# Patient Record
Sex: FEMALE | Race: WHITE | Employment: FULL TIME | URBAN - METROPOLITAN AREA
[De-identification: names, ages, dates, MRNs, and addresses within clinical notes are randomized per-mention and may not be internally consistent; named-entity substitution may affect disease eponyms.]

---

## 2017-01-08 ENCOUNTER — APPOINTMENT (OUTPATIENT)
Dept: CT IMAGING | Age: 37
End: 2017-01-08
Attending: NURSE PRACTITIONER
Payer: MEDICAID

## 2017-01-08 ENCOUNTER — HOSPITAL ENCOUNTER (EMERGENCY)
Age: 37
Discharge: HOME | End: 2017-01-08
Attending: EMERGENCY MEDICINE
Payer: MEDICAID

## 2017-01-08 VITALS
HEART RATE: 86 BPM | SYSTOLIC BLOOD PRESSURE: 105 MMHG | WEIGHT: 182.76 LBS | RESPIRATION RATE: 16 BRPM | OXYGEN SATURATION: 99 % | HEIGHT: 64 IN | TEMPERATURE: 97.7 F | BODY MASS INDEX: 31.2 KG/M2 | DIASTOLIC BLOOD PRESSURE: 76 MMHG

## 2017-01-08 DIAGNOSIS — K57.30 DIVERTICULOSIS OF LARGE INTESTINE WITHOUT HEMORRHAGE: Primary | ICD-10-CM

## 2017-01-08 DIAGNOSIS — K59.00 CONSTIPATION, UNSPECIFIED CONSTIPATION TYPE: ICD-10-CM

## 2017-01-08 LAB
ALBUMIN SERPL BCP-MCNC: 3.5 G/DL (ref 3.5–5)
ALBUMIN/GLOB SERPL: 0.8 {RATIO} (ref 1.1–2.2)
ALP SERPL-CCNC: 110 U/L (ref 45–117)
ALT SERPL-CCNC: 23 U/L (ref 12–78)
ANION GAP BLD CALC-SCNC: 9 MMOL/L (ref 5–15)
APPEARANCE UR: CLEAR
AST SERPL W P-5'-P-CCNC: 12 U/L (ref 15–37)
BACTERIA URNS QL MICRO: NEGATIVE /HPF
BASOPHILS # BLD AUTO: 0.1 K/UL (ref 0–0.1)
BASOPHILS # BLD: 1 % (ref 0–1)
BILIRUB SERPL-MCNC: 0.5 MG/DL (ref 0.2–1)
BILIRUB UR QL: NEGATIVE
BUN SERPL-MCNC: 24 MG/DL (ref 6–20)
BUN/CREAT SERPL: 26 (ref 12–20)
CALCIUM SERPL-MCNC: 9.3 MG/DL (ref 8.5–10.1)
CHLORIDE SERPL-SCNC: 105 MMOL/L (ref 97–108)
CO2 SERPL-SCNC: 25 MMOL/L (ref 21–32)
COLOR UR: ABNORMAL
CREAT SERPL-MCNC: 0.93 MG/DL (ref 0.55–1.02)
EOSINOPHIL # BLD: 0.1 K/UL (ref 0–0.4)
EOSINOPHIL NFR BLD: 2 % (ref 0–7)
EPITH CASTS URNS QL MICRO: ABNORMAL /LPF
ERYTHROCYTE [DISTWIDTH] IN BLOOD BY AUTOMATED COUNT: 14.2 % (ref 11.5–14.5)
GLOBULIN SER CALC-MCNC: 4.3 G/DL (ref 2–4)
GLUCOSE SERPL-MCNC: 72 MG/DL (ref 65–100)
GLUCOSE UR STRIP.AUTO-MCNC: NEGATIVE MG/DL
HCT VFR BLD AUTO: 45 % (ref 35–47)
HGB BLD-MCNC: 14.9 G/DL (ref 11.5–16)
HGB UR QL STRIP: ABNORMAL
KETONES UR QL STRIP.AUTO: NEGATIVE MG/DL
LEUKOCYTE ESTERASE UR QL STRIP.AUTO: NEGATIVE
LIPASE SERPL-CCNC: 159 U/L (ref 73–393)
LYMPHOCYTES # BLD AUTO: 38 % (ref 12–49)
LYMPHOCYTES # BLD: 2.9 K/UL (ref 0.8–3.5)
MCH RBC QN AUTO: 30.2 PG (ref 26–34)
MCHC RBC AUTO-ENTMCNC: 33.1 G/DL (ref 30–36.5)
MCV RBC AUTO: 91.1 FL (ref 80–99)
MONOCYTES # BLD: 0.4 K/UL (ref 0–1)
MONOCYTES NFR BLD AUTO: 5 % (ref 5–13)
NEUTS SEG # BLD: 4.2 K/UL (ref 1.8–8)
NEUTS SEG NFR BLD AUTO: 54 % (ref 32–75)
NITRITE UR QL STRIP.AUTO: NEGATIVE
PH UR STRIP: 5.5 [PH] (ref 5–8)
PLATELET # BLD AUTO: 383 K/UL (ref 150–400)
POTASSIUM SERPL-SCNC: 4.6 MMOL/L (ref 3.5–5.1)
PROT SERPL-MCNC: 7.8 G/DL (ref 6.4–8.2)
PROT UR STRIP-MCNC: NEGATIVE MG/DL
RBC # BLD AUTO: 4.94 M/UL (ref 3.8–5.2)
RBC #/AREA URNS HPF: ABNORMAL /HPF (ref 0–5)
SODIUM SERPL-SCNC: 139 MMOL/L (ref 136–145)
SP GR UR REFRACTOMETRY: 1.02 (ref 1–1.03)
UA: UC IF INDICATED,UAUC: ABNORMAL
UROBILINOGEN UR QL STRIP.AUTO: 0.2 EU/DL (ref 0.2–1)
WBC # BLD AUTO: 7.7 K/UL (ref 3.6–11)
WBC URNS QL MICRO: ABNORMAL /HPF (ref 0–4)

## 2017-01-08 PROCEDURE — 80053 COMPREHEN METABOLIC PANEL: CPT | Performed by: EMERGENCY MEDICINE

## 2017-01-08 PROCEDURE — 96361 HYDRATE IV INFUSION ADD-ON: CPT

## 2017-01-08 PROCEDURE — 81001 URINALYSIS AUTO W/SCOPE: CPT | Performed by: NURSE PRACTITIONER

## 2017-01-08 PROCEDURE — 85025 COMPLETE CBC W/AUTO DIFF WBC: CPT | Performed by: EMERGENCY MEDICINE

## 2017-01-08 PROCEDURE — 83690 ASSAY OF LIPASE: CPT | Performed by: EMERGENCY MEDICINE

## 2017-01-08 PROCEDURE — 74011250636 HC RX REV CODE- 250/636: Performed by: NURSE PRACTITIONER

## 2017-01-08 PROCEDURE — 36415 COLL VENOUS BLD VENIPUNCTURE: CPT | Performed by: EMERGENCY MEDICINE

## 2017-01-08 PROCEDURE — 74176 CT ABD & PELVIS W/O CONTRAST: CPT

## 2017-01-08 PROCEDURE — 96374 THER/PROPH/DIAG INJ IV PUSH: CPT

## 2017-01-08 PROCEDURE — 99283 EMERGENCY DEPT VISIT LOW MDM: CPT

## 2017-01-08 RX ORDER — SODIUM CHLORIDE 9 MG/ML
100 INJECTION, SOLUTION INTRAVENOUS CONTINUOUS
Status: DISCONTINUED | OUTPATIENT
Start: 2017-01-08 | End: 2017-01-08 | Stop reason: HOSPADM

## 2017-01-08 RX ORDER — MORPHINE SULFATE 4 MG/ML
4 INJECTION, SOLUTION INTRAMUSCULAR; INTRAVENOUS
Status: COMPLETED | OUTPATIENT
Start: 2017-01-08 | End: 2017-01-08

## 2017-01-08 RX ADMIN — SODIUM CHLORIDE 100 ML/HR: 900 INJECTION, SOLUTION INTRAVENOUS at 17:53

## 2017-01-08 RX ADMIN — MORPHINE SULFATE 4 MG: 4 INJECTION, SOLUTION INTRAMUSCULAR; INTRAVENOUS at 17:53

## 2017-01-08 NOTE — ED PROVIDER NOTES
HPI Comments: Rita Beard, 31 y.o. female, presents ambulatory to ED [facility] ED with cc of acute onset sharp epigastric abdominal pain that radiates to her mid back that onset yesterday. The patient notes a history of gall bladder complications, and she notes that current symptoms are similar. The patient notes that she took Ibuprofen with no relief. The patient also reports that she has a surgical history significant for a hysterectomy. The patient denies any exacerbating/relieving factors to her symptoms. The patient specifically denies diarrhea, urinary complaints, or other acute complaints at this time. PCP: Terry Dyson MD    Social history significant for: + Tobacco (1 PPD), - EtOH, - Illicit Drug Use    There are no other complaints, changes, or physical findings at this time. Written by ANGELICA Sanchez, as dictated by Stephan Hi P-BC. The history is provided by the patient. No  was used. Past Medical History:   Diagnosis Date    ADD (attention deficit disorder) 19-17yo     Treated with Adderall since . 2013 dosing 20mg AM and 10mg PM.  Trial/change to Vyvanse Nov-Dec 2015--not as effective.  Headache(784.0)      migraines    HX OTHER MEDICAL       -BUFA baby    HX OTHER MEDICAL      HPV positive    Idiopathic vulvodynia 2014    IUD (intrauterine device) in place 2015     Mirena IUD placed on 4/17/15    Migraines 2013    miscarriages     Neurological disorder     Pelvic pain in female 9/15/2014     2015 gyn laparoscopy/hysteroscopy with endometriosis worse right.     Psychiatric disorder      depression    Psychiatric problem      depression    Secondary dysmenorrhea 2014     With menorraghia    Seizures ([facility] [address]      never on meds--had appr 4-5 in a short amt of time and none since       Past Surgical History:   Procedure Laterality Date    Endometrial cryoablation      Hx gyn  4/17/15 laparoscopy and hysteroscopy and IUD placement    Hx hysterectomy  04/04/2016     Complete Hysterectomy         Family History:   Problem Relation Age of Onset    Cancer Mother     Alcohol abuse Father      and drug    Psychiatric Disorder Father     Cancer Father     Diabetes Maternal Grandmother     Hypertension Maternal Grandmother     Thyroid Disease Maternal Grandmother     Diabetes Maternal Grandfather     Hypertension Maternal Grandfather     Cancer Maternal Grandfather     Heart Disease Maternal Grandfather     Cancer Paternal Grandmother     Diabetes Paternal Grandmother        Social History     Social History    Marital status: SINGLE     Spouse name: N/A    Number of children: N/A    Years of education: N/A     Occupational History    Not on file. Social History Main Topics    Smoking status: Current Every Day Smoker     Packs/day: 1.00     Years: 18.00     Types: Cigarettes    Smokeless tobacco: Never Used      Comment: Information given with AVS.    Alcohol use No    Drug use: No    Sexual activity: Yes     Partners: Male     Birth control/ protection: Surgical     Other Topics Concern    Not on file     Social History Narrative    ** Merged History Encounter **          ALLERGIES: Aspirin and Penicillins    Review of Systems   Constitutional: Negative. Negative for chills, diaphoresis and fever. HENT: Negative. Negative for congestion, rhinorrhea and trouble swallowing. Eyes: Negative. Respiratory: Negative. Negative for shortness of breath. Cardiovascular: Negative. Gastrointestinal: Positive for abdominal pain. Negative for diarrhea, nausea and vomiting. Endocrine: Negative. Genitourinary: Negative. Negative for difficulty urinating and dysuria. Musculoskeletal: Negative. Negative for arthralgias, myalgias, neck pain and neck stiffness. Skin: Negative. Negative for rash. Allergic/Immunologic: Negative. Neurological: Negative.   Negative for dizziness, syncope, weakness and headaches. Hematological: Negative. Psychiatric/Behavioral: Negative. All other systems reviewed and are negative. Vitals:    01/08/17 1529 01/08/17 1840   BP: 108/66 105/76   Pulse: 100 86   Resp: 12 16   Temp: 97.7 °F (36.5 °C)    SpO2: 100% 99%   Weight: 82.9 kg (182 lb 12.2 oz)    Height: 5' 4\" (1.626 m)             Physical Exam   Constitutional: She is oriented to person, place, and time. Vital signs are normal. She appears well-developed and well-nourished. Non-toxic appearance. She does not have a sickly appearance. She does not appear ill. HENT:   Head: Normocephalic and atraumatic. Eyes: Conjunctivae, EOM and lids are normal. Pupils are equal, round, and reactive to light. Neck: Trachea normal, normal range of motion and full passive range of motion without pain. Neck supple. Cardiovascular: Normal rate, regular rhythm, normal heart sounds and normal pulses. Pulmonary/Chest: Effort normal and breath sounds normal.   Abdominal: Soft. Normal appearance. There is tenderness in the right upper quadrant and epigastric area. There is no CVA tenderness. Active bowel sounds in all four quadrants   Musculoskeletal: Normal range of motion. Neurological: She is alert and oriented to person, place, and time. She has normal strength. GCS eye subscore is 4. GCS verbal subscore is 5. GCS motor subscore is 6. Skin: Skin is warm, dry and intact. Psychiatric: She has a normal mood and affect. Her speech is normal and behavior is normal. Judgment and thought content normal. Cognition and memory are normal.   Nursing note and vitals reviewed.        MDM  Number of Diagnoses or Management Options  Constipation, unspecified constipation type: minor  Diverticulosis of large intestine without hemorrhage: minor  Diagnosis management comments:   DDx: Cholecystitis, kidney stone, diverticulitis       Amount and/or Complexity of Data Reviewed  Clinical lab tests: ordered and reviewed  Tests in the radiology section of CPT®: ordered and reviewed  Review and summarize past medical records: yes    Risk of Complications, Morbidity, and/or Mortality  Presenting problems: low  Diagnostic procedures: moderate  Management options: low    Patient Progress  Patient progress: stable    ED Course       Procedures    Progress Note:  7:15 PM  Upon discharge, the patient's lab and imaging results were discussed with the patient. The patient conveys her understanding of the results and agreement with the plan of care. Written by Aaliyah Newberry ED Scribe, as dictated by Lino Hi [facility]-BC. LABORATORY TESTS:  Recent Results (from the past 12 hour(s))   CBC WITH AUTOMATED DIFF    Collection Time: 01/08/17  3:50 PM   Result Value Ref Range    WBC 7.7 3.6 - 11.0 K/uL    RBC 4.94 3.80 - 5.20 M/uL    HGB 14.9 11.5 - 16.0 g/dL    HCT 45.0 35.0 - 47.0 %    MCV 91.1 80.0 - 99.0 FL    MCH 30.2 26.0 - 34.0 PG    MCHC 33.1 30.0 - 36.5 g/dL    RDW 14.2 11.5 - 14.5 %    PLATELET 638 044 - 646 K/uL    NEUTROPHILS 54 32 - 75 %    LYMPHOCYTES 38 12 - 49 %    MONOCYTES 5 5 - 13 %    EOSINOPHILS 2 0 - 7 %    BASOPHILS 1 0 - 1 %    ABS. NEUTROPHILS 4.2 1.8 - 8.0 K/UL    ABS. LYMPHOCYTES 2.9 0.8 - 3.5 K/UL    ABS. MONOCYTES 0.4 0.0 - 1.0 K/UL    ABS. EOSINOPHILS 0.1 0.0 - 0.4 K/UL    ABS. BASOPHILS 0.1 0.0 - 0.1 K/UL   METABOLIC PANEL, COMPREHENSIVE    Collection Time: 01/08/17  3:50 PM   Result Value Ref Range    Sodium 139 136 - 145 mmol/L    Potassium 4.6 3.5 - 5.1 mmol/L    Chloride 105 97 - 108 mmol/L    CO2 25 21 - 32 mmol/L    Anion gap 9 5 - 15 mmol/L    Glucose 72 65 - 100 mg/dL    BUN 24 (H) 6 - 20 MG/DL    Creatinine 0.93 0.55 - 1.02 MG/DL    BUN/Creatinine ratio 26 (H) 12 - 20      GFR est AA >60 >60 ml/min/1.73m2    GFR est non-AA >60 >60 ml/min/1.73m2    Calcium 9.3 8.5 - 10.1 MG/DL    Bilirubin, total 0.5 0.2 - 1.0 MG/DL    ALT 23 12 - 78 U/L    AST 12 (L) 15 - 37 U/L    Alk.  phosphatase 110 45 - 117 U/L    Protein, total 7.8 6.4 - 8.2 g/dL    Albumin 3.5 3.5 - 5.0 g/dL    Globulin 4.3 (H) 2.0 - 4.0 g/dL    A-G Ratio 0.8 (L) 1.1 - 2.2     LIPASE    Collection Time: 01/08/17  3:50 PM   Result Value Ref Range    Lipase 159 73 - 393 U/L   URINALYSIS W/ REFLEX CULTURE    Collection Time: 01/08/17  5:56 PM   Result Value Ref Range    Color YELLOW/STRAW      Appearance CLEAR CLEAR      Specific gravity 1.025 1.003 - 1.030      pH (UA) 5.5 5.0 - 8.0      Protein NEGATIVE  NEG mg/dL    Glucose NEGATIVE  NEG mg/dL    Ketone NEGATIVE  NEG mg/dL    Bilirubin NEGATIVE  NEG      Blood TRACE (A) NEG      Urobilinogen 0.2 0.2 - 1.0 EU/dL    Nitrites NEGATIVE  NEG      Leukocyte Esterase NEGATIVE  NEG      WBC 0-4 0 - 4 /hpf    RBC 5-10 0 - 5 /hpf    Epithelial cells FEW FEW /lpf    Bacteria NEGATIVE  NEG /hpf    UA:UC IF INDICATED CULTURE NOT INDICATED BY UA RESULT CNI         IMAGING RESULTS:      EXAM: CT ABD PELV WO CONT     INDICATION: Epigastric pain     COMPARISON: 11/10/2016.     CONTRAST: None.      TECHNIQUE:   Unenhanced multislice helical CT was performed from the diaphragm to the  symphysis pubis without oral or intravenous contrast administration. Contiguous  5 mm axial images were reconstructed and lung and soft tissue windows were  generated. Coronal and sagittal reformations were generated. CT dose reduction  was achieved through use of a standardized protocol tailored for this  examination and automatic exposure control for dose modulation.     FINDINGS:  The visualized portions of the lung bases are clear.     The absence of intravenous contrast material reduces the sensitivity for  evaluation of the solid parenchymal organs of the abdomen. The liver, spleen,  bilateral adrenal glands, pancreas, and gallbladder are unremarkable. The  bilateral kidneys are unremarkable without evidence of nephrolithiasis or  hydronephrosis.     The abdominal aorta is normal in size.  There is no lymphadenopathy.     The stomach is decompressed. The small bowel is unremarkable. The appendix is  normal. A mild amount of stool is seen throughout the colon. Several diverticula  are seen in the distal left colon. No evidence of diverticulitis. No  obstruction, free fluid, or free air.     The patient is status post hysterectomy. The bladder is unremarkable.     No evidence of a destructive osseous lesion.     IMPRESSION  IMPRESSION: No evidence of acute process         MEDICATIONS GIVEN:  Medications   0.9% sodium chloride infusion (0 mL/hr IntraVENous IV Completed 1/8/17 1943)   morphine injection 4 mg (4 mg IntraVENous Given 1/8/17 6313)       IMPRESSION:  1. Diverticulosis of large intestine without hemorrhage    2. Constipation, unspecified constipation type        PLAN:  1. Current Discharge Medication List      CONTINUE these medications which have NOT CHANGED    Details   amphetamine-dextroamphetamine XR (ADDERALL XR) 20 mg XR capsule Take 1 Cap by mouth daily. Refills: 0      amphetamine-dextroamphetamine XR (ADDERALL XR) 25 mg XR capsule Take 1 Cap by mouth daily. Refills: 0           2. Follow-up Information     Follow up With Details Comments [address] [facility] [facility], [address] [facility] and [place] [address]  [phone]          Return to ED if worse     DISCHARGE NOTE  7:24 PM  The patient has been re-evaluated and is ready for discharge. Reviewed available results with patient. Counseled patient on diagnosis and care plan. Patient has expressed understanding, and all questions have been answered. Patient agrees with plan and agrees to follow up as recommended, or return to the ED if their symptoms worsen. Discharge instructions have been provided and explained to the patient, along with reasons to return to the ED. This note is prepared by Lorin Carlton, acting as Scribe for [employer]. Pagé FNP-BC. Rob Degroot  Pagé FNP-BC: The scribe's documentation has been prepared under my direction and personally reviewed by me in its entirety. I confirm that the note above accurately reflects all work, treatment, procedures, and medical decision making performed by me.

## 2017-01-08 NOTE — ED NOTES
Pt presents to ED for abdominal pain mid abdomen, epigastric that radiates straight through to back x yesterday. Pt reports she had an episodes of this pain 10 years ago, told gallbladder at that time, pain subsided. Pt denies alcohol intake or greasy food. Pt alert and oriented x 4. Pt denies nausea, vomiting, diarrhea.

## 2017-01-09 ENCOUNTER — OFFICE VISIT (OUTPATIENT)
Dept: INTERNAL MEDICINE CLINIC | Age: 37
End: 2017-01-09

## 2017-01-09 VITALS
DIASTOLIC BLOOD PRESSURE: 75 MMHG | HEART RATE: 95 BPM | OXYGEN SATURATION: 99 % | WEIGHT: 182 LBS | TEMPERATURE: 97.9 F | RESPIRATION RATE: 17 BRPM | HEIGHT: 64 IN | BODY MASS INDEX: 31.07 KG/M2 | SYSTOLIC BLOOD PRESSURE: 110 MMHG

## 2017-01-09 DIAGNOSIS — R10.13 MIDEPIGASTRIC PAIN: ICD-10-CM

## 2017-01-09 DIAGNOSIS — F98.8 ADD (ATTENTION DEFICIT DISORDER): ICD-10-CM

## 2017-01-09 DIAGNOSIS — K29.00 ACUTE GASTRITIS WITHOUT HEMORRHAGE, UNSPECIFIED GASTRITIS TYPE: Primary | ICD-10-CM

## 2017-01-09 DIAGNOSIS — R10.11 RUQ ABDOMINAL PAIN: ICD-10-CM

## 2017-01-09 RX ORDER — ESOMEPRAZOLE MAGNESIUM 40 MG/1
40 CAPSULE, DELAYED RELEASE ORAL DAILY
Qty: 30 CAP | Refills: 1 | Status: SHIPPED | OUTPATIENT
Start: 2017-01-09 | End: 2017-01-23

## 2017-01-09 RX ORDER — DEXTROAMPHETAMINE SACCHARATE, AMPHETAMINE ASPARTATE MONOHYDRATE, DEXTROAMPHETAMINE SULFATE AND AMPHETAMINE SULFATE 5; 5; 5; 5 MG/1; MG/1; MG/1; MG/1
20 CAPSULE, EXTENDED RELEASE ORAL
Qty: 30 CAP | Refills: 0 | Status: SHIPPED | OUTPATIENT
Start: 2017-02-13 | End: 2017-01-09 | Stop reason: SDUPTHER

## 2017-01-09 RX ORDER — DEXTROAMPHETAMINE SACCHARATE, AMPHETAMINE ASPARTATE MONOHYDRATE, DEXTROAMPHETAMINE SULFATE AND AMPHETAMINE SULFATE 6.25; 6.25; 6.25; 6.25 MG/1; MG/1; MG/1; MG/1
25 CAPSULE, EXTENDED RELEASE ORAL
Qty: 30 CAP | Refills: 0 | Status: SHIPPED | OUTPATIENT
Start: 2017-01-10 | End: 2017-06-17

## 2017-01-09 RX ORDER — DEXTROAMPHETAMINE SACCHARATE, AMPHETAMINE ASPARTATE MONOHYDRATE, DEXTROAMPHETAMINE SULFATE AND AMPHETAMINE SULFATE 6.25; 6.25; 6.25; 6.25 MG/1; MG/1; MG/1; MG/1
25 CAPSULE, EXTENDED RELEASE ORAL
Qty: 30 CAP | Refills: 0 | Status: SHIPPED | OUTPATIENT
Start: 2017-01-14 | End: 2017-01-09 | Stop reason: SDUPTHER

## 2017-01-09 RX ORDER — DEXTROAMPHETAMINE SACCHARATE, AMPHETAMINE ASPARTATE MONOHYDRATE, DEXTROAMPHETAMINE SULFATE AND AMPHETAMINE SULFATE 6.25; 6.25; 6.25; 6.25 MG/1; MG/1; MG/1; MG/1
25 CAPSULE, EXTENDED RELEASE ORAL DAILY
Qty: 30 CAP | Refills: 0 | Status: SHIPPED | OUTPATIENT
Start: 2017-03-11 | End: 2017-06-17

## 2017-01-09 RX ORDER — DEXTROAMPHETAMINE SACCHARATE, AMPHETAMINE ASPARTATE MONOHYDRATE, DEXTROAMPHETAMINE SULFATE AND AMPHETAMINE SULFATE 6.25; 6.25; 6.25; 6.25 MG/1; MG/1; MG/1; MG/1
25 CAPSULE, EXTENDED RELEASE ORAL DAILY
Qty: 30 CAP | Refills: 0 | Status: SHIPPED | OUTPATIENT
Start: 2017-03-15 | End: 2017-01-09 | Stop reason: SDUPTHER

## 2017-01-09 RX ORDER — DEXTROAMPHETAMINE SACCHARATE, AMPHETAMINE ASPARTATE MONOHYDRATE, DEXTROAMPHETAMINE SULFATE AND AMPHETAMINE SULFATE 6.25; 6.25; 6.25; 6.25 MG/1; MG/1; MG/1; MG/1
25 CAPSULE, EXTENDED RELEASE ORAL
Qty: 30 CAP | Refills: 0 | Status: SHIPPED | OUTPATIENT
Start: 2017-02-13 | End: 2017-01-09 | Stop reason: SDUPTHER

## 2017-01-09 RX ORDER — DEXTROAMPHETAMINE SACCHARATE, AMPHETAMINE ASPARTATE MONOHYDRATE, DEXTROAMPHETAMINE SULFATE AND AMPHETAMINE SULFATE 6.25; 6.25; 6.25; 6.25 MG/1; MG/1; MG/1; MG/1
25 CAPSULE, EXTENDED RELEASE ORAL
Qty: 30 CAP | Refills: 0 | Status: SHIPPED | OUTPATIENT
Start: 2017-02-09 | End: 2017-06-17

## 2017-01-09 RX ORDER — DEXTROAMPHETAMINE SACCHARATE, AMPHETAMINE ASPARTATE MONOHYDRATE, DEXTROAMPHETAMINE SULFATE AND AMPHETAMINE SULFATE 5; 5; 5; 5 MG/1; MG/1; MG/1; MG/1
20 CAPSULE, EXTENDED RELEASE ORAL
Qty: 30 CAP | Refills: 0 | Status: SHIPPED | OUTPATIENT
Start: 2017-01-14 | End: 2017-01-09 | Stop reason: SDUPTHER

## 2017-01-09 RX ORDER — DEXTROAMPHETAMINE SACCHARATE, AMPHETAMINE ASPARTATE MONOHYDRATE, DEXTROAMPHETAMINE SULFATE AND AMPHETAMINE SULFATE 5; 5; 5; 5 MG/1; MG/1; MG/1; MG/1
20 CAPSULE, EXTENDED RELEASE ORAL DAILY
Qty: 30 CAP | Refills: 0 | Status: SHIPPED | OUTPATIENT
Start: 2017-03-15 | End: 2017-01-09 | Stop reason: SDUPTHER

## 2017-01-09 RX ORDER — DEXTROAMPHETAMINE SACCHARATE, AMPHETAMINE ASPARTATE MONOHYDRATE, DEXTROAMPHETAMINE SULFATE AND AMPHETAMINE SULFATE 5; 5; 5; 5 MG/1; MG/1; MG/1; MG/1
20 CAPSULE, EXTENDED RELEASE ORAL
Qty: 30 CAP | Refills: 0 | Status: SHIPPED | OUTPATIENT
Start: 2017-02-09 | End: 2017-06-17

## 2017-01-09 RX ORDER — DEXTROAMPHETAMINE SACCHARATE, AMPHETAMINE ASPARTATE MONOHYDRATE, DEXTROAMPHETAMINE SULFATE AND AMPHETAMINE SULFATE 5; 5; 5; 5 MG/1; MG/1; MG/1; MG/1
20 CAPSULE, EXTENDED RELEASE ORAL
Qty: 30 CAP | Refills: 0 | Status: SHIPPED | OUTPATIENT
Start: 2017-01-10 | End: 2017-06-17

## 2017-01-09 RX ORDER — DEXTROAMPHETAMINE SACCHARATE, AMPHETAMINE ASPARTATE MONOHYDRATE, DEXTROAMPHETAMINE SULFATE AND AMPHETAMINE SULFATE 5; 5; 5; 5 MG/1; MG/1; MG/1; MG/1
20 CAPSULE, EXTENDED RELEASE ORAL DAILY
Qty: 30 CAP | Refills: 0 | Status: SHIPPED | OUTPATIENT
Start: 2017-03-11 | End: 2017-06-17

## 2017-01-09 NOTE — ED NOTES
Bedside and Verbal shift change report given to Marlin RN (oncoming nurse) by Abraham Gomez RN (offgoing nurse). Report included the following information SBAR and ED Summary.

## 2017-01-09 NOTE — PATIENT INSTRUCTIONS
Please call [facility] to schedule your ultrasound at [phone]. Please schedule US if not improving on the Nexium or stomach acid blocking medicine. If the Nexium is not covered, please let us know, as pharmacy/insurance should cover an alternative. If not able to find alternative, can take omeprazole 40mg daily OTC in interim. If medicine not helping, and pain continues, may need to see GI to evaluate.

## 2017-01-09 NOTE — PROGRESS NOTES
HISTORY OF PRESENT ILLNESS  Herminia Frausto is a 31 y.o. female. HPI   Here for ADD medication follow-up, and for eval/follow-up of abdominal pain. She notes eval yesterday in ED--reviewed. --normal labs:  CMP, H18, lipase; UA with trace blood. --normal CT abd/pelvis without clear cause noted. Wt Readings from Last 3 Encounters:   01/09/17 182 lb (82.6 kg)   01/08/17 182 lb 12.2 oz (82.9 kg)   12/02/16 177 lb (80.3 kg)       Prescription Monitoring Program ([place]) database query with fills:  12/15/2016 DEXTROAMP-AMPHET ER 25 MG CAP 30.  12/11/2016 DEXTROAMP-AMPHET ER 20 MG CAP 30.  11/11/2016 DEXTROAMP-AMPHET ER 25 MG CAP 30.  11/11/2016 DEXTROAMP-AMPHET ER 20 MG CAP 30. Notes prior Adderall refill was approved, as above--the 12/15 fill. She needed authorization due to change in her Medicaid plan. She notes has had pain like current abd pain, but was some time ago. Notes some burning character to pain. Notes had when younger when she used to drink alcohol more--not drinking currently. Reviewed possible gastritis, as alternative to ED constipation dx. She is agreeable with plan as below. ROS      Blood pressure 110/75, pulse 95, temperature 97.9 °F (36.6 °C), temperature source Oral, resp. rate 17, height 5' 4\" (1.626 m), weight 182 lb (82.6 kg), last menstrual period 03/14/2016, SpO2 99 %. Physical Exam   Constitutional: She appears well-developed and well-nourished. No distress. HENT:   Head: Normocephalic and atraumatic. Eyes: Conjunctivae are normal. Right eye exhibits no discharge. Left eye exhibits no discharge. No scleral icterus. Neck: Neck supple. Cardiovascular: Normal rate, regular rhythm, normal heart sounds and intact distal pulses. Exam reveals no gallop and no friction rub. No murmur heard. Pulmonary/Chest: Effort normal and breath sounds normal. No respiratory distress. She has no wheezes. She has no rales. She exhibits no tenderness.    Abdominal: Soft. Bowel sounds are normal. She exhibits no distension and no mass. There is tenderness (mid-epigastric and RUQ--mild. ). There is no rebound and no guarding. Musculoskeletal: She exhibits no edema, tenderness or deformity. Neurological: She is alert. She exhibits normal muscle tone. Coordination normal.   Skin: Skin is warm. No rash noted. She is not diaphoretic. No erythema. No pallor. Psychiatric: She has a normal mood and affect. Her behavior is normal. Judgment and thought content normal.       ASSESSMENT and PLAN    ICD-10-CM ICD-9-CM    1. Acute gastritis without hemorrhage, unspecified gastritis type K29.00 535.00 esomeprazole (NEXIUM) 40 mg capsule      US ABD LTD   2. ADD (attention deficit disorder) F98.8 314.00 amphetamine-dextroamphetamine XR (ADDERALL XR) 20 mg XR capsule      amphetamine-dextroamphetamine XR (ADDERALL XR) 25 mg XR capsule      amphetamine-dextroamphetamine XR (ADDERALL XR) 25 mg XR capsule      amphetamine-dextroamphetamine XR (ADDERALL XR) 20 mg XR capsule      amphetamine-dextroamphetamine XR (ADDERALL XR) 20 mg XR capsule      amphetamine-dextroamphetamine XR (ADDERALL XR) 25 mg XR capsule   3. Midepigastric pain R10.13 789.06 esomeprazole (NEXIUM) 40 mg capsule      US ABD LTD   4. RUQ abdominal pain R10.11 789.01 US ABD LTD       1,3,4:  If PPI not helping, plan US, although no GB problems on CT. If US normal, plan see GI to evaluate. 2.  Refills reviewed. One script mis-printed (Jan 2017 fill)and re-printed at visit.       Follow-up Disposition:  Return in about 3 months (around 4/9/2017), or if symptoms worsen or fail to improve, for medication follow-up.  lab results and schedule of future lab studies reviewed with patient  reviewed diet, exercise and weight control  reviewed medications and side effects in detail  radiology results and schedule of future radiology studies reviewed with patient    For additional documentation of information and/or recommendations discussed this visit, please see notes in instructions. Addendum:  Pt requested 20mg script for Adderall be written earlier than initial order, as out. Not due to fill 20mg script until tomorrow. The 20mg and 25mg scripts written to be filled at same time, even though last scripts staggered with fill by 4 days. Re-printed for pt at visit for 3mo supply.

## 2017-01-09 NOTE — MR AVS SNAPSHOT
Visit Information Date & Time Provider Department Dept. Phone Encounter #  
 1/9/2017  8:30 AM David Pulido, [address] [place] [address] and Internal Medicine [phone] [id number] Follow-up Instructions Return in about 3 months (around 4/9/2017), or if symptoms worsen or fail to improve, for medication follow-up. Upcoming Health Maintenance Date Due Pneumococcal 19-64 Medium Risk (1 of 1 - PPSV23) 11/9/2001 DTaP/Tdap/Td series (1 - Tdap) 11/9/2003 PAP AKA CERVICAL CYTOLOGY 8/12/2017 Allergies as of 1/9/2017  Review Complete On: 1/9/2017 By: David Pulido MD  
  
 Severity Noted Reaction Type Reactions Aspirin High 05/30/2013    Other (comments) Hot sweats and passed out; tolerated ibuprofen Penicillins  08/27/2013    Other (comments) Childhood. Does not remember reaction. Is not aware if she has ever taken cephalosporins in the past.  
  
Current Immunizations  Reviewed on 11/21/2013 Name Date Influenza Vaccine 9/26/2013 Influenza Vaccine (Quad) PF 10/12/2016, 10/13/2015, 12/12/2014 Influenza Vaccine Split 9/27/2012 11:34 AM  
  
 Not reviewed this visit You Were Diagnosed With   
  
 Codes Comments Acute gastritis without hemorrhage, unspecified gastritis type    -  Primary ICD-10-CM: K29.00 ICD-9-CM: 535.00   
 ADD (attention deficit disorder)     ICD-10-CM: F98.8 ICD-9-CM: 314.00 Midepigastric pain     ICD-10-CM: R10.13 ICD-9-CM: 789.06   
 RUQ abdominal pain     ICD-10-CM: R10.11 ICD-9-CM: 789.01 Vitals BP Pulse Temp Resp Height(growth percentile) Weight(growth percentile) 110/75 (BP 1 Location: Right arm, BP Patient Position: Sitting) 95 97.9 °F (36.6 °C) (Oral) 17 5' 4\" (1.626 m) 182 lb (82.6 kg) LMP SpO2 BMI OB Status Smoking Status 03/14/2016 99% 31.24 kg/m2 Hysterectomy Current Every Day Smoker Vitals History BMI and BSA Data Body Mass Index Body Surface Area 31.24 kg/m 2 1.93 m 2 Preferred Pharmacy Pharmacy Name Phone [id number] [address] [phone] Your Updated Medication List  
  
   
This list is accurate as of: 1/9/17  9:16 AM.  Always use your most recent med list.  
  
  
  
  
 * amphetamine-dextroamphetamine XR 25 mg XR capsule Commonly known as:  ADDERALL XR Take 1 Cap by mouth every morning. Max Daily Amount: 25 mg. Take daily with 20mg XR dose for 45mg total daily dose. Fill on or after January 14, 2017. Start taking on:  1/14/2017 * amphetamine-dextroamphetamine XR 20 mg XR capsule Commonly known as:  ADDERALL XR Take 1 Cap by mouth every morning. Max Daily Amount: 20 mg. Take daily with 25mg XR dose for 45mg total daily dose. Fill on or after January 14, 2017. Start taking on:  1/14/2017 * amphetamine-dextroamphetamine XR 25 mg XR capsule Commonly known as:  ADDERALL XR Take 1 Cap by mouth every morning. Max Daily Amount: 45 mg. Take daily with 20mg XR dose for 45mg total daily dose. Fill on or after February 13, 2017. Start taking on:  2/13/2017 * amphetamine-dextroamphetamine XR 20 mg XR capsule Commonly known as:  ADDERALL XR Take 1 Cap by mouth every morning. Max Daily Amount: 45 mg. Take daily with 25mg XR dose for 45mg total daily dose. Fill on or after February 13, 2017. Start taking on:  2/13/2017 * amphetamine-dextroamphetamine XR 20 mg XR capsule Commonly known as:  ADDERALL XR Take 1 Cap by mouth daily. Max Daily Amount: 20 mg. Take daily with 25mg XR dose for 45mg total daily dose. Fill on or after March 15, 2017. Start taking on:  3/15/2017 * amphetamine-dextroamphetamine XR 25 mg XR capsule Commonly known as:  ADDERALL XR Take 1 Cap by mouth daily. Max Daily Amount: 25 mg. Take daily with 20mg XR dose for 45mg total daily dose. Fill on or after March 15, 2017. Start taking on:  3/15/2017  
  
 esomeprazole 40 mg capsule Commonly known as:  NexIUM Take 1 Cap by mouth daily for 14 days. Take daily for 14 days, then as needed. * Notice: This list has 6 medication(s) that are the same as other medications prescribed for you. Read the directions carefully, and ask your doctor or other care provider to review them with you. Prescriptions Printed Refills  
 amphetamine-dextroamphetamine XR (ADDERALL XR) 20 mg XR capsule 0 Starting on: 3/15/2017 Sig: Take 1 Cap by mouth daily. Max Daily Amount: 20 mg. Take daily with 25mg XR dose for 45mg total daily dose. Fill on or after March 15, 2017. Class: Print Route: Oral  
 amphetamine-dextroamphetamine XR (ADDERALL XR) 25 mg XR capsule 0 Starting on: 3/15/2017 Sig: Take 1 Cap by mouth daily. Max Daily Amount: 25 mg. Take daily with 20mg XR dose for 45mg total daily dose. Fill on or after March 15, 2017. Class: Print Route: Oral  
 amphetamine-dextroamphetamine XR (ADDERALL XR) 25 mg XR capsule 0 Starting on: 2/13/2017 Sig: Take 1 Cap by mouth every morning. Max Daily Amount: 45 mg. Take daily with 20mg XR dose for 45mg total daily dose. Fill on or after February 13, 2017. Class: Print Route: Oral  
 amphetamine-dextroamphetamine XR (ADDERALL XR) 20 mg XR capsule 0 Starting on: 2/13/2017 Sig: Take 1 Cap by mouth every morning. Max Daily Amount: 45 mg. Take daily with 25mg XR dose for 45mg total daily dose. Fill on or after February 13, 2017. Class: Print Route: Oral  
 amphetamine-dextroamphetamine XR (ADDERALL XR) 25 mg XR capsule 0 Starting on: 1/14/2017 Sig: Take 1 Cap by mouth every morning. Max Daily Amount: 25 mg. Take daily with 20mg XR dose for 45mg total daily dose. Fill on or after January 14, 2017. Class: Print Route: Oral  
 amphetamine-dextroamphetamine XR (ADDERALL XR) 20 mg XR capsule 0 Starting on: 1/14/2017 Sig: Take 1 Cap by mouth every morning. Max Daily Amount: 20 mg. Take daily with 25mg XR dose for 45mg total daily dose. Fill on or after January 14, 2017. Class: Print Route: Oral  
  
Prescriptions Sent to Pharmacy Refills  
 esomeprazole (NEXIUM) 40 mg capsule 1 Sig: Take 1 Cap by mouth daily for 14 days. Take daily for 14 days, then as needed. Class: Normal  
 Pharmacy: [facility]/pharmacy #[id number]- [address]  #: [phone] Route: Oral  
  
Follow-up Instructions Return in about 3 months (around 4/9/2017), or if symptoms worsen or fail to improve, for medication follow-up. To-Do List   
 01/14/2017 Imaging:  US ABD LTD Patient Instructions Please call Ysabel Dunn to schedule your ultrasound at [phone]. Please schedule US if not improving on the Nexium or stomach acid blocking medicine. If the Nexium is not covered, please let us know, as pharmacy/insurance should cover an alternative. If not able to find alternative, can take omeprazole 40mg daily OTC in interim. If medicine not helping, and pain continues, may need to see GI to evaluate. Introducing [facility]! Dear Geovani Reese: Thank you for requesting a [employer] account. Our records indicate that you already have an active [employer] account. You can access your account anytime at [email or web address] Did you know that you can access your hospital and ER discharge instructions at any time in [employer]? You can also review all of your test results from your hospital stay or ER visit. Additional Information If you have questions, please visit the Frequently Asked Questions section of the [employer] website at [email or web address] Remember, [employer] is NOT to be used for urgent needs. For medical emergencies, dial 911. Now available from your iPhone and Android! Please provide this summary of care documentation to your next provider. Your primary care clinician is listed as [address] [facility]. If you have any questions after today's visit, please call [phone].

## 2017-01-09 NOTE — PROGRESS NOTES
Rm 16    Chief Complaint   Patient presents with   [address] [place] [address] Follow Up     patient seen in Er yesterday for abdominal pain     Patient states the pain started Saturday morning located in the middle of her stomach  Through to her back 4/10  Health Maintenance Due   Topic Date Due    Pneumococcal 19-64 Medium Risk (1 of 1 - PPSV23) 11/09/2001    DTaP/Tdap/Td series (1 - Tdap) 11/09/2003     1. Have you been to the ER, urgent care clinic since your last visit? Hospitalized since your last visit? yes/ [facility] Er/1/8/2016/abdominal pain    2. Have you seen or consulted any other health care providers outside of the [address] [place] [address] since your last visit? Include any pap smears or colon screening.  No    Learning Assessment 3/4/2016   PRIMARY LEARNER Patient   HIGHEST LEVEL OF EDUCATION - PRIMARY LEARNER  -   BARRIERS PRIMARY LEARNER -   PRIMARY LANGUAGE ENGLISH    NEED -   LEARNER PREFERENCE PRIMARY LISTENING   ANSWERED BY patient   RELATIONSHIP SELF     PHQ 2 / 9, over the last two weeks 7/8/2016   Little interest or pleasure in doing things More than half the days   Feeling down, depressed or hopeless More than half the days   Total Score PHQ 2 4     Living medical will information given at previous visit

## 2017-01-09 NOTE — DISCHARGE INSTRUCTIONS
Constipation: Care Instructions  Your Care Instructions  Constipation means that you have a hard time passing stools (bowel movements). People pass stools from 3 times a day to once every 3 days. What is normal for you may be different. Constipation may occur with pain in the rectum and cramping. The pain may get worse when you try to pass stools. Sometimes there are small amounts of bright red blood on toilet paper or the surface of stools. This is because of enlarged veins near the rectum (hemorrhoids). A few changes in your diet and lifestyle may help you avoid ongoing constipation. Your doctor may also prescribe medicine to help loosen your stool. Some medicines can cause constipation. These include pain medicines and antidepressants. Tell your doctor about all the medicines you take. Your doctor may want to make a medicine change to ease your symptoms. Follow-up care is a key part of your treatment and safety. Be sure to make and go to all appointments, and call your doctor if you are having problems. It's also a good idea to know your test results and keep a list of the medicines you take. How can you care for yourself at home? · Drink plenty of fluids, enough so that your urine is light yellow or clear like water. If you have kidney, heart, or liver disease and have to limit fluids, talk with your doctor before you increase the amount of fluids you drink. · Include high-fiber foods in your diet each day. These include fruits, vegetables, beans, and whole grains. · Get at least 30 minutes of exercise on most days of the week. Walking is a good choice. You also may want to do other activities, such as running, swimming, cycling, or playing tennis or team sports. · Take a fiber supplement, such as Citrucel or Metamucil, every day. Read and follow all instructions on the label. · Schedule time each day for a bowel movement. A daily routine may help.  Take your time having your bowel movement. · Support your feet with a small step stool when you sit on the toilet. This helps flex your hips and places your pelvis in a squatting position. · Your doctor may recommend an over-the-counter laxative to relieve your constipation. Examples are Milk of Magnesia and MiraLax. Read and follow all instructions on the label. Do not use laxatives on a long-term basis. When should you call for help? Call your doctor now or seek immediate medical care if:  · You have new or worse belly pain. · You have new or worse nausea or vomiting. · You have blood in your stools. Watch closely for changes in your health, and be sure to contact your doctor if:  · Your constipation is getting worse. · You do not get better as expected. Where can you learn more? Go to http://jalil-mo.info/. Enter [id number] in the search box to learn more about \"Constipation: Care Instructions. \"  Current as of: May 27, 2016  Content Version: 11.1  © 9971-4266 [employer]. Care instructions adapted under license by [employer] (which disclaims liability or warranty for this information). If you have questions about a medical condition or this instruction, always ask your healthcare professional. [address] any warranty or liability for your use of this information. Diverticulosis: Care Instructions  Your Care Instructions  In diverticulosis, pouches called diverticula form in the wall of the large intestine (colon). The pouches do not cause any pain or other symptoms. Most people who have diverticulosis do not know they have it. But the pouches sometimes bleed, and if they become infected, they can cause pain and other symptoms. When this happens, it is called diverticulitis. Diverticula form when pressure pushes the wall of the colon outward at certain weak points. A diet that is too low in fiber can cause diverticula.   Follow-up care is a key part of your treatment and safety. Be sure to make and go to all appointments, and call your doctor if you are having problems. It's also a good idea to know your test results and keep a list of the medicines you take. How can you care for yourself at home? · Include fruits, leafy green vegetables, beans, and whole grains in your diet each day. These foods are high in fiber. · Take a fiber supplement, such as Citrucel or Metamucil, every day if needed. Read and follow all instructions on the label. · Drink plenty of fluids, enough so that your urine is light yellow or clear like water. If you have kidney, heart, or liver disease and have to limit fluids, talk with your doctor before you increase the amount of fluids you drink. · Get at least 30 minutes of exercise on most days of the week. Walking is a good choice. You also may want to do other activities, such as running, swimming, cycling, or playing tennis or team sports. · Cut out foods that cause gas, pain, or other symptoms. When should you call for help? Call your doctor now or seek immediate medical care if:  · You have belly pain. · You pass maroon or very bloody stools. · You have a fever. · You have nausea and vomiting. · You have unusual changes in your bowel movements or abdominal swelling. · You have burning pain when you urinate. · You have abnormal vaginal discharge. · You have shoulder pain. · You have cramping pain that does not get better when you have a bowel movement or pass gas. · You pass gas or stool from your urethra while urinating. Watch closely for changes in your health, and be sure to contact your doctor if you have any problems. Where can you learn more? Go to http://jalil-mo.info/. Enter Y725 in the search box to learn more about \"Diverticulosis: Care Instructions. \"  Current as of: August 9, 2016  Content Version: 11.1  © 2718-0207 [employer], [employer].  Care instructions adapted under license by [employer] (which disclaims liability or warranty for this information). If you have questions about a medical condition or this instruction, always ask your healthcare professional. [address] any warranty or liability for your use of this information. Thank you for allowing us to provide you with excellent care today. We hope we addressed all of your concerns and needs. We strive to provide excellent quality care in the Emergency Department. Please rate us as excellent, as anything less than excellent does not meet our expectations. If you feel that you have not received excellent quality care or timely care, please ask to speak to the nurse manager. Please choose us in the future for your continued health care needs. The exam and treatment you received in the Emergency Department were for an urgent problem and are not intended as complete care. It is important that you follow-up with a doctor, nurse practitioner, or  [id number] assistant to: (1) confirm your diagnosis, (2) re-evaluation of changes in your illness and treatment, and (3) for ongoing care. If your symptoms become worse or you do not improve as expected and you are unable to reach your usual health care provider, you should return to the Emergency Department. We are available 24 hours a day. Take this sheet with you when you go to your follow-up visit. If you have any problem arranging the follow-up visit, contact the Emergency Department immediately. Make an appointment with your Primary Care doctor for follow up of this visit. Return to the ER if you are unable to be seen in the time recommended on your discharge instructions. Jacinta Mendiola  Pagé FNP-BC

## 2017-01-09 NOTE — ED NOTES
Jose Maria Metcalf NP has reviewed discharge instructions with the patient. The patient verbalized understanding. Pt. A&Ox4, respirations even and unlabored. VS stable as noted in flowsheet. Ambulating independently from department with paperwork and prescriptions in hand.

## 2017-06-17 ENCOUNTER — HOSPITAL ENCOUNTER (EMERGENCY)
Age: 37
Discharge: HOME | End: 2017-06-17
Attending: EMERGENCY MEDICINE
Payer: SELF-PAY

## 2017-06-17 VITALS
DIASTOLIC BLOOD PRESSURE: 55 MMHG | HEART RATE: 109 BPM | HEIGHT: 64 IN | BODY MASS INDEX: 29.55 KG/M2 | SYSTOLIC BLOOD PRESSURE: 107 MMHG | RESPIRATION RATE: 14 BRPM | TEMPERATURE: 97.3 F | OXYGEN SATURATION: 95 % | WEIGHT: 173.06 LBS

## 2017-06-17 DIAGNOSIS — R11.2 NAUSEA AND VOMITING, INTRACTABILITY OF VOMITING NOT SPECIFIED, UNSPECIFIED VOMITING TYPE: Primary | ICD-10-CM

## 2017-06-17 DIAGNOSIS — R10.13 ABDOMINAL PAIN, EPIGASTRIC: ICD-10-CM

## 2017-06-17 LAB
ALBUMIN SERPL BCP-MCNC: 3.5 G/DL (ref 3.5–5)
ALBUMIN/GLOB SERPL: 0.9 {RATIO} (ref 1.1–2.2)
ALP SERPL-CCNC: 89 U/L (ref 45–117)
ALT SERPL-CCNC: 22 U/L (ref 12–78)
ANION GAP BLD CALC-SCNC: 8 MMOL/L (ref 5–15)
APPEARANCE UR: CLEAR
AST SERPL W P-5'-P-CCNC: 17 U/L (ref 15–37)
BASOPHILS # BLD AUTO: 0.1 K/UL (ref 0–0.1)
BASOPHILS # BLD: 1 % (ref 0–1)
BILIRUB SERPL-MCNC: 0.7 MG/DL (ref 0.2–1)
BILIRUB UR QL: NEGATIVE
BUN SERPL-MCNC: 18 MG/DL (ref 6–20)
BUN/CREAT SERPL: 28 (ref 12–20)
CALCIUM SERPL-MCNC: 9 MG/DL (ref 8.5–10.1)
CHLORIDE SERPL-SCNC: 105 MMOL/L (ref 97–108)
CO2 SERPL-SCNC: 23 MMOL/L (ref 21–32)
COLOR UR: NORMAL
CREAT SERPL-MCNC: 0.65 MG/DL (ref 0.55–1.02)
EOSINOPHIL # BLD: 0 K/UL (ref 0–0.4)
EOSINOPHIL NFR BLD: 1 % (ref 0–7)
ERYTHROCYTE [DISTWIDTH] IN BLOOD BY AUTOMATED COUNT: 13.9 % (ref 11.5–14.5)
GLOBULIN SER CALC-MCNC: 4.1 G/DL (ref 2–4)
GLUCOSE SERPL-MCNC: 98 MG/DL (ref 65–100)
GLUCOSE UR STRIP.AUTO-MCNC: NEGATIVE MG/DL
HCT VFR BLD AUTO: 39.7 % (ref 35–47)
HGB BLD-MCNC: 14 G/DL (ref 11.5–16)
HGB UR QL STRIP: NEGATIVE
KETONES UR QL STRIP.AUTO: NEGATIVE MG/DL
LEUKOCYTE ESTERASE UR QL STRIP.AUTO: NEGATIVE
LIPASE SERPL-CCNC: 143 U/L (ref 73–393)
LYMPHOCYTES # BLD AUTO: 29 % (ref 12–49)
LYMPHOCYTES # BLD: 2.1 K/UL (ref 0.8–3.5)
MCH RBC QN AUTO: 32.2 PG (ref 26–34)
MCHC RBC AUTO-ENTMCNC: 35.3 G/DL (ref 30–36.5)
MCV RBC AUTO: 91.3 FL (ref 80–99)
MONOCYTES # BLD: 0.5 K/UL (ref 0–1)
MONOCYTES NFR BLD AUTO: 7 % (ref 5–13)
NEUTS SEG # BLD: 4.7 K/UL (ref 1.8–8)
NEUTS SEG NFR BLD AUTO: 62 % (ref 32–75)
NITRITE UR QL STRIP.AUTO: NEGATIVE
PH UR STRIP: 7 [PH] (ref 5–8)
PLATELET # BLD AUTO: 343 K/UL (ref 150–400)
POTASSIUM SERPL-SCNC: 3.9 MMOL/L (ref 3.5–5.1)
PROT SERPL-MCNC: 7.6 G/DL (ref 6.4–8.2)
PROT UR STRIP-MCNC: NEGATIVE MG/DL
RBC # BLD AUTO: 4.35 M/UL (ref 3.8–5.2)
SODIUM SERPL-SCNC: 136 MMOL/L (ref 136–145)
SP GR UR REFRACTOMETRY: 1.02 (ref 1–1.03)
UROBILINOGEN UR QL STRIP.AUTO: 0.2 EU/DL (ref 0.2–1)
WBC # BLD AUTO: 7.4 K/UL (ref 3.6–11)

## 2017-06-17 PROCEDURE — 96376 TX/PRO/DX INJ SAME DRUG ADON: CPT

## 2017-06-17 PROCEDURE — 96375 TX/PRO/DX INJ NEW DRUG ADDON: CPT

## 2017-06-17 PROCEDURE — 74011000250 HC RX REV CODE- 250: Performed by: EMERGENCY MEDICINE

## 2017-06-17 PROCEDURE — 36415 COLL VENOUS BLD VENIPUNCTURE: CPT | Performed by: EMERGENCY MEDICINE

## 2017-06-17 PROCEDURE — 83690 ASSAY OF LIPASE: CPT | Performed by: EMERGENCY MEDICINE

## 2017-06-17 PROCEDURE — 80053 COMPREHEN METABOLIC PANEL: CPT | Performed by: EMERGENCY MEDICINE

## 2017-06-17 PROCEDURE — 96361 HYDRATE IV INFUSION ADD-ON: CPT

## 2017-06-17 PROCEDURE — 74011250636 HC RX REV CODE- 250/636: Performed by: EMERGENCY MEDICINE

## 2017-06-17 PROCEDURE — 85025 COMPLETE CBC W/AUTO DIFF WBC: CPT | Performed by: EMERGENCY MEDICINE

## 2017-06-17 PROCEDURE — 96374 THER/PROPH/DIAG INJ IV PUSH: CPT

## 2017-06-17 PROCEDURE — 74011250637 HC RX REV CODE- 250/637: Performed by: EMERGENCY MEDICINE

## 2017-06-17 PROCEDURE — 81003 URINALYSIS AUTO W/O SCOPE: CPT | Performed by: EMERGENCY MEDICINE

## 2017-06-17 PROCEDURE — 99284 EMERGENCY DEPT VISIT MOD MDM: CPT

## 2017-06-17 RX ORDER — HYDROCODONE BITARTRATE AND ACETAMINOPHEN 5; 325 MG/1; MG/1
1 TABLET ORAL
Qty: 15 TAB | Refills: 0 | Status: SHIPPED | OUTPATIENT
Start: 2017-06-17 | End: 2017-06-21

## 2017-06-17 RX ORDER — ONDANSETRON 2 MG/ML
4 INJECTION INTRAMUSCULAR; INTRAVENOUS
Status: COMPLETED | OUTPATIENT
Start: 2017-06-17 | End: 2017-06-17

## 2017-06-17 RX ORDER — MORPHINE SULFATE 4 MG/ML
4 INJECTION, SOLUTION INTRAMUSCULAR; INTRAVENOUS
Status: COMPLETED | OUTPATIENT
Start: 2017-06-17 | End: 2017-06-17

## 2017-06-17 RX ORDER — SUCRALFATE 1 G/1
1 TABLET ORAL 4 TIMES DAILY
Qty: 30 TAB | Refills: 0 | Status: SHIPPED | OUTPATIENT
Start: 2017-06-17 | End: 2017-06-21

## 2017-06-17 RX ORDER — ONDANSETRON 4 MG/1
4 TABLET, ORALLY DISINTEGRATING ORAL
Qty: 16 TAB | Refills: 0 | Status: SHIPPED | OUTPATIENT
Start: 2017-06-17 | End: 2017-06-21

## 2017-06-17 RX ORDER — PROCHLORPERAZINE EDISYLATE 5 MG/ML
10 INJECTION INTRAMUSCULAR; INTRAVENOUS
Status: COMPLETED | OUTPATIENT
Start: 2017-06-17 | End: 2017-06-17

## 2017-06-17 RX ADMIN — MORPHINE SULFATE 4 MG: 4 INJECTION, SOLUTION INTRAMUSCULAR; INTRAVENOUS at 19:02

## 2017-06-17 RX ADMIN — ONDANSETRON HYDROCHLORIDE 4 MG: 2 INJECTION, SOLUTION INTRAMUSCULAR; INTRAVENOUS at 16:13

## 2017-06-17 RX ADMIN — SODIUM CHLORIDE 1000 ML: 900 INJECTION, SOLUTION INTRAVENOUS at 16:14

## 2017-06-17 RX ADMIN — PROCHLORPERAZINE EDISYLATE 10 MG: 5 INJECTION INTRAMUSCULAR; INTRAVENOUS at 19:02

## 2017-06-17 RX ADMIN — LIDOCAINE HYDROCHLORIDE 40 ML: 20 SOLUTION ORAL; TOPICAL at 16:12

## 2017-06-17 RX ADMIN — MORPHINE SULFATE 4 MG: 4 INJECTION, SOLUTION INTRAMUSCULAR; INTRAVENOUS at 17:43

## 2017-06-17 NOTE — ED NOTES
Bedside shift change report given to [address] [place] [address]  (oncoming nurse) by Martha Herring RN (offgoing nurse). Report included the following information SBAR, ED Summary, MAR and Recent Results.

## 2017-06-17 NOTE — DISCHARGE INSTRUCTIONS
Abdominal Pain: Care Instructions  Your Care Instructions    Abdominal pain has many possible causes. Some aren't serious and get better on their own in a few days. Others need more testing and treatment. If your pain continues or gets worse, you need to be rechecked and may need more tests to find out what is wrong. You may need surgery to correct the problem. Don't ignore new symptoms, such as fever, nausea and vomiting, urination problems, pain that gets worse, and dizziness. These may be signs of a more serious problem. Your doctor may have recommended a follow-up visit in the next 8 to 12 hours. If you are not getting better, you may need more tests or treatment. The doctor has checked you carefully, but problems can develop later. If you notice any problems or new symptoms, get medical treatment right away. Follow-up care is a key part of your treatment and safety. Be sure to make and go to all appointments, and call your doctor if you are having problems. It's also a good idea to know your test results and keep a list of the medicines you take. How can you care for yourself at home? · Rest until you feel better. · To prevent dehydration, drink plenty of fluids, enough so that your urine is light yellow or clear like water. Choose water and other caffeine-free clear liquids until you feel better. If you have kidney, heart, or liver disease and have to limit fluids, talk with your doctor before you increase the amount of fluids you drink. · If your stomach is upset, eat mild foods, such as rice, dry toast or crackers, bananas, and applesauce. Try eating several small meals instead of two or three large ones. · Wait until 48 hours after all symptoms have gone away before you have spicy foods, alcohol, and drinks that contain caffeine. · Do not eat foods that are high in fat. · Avoid anti-inflammatory medicines such as aspirin, ibuprofen (Advil, Motrin), and naproxen (Aleve).  These can cause stomach upset. Talk to your doctor if you take daily aspirin for another health problem. When should you call for help? Call 911 anytime you think you may need emergency care. For example, call if:  · You passed out (lost consciousness). · You pass maroon or very bloody stools. · You vomit blood or what looks like coffee grounds. · You have new, severe belly pain. Call your doctor now or seek immediate medical care if:  · Your pain gets worse, especially if it becomes focused in one area of your belly. · You have a new or higher fever. · Your stools are black and look like tar, or they have streaks of blood. · You have unexpected vaginal bleeding. · You have symptoms of a urinary tract infection. These may include:  ¨ Pain when you urinate. ¨ Urinating more often than usual.  ¨ Blood in your urine. · You are dizzy or lightheaded, or you feel like you may faint. Watch closely for changes in your health, and be sure to contact your doctor if:  · You are not getting better after 1 day (24 hours). Where can you learn more? Go to http://jalilAbsiomo.info/. Enter G428 in the search box to learn more about \"Abdominal Pain: Care Instructions. \"  Current as of: May 27, 2016  Content Version: 11.2  © 7657-0500 [employer]. Care instructions adapted under license by [employer] (which disclaims liability or warranty for this information). If you have questions about a medical condition or this instruction, always ask your healthcare professional. [place] [address] any warranty or liability for your use of this information. Upper GI Endoscopy: Before Your Procedure  What is an upper GI endoscopy? An upper gastrointestinal (or GI) endoscopy is a test that allows your doctor to look at the inside of your esophagus, stomach, and the first part of your small intestine, called the duodenum. The esophagus is the tube that carries food to your stomach. The doctor uses a thin, lighted tube that bends. It is called an endoscope, or scope. The doctor puts the tip of the scope in your mouth and gently moves it down your throat. The scope is a flexible video camera. The doctor looks at a monitor (like a TV set or a computer screen) as he or she moves the scope. A doctor may do this test, which is also called a procedure, to look for ulcers, tumors, infection, or bleeding. It also can be used to look for signs of acid backing up into your esophagus. This is called gastroesophageal reflux disease, or GERD. The doctor can use the scope to take a sample of tissue for study (a biopsy). The doctor also can use the scope to take out growths or stop bleeding. Follow-up care is a key part of your treatment and safety. Be sure to make and go to all appointments, and call your doctor if you are having problems. It's also a good idea to know your test results and keep a list of the medicines you take. What happens before the procedure? Procedures can be stressful. This information will help you understand what you can expect. And it will help you safely prepare for your procedure. Preparing for the procedure  · Understand exactly what procedure is planned, along with the risks, benefits, and other options. · Tell your doctors ALL the medicines, vitamins, supplements, and herbal remedies you take. Some of these can increase the risk of bleeding or interact with anesthesia. · If you take blood thinners, such as warfarin (Coumadin), clopidogrel (Plavix), or aspirin, be sure to talk to your doctor. He or she will tell you if you should stop taking these medicines before your procedure. Make sure that you understand exactly what your doctor wants you to do. · Your doctor will tell you which medicines to take or stop before your procedure. You may need to stop taking certain medicines a week or more before the procedure. So talk to your doctor as soon as you can.   · If you have an advance directive, let your doctor know. It may include a living will and a durable power of  for health care. Bring a copy to the hospital. If you don't have one, you may want to prepare one. It lets your doctor and loved ones know your health care wishes. Doctors advise that everyone prepare these papers before any type of surgery or procedure. What happens on the day of the procedure? · Follow the instructions exactly about when to stop eating and drinking. If you don't, your procedure may be canceled. If your doctor told you to take your medicines on the day of the procedure, take them with only a sip of water. · Take a bath or shower before you come in for your procedure. Do not apply lotions, perfumes, deodorants, or nail polish. · Take off all jewelry and piercings. And take out contact lenses, if you wear them. At the hospital or surgery center  · Bring a picture ID. · The test may take 15 to 30 minutes. · The doctor may spray medicine on the back of your throat to numb it. You also will get medicine to prevent pain and to relax you. · You will lie on your left side. The doctor will put the scope in your mouth and toward the back of your throat. The doctor will tell you when to swallow. This helps the scope move down your throat. You will be able to breathe normally. The doctor will move the scope down your esophagus into your stomach. The doctor also may look at the duodenum. · If your doctor wants to take a sample of tissue for a biopsy, he or she may use small surgical tools, which are put into the scope, to cut off some tissue. You will not feel a biopsy, if one is taken. The doctor also can use the tools to stop bleeding or to do other treatments, if needed. · You will stay at the hospital or surgery center for 1 to 2 hours until the medicine you were given wears off. Going home  · Be sure you have someone to drive you home.  Anesthesia and pain medicine make it unsafe for you to drive.  · You will be given more specific instructions about recovering from your procedure. They will cover things like diet, wound care, follow-up care, driving, and getting back to your normal routine. When should you call your doctor? · You have questions or concerns. · You don't understand how to prepare for your procedure. · You become ill before the procedure (such as fever, flu, or a cold). · You need to reschedule or have changed your mind about having the procedure. Where can you learn more? Go to http://jalil-mo.info/. Enter P790 in the search box to learn more about \"Upper GI Endoscopy: Before Your Procedure. \"  Current as of: August 9, 2016  Content Version: 11.2  © 2204-5575 [employer]. Care instructions adapted under license by [employer] (which disclaims liability or warranty for this information). If you have questions about a medical condition or this instruction, always ask your healthcare professional. [place] [address] any warranty or liability for your use of this information.

## 2017-06-17 NOTE — ED PROVIDER NOTES
HPI Comments: Ana Maria Huerta is a 31 y.o. Female, with no pertinent PMHx, who presents ambulatory to the ED [facility] ED c/o 4 episodes intermittent epigastric abdominal pain in the \"past couple months\". She notes associated symptoms of N/V/D. Pt reports she is uncertain of the cause of the onset and states eating or drinking does not trigger the abdominal pain, but she notes eating or drinking will exacerbate her pain and induce vomiting. She stated she had 10 episodes of dry heaving today, secondary to her not being able to stomach her foods. Pt denies taking ASA or Ibuprofen for her pain. Pt denies seeing a regular GI specialist. Pt specifically denies vomiting blood. Social hx: + Tobacco use, - EtOH use, - Illicit drug use    PCP: Rhea Castelan MD  GI: Dr. Jhony Whelan MD    There are no other complaints, changes or physical findings at this time. The history is provided by the patient. No  was used. Past Medical History:   Diagnosis Date    ADD (attention deficit disorder) 19-19yo    Treated with Adderall since dx. 2013 dosing 20mg AM and 10mg PM.  Trial/change to Vyvanse Nov-Dec 2015--not as effective.  Headache     migraines    HX OTHER MEDICAL      -BUFA baby    HX OTHER MEDICAL     HPV positive    Idiopathic vulvodynia 2014    IUD (intrauterine device) in place 2015    Mirena IUD placed on 4/17/15    Migraines 2013    miscarriages     Neurological disorder     Pelvic pain in female 9/15/2014    2015 gyn laparoscopy/hysteroscopy with endometriosis worse right.     Psychiatric disorder     depression    Psychiatric problem     depression    Secondary dysmenorrhea 2014    With menorraghia    Seizures ([facility] [address]     never on meds--had appr 4-5 in a short amt of time and none since       Past Surgical History:   Procedure Laterality Date    ENDOMETRIAL CRYOABLATION      HX GYN  4/17/15    laparoscopy and hysteroscopy and IUD placement    HX HYSTERECTOMY  04/04/2016    Complete Hysterectomy         Family History:   Problem Relation Age of Onset    Cancer Mother     Alcohol abuse Father      and drug    Psychiatric Disorder Father     Cancer Father     Diabetes Maternal Grandmother     Hypertension Maternal Grandmother     Thyroid Disease Maternal Grandmother     Diabetes Maternal Grandfather     Hypertension Maternal Grandfather     Cancer Maternal Grandfather     Heart Disease Maternal Grandfather     Cancer Paternal Grandmother     Diabetes Paternal Grandmother        Social History     Social History    Marital status: SINGLE     Spouse name: N/A    Number of children: N/A    Years of education: N/A     Occupational History    Not on file. Social History Main Topics    Smoking status: Current Every Day Smoker     Packs/day: 1.00     Years: 18.00     Types: Cigarettes    Smokeless tobacco: Never Used      Comment: Information given with AVS.    Alcohol use No    Drug use: No    Sexual activity: Yes     Partners: Male     Birth control/ protection: Surgical     Other Topics Concern    Not on file     Social History Narrative    ** Merged History Encounter **              ALLERGIES: Aspirin and Penicillins    Review of Systems   Constitutional: Negative for fatigue and fever. HENT: Negative. Eyes: Negative. Respiratory: Negative for shortness of breath and wheezing. Cardiovascular: Negative for chest pain and leg swelling. Gastrointestinal: Positive for abdominal pain (+epigastric), diarrhea, nausea and vomiting. Negative for blood in stool and constipation. -hematemesis   Endocrine: Negative. Genitourinary: Negative for difficulty urinating and dysuria. Musculoskeletal: Negative. Skin: Negative for rash. Allergic/Immunologic: Negative. Neurological: Negative for weakness and numbness. Hematological: Negative. Psychiatric/Behavioral: Negative.       Patient Vitals for the past 12 hrs:   Temp Pulse Resp BP SpO2   06/17/17 1941 - - - - 95 %   06/17/17 1930 - - - 107/55 -   06/17/17 1900 - - - 119/62 95 %   06/17/17 1800 - - - 101/48 97 %   06/17/17 1743 - - - 126/72 99 %   06/17/17 1600 - - - 119/51 99 %   06/17/17 1524 97.3 °F (36.3 °C) (!) 109 14 134/71 100 %            Physical Exam   Constitutional: She is oriented to person, place, and time. She appears well-developed and well-nourished. Appears uncomfortable   HENT:   Head: Normocephalic and atraumatic. Mouth/Throat: Oropharynx is clear and moist.   Eyes: Conjunctivae and EOM are normal.   Neck: Neck supple. No JVD present. No tracheal deviation present. Cardiovascular: Normal rate, regular rhythm and intact distal pulses. Exam reveals no gallop and no friction rub. No murmur heard. Pulmonary/Chest: Effort normal and breath sounds normal. No stridor. No respiratory distress. She has no wheezes. Abdominal: Soft. Bowel sounds are normal. She exhibits no distension and no mass. There is no rebound and no guarding. General upper abdominal tenderness   Musculoskeletal: Normal range of motion. She exhibits no edema or tenderness. No deformity   Neurological: She is alert and oriented to person, place, and time. She has normal strength. No focal deficits   Skin: Skin is warm, dry and intact. No rash noted. Psychiatric: She has a normal mood and affect. Her behavior is normal. Judgment and thought content normal.   Nursing note and vitals reviewed. MDM  Number of Diagnoses or Management Options  Diagnosis management comments: DDx: gastritis, PUD, biliary colic, pancreatitis, GERD, dehydration, steven, electrolyte abnormality, gastroenteritis. Amount and/or Complexity of Data Reviewed  Clinical lab tests: ordered and reviewed  Review and summarize past medical records: yes    Patient Progress  Patient progress: stable    ED Course       Procedures    PROGRESS NOTE:  5:54 PM  Pt has been re-evaluated.  Pt notes she is feeling better. PROGRESS NOTE:  6:37 PM  Pt has been re-evaluated. Pt is tolerating her ice chips slowly. Pt reports her pain has improved, but she is getting nauseous again. PROGRESS NOTE:  7:32 PM  Pt has been re-evaluated. Pt reports she is feeling a little better and is just a little nauseous. LABORATORY TESTS:  Recent Results (from the past 12 hour(s))   CBC WITH AUTOMATED DIFF    Collection Time: 06/17/17  4:06 PM   Result Value Ref Range    WBC 7.4 3.6 - 11.0 K/uL    RBC 4.35 3.80 - 5.20 M/uL    HGB 14.0 11.5 - 16.0 g/dL    HCT 39.7 35.0 - 47.0 %    MCV 91.3 80.0 - 99.0 FL    MCH 32.2 26.0 - 34.0 PG    MCHC 35.3 30.0 - 36.5 g/dL    RDW 13.9 11.5 - 14.5 %    PLATELET 304 862 - 628 K/uL    NEUTROPHILS 62 32 - 75 %    LYMPHOCYTES 29 12 - 49 %    MONOCYTES 7 5 - 13 %    EOSINOPHILS 1 0 - 7 %    BASOPHILS 1 0 - 1 %    ABS. NEUTROPHILS 4.7 1.8 - 8.0 K/UL    ABS. LYMPHOCYTES 2.1 0.8 - 3.5 K/UL    ABS. MONOCYTES 0.5 0.0 - 1.0 K/UL    ABS. EOSINOPHILS 0.0 0.0 - 0.4 K/UL    ABS. BASOPHILS 0.1 0.0 - 0.1 K/UL   METABOLIC PANEL, COMPREHENSIVE    Collection Time: 06/17/17  4:06 PM   Result Value Ref Range    Sodium 136 136 - 145 mmol/L    Potassium 3.9 3.5 - 5.1 mmol/L    Chloride 105 97 - 108 mmol/L    CO2 23 21 - 32 mmol/L    Anion gap 8 5 - 15 mmol/L    Glucose 98 65 - 100 mg/dL    BUN 18 6 - 20 MG/DL    Creatinine 0.65 0.55 - 1.02 MG/DL    BUN/Creatinine ratio 28 (H) 12 - 20      GFR est AA >60 >60 ml/min/1.73m2    GFR est non-AA >60 >60 ml/min/1.73m2    Calcium 9.0 8.5 - 10.1 MG/DL    Bilirubin, total 0.7 0.2 - 1.0 MG/DL    ALT (SGPT) 22 12 - 78 U/L    AST (SGOT) 17 15 - 37 U/L    Alk.  phosphatase 89 45 - 117 U/L    Protein, total 7.6 6.4 - 8.2 g/dL    Albumin 3.5 3.5 - 5.0 g/dL    Globulin 4.1 (H) 2.0 - 4.0 g/dL    A-G Ratio 0.9 (L) 1.1 - 2.2     URINALYSIS W/ RFLX MICROSCOPIC    Collection Time: 06/17/17  4:06 PM   Result Value Ref Range    Color YELLOW/STRAW      Appearance CLEAR CLEAR Specific gravity 1.022 1.003 - 1.030      pH (UA) 7.0 5.0 - 8.0      Protein NEGATIVE  NEG mg/dL    Glucose NEGATIVE  NEG mg/dL    Ketone NEGATIVE  NEG mg/dL    Bilirubin NEGATIVE  NEG      Blood NEGATIVE  NEG      Urobilinogen 0.2 0.2 - 1.0 EU/dL    Nitrites NEGATIVE  NEG      Leukocyte Esterase NEGATIVE  NEG     LIPASE    Collection Time: 06/17/17  4:06 PM   Result Value Ref Range    Lipase 143 73 - 393 U/L       MEDICATIONS GIVEN:  Medications   sodium chloride 0.9 % bolus infusion 1,000 mL (0 mL IntraVENous IV Completed 6/17/17 1744)   ondansetron (ZOFRAN) injection 4 mg (4 mg IntraVENous Given 6/17/17 1613)   mylanta/viscous lidocaine (ZAHRAA)(GI COCKTAIL) (40 mL Oral Given 6/17/17 1612)   morphine injection 4 mg (4 mg IntraVENous Given 6/17/17 1743)   morphine injection 4 mg (4 mg IntraVENous Given 6/17/17 1902)   prochlorperazine (COMPAZINE) injection 10 mg (10 mg IntraVENous Given 6/17/17 1902)       IMPRESSION:  1. Nausea and vomiting, intractability of vomiting not specified, unspecified vomiting type    2. Abdominal pain, epigastric        PLAN:  1. Discharge home  Current Discharge Medication List      START taking these medications    Details   ondansetron (ZOFRAN ODT) 4 mg disintegrating tablet Take 1 Tab by mouth every eight (8) hours as needed for Nausea. Qty: 16 Tab, Refills: 0      sucralfate (CARAFATE) 1 gram tablet Take 1 Tab by mouth four (4) times daily. Qty: 30 Tab, Refills: 0      HYDROcodone-acetaminophen (NORCO) 5-325 mg per tablet Take 1 Tab by mouth every four (4) hours as needed for Pain. Max Daily Amount: 6 Tabs. Qty: 15 Tab, Refills: 0           2.    Follow-up Information     Follow up With Details Comments [address], [place] Schedule an appointment as soon as possible for a visit in 2 days  [address] [place] [address] and [address] [id number] [facility] Schedule an appointment as soon as possible for a visit in 2 days  [address] [facility] [address]      [facility] EMERGENCY DEPT  As needed, If symptoms worsen [address] [place] [address]  [phone]        Return to ED if worse     DISCHARGE NOTE  7:45 PM  The patient has been re-evaluated and is ready for discharge. Reviewed available results with patient. Counseled patient on diagnosis and care plan. Patient has expressed understanding, and all questions have been answered. Patient agrees with plan and agrees to follow up as recommended, or return to the ED if their symptoms worsen. Discharge instructions have been provided and explained to the patient, along with reasons to return to the ED. ATTESTATION:  This note is prepared by Lizbeth Marcano, acting as Scribe for Bren Francisco DO. Bren Francisco DO: The scribe's documentation has been prepared under my direction and personally reviewed by me in its entirety. I confirm that the note above accurately reflects all work, treatment, procedures, and medical decision making performed by me.

## 2017-06-17 NOTE — ED NOTES
Assumed care of pt from [address] [facility], [address] [place] [address]. Pt resting quietly and in no acute distress at this time. Pt states pain and nausea are better. Call bell within reach.

## 2017-06-17 NOTE — ED NOTES
Complaint of n/v/d since early this morning with greater than 20 episodes of vomiting and 7 or 8 episodes of diarrhea. No fever. This problem seems to be recurring every couple of weeks or so for the past 3 months.

## 2017-06-18 NOTE — ED NOTES
Dr. Yanet Alfaro reviewed discharge instructions with the patient. The patient verbalized understanding. All questions and concerns were addressed. The patient declined a wheelchair and is discharged ambulatory in the care of family members with instructions and prescriptions in hand. Pt is alert and oriented x 4. Respirations are clear and unlabored. Pt A/Ox4. Pt in no apparent distress. Pt eating crackers and juice and able to keep food/fluid down without vomiting.

## 2017-06-21 ENCOUNTER — APPOINTMENT (OUTPATIENT)
Dept: GENERAL RADIOLOGY | Age: 37
End: 2017-06-21
Payer: SELF-PAY

## 2017-06-21 ENCOUNTER — HOSPITAL ENCOUNTER (EMERGENCY)
Age: 37
Discharge: HOME | End: 2017-06-21
Attending: EMERGENCY MEDICINE
Payer: SELF-PAY

## 2017-06-21 VITALS
BODY MASS INDEX: 28.94 KG/M2 | SYSTOLIC BLOOD PRESSURE: 113 MMHG | WEIGHT: 169.53 LBS | HEIGHT: 64 IN | HEART RATE: 77 BPM | DIASTOLIC BLOOD PRESSURE: 63 MMHG | RESPIRATION RATE: 16 BRPM | TEMPERATURE: 98.2 F | OXYGEN SATURATION: 95 %

## 2017-06-21 DIAGNOSIS — R19.7 DIARRHEA, UNSPECIFIED TYPE: ICD-10-CM

## 2017-06-21 DIAGNOSIS — R10.12 ABDOMINAL PAIN, LUQ (LEFT UPPER QUADRANT): Primary | ICD-10-CM

## 2017-06-21 DIAGNOSIS — R11.2 INTRACTABLE VOMITING WITH NAUSEA, UNSPECIFIED VOMITING TYPE: ICD-10-CM

## 2017-06-21 DIAGNOSIS — F32.A DEPRESSION, UNSPECIFIED DEPRESSION TYPE: ICD-10-CM

## 2017-06-21 LAB
ALBUMIN SERPL BCP-MCNC: 3.4 G/DL (ref 3.5–5)
ALBUMIN/GLOB SERPL: 0.9 {RATIO} (ref 1.1–2.2)
ALP SERPL-CCNC: 87 U/L (ref 45–117)
ALT SERPL-CCNC: 20 U/L (ref 12–78)
ANION GAP BLD CALC-SCNC: 8 MMOL/L (ref 5–15)
APPEARANCE UR: CLEAR
AST SERPL W P-5'-P-CCNC: 14 U/L (ref 15–37)
BACTERIA URNS QL MICRO: NEGATIVE /HPF
BASOPHILS # BLD AUTO: 0 K/UL (ref 0–0.1)
BASOPHILS # BLD: 1 % (ref 0–1)
BILIRUB SERPL-MCNC: 0.7 MG/DL (ref 0.2–1)
BILIRUB UR QL: NEGATIVE
BUN SERPL-MCNC: 18 MG/DL (ref 6–20)
BUN/CREAT SERPL: 28 (ref 12–20)
CALCIUM SERPL-MCNC: 9 MG/DL (ref 8.5–10.1)
CHLORIDE SERPL-SCNC: 104 MMOL/L (ref 97–108)
CO2 SERPL-SCNC: 25 MMOL/L (ref 21–32)
COLOR UR: ABNORMAL
CREAT SERPL-MCNC: 0.65 MG/DL (ref 0.55–1.02)
EOSINOPHIL # BLD: 0 K/UL (ref 0–0.4)
EOSINOPHIL NFR BLD: 1 % (ref 0–7)
EPITH CASTS URNS QL MICRO: ABNORMAL /LPF
ERYTHROCYTE [DISTWIDTH] IN BLOOD BY AUTOMATED COUNT: 13.8 % (ref 11.5–14.5)
GLOBULIN SER CALC-MCNC: 3.9 G/DL (ref 2–4)
GLUCOSE SERPL-MCNC: 92 MG/DL (ref 65–100)
GLUCOSE UR STRIP.AUTO-MCNC: NEGATIVE MG/DL
HCT VFR BLD AUTO: 40.9 % (ref 35–47)
HGB BLD-MCNC: 14.2 G/DL (ref 11.5–16)
HGB UR QL STRIP: NEGATIVE
HYALINE CASTS URNS QL MICRO: ABNORMAL /LPF (ref 0–5)
KETONES UR QL STRIP.AUTO: NEGATIVE MG/DL
LEUKOCYTE ESTERASE UR QL STRIP.AUTO: NEGATIVE
LIPASE SERPL-CCNC: 126 U/L (ref 73–393)
LYMPHOCYTES # BLD AUTO: 28 % (ref 12–49)
LYMPHOCYTES # BLD: 2 K/UL (ref 0.8–3.5)
MCH RBC QN AUTO: 31 PG (ref 26–34)
MCHC RBC AUTO-ENTMCNC: 34.7 G/DL (ref 30–36.5)
MCV RBC AUTO: 89.3 FL (ref 80–99)
MONOCYTES # BLD: 0.4 K/UL (ref 0–1)
MONOCYTES NFR BLD AUTO: 5 % (ref 5–13)
NEUTS SEG # BLD: 4.5 K/UL (ref 1.8–8)
NEUTS SEG NFR BLD AUTO: 65 % (ref 32–75)
NITRITE UR QL STRIP.AUTO: NEGATIVE
PH UR STRIP: 7 [PH] (ref 5–8)
PLATELET # BLD AUTO: 324 K/UL (ref 150–400)
POTASSIUM SERPL-SCNC: 4.1 MMOL/L (ref 3.5–5.1)
PROT SERPL-MCNC: 7.3 G/DL (ref 6.4–8.2)
PROT UR STRIP-MCNC: NEGATIVE MG/DL
RBC # BLD AUTO: 4.58 M/UL (ref 3.8–5.2)
RBC #/AREA URNS HPF: ABNORMAL /HPF (ref 0–5)
SODIUM SERPL-SCNC: 137 MMOL/L (ref 136–145)
SP GR UR REFRACTOMETRY: 1.02 (ref 1–1.03)
UA: UC IF INDICATED,UAUC: ABNORMAL
UROBILINOGEN UR QL STRIP.AUTO: 0.2 EU/DL (ref 0.2–1)
WBC # BLD AUTO: 6.9 K/UL (ref 3.6–11)
WBC URNS QL MICRO: ABNORMAL /HPF (ref 0–4)

## 2017-06-21 PROCEDURE — 96374 THER/PROPH/DIAG INJ IV PUSH: CPT

## 2017-06-21 PROCEDURE — 85025 COMPLETE CBC W/AUTO DIFF WBC: CPT | Performed by: PHYSICIAN ASSISTANT

## 2017-06-21 PROCEDURE — 74011250636 HC RX REV CODE- 250/636: Performed by: PHYSICIAN ASSISTANT

## 2017-06-21 PROCEDURE — 81001 URINALYSIS AUTO W/SCOPE: CPT | Performed by: PHYSICIAN ASSISTANT

## 2017-06-21 PROCEDURE — 74000 XR ABD (KUB): CPT

## 2017-06-21 PROCEDURE — 80053 COMPREHEN METABOLIC PANEL: CPT | Performed by: PHYSICIAN ASSISTANT

## 2017-06-21 PROCEDURE — 74011250637 HC RX REV CODE- 250/637

## 2017-06-21 PROCEDURE — 96375 TX/PRO/DX INJ NEW DRUG ADDON: CPT

## 2017-06-21 PROCEDURE — 99284 EMERGENCY DEPT VISIT MOD MDM: CPT

## 2017-06-21 PROCEDURE — 96361 HYDRATE IV INFUSION ADD-ON: CPT

## 2017-06-21 PROCEDURE — 36415 COLL VENOUS BLD VENIPUNCTURE: CPT | Performed by: PHYSICIAN ASSISTANT

## 2017-06-21 PROCEDURE — 96376 TX/PRO/DX INJ SAME DRUG ADON: CPT

## 2017-06-21 PROCEDURE — 83690 ASSAY OF LIPASE: CPT | Performed by: PHYSICIAN ASSISTANT

## 2017-06-21 RX ORDER — PROMETHAZINE HYDROCHLORIDE 25 MG/1
25 TABLET ORAL
Qty: 12 TAB | Refills: 0 | Status: SHIPPED | OUTPATIENT
Start: 2017-06-21 | End: 2017-07-16

## 2017-06-21 RX ORDER — SODIUM CHLORIDE 0.9 % (FLUSH) 0.9 %
5-10 SYRINGE (ML) INJECTION AS NEEDED
Status: DISCONTINUED | OUTPATIENT
Start: 2017-06-21 | End: 2017-06-21 | Stop reason: HOSPADM

## 2017-06-21 RX ORDER — SODIUM CHLORIDE 0.9 % (FLUSH) 0.9 %
5-10 SYRINGE (ML) INJECTION EVERY 8 HOURS
Status: DISCONTINUED | OUTPATIENT
Start: 2017-06-21 | End: 2017-06-21 | Stop reason: HOSPADM

## 2017-06-21 RX ORDER — HYDROCODONE BITARTRATE AND ACETAMINOPHEN 5; 325 MG/1; MG/1
1 TABLET ORAL
Qty: 10 TAB | Refills: 0 | Status: SHIPPED | OUTPATIENT
Start: 2017-06-21 | End: 2017-07-11

## 2017-06-21 RX ORDER — FAMOTIDINE 40 MG/1
40 TABLET, FILM COATED ORAL DAILY
Qty: 14 TAB | Refills: 0 | Status: SHIPPED | OUTPATIENT
Start: 2017-06-21 | End: 2017-07-05

## 2017-06-21 RX ORDER — ONDANSETRON 4 MG/1
TABLET, ORALLY DISINTEGRATING ORAL
Status: COMPLETED
Start: 2017-06-21 | End: 2017-06-21

## 2017-06-21 RX ORDER — ONDANSETRON 2 MG/ML
4 INJECTION INTRAMUSCULAR; INTRAVENOUS
Status: COMPLETED | OUTPATIENT
Start: 2017-06-21 | End: 2017-06-21

## 2017-06-21 RX ORDER — ONDANSETRON 4 MG/1
4 TABLET, ORALLY DISINTEGRATING ORAL
Qty: 10 TAB | Refills: 0 | Status: SHIPPED | OUTPATIENT
Start: 2017-06-21 | End: 2017-07-16

## 2017-06-21 RX ORDER — HYDROMORPHONE HYDROCHLORIDE 1 MG/ML
1 INJECTION, SOLUTION INTRAMUSCULAR; INTRAVENOUS; SUBCUTANEOUS
Status: COMPLETED | OUTPATIENT
Start: 2017-06-21 | End: 2017-06-21

## 2017-06-21 RX ADMIN — SODIUM CHLORIDE 1000 ML: 900 INJECTION, SOLUTION INTRAVENOUS at 11:20

## 2017-06-21 RX ADMIN — HYDROMORPHONE HYDROCHLORIDE 1 MG: 1 INJECTION, SOLUTION INTRAMUSCULAR; INTRAVENOUS; SUBCUTANEOUS at 11:51

## 2017-06-21 RX ADMIN — ONDANSETRON HYDROCHLORIDE 4 MG: 2 INJECTION, SOLUTION INTRAMUSCULAR; INTRAVENOUS at 14:08

## 2017-06-21 RX ADMIN — ONDANSETRON HYDROCHLORIDE 4 MG: 2 INJECTION, SOLUTION INTRAMUSCULAR; INTRAVENOUS at 11:20

## 2017-06-21 RX ADMIN — ONDANSETRON 4 MG: 4 TABLET, ORALLY DISINTEGRATING ORAL at 11:10

## 2017-06-21 NOTE — LETTER
[address] 
[facility] EMERGENCY DEPT 
[address] [place] [address] [address] [id number] 
[phone] Work/School Note Date: 6/21/2017 To Whom It May concern: 
 
Rudy Barrett was seen and treated today in the emergency room. She may return to work in 1 to 2 days, as symptoms improve. Sincerely, Bryson Nielsen

## 2017-06-21 NOTE — Clinical Note
Rest, liquid diet x 24 hours, then gradually advance as tolerated. Follow up with the Gastroenterologist as scheduled.

## 2017-06-21 NOTE — ED PROVIDER NOTES
HPI Comments: Abel Nolasco is a 31 y.o. female with PMHx of IUD, and PSHx of endometrial cryoablation and complete hysterectomy, presenting ambulatory to ED c/o N/V/D with associated LUQ abdominal pain since 19. She rates current pain 6/10 and notes her symptoms are unchanged with taking Motrin and Zofran. Pt notes streaks of blood in her emesis since last night. She reports her diarrhea is watery and denies blood. Pt endorses she has been having these symptoms intermittently for the past two months, noting they onset ~ every two weeks, with no specific/known trigger. She reports she was evaluated on 17 at [facility] ED for the same symptoms and treated with a GI cocktail, hydrocodone, and compazine, and rx'd Zofran and hydrocodone, which temporarily resolved her symptoms. Pt notes her symptoms returned that evening and have been persistent since. She denies history of pancreatitis and liver disease. Pt notes she has an appointment with GI in two weeks but is unsure of whom the appointment is with. She denies chance of pregnancy secondary to history of hysterectomy. Pt notes she had a CT two months ago but is unsure of the impression. She specifically denies blood in her stool. PCP: Alexy Hayden MD  Social Hx: current every day smoker (1 ppd); - EtOH; - drug use. There are no other complaints, changes, or physical findings at this time. Written by ANGELICA Pelaez, as dictated by KP Prater. The history is provided by the patient. Past Medical History:   Diagnosis Date    ADD (attention deficit disorder) 17-17yo    Treated with Adderall since dx. 2013 dosing 20mg AM and 10mg PM.  Trial/change to Vyvanse Nov-Dec 2015--not as effective.     Headache     migraines    HX OTHER MEDICAL      -BUFA baby    HX OTHER MEDICAL     HPV positive    Idiopathic vulvodynia 2014    IUD (intrauterine device) in place 2015    Mirena IUD placed on 4/17/15    Migraines 6/12/2013    miscarriages     Neurological disorder     Pelvic pain in female 9/15/2014    April 2015 gyn laparoscopy/hysteroscopy with endometriosis worse right.  Psychiatric disorder     depression    Psychiatric problem     depression    Secondary dysmenorrhea 8/12/2014    With menorraghia    Seizures ([facility] [address] 2008    never on meds--had appr 4-5 in a short amt of time and none since       Past Surgical History:   Procedure Laterality Date    ENDOMETRIAL CRYOABLATION      HX GYN  4/17/15    laparoscopy and hysteroscopy and IUD placement    HX HYSTERECTOMY  04/04/2016    Complete Hysterectomy         Family History:   Problem Relation Age of Onset    Cancer Mother     Alcohol abuse Father      and drug    Psychiatric Disorder Father     Cancer Father     Diabetes Maternal Grandmother     Hypertension Maternal Grandmother     Thyroid Disease Maternal Grandmother     Diabetes Maternal Grandfather     Hypertension Maternal Grandfather     Cancer Maternal Grandfather     Heart Disease Maternal Grandfather     Cancer Paternal Grandmother     Diabetes Paternal Grandmother        Social History     Social History    Marital status: SINGLE     Spouse name: N/A    Number of children: N/A    Years of education: N/A     Occupational History    Not on file. Social History Main Topics    Smoking status: Current Every Day Smoker     Packs/day: 1.00     Years: 18.00     Types: Cigarettes    Smokeless tobacco: Never Used      Comment: Information given with AVS.    Alcohol use No    Drug use: No    Sexual activity: Yes     Partners: Male     Birth control/ protection: Surgical     Other Topics Concern    Not on file     Social History Narrative    ** Merged History Encounter **              ALLERGIES: Aspirin and Penicillins    Review of Systems   Constitutional: Negative for chills and fever. HENT: Negative for congestion, rhinorrhea and sore throat.     Respiratory: Negative for cough and shortness of breath. Cardiovascular: Negative for chest pain and palpitations. Gastrointestinal: Positive for abdominal pain (LUQ), diarrhea (watery), nausea and vomiting (with streaks of blood). Negative for blood in stool. Genitourinary: Negative for dysuria and hematuria. Musculoskeletal: Negative for neck pain and neck stiffness. Skin: Negative for rash and wound. Neurological: Negative for dizziness and headaches. Psychiatric/Behavioral: Negative for agitation and confusion. All other systems reviewed and are negative. Vitals:    06/21/17 1151 06/21/17 1229 06/21/17 1230 06/21/17 1410   BP: 112/64 104/59 113/67 113/63   Pulse: 95 74  77   Resp: 16 16  16   Temp:       SpO2: 100% 95% 94% 95%   Weight:       Height:                Physical Exam   Constitutional: She is oriented to person, place, and time. She appears well-developed and well-nourished. Notable distress; active emesis in the room;   HENT:   Head: Normocephalic and atraumatic. Nose: Nose normal.   Mouth/Throat: Oropharynx is clear and moist. No oropharyngeal exudate. Eyes: Conjunctivae and EOM are normal. Right eye exhibits no discharge. Left eye exhibits no discharge. No scleral icterus. Neck: Normal range of motion. Neck supple. No JVD present. No tracheal deviation present. No thyromegaly present. Cardiovascular: Normal rate, regular rhythm and normal heart sounds. Pulmonary/Chest: Effort normal and breath sounds normal. No respiratory distress. She has no wheezes. Abdominal: Soft. There is tenderness (LUQ). Musculoskeletal: Normal range of motion. She exhibits no edema. Lymphadenopathy:     She has no cervical adenopathy. Neurological: She is alert and oriented to person, place, and time. She exhibits normal muscle tone. Coordination normal.   Skin: Skin is warm and dry. She is not diaphoretic. Psychiatric: She has a normal mood and affect.  Her behavior is normal. Judgment normal.   Nursing note and vitals reviewed. MDM  Number of Diagnoses or Management Options  Diagnosis management comments: DDx: pancreatitis, gastroenteritis, PUD, gastritis, cholecystectomy. Amount and/or Complexity of Data Reviewed  Clinical lab tests: ordered and reviewed  Review and summarize past medical records: yes    Patient Progress  Patient progress: stable    Procedures    Progress Note:  12:36 PM  Pt re-evaluated. Her vomiting is controlled and she is resting comfortably in bed. Written by Hari Wilder ED Scribe, as dictated by Josué Santiago PAC. Progress Note:  12:46 PM  Discussed pt's case with Bertha Dukes DO, who recommends obtaining a KUB. Written by Hari Wilder ED Scribe, as dictated by Josué Santiago, PAC. LABORATORY TESTS:  Recent Results (from the past 12 hour(s))   CBC WITH AUTOMATED DIFF    Collection Time: 06/21/17 11:17 AM   Result Value Ref Range    WBC 6.9 3.6 - 11.0 K/uL    RBC 4.58 3.80 - 5.20 M/uL    HGB 14.2 11.5 - 16.0 g/dL    HCT 40.9 35.0 - 47.0 %    MCV 89.3 80.0 - 99.0 FL    MCH 31.0 26.0 - 34.0 PG    MCHC 34.7 30.0 - 36.5 g/dL    RDW 13.8 11.5 - 14.5 %    PLATELET 589 249 - 771 K/uL    NEUTROPHILS 65 32 - 75 %    LYMPHOCYTES 28 12 - 49 %    MONOCYTES 5 5 - 13 %    EOSINOPHILS 1 0 - 7 %    BASOPHILS 1 0 - 1 %    ABS. NEUTROPHILS 4.5 1.8 - 8.0 K/UL    ABS. LYMPHOCYTES 2.0 0.8 - 3.5 K/UL    ABS. MONOCYTES 0.4 0.0 - 1.0 K/UL    ABS. EOSINOPHILS 0.0 0.0 - 0.4 K/UL    ABS.  BASOPHILS 0.0 0.0 - 0.1 K/UL   METABOLIC PANEL, COMPREHENSIVE    Collection Time: 06/21/17 11:17 AM   Result Value Ref Range    Sodium 137 136 - 145 mmol/L    Potassium 4.1 3.5 - 5.1 mmol/L    Chloride 104 97 - 108 mmol/L    CO2 25 21 - 32 mmol/L    Anion gap 8 5 - 15 mmol/L    Glucose 92 65 - 100 mg/dL    BUN 18 6 - 20 MG/DL    Creatinine 0.65 0.55 - 1.02 MG/DL    BUN/Creatinine ratio 28 (H) 12 - 20      GFR est AA >60 >60 ml/min/1.73m2    GFR est non-AA >60 >60 ml/min/1.73m2 Calcium 9.0 8.5 - 10.1 MG/DL    Bilirubin, total 0.7 0.2 - 1.0 MG/DL    ALT (SGPT) 20 12 - 78 U/L    AST (SGOT) 14 (L) 15 - 37 U/L    Alk. phosphatase 87 45 - 117 U/L    Protein, total 7.3 6.4 - 8.2 g/dL    Albumin 3.4 (L) 3.5 - 5.0 g/dL    Globulin 3.9 2.0 - 4.0 g/dL    A-G Ratio 0.9 (L) 1.1 - 2.2     LIPASE    Collection Time: 06/21/17 11:17 AM   Result Value Ref Range    Lipase 126 73 - 393 U/L   URINALYSIS W/ REFLEX CULTURE    Collection Time: 06/21/17  1:44 PM   Result Value Ref Range    Color YELLOW/STRAW      Appearance CLEAR CLEAR      Specific gravity 1.019 1.003 - 1.030      pH (UA) 7.0 5.0 - 8.0      Protein NEGATIVE  NEG mg/dL    Glucose NEGATIVE  NEG mg/dL    Ketone NEGATIVE  NEG mg/dL    Bilirubin NEGATIVE  NEG      Blood NEGATIVE  NEG      Urobilinogen 0.2 0.2 - 1.0 EU/dL    Nitrites NEGATIVE  NEG      Leukocyte Esterase NEGATIVE  NEG      UA:UC IF INDICATED CULTURE NOT INDICATED BY UA RESULT CNI      WBC 0-4 0 - 4 /hpf    RBC 0-5 0 - 5 /hpf    Epithelial cells MODERATE (A) FEW /lpf    Bacteria NEGATIVE  NEG /hpf    Hyaline cast 2-5 0 - 5 /lpf       IMAGING RESULTS:  EXAM:  XR ABD (KUB)     INDICATION: Abdominal pain, nausea, and vomiting for 5 days. Emergency  department visit 4 days ago.     COMPARISON: Acute abdominal series on 6/10/2015. CT abdomen/pelvis on 1/8/2017. .     TECHNIQUE: 2 images of supine frontal abdomen (KUB).     FINDINGS: No dilated small bowel. Stool and gas are present in the colon. No  pathologic calcification. Osseous structures are age appropriate.     IMPRESSION  IMPRESSION:      Normal abdomen views.  No change.            MEDICATIONS GIVEN:  Medications   sodium chloride (NS) flush 5-10 mL (not administered)   sodium chloride (NS) flush 5-10 mL (not administered)   ondansetron (ZOFRAN) injection 4 mg (not administered)   sodium chloride 0.9 % bolus infusion 1,000 mL (1,000 mL IntraVENous New Bag 6/21/17 1120)   ondansetron (ZOFRAN) injection 4 mg (4 mg IntraVENous Given 6/21/17 1120)   ondansetron (ZOFRAN ODT) 4 mg tablet (4 mg  Given 6/21/17 1110)   HYDROmorphone (PF) (DILAUDID) injection 1 mg (1 mg IntraVENous Given 6/21/17 1151)       IMPRESSION:  1. Abdominal pain, LUQ (left upper quadrant)    2. Intractable vomiting with nausea, unspecified vomiting type    3. Diarrhea, unspecified type        PLAN:  1. Current Discharge Medication List      START taking these medications    Details   ondansetron (ZOFRAN ODT) 4 mg disintegrating tablet Take 1 Tab by mouth every eight (8) hours as needed for Nausea. Qty: 10 Tab, Refills: 0      promethazine (PHENERGAN) 25 mg tablet Take 1 Tab by mouth every six (6) hours as needed for Nausea. Qty: 12 Tab, Refills: 0      HYDROcodone-acetaminophen (NORCO) 5-325 mg per tablet Take 1 Tab by mouth every six (6) hours as needed for Pain for up to 10 doses. Max Daily Amount: 4 Tabs. Qty: 10 Tab, Refills: 0      famotidine (PEPCID) 40 mg tablet Take 1 Tab by mouth daily for 14 days. Qty: 14 Tab, Refills: 0           2. Follow-up Information     Follow up With Details Comments [address], [place]   [address] [facility]  Sakshi Found [id number]  [address] [place] [address] [facility]   [address] [facility] [address]      [facility] EMERGENCY DEPT  If symptoms worsen [address]        Return to ED if worse     DISCHARGE NOTE  2:01 PM  The patient has been re-evaluated and is ready for discharge. Reviewed available results with patient. Counseled pt on diagnosis and care plan. Pt has expressed understanding, and all questions have been answered. Pt agrees with plan and agrees to F/U as recommended, or return to the ED if their sxs worsen. Discharge instructions have been provided and explained to the pt, along with reasons to return to the ED.   Written by Aundrea Euceda ED Scribe, as dictated by Aki Garcia PAC.    This note is prepared by Edwige Blancas, acting as Scribe for [employer], PAC. BRICE Silveira, [facility]: The scribe's documentation has been prepared under my direction and personally reviewed by me in its entirety. I confirm that the note above accurately reflects all work, treatment, procedures, and medical decision making performed by me.

## 2017-06-21 NOTE — ED NOTES
Discharge instructions reviewed with the patient by the PA. Patient ambulatory out of the ER with family.

## 2017-06-21 NOTE — ED NOTES
Patient c/o abdominal pain, nausea and vomiting x 5 days. Patient was seen in this ER Saturday for the same and discharged home. Patient states she is not feeling better.

## 2017-06-21 NOTE — DISCHARGE INSTRUCTIONS
Abdominal Pain: Care Instructions  Your Care Instructions    Abdominal pain has many possible causes. Some aren't serious and get better on their own in a few days. Others need more testing and treatment. If your pain continues or gets worse, you need to be rechecked and may need more tests to find out what is wrong. You may need surgery to correct the problem. Don't ignore new symptoms, such as fever, nausea and vomiting, urination problems, pain that gets worse, and dizziness. These may be signs of a more serious problem. Your doctor may have recommended a follow-up visit in the next 8 to 12 hours. If you are not getting better, you may need more tests or treatment. The doctor has checked you carefully, but problems can develop later. If you notice any problems or new symptoms, get medical treatment right away. Follow-up care is a key part of your treatment and safety. Be sure to make and go to all appointments, and call your doctor if you are having problems. It's also a good idea to know your test results and keep a list of the medicines you take. How can you care for yourself at home? · Rest until you feel better. · To prevent dehydration, drink plenty of fluids, enough so that your urine is light yellow or clear like water. Choose water and other caffeine-free clear liquids until you feel better. If you have kidney, heart, or liver disease and have to limit fluids, talk with your doctor before you increase the amount of fluids you drink. · If your stomach is upset, eat mild foods, such as rice, dry toast or crackers, bananas, and applesauce. Try eating several small meals instead of two or three large ones. · Wait until 48 hours after all symptoms have gone away before you have spicy foods, alcohol, and drinks that contain caffeine. · Do not eat foods that are high in fat. · Avoid anti-inflammatory medicines such as aspirin, ibuprofen (Advil, Motrin), and naproxen (Aleve).  These can cause stomach upset. Talk to your doctor if you take daily aspirin for another health problem. When should you call for help? Call 911 anytime you think you may need emergency care. For example, call if:  · You passed out (lost consciousness). · You pass maroon or very bloody stools. · You vomit blood or what looks like coffee grounds. · You have new, severe belly pain. Call your doctor now or seek immediate medical care if:  · Your pain gets worse, especially if it becomes focused in one area of your belly. · You have a new or higher fever. · Your stools are black and look like tar, or they have streaks of blood. · You have unexpected vaginal bleeding. · You have symptoms of a urinary tract infection. These may include:  ¨ Pain when you urinate. ¨ Urinating more often than usual.  ¨ Blood in your urine. · You are dizzy or lightheaded, or you feel like you may faint. Watch closely for changes in your health, and be sure to contact your doctor if:  · You are not getting better after 1 day (24 hours). Where can you learn more? Go to http://jalilMassage Envymo.info/. Enter D931 in the search box to learn more about \"Abdominal Pain: Care Instructions. \"  Current as of: March 20, 2017  Content Version: 11.3  © 7984-0817 [employer]. Care instructions adapted under license by [employer] (which disclaims liability or warranty for this information). If you have questions about a medical condition or this instruction, always ask your healthcare professional. [place] [address] any warranty or liability for your use of this information. Nausea and Vomiting: Care Instructions  Your Care Instructions    When you are nauseated, you may feel weak and sweaty and notice a lot of saliva in your mouth. Nausea often leads to vomiting. Most of the time you do not need to worry about nausea and vomiting, but they can be signs of other illnesses.   Two common causes of nausea and vomiting are stomach flu and food poisoning. Nausea and vomiting from viral stomach flu will usually start to improve within 24 hours. Nausea and vomiting from food poisoning may last from 12 to 48 hours. The doctor has checked you carefully, but problems can develop later. If you notice any problems or new symptoms, get medical treatment right away. Follow-up care is a key part of your treatment and safety. Be sure to make and go to all appointments, and call your doctor if you are having problems. It's also a good idea to know your test results and keep a list of the medicines you take. How can you care for yourself at home? · To prevent dehydration, drink plenty of fluids, enough so that your urine is light yellow or clear like water. Choose water and other caffeine-free clear liquids until you feel better. If you have kidney, heart, or liver disease and have to limit fluids, talk with your doctor before you increase the amount of fluids you drink. · Rest in bed until you feel better. · When you are able to eat, try clear soups, mild foods, and liquids until all symptoms are gone for 12 to 48 hours. Other good choices include dry toast, crackers, cooked cereal, and gelatin dessert, such as Jell-O. When should you call for help? Call 911 anytime you think you may need emergency care. For example, call if:  · You passed out (lost consciousness). Call your doctor now or seek immediate medical care if:  · You have symptoms of dehydration, such as:  ¨ Dry eyes and a dry mouth. ¨ Passing only a little dark urine. ¨ Feeling thirstier than usual.  · You have new or worsening belly pain. · You have a new or higher fever. · You vomit blood or what looks like coffee grounds. Watch closely for changes in your health, and be sure to contact your doctor if:  · You have ongoing nausea and vomiting. · Your vomiting is getting worse. · Your vomiting lasts longer than 2 days.   · You are not getting better as expected. Where can you learn more? Go to http://jalil-mo.info/. Enter 25 426509 in the search box to learn more about \"Nausea and Vomiting: Care Instructions. \"  Current as of: March 20, 2017  Content Version: 11.3  © 0883-9947 [employer]. Care instructions adapted under license by [employer] (which disclaims liability or warranty for this information). If you have questions about a medical condition or this instruction, always ask your healthcare professional. [address] any warranty or liability for your use of this information. Peptic Ulcer Disease: Care Instructions  Your Care Instructions    Peptic ulcers are sores on the inside of the stomach or the small intestine. They are usually caused by an infection with bacteria or from use of nonsteroidal anti-inflammatory drugs (NSAIDs). NSAIDs include aspirin, ibuprofen (Advil), and naproxen (Aleve). Your doctor may have prescribed medicine to reduce stomach acid. You also may need to take antibiotics if your peptic ulcers are caused by an infection. You can help your stomach heal and keep ulcers from coming back by making some changes in your lifestyle. Quit smoking, limit caffeine and alcohol, and reduce stress. Follow-up care is a key part of your treatment and safety. Be sure to make and go to all appointments, and call your doctor if you are having problems. Its also a good idea to know your test results and keep a list of the medicines you take. How can you care for yourself at home? · Take your medicines exactly as prescribed. Call your doctor if you think you are having a problem with your medicine. · Do not take aspirin or other NSAIDs such as ibuprofen (Advil or Motrin) or naproxen (Aleve). Ask your doctor what you can take for pain. · Do not smoke. Smoking can make ulcers worse.  If you need help quitting, talk to your doctor about stop-smoking programs and medicines. These can increase your chances of quitting for good. · Drink in moderation or avoid drinking alcohol. · Do not drink beverages that have caffeine if they bother your stomach. These include coffee, tea, and soda. · Eat a balanced diet of small, frequent meals. Make an appointment with a dietitian if you need help planning your meals. · Reduce stress. Avoid people and places that make you feel anxious, if you can. Learn ways to reduce stress, such as biofeedback, guided imagery, and meditation. When should you call for help? Call 911 anytime you think you may need emergency care. For example, call if:  · You passed out (lost consciousness). · You vomit blood or what looks like coffee grounds. · You pass maroon or very bloody stools. Call your doctor now or seek immediate medical care if:  · You have severe pain in your belly, back, or shoulders. · You have new or worsening belly pain. · You are dizzy or lightheaded, or you feel like you may faint. · Your stools are black and tarlike or have streaks of blood. Watch closely for changes in your health, and be sure to contact your doctor if:  · You have new symptoms such as weight loss, nausea or vomiting. · You do not feel better as expected. Where can you learn more? Go to http://jalil-mo.info/. Enter I719 in the search box to learn more about \"Peptic Ulcer Disease: Care Instructions. \"  Current as of: August 9, 2016  Content Version: 11.3  © 1040-0799 [employer]. Care instructions adapted under license by [employer] (which disclaims liability or warranty for this information). If you have questions about a medical condition or this instruction, always ask your healthcare professional. [address] any warranty or liability for your use of this information.

## 2017-06-29 ENCOUNTER — TELEPHONE (OUTPATIENT)
Dept: SURGERY | Age: 37
End: 2017-06-29

## 2017-06-29 NOTE — TELEPHONE ENCOUNTER
Pt would like a letter regarding April 2016 surgery for a custody case. Please review and advise.     [phone]

## 2017-07-07 NOTE — TELEPHONE ENCOUNTER
Called pt and letter written for her. She will come by next week and  letter from [address] [place] [address]

## 2017-07-11 ENCOUNTER — HOSPITAL ENCOUNTER (EMERGENCY)
Age: 37
Discharge: HOME | End: 2017-07-11
Attending: EMERGENCY MEDICINE | Admitting: EMERGENCY MEDICINE
Payer: SELF-PAY

## 2017-07-11 ENCOUNTER — APPOINTMENT (OUTPATIENT)
Dept: GENERAL RADIOLOGY | Age: 37
End: 2017-07-11
Attending: PHYSICIAN ASSISTANT
Payer: SELF-PAY

## 2017-07-11 VITALS
HEART RATE: 115 BPM | RESPIRATION RATE: 18 BRPM | BODY MASS INDEX: 26.46 KG/M2 | SYSTOLIC BLOOD PRESSURE: 135 MMHG | TEMPERATURE: 98.1 F | DIASTOLIC BLOOD PRESSURE: 87 MMHG | OXYGEN SATURATION: 98 % | HEIGHT: 64 IN | WEIGHT: 155 LBS

## 2017-07-11 DIAGNOSIS — F17.200 NICOTINE DEPENDENCE, UNCOMPLICATED, UNSPECIFIED NICOTINE PRODUCT TYPE: ICD-10-CM

## 2017-07-11 DIAGNOSIS — S93.401A SPRAIN OF RIGHT ANKLE, UNSPECIFIED LIGAMENT, INITIAL ENCOUNTER: Primary | ICD-10-CM

## 2017-07-11 PROCEDURE — L4350 ANKLE CONTROL ORTHO PRE OTS: HCPCS

## 2017-07-11 PROCEDURE — 73630 X-RAY EXAM OF FOOT: CPT

## 2017-07-11 PROCEDURE — 74011250637 HC RX REV CODE- 250/637: Performed by: PHYSICIAN ASSISTANT

## 2017-07-11 PROCEDURE — 99283 EMERGENCY DEPT VISIT LOW MDM: CPT

## 2017-07-11 PROCEDURE — 73610 X-RAY EXAM OF ANKLE: CPT

## 2017-07-11 RX ORDER — IBUPROFEN 600 MG/1
600 TABLET ORAL
Qty: 20 TAB | Refills: 0 | Status: SHIPPED | OUTPATIENT
Start: 2017-07-11 | End: 2017-07-16

## 2017-07-11 RX ORDER — HYDROCODONE BITARTRATE AND ACETAMINOPHEN 5; 325 MG/1; MG/1
1 TABLET ORAL
Qty: 12 TAB | Refills: 0 | Status: SHIPPED | OUTPATIENT
Start: 2017-07-11 | End: 2017-07-16

## 2017-07-11 RX ORDER — OXYCODONE AND ACETAMINOPHEN 5; 325 MG/1; MG/1
1 TABLET ORAL
Status: COMPLETED | OUTPATIENT
Start: 2017-07-11 | End: 2017-07-11

## 2017-07-11 RX ADMIN — OXYCODONE HYDROCHLORIDE AND ACETAMINOPHEN 1 TABLET: 5; 325 TABLET ORAL at 22:12

## 2017-07-11 NOTE — LETTER
[address] 
[facility] EMERGENCY DEPT 
[address] [place] [address] [address] [id number] [phone] Work/School Note Date: 7/11/2017 To Whom It May concern: 
 
Jose Abdul was seen and treated today in the emergency room by the following provider(s): 
Attending Provider: Starr Lundberg MD 
Physician Assistant: JULIENNE Astorga.   
 
Jose Abdul may return to work in 1-2 days. Sincerely, Bryson Astorga

## 2017-07-12 NOTE — DISCHARGE INSTRUCTIONS
Ankle Sprain: Care Instructions  Your Care Instructions    An ankle sprain can happen when you twist your ankle. The ligaments that support the ankle can get stretched and torn. Often the ankle is swollen and painful. Ankle sprains may take from several weeks to several months to heal. Usually, the more pain and swelling you have, the more severe your ankle sprain is and the longer it will take to heal. You can heal faster and regain strength in your ankle with good home treatment. It is very important to give your ankle time to heal completely, so that you do not easily hurt your ankle again. Follow-up care is a key part of your treatment and safety. Be sure to make and go to all appointments, and call your doctor if you are having problems. It's also a good idea to know your test results and keep a list of the medicines you take. How can you care for yourself at home? · Prop up your foot on pillows as much as possible for the next 3 days. Try to keep your ankle above the level of your heart. This will help reduce the swelling. · Follow your doctor's directions for wearing a splint or elastic bandage. Wrapping the ankle may help reduce or prevent swelling. · Your doctor may give you a splint, a brace, an air stirrup, or another form of ankle support to protect your ankle until it is healed. Wear it as directed while your ankle is healing. Do not remove it unless your doctor tells you to. After your ankle has healed, ask your doctor whether you should wear the brace when you exercise. · Put ice or cold packs on your injured ankle for 10 to 20 minutes at a time. Try to do this every 1 to 2 hours for the next 3 days (when you are awake) or until the swelling goes down. Put a thin cloth between the ice and your skin. · You may need to use crutches until you can walk without pain. If you do use crutches, try to bear some weight on your injured ankle if you can do so without pain.  This helps the ankle heal.  · Take pain medicines exactly as directed. ¨ If the doctor gave you a prescription medicine for pain, take it as prescribed. ¨ If you are not taking a prescription pain medicine, ask your doctor if you can take an over-the-counter medicine. · If you have been given ankle exercises to do at home, do them exactly as instructed. These can promote healing and help prevent lasting weakness. When should you call for help? Call your doctor now or seek immediate medical care if:  · Your pain is getting worse. · Your swelling is getting worse. · Your splint feels too tight or you are unable to loosen it. Watch closely for changes in your health, and be sure to contact your doctor if:  · You are not getting better after 1 week. Where can you learn more? Go to http://jalil-mo.info/. Enter O607 in the search box to learn more about \"Ankle Sprain: Care Instructions. \"  Current as of: March 21, 2017  Content Version: 11.3  © 1083-1551 [employer]. Care instructions adapted under license by [employer] (which disclaims liability or warranty for this information). If you have questions about a medical condition or this instruction, always ask your healthcare professional. [place] [address] any warranty or liability for your use of this information.

## 2017-07-12 NOTE — ED PROVIDER NOTES
HPI Comments: Augustus Santillan, 31 y.o. female, presents ambulatory to [address] [facility] ED with cc of R foot and ankle pain x this evening. Patient states her injury occurred while walking at work. She states she felt her R ankle roll inward and reports exacerbation of pain with any weight bearing. She also c/o associated numbness of the top of the foot. Patient took Ibuprofen at home approximately 1.5-2 hours PTA. She denies any other injury, prior h/o ankle injuries, or other sxs. Patient denies CP, SOB, abdominal pain, N/V/D, and ha. PCP: Cody Leblanc MD    PMHx significant for: migraines, ADD, depression, HPV+, dysmenorrhea, menorrhagia, sz  PSHx significant for: endometrial ablation, hysterectomy  Social history significant for: + Tobacco, - EtOH, - Illicit Drug Use    There are no other complaints, changes, or physical findings at this time. Written by Pb Robles ED Scribe, as dictated by Mary Ellen Hendricks PA-C. The history is provided by the patient. No  was used. Past Medical History:   Diagnosis Date    ADD (attention deficit disorder) 17-17yo    Treated with Adderall since . 2013 dosing 20mg AM and 10mg PM.  Trial/change to Vyvanse Nov-Dec 2015--not as effective.  Headache     migraines    HX OTHER MEDICAL      -BUFA baby    HX OTHER MEDICAL     HPV positive    Idiopathic vulvodynia 2014    IUD (intrauterine device) in place 2015    Mirena IUD placed on 4/17/15    Migraines 2013    miscarriages     Neurological disorder     Pelvic pain in female 9/15/2014    2015 gyn laparoscopy/hysteroscopy with endometriosis worse right.     Psychiatric disorder     depression    Psychiatric problem     depression    Secondary dysmenorrhea 2014    With menorraghia    Seizures ([address]     never on meds--had appr 4-5 in a short amt of time and none since       Past Surgical History:   Procedure Laterality Date    ENDOMETRIAL CRYOABLATION      HX GYN  4/17/15    laparoscopy and hysteroscopy and IUD placement    HX HYSTERECTOMY  04/04/2016    Complete Hysterectomy         Family History:   Problem Relation Age of Onset    Cancer Mother     Alcohol abuse Father      and drug    Psychiatric Disorder Father     Cancer Father     Diabetes Maternal Grandmother     Hypertension Maternal Grandmother     Thyroid Disease Maternal Grandmother     Diabetes Maternal Grandfather     Hypertension Maternal Grandfather     Cancer Maternal Grandfather     Heart Disease Maternal Grandfather     Cancer Paternal Grandmother     Diabetes Paternal Grandmother        Social History     Social History    Marital status: SINGLE     Spouse name: N/A    Number of children: N/A    Years of education: N/A     Occupational History    Not on file. Social History Main Topics    Smoking status: Current Every Day Smoker     Packs/day: 1.00     Years: 18.00     Types: Cigarettes    Smokeless tobacco: Never Used      Comment: Information given with AVS.    Alcohol use No    Drug use: No    Sexual activity: Yes     Partners: Male     Birth control/ protection: Surgical     Other Topics Concern    Not on file     Social History Narrative    ** Merged History Encounter **              ALLERGIES: Aspirin and Penicillins    Review of Systems   Constitutional: Negative. Negative for chills and fever. HENT: Negative. Negative for rhinorrhea and sore throat. Eyes: Negative. Negative for visual disturbance. Respiratory: Negative. Negative for cough, chest tightness, shortness of breath and wheezing. Cardiovascular: Negative. Negative for chest pain and palpitations. Gastrointestinal: Negative. Negative for abdominal pain, constipation, diarrhea, nausea and vomiting. Genitourinary: Negative. Negative for dysuria and hematuria. Musculoskeletal: Positive for arthralgias. Negative for myalgias. Skin: Negative. Negative for rash. Allergic/Immunologic: Negative. Negative for environmental allergies and food allergies. Neurological: Positive for numbness. Negative for headaches. Psychiatric/Behavioral: Negative. Negative for suicidal ideas. Vitals:    07/11/17 2205 07/11/17 2310   BP: 124/82 135/87   Pulse: (!) 114 (!) 115   Resp: 18    Temp: 98.1 °F (36.7 °C)    SpO2: 100% 98%   Weight: 70.3 kg (155 lb)    Height: 5' 4\" (1.626 m)             Physical Exam   Constitutional: She is oriented to person, place, and time. She appears well-developed and well-nourished. No distress. Pt is a  F, awake and alert in NAD. HENT:   Head: Normocephalic and atraumatic. Right Ear: External ear normal.   Left Ear: External ear normal.   Nose: Nose normal.   Eyes: Conjunctivae are normal. Right eye exhibits no discharge. Left eye exhibits no discharge. Neck: Normal range of motion. Cardiovascular: Normal heart sounds and intact distal pulses. Tachycardia present. Pulses:       Dorsalis pedis pulses are 2+ on the right side, and 2+ on the left side. Pulmonary/Chest: Effort normal and breath sounds normal. No respiratory distress. She has no wheezes. She has no rales. She exhibits no tenderness. Abdominal: Soft. Bowel sounds are normal. There is no tenderness. There is no guarding. No CVA tenderness b/l. Musculoskeletal: Normal range of motion. She exhibits tenderness. She exhibits no edema or deformity. Right ankle: She exhibits normal range of motion. Tenderness. R ankle: No edema, erythema, or obvious bony deformity. + TTP to diffuse medial joint. ROM intact. R foot: No edema, erythema, or obvious bony deformity. + TTP over lateral foot. ROM intact. 2+ D.P. Pulses b/l. Neuro and sensation intact. Neurological: She is alert and oriented to person, place, and time. No cranial nerve deficit. No focal neuro deficits. Skin: Skin is warm and dry. No rash noted. She is not diaphoretic. No erythema.  No pallor. No erythema or edema of ankle. Psychiatric: She has a normal mood and affect. Her behavior is normal.   Nursing note and vitals reviewed. MDM  Number of Diagnoses or Management Options  Nicotine dependence, uncomplicated, unspecified nicotine product type:   Sprain of right ankle, unspecified ligament, initial encounter:   Diagnosis management comments:   Ddx: sprain, strain, fx       Amount and/or Complexity of Data Reviewed  Tests in the radiology section of CPT®: ordered and reviewed  Review and summarize past medical records: yes    Patient Progress  Patient progress: stable    ED Course       Procedures      IMAGING RESULTS:  XR ANKLE RT MIN 3 V   Final Result   EXAM:  XR ANKLE RT MIN 3 V, XR FOOT RT MIN 3 V     INDICATION:  Trauma. 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     COMPARISON: None.     FINDINGS: Three views of the right ankle an 3 additional views of the right foot  demonstrate no fracture or disruption of the ankle mortise. There is no other  acute osseous or articular abnormality. The soft tissues are within normal  limits.     IMPRESSION  IMPRESSION: No acute abnormality. XR FOOT RT MIN 3 V   Final Result   EXAM:  XR ANKLE RT MIN 3 V, XR FOOT RT MIN 3 V     INDICATION:  Trauma. 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     COMPARISON: None.     FINDINGS: Three views of the right ankle an 3 additional views of the right foot  demonstrate no fracture or disruption of the ankle mortise. There is no other  acute osseous or articular abnormality. The soft tissues are within normal  limits.     IMPRESSION  IMPRESSION: No acute abnormality. MEDICATIONS GIVEN:  Medications   oxyCODONE-acetaminophen (PERCOCET) 5-325 mg per tablet 1 Tab (1 Tab Oral Given 7/11/17 8982)       IMPRESSION:  1. Sprain of right ankle, unspecified ligament, initial encounter    2. Nicotine dependence, uncomplicated, unspecified nicotine product type        PLAN:  1.    Discharge Medication List as of 7/11/2017 11:03 PM      START taking these medications    Details   ibuprofen (MOTRIN) 600 mg tablet Take 1 Tab by mouth every six (6) hours as needed for Pain., Print, Disp-20 Tab, R-0         CONTINUE these medications which have CHANGED    Details   HYDROcodone-acetaminophen (NORCO) 5-325 mg per tablet Take 1 Tab by mouth every six (6) hours as needed for Pain. Max Daily Amount: 4 Tabs., Print, Disp-12 Tab, R-0         CONTINUE these medications which have NOT CHANGED    Details   ondansetron (ZOFRAN ODT) 4 mg disintegrating tablet Take 1 Tab by mouth every eight (8) hours as needed for Nausea., Normal, Disp-10 Tab, R-0      promethazine (PHENERGAN) 25 mg tablet Take 1 Tab by mouth every six (6) hours as needed for Nausea., Normal, Disp-12 Tab, R-0           2. Follow-up Information     Follow up With Details Comments Contact Info    Julio Ruiz MD Schedule an appointment as soon as possible for a visit in 4 days  [address]  [phone]      [facility] EMERGENCY DEPT  As needed or, If symptoms worsen [address]  [phone]        3. RICE    Return to ED if worse     Discharge Note:  11:00 PM  The pt is ready for discharge. The pt's signs, symptoms, diagnosis, and discharge instructions have been discussed and pt has conveyed their understanding. The pt is to follow up as recommended or return to ER should their symptoms worsen. Plan has been discussed and pt is in agreement. This note is prepared by Edel Black, acting as a Scribe for Julio César Isaacs PA-C. Julio César Isaacs PA-C: The scribe's documentation has been prepared under my direction and personally reviewed by me in its entirety. I confirm that the notes above accurately reflects all work, treatment, procedures, and medical decision making performed by me. This note will not be viewable in [address]

## 2017-07-16 ENCOUNTER — HOSPITAL ENCOUNTER (EMERGENCY)
Age: 37
Discharge: HOME | End: 2017-07-16
Attending: EMERGENCY MEDICINE
Payer: SELF-PAY

## 2017-07-16 ENCOUNTER — APPOINTMENT (OUTPATIENT)
Dept: GENERAL RADIOLOGY | Age: 37
End: 2017-07-16
Attending: EMERGENCY MEDICINE
Payer: SELF-PAY

## 2017-07-16 VITALS
RESPIRATION RATE: 18 BRPM | TEMPERATURE: 97.9 F | BODY MASS INDEX: 29.58 KG/M2 | SYSTOLIC BLOOD PRESSURE: 116 MMHG | HEART RATE: 91 BPM | WEIGHT: 173.28 LBS | OXYGEN SATURATION: 91 % | DIASTOLIC BLOOD PRESSURE: 69 MMHG | HEIGHT: 64 IN

## 2017-07-16 DIAGNOSIS — E86.0 DEHYDRATION: ICD-10-CM

## 2017-07-16 DIAGNOSIS — K29.90 GASTRITIS AND DUODENITIS: Primary | ICD-10-CM

## 2017-07-16 LAB
ALBUMIN SERPL BCP-MCNC: 3.2 G/DL (ref 3.5–5)
ALBUMIN/GLOB SERPL: 0.9 {RATIO} (ref 1.1–2.2)
ALP SERPL-CCNC: 87 U/L (ref 45–117)
ALT SERPL-CCNC: 25 U/L (ref 12–78)
ANION GAP BLD CALC-SCNC: 10 MMOL/L (ref 5–15)
APPEARANCE UR: CLEAR
AST SERPL W P-5'-P-CCNC: 14 U/L (ref 15–37)
BACTERIA URNS QL MICRO: NEGATIVE /HPF
BASOPHILS # BLD AUTO: 0 K/UL (ref 0–0.1)
BASOPHILS # BLD: 0 % (ref 0–1)
BILIRUB SERPL-MCNC: 0.3 MG/DL (ref 0.2–1)
BILIRUB UR QL: NEGATIVE
BUN SERPL-MCNC: 16 MG/DL (ref 6–20)
BUN/CREAT SERPL: 27 (ref 12–20)
CALCIUM SERPL-MCNC: 8.1 MG/DL (ref 8.5–10.1)
CHLORIDE SERPL-SCNC: 109 MMOL/L (ref 97–108)
CO2 SERPL-SCNC: 21 MMOL/L (ref 21–32)
COLOR UR: NORMAL
CREAT SERPL-MCNC: 0.59 MG/DL (ref 0.55–1.02)
EOSINOPHIL # BLD: 0.1 K/UL (ref 0–0.4)
EOSINOPHIL NFR BLD: 1 % (ref 0–7)
EPITH CASTS URNS QL MICRO: NORMAL /LPF
ERYTHROCYTE [DISTWIDTH] IN BLOOD BY AUTOMATED COUNT: 13.4 % (ref 11.5–14.5)
GLOBULIN SER CALC-MCNC: 3.6 G/DL (ref 2–4)
GLUCOSE SERPL-MCNC: 115 MG/DL (ref 65–100)
GLUCOSE UR STRIP.AUTO-MCNC: NEGATIVE MG/DL
HCT VFR BLD AUTO: 39.3 % (ref 35–47)
HGB BLD-MCNC: 13.7 G/DL (ref 11.5–16)
HGB UR QL STRIP: NEGATIVE
HYALINE CASTS URNS QL MICRO: NORMAL /LPF (ref 0–5)
KETONES UR QL STRIP.AUTO: NEGATIVE MG/DL
LACTATE SERPL-SCNC: 2 MMOL/L (ref 0.4–2)
LEUKOCYTE ESTERASE UR QL STRIP.AUTO: NEGATIVE
LIPASE SERPL-CCNC: 185 U/L (ref 73–393)
LYMPHOCYTES # BLD AUTO: 35 % (ref 12–49)
LYMPHOCYTES # BLD: 3.9 K/UL (ref 0.8–3.5)
MCH RBC QN AUTO: 31.9 PG (ref 26–34)
MCHC RBC AUTO-ENTMCNC: 34.9 G/DL (ref 30–36.5)
MCV RBC AUTO: 91.4 FL (ref 80–99)
MONOCYTES # BLD: 0.5 K/UL (ref 0–1)
MONOCYTES NFR BLD AUTO: 5 % (ref 5–13)
NEUTS SEG # BLD: 6.7 K/UL (ref 1.8–8)
NEUTS SEG NFR BLD AUTO: 59 % (ref 32–75)
NITRITE UR QL STRIP.AUTO: NEGATIVE
PH UR STRIP: 5.5 [PH] (ref 5–8)
PLATELET # BLD AUTO: 300 K/UL (ref 150–400)
POTASSIUM SERPL-SCNC: 3.7 MMOL/L (ref 3.5–5.1)
PROT SERPL-MCNC: 6.8 G/DL (ref 6.4–8.2)
PROT UR STRIP-MCNC: NEGATIVE MG/DL
RBC # BLD AUTO: 4.3 M/UL (ref 3.8–5.2)
RBC #/AREA URNS HPF: NORMAL /HPF (ref 0–5)
SODIUM SERPL-SCNC: 140 MMOL/L (ref 136–145)
SP GR UR REFRACTOMETRY: 1.02 (ref 1–1.03)
UROBILINOGEN UR QL STRIP.AUTO: 0.2 EU/DL (ref 0.2–1)
WBC # BLD AUTO: 11.2 K/UL (ref 3.6–11)
WBC URNS QL MICRO: NORMAL /HPF (ref 0–4)

## 2017-07-16 PROCEDURE — 74011250636 HC RX REV CODE- 250/636: Performed by: EMERGENCY MEDICINE

## 2017-07-16 PROCEDURE — 96361 HYDRATE IV INFUSION ADD-ON: CPT

## 2017-07-16 PROCEDURE — 74022 RADEX COMPL AQT ABD SERIES: CPT

## 2017-07-16 PROCEDURE — 83605 ASSAY OF LACTIC ACID: CPT | Performed by: EMERGENCY MEDICINE

## 2017-07-16 PROCEDURE — 36415 COLL VENOUS BLD VENIPUNCTURE: CPT | Performed by: EMERGENCY MEDICINE

## 2017-07-16 PROCEDURE — 99284 EMERGENCY DEPT VISIT MOD MDM: CPT

## 2017-07-16 PROCEDURE — 83690 ASSAY OF LIPASE: CPT | Performed by: EMERGENCY MEDICINE

## 2017-07-16 PROCEDURE — 96376 TX/PRO/DX INJ SAME DRUG ADON: CPT

## 2017-07-16 PROCEDURE — 85025 COMPLETE CBC W/AUTO DIFF WBC: CPT | Performed by: EMERGENCY MEDICINE

## 2017-07-16 PROCEDURE — 96375 TX/PRO/DX INJ NEW DRUG ADDON: CPT

## 2017-07-16 PROCEDURE — 96374 THER/PROPH/DIAG INJ IV PUSH: CPT

## 2017-07-16 PROCEDURE — 80053 COMPREHEN METABOLIC PANEL: CPT | Performed by: EMERGENCY MEDICINE

## 2017-07-16 PROCEDURE — 81001 URINALYSIS AUTO W/SCOPE: CPT | Performed by: EMERGENCY MEDICINE

## 2017-07-16 PROCEDURE — 74011250636 HC RX REV CODE- 250/636

## 2017-07-16 RX ORDER — ONDANSETRON 2 MG/ML
INJECTION INTRAMUSCULAR; INTRAVENOUS
Status: DISPENSED
Start: 2017-07-16 | End: 2017-07-16

## 2017-07-16 RX ORDER — MORPHINE SULFATE 4 MG/ML
INJECTION, SOLUTION INTRAMUSCULAR; INTRAVENOUS
Status: DISCONTINUED
Start: 2017-07-16 | End: 2017-07-16 | Stop reason: HOSPADM

## 2017-07-16 RX ORDER — MORPHINE SULFATE 4 MG/ML
4 INJECTION, SOLUTION INTRAMUSCULAR; INTRAVENOUS
Status: COMPLETED | OUTPATIENT
Start: 2017-07-16 | End: 2017-07-16

## 2017-07-16 RX ORDER — ONDANSETRON 2 MG/ML
4 INJECTION INTRAMUSCULAR; INTRAVENOUS
Status: DISCONTINUED | OUTPATIENT
Start: 2017-07-16 | End: 2017-07-16 | Stop reason: SDUPTHER

## 2017-07-16 RX ORDER — SODIUM CHLORIDE 0.9 % (FLUSH) 0.9 %
5-10 SYRINGE (ML) INJECTION AS NEEDED
Status: DISCONTINUED | OUTPATIENT
Start: 2017-07-16 | End: 2017-07-16 | Stop reason: HOSPADM

## 2017-07-16 RX ORDER — ONDANSETRON 2 MG/ML
4 INJECTION INTRAMUSCULAR; INTRAVENOUS
Status: COMPLETED | OUTPATIENT
Start: 2017-07-16 | End: 2017-07-16

## 2017-07-16 RX ORDER — MORPHINE SULFATE 2 MG/ML
4 INJECTION, SOLUTION INTRAMUSCULAR; INTRAVENOUS
Status: DISCONTINUED | OUTPATIENT
Start: 2017-07-16 | End: 2017-07-16

## 2017-07-16 RX ORDER — SUCRALFATE 1 G/1
1 TABLET ORAL 4 TIMES DAILY
Qty: 30 TAB | Refills: 0 | Status: SHIPPED | OUTPATIENT
Start: 2017-07-16 | End: 2017-08-08

## 2017-07-16 RX ORDER — HYDROCODONE BITARTRATE AND ACETAMINOPHEN 5; 325 MG/1; MG/1
1 TABLET ORAL
Qty: 20 TAB | Refills: 0 | Status: SHIPPED | OUTPATIENT
Start: 2017-07-16 | End: 2017-07-29

## 2017-07-16 RX ORDER — SODIUM CHLORIDE 0.9 % (FLUSH) 0.9 %
5-10 SYRINGE (ML) INJECTION EVERY 8 HOURS
Status: DISCONTINUED | OUTPATIENT
Start: 2017-07-16 | End: 2017-07-16 | Stop reason: HOSPADM

## 2017-07-16 RX ORDER — MORPHINE SULFATE 2 MG/ML
4 INJECTION, SOLUTION INTRAMUSCULAR; INTRAVENOUS
Status: COMPLETED | OUTPATIENT
Start: 2017-07-16 | End: 2017-07-16

## 2017-07-16 RX ORDER — ONDANSETRON 4 MG/1
4 TABLET, ORALLY DISINTEGRATING ORAL
Qty: 10 TAB | Refills: 0 | Status: SHIPPED | OUTPATIENT
Start: 2017-07-16 | End: 2017-12-13

## 2017-07-16 RX ORDER — MORPHINE SULFATE 4 MG/ML
INJECTION, SOLUTION INTRAMUSCULAR; INTRAVENOUS
Status: COMPLETED
Start: 2017-07-16 | End: 2017-07-16

## 2017-07-16 RX ADMIN — MORPHINE SULFATE 4 MG: 4 INJECTION, SOLUTION INTRAMUSCULAR; INTRAVENOUS at 02:21

## 2017-07-16 RX ADMIN — ONDANSETRON 4 MG: 2 INJECTION INTRAMUSCULAR; INTRAVENOUS at 02:40

## 2017-07-16 RX ADMIN — SODIUM CHLORIDE 1000 ML: 900 INJECTION, SOLUTION INTRAVENOUS at 01:40

## 2017-07-16 RX ADMIN — MORPHINE SULFATE 4 MG: 2 INJECTION, SOLUTION INTRAMUSCULAR; INTRAVENOUS at 02:17

## 2017-07-16 RX ADMIN — MORPHINE SULFATE 4 MG: 4 INJECTION, SOLUTION INTRAMUSCULAR; INTRAVENOUS at 04:34

## 2017-07-16 RX ADMIN — ONDANSETRON 4 MG: 2 INJECTION INTRAMUSCULAR; INTRAVENOUS at 01:19

## 2017-07-16 NOTE — ED PROVIDER NOTES
HPI Comments: Liza Murphy, 31 y.o. Female presents ambulatory to ED [facility] ED with cc of epigastric abdominal pain, worsening a few hours ago. She also has had multiple episodes of nausea and vomiting with blood seen in the vomit. She was seen here recently for the same sxs and diagnosed with a possible peptic ulcer. She specifically denies any fevers, chills, chest pain, shortness of breath, headache, rash, diarrhea, sweating or weight loss. PCP: Saida Parsons MD    Social history significant for: + Tobacco, - EtOH, - Illicit Drug Use  Surgical history significant for: hysterectomy    There are no other complaints, changes, or physical findings at this time. The history is provided by the patient. Past Medical History:   Diagnosis Date    ADD (attention deficit disorder) 17-17yo    Treated with Adderall since dx. 2013 dosing 20mg AM and 10mg PM.  Trial/change to Vyvanse Nov-Dec 2015--not as effective.  Headache     migraines    HX OTHER MEDICAL      -BUFA baby    HX OTHER MEDICAL     HPV positive    Idiopathic vulvodynia 2014    IUD (intrauterine device) in place 2015    Mirena IUD placed on 4/17/15    Migraines 2013    miscarriages     Neurological disorder     Pelvic pain in female 9/15/2014    2015 gyn laparoscopy/hysteroscopy with endometriosis worse right.     Psychiatric disorder     depression    Psychiatric problem     depression    Secondary dysmenorrhea 2014    With menorraghia    Seizures ([facility] [address]     never on meds--had appr 4-5 in a short amt of time and none since       Past Surgical History:   Procedure Laterality Date    ENDOMETRIAL CRYOABLATION      HX GYN  4/17/15    laparoscopy and hysteroscopy and IUD placement    HX HYSTERECTOMY  2016    Complete Hysterectomy         Family History:   Problem Relation Age of Onset    Cancer Mother     Alcohol abuse Father      and drug    Psychiatric Disorder Father  Cancer Father     Diabetes Maternal Grandmother     Hypertension Maternal Grandmother     Thyroid Disease Maternal Grandmother     Diabetes Maternal Grandfather     Hypertension Maternal Grandfather     Cancer Maternal Grandfather     Heart Disease Maternal Grandfather     Cancer Paternal Grandmother     Diabetes Paternal Grandmother        Social History     Social History    Marital status: SINGLE     Spouse name: N/A    Number of children: N/A    Years of education: N/A     Occupational History    Not on file. Social History Main Topics    Smoking status: Current Every Day Smoker     Packs/day: 1.00     Years: 18.00     Types: Cigarettes    Smokeless tobacco: Never Used      Comment: Information given with AVS.    Alcohol use No    Drug use: No    Sexual activity: Yes     Partners: Male     Birth control/ protection: Surgical     Other Topics Concern    Not on file     Social History Narrative    ** Merged History Encounter **              ALLERGIES: Aspirin and Penicillins    Review of Systems   Constitutional: Negative. Negative for activity change, appetite change, chills, fatigue, fever and unexpected weight change. HENT: Negative. Negative for congestion, hearing loss, rhinorrhea, sneezing and voice change. Eyes: Negative. Negative for pain and visual disturbance. Respiratory: Negative. Negative for apnea, cough, choking, chest tightness and shortness of breath. Cardiovascular: Negative. Negative for chest pain and palpitations. Gastrointestinal: Positive for abdominal pain, nausea and vomiting. Negative for abdominal distention, blood in stool and diarrhea. Genitourinary: Negative. Negative for difficulty urinating, flank pain, frequency and urgency. No discharge   Musculoskeletal: Negative. Negative for arthralgias, back pain, myalgias and neck stiffness. Skin: Negative. Negative for color change and rash. Neurological: Negative.   Negative for dizziness, seizures, syncope, speech difficulty, weakness, numbness and headaches. Hematological: Negative for adenopathy. Psychiatric/Behavioral: Negative. Negative for agitation, behavioral problems, dysphoric mood and suicidal ideas. The patient is not nervous/anxious. Vitals:    07/16/17 0315 07/16/17 0330 07/16/17 0345 07/16/17 0415   BP: 96/58 97/61 99/58 116/69   Pulse:       Resp:       Temp:       SpO2: 93% 93% 91%    Weight:       Height:                Physical Exam   Constitutional: She is oriented to person, place, and time. She appears well-developed and well-nourished. No distress. HENT:   Head: Normocephalic and atraumatic. Mouth/Throat: Oropharynx is clear and moist. No oropharyngeal exudate. Eyes: Conjunctivae and EOM are normal. Pupils are equal, round, and reactive to light. Right eye exhibits no discharge. Left eye exhibits no discharge. Neck: Normal range of motion. Neck supple. Cardiovascular: Normal rate, regular rhythm and intact distal pulses. Exam reveals no gallop and no friction rub. No murmur heard. Pulmonary/Chest: Effort normal and breath sounds normal. No respiratory distress. She has no wheezes. She has no rales. She exhibits no tenderness. Abdominal: Soft. Bowel sounds are normal. She exhibits no distension and no mass. There is tenderness (mild diffuse). There is no rebound and no guarding. Musculoskeletal: Normal range of motion. She exhibits no edema. Lymphadenopathy:     She has no cervical adenopathy. Neurological: She is alert and oriented to person, place, and time. No cranial nerve deficit. Coordination normal.   Skin: Skin is warm and dry. No rash noted. No erythema. Psychiatric: She has a normal mood and affect. Nursing note and vitals reviewed.        MDM  Number of Diagnoses or Management Options  Diagnosis management comments: DDx: gastritis, gastroenteritis, PUD       Amount and/or Complexity of Data Reviewed  Clinical lab tests: reviewed and ordered  Tests in the radiology section of CPT®: ordered and reviewed  Review and summarize past medical records: yes  Independent visualization of images, tracings, or specimens: yes      ED Course       Procedures    Chief Complaint   Patient presents with    Abdominal Pain     epigastric pain. seen by GI awaiting endoscopy \"but I can't take the pain\"    Vomiting       2:46 AM  The patients presenting problems have been discussed, and they are in agreement with the care plan formulated and outlined with them. I have encouraged them to ask questions as they arise throughout their visit. MEDICATIONS GIVEN:  Medications   sodium chloride (NS) flush 5-10 mL (not administered)   sodium chloride (NS) flush 5-10 mL (not administered)   morphine 4 mg/mL injection (  Canceled Entry 7/16/17 0225)   ondansetron (ZOFRAN) injection 4 mg (4 mg IntraVENous Given 7/16/17 0119)   sodium chloride 0.9 % bolus infusion 1,000 mL (0 mL IntraVENous IV Completed 7/16/17 0321)   morphine injection 4 mg (4 mg IntraVENous Given 7/16/17 0217)   morphine 4 mg/mL injection (4 mg IntraVENous Given 7/16/17 0221)   ondansetron (ZOFRAN) injection 4 mg (4 mg IntraVENous Given 7/16/17 0240)   morphine injection 4 mg (4 mg IntraVENous Given 7/16/17 0434)       LABS REVIEWED:  Recent Results (from the past 24 hour(s))   CBC WITH AUTOMATED DIFF    Collection Time: 07/16/17  1:36 AM   Result Value Ref Range    WBC 11.2 (H) 3.6 - 11.0 K/uL    RBC 4.30 3.80 - 5.20 M/uL    HGB 13.7 11.5 - 16.0 g/dL    HCT 39.3 35.0 - 47.0 %    MCV 91.4 80.0 - 99.0 FL    MCH 31.9 26.0 - 34.0 PG    MCHC 34.9 30.0 - 36.5 g/dL    RDW 13.4 11.5 - 14.5 %    PLATELET 217 126 - 596 K/uL    NEUTROPHILS 59 32 - 75 %    LYMPHOCYTES 35 12 - 49 %    MONOCYTES 5 5 - 13 %    EOSINOPHILS 1 0 - 7 %    BASOPHILS 0 0 - 1 %    ABS. NEUTROPHILS 6.7 1.8 - 8.0 K/UL    ABS. LYMPHOCYTES 3.9 (H) 0.8 - 3.5 K/UL    ABS. MONOCYTES 0.5 0.0 - 1.0 K/UL    ABS.  EOSINOPHILS 0.1 0.0 - 0.4 K/UL    ABS. BASOPHILS 0.0 0.0 - 0.1 K/UL   METABOLIC PANEL, COMPREHENSIVE    Collection Time: 07/16/17  1:36 AM   Result Value Ref Range    Sodium 140 136 - 145 mmol/L    Potassium 3.7 3.5 - 5.1 mmol/L    Chloride 109 (H) 97 - 108 mmol/L    CO2 21 21 - 32 mmol/L    Anion gap 10 5 - 15 mmol/L    Glucose 115 (H) 65 - 100 mg/dL    BUN 16 6 - 20 MG/DL    Creatinine 0.59 0.55 - 1.02 MG/DL    BUN/Creatinine ratio 27 (H) 12 - 20      GFR est AA >60 >60 ml/min/1.73m2    GFR est non-AA >60 >60 ml/min/1.73m2    Calcium 8.1 (L) 8.5 - 10.1 MG/DL    Bilirubin, total 0.3 0.2 - 1.0 MG/DL    ALT (SGPT) 25 12 - 78 U/L    AST (SGOT) 14 (L) 15 - 37 U/L    Alk.  phosphatase 87 45 - 117 U/L    Protein, total 6.8 6.4 - 8.2 g/dL    Albumin 3.2 (L) 3.5 - 5.0 g/dL    Globulin 3.6 2.0 - 4.0 g/dL    A-G Ratio 0.9 (L) 1.1 - 2.2     LIPASE    Collection Time: 07/16/17  1:36 AM   Result Value Ref Range    Lipase 185 73 - 393 U/L   LACTIC ACID    Collection Time: 07/16/17  1:36 AM   Result Value Ref Range    Lactic acid 2.0 0.4 - 2.0 MMOL/L   URINALYSIS W/MICROSCOPIC    Collection Time: 07/16/17  3:52 AM   Result Value Ref Range    Color YELLOW/STRAW      Appearance CLEAR CLEAR      Specific gravity 1.017 1.003 - 1.030      pH (UA) 5.5 5.0 - 8.0      Protein NEGATIVE  NEG mg/dL    Glucose NEGATIVE  NEG mg/dL    Ketone NEGATIVE  NEG mg/dL    Bilirubin NEGATIVE  NEG      Blood NEGATIVE  NEG      Urobilinogen 0.2 0.2 - 1.0 EU/dL    Nitrites NEGATIVE  NEG      Leukocyte Esterase NEGATIVE  NEG      WBC 0-4 0 - 4 /hpf    RBC 0-5 0 - 5 /hpf    Epithelial cells FEW FEW /lpf    Bacteria NEGATIVE  NEG /hpf    Hyaline cast 2-5 0 - 5 /lpf       VITAL SIGNS:  Patient Vitals for the past 12 hrs:   Temp Pulse Resp BP SpO2   07/16/17 0415 - - - 116/69 -   07/16/17 0345 - - - 99/58 91 %   07/16/17 0330 - - - 97/61 93 %   07/16/17 0315 - - - 96/58 93 %   07/16/17 0300 - - - 99/58 93 %   07/16/17 0245 - - - 102/65 95 %   07/16/17 0215 - - - 114/76 97 %   07/16/17 0200 - - - 111/65 92 %   07/16/17 0145 - - - 114/70 92 %   07/16/17 0130 - - - 117/73 91 %   07/16/17 0115 - - - 154/88 99 %   07/16/17 0110 97.9 °F (36.6 °C) 91 18 113/57 99 %   07/16/17 0108 - - - 113/57 -       RADIOLOGY RESULTS:  The following have been ordered and reviewed:  XR ABD ACUTE W 1 V CHEST   Final Result   INDICATION:  obstruction?     COMPARISON: Abdomen radiographs June 21, 2017, chest x-ray 10/31/2016.     FINDINGS: The upright chest radiograph demonstrates clear lungs and normal  cardiac and mediastinal contours. There is no pleural effusion or free air under  the diaphragm.     Supine and upright views of the abdomen demonstrate a nonobstructive bowel gas  pattern. There is no free intraperitoneal air. No soft tissue masses or  pathologic calcifications are identified. The bones are within normal limits.     IMPRESSION  IMPRESSION: No acute findings. PROGRESS NOTES:    Progress Note:  4:28 AM  She says she did get some relief from the GI cocktail. Written by Lanette White ED Scribcharanjit, as dictated by Jorden Wood. Vanita Germain MD.    DIAGNOSIS:    1. Gastritis and duodenitis    2. Dehydration        PLAN:  Follow-up Information     Follow up With Details Comments [address] [facility], [place]   [address] [facility]  [phone]          Discharge Medication List as of 7/16/2017  4:29 AM      START taking these medications    Details   sucralfate (CARAFATE) 1 gram tablet Take 1 Tab by mouth four (4) times daily. , Normal, Disp-30 Tab, R-0      HYDROcodone-acetaminophen (NORCO) 5-325 mg per tablet Take 1 Tab by mouth every four (4) hours as needed for Pain.  Max Daily Amount: 6 Tabs., Print, Disp-20 Tab, R-0      ondansetron (ZOFRAN ODT) 4 mg disintegrating tablet Take 1 Tab by mouth every eight (8) hours as needed for Nausea., Normal, Disp-10 Tab, R-0               ED COURSE: The patients hospital course has been uncomplicated. 4:28 AM  Rut Milligan's  results have been reviewed with her. She has been counseled regarding her diagnosis. She verbally conveys understanding and agreement of the signs, symptoms, diagnosis, treatment and prognosis and additionally agrees to follow up as recommended with Dr. Geri Turner MD in 24 - 48 hours. She also agrees with the care-plan and conveys that all of her questions have been answered. I have also put together some discharge instructions for her that include: 1) educational information regarding their diagnosis, 2) how to care for their diagnosis at home, as well a 3) list of reasons why they would want to return to the ED prior to their follow-up appointment, should their condition change. This note is prepared by Lanette White, acting as a Scribe for [employer] Vanita Germain, [facility] Vanita Germain MD: The scribe's documentation has been prepared under my direction and personally reviewed by me in its entirety. I confirm that the notes above accurately reflects all work, treatment, procedures, and medical decision making performed by me.

## 2017-07-16 NOTE — DISCHARGE INSTRUCTIONS
Dehydration: Care Instructions  Your Care Instructions  Dehydration happens when your body loses too much fluid. This might happen when you do not drink enough water or you lose large amounts of fluids from your body because of diarrhea, vomiting, or sweating. Severe dehydration can be life-threatening. Water and minerals called electrolytes help put your body fluids back in balance. Learn the early signs of fluid loss, and drink more fluids to prevent dehydration. Follow-up care is a key part of your treatment and safety. Be sure to make and go to all appointments, and call your doctor if you are having problems. It's also a good idea to know your test results and keep a list of the medicines you take. How can you care for yourself at home? · To prevent dehydration, drink plenty of fluids, enough so that your urine is light yellow or clear like water. Choose water and other caffeine-free clear liquids until you feel better. If you have kidney, heart, or liver disease and have to limit fluids, talk with your doctor before you increase the amount of fluids you drink. · If you do not feel like eating or drinking, try taking small sips of water, sports drinks, or other rehydration drinks. · Get plenty of rest.  To prevent dehydration  · Add more fluids to your diet and daily routine, unless your doctor has told you not to. · During hot weather, drink more fluids. Drink even more fluids if you exercise a lot. Stay away from drinks with alcohol or caffeine. · Watch for the symptoms of dehydration. These include:  ¨ A dry, sticky mouth. ¨ Dark yellow urine, and not much of it. ¨ Dry and sunken eyes. ¨ Feeling very tired. · Learn what problems can lead to dehydration. These include:  ¨ Diarrhea, fever, and vomiting. ¨ Any illness with a fever, such as pneumonia or the flu. ¨ Activities that cause heavy sweating, such as endurance races and heavy outdoor work in hot or humid weather.   ¨ Alcohol or drug abuse or withdrawal.  ¨ Certain medicines, such as cold and allergy pills (antihistamines), diet pills (diuretics), and laxatives. ¨ Certain diseases, such as diabetes, cancer, and heart or kidney disease. When should you call for help? Call 911 anytime you think you may need emergency care. For example, call if:  · You passed out (lost consciousness). Call your doctor now or seek immediate medical care if:  · You are confused and cannot think clearly. · You are dizzy or lightheaded, or you feel like you may faint. · You have signs of needing more fluids. You have sunken eyes and a dry mouth, and you pass only a little dark urine. · You cannot keep fluids down. Watch closely for changes in your health, and be sure to contact your doctor if:  · You are not making tears. · Your skin is very dry and sags slowly back into place after you pinch it. · Your mouth and eyes are very dry. Where can you learn more? Go to http://jalil-mo.info/. Enter Q595 in the search box to learn more about \"Dehydration: Care Instructions. \"  Current as of: March 20, 2017  Content Version: 11.3  © 5567-7880 [employer]. Care instructions adapted under license by [employer] (which disclaims liability or warranty for this information). If you have questions about a medical condition or this instruction, always ask your healthcare professional. [place] [address] any warranty or liability for your use of this information. Gastritis: Care Instructions  Your Care Instructions    Gastritis is a sore and upset stomach. It happens when something irritates the stomach lining. Many things can cause it. These include an infection such as the flu or something you ate or drank. Medicines or a sore on the lining of the stomach (ulcer) also can cause it. Your belly may bloat and ache. You may belch, vomit, and feel sick to your stomach.   You should be able to relieve the problem by taking medicine. And it may help to change your diet. If gastritis lasts, your doctor may prescribe medicine. Follow-up care is a key part of your treatment and safety. Be sure to make and go to all appointments, and call your doctor if you are having problems. It's also a good idea to know your test results and keep a list of the medicines you take. How can you care for yourself at home? · If your doctor prescribed antibiotics, take them as directed. Do not stop taking them just because you feel better. You need to take the full course of antibiotics. · Be safe with medicines. If your doctor prescribed medicine to decrease stomach acid, take it as directed. Call your doctor if you think you are having a problem with your medicine. · Do not take any other medicine, including over-the-counter pain relievers, without talking to your doctor first.  · If your doctor recommends over-the-counter medicine to reduce stomach acid, such as Pepcid AC, Prilosec, Tagamet HB, or Zantac 75, follow the directions on the label. · Drink plenty of fluids (enough so that your urine is light yellow or clear like water) to prevent dehydration. Choose water and other caffeine-free clear liquids. If you have kidney, heart, or liver disease and have to limit fluids, talk with your doctor before you increase the amount of fluids you drink. · Limit how much alcohol you drink. · Avoid coffee, tea, cola drinks, chocolate, and other foods with caffeine. They increase stomach acid. When should you call for help? Call 911 anytime you think you may need emergency care. For example, call if:  · You vomit blood or what looks like coffee grounds. · You pass maroon or very bloody stools. Call your doctor now or seek immediate medical care if:  · You start breathing fast and have not produced urine in the last 8 hours. · You cannot keep fluids down.   Watch closely for changes in your health, and be sure to contact your doctor if:  · You do not get better as expected. Where can you learn more? Go to http://jalil-mo.info/. Enter [id number] in the search box to learn more about \"Gastritis: Care Instructions. \"  Current as of: August 9, 2016  Content Version: 11.3  © 5920-6309 [employer]. Care instructions adapted under license by [employer] (which disclaims liability or warranty for this information). If you have questions about a medical condition or this instruction, always ask your healthcare professional. [place] [address] any warranty or liability for your use of this information.

## 2017-07-29 ENCOUNTER — HOSPITAL ENCOUNTER (EMERGENCY)
Age: 37
Discharge: HOME | End: 2017-07-30
Attending: EMERGENCY MEDICINE
Payer: SELF-PAY

## 2017-07-29 DIAGNOSIS — R10.13 ABDOMINAL PAIN, EPIGASTRIC: ICD-10-CM

## 2017-07-29 DIAGNOSIS — R11.2 NAUSEA AND VOMITING, INTRACTABILITY OF VOMITING NOT SPECIFIED, UNSPECIFIED VOMITING TYPE: Primary | ICD-10-CM

## 2017-07-29 LAB
ALBUMIN SERPL BCP-MCNC: 3.5 G/DL (ref 3.5–5)
ALBUMIN/GLOB SERPL: 0.8 {RATIO} (ref 1.1–2.2)
ALP SERPL-CCNC: 87 U/L (ref 45–117)
ALT SERPL-CCNC: 31 U/L (ref 12–78)
ANION GAP BLD CALC-SCNC: 8 MMOL/L (ref 5–15)
APPEARANCE UR: ABNORMAL
AST SERPL W P-5'-P-CCNC: 23 U/L (ref 15–37)
BACTERIA URNS QL MICRO: ABNORMAL /HPF
BASOPHILS # BLD AUTO: 0 K/UL (ref 0–0.1)
BASOPHILS # BLD: 0 % (ref 0–1)
BILIRUB SERPL-MCNC: 0.3 MG/DL (ref 0.2–1)
BILIRUB UR QL: NEGATIVE
BUN SERPL-MCNC: 13 MG/DL (ref 6–20)
BUN/CREAT SERPL: 18 (ref 12–20)
CALCIUM SERPL-MCNC: 9.1 MG/DL (ref 8.5–10.1)
CHLORIDE SERPL-SCNC: 105 MMOL/L (ref 97–108)
CO2 SERPL-SCNC: 26 MMOL/L (ref 21–32)
COLOR UR: ABNORMAL
CREAT SERPL-MCNC: 0.73 MG/DL (ref 0.55–1.02)
EOSINOPHIL # BLD: 0.1 K/UL (ref 0–0.4)
EOSINOPHIL NFR BLD: 1 % (ref 0–7)
EPITH CASTS URNS QL MICRO: ABNORMAL /LPF
ERYTHROCYTE [DISTWIDTH] IN BLOOD BY AUTOMATED COUNT: 13.3 % (ref 11.5–14.5)
GLOBULIN SER CALC-MCNC: 4.4 G/DL (ref 2–4)
GLUCOSE SERPL-MCNC: 94 MG/DL (ref 65–100)
GLUCOSE UR STRIP.AUTO-MCNC: NEGATIVE MG/DL
HCG UR QL: NEGATIVE
HCT VFR BLD AUTO: 42 % (ref 35–47)
HGB BLD-MCNC: 14.7 G/DL (ref 11.5–16)
HGB UR QL STRIP: NEGATIVE
KETONES UR QL STRIP.AUTO: NEGATIVE MG/DL
LACTATE SERPL-SCNC: 2.1 MMOL/L (ref 0.4–2)
LEUKOCYTE ESTERASE UR QL STRIP.AUTO: ABNORMAL
LIPASE SERPL-CCNC: 212 U/L (ref 73–393)
LYMPHOCYTES # BLD AUTO: 25 % (ref 12–49)
LYMPHOCYTES # BLD: 2.1 K/UL (ref 0.8–3.5)
MCH RBC QN AUTO: 32.2 PG (ref 26–34)
MCHC RBC AUTO-ENTMCNC: 35 G/DL (ref 30–36.5)
MCV RBC AUTO: 92.1 FL (ref 80–99)
MONOCYTES # BLD: 0.6 K/UL (ref 0–1)
MONOCYTES NFR BLD AUTO: 7 % (ref 5–13)
MUCOUS THREADS URNS QL MICRO: ABNORMAL /LPF
NEUTS SEG # BLD: 5.8 K/UL (ref 1.8–8)
NEUTS SEG NFR BLD AUTO: 67 % (ref 32–75)
NITRITE UR QL STRIP.AUTO: NEGATIVE
PH UR STRIP: 6 [PH] (ref 5–8)
PLATELET # BLD AUTO: 372 K/UL (ref 150–400)
POTASSIUM SERPL-SCNC: 3.6 MMOL/L (ref 3.5–5.1)
PROT SERPL-MCNC: 7.9 G/DL (ref 6.4–8.2)
PROT UR STRIP-MCNC: NEGATIVE MG/DL
RBC # BLD AUTO: 4.56 M/UL (ref 3.8–5.2)
RBC #/AREA URNS HPF: ABNORMAL /HPF (ref 0–5)
SODIUM SERPL-SCNC: 139 MMOL/L (ref 136–145)
SP GR UR REFRACTOMETRY: 1.02 (ref 1–1.03)
UA: UC IF INDICATED,UAUC: ABNORMAL
UROBILINOGEN UR QL STRIP.AUTO: 1 EU/DL (ref 0.2–1)
WBC # BLD AUTO: 8.6 K/UL (ref 3.6–11)
WBC URNS QL MICRO: ABNORMAL /HPF (ref 0–4)

## 2017-07-29 PROCEDURE — 36415 COLL VENOUS BLD VENIPUNCTURE: CPT | Performed by: EMERGENCY MEDICINE

## 2017-07-29 PROCEDURE — 74011250636 HC RX REV CODE- 250/636: Performed by: EMERGENCY MEDICINE

## 2017-07-29 PROCEDURE — 99284 EMERGENCY DEPT VISIT MOD MDM: CPT

## 2017-07-29 PROCEDURE — 81025 URINE PREGNANCY TEST: CPT | Performed by: EMERGENCY MEDICINE

## 2017-07-29 PROCEDURE — 96374 THER/PROPH/DIAG INJ IV PUSH: CPT

## 2017-07-29 PROCEDURE — 85025 COMPLETE CBC W/AUTO DIFF WBC: CPT | Performed by: EMERGENCY MEDICINE

## 2017-07-29 PROCEDURE — 96375 TX/PRO/DX INJ NEW DRUG ADDON: CPT

## 2017-07-29 PROCEDURE — 83605 ASSAY OF LACTIC ACID: CPT | Performed by: EMERGENCY MEDICINE

## 2017-07-29 PROCEDURE — 87086 URINE CULTURE/COLONY COUNT: CPT | Performed by: EMERGENCY MEDICINE

## 2017-07-29 PROCEDURE — 99285 EMERGENCY DEPT VISIT HI MDM: CPT

## 2017-07-29 PROCEDURE — C9113 INJ PANTOPRAZOLE SODIUM, VIA: HCPCS | Performed by: EMERGENCY MEDICINE

## 2017-07-29 PROCEDURE — 80053 COMPREHEN METABOLIC PANEL: CPT | Performed by: EMERGENCY MEDICINE

## 2017-07-29 PROCEDURE — 74011250637 HC RX REV CODE- 250/637: Performed by: EMERGENCY MEDICINE

## 2017-07-29 PROCEDURE — 83690 ASSAY OF LIPASE: CPT | Performed by: EMERGENCY MEDICINE

## 2017-07-29 PROCEDURE — 74011000250 HC RX REV CODE- 250: Performed by: EMERGENCY MEDICINE

## 2017-07-29 PROCEDURE — 96376 TX/PRO/DX INJ SAME DRUG ADON: CPT

## 2017-07-29 PROCEDURE — 81001 URINALYSIS AUTO W/SCOPE: CPT | Performed by: EMERGENCY MEDICINE

## 2017-07-29 PROCEDURE — 96361 HYDRATE IV INFUSION ADD-ON: CPT

## 2017-07-29 RX ORDER — ONDANSETRON 2 MG/ML
4 INJECTION INTRAMUSCULAR; INTRAVENOUS
Status: COMPLETED | OUTPATIENT
Start: 2017-07-29 | End: 2017-07-29

## 2017-07-29 RX ORDER — PANTOPRAZOLE SODIUM 40 MG/10ML
40 INJECTION, POWDER, LYOPHILIZED, FOR SOLUTION INTRAVENOUS
Status: COMPLETED | OUTPATIENT
Start: 2017-07-29 | End: 2017-07-29

## 2017-07-29 RX ORDER — MORPHINE SULFATE 2 MG/ML
4 INJECTION, SOLUTION INTRAMUSCULAR; INTRAVENOUS
Status: COMPLETED | OUTPATIENT
Start: 2017-07-29 | End: 2017-07-29

## 2017-07-29 RX ADMIN — MORPHINE SULFATE 4 MG: 2 INJECTION, SOLUTION INTRAMUSCULAR; INTRAVENOUS at 23:07

## 2017-07-29 RX ADMIN — ONDANSETRON HYDROCHLORIDE 4 MG: 2 INJECTION, SOLUTION INTRAMUSCULAR; INTRAVENOUS at 23:08

## 2017-07-29 RX ADMIN — SODIUM CHLORIDE 1000 ML: 900 INJECTION, SOLUTION INTRAVENOUS at 23:09

## 2017-07-29 RX ADMIN — PANTOPRAZOLE SODIUM 40 MG: 40 INJECTION, POWDER, FOR SOLUTION INTRAVENOUS at 23:07

## 2017-07-29 RX ADMIN — LIDOCAINE HYDROCHLORIDE 40 ML: 20 SOLUTION ORAL; TOPICAL at 23:07

## 2017-07-30 ENCOUNTER — APPOINTMENT (OUTPATIENT)
Dept: GENERAL RADIOLOGY | Age: 37
End: 2017-07-30
Attending: EMERGENCY MEDICINE
Payer: SELF-PAY

## 2017-07-30 VITALS
RESPIRATION RATE: 18 BRPM | HEIGHT: 65 IN | SYSTOLIC BLOOD PRESSURE: 99 MMHG | OXYGEN SATURATION: 98 % | DIASTOLIC BLOOD PRESSURE: 54 MMHG | WEIGHT: 174.82 LBS | TEMPERATURE: 98.5 F | HEART RATE: 81 BPM | BODY MASS INDEX: 29.13 KG/M2

## 2017-07-30 PROCEDURE — 74000 XR ABD (KUB): CPT

## 2017-07-30 PROCEDURE — 74011250636 HC RX REV CODE- 250/636: Performed by: EMERGENCY MEDICINE

## 2017-07-30 PROCEDURE — 74011000250 HC RX REV CODE- 250: Performed by: EMERGENCY MEDICINE

## 2017-07-30 RX ORDER — PROMETHAZINE HYDROCHLORIDE 25 MG/1
25 TABLET ORAL
Qty: 12 TAB | Refills: 0 | Status: SHIPPED | OUTPATIENT
Start: 2017-07-30 | End: 2017-08-08

## 2017-07-30 RX ORDER — ONDANSETRON 2 MG/ML
INJECTION INTRAMUSCULAR; INTRAVENOUS
Status: DISCONTINUED
Start: 2017-07-30 | End: 2017-07-30 | Stop reason: HOSPADM

## 2017-07-30 RX ORDER — PROMETHAZINE HYDROCHLORIDE 25 MG/1
25 SUPPOSITORY RECTAL
Qty: 12 SUPPOSITORY | Refills: 0 | Status: SHIPPED | OUTPATIENT
Start: 2017-07-30 | End: 2017-08-06

## 2017-07-30 RX ORDER — ONDANSETRON 2 MG/ML
4 INJECTION INTRAMUSCULAR; INTRAVENOUS
Status: COMPLETED | OUTPATIENT
Start: 2017-07-30 | End: 2017-07-30

## 2017-07-30 RX ORDER — DIAZEPAM 10 MG/2ML
2 INJECTION INTRAMUSCULAR
Status: COMPLETED | OUTPATIENT
Start: 2017-07-30 | End: 2017-07-30

## 2017-07-30 RX ORDER — PANTOPRAZOLE SODIUM 40 MG/1
40 TABLET, DELAYED RELEASE ORAL DAILY
Qty: 30 TAB | Refills: 0 | Status: SHIPPED | OUTPATIENT
Start: 2017-07-30 | End: 2017-08-08

## 2017-07-30 RX ORDER — DIAZEPAM 10 MG/2ML
INJECTION INTRAMUSCULAR
Status: DISCONTINUED
Start: 2017-07-30 | End: 2017-07-30 | Stop reason: HOSPADM

## 2017-07-30 RX ADMIN — DIAZEPAM 2 MG: 5 INJECTION, SOLUTION INTRAMUSCULAR; INTRAVENOUS at 01:08

## 2017-07-30 RX ADMIN — SODIUM CHLORIDE 1000 ML: 900 INJECTION, SOLUTION INTRAVENOUS at 02:25

## 2017-07-30 RX ADMIN — SODIUM CHLORIDE 10 MG: 9 INJECTION INTRAMUSCULAR; INTRAVENOUS; SUBCUTANEOUS at 02:23

## 2017-07-30 RX ADMIN — ONDANSETRON 4 MG: 2 INJECTION INTRAMUSCULAR; INTRAVENOUS at 01:08

## 2017-07-30 NOTE — ED PROVIDER NOTES
HPI Comments: Megan Clayton is a 31 y.o. female with PMhx significant for migraine, depression, and seizures who presents ambulatory to the ED with cc of constant, stabbing epigastric pain x 1 day as well as nausea, non-bloody/non-bilious hematemesis, and diarrhea beginning ~1600. She states her abdominal pain is localized to the epigastrium and does not radiate. Pt indicates she has been treated at [facility] ED several times over the past several months for possible peptic ulcer and prescribed Zofran and Carafate. She states she is unable to tolerate any PO, including the medications prescribed. Pt reports she has contacted a GI specialist for an endoscopy, but is awaiting insurance to approve the procedure. Pt denies currently taking any proton pump inhibitors. Pt reports tobacco use, but denies any EtOH and illicit drug use. PCP: Ric Watters MD    There are no other complaints, changes or physical findings at this time. The history is provided by the patient. Past Medical History:   Diagnosis Date    ADD (attention deficit disorder) 17-17yo    Treated with Adderall since dx. 2013 dosing 20mg AM and 10mg PM.  Trial/change to Vyvanse Nov-Dec 2015--not as effective.  Headache     migraines    HX OTHER MEDICAL      -BUFA baby    HX OTHER MEDICAL     HPV positive    Idiopathic vulvodynia 2014    IUD (intrauterine device) in place 2015    Mirena IUD placed on 4/17/15    Migraines 2013    miscarriages     Neurological disorder     Pelvic pain in female 9/15/2014    2015 gyn laparoscopy/hysteroscopy with endometriosis worse right.     Psychiatric disorder     depression    Psychiatric problem     depression    Secondary dysmenorrhea 2014    With menorraghia    Seizures ([facility] [address]     never on meds--had appr 4-5 in a short amt of time and none since       Past Surgical History:   Procedure Laterality Date    ENDOMETRIAL CRYOABLATION      HX GYN  4/17/15    laparoscopy and hysteroscopy and IUD placement    HX HYSTERECTOMY  04/04/2016    Complete Hysterectomy         Family History:   Problem Relation Age of Onset    Cancer Mother     Alcohol abuse Father      and drug    Psychiatric Disorder Father     Cancer Father     Diabetes Maternal Grandmother     Hypertension Maternal Grandmother     Thyroid Disease Maternal Grandmother     Diabetes Maternal Grandfather     Hypertension Maternal Grandfather     Cancer Maternal Grandfather     Heart Disease Maternal Grandfather     Cancer Paternal Grandmother     Diabetes Paternal Grandmother        Social History     Social History    Marital status: SINGLE     Spouse name: N/A    Number of children: N/A    Years of education: N/A     Occupational History    Not on file. Social History Main Topics    Smoking status: Current Every Day Smoker     Packs/day: 1.00     Years: 18.00     Types: Cigarettes    Smokeless tobacco: Never Used      Comment: Information given with AVS.    Alcohol use No    Drug use: No    Sexual activity: Yes     Partners: Male     Birth control/ protection: Surgical     Other Topics Concern    Not on file     Social History Narrative    ** Merged History Encounter **              ALLERGIES: Aspirin and Penicillins    Review of Systems   Constitutional: Negative for chills, diaphoresis, fever and unexpected weight change. HENT: Negative for rhinorrhea and sore throat. Eyes: Negative for pain. Respiratory: Negative for shortness of breath, wheezing and stridor. Cardiovascular: Negative for chest pain and leg swelling. Gastrointestinal: Positive for abdominal pain (epigastric), diarrhea, nausea and vomiting (hematemesis). Negative for blood in stool. Genitourinary: Negative for difficulty urinating, dysuria and flank pain. Musculoskeletal: Negative for back pain and neck stiffness. Skin: Negative for rash.    Neurological: Negative for seizures, syncope, weakness, light-headedness and headaches. Psychiatric/Behavioral: Negative for confusion. Patient Vitals for the past 12 hrs:   Temp Pulse Resp BP SpO2   07/30/17 0400 - 81 18 99/54 98 %   07/30/17 0330 98.5 °F (36.9 °C) 91 18 95/46 97 %   07/30/17 0245 - - - 119/63 96 %   07/30/17 0230 - - - 109/57 92 %   07/30/17 0030 - - - 114/68 96 %   07/30/17 0000 - - - (!) 115/98 97 %   07/29/17 2330 - - - 116/62 94 %   07/29/17 2300 - - - 124/71 94 %   07/29/17 2041 97.8 °F (36.6 °C) (!) 103 16 126/72 100 %            Physical Exam   Constitutional: She is oriented to person, place, and time. She appears well-developed. No distress. Elevated BMI   HENT:   Nose: Nose normal.   Mouth/Throat: Oropharynx is clear and moist. No oropharyngeal exudate. Eyes: Conjunctivae and EOM are normal. Pupils are equal, round, and reactive to light. Right eye exhibits no discharge. Left eye exhibits no discharge. No scleral icterus. Neck: Normal range of motion. Neck supple. No JVD present. Cardiovascular: Normal rate, regular rhythm, normal heart sounds and intact distal pulses. No murmur heard. Pulmonary/Chest: Effort normal and breath sounds normal. No stridor. No respiratory distress. She has no wheezes. She has no rales. Abdominal: Soft. Bowel sounds are normal. She exhibits no distension. There is tenderness (epigastric). There is no rebound and no guarding. Musculoskeletal: She exhibits no edema or tenderness. Neurological: She is alert and oriented to person, place, and time. Skin: Skin is warm and dry. No rash noted. She is not diaphoretic. Psychiatric: She has a normal mood and affect. Nursing note and vitals reviewed. MDM  Number of Diagnoses or Management Options  Abdominal pain, epigastric:   Nausea and vomiting, intractability of vomiting not specified, unspecified vomiting type:   Diagnosis management comments: Recurrent epigastric pain with nausea vomiting.  Likely PUD versus GERD/gastritis. Labs unremarkable. Symptoms improved with treatment in the ED and pt tolerating PO. Pt has established care with GI, but is pending insurance approval for EGD which is what she needs. Does not meet inpatient admission criteria at this time. Will start her on PPI treatment. Stable for discharge. Amount and/or Complexity of Data Reviewed  Clinical lab tests: ordered and reviewed  Tests in the radiology section of CPT®: ordered and reviewed  Review and summarize past medical records: yes  Independent visualization of images, tracings, or specimens: yes    Patient Progress  Patient progress: stable    ED Course       Procedures    PROGRESS NOTE:  12:23 AM  Pt states she was feeling much better, but after taking the GI cocktail she is having severe pain again. Written by Susannah Avendano, ED Scribe, as dictated by George Baez MD.    PROGRESS NOTE:  3:43 AM  Pt reports she is feeling better and would like to PO challenge. Written by Susannah Avendano, ED Scribe, as dictated by George Baez MD.    PROGRESS NOTE:  4:24 AM  Pt tolerated PO and is feeling better. Anticipate discharge. Written by Susannah Avendano ED Scribe, as dictated by George Baez MD.    LABORATORY TESTS:  Recent Results (from the past 12 hour(s))   CBC WITH AUTOMATED DIFF    Collection Time: 07/29/17  9:59 PM   Result Value Ref Range    WBC 8.6 3.6 - 11.0 K/uL    RBC 4.56 3.80 - 5.20 M/uL    HGB 14.7 11.5 - 16.0 g/dL    HCT 42.0 35.0 - 47.0 %    MCV 92.1 80.0 - 99.0 FL    MCH 32.2 26.0 - 34.0 PG    MCHC 35.0 30.0 - 36.5 g/dL    RDW 13.3 11.5 - 14.5 %    PLATELET 164 353 - 225 K/uL    NEUTROPHILS 67 32 - 75 %    LYMPHOCYTES 25 12 - 49 %    MONOCYTES 7 5 - 13 %    EOSINOPHILS 1 0 - 7 %    BASOPHILS 0 0 - 1 %    ABS. NEUTROPHILS 5.8 1.8 - 8.0 K/UL    ABS. LYMPHOCYTES 2.1 0.8 - 3.5 K/UL    ABS. MONOCYTES 0.6 0.0 - 1.0 K/UL    ABS. EOSINOPHILS 0.1 0.0 - 0.4 K/UL    ABS.  BASOPHILS 0.0 0.0 - 0.1 K/UL   METABOLIC PANEL, COMPREHENSIVE Collection Time: 07/29/17  9:59 PM   Result Value Ref Range    Sodium 139 136 - 145 mmol/L    Potassium 3.6 3.5 - 5.1 mmol/L    Chloride 105 97 - 108 mmol/L    CO2 26 21 - 32 mmol/L    Anion gap 8 5 - 15 mmol/L    Glucose 94 65 - 100 mg/dL    BUN 13 6 - 20 MG/DL    Creatinine 0.73 0.55 - 1.02 MG/DL    BUN/Creatinine ratio 18 12 - 20      GFR est AA >60 >60 ml/min/1.73m2    GFR est non-AA >60 >60 ml/min/1.73m2    Calcium 9.1 8.5 - 10.1 MG/DL    Bilirubin, total 0.3 0.2 - 1.0 MG/DL    ALT (SGPT) 31 12 - 78 U/L    AST (SGOT) 23 15 - 37 U/L    Alk. phosphatase 87 45 - 117 U/L    Protein, total 7.9 6.4 - 8.2 g/dL    Albumin 3.5 3.5 - 5.0 g/dL    Globulin 4.4 (H) 2.0 - 4.0 g/dL    A-G Ratio 0.8 (L) 1.1 - 2.2     LIPASE    Collection Time: 07/29/17  9:59 PM   Result Value Ref Range    Lipase 212 73 - 393 U/L   LACTIC ACID    Collection Time: 07/29/17 10:51 PM   Result Value Ref Range    Lactic acid 2.1 (HH) 0.4 - 2.0 MMOL/L   URINALYSIS W/ REFLEX CULTURE    Collection Time: 07/29/17 10:51 PM   Result Value Ref Range    Color YELLOW/STRAW      Appearance CLOUDY (A) CLEAR      Specific gravity 1.018 1.003 - 1.030      pH (UA) 6.0 5.0 - 8.0      Protein NEGATIVE  NEG mg/dL    Glucose NEGATIVE  NEG mg/dL    Ketone NEGATIVE  NEG mg/dL    Bilirubin NEGATIVE  NEG      Blood NEGATIVE  NEG      Urobilinogen 1.0 0.2 - 1.0 EU/dL    Nitrites NEGATIVE  NEG      Leukocyte Esterase TRACE (A) NEG      WBC 5-10 0 - 4 /hpf    RBC 0-5 0 - 5 /hpf    Epithelial cells MODERATE (A) FEW /lpf    Bacteria 1+ (A) NEG /hpf    UA:UC IF INDICATED URINE CULTURE ORDERED (A) CNI      Mucus 2+ (A) NEG /lpf   HCG URINE, QL    Collection Time: 07/29/17 10:51 PM   Result Value Ref Range    HCG urine, Ql. NEGATIVE  NEG         IMAGING RESULTS:  XR ABD (KUB)   Final Result   EXAM:  XR ABD (KUB)     INDICATION:  r/o free air     COMPARISON: None.     FINDINGS: A supine radiograph of the abdomen shows a normal bowel gas pattern.   No soft tissue masses or pathologic calcifications are identified. The bones and  soft tissues are within normal limits.     IMPRESSION: No free air or other acute finding. MEDICATIONS GIVEN:  Medications   ondansetron (ZOFRAN) 4 mg/2 mL injection (not administered)   diazePAM (VALIUM) 5 mg/mL injection (not administered)   sodium chloride 0.9 % bolus infusion 1,000 mL (0 mL IntraVENous IV Completed 7/30/17 0100)   ondansetron (ZOFRAN) injection 4 mg (4 mg IntraVENous Given 7/29/17 2308)   morphine injection 4 mg (4 mg IntraVENous Given 7/29/17 2307)   pantoprazole (PROTONIX) injection 40 mg (40 mg IntraVENous Given 7/29/17 2307)   mylanta/viscous lidocaine (ZAHRAA)(GI COCKTAIL) (40 mL Oral Given 7/29/17 2307)   ondansetron (ZOFRAN) injection 4 mg (4 mg IntraVENous Given 7/30/17 0108)   diazePAM (VALIUM) injection 2 mg (2 mg IntraVENous Given 7/30/17 0108)   sodium chloride 0.9 % bolus infusion 1,000 mL (0 mL IntraVENous IV Completed 7/30/17 0427)   prochlorperazine (COMPAZINE) with saline injection 10 mg (10 mg IntraVENous Given 7/30/17 0223)       IMPRESSION:  1. Nausea and vomiting, intractability of vomiting not specified, unspecified vomiting type    2. Abdominal pain, epigastric        PLAN:  1. Discharge home  Current Discharge Medication List      START taking these medications    Details   promethazine (PHENERGAN) 25 mg tablet Take 1 Tab by mouth every six (6) hours as needed. Qty: 12 Tab, Refills: 0      promethazine (PHENERGAN) 25 mg suppository Insert 1 Suppository into rectum every six (6) hours as needed for Nausea for up to 7 days. Qty: 12 Suppository, Refills: 0      pantoprazole (PROTONIX) 40 mg tablet Take 1 Tab by mouth daily. Qty: 30 Tab, Refills: 0           2.    Follow-up Information     Follow up With Details Comments Contact Info    Yelitza Diaz MD Call in 2 days  [address]      Yuri Bocanegra MD Call in 2 days Gastroenterology [address] [facility]  [address] [facility]  [phone]      [facility] EMERGENCY DEPT  As needed, If symptoms worsen [address] [place] [address]  [phone]        Return to ED if worse     DISCHARGE NOTE:  4:30 AM  The patient is ready for discharge. The patients signs, symptoms, diagnosis, and instructions for discharge have been discussed and the pt has conveyed their understanding. The patient is to follow up as recommended with PCP or return to the ER should their symptoms worsen. Plan has been discussed and patient has conveyed their agreement. This note is prepared by Day Vale, acting as Scribe for Kaleigh Fontanez MD.    Kaleigh Fontanez MD: The scribe's documentation has been prepared under my direction and personally reviewed by me in its entirety. I confirm that the note above accurately reflects all work, treatment, procedures, and medical decision making performed by me.

## 2017-07-30 NOTE — ED NOTES
Bedside and Verbal shift change report given to [facility] [address] (oncoming nurse) by Bruno Brown (offgoing nurse). Report included the following information SBAR, ED Summary, Intake/Output, MAR and Recent Results. Monitor x 2. Call bell in reach. Bed in lowest position, with side rails up x 2. Pt updated on plan of care. Resting at this time.

## 2017-07-30 NOTE — ED NOTES
.Discharge instructions reviewed with patient and given to pt per MD Ashkan Hutchinson. Pt able to return/verbalize discharge instructions. Copy of discharge instructions given. RX given to pt. Pt condition stable, no further complaints. Pt out of ER, accompanied by self. Ambulatory, steady gait. Wheelchair offered & pt declined. Patient out of ED prior to obtaining discharge vitals. Belongings out of ED with patient. Family to drive patient home.

## 2017-07-30 NOTE — DISCHARGE INSTRUCTIONS
Abdominal Pain: Care Instructions  Your Care Instructions    Abdominal pain has many possible causes. Some aren't serious and get better on their own in a few days. Others need more testing and treatment. If your pain continues or gets worse, you need to be rechecked and may need more tests to find out what is wrong. You may need surgery to correct the problem. Don't ignore new symptoms, such as fever, nausea and vomiting, urination problems, pain that gets worse, and dizziness. These may be signs of a more serious problem. Your doctor may have recommended a follow-up visit in the next 8 to 12 hours. If you are not getting better, you may need more tests or treatment. The doctor has checked you carefully, but problems can develop later. If you notice any problems or new symptoms, get medical treatment right away. Follow-up care is a key part of your treatment and safety. Be sure to make and go to all appointments, and call your doctor if you are having problems. It's also a good idea to know your test results and keep a list of the medicines you take. How can you care for yourself at home? · Rest until you feel better. · To prevent dehydration, drink plenty of fluids, enough so that your urine is light yellow or clear like water. Choose water and other caffeine-free clear liquids until you feel better. If you have kidney, heart, or liver disease and have to limit fluids, talk with your doctor before you increase the amount of fluids you drink. · If your stomach is upset, eat mild foods, such as rice, dry toast or crackers, bananas, and applesauce. Try eating several small meals instead of two or three large ones. · Wait until 48 hours after all symptoms have gone away before you have spicy foods, alcohol, and drinks that contain caffeine. · Do not eat foods that are high in fat. · Avoid anti-inflammatory medicines such as aspirin, ibuprofen (Advil, Motrin), and naproxen (Aleve).  These can cause stomach upset. Talk to your doctor if you take daily aspirin for another health problem. When should you call for help? Call 911 anytime you think you may need emergency care. For example, call if:  · You passed out (lost consciousness). · You pass maroon or very bloody stools. · You vomit blood or what looks like coffee grounds. · You have new, severe belly pain. Call your doctor now or seek immediate medical care if:  · Your pain gets worse, especially if it becomes focused in one area of your belly. · You have a new or higher fever. · Your stools are black and look like tar, or they have streaks of blood. · You have unexpected vaginal bleeding. · You have symptoms of a urinary tract infection. These may include:  ¨ Pain when you urinate. ¨ Urinating more often than usual.  ¨ Blood in your urine. · You are dizzy or lightheaded, or you feel like you may faint. Watch closely for changes in your health, and be sure to contact your doctor if:  · You are not getting better after 1 day (24 hours). Where can you learn more? Go to http://jalilAltrec.commo.info/. Enter T560 in the search box to learn more about \"Abdominal Pain: Care Instructions. \"  Current as of: March 20, 2017  Content Version: 11.3  © 4661-1974 "[employer]". Care instructions adapted under license by [employer] (which disclaims liability or warranty for this information). If you have questions about a medical condition or this instruction, always ask your healthcare professional. [place] [address] any warranty or liability for your use of this information. Nausea and Vomiting: Care Instructions  Your Care Instructions    When you are nauseated, you may feel weak and sweaty and notice a lot of saliva in your mouth. Nausea often leads to vomiting. Most of the time you do not need to worry about nausea and vomiting, but they can be signs of other illnesses.   Two common causes of nausea and vomiting are stomach flu and food poisoning. Nausea and vomiting from viral stomach flu will usually start to improve within 24 hours. Nausea and vomiting from food poisoning may last from 12 to 48 hours. The doctor has checked you carefully, but problems can develop later. If you notice any problems or new symptoms, get medical treatment right away. Follow-up care is a key part of your treatment and safety. Be sure to make and go to all appointments, and call your doctor if you are having problems. It's also a good idea to know your test results and keep a list of the medicines you take. How can you care for yourself at home? · To prevent dehydration, drink plenty of fluids, enough so that your urine is light yellow or clear like water. Choose water and other caffeine-free clear liquids until you feel better. If you have kidney, heart, or liver disease and have to limit fluids, talk with your doctor before you increase the amount of fluids you drink. · Rest in bed until you feel better. · When you are able to eat, try clear soups, mild foods, and liquids until all symptoms are gone for 12 to 48 hours. Other good choices include dry toast, crackers, cooked cereal, and gelatin dessert, such as Jell-O. When should you call for help? Call 911 anytime you think you may need emergency care. For example, call if:  · You passed out (lost consciousness). Call your doctor now or seek immediate medical care if:  · You have symptoms of dehydration, such as:  ¨ Dry eyes and a dry mouth. ¨ Passing only a little dark urine. ¨ Feeling thirstier than usual.  · You have new or worsening belly pain. · You have a new or higher fever. · You vomit blood or what looks like coffee grounds. Watch closely for changes in your health, and be sure to contact your doctor if:  · You have ongoing nausea and vomiting. · Your vomiting is getting worse. · Your vomiting lasts longer than 2 days.   · You are not getting better as expected. Where can you learn more? Go to http://jalil-mo.info/. Enter 25 [id number] in the search box to learn more about \"Nausea and Vomiting: Care Instructions. \"  Current as of: March 20, 2017  Content Version: 11.3  © 6410-2430 [employer]. Care instructions adapted under license by [employer] (which disclaims liability or warranty for this information). If you have questions about a medical condition or this instruction, always ask your healthcare professional. [place] [address] any warranty or liability for your use of this information.

## 2017-07-31 ENCOUNTER — HOSPITAL ENCOUNTER (EMERGENCY)
Age: 37
Discharge: STILL ADMITTED OR ADMINISTRATIVE | End: 2017-07-31
Attending: EMERGENCY MEDICINE
Payer: SELF-PAY

## 2017-07-31 LAB
BACTERIA SPEC CULT: NORMAL
CC UR VC: NORMAL
SERVICE CMNT-IMP: NORMAL

## 2017-07-31 PROCEDURE — 75810000275 HC EMERGENCY DEPT VISIT NO LEVEL OF CARE

## 2017-08-07 ENCOUNTER — HOSPITAL ENCOUNTER (EMERGENCY)
Age: 37
Discharge: HOME | End: 2017-08-08
Attending: EMERGENCY MEDICINE
Payer: SELF-PAY

## 2017-08-07 DIAGNOSIS — S32.010A COMPRESSION FRACTURE OF L1 LUMBAR VERTEBRA, CLOSED, INITIAL ENCOUNTER (HCC): Primary | ICD-10-CM

## 2017-08-07 PROCEDURE — 96376 TX/PRO/DX INJ SAME DRUG ADON: CPT

## 2017-08-07 PROCEDURE — 96361 HYDRATE IV INFUSION ADD-ON: CPT

## 2017-08-07 PROCEDURE — 96374 THER/PROPH/DIAG INJ IV PUSH: CPT

## 2017-08-07 PROCEDURE — 99285 EMERGENCY DEPT VISIT HI MDM: CPT

## 2017-08-07 NOTE — LETTER
[address] 
[facility] EMERGENCY DEPT 
[address] [facility] [id number] 
[phone] Work/School Note Date: 8/7/2017 To Whom It May concern: Yvette Curry was in the Emergency Room with patient Awa Mckinley was seen and treated today in the emergency room by the following provider(s): 
Attending Provider: Ronny García MD. Awa Mckinley Sincerely, 
 
 
 
 
Pablo Arizmendi RN

## 2017-08-07 NOTE — LETTER
[facility] EMERGENCY DEPT 
[address] [place] [address] [address] [id number] [phone] Work/School Note Date: 8/7/2017 To Whom It May concern: 
 
Carmel Moise was seen and treated today in the emergency room by the following provider(s): 
Attending Provider: Brooklyn Kwok MD. Carmel Moise should be excused from work on 8/8/2017. Sincerely, Brooklyn Kwok MD

## 2017-08-08 ENCOUNTER — APPOINTMENT (OUTPATIENT)
Dept: GENERAL RADIOLOGY | Age: 37
End: 2017-08-08
Attending: EMERGENCY MEDICINE
Payer: SELF-PAY

## 2017-08-08 VITALS
RESPIRATION RATE: 18 BRPM | BODY MASS INDEX: 29.71 KG/M2 | SYSTOLIC BLOOD PRESSURE: 107 MMHG | TEMPERATURE: 98 F | WEIGHT: 174 LBS | DIASTOLIC BLOOD PRESSURE: 72 MMHG | HEIGHT: 64 IN | OXYGEN SATURATION: 97 % | HEART RATE: 99 BPM

## 2017-08-08 LAB
ALBUMIN SERPL BCP-MCNC: 3.2 G/DL (ref 3.5–5)
ALBUMIN/GLOB SERPL: 0.8 {RATIO} (ref 1.1–2.2)
ALP SERPL-CCNC: 83 U/L (ref 45–117)
ALT SERPL-CCNC: 26 U/L (ref 12–78)
ANION GAP BLD CALC-SCNC: 7 MMOL/L (ref 5–15)
APPEARANCE UR: CLEAR
AST SERPL W P-5'-P-CCNC: 14 U/L (ref 15–37)
BACTERIA URNS QL MICRO: ABNORMAL /HPF
BASOPHILS # BLD AUTO: 0 K/UL (ref 0–0.1)
BASOPHILS # BLD: 0 % (ref 0–1)
BILIRUB SERPL-MCNC: 0.4 MG/DL (ref 0.2–1)
BILIRUB UR QL: NEGATIVE
BUN SERPL-MCNC: 21 MG/DL (ref 6–20)
BUN/CREAT SERPL: 21 (ref 12–20)
CALCIUM SERPL-MCNC: 8 MG/DL (ref 8.5–10.1)
CHLORIDE SERPL-SCNC: 109 MMOL/L (ref 97–108)
CO2 SERPL-SCNC: 25 MMOL/L (ref 21–32)
COLOR UR: ABNORMAL
CREAT SERPL-MCNC: 1 MG/DL (ref 0.55–1.02)
EOSINOPHIL # BLD: 0.1 K/UL (ref 0–0.4)
EOSINOPHIL NFR BLD: 1 % (ref 0–7)
EPITH CASTS URNS QL MICRO: ABNORMAL /LPF
ERYTHROCYTE [DISTWIDTH] IN BLOOD BY AUTOMATED COUNT: 12.9 % (ref 11.5–14.5)
GLOBULIN SER CALC-MCNC: 3.8 G/DL (ref 2–4)
GLUCOSE SERPL-MCNC: 110 MG/DL (ref 65–100)
GLUCOSE UR STRIP.AUTO-MCNC: NEGATIVE MG/DL
HCT VFR BLD AUTO: 40.7 % (ref 35–47)
HGB BLD-MCNC: 13.6 G/DL (ref 11.5–16)
HGB UR QL STRIP: ABNORMAL
HYALINE CASTS URNS QL MICRO: ABNORMAL /LPF (ref 0–5)
KETONES UR QL STRIP.AUTO: NEGATIVE MG/DL
LEUKOCYTE ESTERASE UR QL STRIP.AUTO: NEGATIVE
LYMPHOCYTES # BLD AUTO: 46 % (ref 12–49)
LYMPHOCYTES # BLD: 3.3 K/UL (ref 0.8–3.5)
MCH RBC QN AUTO: 30.3 PG (ref 26–34)
MCHC RBC AUTO-ENTMCNC: 33.4 G/DL (ref 30–36.5)
MCV RBC AUTO: 90.6 FL (ref 80–99)
MONOCYTES # BLD: 0.2 K/UL (ref 0–1)
MONOCYTES NFR BLD AUTO: 2 % (ref 5–13)
NEUTS SEG # BLD: 3.5 K/UL (ref 1.8–8)
NEUTS SEG NFR BLD AUTO: 51 % (ref 32–75)
NITRITE UR QL STRIP.AUTO: NEGATIVE
PH UR STRIP: 5.5 [PH] (ref 5–8)
PLATELET # BLD AUTO: 359 K/UL (ref 150–400)
POTASSIUM SERPL-SCNC: 4 MMOL/L (ref 3.5–5.1)
PROT SERPL-MCNC: 7 G/DL (ref 6.4–8.2)
PROT UR STRIP-MCNC: NEGATIVE MG/DL
RBC # BLD AUTO: 4.49 M/UL (ref 3.8–5.2)
RBC #/AREA URNS HPF: ABNORMAL /HPF (ref 0–5)
SODIUM SERPL-SCNC: 141 MMOL/L (ref 136–145)
SP GR UR REFRACTOMETRY: 1.02 (ref 1–1.03)
UA: UC IF INDICATED,UAUC: ABNORMAL
UROBILINOGEN UR QL STRIP.AUTO: 0.2 EU/DL (ref 0.2–1)
WBC # BLD AUTO: 7.1 K/UL (ref 3.6–11)
WBC URNS QL MICRO: ABNORMAL /HPF (ref 0–4)

## 2017-08-08 PROCEDURE — 85025 COMPLETE CBC W/AUTO DIFF WBC: CPT | Performed by: EMERGENCY MEDICINE

## 2017-08-08 PROCEDURE — 36415 COLL VENOUS BLD VENIPUNCTURE: CPT | Performed by: EMERGENCY MEDICINE

## 2017-08-08 PROCEDURE — 80053 COMPREHEN METABOLIC PANEL: CPT | Performed by: EMERGENCY MEDICINE

## 2017-08-08 PROCEDURE — 74011250636 HC RX REV CODE- 250/636: Performed by: EMERGENCY MEDICINE

## 2017-08-08 PROCEDURE — 81001 URINALYSIS AUTO W/SCOPE: CPT | Performed by: EMERGENCY MEDICINE

## 2017-08-08 PROCEDURE — 72110 X-RAY EXAM L-2 SPINE 4/>VWS: CPT

## 2017-08-08 PROCEDURE — 87086 URINE CULTURE/COLONY COUNT: CPT | Performed by: EMERGENCY MEDICINE

## 2017-08-08 RX ORDER — LORAZEPAM 1 MG/1
1 TABLET ORAL
COMMUNITY
End: 2017-12-13

## 2017-08-08 RX ORDER — HYDROMORPHONE HYDROCHLORIDE 1 MG/ML
0.5 INJECTION, SOLUTION INTRAMUSCULAR; INTRAVENOUS; SUBCUTANEOUS
Status: COMPLETED | OUTPATIENT
Start: 2017-08-08 | End: 2017-08-08

## 2017-08-08 RX ORDER — SODIUM CHLORIDE 0.9 % (FLUSH) 0.9 %
5-10 SYRINGE (ML) INJECTION AS NEEDED
Status: DISCONTINUED | OUTPATIENT
Start: 2017-08-08 | End: 2017-08-08 | Stop reason: HOSPADM

## 2017-08-08 RX ORDER — CYCLOBENZAPRINE HCL 10 MG
10 TABLET ORAL
Qty: 15 TAB | Refills: 0 | Status: SHIPPED | OUTPATIENT
Start: 2017-08-08 | End: 2017-12-13

## 2017-08-08 RX ORDER — OXYCODONE AND ACETAMINOPHEN 5; 325 MG/1; MG/1
1 TABLET ORAL
Qty: 10 TAB | Refills: 0 | Status: SHIPPED | OUTPATIENT
Start: 2017-08-08 | End: 2017-09-10

## 2017-08-08 RX ORDER — SODIUM CHLORIDE 0.9 % (FLUSH) 0.9 %
5-10 SYRINGE (ML) INJECTION EVERY 8 HOURS
Status: DISCONTINUED | OUTPATIENT
Start: 2017-08-08 | End: 2017-08-08 | Stop reason: HOSPADM

## 2017-08-08 RX ADMIN — HYDROMORPHONE HYDROCHLORIDE 0.5 MG: 1 INJECTION, SOLUTION INTRAMUSCULAR; INTRAVENOUS; SUBCUTANEOUS at 03:51

## 2017-08-08 RX ADMIN — HYDROMORPHONE HYDROCHLORIDE 0.5 MG: 1 INJECTION, SOLUTION INTRAMUSCULAR; INTRAVENOUS; SUBCUTANEOUS at 01:57

## 2017-08-08 RX ADMIN — Medication 10 ML: at 00:27

## 2017-08-08 RX ADMIN — SODIUM CHLORIDE 1000 ML: 900 INJECTION, SOLUTION INTRAVENOUS at 00:26

## 2017-08-08 RX ADMIN — HYDROMORPHONE HYDROCHLORIDE 0.5 MG: 1 INJECTION, SOLUTION INTRAMUSCULAR; INTRAVENOUS; SUBCUTANEOUS at 00:27
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
## 2017-08-08 NOTE — ED NOTES
Dr __Obier______________________ in to talk with patient and explain plan of care with  understanding and  written & verbal instructions.

## 2017-08-08 NOTE — DISCHARGE INSTRUCTIONS
Compression Fracture of the Spine: Care Instructions  Your Care Instructions    A compression fracture happens when the front part of a spinal bone breaks and collapses. A fall or other accident can cause it. A minor injury or moving the wrong way can cause a break if you have thin or brittle bones (osteoporosis). These types of breaks will heal in 8 to 10 weeks. You will need rest and pain medicines. Your doctor may recommend physical therapy. Some doctors recommend that certain people with compression fractures wear braces. Your doctor also may treat thin or brittle bones. You may need surgery if you have lasting pain or if the bone presses on the spinal cord or nerves. You heal best when you take good care of yourself. Eat a variety of healthy foods, and don't smoke. Follow-up care is a key part of your treatment and safety. Be sure to make and go to all appointments, and call your doctor if you are having problems. It's also a good idea to know your test results and keep a list of the medicines you take. How can you care for yourself at home? · Be safe with medicines. Read and follow all instructions on the label. ¨ If the doctor gave you a prescription medicine for pain, take it as prescribed. ¨ If you are not taking a prescription pain medicine, ask your doctor if you can take an over-the-counter medicine. · Talk to your doctor about how to make your bones stronger. You may need medicines or a change in what you eat. · Be active only as directed by your doctor. When should you call for help? Call 911 anytime you think you may need emergency care. For example, call if:  · You are unable to move an arm or a leg at all. Call your doctor now or seek immediate medical care if:  · You have new or worse symptoms in your arms, chest, legs, belly, or buttocks. Symptoms may include:  ¨ Numbness or tingling. ¨ Weakness. ¨ Pain. · You lose bladder or bowel control.   · You have belly pain, bloating, vomiting, or nausea. Watch closely for changes in your health, and be sure to contact your doctor if:  · You are not getting better as expected. Where can you learn more? Go to http://jalil-mo.info/. Enter P445 in the search box to learn more about \"Compression Fracture of the Spine: Care Instructions. \"  Current as of: March 21, 2017  Content Version: 11.3  © 1214-2267 [employer]. Care instructions adapted under license by [employer] (which disclaims liability or warranty for this information). If you have questions about a medical condition or this instruction, always ask your healthcare professional. [address] any warranty or liability for your use of this information.

## 2017-08-09 LAB
BACTERIA SPEC CULT: NORMAL
CC UR VC: NORMAL
SERVICE CMNT-IMP: NORMAL

## 2017-08-10 ENCOUNTER — HOSPITAL ENCOUNTER (EMERGENCY)
Age: 37
Discharge: HOME | End: 2017-08-10
Attending: EMERGENCY MEDICINE
Payer: SELF-PAY

## 2017-08-10 VITALS
HEART RATE: 122 BPM | OXYGEN SATURATION: 98 % | TEMPERATURE: 98.3 F | DIASTOLIC BLOOD PRESSURE: 83 MMHG | BODY MASS INDEX: 29.36 KG/M2 | SYSTOLIC BLOOD PRESSURE: 157 MMHG | RESPIRATION RATE: 16 BRPM | HEIGHT: 64 IN | WEIGHT: 171.96 LBS

## 2017-08-10 DIAGNOSIS — S32.000S COMPRESSION FRACTURE OF LUMBAR VERTEBRA, SEQUELA: Primary | ICD-10-CM

## 2017-08-10 PROCEDURE — 99282 EMERGENCY DEPT VISIT SF MDM: CPT

## 2017-08-10 RX ORDER — OXYCODONE AND ACETAMINOPHEN 7.5; 325 MG/1; MG/1
1 TABLET ORAL
Qty: 20 TAB | Refills: 0 | Status: SHIPPED | OUTPATIENT
Start: 2017-08-10 | End: 2017-09-10

## 2017-08-10 RX ORDER — HYDROMORPHONE HYDROCHLORIDE 1 MG/ML
1 INJECTION, SOLUTION INTRAMUSCULAR; INTRAVENOUS; SUBCUTANEOUS
Status: DISCONTINUED | OUTPATIENT
Start: 2017-08-10 | End: 2017-08-10

## 2017-08-10 NOTE — DISCHARGE INSTRUCTIONS
Compression Fracture of the Spine: Care Instructions  Your Care Instructions    A compression fracture happens when the front part of a spinal bone breaks and collapses. A fall or other accident can cause it. A minor injury or moving the wrong way can cause a break if you have thin or brittle bones (osteoporosis). These types of breaks will heal in 8 to 10 weeks. You will need rest and pain medicines. Your doctor may recommend physical therapy. Some doctors recommend that certain people with compression fractures wear braces. Your doctor also may treat thin or brittle bones. You may need surgery if you have lasting pain or if the bone presses on the spinal cord or nerves. You heal best when you take good care of yourself. Eat a variety of healthy foods, and don't smoke. Follow-up care is a key part of your treatment and safety. Be sure to make and go to all appointments, and call your doctor if you are having problems. It's also a good idea to know your test results and keep a list of the medicines you take. How can you care for yourself at home? · Be safe with medicines. Read and follow all instructions on the label. ¨ If the doctor gave you a prescription medicine for pain, take it as prescribed. ¨ If you are not taking a prescription pain medicine, ask your doctor if you can take an over-the-counter medicine. · Talk to your doctor about how to make your bones stronger. You may need medicines or a change in what you eat. · Be active only as directed by your doctor. When should you call for help? Call 911 anytime you think you may need emergency care. For example, call if:  · You are unable to move an arm or a leg at all. Call your doctor now or seek immediate medical care if:  · You have new or worse symptoms in your arms, chest, legs, belly, or buttocks. Symptoms may include:  ¨ Numbness or tingling. ¨ Weakness. ¨ Pain. · You lose bladder or bowel control.   · You have belly pain, bloating, vomiting, or nausea. Watch closely for changes in your health, and be sure to contact your doctor if:  · You are not getting better as expected. Where can you learn more? Go to http://jalil-mo.info/. Enter P445 in the search box to learn more about \"Compression Fracture of the Spine: Care Instructions. \"  Current as of: March 21, 2017  Content Version: 11.3  © 2754-1600 [employer]. Care instructions adapted under license by [employer] (which disclaims liability or warranty for this information). If you have questions about a medical condition or this instruction, always ask your healthcare professional. [place] [address] any warranty or liability for your use of this information. Kyphoplasty: Before Your Procedure  What is kyphoplasty? Kyphoplasty (say \"EN-wog-zrxu-kelvin\") is a procedure for your back. It is done to relieve pain from compression fractures of the spine. It can return your vertebrae to a more normal shape. The doctor makes a small cut in your back. Then he or she inserts a hollow needle or tube called a trocar. Fluoroscopy, a kind of X-ray, is used to guide the needle to the fractured vertebra. When the needle is in place, the doctor inserts a balloon. The balloon is inflated and then deflated. Using the hollow needle, the doctor puts a cement substance into the space created by the balloon. It takes about 1 to 2 hours to treat each vertebra. You may go home that day, or you may spend the night in the hospital.  Most people are able to go back to their normal activities within a day. Follow-up care is a key part of your treatment and safety. Be sure to make and go to all appointments, and call your doctor if you are having problems. It's also a good idea to know your test results and keep a list of the medicines you take. What happens before the procedure? Procedures can be stressful.  This information will help you understand what you can expect. And it will help you safely prepare for your procedure. Preparing for the procedure  · Understand exactly what procedure is planned, along with the risks, benefits, and other options. · Tell your doctors ALL the medicines, vitamins, supplements, and herbal remedies you take. Some of these can increase the risk of bleeding or interact with anesthesia. · If you take blood thinners, such as warfarin (Coumadin), clopidogrel (Plavix), or aspirin, be sure to talk to your doctor. He or she will tell you if you should stop taking these medicines before your procedure. Make sure that you understand exactly what your doctor wants you to do. · Your doctor will tell you which medicines to take or stop before your procedure. You may need to stop taking certain medicines a week or more before the procedure. So talk to your doctor as soon as you can. · If you have an advance directive, let your doctor know. It may include a living will and a durable power of  for health care. Bring a copy to the hospital. If you don't have one, you may want to prepare one. It lets your doctor and loved ones know your health care wishes. Doctors advise that everyone prepare these papers before any type of surgery or procedure. What happens on the day of the procedure? · Follow the instructions exactly about when to stop eating and drinking. If you don't, your procedure may be canceled. If your doctor told you to take your medicines on the day of the procedure, take them with only a sip of water. · Take a bath or shower before you come in for your procedure. Do not apply lotions, perfumes, deodorants, or nail polish. · Take off all jewelry and piercings. And take out contact lenses, if you wear them. At the hospital or surgery center  · Bring a picture ID. · You will be kept comfortable and safe by your anesthesia provider. You may get medicine that relaxes you or puts you in a light sleep.  The area being worked on will be numb. Going home  · Be sure you have someone to drive you home. Anesthesia and pain medicine make it unsafe for you to drive. · You will be given more specific instructions about recovering from your procedure. They will cover things like diet, wound care, follow-up care, driving, and getting back to your normal routine. When should you call your doctor? · You have questions or concerns. · You don't understand how to prepare for your procedure. · You become ill before the procedure (such as fever, flu, or a cold). · You need to reschedule or have changed your mind about having the procedure. Where can you learn more? Go to http://jalil-mo.info/. Enter Y916 in the search box to learn more about \"Kyphoplasty: Before Your Procedure. \"  Current as of: March 21, 2017  Content Version: 11.3  © 1138-8731 [employer]. Care instructions adapted under license by [employer] (which disclaims liability or warranty for this information). If you have questions about a medical condition or this instruction, always ask your healthcare professional. [address] any warranty or liability for your use of this information.

## 2017-08-10 NOTE — ED NOTES
JULIENNE Henderson has reviewed discharge instructions with the patient. The patient verbalized understanding. Pt discharged with written instructions. No further concerns at this time. Pt given prescriptions and ED excuse note. Pt ambulatory to exit.
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
## 2017-08-10 NOTE — LETTER
[address] 
[facility] EMERGENCY DEPT 
[address] [place] [address] [address] [phone] 
[phone] Work/School Note Date: 8/10/2017 To Whom It May concern: 
 
Kei Negrete was seen and treated today in the emergency room by the following provider(s): 
Attending Provider: Yessenia Herron MD 
Physician Assistant: JULIENNE Petty.   
 
Kei Negrete may return to work on [facility]. Sincerely, JULIENNE Petty

## 2017-08-14 ENCOUNTER — HOSPITAL ENCOUNTER (EMERGENCY)
Age: 37
Discharge: HOME | End: 2017-08-14
Attending: EMERGENCY MEDICINE
Payer: SELF-PAY

## 2017-08-14 VITALS
RESPIRATION RATE: 21 BRPM | TEMPERATURE: 97.9 F | HEART RATE: 131 BPM | OXYGEN SATURATION: 99 % | SYSTOLIC BLOOD PRESSURE: 129 MMHG | DIASTOLIC BLOOD PRESSURE: 87 MMHG | WEIGHT: 177.69 LBS | HEIGHT: 64 IN | BODY MASS INDEX: 30.34 KG/M2

## 2017-08-14 DIAGNOSIS — S32.000S COMPRESSION FRACTURE OF LUMBAR VERTEBRA, SEQUELA: Primary | ICD-10-CM

## 2017-08-14 PROCEDURE — 99283 EMERGENCY DEPT VISIT LOW MDM: CPT

## 2017-08-14 PROCEDURE — 74011250637 HC RX REV CODE- 250/637: Performed by: PHYSICIAN ASSISTANT

## 2017-08-14 RX ORDER — ONDANSETRON 4 MG/1
4 TABLET, ORALLY DISINTEGRATING ORAL
Status: COMPLETED | OUTPATIENT
Start: 2017-08-14 | End: 2017-08-14

## 2017-08-14 RX ORDER — OXYCODONE AND ACETAMINOPHEN 5; 325 MG/1; MG/1
2 TABLET ORAL
Status: COMPLETED | OUTPATIENT
Start: 2017-08-14 | End: 2017-08-14

## 2017-08-14 RX ADMIN — ONDANSETRON 4 MG: 4 TABLET, ORALLY DISINTEGRATING ORAL at 14:50

## 2017-08-14 RX ADMIN — OXYCODONE HYDROCHLORIDE AND ACETAMINOPHEN 2 TABLET: 5; 325 TABLET ORAL at 14:51
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
## 2017-08-14 NOTE — ED NOTES
Pt was seen, and deemed safe for discharge by ED provider. ED provider discussed discharge instructions with the patient and all questioned fully answered. VSS. Pt ambulatory leaving ED.

## 2017-08-14 NOTE — DISCHARGE INSTRUCTIONS
Compression Fracture of the Spine: Care Instructions  Your Care Instructions    A compression fracture happens when the front part of a spinal bone breaks and collapses. A fall or other accident can cause it. A minor injury or moving the wrong way can cause a break if you have thin or brittle bones (osteoporosis). These types of breaks will heal in 8 to 10 weeks. You will need rest and pain medicines. Your doctor may recommend physical therapy. Some doctors recommend that certain people with compression fractures wear braces. Your doctor also may treat thin or brittle bones. You may need surgery if you have lasting pain or if the bone presses on the spinal cord or nerves. You heal best when you take good care of yourself. Eat a variety of healthy foods, and don't smoke. Follow-up care is a key part of your treatment and safety. Be sure to make and go to all appointments, and call your doctor if you are having problems. It's also a good idea to know your test results and keep a list of the medicines you take. How can you care for yourself at home? · Be safe with medicines. Read and follow all instructions on the label. ¨ If the doctor gave you a prescription medicine for pain, take it as prescribed. ¨ If you are not taking a prescription pain medicine, ask your doctor if you can take an over-the-counter medicine. · Talk to your doctor about how to make your bones stronger. You may need medicines or a change in what you eat. · Be active only as directed by your doctor. When should you call for help? Call 911 anytime you think you may need emergency care. For example, call if:  · You are unable to move an arm or a leg at all. Call your doctor now or seek immediate medical care if:  · You have new or worse symptoms in your arms, chest, legs, belly, or buttocks. Symptoms may include:  ¨ Numbness or tingling. ¨ Weakness. ¨ Pain. · You lose bladder or bowel control.   · You have belly pain, bloating, vomiting, or nausea. Watch closely for changes in your health, and be sure to contact your doctor if:  · You are not getting better as expected. Where can you learn more? Go to http://jalil-mo.info/. Enter P445 in the search box to learn more about \"Compression Fracture of the Spine: Care Instructions. \"  Current as of: March 21, 2017  Content Version: 11.3  © 9368-1272 [employer]. Care instructions adapted under license by [employer] (which disclaims liability or warranty for this information). If you have questions about a medical condition or this instruction, always ask your healthcare professional. [address] any warranty or liability for your use of this information.

## 2017-09-10 ENCOUNTER — HOSPITAL ENCOUNTER (EMERGENCY)
Age: 37
Discharge: HOME | End: 2017-09-10
Attending: EMERGENCY MEDICINE | Admitting: EMERGENCY MEDICINE
Payer: SELF-PAY

## 2017-09-10 ENCOUNTER — APPOINTMENT (OUTPATIENT)
Dept: GENERAL RADIOLOGY | Age: 37
End: 2017-09-10
Payer: SELF-PAY

## 2017-09-10 VITALS
RESPIRATION RATE: 16 BRPM | DIASTOLIC BLOOD PRESSURE: 63 MMHG | TEMPERATURE: 97.9 F | BODY MASS INDEX: 30 KG/M2 | OXYGEN SATURATION: 96 % | HEART RATE: 101 BPM | WEIGHT: 175.71 LBS | HEIGHT: 64 IN | SYSTOLIC BLOOD PRESSURE: 118 MMHG

## 2017-09-10 DIAGNOSIS — Z87.81 HISTORY OF VERTEBRAL FRACTURE: ICD-10-CM

## 2017-09-10 DIAGNOSIS — F17.200 TOBACCO DEPENDENCE: ICD-10-CM

## 2017-09-10 DIAGNOSIS — M54.50 ACUTE BILATERAL LOW BACK PAIN WITHOUT SCIATICA: Primary | ICD-10-CM

## 2017-09-10 DIAGNOSIS — F32.A DEPRESSION, UNSPECIFIED DEPRESSION TYPE: ICD-10-CM

## 2017-09-10 DIAGNOSIS — F98.8 ADD (ATTENTION DEFICIT DISORDER): ICD-10-CM

## 2017-09-10 PROCEDURE — 96372 THER/PROPH/DIAG INJ SC/IM: CPT

## 2017-09-10 PROCEDURE — 72100 X-RAY EXAM L-S SPINE 2/3 VWS: CPT

## 2017-09-10 PROCEDURE — 74011250636 HC RX REV CODE- 250/636: Performed by: PHYSICIAN ASSISTANT

## 2017-09-10 PROCEDURE — 74011250637 HC RX REV CODE- 250/637: Performed by: PHYSICIAN ASSISTANT

## 2017-09-10 PROCEDURE — 99283 EMERGENCY DEPT VISIT LOW MDM: CPT

## 2017-09-10 RX ORDER — HYDROMORPHONE HYDROCHLORIDE 2 MG/ML
1 INJECTION, SOLUTION INTRAMUSCULAR; INTRAVENOUS; SUBCUTANEOUS
Status: COMPLETED | OUTPATIENT
Start: 2017-09-10 | End: 2017-09-10

## 2017-09-10 RX ORDER — OXYCODONE AND ACETAMINOPHEN 5; 325 MG/1; MG/1
1 TABLET ORAL
Status: COMPLETED | OUTPATIENT
Start: 2017-09-10 | End: 2017-09-10

## 2017-09-10 RX ORDER — METHOCARBAMOL 750 MG/1
750 TABLET, FILM COATED ORAL 3 TIMES DAILY
Qty: 15 TAB | Refills: 0 | Status: SHIPPED | OUTPATIENT
Start: 2017-09-10 | End: 2017-09-15

## 2017-09-10 RX ORDER — KETOROLAC TROMETHAMINE 30 MG/ML
60 INJECTION, SOLUTION INTRAMUSCULAR; INTRAVENOUS
Status: COMPLETED | OUTPATIENT
Start: 2017-09-10 | End: 2017-09-10

## 2017-09-10 RX ORDER — IBUPROFEN 600 MG/1
600 TABLET ORAL
Qty: 20 TAB | Refills: 0 | Status: SHIPPED | OUTPATIENT
Start: 2017-09-10 | End: 2018-05-20

## 2017-09-10 RX ORDER — ONDANSETRON 4 MG/1
4 TABLET, ORALLY DISINTEGRATING ORAL
Status: COMPLETED | OUTPATIENT
Start: 2017-09-10 | End: 2017-09-10

## 2017-09-10 RX ORDER — OXYCODONE AND ACETAMINOPHEN 5; 325 MG/1; MG/1
1 TABLET ORAL
Qty: 15 TAB | Refills: 0 | Status: SHIPPED | OUTPATIENT
Start: 2017-09-10 | End: 2017-12-13

## 2017-09-10 RX ORDER — DIAZEPAM 5 MG/1
5 TABLET ORAL
Status: COMPLETED | OUTPATIENT
Start: 2017-09-10 | End: 2017-09-10

## 2017-09-10 RX ADMIN — DIAZEPAM 5 MG: 5 TABLET ORAL at 11:52

## 2017-09-10 RX ADMIN — HYDROMORPHONE HYDROCHLORIDE 1 MG: 2 INJECTION, SOLUTION INTRAMUSCULAR; INTRAVENOUS; SUBCUTANEOUS at 12:42

## 2017-09-10 RX ADMIN — KETOROLAC TROMETHAMINE 60 MG: 30 INJECTION, SOLUTION INTRAMUSCULAR at 11:52

## 2017-09-10 RX ADMIN — ONDANSETRON 4 MG: 4 TABLET, ORALLY DISINTEGRATING ORAL at 12:42

## 2017-09-10 RX ADMIN — OXYCODONE AND ACETAMINOPHEN 1 TABLET: 5; 325 TABLET ORAL at 13:36
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
## 2017-09-10 NOTE — LETTER
[address] 
[facility] EMERGENCY DEPT 
[address] [place] [id number] [phone] Work/School Note Date: 9/10/2017 To Whom It May concern: 
 
Benja Martinez was seen and treated today in the emergency room. She may return to work in 2 to 3 days, as symptoms improve. Sincerely, Bryson Mota

## 2017-09-10 NOTE — DISCHARGE INSTRUCTIONS
Back Pain, Emergency or Urgent Symptoms: Care Instructions  Your Care Instructions  Many people have back pain at one time or another. In most cases, pain gets better with self-care that includes over-the-counter pain medicine, ice, heat, and exercises. Unless you have symptoms of a severe injury or heart attack, you may be able to give yourself a few days before you call a doctor. But some back problems are very serious. Do not ignore symptoms that need to be checked right away. Follow-up care is a key part of your treatment and safety. Be sure to make and go to all appointments, and call your doctor if you are having problems. It's also a good idea to know your test results and keep a list of the medicines you take. How can you care for yourself at home? · Sit or lie in positions that are most comfortable and that reduce your pain. Try one of these positions when you lie down:  ¨ Lie on your back with your knees bent and supported by large pillows. ¨ Lie on the floor with your legs on the seat of a sofa or chair. [place]  on your side with your knees and hips bent and a pillow between your legs. ¨ Lie on your stomach if it does not make pain worse. · Do not sit up in bed, and avoid soft couches and twisted positions. Bed rest can help relieve pain at first, but it delays healing. Avoid bed rest after the first day. · Change positions every 30 minutes. If you must sit for long periods of time, take breaks from sitting. Get up and walk around, or lie flat. · Try using a heating pad on a low or medium setting, for 15 to 20 minutes every 2 or 3 hours. Try a warm shower in place of one session with the heating pad. You can also buy single-use heat wraps that last up to 8 hours. You can also try ice or cold packs on your back for 10 to 20 minutes at a time, several times a day. (Put a thin cloth between the ice pack and your skin.) This reduces pain and makes it easier to be active and exercise.   · Take pain medicines exactly as directed. ¨ If the doctor gave you a prescription medicine for pain, take it as prescribed. ¨ If you are not taking a prescription pain medicine, ask your doctor if you can take an over-the-counter medicine. When should you call for help? Call 911 anytime you think you may need emergency care. For example, call if:  · You are unable to move a leg at all. · You have back pain with severe belly pain. · You have symptoms of a heart attack. These may include:  ¨ Chest pain or pressure, or a strange feeling in the chest.  ¨ Sweating. ¨ Shortness of breath. ¨ Nausea or vomiting. ¨ Pain, pressure, or a strange feeling in the back, neck, jaw, or upper belly or in one or both shoulders or arms. ¨ Lightheadedness or sudden weakness. ¨ A fast or irregular heartbeat. After you call 911, the  may tell you to chew 1 adult-strength or 2 to 4 low-dose aspirin. Wait for an ambulance. Do not try to drive yourself. Call your doctor now or seek immediate medical care if:  · You have new or worse symptoms in your arms, legs, chest, belly, or buttocks. Symptoms may include:  ¨ Numbness or tingling. ¨ Weakness. ¨ Pain. · You lose bladder or bowel control. · You have back pain and:  ¨ You have injured your back while lifting or doing some other activity. Call if the pain is severe, has not gone away after 1 or 2 days, and you cannot do your normal daily activities. ¨ You have had a back injury before that needed treatment. ¨ Your pain has lasted longer than 4 weeks. ¨ You have had weight loss you cannot explain. ¨ You are age 48 or older. ¨ You have cancer now or have had it before. Watch closely for changes in your health, and be sure to contact your doctor if you are not getting better as expected. Where can you learn more? Go to http://jalil-mo.info/.   Enter G482 in the search box to learn more about \"Back Pain, Emergency or Urgent Symptoms: Care Instructions. \"  Current as of: March 20, 2017  Content Version: 11.3  © 3109-0676 [employer]. Care instructions adapted under license by [employer] (which disclaims liability or warranty for this information). If you have questions about a medical condition or this instruction, always ask your healthcare professional. [address] any warranty or liability for your use of this information. Low Back Pain: Exercises  Your Care Instructions  Here are some examples of typical rehabilitation exercises for your condition. Start each exercise slowly. Ease off the exercise if you start to have pain. Your doctor or physical therapist will tell you when you can start these exercises and which ones will work best for you. How to do the exercises  Press-up    1. Lie on your stomach, supporting your body with your forearms. 2. Press your elbows down into the floor to raise your upper back. As you do this, relax your stomach muscles and allow your back to arch without using your back muscles. As your press up, do not let your hips or pelvis come off the floor. 3. Hold for 15 to 30 seconds, then relax. 4. Repeat 2 to 4 times. Alternate arm and leg (bird dog) exercise    Note: Do this exercise slowly. Try to keep your body straight at all times, and do not let one hip drop lower than the other. 1. Start on the floor, on your hands and knees. 2. Tighten your belly muscles. 3. Raise one leg off the floor, and hold it straight out behind you. Be careful not to let your hip drop down, because that will twist your trunk. 4. Hold for about 6 seconds, then lower your leg and switch to the other leg. 5. Repeat 8 to 12 times on each leg. 6. Over time, work up to holding for 10 to 30 seconds each time. 7. If you feel stable and secure with your leg raised, try raising the opposite arm straight out in front of you at the same time. Knee-to-chest exercise    1.  Lie on your back with your knees bent and your feet flat on the floor. 2. Bring one knee to your chest, keeping the other foot flat on the floor (or keeping the other leg straight, whichever feels better on your lower back). 3. Keep your lower back pressed to the floor. Hold for at least 15 to 30 seconds. 4. Relax, and lower the knee to the starting position. 5. Repeat with the other leg. Repeat 2 to 4 times with each leg. 6. To get more stretch, put your other leg flat on the floor while pulling your knee to your chest.  Curl-ups    1. Lie on the floor on your back with your knees bent at a 90-degree angle. Your feet should be flat on the floor, about 12 inches from your buttocks. 2. Cross your arms over your chest. If this bothers your neck, try putting your hands behind your neck (not your head), with your elbows spread apart. 3. Slowly tighten your belly muscles and raise your shoulder blades off the floor. 4. Keep your head in line with your body, and do not press your chin to your chest.  5. Hold this position for 1 or 2 seconds, then slowly lower yourself back down to the floor. 6. Repeat 8 to 12 times. Pelvic tilt exercise    1. Lie on your back with your knees bent. 2. \"Brace\" your stomach. This means to tighten your muscles by pulling in and imagining your belly button moving toward your spine. You should feel like your back is pressing to the floor and your hips and pelvis are rocking back. 3. Hold for about 6 seconds while you breathe smoothly. 4. Repeat 8 to 12 times. Heel dig bridging    1. Lie on your back with both knees bent and your ankles bent so that only your heels are digging into the floor. Your knees should be bent about 90 degrees. 2. Then push your heels into the floor, squeeze your buttocks, and lift your hips off the floor until your shoulders, hips, and knees are all in a straight line.   3. Hold for about 6 seconds as you continue to breathe normally, and then slowly lower your hips back down to the floor and rest for up to 10 seconds. 4. Do 8 to 12 repetitions. Hamstring stretch in doorway    1. Lie on your back in a doorway, with one leg through the open door. 2. Slide your leg up the wall to straighten your knee. You should feel a gentle stretch down the back of your leg. 3. Hold the stretch for at least 15 to 30 seconds. Do not arch your back, point your toes, or bend either knee. Keep one heel touching the floor and the other heel touching the wall. 4. Repeat with your other leg. 5. Do 2 to 4 times for each leg. Hip flexor stretch    1. Kneel on the floor with one knee bent and one leg behind you. Place your forward knee over your foot. Keep your other knee touching the floor. 2. Slowly push your hips forward until you feel a stretch in the upper thigh of your rear leg. 3. Hold the stretch for at least 15 to 30 seconds. Repeat with your other leg. 4. Do 2 to 4 times on each side. Wall sit    1. Stand with your back 10 to 12 inches away from a wall. 2. Lean into the wall until your back is flat against it. 3. Slowly slide down until your knees are slightly bent, pressing your lower back into the wall. 4. Hold for about 6 seconds, then slide back up the wall. 5. Repeat 8 to 12 times. Follow-up care is a key part of your treatment and safety. Be sure to make and go to all appointments, and call your doctor if you are having problems. It's also a good idea to know your test results and keep a list of the medicines you take. Where can you learn more? Go to http://jalil-mo.info/. Enter P697 in the search box to learn more about \"Low Back Pain: Exercises. \"  Current as of: March 21, 2017  Content Version: 11.3  © 0310-8605 [employer]. Care instructions adapted under license by [employer] (which disclaims liability or warranty for this information).  If you have questions about a medical condition or this instruction, always ask your healthcare professional. [place] [address] any warranty or liability for your use of this information.

## 2017-09-10 NOTE — ED NOTES
Patient resting in bed in no apparent distress. Call bell in reach, bed in low locked position. Using cell phone at this time, waiting for ride to arrive.

## 2017-09-10 NOTE — ED NOTES
The patient was given discharge instructions and questions were answered. Patient is in no apparent distress at time of discharge. Ambulatory with steady gait. Stepfather present to drive her home.

## 2017-09-14 ENCOUNTER — HOSPITAL ENCOUNTER (EMERGENCY)
Age: 37
Discharge: HOME | End: 2017-09-14
Attending: EMERGENCY MEDICINE
Payer: SELF-PAY

## 2017-09-14 ENCOUNTER — APPOINTMENT (OUTPATIENT)
Dept: GENERAL RADIOLOGY | Age: 37
End: 2017-09-14
Attending: EMERGENCY MEDICINE
Payer: SELF-PAY

## 2017-09-14 VITALS
DIASTOLIC BLOOD PRESSURE: 66 MMHG | HEIGHT: 64 IN | HEART RATE: 105 BPM | SYSTOLIC BLOOD PRESSURE: 105 MMHG | WEIGHT: 181.22 LBS | OXYGEN SATURATION: 95 % | BODY MASS INDEX: 30.94 KG/M2 | TEMPERATURE: 98.6 F | RESPIRATION RATE: 16 BRPM
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
## 2017-09-14 DIAGNOSIS — Z76.5 MALINGERING: ICD-10-CM

## 2017-09-14 PROCEDURE — 96372 THER/PROPH/DIAG INJ SC/IM: CPT

## 2017-09-14 PROCEDURE — 72100 X-RAY EXAM L-S SPINE 2/3 VWS: CPT

## 2017-09-14 PROCEDURE — 74011250637 HC RX REV CODE- 250/637: Performed by: EMERGENCY MEDICINE

## 2017-09-14 PROCEDURE — 74011250636 HC RX REV CODE- 250/636: Performed by: EMERGENCY MEDICINE

## 2017-09-14 PROCEDURE — 99284 EMERGENCY DEPT VISIT MOD MDM: CPT

## 2017-09-14 RX ORDER — ONDANSETRON 4 MG/1
TABLET, ORALLY DISINTEGRATING ORAL
Status: DISCONTINUED
Start: 2017-09-14 | End: 2017-09-14 | Stop reason: HOSPADM

## 2017-09-14 RX ORDER — KETOROLAC TROMETHAMINE 30 MG/ML
30 INJECTION, SOLUTION INTRAMUSCULAR; INTRAVENOUS ONCE
Status: COMPLETED | OUTPATIENT
Start: 2017-09-14 | End: 2017-09-14

## 2017-09-14 RX ORDER — OXYCODONE AND ACETAMINOPHEN 5; 325 MG/1; MG/1
1 TABLET ORAL ONCE
Status: COMPLETED | OUTPATIENT
Start: 2017-09-14 | End: 2017-09-14

## 2017-09-14 RX ORDER — KETOROLAC TROMETHAMINE 30 MG/ML
30 INJECTION, SOLUTION INTRAMUSCULAR; INTRAVENOUS ONCE
Status: DISCONTINUED | OUTPATIENT
Start: 2017-09-14 | End: 2017-09-14

## 2017-09-14 RX ORDER — ONDANSETRON 4 MG/1
8 TABLET, ORALLY DISINTEGRATING ORAL
Status: COMPLETED | OUTPATIENT
Start: 2017-09-14 | End: 2017-09-14

## 2017-09-14 RX ADMIN — ONDANSETRON 8 MG: 4 TABLET, ORALLY DISINTEGRATING ORAL at 01:02

## 2017-09-14 RX ADMIN — KETOROLAC TROMETHAMINE 30 MG: 30 INJECTION, SOLUTION INTRAMUSCULAR at 01:54

## 2017-09-14 RX ADMIN — OXYCODONE HYDROCHLORIDE AND ACETAMINOPHEN 1 TABLET: 5; 325 TABLET ORAL at 00:46

## 2017-09-14 NOTE — ED TRIAGE NOTES
Patient states she fractured her back in April. Patient states she moved in a weird way which caused her to refracture her back just last month. Tonight at [phone] of 9/13/17, she re injured her back when her dog pulled her to the floor. Patient reports sharp constant pain of 7/10. Patient updated on plan of care and call bell within reach.

## 2017-09-14 NOTE — ED NOTES
Discharge instructions reviewed with patient. Discharge instructions given to patient per Dr. Freddie Paul. Patient able to return/verbalize discharge instructions. Copy of discharge instructions given. Patient condition stable, respiratory status within normal limits, neuro status intact. Ambulatory out of ER.
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
## 2017-10-20 ENCOUNTER — HOSPITAL ENCOUNTER (EMERGENCY)
Age: 37
Discharge: LEFT BEFORE DISCHARGE | End: 2017-10-20
Attending: EMERGENCY MEDICINE
Payer: SELF-PAY

## 2017-10-20 VITALS
OXYGEN SATURATION: 99 % | TEMPERATURE: 98.7 F | HEIGHT: 64 IN | HEART RATE: 119 BPM | SYSTOLIC BLOOD PRESSURE: 139 MMHG | RESPIRATION RATE: 18 BRPM | WEIGHT: 174.6 LBS | DIASTOLIC BLOOD PRESSURE: 71 MMHG | BODY MASS INDEX: 29.81 KG/M2

## 2017-10-20 PROCEDURE — 75810000275 HC EMERGENCY DEPT VISIT NO LEVEL OF CARE

## 2017-12-13 ENCOUNTER — OFFICE VISIT (OUTPATIENT)
Dept: INTERNAL MEDICINE CLINIC | Age: 37
End: 2017-12-13

## 2017-12-13 VITALS
OXYGEN SATURATION: 97 % | RESPIRATION RATE: 18 BRPM | WEIGHT: 172 LBS | HEIGHT: 64 IN | BODY MASS INDEX: 29.37 KG/M2 | DIASTOLIC BLOOD PRESSURE: 75 MMHG | TEMPERATURE: 97.6 F | HEART RATE: 118 BPM | SYSTOLIC BLOOD PRESSURE: 119 MMHG

## 2017-12-13 DIAGNOSIS — Z88.0 PENICILLIN ALLERGY: ICD-10-CM

## 2017-12-13 DIAGNOSIS — S32.018A OTHER CLOSED FRACTURE OF FIRST LUMBAR VERTEBRA, INITIAL ENCOUNTER (HCC): ICD-10-CM

## 2017-12-13 DIAGNOSIS — J02.9 SORE THROAT: Primary | ICD-10-CM

## 2017-12-13 DIAGNOSIS — Z20.818 EXPOSURE TO STREPTOCOCCAL PHARYNGITIS: ICD-10-CM

## 2017-12-13 DIAGNOSIS — F98.8 ATTENTION DEFICIT DISORDER, UNSPECIFIED HYPERACTIVITY PRESENCE: ICD-10-CM

## 2017-12-13 LAB
S PYO AG THROAT QL: NEGATIVE
VALID INTERNAL CONTROL?: YES

## 2017-12-13 RX ORDER — GABAPENTIN 300 MG/1
300 CAPSULE ORAL
Qty: 60 CAP | Refills: 2 | Status: SHIPPED | OUTPATIENT
Start: 2017-12-13 | End: 2018-05-20

## 2017-12-13 RX ORDER — DEXTROAMPHETAMINE SACCHARATE, AMPHETAMINE ASPARTATE, DEXTROAMPHETAMINE SULFATE AND AMPHETAMINE SULFATE 5; 5; 5; 5 MG/1; MG/1; MG/1; MG/1
TABLET ORAL
Qty: 60 TAB | Refills: 0 | Status: SHIPPED | OUTPATIENT
Start: 2017-12-13 | End: 2018-01-10 | Stop reason: SDUPTHER

## 2017-12-13 RX ORDER — AZITHROMYCIN 250 MG/1
TABLET, FILM COATED ORAL
Qty: 6 TAB | Refills: 0 | Status: SHIPPED | OUTPATIENT
Start: 2017-12-13 | End: 2018-05-20

## 2017-12-13 NOTE — PATIENT INSTRUCTIONS
Results for orders placed or performed in visit on 12/13/17   AMB POC RAPID STREP A   Result Value Ref Range    VALID INTERNAL CONTROL POC Yes     Group A Strep Ag Negative Negative       1. Culture not sent, as reviewed, since empirically treating. 2.  The gabapentin can be taking every 8 hours if needed, or more effective. You would start 300mg every 8 hours and can increase doses as needed/reviewed. 3.  Please schedule with [facility] provider who managed your L1 fracture as reviewed. They can review healing and determine if any other therapies could help the pain you are currently having. Please clarify if they need you to bring them the imaging disk (from x-rays you had at [address] [address] include [facility] beds at the [facility]) if you are seeing them for follow-up. If you need referral, please let us know.
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
## 2017-12-13 NOTE — MR AVS SNAPSHOT
Visit Information Date & Time Provider Department Dept. Phone Encounter #  
 12/13/2017 10:15 AM Sindy Rodrigez [place] [id number] and Internal Medicine [phone] [id number] Follow-up Instructions Return in about 4 weeks (around 1/10/2018) for medication follow-up. Upcoming Health Maintenance Date Due Pneumococcal 19-64 Medium Risk (1 of 1 - PPSV23) 11/9/2001 DTaP/Tdap/Td series (1 - Tdap) 11/9/2003 Influenza Age 5 to Adult 8/1/2017 PAP AKA CERVICAL CYTOLOGY 8/12/2017 Allergies as of 12/13/2017  Review Complete On: 12/13/2017 By: Jamila Dyson MD  
  
 Severity Noted Reaction Type Reactions Aspirin High 05/30/2013    Other (comments) Hot sweats and passed out; tolerated ibuprofen Penicillins  08/27/2013    Other (comments) Childhood. Does not remember reaction. Is not aware if she has ever taken cephalosporins in the past.  
  
Current Immunizations  Reviewed on 6/17/2017 Name Date Influenza Vaccine 9/26/2013 Influenza Vaccine (Quad) PF 10/12/2016, 10/13/2015, 12/12/2014 Influenza Vaccine Split 9/27/2012 11:34 AM  
  
 Not reviewed this visit You Were Diagnosed With   
  
 Codes Comments Sore throat    -  Primary ICD-10-CM: J02.9 ICD-9-CM: 725 Exposure to Streptococcal pharyngitis     ICD-10-CM: Z20.818 ICD-9-CM: V01.89 Attention deficit disorder, unspecified hyperactivity presence     ICD-10-CM: F98.8 ICD-9-CM: 314.00 Other closed fracture of first lumbar vertebra, initial encounter [facility])     ICD-10-CM: L22.998L ICD-9-CM: 805.4 Penicillin allergy     ICD-10-CM: Z88.0 ICD-9-CM: V14.0 Vitals BP Pulse Temp Resp Height(growth percentile) Weight(growth percentile) 119/75 (BP 1 Location: Left arm, BP Patient Position: Sitting) (!) 118 97.6 °F (36.4 °C) (Oral) 18 5' 4\" (1.626 m) 172 lb (78 kg) LMP SpO2 BMI OB Status Smoking Status 03/14/2016 97% 29.52 kg/m2 Hysterectomy Current Every Day Smoker Vitals History BMI and BSA Data Body Mass Index Body Surface Area  
 29.52 kg/m 2 1.88 m 2 Preferred Pharmacy Pharmacy Name Phone [facility]/PHARMACY #[id number] [phone] Your Updated Medication List  
  
   
This list is accurate as of: 12/13/17 11:52 AM.  Always use your most recent med list.  
  
  
  
  
 azithromycin 250 mg tablet Commonly known as:  Dick Rowels Take 2 tablets today, then take 1 tablet daily for 4 additional daily doses. dextroamphetamine-amphetamine 20 mg tablet Commonly known as:  ADDERALL Take 20mg in AM and 20mg again in afternoon as needed. gabapentin 300 mg capsule Commonly known as:  NEURONTIN Take 1 Cap by mouth nightly as needed. May increase to 600mg (2 capsules) nightly (after work) PRN. ibuprofen 600 mg tablet Commonly known as:  MOTRIN Take 1 Tab by mouth every six (6) hours as needed for Pain for up to 20 doses. Prescriptions Printed Refills  
 dextroamphetamine-amphetamine (ADDERALL) 20 mg tablet 0 Sig: Take 20mg in AM and 20mg again in afternoon as needed. Class: Print Prescriptions Sent to Pharmacy Refills  
 azithromycin (ZITHROMAX) 250 mg tablet 0 Sig: Take 2 tablets today, then take 1 tablet daily for 4 additional daily doses. Class: Normal  
 Pharmacy: [facility]/pharmacy #[id number] Ph #: [phone]  
 gabapentin (NEURONTIN) 300 mg capsule 2 Sig: Take 1 Cap by mouth nightly as needed. May increase to 600mg (2 capsules) nightly (after work) PRN. Class: Normal  
 Pharmacy: [facility]/pharmacy #[id number] Ph #: [phone] Route: Oral  
  
We Performed the Following AMB POC RAPID STREP A [57249 CPT(R)] Follow-up Instructions Return in about 4 weeks (around 1/10/2018) for medication follow-up. Patient Instructions Results for orders placed or performed in visit on 12/13/17 AMB POC RAPID STREP A Result Value Ref Range VALID INTERNAL CONTROL POC Yes Group A Strep Ag Negative Negative 1. Culture not sent, as reviewed, since empirically treating. 2.  The gabapentin can be taking every 8 hours if needed, or more effective. You would start 300mg every 8 hours and can increase doses as needed/reviewed. 3.  Please schedule with [facility] provider who managed your L1 fracture as reviewed. They can review healing and determine if any other therapies could help the pain you are currently having. Please clarify if they need you to bring them the imaging disk (from x-rays you had [facility] [address] [facility]) if you are seeing them for follow-up. If you need referral, please let us know. Introducing [facility]! Dear Solomon Robles: Thank you for requesting a [employer] account. Our records indicate that you already have an active [employer] account. You can access your account anytime at [email or web address] Did you know that you can access your hospital and ER discharge instructions at any time in [employer]? You can also review all of your test results from your hospital stay or ER visit. Additional Information If you have questions, please visit the Frequently Asked Questions section of the [employer] website at [email or web address]. Remember, [employer] is NOT to be used for urgent needs. For medical emergencies, dial 911. Now available from your iPhone and Android! Please provide this summary of care documentation to your next provider. Your primary care clinician is listed as [facility]. If you have any questions after today's visit, please call [phone].

## 2018-01-10 DIAGNOSIS — F98.8 ATTENTION DEFICIT DISORDER, UNSPECIFIED HYPERACTIVITY PRESENCE: ICD-10-CM

## 2018-01-10 NOTE — TELEPHONE ENCOUNTER
Dr Swartz/telephone  Received:  Today       [facility] Front Office Pool                     Pt needs a refill on Adderall, generic brand, please call when ready for pick-up at [phone]

## 2018-01-12 RX ORDER — DEXTROAMPHETAMINE SACCHARATE, AMPHETAMINE ASPARTATE, DEXTROAMPHETAMINE SULFATE AND AMPHETAMINE SULFATE 5; 5; 5; 5 MG/1; MG/1; MG/1; MG/1
TABLET ORAL
Qty: 60 TAB | Refills: 0 | Status: SHIPPED | OUTPATIENT
Start: 2018-01-12 | End: 2018-02-07 | Stop reason: SDUPTHER

## 2018-01-12 NOTE — TELEPHONE ENCOUNTER
Patient came by states that someone called and stated her prescription was ready for . Per pt, she will have taken her last pill on Saturday and needs prescription ASAP.

## 2018-01-12 NOTE — TELEPHONE ENCOUNTER
Prescription Monitoring Program ([place]) database query with fills:  01/09/2018 3 01/09/2018 TRAMADOL HCL 50 MG TABLET 15.0 4 JE KESHAV   12/13/2017 3 12/13/2017 DEXTROAMP-AMPHETAMIN 20 MG TAB 60.0    Refill request(s) approved--Adderall--printed for pt to pick-up today (pt in clinic to pick-up).

## 2018-05-20 ENCOUNTER — HOSPITAL ENCOUNTER (EMERGENCY)
Age: 38
Discharge: HOME | End: 2018-05-20
Attending: EMERGENCY MEDICINE
Payer: SELF-PAY

## 2018-05-20 VITALS
DIASTOLIC BLOOD PRESSURE: 65 MMHG | SYSTOLIC BLOOD PRESSURE: 118 MMHG | WEIGHT: 183.86 LBS | OXYGEN SATURATION: 98 % | BODY MASS INDEX: 30.63 KG/M2 | TEMPERATURE: 97.8 F | RESPIRATION RATE: 18 BRPM | HEIGHT: 65 IN | HEART RATE: 86 BPM

## 2018-05-20 DIAGNOSIS — K08.89 DENTALGIA: ICD-10-CM

## 2018-05-20 DIAGNOSIS — F17.200 TOBACCO DEPENDENCE: ICD-10-CM

## 2018-05-20 DIAGNOSIS — G50.1 ATYPICAL FACE PAIN: Primary | ICD-10-CM

## 2018-05-20 PROCEDURE — 99282 EMERGENCY DEPT VISIT SF MDM: CPT

## 2018-05-20 RX ORDER — CLINDAMYCIN HYDROCHLORIDE 300 MG/1
300 CAPSULE ORAL 3 TIMES DAILY
Qty: 30 CAP | Refills: 0 | Status: SHIPPED | OUTPATIENT
Start: 2018-05-20 | End: 2018-05-30

## 2018-05-20 RX ORDER — HYDROCODONE BITARTRATE AND ACETAMINOPHEN 5; 325 MG/1; MG/1
1 TABLET ORAL
Qty: 8 TAB | Refills: 0 | Status: SHIPPED | OUTPATIENT
Start: 2018-05-20 | End: 2018-06-15 | Stop reason: CLARIF

## 2018-05-20 RX ORDER — IBUPROFEN 600 MG/1
600 TABLET ORAL
Qty: 20 TAB | Refills: 0 | Status: SHIPPED | OUTPATIENT
Start: 2018-05-20 | End: 2018-06-09

## 2018-05-20 NOTE — ED PROVIDER NOTES
EMERGENCY DEPARTMENT HISTORY AND PHYSICAL EXAM      Date: 2018  Patient Name: Verito Dash    History of Presenting Illness     Chief Complaint   Patient presents with    Dental Pain     a week of pain left lower face where a tooth is chipped; History Provided By: Patient    HPI: Verito Dash, 30 y.o. female with PMHx significant for migraines, presents ambulatory to the ED with cc of constant, moderate, aching left lower dental pain for the past couple days, along associated left sided facial swelling. Pt states she has a known left lower chipped tooth. The pain is exacerbated by chewing. She does not have insurance, and she has not seen a dentist. Pt denies recent fever. There are no other complaints, changes, or physical findings at this time. PCP: Belen Thompson MD    Current Outpatient Prescriptions   Medication Sig Dispense Refill    clindamycin (CLEOCIN) 300 mg capsule Take 1 Cap by mouth three (3) times daily for 10 days. 30 Cap 0    HYDROcodone-acetaminophen (NORCO) 5-325 mg per tablet Take 1 Tab by mouth every six (6) hours as needed for Pain for up to 8 doses. Max Daily Amount: 4 Tabs. 8 Tab 0    ibuprofen (MOTRIN) 600 mg tablet Take 1 Tab by mouth every eight (8) hours as needed for Pain for up to 20 days. 20 Tab 0       Past History     Past Medical History:  Past Medical History:   Diagnosis Date    ADD (attention deficit disorder) 17-19yo    Treated with Adderall since  dosing 20mg AM and 10mg PM.  Trial/change to Vyvanse Nov-Dec 2015--not as effective.  Gynecologic disorder     History of miscarriages.   History of Mirena IUD placed on 4/17/15    HX OTHER MEDICAL      -BUFA baby    HX OTHER MEDICAL     HPV positive    Idiopathic vulvodynia 2014    L1 vertebral fracture (HCC) 2017    [facility] imaging/admissoin: Mild acute two column compression fracture of L1 without evidence of retropulsion into the spinal canal.  Managed non-operatively.  Migraines 6/12/2013    Neurological disorder     Pelvic pain in female 9/15/2014    April 2015 gyn laparoscopy/hysteroscopy with endometriosis worse right.  Psychiatric disorder     depression    Secondary dysmenorrhea 8/12/2014    With menorraghia    Seizures ([facility] [address] 2008    never on meds--had appr 4-5 in a short amt of time and none since       Past Surgical History:  Past Surgical History:   Procedure Laterality Date    ENDOMETRIAL CRYOABLATION      HX GYN  4/17/15    laparoscopy and hysteroscopy and IUD placement    HX HYSTERECTOMY  04/04/2016    Complete Hysterectomy       Family History:  Family History   Problem Relation Age of Onset    Cancer Mother     Alcohol abuse Father      and drug    Psychiatric Disorder Father     Cancer Father     Diabetes Maternal Grandmother     Hypertension Maternal Grandmother     Thyroid Disease Maternal Grandmother     Diabetes Maternal Grandfather     Hypertension Maternal Grandfather     Cancer Maternal Grandfather     Heart Disease Maternal Grandfather     Cancer Paternal Grandmother     Diabetes Paternal Grandmother        Social History:  Social History   Substance Use Topics    Smoking status: Current Every Day Smoker     Packs/day: 0.50     Years: 18.00     Types: Cigarettes    Smokeless tobacco: Never Used    Alcohol use 0.0 oz/week     0 Standard drinks or equivalent per week      Comment: occasionally       Allergies: Allergies   Allergen Reactions    Aspirin Other (comments)     Hot sweats and passed out; tolerated ibuprofen    Penicillins Other (comments)     Childhood. Does not remember reaction. Is not aware if she has ever taken cephalosporins in the past.         Review of Systems   Review of Systems   Constitutional: Negative for chills and fever. HENT: Positive for dental problem and facial swelling. Negative for congestion, rhinorrhea, sore throat and trouble swallowing.     Eyes: Negative for pain and redness. Respiratory: Negative for cough and shortness of breath. Cardiovascular: Negative for chest pain and palpitations. Gastrointestinal: Negative for abdominal pain, diarrhea, nausea and vomiting. Genitourinary: Negative for dysuria and hematuria. Musculoskeletal: Negative for neck pain and neck stiffness. Skin: Negative for rash and wound. Allergic/Immunologic: Negative for food allergies and immunocompromised state. Neurological: Negative for dizziness and headaches. Psychiatric/Behavioral: Negative for agitation and confusion. Physical Exam   Physical Exam   Constitutional: She is oriented to person, place, and time. She appears well-developed and well-nourished. Non-toxic appearance. No distress. WDWN female, alert, in NAD   HENT:   Head: Normocephalic and atraumatic. Right Ear: External ear normal.   Left Ear: External ear normal.   Nose: Nose normal.   Mouth/Throat: Uvula is midline. No trismus in the jaw. No oropharyngeal exudate. Pt with poor overall dental health, multiple dental caries, decayed/missing teeth. Tender to left lower molar which was notably decayed. No gingival erythema, edema, exudate, or bleeding noted. Eyes: Conjunctivae and EOM are normal. Pupils are equal, round, and reactive to light. Right eye exhibits no discharge. Left eye exhibits no discharge. No scleral icterus. Neck: Normal range of motion and full passive range of motion without pain. Neck supple. No JVD present. No tracheal deviation present. No thyromegaly present. Cardiovascular: Normal rate, regular rhythm and normal heart sounds. Pulmonary/Chest: Effort normal and breath sounds normal. No accessory muscle usage. No tachypnea. No respiratory distress. She has no decreased breath sounds. She has no wheezes. Musculoskeletal: Normal range of motion. She exhibits no edema. Lymphadenopathy:     She has no cervical adenopathy.    Neurological: She is alert and oriented to person, place, and time. She is not disoriented. She exhibits normal muscle tone. Coordination normal. GCS eye subscore is 4. GCS verbal subscore is 5. GCS motor subscore is 6. Skin: Skin is warm, dry and intact. No rash noted. She is not diaphoretic. No erythema. Psychiatric: She has a normal mood and affect. Her speech is normal and behavior is normal. Judgment normal.   Nursing note and vitals reviewed. Medical Decision Making   I am the first provider for this patient. I reviewed the vital signs, available nursing notes, past medical history, past surgical history, family history and social history. Vital Signs-Reviewed the patient's vital signs. Patient Vitals for the past 12 hrs:   Temp Pulse Resp BP SpO2   05/20/18 0720 97.8 °F (36.6 °C) 86 18 118/65 98 %       Pulse Oximetry Analysis - 98% on room air    Records Reviewed: Nursing Notes and Old Medical Records    ED Course:   Initial assessment performed. The patients presenting problems have been discussed, and they are in agreement with the care plan formulated and outlined with them. I have encouraged them to ask questions as they arise throughout their visit. TOBACCO COUNSELING:  Upon evaluation, pt expressed that they are a current tobacco user. Pt has been counseled on the dangers of smoking and was encouraged to quit as soon as possible in order to decrease further risks to their health. Pt has conveyed their understanding of the risks involved should they continue to use tobacco products. Disposition:  DISCHARGE NOTE  7:42 AM  The patient has been re-evaluated and is ready for discharge. Reviewed available results with patient. Counseled pt on diagnosis and care plan. Pt has expressed understanding, and all questions have been answered. Pt agrees with plan and agrees to follow up as recommended, or return to the ED if their symptoms worsen.  Discharge instructions have been provided and explained to the pt, along with reasons to return to the ED. PLAN:  1. Current Discharge Medication List      START taking these medications    Details   clindamycin (CLEOCIN) 300 mg capsule Take 1 Cap by mouth three (3) times daily for 10 days. Qty: 30 Cap, Refills: 0      HYDROcodone-acetaminophen (NORCO) 5-325 mg per tablet Take 1 Tab by mouth every six (6) hours as needed for Pain for up to 8 doses. Max Daily Amount: 4 Tabs. Qty: 8 Tab, Refills: 0    Associated Diagnoses: Dentalgia      ibuprofen (MOTRIN) 600 mg tablet Take 1 Tab by mouth every eight (8) hours as needed for Pain for up to 20 days. Qty: 20 Tab, Refills: 0           2. Follow-up Information     Follow up With Details Comments Contact Info    [facility]   [address] [place]  [phone]        Return to ED if worse     Diagnosis     Clinical Impression:   1. Atypical face pain    2. Dentalgia    3. Tobacco dependence        Attestations: This note is prepared by Orville Euceda, acting as Scribe for [facility]. DAVID Miranda: The scribe's documentation has been prepared under my direction and personally reviewed by me in its entirety. I confirm that the note above accurately reflects all work, treatment, procedures, and medical decision making performed by me.

## 2018-05-20 NOTE — LETTER
[address] 
[facility] EMERGENCY DEPT 
[address] [place] [address] [address] [id number] [phone] Work/School Note Date: 5/20/2018 To Whom It May concern: 
 
Benja Martinez was seen and treated today in the emergency room. She may return to work in 1 to 2 days, as symptoms improve. Sincerely, Bryson Mota

## 2018-05-20 NOTE — ED NOTES
Patient discharged by [facility]. Patient provided with discharge instructions Rx and instructions on follow up care. Patient out of ED ambulatory accompanied by self.

## 2018-05-20 NOTE — ED TRIAGE NOTES
Assumed care of this patient. She is alert and oriented x4. Patient reports that she has been experiencing lower left dental pain x1 week that has worsened in the last 24 hrs.   Patient states she doesn't have dental insurance or a dentist.

## 2018-05-20 NOTE — DISCHARGE INSTRUCTIONS
Tooth and Gum Pain: Care Instructions  Your Care Instructions    The most common causes of dental pain are tooth decay and gum disease. Pain can also be caused by an infection of the tooth (abscess) or the gums. Or you may have pain from a broken or cracked tooth. Other causes of pain include infection and damage to a tooth from nervous grinding of your teeth. A wisdom tooth can be painful when it is coming in but cannot break through the gum. It can also be painful when the tooth is only partway in and extra gum tissue has formed around it. The tissue can get inflamed (pericoronitis), and sometimes it gets infected. Prompt dental care can help find the cause of your toothache and keep the tooth from dying or gum disease from getting worse. Self-care at home may reduce your pain and discomfort. Follow-up care is a key part of your treatment and safety. Be sure to make and go to all appointments, and call your dentist or doctor if you are having problems. It's also a good idea to know your test results and keep a list of the medicines you take. How can you care for yourself at home? · To reduce pain and facial swelling, put an ice or cold pack on the outside of your cheek for 10 to 20 minutes at a time. Put a thin cloth between the ice and your skin. Do not use heat. · If your doctor prescribed antibiotics, take them as directed. Do not stop taking them just because you feel better. You need to take the full course of antibiotics. · Ask your doctor if you can take an over-the-counter pain medicine, such as acetaminophen (Tylenol), ibuprofen (Advil, Motrin), or naproxen (Aleve). Be safe with medicines. Read and follow all instructions on the label. · Avoid very hot, cold, or sweet foods and drinks if they increase your pain. · Rinse your mouth with warm salt water every 2 hours to help relieve pain and swelling. Mix 1 teaspoon of salt in 8 ounces of water.   · Talk to your dentist about using special toothpaste for sensitive teeth. To reduce pain on contact with heat or cold or when brushing, brush with this toothpaste regularly or rub a small amount of the paste on the sensitive area with a clean finger 2 or 3 times a day. Floss gently between your teeth. · Do not smoke or use spit tobacco. Tobacco use can make gum problems worse, decreases your ability to fight infection in your gums, and delays healing. If you need help quitting, talk to your doctor about stop-smoking programs and medicines. These can increase your chances of quitting for good. When should you call for help? Call 911 anytime you think you may need emergency care. For example, call if:  ? · You have trouble breathing. ?Call your dentist or doctor now or seek immediate medical care if:  ? · You have signs of infection, such as:  ¨ Increased pain, swelling, warmth, or redness. ¨ Red streaks leading from the area. ¨ Pus draining from the area. ¨ A fever. ? Watch closely for changes in your health, and be sure to contact your doctor if:  ? · You do not get better as expected. Where can you learn more? Go to http://jalil-mo.info/. Enter 0363 [id number] in the search box to learn more about \"Tooth and Gum Pain: Care Instructions. \"  Current as of: May 12, 2017  Content Version: 11.4  © 0858-5461 [employer]. Care instructions adapted under license by [employer] (which disclaims liability or warranty for this information). If you have questions about a medical condition or this instruction, always ask your healthcare professional. [place] [address] any warranty or liability for your use of this information. Learning About Benefits From Quitting Smoking  How does quitting smoking make you healthier? If you're thinking about quitting smoking, you may have a few reasons to be smoke-free. Your health may be one of them.   · When you quit smoking, you lower your risks for cancer, lung disease, heart attack, stroke, blood vessel disease, and blindness from macular degeneration. · When you're smoke-free, you get sick less often, and you heal faster. You are less likely to get colds, flu, bronchitis, and pneumonia. · As a nonsmoker, you may find that your mood is better and you are less stressed. When and how will you feel healthier? Quitting has real health benefits that start from day 1 of being smoke-free. And the longer you stay smoke-free, the healthier you get and the better you feel. The first hours  · After just 20 minutes, your blood pressure and heart rate go down. That means there's less stress on your heart and blood vessels. · Within 12 hours, the level of carbon monoxide in your blood drops back to normal. That makes room for more oxygen. With more oxygen in your body, you may notice that you have more energy than when you smoked. After 2 weeks  · Your lungs start to work better. · Your risk of heart attack starts to drop. After 1 month  · When your lungs are clear, you cough less and breathe deeper, so it's easier to be active. · Your sense of taste and smell return. That means you can enjoy food more than you have since you started smoking. Over the years  · After 1 year, your risk of heart disease is half what it would be if you kept smoking. · After 5 years, your risk of stroke starts to shrink. Within a few years after that, it's about the same as if you'd never smoked. · After 10 years, your risk of dying from lung cancer is cut by about half. And your risk for many other types of cancer is lower too. How would quitting help others in your life? When you quit smoking, you improve the health of everyone who now breathes in your smoke. · Their heart, lung, and cancer risks drop, much like yours. · They are sick less. For babies and small children, living smoke-free means they're less likely to have ear infections, pneumonia, and bronchitis.   · If you're a woman who is or will be pregnant someday, quitting smoking means a healthier . · Children who are close to you are less likely to become adult smokers. Where can you learn more? Go to http://jalil-mo.info/. Enter 052 [id number] [id number] 11 in the search box to learn more about \"Learning About Benefits From Quitting Smoking. \"  Current as of: 2017  Content Version: 11.4  © 5904-1056 [employer]. Care instructions adapted under license by [employer] (which disclaims liability or warranty for this information). If you have questions about a medical condition or this instruction, always ask your healthcare professional. [place] [address] any warranty or liability for your use of this information.

## 2018-06-15 ENCOUNTER — HOSPITAL ENCOUNTER (EMERGENCY)
Age: 38
Discharge: HOME | End: 2018-06-15
Attending: EMERGENCY MEDICINE
Payer: SELF-PAY

## 2018-06-15 VITALS
TEMPERATURE: 98.3 F | BODY MASS INDEX: 28.91 KG/M2 | HEIGHT: 64 IN | WEIGHT: 169.31 LBS | SYSTOLIC BLOOD PRESSURE: 123 MMHG | RESPIRATION RATE: 13 BRPM | HEART RATE: 125 BPM | OXYGEN SATURATION: 99 % | DIASTOLIC BLOOD PRESSURE: 85 MMHG

## 2018-06-15 DIAGNOSIS — F17.200 SMOKING ADDICTION: ICD-10-CM

## 2018-06-15 DIAGNOSIS — K02.9 PAIN DUE TO DENTAL CARIES: Primary | ICD-10-CM

## 2018-06-15 PROCEDURE — 99281 EMR DPT VST MAYX REQ PHY/QHP: CPT

## 2018-06-15 RX ORDER — IBUPROFEN 800 MG/1
800 TABLET ORAL
Qty: 30 TAB | Refills: 0 | Status: SHIPPED | OUTPATIENT
Start: 2018-06-15 | End: 2018-07-14

## 2018-06-15 RX ORDER — HYDROCODONE BITARTRATE AND ACETAMINOPHEN 5; 325 MG/1; MG/1
1 TABLET ORAL
Qty: 10 TAB | Refills: 0 | Status: SHIPPED | OUTPATIENT
Start: 2018-06-15 | End: 2018-07-14

## 2018-06-15 RX ORDER — CLINDAMYCIN HYDROCHLORIDE 150 MG/1
CAPSULE ORAL EVERY 6 HOURS
COMMUNITY
End: 2018-07-14

## 2018-06-15 RX ORDER — AMOXICILLIN 500 MG/1
500 TABLET, FILM COATED ORAL 3 TIMES DAILY
Qty: 30 TAB | Refills: 0 | Status: SHIPPED | OUTPATIENT
Start: 2018-06-15 | End: 2018-07-14

## 2018-06-15 NOTE — DISCHARGE INSTRUCTIONS
Emergency [address] by L[facility]ANDR[facility]  [address], [address]  Open M, W, F: 8AM - 5PM and T, Th: 8AM-6PM  Phone: [phone], press 4  $70 for Emergency Care  $60 for first routine care, then pay by sliding scale based upon income. [facility]  [address] [place], [address]  Phone: [phone]    [employer]  [address] [facility], [address]  Open Monday - Friday 8AM - 4:30 PM  Phone: [address] [place] [address] Dentistry Urgent [address] [place] [address] Dentistry, [address] [place] [address], [address] [place] [address] starting at 8:30 AM M-F  Phone: [phone], press 2  Fee: $150 per tooth (x-ray & extractions only)  Pediatrics Phone[de-identified] [phone], 8-5 M-F    [address] Dentistry, [address] [address], 2nd Floor, [address] [place] [address] starting at 8:30 AM - 3 PM [address] [place] [address]  [address] [facility], [address] [place] [address]  Phone: [phone] or [phone]  Emergency Hours: 9:30AM - 11AM (extractions)  Simple tooth extraction $ per tooth. #75 for x-ray    [facility] Residents only, over the age of 25  Phone: [phone]. Leave message saying you need an appointment to register.   Hours: Tuesday Evenings

## 2018-06-15 NOTE — ED NOTES
Assumed care of patient. Patient is alert and oriented and is ambulatory or ambulatory with minimal assistance. Patient is evaluated by mid-level provider in the JET express procedure of the Emergency Department. The Patient is made aware this nurse is available for any assistance at any point of the JET express process. Family and/or caregiver is encouraged to be present. Focus Note: Patient c/o low right sided broken tooth today secondary to eating candy this morning. Physical Assessment:    Neuro:  WDL  Cardiac: WDL  Resp: WDL  PVS: WDL  GI/: WDL  Skin: WDL  ENT: dental pain  Musculoskeletal: WDL

## 2018-06-15 NOTE — LETTER
[address] 
[facility] EMERGENCY DEPT 
[address] [place] [address] [address] [address] [phone] Work/School Note Date: 6/15/2018 To Whom It May concern: 
 
Nisa Armstrong was seen and treated today in the emergency room by the following provider(s): 
Attending Provider: Farrukh Gomez MD 
Physician Assistant: JULIENNE Brewer.   
 
Nisa Armstrong may return to work on [id number]. Sincerely, JULIENNE Brewer

## 2018-06-15 NOTE — ED PROVIDER NOTES
EMERGENCY DEPARTMENT HISTORY AND PHYSICAL EXAM      Date: 6/15/2018  Patient Name: Carmel Moise    History of Presenting Illness     Chief Complaint   Patient presents with    Dental Pain     Ambulatory into the ED with c/o Lt lower dental pain x today. History Provided By: Patient    HPI: Carmel Moise, 30 y.o. female with PMHx significant for migraines, seizures, depression presents ambulatory to the ED with cc of left lower dental pain that started this morning after biting into a chewy piece of candy. She specifically denies any fevers, chills, nausea, vomiting, chest pain, shortness of breath, headache, rash, diarrhea, sweating or weight loss. Chief Complaint: dental pain  Duration: 2 Hours  Timing:  Worsening  Location: left lower tooth  Quality: Aching  Severity: 7 out of 10  Modifying Factors: drinking makes it worse  Associated Symptoms: denies any other associated signs or symptoms    There are no other complaints, changes, or physical findings at this time. PCP: Yaa Pena MD    Current Outpatient Prescriptions   Medication Sig Dispense Refill    clindamycin (CLEOCIN) 150 mg capsule Take  by mouth every six (6) hours.  amoxicillin 500 mg tab Take 500 mg by mouth three (3) times daily. 30 Tab 0    ibuprofen (MOTRIN) 800 mg tablet Take 1 Tab by mouth every eight (8) hours as needed for Pain. 30 Tab 0    HYDROcodone-acetaminophen (NORCO) 5-325 mg per tablet Take 1 Tab by mouth every four (4) hours as needed for Pain. Max Daily Amount: 6 Tabs. 10 Tab 0       Past History     Past Medical History:  Past Medical History:   Diagnosis Date    ADD (attention deficit disorder) 19-19yo    Treated with Adderall since dx. 2013 dosing 20mg AM and 10mg PM.  Trial/change to Vyvanse Nov-Dec 2015--not as effective.  Gynecologic disorder     History of miscarriages.   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  Managed non-operatively.  Migraines 6/12/2013    Neurological disorder     Pelvic pain in female 9/15/2014    April 2015 gyn laparoscopy/hysteroscopy with endometriosis worse right.  Psychiatric disorder     depression    Secondary dysmenorrhea 8/12/2014    With menorraghia    Seizures ([address] 2008    never on meds--had appr 4-5 in a short amt of time and none since       Past Surgical History:  Past Surgical History:   Procedure Laterality Date    ENDOMETRIAL CRYOABLATION      HX GYN  4/17/15    laparoscopy and hysteroscopy and IUD placement    HX HYSTERECTOMY  04/04/2016    Complete Hysterectomy       Family History:  Family History   Problem Relation Age of Onset    Cancer Mother     Alcohol abuse Father      and drug    Psychiatric Disorder Father     Cancer Father     Diabetes Maternal Grandmother     Hypertension Maternal Grandmother     Thyroid Disease Maternal Grandmother     Diabetes Maternal Grandfather     Hypertension Maternal Grandfather     Cancer Maternal Grandfather     Heart Disease Maternal Grandfather     Cancer Paternal Grandmother     Diabetes Paternal Grandmother        Social History:  Social History   Substance Use Topics    Smoking status: Current Every Day Smoker     Packs/day: 0.50     Years: 18.00     Types: Cigarettes    Smokeless tobacco: Never Used    Alcohol use 0.0 oz/week     0 Standard drinks or equivalent per week      Comment: occasionally       Allergies: Allergies   Allergen Reactions    Aspirin Other (comments)     Hot sweats and passed out; tolerated ibuprofen    Penicillins Other (comments)     Childhood. Does not remember reaction.  Is not aware if she has ever taken cephalosporins in the past.         Review of Systems Review of Systems   Constitutional: Negative for fatigue and fever. HENT: Positive for dental problem. Negative for ear pain and sore throat. Eyes: Negative for pain, redness and visual disturbance. Respiratory: Negative for cough and shortness of breath. Cardiovascular: Negative for chest pain and palpitations. Gastrointestinal: Negative for abdominal pain, nausea and vomiting. Genitourinary: Negative for dysuria, frequency and urgency. Musculoskeletal: Negative for back pain, gait problem, neck pain and neck stiffness. Skin: Negative for rash and wound. Neurological: Negative for dizziness, weakness, light-headedness, numbness and headaches. Physical Exam   Physical Exam   Constitutional: She is oriented to person, place, and time. She appears well-developed and well-nourished. Non-toxic appearance. No distress. HENT:   Head: Normocephalic and atraumatic. Right Ear: External ear normal.   Left Ear: External ear normal.   Nose: Nose normal.   Mouth/Throat: Uvula is midline. No trismus in the jaw. No facial swelling  No trismus  Decayed remnants of left lower 3rd molar  No abscess     Eyes: Conjunctivae and EOM are normal. Pupils are equal, round, and reactive to light. No scleral icterus. Neck: Normal range of motion and full passive range of motion without pain. Cardiovascular: Normal rate and regular rhythm. HR 90 on my exam   Pulmonary/Chest: Effort normal. No accessory muscle usage. No tachypnea. No respiratory distress. She has no decreased breath sounds. She has no wheezes. Abdominal: Soft. There is no tenderness. Musculoskeletal: Normal range of motion. Neurological: She is alert and oriented to person, place, and time. She is not disoriented. No cranial nerve deficit. GCS eye subscore is 4. GCS verbal subscore is 5. GCS motor subscore is 6. Skin: Skin is intact. No rash noted. Psychiatric: She has a normal mood and affect.  Her speech is normal.   Nursing note and vitals reviewed. Diagnostic Study Results     Labs -   No results found for this or any previous visit (from the past 12 hour(s)). Radiologic Studies -   No orders to display     CT Results  (Last 48 hours)    None        CXR Results  (Last 48 hours)    None            Medical Decision Making   I am the first provider for this patient. I reviewed the vital signs, available nursing notes, past medical history, past surgical history, family history and social history. Vital Signs-Reviewed the patient's vital signs. Patient Vitals for the past 12 hrs:   Temp Pulse Resp BP SpO2   06/15/18 1059 98.3 °F (36.8 °C) (!) 125 13 123/85 99 %     Records Reviewed: Nursing Notes, Old Medical Records and     Provider Notes (Medical Decision Making):   DDx: dental caries, dental abscess; smoking addiction    TOBACCO COUNSELING:  Upon evaluation, pt expressed that they are a current tobacco user. Pt has been counseled on the dangers of smoking and was encouraged to quit as soon as possible in order to decrease further risks to their health. Pt has conveyed their understanding of the risks involved should they continue to use tobacco products. ED Course:   Initial assessment performed. The patients presenting problems have been discussed, and they are in agreement with the care plan formulated and outlined with them. I have encouraged them to ask questions as they arise throughout their visit. Disposition:  Discharge    PLAN:  1. Discharge Medication List as of 6/15/2018 11:36 AM      START taking these medications    Details   amoxicillin 500 mg tab Take 500 mg by mouth three (3) times daily. , Print, Disp-30 Tab, R-0      ibuprofen (MOTRIN) 800 mg tablet Take 1 Tab by mouth every eight (8) hours as needed for Pain., Print, Disp-30 Tab, R-0      HYDROcodone-acetaminophen (NORCO) 5-325 mg per tablet Take 1 Tab by mouth every four (4) hours as needed for Pain.  Max Daily Amount: 6 Tabs., Print, Disp-10 Tab, R-0         CONTINUE these medications which have NOT CHANGED    Details   clindamycin (CLEOCIN) 150 mg capsule Take  by mouth every six (6) hours. , Historical Med           2. Follow-up Information     Follow up With Details Comments Contact Info    [facility] Schedule an appointment as soon as possible for a visit DENTAL SERVICES: call to schedule follow up [address] [place]  [phone]        Return to ED if worse     Diagnosis     Clinical Impression:   1. Pain due to dental caries    2.  Smoking addiction

## 2018-06-20 ENCOUNTER — OFFICE VISIT (OUTPATIENT)
Dept: INTERNAL MEDICINE CLINIC | Age: 38
End: 2018-06-20

## 2018-06-20 VITALS
RESPIRATION RATE: 18 BRPM | BODY MASS INDEX: 30.43 KG/M2 | TEMPERATURE: 97.9 F | HEART RATE: 88 BPM | DIASTOLIC BLOOD PRESSURE: 57 MMHG | SYSTOLIC BLOOD PRESSURE: 115 MMHG | OXYGEN SATURATION: 94 % | HEIGHT: 64 IN | WEIGHT: 178.25 LBS

## 2018-06-20 DIAGNOSIS — F98.8 ATTENTION DEFICIT DISORDER, UNSPECIFIED HYPERACTIVITY PRESENCE: Primary | ICD-10-CM

## 2018-06-20 DIAGNOSIS — K08.89 PAIN, DENTAL: ICD-10-CM

## 2018-06-20 RX ORDER — DEXTROAMPHETAMINE SACCHARATE, AMPHETAMINE ASPARTATE, DEXTROAMPHETAMINE SULFATE AND AMPHETAMINE SULFATE 5; 5; 5; 5 MG/1; MG/1; MG/1; MG/1
TABLET ORAL
Qty: 60 TAB | Refills: 0 | Status: SHIPPED | OUTPATIENT
Start: 2018-07-20 | End: 2018-08-15 | Stop reason: SDUPTHER

## 2018-06-20 RX ORDER — DEXTROAMPHETAMINE SACCHARATE, AMPHETAMINE ASPARTATE, DEXTROAMPHETAMINE SULFATE AND AMPHETAMINE SULFATE 5; 5; 5; 5 MG/1; MG/1; MG/1; MG/1
TABLET ORAL
Qty: 60 TAB | Refills: 0 | Status: SHIPPED | OUTPATIENT
Start: 2018-06-20 | End: 2018-09-20 | Stop reason: SDUPTHER

## 2018-06-20 NOTE — PROGRESS NOTES
RM 18    Chief Complaint   Patient presents with    Medication Evaluation     followup for ADHD medication. patient request refill. Patient states she will have health insurance in a month, does not currently have at this time. 1. Have you been to the ER, urgent care clinic since your last visit? Hospitalized since your last visit? Yes Reason for visit: [address], 6/15/18 and 5/20/18, dental pain, broken tooth    2. Have you seen or consulted any other health care providers outside of the [address] [place] [address] since your last visit? Include any pap smears or colon screening.  No    Health Maintenance Due   Topic Date Due    Pneumococcal 19-64 Medium Risk (1 of 1 - PPSV23) 11/09/2001    DTaP/Tdap/Td series (1 - Tdap) 11/09/2003    PAP AKA CERVICAL CYTOLOGY  08/12/2017     PHQ over the last two weeks 6/20/2018   Little interest or pleasure in doing things Not at all   Feeling down, depressed or hopeless Several days   Total Score PHQ 2 1     Learning Assessment 6/20/2018   PRIMARY LEARNER Patient   HIGHEST LEVEL OF EDUCATION - PRIMARY LEARNER  GRADUATED HIGH SCHOOL OR GED   BARRIERS PRIMARY LEARNER NONE   PRIMARY LANGUAGE ENGLISH    NEED -   LEARNER PREFERENCE PRIMARY DEMONSTRATION   ANSWERED BY patient   RELATIONSHIP SELF

## 2018-06-20 NOTE — PATIENT INSTRUCTIONS
1.   Please confirm with your insurance, when active, if the Adderall XR preparation is covered. If needs prior authorization, please let us know, and let us know when your insurance is active. 2.  Will plan change to Adderall XR preparation (20mg + 25mg) once daily, when covered by insurance.

## 2018-06-20 NOTE — MR AVS SNAPSHOT
[address] [place] [address] 
[phone] Patient: Kei Negrete MRN: [id number] JQM:[birth year] Visit Information Date & Time Provider Department Dept. Phone Encounter #  
 6/20/2018  9:00 AM Krish Bacon, [address] [place] [address] and Internal Medicine [id number] Follow-up Instructions Return in about 3 months (around 9/20/2018) for medication follow-up--2-3mo. Upcoming Health Maintenance Date Due Pneumococcal 19-64 Medium Risk (1 of 1 - PPSV23) 11/9/2001 DTaP/Tdap/Td series (1 - Tdap) 11/9/2003 PAP AKA CERVICAL CYTOLOGY 8/12/2017 Influenza Age 5 to Adult 8/1/2018 Allergies as of 6/20/2018  Review Complete On: 6/20/2018 By: Krish Bacon MD  
  
 Severity Noted Reaction Type Reactions Aspirin High 05/30/2013    Other (comments) Hot sweats and passed out; tolerated ibuprofen Penicillins  08/27/2013    Other (comments) Childhood. Does not remember reaction. Is not aware if she has ever taken cephalosporins in the past.  
  
Current Immunizations  Reviewed on 6/17/2017 Name Date Influenza Vaccine 9/26/2013 Influenza Vaccine (Quad) PF 10/12/2016, 10/13/2015, 12/12/2014 Influenza Vaccine Split 9/27/2012 11:34 AM  
  
 Not reviewed this visit You Were Diagnosed With   
  
 Codes Comments Attention deficit disorder, unspecified hyperactivity presence    -  Primary ICD-10-CM: F98.8 ICD-9-CM: 314.00 Vitals BP Pulse Temp Resp Height(growth percentile) Weight(growth percentile) 115/57 (BP 1 Location: Left arm, BP Patient Position: Sitting) 88 97.9 °F (36.6 °C) (Oral) 18 5' 4\" (1.626 m) 178 lb 4 oz (80.9 kg) LMP SpO2 BMI OB Status Smoking Status 03/14/2016 94% 30.6 kg/m2 Hysterectomy Current Every Day Smoker BMI and BSA Data Body Mass Index Body Surface Area  
 30.6 kg/m 2 1.91 m 2 Preferred Pharmacy Pharmacy Name Phone [id number] [address] [phone] Your Updated Medication List  
  
   
This list is accurate as of 6/20/18 10:09 AM.  Always use your most recent med list.  
  
  
  
  
 amoxicillin 500 mg Tab Take 500 mg by mouth three (3) times daily. clindamycin 150 mg capsule Commonly known as:  CLEOCIN Take  by mouth every six (6) hours. * dextroamphetamine-amphetamine 20 mg tablet Commonly known as:  ADDERALL Take 20mg in AM and 20mg again in afternoon as needed. * dextroamphetamine-amphetamine 20 mg tablet Commonly known as:  ADDERALL Earliest Fill Date: 7/20/18. Take 20mg in AM and 20mg again in afternoon as needed. Start taking on:  7/20/2018 HYDROcodone-acetaminophen 5-325 mg per tablet Commonly known as:  [place] Collar Take 1 Tab by mouth every four (4) hours as needed for Pain. Max Daily Amount: 6 Tabs. ibuprofen 800 mg tablet Commonly known as:  MOTRIN Take 1 Tab by mouth every eight (8) hours as needed for Pain. * Notice: This list has 2 medication(s) that are the same as other medications prescribed for you. Read the directions carefully, and ask your doctor or other care provider to review them with you. Prescriptions Printed Refills  
 dextroamphetamine-amphetamine (ADDERALL) 20 mg tablet 0 Starting on: 7/20/2018 Sig: Earliest Krystin Gens Date: 7/20/18. Take 20mg in AM and 20mg again in afternoon as needed. Class: Print  
 dextroamphetamine-amphetamine (ADDERALL) 20 mg tablet 0 Sig: Take 20mg in AM and 20mg again in afternoon as needed. Class: Print Follow-up Instructions Return in about 3 months (around 9/20/2018) for medication follow-up--2-3mo. Patient Instructions 1. Please confirm with your insurance, when active, if the Adderall XR preparation is covered. If needs prior authorization, please let us know, and let us know when your insurance is active. 2.  Will plan change to Adderall XR preparation (20mg + 25mg) once daily, when covered by insurance. Introducing [facility] SERVICES! Dear Jalen Montgomery: Thank you for requesting a [employer] account. Our records indicate that you already have an active [employer] account. You can access your account anytime at [email or web address]. [email or web address] Did you know that you can access your hospital and ER discharge instructions at any time in [employer]? You can also review all of your test results from your hospital stay or ER visit. Additional Information If you have questions, please visit the Frequently Asked Questions section of the [employer] website at [email or web address] Remember, [employer] is NOT to be used for urgent needs. For medical emergencies, dial 911. Now available from your iPhone and Android! Please provide this summary of care documentation to your next provider. Your primary care clinician is listed as [address] [facility]. If you have any questions after today's visit, please call [phone].

## 2018-06-20 NOTE — PROGRESS NOTES
History of Present Illness:   Katie Lopez is a 30 y.o. female here for evaluation:    Chief Complaint   Patient presents with    Medication Evaluation     followup for ADHD medication. patient request refill. Patient states she will have health insurance in a month, does not currently have at this time. Notes no problems with ADHD medications. Prescription Monitoring Program ([place]) database query with fills:  06/15/2018 3 06/15/2018 HYDROCODONE-ACETAMIN 5-325 MG 10.0 2 EV BI  05/20/2018 3 05/20/2018 HYDROCODONE-ACETAMIN 5-325 MG 8.0 2 [facility]  02/11/2018 3 02/11/2018 DEXTROAMP-AMPHETAMIN 20 MG TAB 60.0 30 CL [facility]    01/12/2018 1 01/12/2018 DEXTROAMP-AMPHETAMIN 20 MG TAB 60.0 30 CL [facility]       Wt Readings from Last 3 Encounters:   06/20/18 178 lb 4 oz (80.9 kg)   06/15/18 169 lb 5 oz (76.8 kg)   05/20/18 183 lb 13.8 oz (83.4 kg)     BP Readings from Last 3 Encounters:   06/20/18 115/57   06/15/18 123/85   05/20/18 118/65     Pulse Readings from Last 3 Encounters:   06/20/18 88   06/15/18 (!) 125   05/20/18 86       Refills reviewed as below. [place] She has more regular schedule and immed release better for dosing not for cost.  She had used XR prior and would prefer to switch back to this once has insurance coverage. Plan 2mo supply now, and then can refill XR then. She was seen 6/15 for dental pain and treated with antibiotics and pain medication. She was given [facility] information for evaluation/mgt. She has a dentist and plans to schedule with them to evaluate. She is still having pain. Reviewed eval with dentistry to manage pain and infection. Prior to Admission medications    Medication Sig Start Date End Date Taking? Authorizing Provider   amoxicillin 500 mg tab Take 500 mg by mouth three (3) times daily.  6/15/18  Yes JULIENNE La   ibuprofen (MOTRIN) 800 mg tablet Take 1 Tab by mouth every eight (8) hours as needed for Pain. 6/15/18  Yes Jules Ahumada, PA   HYDROcodone-acetaminophen (NORCO) 5-325 mg per tablet Take 1 Tab by mouth every four (4) hours as needed for Pain. Max Daily Amount: 6 Tabs. 6/15/18  Yes Jules Ahumada, PA   clindamycin (CLEOCIN) 150 mg capsule Take  by mouth every six (6) hours. MD PARAS Posada    Vitals:    06/20/18 0924   BP: 115/57   Pulse: 88   Resp: 18   Temp: 97.9 °F (36.6 °C)   TempSrc: Oral   SpO2: 94%   Weight: 178 lb 4 oz (80.9 kg)   Height: 5' 4\" (1.626 m)   PainSc:   3   PainLoc: Teeth   LMP: 03/14/2016      Body mass index is 30.6 kg/(m^2). Physical Exam:     Physical Exam   Constitutional: She appears well-developed and well-nourished. No distress. HENT:   Head: Normocephalic and atraumatic. Eyes: Conjunctivae are normal. Right eye exhibits no discharge. Left eye exhibits no discharge. No scleral icterus. Cardiovascular: Normal rate, regular rhythm, normal heart sounds and intact distal pulses. Exam reveals no gallop and no friction rub. No murmur heard. Pulmonary/Chest: Effort normal and breath sounds normal. No respiratory distress. She has no wheezes. She has no rales. She exhibits no tenderness. Abdominal: Soft. She exhibits no distension. Neurological: She is alert. She exhibits normal muscle tone. Coordination normal.   Skin: Skin is warm. No rash noted. She is not diaphoretic. No erythema. No pallor. Psychiatric: She has a normal mood and affect. Her behavior is normal. Judgment and thought content normal.       Assessment and Plan:       ICD-10-CM ICD-9-CM    1. Attention deficit disorder, unspecified hyperactivity presence F98.8 314.00 dextroamphetamine-amphetamine (ADDERALL) 20 mg tablet      dextroamphetamine-amphetamine (ADDERALL) 20 mg tablet   2. Pain, dental K08.89 525.9        1. Prefers 2mo then will refill 3rd month as 20mg + 25mg XR Adderall as reviewed--once insurance coverage in place with new job.   She will likely follow-up then for visit with insurance. Reviewed, if possible, pt clarifying once gets insurance if XR preparation covered. If prefers, can refill 3rd monthly script then as XR prep, or as immediate release pending insurance review/coverage. 2.  Dental eval reviewed with pt. Follow-up Disposition:  Return in about 3 months (around 9/20/2018) for medication follow-up--2-3mo. reviewed medications and side effects in detail    For additional documentation of information and/or recommendations discussed this visit, please see notes in instructions. Plan and evaluation (above) reviewed with pt at visit  Patient voiced understanding of plan and provided with time to ask/review questions. After Visit Summary (AVS) provided to pt after visit with additional instructions as needed/reviewed.

## 2018-07-14 ENCOUNTER — HOSPITAL ENCOUNTER (EMERGENCY)
Age: 38
Discharge: HOME | End: 2018-07-14
Attending: EMERGENCY MEDICINE
Payer: SELF-PAY

## 2018-07-14 VITALS
WEIGHT: 166.23 LBS | DIASTOLIC BLOOD PRESSURE: 65 MMHG | RESPIRATION RATE: 18 BRPM | OXYGEN SATURATION: 100 % | HEIGHT: 65 IN | BODY MASS INDEX: 27.7 KG/M2 | SYSTOLIC BLOOD PRESSURE: 127 MMHG | HEART RATE: 108 BPM | TEMPERATURE: 98.1 F

## 2018-07-14 DIAGNOSIS — K08.89 PAIN, DENTAL: ICD-10-CM

## 2018-07-14 DIAGNOSIS — G50.1 ATYPICAL FACIAL PAIN: Primary | ICD-10-CM

## 2018-07-14 DIAGNOSIS — Z92.89 HISTORY OF DENTAL SURGERY: ICD-10-CM

## 2018-07-14 PROCEDURE — 74011250637 HC RX REV CODE- 250/637: Performed by: PHYSICIAN ASSISTANT

## 2018-07-14 PROCEDURE — 99283 EMERGENCY DEPT VISIT LOW MDM: CPT

## 2018-07-14 PROCEDURE — 75810000283 HC INJECTION NERVE BLOCK

## 2018-07-14 PROCEDURE — 74011000250 HC RX REV CODE- 250: Performed by: PHYSICIAN ASSISTANT

## 2018-07-14 RX ORDER — HYDROCODONE BITARTRATE AND ACETAMINOPHEN 5; 325 MG/1; MG/1
1 TABLET ORAL
Qty: 10 TAB | Refills: 0 | Status: SHIPPED | OUTPATIENT
Start: 2018-07-14 | End: 2018-08-04

## 2018-07-14 RX ORDER — HYDROCODONE BITARTRATE AND ACETAMINOPHEN 5; 325 MG/1; MG/1
1 TABLET ORAL
Status: COMPLETED | OUTPATIENT
Start: 2018-07-14 | End: 2018-07-14

## 2018-07-14 RX ORDER — CLINDAMYCIN HYDROCHLORIDE 150 MG/1
300 CAPSULE ORAL
Status: COMPLETED | OUTPATIENT
Start: 2018-07-14 | End: 2018-07-14

## 2018-07-14 RX ORDER — CLINDAMYCIN HYDROCHLORIDE 300 MG/1
300 CAPSULE ORAL 4 TIMES DAILY
Qty: 40 CAP | Refills: 0 | Status: SHIPPED | OUTPATIENT
Start: 2018-07-14 | End: 2018-08-04

## 2018-07-14 RX ORDER — IBUPROFEN 600 MG/1
600 TABLET ORAL
Qty: 30 TAB | Refills: 0 | Status: SHIPPED | OUTPATIENT
Start: 2018-07-14 | End: 2018-08-04

## 2018-07-14 RX ORDER — BUPIVACAINE HYDROCHLORIDE 5 MG/ML
5 INJECTION, SOLUTION EPIDURAL; INTRACAUDAL
Status: COMPLETED | OUTPATIENT
Start: 2018-07-14 | End: 2018-07-14

## 2018-07-14 RX ADMIN — CLINDAMYCIN HYDROCHLORIDE 300 MG: 150 CAPSULE ORAL at 09:19

## 2018-07-14 RX ADMIN — BUPIVACAINE HYDROCHLORIDE 25 MG: 5 INJECTION, SOLUTION EPIDURAL; INTRACAUDAL; PERINEURAL at 08:45

## 2018-07-14 RX ADMIN — LIDOCAINE HYDROCHLORIDE: 20 SOLUTION ORAL; TOPICAL at 09:04

## 2018-07-14 RX ADMIN — HYDROCODONE BITARTRATE AND ACETAMINOPHEN 1 TABLET: 5; 325 TABLET ORAL at 09:03

## 2018-07-14 NOTE — ED NOTES
Discharge instructions reviewed with patient, copy given by JULIENNE Sanderson. Pt is accomponied by family, denies use of wheelchair.

## 2018-07-14 NOTE — ED NOTES
Assumed care of patient. Patient is alert and oriented, does not appear to be in distress. Patient ambulatory to ED with c/o left sided dental pain x last night. Pt had left molar extracted Thursday.

## 2018-07-14 NOTE — ED PROVIDER NOTES
EMERGENCY DEPARTMENT HISTORY AND PHYSICAL EXAM 
 
 
Date: 7/14/2018 Patient Name: Jennifer Bowen History of Presenting Illness Chief Complaint Patient presents with  Dental Pain  
  had dental surgery 2 days ago and is now having pain on the left side of face History Provided By: Patient HPI: Jennifer Bowen, 30 y.o. female with PMHx significant for ADD, seizures and migraines, presents ambulatory to the ED with cc of left lower dental pain. Patient states that 2 days ago she had a tooth removed at [address] [place] [address] dentistry. She states that last night around midnight she started to have worsening pain around where the tooth was removed, which has persisted into today. She tooth ibuprofen last night and tried ice packs but has not had any relief. She denies any pain medication prescriptions or abx given by [facility]. She denies fevers, chills, nausea, vomiting, trouble swallowing or facial swelling. Chief Complaint: dental pain Duration: 2 Days Timing:  Acute Location: left lower tooth Quality: Aching Severity: 7 out of 10 Modifying Factors: no relief with Ibuprofen Associated Symptoms: denies any other associated signs or symptoms There are no other complaints, changes, or physical findings at this time. PCP: Keaton Andersen MD 
 
Current Outpatient Prescriptions Medication Sig Dispense Refill  ibuprofen (MOTRIN) 600 mg tablet Take 1 Tab by mouth every eight (8) hours as needed for Pain. 30 Tab 0  
 clindamycin (CLEOCIN) 300 mg capsule Take 1 Cap by mouth four (4) times daily. 40 Cap 0  
 HYDROcodone-acetaminophen (NORCO) 5-325 mg per tablet Take 1 Tab by mouth every four (4) hours as needed for Pain. Max Daily Amount: 6 Tabs. 10 Tab 0  
 [START ON 7/20/2018] dextroamphetamine-amphetamine (ADDERALL) 20 mg tablet Earliest Fill Date: 7/20/18. Take 20mg in AM and 20mg again in afternoon as needed.  60 Tab 0  
 dextroamphetamine-amphetamine (ADDERALL) 20 mg tablet Take 20mg in AM and 20mg again in afternoon as needed. 60 Tab 0 Past History Past Medical History: 
Past Medical History:  
Diagnosis Date  ADD (attention deficit disorder) 17-19yo Treated with Adderall since dx. 2013 dosing 20mg AM and 10mg PM.  Trial/change to Vyvanse Nov-Dec 2015--not as effective.  Gynecologic disorder History of miscarriages. History of Mirena IUD placed on 4/17/15  HX OTHER MEDICAL   
  -BUFA baby  HX OTHER MEDICAL   
 HPV positive  Idiopathic vulvodynia 2014  L1 vertebral fracture (HCC) 2017 [facility] imaging/admissoin: Mild acute two column compression fracture of L1 without evidence of retropulsion into the spinal canal.  Managed non-operatively.  Migraines 2013  Neurological disorder  Pelvic pain in female 9/15/2014 2015 gyn laparoscopy/hysteroscopy with endometriosis worse right.  Psychiatric disorder   
 depression  Secondary dysmenorrhea 2014 With menorraghia  Seizures ([facility] [address]   
 never on meds--had appr 4-5 in a short amt of time and none since Past Surgical History: 
Past Surgical History:  
Procedure Laterality Date  ENDOMETRIAL CRYOABLATION    
 HX GYN  4/17/15  
 laparoscopy and hysteroscopy and IUD placement  HX HYSTERECTOMY  2016 Complete Hysterectomy Family History: 
Family History Problem Relation Age of Onset  Cancer Mother  Alcohol abuse Father   
  and drug  Psychiatric Disorder Father  Cancer Father  Diabetes Maternal Grandmother  Hypertension Maternal Grandmother  Thyroid Disease Maternal Grandmother  Diabetes Maternal Grandfather  Hypertension Maternal Grandfather  Cancer Maternal Grandfather  Heart Disease Maternal Grandfather  Cancer Paternal Grandmother  Diabetes Paternal Grandmother Social History: 
Social History Substance Use Topics  Smoking status: Current Every Day Smoker Packs/day: 0.50 Years: 18.00 Types: Cigarettes  Smokeless tobacco: Never Used  Alcohol use 0.0 oz/week  
  0 Standard drinks or equivalent per week Comment: occasionally Allergies: Allergies Allergen Reactions  Aspirin Other (comments) Hot sweats and passed out; tolerated ibuprofen  Penicillins Other (comments) Childhood. Does not remember reaction. Is not aware if she has ever taken cephalosporins in the past.  
 
 
 
Review of Systems Review of Systems Constitutional: Negative for chills and fever. HENT: Positive for dental problem. Negative for sore throat. Eyes: Negative for pain. Respiratory: Negative for cough and shortness of breath. Cardiovascular: Negative for chest pain. Gastrointestinal: Negative for abdominal pain, diarrhea, nausea and vomiting. Genitourinary: Negative for dysuria and hematuria. Musculoskeletal: Negative for arthralgias and myalgias. Skin: Negative for rash. Neurological: Negative for dizziness, light-headedness, numbness and headaches. Psychiatric/Behavioral: Negative for behavioral problems and confusion. Physical Exam  
Physical Exam  
Constitutional: She is oriented to person, place, and time. She appears well-developed and well-nourished. No distress. HENT:  
Head: Normocephalic and atraumatic. Right Ear: External ear normal.  
Left Ear: External ear normal.  
Nose: Nose normal.  
Generally good dental health overall with few dental caries. No facial erythema, edema, bleeding, or fluctuance. tooth # 18 with dental caries and some gingival tenderness, no surrounding erythema, edema, or fluctuance. No trismus. Eyes: Conjunctivae and EOM are normal.  
Neck: Normal range of motion. Neck supple. Cardiovascular: Normal rate, regular rhythm and normal heart sounds. No murmur heard. Pulmonary/Chest: Effort normal and breath sounds normal. She has no decreased breath sounds. She has no wheezes. Abdominal: Soft. Bowel sounds are normal. She exhibits no distension. There is no tenderness. There is no guarding. Musculoskeletal: Normal range of motion. She exhibits no edema or tenderness. Neurological: She is alert and oriented to person, place, and time. Skin: Skin is warm and dry. No rash noted. She is not diaphoretic. Psychiatric: She has a normal mood and affect. Her behavior is normal. Judgment normal.  
Nursing note and vitals reviewed. Diagnostic Study Results Labs - No results found for this or any previous visit (from the past 12 hour(s)). Radiologic Studies - Medical Decision Making I am the first provider for this patient. I reviewed the vital signs, available nursing notes, past medical history, past surgical history, family history and social history. Vital Signs-Reviewed the patient's vital signs. Patient Vitals for the past 12 hrs: 
 Temp Pulse Resp BP SpO2  
07/14/18 0825 98.1 °F (36.7 °C) (!) 108 18 127/65 100 % Records Reviewed: Nursing Notes and Old Medical Records Provider Notes (Medical Decision Making): DDX: gingivitis, abscess, poor oral hygiene, fractured tooth, dentalgia Patient presents with dental pain. No obvious abscess that needs drainage. No red flags that make PTA, RPA, ludwigs angina concerning. Will tx with dental balls, antibiotics and outpatient analgesics. Given information on dentists and importance of followup and no smoking. ED Course:  
Initial assessment performed. The patients presenting problems have been discussed, and they are in agreement with the care plan formulated and outlined with them. I have encouraged them to ask questions as they arise throughout their visit. Procedure Note - Dental Block:   
9:05 AM 
Performed by Roberto Griffiths PA-C. A inferior alveolar nerve block was performed on the left side. 3 mL of 0.25% bupivicaine without epinephrine was injected.    
Total time at bedside, performing this procedure was 10 minutes. The procedure was tolerated well without any complications. Disposition: 
DISCHARGE NOTE: 
9:11 AM 
The care plan has been outline with the patient and/or family, who verbally conveyed understanding and agreement. Available results have been reviewed. Patient and/or family understand the follow up plan as outlined and discharge instructions. Should their condition deterioration at any time after discharge the patient agrees to return, follow up sooner than outlined or seek medical assistance at the closest Emergency Room as soon as possible. Questions have been answered. Discharge instructions and educational information regarding the patient's diagnosis as well a list of reasons why the patient would want to seek immediate medical attention, should their condition change, were reviewed directly with the patient/family. PLAN: 
1. Discharge home 2. Medications as directed 3. Schedule f/u with Dentist 
4. Return precautions reviewed Discharge Medication List as of 7/14/2018  9:06 AM  
  
CONTINUE these medications which have CHANGED Details  
ibuprofen (MOTRIN) 600 mg tablet Take 1 Tab by mouth every eight (8) hours as needed for Pain., Normal, Disp-30 Tab, R-0  
  
clindamycin (CLEOCIN) 300 mg capsule Take 1 Cap by mouth four (4) times daily. , Normal, Disp-40 Cap, R-0  
  
HYDROcodone-acetaminophen (NORCO) 5-325 mg per tablet Take 1 Tab by mouth every four (4) hours as needed for Pain. Max Daily Amount: 6 Tabs., Print, Disp-10 Tab, R-0  
  
  
CONTINUE these medications which have NOT CHANGED Details  
!! dextroamphetamine-amphetamine (ADDERALL) 20 mg tablet Earliest Fill Date: 7/20/18. Take 20mg in AM and 20mg again in afternoon as needed. , Print, Disp-60 Tab, R-0  
  
!! dextroamphetamine-amphetamine (ADDERALL) 20 mg tablet Take 20mg in AM and 20mg again in afternoon as needed. , Print, Disp-60 Tab, R-0  
  
 !! - Potential duplicate medications found. Please discuss with provider. STOP taking these medications  
  
 amoxicillin 500 mg tab Comments:  
Reason for Stoppin.  
Follow-up Information Follow up With Details Comments Contact Info Renu Knott MD In 3 days  [address] 
[phone] [facility] EMERGENCY DEPT  As needed, If symptoms worsen [address] [facility] 
[phone] Return to ED if worse Diagnosis Clinical Impression: 1. Atypical facial pain 2. Pain, dental   
3. History of dental surgery This note will not be viewable in [address]

## 2018-07-14 NOTE — DISCHARGE INSTRUCTIONS
Thank you!     Thank you for allowing us to provide you with excellent care today. We hope we addressed all of your concerns and needs. We strive to provide excellent quality care in the Emergency Department. You will receive a survey after your visit to evaluate the care you were provided.      Please rate us a level 5 (excellent), as anything less than excellent does not meet our goals.      If you feel that you have not received excellent quality care or timely care, please ask to speak to the nurse manager. Please choose us in the future for your continued health care needs. ______________________________________________________________________    The exam and treatment you received in the Emergency Department were for an urgent problem and are not intended as complete care. It is important that you follow-up with a doctor, nurse practitioner, or physician assistant to:  (1) confirm your diagnosis,  (2) re-evaluation of changes in your illness and treatment, and  (3) for ongoing care. If your symptoms become worse or you do not improve as expected and you are unable to reach your usual health care provider, you should return to the Emergency Department. We are available 24 hours a day. Take this sheet with you when you go to your follow-up visit. If you have any problem arranging the follow-up visit, contact [address] [place] [address] 21 [phone])    Make an appointment with your Primary Care doctor for follow up of this visit. Return to the ER if you are unable to be seen in the time recommended on your discharge instructions. Tooth and Gum Pain: Care Instructions  Your Care Instructions    The most common causes of dental pain are tooth decay and gum disease. Pain can also be caused by an infection of the tooth (abscess) or the gums. Or you may have pain from a broken or cracked tooth. Other causes of pain include infection and damage to a tooth from nervous grinding of your teeth.   A wisdom tooth can be painful when it is coming in but cannot break through the gum. It can also be painful when the tooth is only partway in and extra gum tissue has formed around it. The tissue can get inflamed (pericoronitis), and sometimes it gets infected. Prompt dental care can help find the cause of your toothache and keep the tooth from dying or gum disease from getting worse. Self-care at home may reduce your pain and discomfort. Follow-up care is a key part of your treatment and safety. Be sure to make and go to all appointments, and call your dentist or doctor if you are having problems. It's also a good idea to know your test results and keep a list of the medicines you take. How can you care for yourself at home? · To reduce pain and facial swelling, put an ice or cold pack on the outside of your cheek for 10 to 20 minutes at a time. Put a thin cloth between the ice and your skin. Do not use heat. · If your doctor prescribed antibiotics, take them as directed. Do not stop taking them just because you feel better. You need to take the full course of antibiotics. · Ask your doctor if you can take an over-the-counter pain medicine, such as acetaminophen (Tylenol), ibuprofen (Advil, Motrin), or naproxen (Aleve). Be safe with medicines. Read and follow all instructions on the label. · Avoid very hot, cold, or sweet foods and drinks if they increase your pain. · Rinse your mouth with warm salt water every 2 hours to help relieve pain and swelling. Mix 1 teaspoon of salt in 8 ounces of water. · Talk to your dentist about using special toothpaste for sensitive teeth. To reduce pain on contact with heat or cold or when brushing, brush with this toothpaste regularly or rub a small amount of the paste on the sensitive area with a clean finger 2 or 3 times a day. Floss gently between your teeth.   · Do not smoke or use spit tobacco. Tobacco use can make gum problems worse, decreases your ability to fight infection in your gums, and delays healing. If you need help quitting, talk to your doctor about stop-smoking programs and medicines. These can increase your chances of quitting for good. When should you call for help? Call 911 anytime you think you may need emergency care. For example, call if:    · You have trouble breathing.    Call your dentist or doctor now or seek immediate medical care if:    · You have signs of infection, such as:  ¨ Increased pain, swelling, warmth, or redness. ¨ Red streaks leading from the area. ¨ Pus draining from the area. ¨ A fever.    Watch closely for changes in your health, and be sure to contact your doctor if:    · You do not get better as expected. Where can you learn more? Go to http://jalil-mo.info/. Enter [id number] [id number] in the search box to learn more about \"Tooth and Gum Pain: Care Instructions. \"  Current as of: May 12, 2017  Content Version: 11.7  © 8050-1695 [employer]. Care instructions adapted under license by [employer] (which disclaims liability or warranty for this information). If you have questions about a medical condition or this instruction, always ask your healthcare professional. [address] any warranty or liability for your use of this information.

## 2018-07-15 ENCOUNTER — HOSPITAL ENCOUNTER (EMERGENCY)
Age: 38
Discharge: LEFT BEFORE DISCHARGE | End: 2018-07-15
Attending: EMERGENCY MEDICINE
Payer: SELF-PAY

## 2018-07-15 VITALS
DIASTOLIC BLOOD PRESSURE: 80 MMHG | HEIGHT: 65 IN | SYSTOLIC BLOOD PRESSURE: 120 MMHG | WEIGHT: 164.9 LBS | BODY MASS INDEX: 27.47 KG/M2 | RESPIRATION RATE: 18 BRPM | OXYGEN SATURATION: 100 % | TEMPERATURE: 98.7 F | HEART RATE: 100 BPM

## 2018-07-15 PROCEDURE — 75810000275 HC EMERGENCY DEPT VISIT NO LEVEL OF CARE

## 2018-07-15 PROCEDURE — 99281 EMR DPT VST MAYX REQ PHY/QHP: CPT

## 2018-07-15 NOTE — ED NOTES
At the conclusion of triage, pt states, \"I'm going to go to the urgent care. \" no acute distress at time of departure.

## 2018-08-04 ENCOUNTER — APPOINTMENT (OUTPATIENT)
Dept: CT IMAGING | Age: 38
End: 2018-08-04
Attending: EMERGENCY MEDICINE
Payer: SELF-PAY

## 2018-08-04 ENCOUNTER — HOSPITAL ENCOUNTER (EMERGENCY)
Age: 38
Discharge: HOME | End: 2018-08-04
Attending: EMERGENCY MEDICINE | Admitting: EMERGENCY MEDICINE
Payer: SELF-PAY

## 2018-08-04 ENCOUNTER — APPOINTMENT (OUTPATIENT)
Dept: GENERAL RADIOLOGY | Age: 38
End: 2018-08-04
Attending: EMERGENCY MEDICINE
Payer: SELF-PAY

## 2018-08-04 VITALS
TEMPERATURE: 98.2 F | WEIGHT: 160.5 LBS | SYSTOLIC BLOOD PRESSURE: 113 MMHG | HEART RATE: 131 BPM | RESPIRATION RATE: 16 BRPM | BODY MASS INDEX: 26.74 KG/M2 | OXYGEN SATURATION: 100 % | DIASTOLIC BLOOD PRESSURE: 75 MMHG | HEIGHT: 65 IN

## 2018-08-04 DIAGNOSIS — R10.2 PELVIC PAIN: ICD-10-CM

## 2018-08-04 DIAGNOSIS — R10.9 ACUTE ABDOMINAL PAIN: Primary | ICD-10-CM

## 2018-08-04 DIAGNOSIS — R74.8 ELEVATED LIVER ENZYMES: ICD-10-CM

## 2018-08-04 DIAGNOSIS — E87.20 LACTIC ACIDOSIS: ICD-10-CM

## 2018-08-04 LAB
ALBUMIN SERPL-MCNC: 3.5 G/DL (ref 3.5–5)
ALBUMIN/GLOB SERPL: 1.1 {RATIO} (ref 1.1–2.2)
ALP SERPL-CCNC: 128 U/L (ref 45–117)
ALT SERPL-CCNC: 98 U/L (ref 12–78)
ANION GAP SERPL CALC-SCNC: 11 MMOL/L (ref 5–15)
APPEARANCE UR: CLEAR
AST SERPL-CCNC: 101 U/L (ref 15–37)
BASOPHILS # BLD: 0.1 K/UL (ref 0–0.1)
BASOPHILS NFR BLD: 1 % (ref 0–1)
BILIRUB SERPL-MCNC: 1.1 MG/DL (ref 0.2–1)
BILIRUB UR QL: NEGATIVE
BUN SERPL-MCNC: 21 MG/DL (ref 6–20)
BUN/CREAT SERPL: 24 (ref 12–20)
CALCIUM SERPL-MCNC: 8.3 MG/DL (ref 8.5–10.1)
CHLORIDE SERPL-SCNC: 104 MMOL/L (ref 97–108)
CLUE CELLS VAG QL WET PREP: NORMAL
CO2 SERPL-SCNC: 22 MMOL/L (ref 21–32)
COLOR UR: NORMAL
CREAT SERPL-MCNC: 0.89 MG/DL (ref 0.55–1.02)
DIFFERENTIAL METHOD BLD: NORMAL
EOSINOPHIL # BLD: 0 K/UL (ref 0–0.4)
EOSINOPHIL NFR BLD: 0 % (ref 0–7)
ERYTHROCYTE [DISTWIDTH] IN BLOOD BY AUTOMATED COUNT: 13.3 % (ref 11.5–14.5)
GLOBULIN SER CALC-MCNC: 3.2 G/DL (ref 2–4)
GLUCOSE SERPL-MCNC: 93 MG/DL (ref 65–100)
GLUCOSE UR STRIP.AUTO-MCNC: NEGATIVE MG/DL
HCT VFR BLD AUTO: 42 % (ref 35–47)
HGB BLD-MCNC: 14.9 G/DL (ref 11.5–16)
HGB UR QL STRIP: NEGATIVE
IMM GRANULOCYTES # BLD: 0 K/UL (ref 0–0.04)
IMM GRANULOCYTES NFR BLD AUTO: 0 % (ref 0–0.5)
KETONES UR QL STRIP.AUTO: NEGATIVE MG/DL
KOH PREP SPEC: NORMAL
LACTATE SERPL-SCNC: 2.4 MMOL/L (ref 0.4–2)
LACTATE SERPL-SCNC: 2.9 MMOL/L (ref 0.4–2)
LACTATE SERPL-SCNC: 3.4 MMOL/L (ref 0.4–2)
LEUKOCYTE ESTERASE UR QL STRIP.AUTO: NEGATIVE
LIPASE SERPL-CCNC: 265 U/L (ref 73–393)
LYMPHOCYTES # BLD: 3.1 K/UL (ref 0.8–3.5)
LYMPHOCYTES NFR BLD: 32 % (ref 12–49)
MCH RBC QN AUTO: 31 PG (ref 26–34)
MCHC RBC AUTO-ENTMCNC: 35.5 G/DL (ref 30–36.5)
MCV RBC AUTO: 87.3 FL (ref 80–99)
MONOCYTES # BLD: 0.5 K/UL (ref 0–1)
MONOCYTES NFR BLD: 5 % (ref 5–13)
NEUTS SEG # BLD: 6 K/UL (ref 1.8–8)
NEUTS SEG NFR BLD: 62 % (ref 32–75)
NITRITE UR QL STRIP.AUTO: NEGATIVE
NRBC # BLD: 0 K/UL (ref 0–0.01)
NRBC BLD-RTO: 0 PER 100 WBC
PH UR STRIP: 6 [PH] (ref 5–8)
PLATELET # BLD AUTO: 379 K/UL (ref 150–400)
PMV BLD AUTO: 9.8 FL (ref 8.9–12.9)
POTASSIUM SERPL-SCNC: 3.5 MMOL/L (ref 3.5–5.1)
PROT SERPL-MCNC: 6.7 G/DL (ref 6.4–8.2)
PROT UR STRIP-MCNC: NEGATIVE MG/DL
RBC # BLD AUTO: 4.81 M/UL (ref 3.8–5.2)
SERVICE CMNT-IMP: NORMAL
SODIUM SERPL-SCNC: 137 MMOL/L (ref 136–145)
SP GR UR REFRACTOMETRY: 1.02 (ref 1–1.03)
T VAGINALIS VAG QL WET PREP: NORMAL
UROBILINOGEN UR QL STRIP.AUTO: 0.2 EU/DL (ref 0.2–1)
WBC # BLD AUTO: 9.8 K/UL (ref 3.6–11)

## 2018-08-04 PROCEDURE — 96376 TX/PRO/DX INJ SAME DRUG ADON: CPT

## 2018-08-04 PROCEDURE — 74011636320 HC RX REV CODE- 636/320: Performed by: EMERGENCY MEDICINE

## 2018-08-04 PROCEDURE — 81003 URINALYSIS AUTO W/O SCOPE: CPT | Performed by: EMERGENCY MEDICINE

## 2018-08-04 PROCEDURE — 96361 HYDRATE IV INFUSION ADD-ON: CPT

## 2018-08-04 PROCEDURE — 74011000250 HC RX REV CODE- 250: Performed by: EMERGENCY MEDICINE

## 2018-08-04 PROCEDURE — 71045 X-RAY EXAM CHEST 1 VIEW: CPT

## 2018-08-04 PROCEDURE — 36415 COLL VENOUS BLD VENIPUNCTURE: CPT | Performed by: EMERGENCY MEDICINE

## 2018-08-04 PROCEDURE — 74011250636 HC RX REV CODE- 250/636

## 2018-08-04 PROCEDURE — 96374 THER/PROPH/DIAG INJ IV PUSH: CPT

## 2018-08-04 PROCEDURE — 83605 ASSAY OF LACTIC ACID: CPT | Performed by: EMERGENCY MEDICINE

## 2018-08-04 PROCEDURE — 96375 TX/PRO/DX INJ NEW DRUG ADDON: CPT

## 2018-08-04 PROCEDURE — 74011000258 HC RX REV CODE- 258: Performed by: EMERGENCY MEDICINE

## 2018-08-04 PROCEDURE — 74177 CT ABD & PELVIS W/CONTRAST: CPT

## 2018-08-04 PROCEDURE — 93005 ELECTROCARDIOGRAM TRACING: CPT

## 2018-08-04 PROCEDURE — 83690 ASSAY OF LIPASE: CPT | Performed by: EMERGENCY MEDICINE

## 2018-08-04 PROCEDURE — 87040 BLOOD CULTURE FOR BACTERIA: CPT | Performed by: EMERGENCY MEDICINE

## 2018-08-04 PROCEDURE — 87210 SMEAR WET MOUNT SALINE/INK: CPT | Performed by: EMERGENCY MEDICINE

## 2018-08-04 PROCEDURE — 99285 EMERGENCY DEPT VISIT HI MDM: CPT

## 2018-08-04 PROCEDURE — 87491 CHLMYD TRACH DNA AMP PROBE: CPT | Performed by: EMERGENCY MEDICINE

## 2018-08-04 PROCEDURE — 85025 COMPLETE CBC W/AUTO DIFF WBC: CPT | Performed by: EMERGENCY MEDICINE

## 2018-08-04 PROCEDURE — 74011250636 HC RX REV CODE- 250/636: Performed by: EMERGENCY MEDICINE

## 2018-08-04 PROCEDURE — 80053 COMPREHEN METABOLIC PANEL: CPT | Performed by: EMERGENCY MEDICINE

## 2018-08-04 RX ORDER — ONDANSETRON 4 MG/1
4 TABLET, ORALLY DISINTEGRATING ORAL
Qty: 10 TAB | Refills: 0 | Status: SHIPPED | OUTPATIENT
Start: 2018-08-04 | End: 2019-02-15

## 2018-08-04 RX ORDER — ONDANSETRON 2 MG/ML
INJECTION INTRAMUSCULAR; INTRAVENOUS
Status: COMPLETED
Start: 2018-08-04 | End: 2018-08-04

## 2018-08-04 RX ORDER — SODIUM CHLORIDE 9 MG/ML
50 INJECTION, SOLUTION INTRAVENOUS
Status: COMPLETED | OUTPATIENT
Start: 2018-08-04 | End: 2018-08-04

## 2018-08-04 RX ORDER — ONDANSETRON 2 MG/ML
4 INJECTION INTRAMUSCULAR; INTRAVENOUS
Status: COMPLETED | OUTPATIENT
Start: 2018-08-04 | End: 2018-08-04

## 2018-08-04 RX ORDER — SODIUM CHLORIDE 0.9 % (FLUSH) 0.9 %
10 SYRINGE (ML) INJECTION
Status: COMPLETED | OUTPATIENT
Start: 2018-08-04 | End: 2018-08-04

## 2018-08-04 RX ORDER — FENTANYL CITRATE 50 UG/ML
50 INJECTION, SOLUTION INTRAMUSCULAR; INTRAVENOUS
Status: COMPLETED | OUTPATIENT
Start: 2018-08-04 | End: 2018-08-04

## 2018-08-04 RX ORDER — TRAMADOL HYDROCHLORIDE 50 MG/1
50 TABLET ORAL
Qty: 10 TAB | Refills: 0 | Status: SHIPPED | OUTPATIENT
Start: 2018-08-04 | End: 2018-08-14

## 2018-08-04 RX ORDER — KETOROLAC TROMETHAMINE 30 MG/ML
30 INJECTION, SOLUTION INTRAMUSCULAR; INTRAVENOUS
Status: COMPLETED | OUTPATIENT
Start: 2018-08-04 | End: 2018-08-04

## 2018-08-04 RX ORDER — FENTANYL CITRATE 50 UG/ML
25 INJECTION, SOLUTION INTRAMUSCULAR; INTRAVENOUS
Status: COMPLETED | OUTPATIENT
Start: 2018-08-04 | End: 2018-08-04

## 2018-08-04 RX ORDER — DICYCLOMINE HYDROCHLORIDE 20 MG/1
20 TABLET ORAL EVERY 6 HOURS
Qty: 20 TAB | Refills: 0 | Status: SHIPPED | OUTPATIENT
Start: 2018-08-04 | End: 2018-08-09

## 2018-08-04 RX ORDER — SODIUM CHLORIDE 0.9 % (FLUSH) 0.9 %
5-10 SYRINGE (ML) INJECTION AS NEEDED
Status: DISCONTINUED | OUTPATIENT
Start: 2018-08-04 | End: 2018-08-04 | Stop reason: HOSPADM

## 2018-08-04 RX ADMIN — SODIUM CHLORIDE 184 ML: 900 INJECTION, SOLUTION INTRAVENOUS at 14:39

## 2018-08-04 RX ADMIN — SODIUM CHLORIDE 1000 ML: 900 INJECTION, SOLUTION INTRAVENOUS at 14:39

## 2018-08-04 RX ADMIN — FAMOTIDINE 20 MG: 10 INJECTION, SOLUTION INTRAVENOUS at 14:39

## 2018-08-04 RX ADMIN — Medication 10 ML: at 16:16

## 2018-08-04 RX ADMIN — SODIUM CHLORIDE 50 ML/HR: 900 INJECTION, SOLUTION INTRAVENOUS at 16:16

## 2018-08-04 RX ADMIN — ONDANSETRON 4 MG: 2 INJECTION, SOLUTION INTRAMUSCULAR; INTRAVENOUS at 14:39

## 2018-08-04 RX ADMIN — SODIUM CHLORIDE 1000 ML: 900 INJECTION, SOLUTION INTRAVENOUS at 14:09

## 2018-08-04 RX ADMIN — FENTANYL CITRATE 25 MCG: 50 INJECTION, SOLUTION INTRAMUSCULAR; INTRAVENOUS at 16:54

## 2018-08-04 RX ADMIN — ONDANSETRON HYDROCHLORIDE 4 MG: 2 INJECTION, SOLUTION INTRAMUSCULAR; INTRAVENOUS at 13:07

## 2018-08-04 RX ADMIN — DIATRIZOATE MEGLUMINE AND DIATRIZOATE SODIUM 30 ML: 600; 100 SOLUTION ORAL; RECTAL at 13:42

## 2018-08-04 RX ADMIN — KETOROLAC TROMETHAMINE 30 MG: 30 INJECTION, SOLUTION INTRAMUSCULAR at 17:50

## 2018-08-04 RX ADMIN — ONDANSETRON 4 MG: 2 INJECTION INTRAMUSCULAR; INTRAVENOUS at 13:07

## 2018-08-04 RX ADMIN — FENTANYL CITRATE 50 MCG: 50 INJECTION, SOLUTION INTRAMUSCULAR; INTRAVENOUS at 13:16

## 2018-08-04 RX ADMIN — IOPAMIDOL 100 ML: 755 INJECTION, SOLUTION INTRAVENOUS at 16:16

## 2018-08-04 NOTE — DISCHARGE INSTRUCTIONS
Abdominal Pain: Care Instructions  Your Care Instructions    Abdominal pain has many possible causes. Some aren't serious and get better on their own in a few days. Others need more testing and treatment. If your pain continues or gets worse, you need to be rechecked and may need more tests to find out what is wrong. You may need surgery to correct the problem. Don't ignore new symptoms, such as fever, nausea and vomiting, urination problems, pain that gets worse, and dizziness. These may be signs of a more serious problem. Your doctor may have recommended a follow-up visit in the next 8 to 12 hours. If you are not getting better, you may need more tests or treatment. The doctor has checked you carefully, but problems can develop later. If you notice any problems or new symptoms, get medical treatment right away. Follow-up care is a key part of your treatment and safety. Be sure to make and go to all appointments, and call your doctor if you are having problems. It's also a good idea to know your test results and keep a list of the medicines you take. How can you care for yourself at home? · Rest until you feel better. · To prevent dehydration, drink plenty of fluids, enough so that your urine is light yellow or clear like water. Choose water and other caffeine-free clear liquids until you feel better. If you have kidney, heart, or liver disease and have to limit fluids, talk with your doctor before you increase the amount of fluids you drink. · If your stomach is upset, eat mild foods, such as rice, dry toast or crackers, bananas, and applesauce. Try eating several small meals instead of two or three large ones. · Wait until 48 hours after all symptoms have gone away before you have spicy foods, alcohol, and drinks that contain caffeine. · Do not eat foods that are high in fat. · Avoid anti-inflammatory medicines such as aspirin, ibuprofen (Advil, Motrin), and naproxen (Aleve).  These can cause stomach upset. Talk to your doctor if you take daily aspirin for another health problem. When should you call for help? Call 911 anytime you think you may need emergency care. For example, call if:    · You passed out (lost consciousness).     · You pass maroon or very bloody stools.     · You vomit blood or what looks like coffee grounds.     · You have new, severe belly pain.    Call your doctor now or seek immediate medical care if:    · Your pain gets worse, especially if it becomes focused in one area of your belly.     · You have a new or higher fever.     · Your stools are black and look like tar, or they have streaks of blood.     · You have unexpected vaginal bleeding.     · You have symptoms of a urinary tract infection. These may include:  ¨ Pain when you urinate. ¨ Urinating more often than usual.  ¨ Blood in your urine.     · You are dizzy or lightheaded, or you feel like you may faint.    Watch closely for changes in your health, and be sure to contact your doctor if:    · You are not getting better after 1 day (24 hours). Where can you learn more? Go to http://jalilTriad Retail Mediamo.info/. Enter W909 in the search box to learn more about \"Abdominal Pain: Care Instructions. \"  Current as of: November 20, 2017  Content Version: 11.7  © 7497-6316 [employer]. Care instructions adapted under license by [employer] (which disclaims liability or warranty for this information). If you have questions about a medical condition or this instruction, always ask your healthcare professional. [place] [address] any warranty or liability for your use of this information. Pelvic Pain: Care Instructions  Your Care Instructions    Pelvic pain, or pain in the lower belly, can have many causes. Often pelvic pain is not serious and gets better in a few days. If your pain continues or gets worse, you may need tests and treatment.  Tell your doctor about any new symptoms. These may be signs of a serious problem. Follow-up care is a key part of your treatment and safety. Be sure to make and go to all appointments, and call your doctor if you are having problems. It's also a good idea to know your test results and keep a list of the medicines you take. How can you care for yourself at home? · Rest until you feel better. Lie down, and raise your legs by placing a pillow under your knees. · Drink plenty of fluids. You may find that small, frequent sips are easier on your stomach than if you drink a lot at once. Avoid drinks with carbonation or caffeine, such as soda pop, tea, or coffee. · Try eating several small meals instead of 2 or 3 large ones. Eat mild foods, such as rice, dry toast or crackers, bananas, and applesauce. Avoid fatty and spicy foods, other fruits, and alcohol until 48 hours after your symptoms have gone away. · Take an over-the-counter pain medicine, such as acetaminophen (Tylenol), ibuprofen (Advil, Motrin), or naproxen (Aleve). Read and follow all instructions on the label. · Do not take two or more pain medicines at the same time unless the doctor told you to. Many pain medicines have acetaminophen, which is Tylenol. Too much acetaminophen (Tylenol) can be harmful. · You can put a heating pad, a warm cloth, or moist heat on your belly to relieve pain. When should you call for help? Call your doctor now or seek immediate medical care if:    · You have a new or higher fever.     · You have unusual vaginal bleeding.     · You have new or worse belly or pelvic pain.     · You have vaginal discharge that has increased in amount or smells bad.    Watch closely for changes in your health, and be sure to contact your doctor if:    · You do not get better as expected. Where can you learn more? Go to http://jalil-mo.info/. Enter [phone] in the search box to learn more about \"Pelvic Pain: Care Instructions. \"  Current as of: October 6, 2017  Content Version: 11.7  © 6082-7186 [employer]. Care instructions adapted under license by [employer] (which disclaims liability or warranty for this information). If you have questions about a medical condition or this instruction, always ask your healthcare professional. [address] any warranty or liability for your use of this information. Thank you! Thank you for allowing us to provide you with excellent care today. We hope we addressed all of your concerns and needs. We strive to provide excellent quality care in the Emergency Department. You may receive a survey after your visit to evaluate the care you were provided. Should you receive a survey from us, we invite you to share your experience and tell us what made it excellent. It was a pleasure serving you, we invite you to share your experience with us, in our pursuit for excellence, should you be selected to receive a survey. If you feel that you have not received excellent quality care or timely care, please ask to speak to the nurse manager. Please choose us in the future for your continued health care needs. ------------------------------------------------------------------------------------------------------------  The exam and treatment you received in the Emergency Department were for an urgent problem and are not intended as complete care. It is important that you follow up with a doctor, nurse practitioner, or physician assistant for ongoing care. If your symptoms become worse or you do not improve as expected and you are unable to reach your usual health care provider, you should return to the Emergency Department. We are available 24 hours a day. Please take your discharge instructions with you when you go to your follow-up appointment. If you have any problem arranging a follow-up appointment, contact the Emergency Department immediately.     If a prescription has been provided, please have it filled as soon as possible to prevent a delay in treatment. Read the entire medication instruction sheet provided to you by the pharmacy. If you have any questions or reservations about taking the medication due to side effects or interactions with other medications, please call your primary care physician or contact the ER to speak with the charge nurse. Make an appointment with your family doctor or the physician you were referred to for follow-up of this visit as instructed on your discharge paperwork, as this is mandatory follow-up. Return to the ER if you are unable to be seen or if you are unable to be seen in a timely manner. If you have any problem arranging the follow-up visit, contact the Emergency Department immediately.

## 2018-08-04 NOTE — ED PROVIDER NOTES
EMERGENCY DEPARTMENT HISTORY AND PHYSICAL EXAM 
 
 
Date: 8/4/2018 Patient Name: Bria Mulligan History of Presenting Illness Chief Complaint Patient presents with  Pelvic Pain  
  reports lower pelvic pain and light bleeding after having intercourse on Tuesday. reports she had a hysterectomy 2 years ago and this was the first time having intercourse since surgery History Provided By: Patient HPI: Bria Mulligan, 30 y.o. female with PMHx significant for hysterectomy, endometrial cryoablation and bowel reconstruction, presents ambulatory to the ED with cc of a sharp, intermittent lower pelvic pain that began 4 days ago after sexual intercourse for the first time since her surgery. Pt states her pain is an 8/10 with movement and a 6/10 at baseline. She reports mild nausea and spotting when her pain began, which she states is unusual. Pt notes she took ibuprofen today to no alleviation. She notes she has stage IV endometriosis, which was treated by Dr. Timothy Lyman ~2 years ago. She specifically denies any fever, chills, CP, SOB, V/D, dysuria, constipation and vaginal discharge. Social Hx: +Tobacco, -EtOH Family Hx: Cancer Chief Complaint: Lower pelvic pain Duration: 4 Days Timing:  Intermittent Location: Abdominal/pelvic region Quality: Tariq Arciniega Severity: 8 out of 10 Modifying Factors: Exacerbated with movement Associated Symptoms: Nausea There are no other complaints, changes, or physical findings at this time. PCP: Leo Morris MD 
 
Current Facility-Administered Medications Medication Dose Route Frequency Provider Last Rate Last Dose  sodium chloride (NS) flush 5-10 mL  5-10 mL IntraVENous PRN Katharine Charles MD      
 
Current Outpatient Prescriptions Medication Sig Dispense Refill  dicyclomine (BENTYL) 20 mg tablet Take 1 Tab by mouth every six (6) hours for 20 doses.  20 Tab 0  
 ondansetron (ZOFRAN ODT) 4 mg disintegrating tablet Take 1 Tab by mouth every eight (8) hours as needed for Nausea. 10 Tab 0  
 traMADol (ULTRAM) 50 mg tablet Take 1 Tab by mouth every six (6) hours as needed for Pain for up to 10 days. Max Daily Amount: 200 mg. 10 Tab 0  
 dextroamphetamine-amphetamine (ADDERALL) 20 mg tablet Earliest Fill Date: 18. Take 20mg in AM and 20mg again in afternoon as needed. 60 Tab 0  
 dextroamphetamine-amphetamine (ADDERALL) 20 mg tablet Take 20mg in AM and 20mg again in afternoon as needed. 60 Tab 0 Past History Past Medical History: 
Past Medical History:  
Diagnosis Date  ADD (attention deficit disorder) 17-17yo Treated with Adderall since dx. 2013 dosing 20mg AM and 10mg PM.  Trial/change to Vyvanse Nov-Dec 2015--not as effective.  Gynecologic disorder History of miscarriages. History of Mirena IUD placed on 4/17/15  HX OTHER MEDICAL   
  -BUFA baby  HX OTHER MEDICAL   
 HPV positive  Idiopathic vulvodynia 2014  L1 vertebral fracture (HCC) 2017 [facility] imaging/admissoin: Mild acute two column compression fracture of L1 without evidence of retropulsion into the spinal canal.  Managed non-operatively.  Migraines 2013  Neurological disorder  Pelvic pain in female 9/15/2014 2015 gyn laparoscopy/hysteroscopy with endometriosis worse right.  Psychiatric disorder   
 depression  Secondary dysmenorrhea 2014 With menorraghia  Seizures ([facility] [address]   
 never on meds--had appr 4-5 in a short amt of time and none since Past Surgical History: 
Past Surgical History:  
Procedure Laterality Date  ENDOMETRIAL CRYOABLATION    
 HX GYN  4/17/15  
 laparoscopy and hysteroscopy and IUD placement  HX HYSTERECTOMY  2016 Complete Hysterectomy Family History: 
Family History Problem Relation Age of Onset  Cancer Mother  Alcohol abuse Father   
  and drug  Psychiatric Disorder Father  Cancer Father  Diabetes Maternal Grandmother  Hypertension Maternal Grandmother  Thyroid Disease Maternal Grandmother  Diabetes Maternal Grandfather  Hypertension Maternal Grandfather  Cancer Maternal Grandfather  Heart Disease Maternal Grandfather  Cancer Paternal Grandmother  Diabetes Paternal Grandmother Social History: 
Social History Substance Use Topics  Smoking status: Current Every Day Smoker Packs/day: 0.50 Years: 18.00 Types: Cigarettes  Smokeless tobacco: Never Used  Alcohol use 0.0 oz/week  
  0 Standard drinks or equivalent per week Comment: occasionally Allergies: Allergies Allergen Reactions  Aspirin Other (comments) Hot sweats and passed out; tolerated ibuprofen  Penicillins Other (comments) Childhood. Does not remember reaction. Is not aware if she has ever taken cephalosporins in the past.  
 
 
 
Review of Systems Review of Systems Constitutional: Negative for chills and fever. HENT: Negative for congestion. Eyes: Negative for visual disturbance. Respiratory: Negative for shortness of breath. Cardiovascular: Negative for chest pain. Gastrointestinal: Positive for abdominal pain (generalized) and nausea. Negative for constipation and vomiting. Endocrine: Negative for heat intolerance. Genitourinary: Negative. Negative for dysuria and vaginal discharge. Musculoskeletal: Negative for back pain. Skin: Negative for rash. Allergic/Immunologic: Negative for immunocompromised state. Neurological: Negative for dizziness. Hematological: Does not bruise/bleed easily. Psychiatric/Behavioral: Negative. All other systems reviewed and are negative. Physical Exam  
Physical Exam  
Constitutional: She is oriented to person, place, and time. She appears well-developed and well-nourished. She appears distressed (moderate). Tearful. HENT:  
Head: Normocephalic and atraumatic.   
Eyes: EOM are normal. Pupils are equal, round, and reactive to light. Neck: Normal range of motion. Neck supple. Cardiovascular: Normal rate, regular rhythm and normal heart sounds. Pulmonary/Chest: Effort normal and breath sounds normal. No respiratory distress. Abdominal: Soft. Bowel sounds are normal. She exhibits no mass. There is tenderness (diffuse). There is no rebound. Musculoskeletal: Normal range of motion. She exhibits no edema. Neurological: She is alert and oriented to person, place, and time. Coordination normal.  
Skin: Skin is warm and dry. Psychiatric: She has a normal mood and affect. Her behavior is normal.  
Nursing note and vitals reviewed. Diagnostic Study Results Labs - Recent Results (from the past 12 hour(s)) CBC WITH AUTOMATED DIFF Collection Time: 08/04/18  1:15 PM  
Result Value Ref Range WBC 9.8 3.6 - 11.0 K/uL  
 RBC 4.81 3.80 - 5.20 M/uL  
 HGB 14.9 11.5 - 16.0 g/dL HCT 42.0 35.0 - 47.0 % MCV 87.3 80.0 - 99.0 FL  
 MCH 31.0 26.0 - 34.0 PG  
 MCHC 35.5 30.0 - 36.5 g/dL  
 RDW 13.3 11.5 - 14.5 % PLATELET 569 844 - 790 K/uL MPV 9.8 8.9 - 12.9 FL  
 NRBC 0.0 0  WBC ABSOLUTE NRBC 0.00 0.00 - 0.01 K/uL NEUTROPHILS 62 32 - 75 % LYMPHOCYTES 32 12 - 49 % MONOCYTES 5 5 - 13 % EOSINOPHILS 0 0 - 7 % BASOPHILS 1 0 - 1 % IMMATURE GRANULOCYTES 0 0.0 - 0.5 % ABS. NEUTROPHILS 6.0 1.8 - 8.0 K/UL  
 ABS. LYMPHOCYTES 3.1 0.8 - 3.5 K/UL  
 ABS. MONOCYTES 0.5 0.0 - 1.0 K/UL  
 ABS. EOSINOPHILS 0.0 0.0 - 0.4 K/UL  
 ABS. BASOPHILS 0.1 0.0 - 0.1 K/UL  
 ABS. IMM. GRANS. 0.0 0.00 - 0.04 K/UL  
 DF AUTOMATED METABOLIC PANEL, COMPREHENSIVE Collection Time: 08/04/18  1:15 PM  
Result Value Ref Range Sodium 137 136 - 145 mmol/L Potassium 3.5 3.5 - 5.1 mmol/L Chloride 104 97 - 108 mmol/L  
 CO2 22 21 - 32 mmol/L Anion gap 11 5 - 15 mmol/L Glucose 93 65 - 100 mg/dL BUN 21 (H) 6 - 20 MG/DL  Creatinine 0.89 0.55 - 1.02 MG/DL  
 BUN/Creatinine ratio 24 (H) 12 - 20 GFR est AA >60 >60 ml/min/1.73m2 GFR est non-AA >60 >60 ml/min/1.73m2 Calcium 8.3 (L) 8.5 - 10.1 MG/DL Bilirubin, total 1.1 (H) 0.2 - 1.0 MG/DL  
 ALT (SGPT) 98 (H) 12 - 78 U/L  
 AST (SGOT) 101 (H) 15 - 37 U/L Alk. phosphatase 128 (H) 45 - 117 U/L Protein, total 6.7 6.4 - 8.2 g/dL Albumin 3.5 3.5 - 5.0 g/dL Globulin 3.2 2.0 - 4.0 g/dL A-G Ratio 1.1 1.1 - 2.2 LIPASE Collection Time: 08/04/18  1:15 PM  
Result Value Ref Range Lipase 265 73 - 393 U/L  
LACTIC ACID Collection Time: 08/04/18  1:15 PM  
Result Value Ref Range Lactic acid 2.9 (HH) 0.4 - 2.0 MMOL/L  
URINALYSIS W/ RFLX MICROSCOPIC Collection Time: 08/04/18  1:15 PM  
Result Value Ref Range Color YELLOW/STRAW Appearance CLEAR CLEAR Specific gravity 1.021 1.003 - 1.030    
 pH (UA) 6.0 5.0 - 8.0 Protein NEGATIVE  NEG mg/dL Glucose NEGATIVE  NEG mg/dL Ketone NEGATIVE  NEG mg/dL Bilirubin NEGATIVE  NEG Blood NEGATIVE  NEG Urobilinogen 0.2 0.2 - 1.0 EU/dL Nitrites NEGATIVE  NEG Leukocyte Esterase NEGATIVE  NEG    
WET PREP Collection Time: 08/04/18  1:50 PM  
Result Value Ref Range Clue cells CLUE CELLS ABSENT Wet prep NO TRICHOMONAS SEEN    
KOH, OTHER SOURCES Collection Time: 08/04/18  1:50 PM  
Result Value Ref Range Special Requests: NO SPECIAL REQUESTS    
 KOH NO YEAST SEEN    
LACTIC ACID Collection Time: 08/04/18  2:39 PM  
Result Value Ref Range Lactic acid 3.4 (HH) 0.4 - 2.0 MMOL/L  
EKG, 12 LEAD, INITIAL Collection Time: 08/04/18  2:56 PM  
Result Value Ref Range Ventricular Rate 80 BPM  
 Atrial Rate 80 BPM  
 P-R Interval 142 ms QRS Duration 94 ms Q-T Interval 400 ms QTC Calculation (Bezet) 461 ms Calculated P Axis 51 degrees Calculated R Axis 58 degrees Calculated T Axis 50 degrees Diagnosis Normal sinus rhythm Normal ECG When compared with ECG of 10-NELL-2010 23:05, 
Previous ECG has undetermined rhythm, needs review LACTIC ACID Collection Time: 08/04/18  4:28 PM  
Result Value Ref Range Lactic acid 2.4 (HH) 0.4 - 2.0 MMOL/L Radiologic Studies -  
CT Results  (Last 48 hours) 08/04/18 1616  CT ABD PELV W CONT Final result Impression:  IMPRESSION:   
1. No acute process in the abdomen and pelvis. 2. Age-indeterminate L1 superior endplate fracture, new from January 2017. Narrative:  CT ABDOMEN AND PELVIS WITH CONTRAST. 8/4/2018 4:16 PM   
   
INDICATION: Lower pelvic pain and leg bleeding after intercourse. COMPARISON: 1/8/2017, 11/10/2016. TECHNIQUE: CT of the abdomen and pelvis was performed after the administration 100 cc IV Isovue 370 and oral contrast. CT dose reduction was achieved through  
use of a standardized protocol tailored for this examination and automatic  
exposure control for dose modulation. FINDINGS:  
Abdomen: The lung bases are clear and the heart size is normal. The distal  
esophagus, stomach, duodenum, liver, gallbladder, pancreas, spleen, adrenals,  
and kidneys are normal.   
   
Pelvis: The small bowel, ileocecal junction, appendix, colon, and bladder are  
normal. No free air or fluid, and no abdominopelvic lymphadenopathy. Post  
hysterectomy. A subtle superior endplate compression fracture of L1 (853-26) is  
age-indeterminate but from 1/8/2017. CXR Results  (Last 48 hours) 08/04/18 1431  XR CHEST PORT Final result Impression:  IMPRESSION:   
Clear lungs. Narrative:  PORTABLE CHEST RADIOGRAPH/S: 8/4/2018 2:31 PM  
   
INDICATION: Meets SIRS criteria. COMPARISON: 7/16/2017, 10/31/2016, 12/20/2014. TECHNIQUE: Portable frontal upright radiograph/s of the chest.  
   
FINDINGS:   
The lungs are clear. The central airways are patent. No pneumothorax or pleural  
effusion. Medical Decision Making I am the first provider for this patient. I reviewed the vital signs, available nursing notes, past medical history, past surgical history, family history and social history. Vital Signs-Reviewed the patient's vital signs. Patient Vitals for the past 12 hrs: 
 Temp Pulse Resp BP SpO2  
08/04/18 1745 - - - 113/75 -  
08/04/18 1730 - - - 110/63 -  
08/04/18 1715 - - - 111/77 -  
08/04/18 1700 - - - 113/79 -  
08/04/18 1645 - - - 107/86 -  
08/04/18 1630 - - - 102/58 -  
08/04/18 1628 - - - 115/57 -  
08/04/18 1545 - - - 112/80 -  
08/04/18 1530 - - - 109/74 -  
08/04/18 1515 - - - 116/78 -  
08/04/18 1500 - - - 116/64 -  
08/04/18 1445 - - - 114/76 -  
08/04/18 1430 - - - 113/70 -  
08/04/18 1415 - - - 109/66 -  
08/04/18 1400 - - - 99/64 -  
08/04/18 1330 - - - 111/72 -  
08/04/18 1315 - - - 114/72 -  
08/04/18 1300 - - - 113/81 -  
08/04/18 1249 - - - 108/78 -  
08/04/18 1236 98.2 °F (36.8 °C) (!) 131 16 (!) 120/103 100 % Pulse Oximetry Analysis - 100% on RA 
 
EKG interpretation: (Preliminary 14:56) Rhythm: normal sinus rhythm; and regular . Rate (approx.): 80; Axis: normal; MS interval: normal; QRS interval: normal ; ST/T wave: normal. 
Written by ANGELICA Benitez, as dictated by Sola Calderon MD. Records Reviewed: Nursing Notes and Old Medical Records Provider Notes (Medical Decision Making): DDx: Perforation, vaginitis, appendicitis ED Course:  
Initial assessment performed. The patients presenting problems have been discussed, and they are in agreement with the care plan formulated and outlined with them. I have encouraged them to ask questions as they arise throughout their visit. Procedure Note - Pelvic Exam:   
1:41 PM 
Performed by: Sola Calderon MD 
Pelvic exam was performed using bimanual and speculum. Further findings noted in physical exam.  
The procedure took 1-15 minutes, and pt tolerated well. Trace blood found but no other discharge. Cuff tenderness. Tobacco Counseling Discussed the risks of smoking and the benefits of smoking cessation as well as the long term sequelae of smoking with the patient. The patient verbalized their understanding. 1:40 PM 
On re-evaluation, pt notes her pain improved. She reports her pain is a 6/10 with movement and a 1/10 when she is stationary. Critical Care Time: CRITICAL CARE NOTE: 
 
2:36 PM 
 
IMPENDING DETERIORATION -Cardiovascular, Metabolic and Hepatic ASSOCIATED RISK FACTORS - Hypotension, Dysrhythmia, Metabolic changes and Dehydration MANAGEMENT- Bedside Assessment and Supervision of Care INTERPRETATION -  Xrays, CT Scan, ECG and Blood Pressure INTERVENTIONS - hemodynamic mngmt and Metobolic interventions CASE REVIEW - Nursing and Family TREATMENT RESPONSE -Improved PERFORMED BY - Self NOTES: 
I have spent 45 minutes of critical care time involved in lab review, consultations with specialist, family decision- making, bedside attention and documentation. During this entire length of time I was immediately available to the patient . 5:11 PM   
Pt reports her pain has not improved and remains as a 6/10. Will continue to monitor. Disposition: 
Discharge Note: 
6:18 PM 
The pt is ready for discharge. The pt's signs, symptoms, diagnosis, and discharge instructions have been discussed and pt has conveyed their understanding. The pt is to follow up as recommended or return to ER should their symptoms worsen. Plan has been discussed and pt is in agreement. PLAN: 
1. Discharge Discharge Medication List as of 8/4/2018  5:59 PM  
  
START taking these medications Details  
dicyclomine (BENTYL) 20 mg tablet Take 1 Tab by mouth every six (6) hours for 20 doses. , Normal, Disp-20 Tab, R-0  
  
ondansetron (ZOFRAN ODT) 4 mg disintegrating tablet Take 1 Tab by mouth every eight (8) hours as needed for Nausea., Normal, Disp-10 Tab, R-0  
  
traMADol (ULTRAM) 50 mg tablet Take 1 Tab by mouth every six (6) hours as needed for Pain for up to 10 days. Max Daily Amount: 200 mg., Print, Disp-10 Tab, R-0  
  
  
CONTINUE these medications which have NOT CHANGED Details  
!! dextroamphetamine-amphetamine (ADDERALL) 20 mg tablet Earliest Fill Date: 7/20/18. Take 20mg in AM and 20mg again in afternoon as needed. , Print, Disp-60 Tab, R-0  
  
!! dextroamphetamine-amphetamine (ADDERALL) 20 mg tablet Take 20mg in AM and 20mg again in afternoon as needed. , Print, Disp-60 Tab, R-0  
  
 !! - Potential duplicate medications found. Please discuss with provider. 2.  
Follow-up Information Follow up With Details Comments Contact Info Cecilia Phelps MD In 2 days As needed [address] [address] [address] 
[phone] [facility] EMERGENCY DEPT  If symptoms worsen [address] [facility] 
[phone] Return to ED if worse Diagnosis Clinical Impression: 1. Acute abdominal pain 2. Lactic acidosis 3. Pelvic pain 4. Elevated liver enzymes Attestations: This note is prepared by Colten Milligan, acting as Scribe for MD Oskar Ceballos MD: The scribe's documentation has been prepared under my direction and personally reviewed by me in its entirety. I confirm that the note above accurately reflects all work, treatment, procedures, and medical decision making performed by me.

## 2018-08-04 NOTE — ED NOTES
Dr. Arin Snider has reviewed discharge instructions with the patient. The patient verbalized understanding. Pt ambulatory home with boyfriend, discharge papers in hand.

## 2018-08-05 LAB
ATRIAL RATE: 80 BPM
CALCULATED P AXIS, ECG09: 51 DEGREES
CALCULATED R AXIS, ECG10: 58 DEGREES
CALCULATED T AXIS, ECG11: 50 DEGREES
DIAGNOSIS, 93000: NORMAL
P-R INTERVAL, ECG05: 142 MS
Q-T INTERVAL, ECG07: 400 MS
QRS DURATION, ECG06: 94 MS
QTC CALCULATION (BEZET), ECG08: 461 MS
VENTRICULAR RATE, ECG03: 80 BPM

## 2018-08-06 LAB
C TRACH DNA SPEC QL NAA+PROBE: NEGATIVE
N GONORRHOEA DNA SPEC QL NAA+PROBE: NEGATIVE
SAMPLE TYPE: NORMAL
SERVICE CMNT-IMP: NORMAL
SPECIMEN SOURCE: NORMAL

## 2018-08-09 LAB
BACTERIA SPEC CULT: NORMAL
SERVICE CMNT-IMP: NORMAL

## 2018-08-15 ENCOUNTER — TELEPHONE (OUTPATIENT)
Dept: INTERNAL MEDICINE CLINIC | Age: 38
End: 2018-08-15

## 2018-08-15 DIAGNOSIS — F98.8 ATTENTION DEFICIT DISORDER, UNSPECIFIED HYPERACTIVITY PRESENCE: ICD-10-CM

## 2018-08-15 NOTE — TELEPHONE ENCOUNTER
Medication refill request:    Last Office Visit:6/202018  Next Office Visit:  Future Appointments  Date Time Provider Vera Jaeger   9/20/2018 9:00 AM Nelle Bernheim, MD [facility]  Yes

## 2018-08-17 ENCOUNTER — DOCUMENTATION ONLY (OUTPATIENT)
Dept: INTERNAL MEDICINE CLINIC | Age: 38
End: 2018-08-17

## 2018-08-17 RX ORDER — DEXTROAMPHETAMINE SACCHARATE, AMPHETAMINE ASPARTATE, DEXTROAMPHETAMINE SULFATE AND AMPHETAMINE SULFATE 5; 5; 5; 5 MG/1; MG/1; MG/1; MG/1
TABLET ORAL
Qty: 60 TAB | Refills: 0 | Status: SHIPPED | OUTPATIENT
Start: 2018-08-19 | End: 2018-09-20 | Stop reason: SDUPTHER

## 2018-08-17 NOTE — TELEPHONE ENCOUNTER
Prescription Monitoring Program ([place]) database query with fills:  08/04/2018 1 08/04/2018 TRAMADOL HCL 50 MG TABLET 10 2 NATASHA LEIGHCONNIE M. [facility]   07/20/2018 1 06/20/2018 DEXTROAMP-AMPHETAMIN 20 MG TAB 60 30 CL [facility]   07/14/2018 1 07/14/2018 HYDROCODONE-ACETAMIN 5-325 MG 10 2 [facility]   06/28/2018 1 06/28/2018 HYDROCODONE-ACETAMIN 7.5-325 20 3 SHERIN JIE   06/20/2018 1 06/20/2018 DEXTROAMP-AMPHETAMIN 20 MG TAB 60 30 CL [facility]    Refill request(s) approved--Adderall. Since last filled 7-20, refill will be able to be filled on 8-19-18. Please contact pt to inform script ready to pick-up.
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
## 2018-09-20 ENCOUNTER — OFFICE VISIT (OUTPATIENT)
Dept: INTERNAL MEDICINE CLINIC | Age: 38
End: 2018-09-20

## 2018-09-20 VITALS
DIASTOLIC BLOOD PRESSURE: 66 MMHG | TEMPERATURE: 97.6 F | HEART RATE: 62 BPM | WEIGHT: 170.6 LBS | OXYGEN SATURATION: 95 % | RESPIRATION RATE: 16 BRPM | SYSTOLIC BLOOD PRESSURE: 112 MMHG | HEIGHT: 65 IN | BODY MASS INDEX: 28.42 KG/M2

## 2018-09-20 DIAGNOSIS — F98.8 ATTENTION DEFICIT DISORDER, UNSPECIFIED HYPERACTIVITY PRESENCE: ICD-10-CM

## 2018-09-20 RX ORDER — DEXTROAMPHETAMINE SACCHARATE, AMPHETAMINE ASPARTATE, DEXTROAMPHETAMINE SULFATE AND AMPHETAMINE SULFATE 5; 5; 5; 5 MG/1; MG/1; MG/1; MG/1
TABLET ORAL
Qty: 60 TAB | Refills: 0 | Status: SHIPPED | OUTPATIENT
Start: 2018-10-20 | End: 2019-01-09 | Stop reason: SDUPTHER

## 2018-09-20 RX ORDER — DEXTROAMPHETAMINE SACCHARATE, AMPHETAMINE ASPARTATE, DEXTROAMPHETAMINE SULFATE AND AMPHETAMINE SULFATE 5; 5; 5; 5 MG/1; MG/1; MG/1; MG/1
TABLET ORAL
Qty: 60 TAB | Refills: 0 | Status: SHIPPED | OUTPATIENT
Start: 2018-09-20 | End: 2018-09-20 | Stop reason: SDUPTHER

## 2018-09-20 RX ORDER — DEXTROAMPHETAMINE SACCHARATE, AMPHETAMINE ASPARTATE, DEXTROAMPHETAMINE SULFATE AND AMPHETAMINE SULFATE 5; 5; 5; 5 MG/1; MG/1; MG/1; MG/1
TABLET ORAL
Qty: 60 TAB | Refills: 0 | Status: SHIPPED | OUTPATIENT
Start: 2018-11-19 | End: 2018-09-20 | Stop reason: SDUPTHER

## 2018-09-20 RX ORDER — DEXTROAMPHETAMINE SACCHARATE, AMPHETAMINE ASPARTATE, DEXTROAMPHETAMINE SULFATE AND AMPHETAMINE SULFATE 5; 5; 5; 5 MG/1; MG/1; MG/1; MG/1
TABLET ORAL
Qty: 60 TAB | Refills: 0 | Status: SHIPPED | OUTPATIENT
Start: 2018-10-20 | End: 2018-09-20 | Stop reason: SDUPTHER

## 2018-09-20 RX ORDER — DEXTROAMPHETAMINE SACCHARATE, AMPHETAMINE ASPARTATE, DEXTROAMPHETAMINE SULFATE AND AMPHETAMINE SULFATE 5; 5; 5; 5 MG/1; MG/1; MG/1; MG/1
TABLET ORAL
Qty: 60 TAB | Refills: 0 | Status: SHIPPED | OUTPATIENT
Start: 2018-11-19 | End: 2018-12-17 | Stop reason: SDUPTHER

## 2018-09-20 RX ORDER — DEXTROAMPHETAMINE SACCHARATE, AMPHETAMINE ASPARTATE, DEXTROAMPHETAMINE SULFATE AND AMPHETAMINE SULFATE 5; 5; 5; 5 MG/1; MG/1; MG/1; MG/1
TABLET ORAL
Qty: 60 TAB | Refills: 0 | Status: SHIPPED | OUTPATIENT
Start: 2018-09-20 | End: 2019-01-09 | Stop reason: SDUPTHER

## 2018-09-20 NOTE — PROGRESS NOTES
RM 17    Pt has not been fasting today      Chief Complaint   Patient presents with    Medication Evaluation     ADHD follow up       1. Have you been to the ER, urgent care clinic since your last visit? Hospitalized since your last visit? No    2. Have you seen or consulted any other health care providers outside of the [address] [place] [address] since your last visit? Include any pap smears or colon screening.  No     Health Maintenance Due   Topic Date Due    Pneumococcal 19-64 Medium Risk (1 of 1 - PPSV23) 11/09/2001    DTaP/Tdap/Td series (1 - Tdap) 11/09/2003    PAP AKA CERVICAL CYTOLOGY  08/12/2017    Influenza Age 9 to Adult  08/01/2018     Pt declines the flu vaccines today
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
## 2018-09-20 NOTE — MR AVS SNAPSHOT
[address] [place] [address] Nahid Medina [address] 
[phone] Patient: Victor Manuel Juan MRN: [id number] Visit Information Date & Time Provider Department Dept. Phone Encounter #  
 9/20/2018  9:00 AM Anastasia Everett, [address] [place] [address] and Internal Medicine [id number] Follow-up Instructions Return in about 3 months (around 12/20/2018) for medication follow-up. Upcoming Health Maintenance Date Due Pneumococcal 19-64 Medium Risk (1 of 1 - PPSV23) 11/9/2001 DTaP/Tdap/Td series (1 - Tdap) 11/9/2003 PAP AKA CERVICAL CYTOLOGY 8/12/2017 Influenza Age 5 to Adult 4/1/2019* *Topic was postponed. The date shown is not the original due date. Allergies as of 9/20/2018  Review Complete On: 9/20/2018 By: Anastasia Everett MD  
  
 Severity Noted Reaction Type Reactions Aspirin High 05/30/2013    Other (comments) Hot sweats and passed out; tolerated ibuprofen Penicillins  08/27/2013    Other (comments) Childhood. Does not remember reaction. Is not aware if she has ever taken cephalosporins in the past.  
  
Current Immunizations  Reviewed on 9/20/2018 Name Date Influenza Vaccine 9/26/2013 Influenza Vaccine (Quad) PF 10/12/2016, 10/13/2015, 12/12/2014 Influenza Vaccine Split 9/27/2012 11:34 AM  
  
 Reviewed by Anastasia Everett MD on 9/20/2018 at  9:32 AM  
You Were Diagnosed With   
  
 Codes Comments Attention deficit disorder, unspecified hyperactivity presence     ICD-10-CM: F98.8 ICD-9-CM: 314.00 Vitals BP Pulse Temp Resp Height(growth percentile) Weight(growth percentile) 112/66 (BP 1 Location: Left arm, BP Patient Position: Sitting) 62 97.6 °F (36.4 °C) (Oral) 16 5' 5\" (1.651 m) 170 lb 9.6 oz (77.4 kg) LMP SpO2 BMI OB Status Smoking Status 03/14/2016 95% 28.39 kg/m2 Hysterectomy Current Every Day Smoker BMI and BSA Data Body Mass Index Body Surface Area  
 28.39 kg/m 2 1.88 m 2 Preferred Pharmacy Pharmacy Name Phone [facility] [address], [address] [place] [address]. [phone] Your Updated Medication List  
  
   
This list is accurate as of 9/20/18  9:49 AM.  Always use your most recent med list.  
  
  
  
  
 * dextroamphetamine-amphetamine 20 mg tablet Commonly known as:  ADDERALL Take 20mg in AM and 20mg again in afternoon as needed. * dextroamphetamine-amphetamine 20 mg tablet Commonly known as:  ADDERALL Earliest Fill Date: 10/20/18. Take 20mg in AM and 20mg again in afternoon as needed. Start taking on:  10/20/2018 * dextroamphetamine-amphetamine 20 mg tablet Commonly known as:  ADDERALL Earliest Fill Date: 11/19/18. Take 20mg in AM and 20mg again in afternoon as needed. Start taking on:  11/19/2018  
  
 ondansetron 4 mg disintegrating tablet Commonly known as:  ZOFRAN ODT Take 1 Tab by mouth every eight (8) hours as needed for Nausea. * Notice: This list has 3 medication(s) that are the same as other medications prescribed for you. Read the directions carefully, and ask your doctor or other care provider to review them with you. Prescriptions Printed Refills  
 dextroamphetamine-amphetamine (ADDERALL) 20 mg tablet 0 Sig: Take 20mg in AM and 20mg again in afternoon as needed. Class: Print  
 dextroamphetamine-amphetamine (ADDERALL) 20 mg tablet 0 Starting on: 10/20/2018 Sig: Rachel Barrett Date: 10/20/18. Take 20mg in AM and 20mg again in afternoon as needed. Class: Print  
 dextroamphetamine-amphetamine (ADDERALL) 20 mg tablet 0 Starting on: 11/19/2018 Sig: Rachel Barrett Date: 11/19/18. Take 20mg in AM and 20mg again in afternoon as needed. Class: Print Follow-up Instructions Return in about 3 months (around 12/20/2018) for medication follow-up. Introducing [facility]! Dear Kika Marin: Thank you for requesting a [employer] account. Our records indicate that you already have an active [employer] account. You can access your account anytime at [email or web address] Did you know that you can access your hospital and ER discharge instructions at any time in [employer]? You can also review all of your test results from your hospital stay or ER visit. Additional Information If you have questions, please visit the Frequently Asked Questions section of the [employer] website at [email or web address] Remember, [employer] is NOT to be used for urgent needs. For medical emergencies, dial 911. Now available from your iPhone and Android! Please provide this summary of care documentation to your next provider. Your primary care clinician is listed as [facility]. If you have any questions after today's visit, please call [phone].

## 2018-12-17 DIAGNOSIS — F98.8 ATTENTION DEFICIT DISORDER, UNSPECIFIED HYPERACTIVITY PRESENCE: ICD-10-CM

## 2018-12-17 RX ORDER — DEXTROAMPHETAMINE SACCHARATE, AMPHETAMINE ASPARTATE, DEXTROAMPHETAMINE SULFATE AND AMPHETAMINE SULFATE 5; 5; 5; 5 MG/1; MG/1; MG/1; MG/1
TABLET ORAL
Qty: 60 TAB | Refills: 0 | Status: SHIPPED | OUTPATIENT
Start: 2018-12-18 | End: 2019-01-09 | Stop reason: SDUPTHER

## 2018-12-17 NOTE — TELEPHONE ENCOUNTER
Pt called stating that she doesn't have insurance until the beginning of January so she doesn't want to come in until after that. She wants to know if she can get a enough to last until her next appt.      Medication refill request:    Last Office Visit: September 20, 2018  Next Office Visit:    Future Appointments   Date Time Provider Vera Jaeger   1/9/2019 11:15 AM Percy Javed MD [facility]

## 2018-12-18 NOTE — TELEPHONE ENCOUNTER
Refill request(s) approved--Adderall. Please inform pt script signed and ready to pick-up--to fill 12-18-18 if needed.       Prescription Monitoring Program ([place]) database query with fills:  11/18/2018 6 09/20/2018 Dextroamp-Amphetamin 20 MG Tab 60 30 Cl [facility]   10/20/2018 6 09/20/2018 Dextroamp-Amphetamin 20 MG Tab 60 30 Cl [facility]   09/20/2018 6 09/20/2018 Dextroamp-Amphetamin 20 MG Tab 60 30 Cl [facility]     Future Appointments   Date Time Provider Vera Jaeger   1/9/2019 11:15 AM Pablito Moore MD [address] [facility]

## 2019-01-09 ENCOUNTER — OFFICE VISIT (OUTPATIENT)
Dept: INTERNAL MEDICINE CLINIC | Age: 39
End: 2019-01-09

## 2019-01-09 VITALS
DIASTOLIC BLOOD PRESSURE: 67 MMHG | TEMPERATURE: 98 F | HEART RATE: 112 BPM | RESPIRATION RATE: 18 BRPM | SYSTOLIC BLOOD PRESSURE: 125 MMHG | OXYGEN SATURATION: 96 %

## 2019-01-09 DIAGNOSIS — J40 BRONCHITIS: ICD-10-CM

## 2019-01-09 DIAGNOSIS — G89.29 CHRONIC MIDLINE LOW BACK PAIN WITHOUT SCIATICA: ICD-10-CM

## 2019-01-09 DIAGNOSIS — F98.8 ATTENTION DEFICIT DISORDER, UNSPECIFIED HYPERACTIVITY PRESENCE: Primary | ICD-10-CM

## 2019-01-09 DIAGNOSIS — M54.50 CHRONIC MIDLINE LOW BACK PAIN WITHOUT SCIATICA: ICD-10-CM

## 2019-01-09 DIAGNOSIS — Z87.81 HISTORY OF SPINAL FRACTURE: ICD-10-CM

## 2019-01-09 RX ORDER — DEXTROAMPHETAMINE SACCHARATE, AMPHETAMINE ASPARTATE, DEXTROAMPHETAMINE SULFATE AND AMPHETAMINE SULFATE 5; 5; 5; 5 MG/1; MG/1; MG/1; MG/1
TABLET ORAL
Qty: 60 TAB | Refills: 0 | Status: SHIPPED | OUTPATIENT
Start: 2019-03-18 | End: 2019-04-11 | Stop reason: SDUPTHER

## 2019-01-09 RX ORDER — BENZONATATE 200 MG/1
200 CAPSULE ORAL
Qty: 21 CAP | Refills: 0 | Status: SHIPPED | OUTPATIENT
Start: 2019-01-09 | End: 2019-01-16

## 2019-01-09 RX ORDER — DEXTROAMPHETAMINE SACCHARATE, AMPHETAMINE ASPARTATE, DEXTROAMPHETAMINE SULFATE AND AMPHETAMINE SULFATE 5; 5; 5; 5 MG/1; MG/1; MG/1; MG/1
TABLET ORAL
Qty: 60 TAB | Refills: 0 | Status: SHIPPED | OUTPATIENT
Start: 2019-01-17 | End: 2019-04-11 | Stop reason: SDUPTHER

## 2019-01-09 RX ORDER — CEFDINIR 300 MG/1
300 CAPSULE ORAL 2 TIMES DAILY
Qty: 20 CAP | Refills: 0 | Status: SHIPPED | OUTPATIENT
Start: 2019-01-09 | End: 2019-01-19

## 2019-01-09 RX ORDER — DEXTROAMPHETAMINE SACCHARATE, AMPHETAMINE ASPARTATE, DEXTROAMPHETAMINE SULFATE AND AMPHETAMINE SULFATE 5; 5; 5; 5 MG/1; MG/1; MG/1; MG/1
TABLET ORAL
Qty: 60 TAB | Refills: 0 | Status: SHIPPED | OUTPATIENT
Start: 2019-02-16 | End: 2019-04-11 | Stop reason: SDUPTHER

## 2019-01-09 NOTE — PATIENT INSTRUCTIONS
Please follow the following instructions to process/authorize your referral, if needed: 
 
Referrals processing Please verify with your insurance IF you need referral authorization submitted. For insurance plans which require this, please follow the following steps. FAILURE TO DO SO MAY RESULT IN INABILITY TO SEE THE SPECIALIST YOU HAVE BEEN REFERRED TO (once you are scheduled to see them). 1. Call and schedule appointment with specialist 
2. Call our clinic and leave message with provider name, and date of appointment 3. We will then submit the referral to your insurance. This process takes 2-5 business days. If you have questions about scheduling or authorizing referral, you can review with our referral coordinators Tyrell Means. or Shikha Cormier) at the . You can review with them today if available/if you have time, or you can call to review with them once you have made your referral/appointment. If you are not sure if you need referral authorizations, please review with the referral coordinators, either prior to or after you have made the appointment, as reviewed.

## 2019-01-09 NOTE — PROGRESS NOTES
History of Present Illness:  
Serena Thompson is a 41 y.o. female here for evaluation: Chief Complaint Patient presents with  Medication Evaluation  Cough Patient reports a cough occuring for about a month. Patient has tried OTC drugs, without any effects noted. Patient also c/o runny nose., Mucus noted brown. Fever and body aches reported when symptoms first started.  Spasms Patient request referral for ortho for back concerns. Here to evaluate above. Notes no problems with ADHD medications. Prescription Monitoring Program ([place]) database query with fills: 
12/18/2018 6 12/17/2018 Dextroamp-Amphetamin 20 MG Tab 60 30 Cl [facility]  
11/18/2018 6 09/20/2018 Dextroamp-Amphetamin 20 MG Tab 60 30 Cl [facility]  
10/20/2018 6 09/20/2018 Dextroamp-Amphetamin 20 MG Tab 60 30 Cl [facility]  
09/20/2018 6 09/20/2018 Dextroamp-Amphetamin 20 MG Tab 60 30 Cl [facility]  
08/19/2018 5 07/17/2018 Dextroamp-Amphetamin 20 MG Tab 60 30 Cl [facility] Wt Readings from Last 3 Encounters:  
09/20/18 170 lb 9.6 oz (77.4 kg) 08/04/18 160 lb 7.9 oz (72.8 kg) 07/14/18 166 lb 3.6 oz (75.4 kg) BP Readings from Last 3 Encounters:  
01/09/19 125/67  
09/20/18 112/66  
08/04/18 113/75 Pulse Readings from Last 3 Encounters:  
01/09/19 (!) 112  
09/20/18 62  
08/04/18 (!) 131 Refills reviewed as below. Prior to Admission medications Medication Sig Start Date End Date Taking? Authorizing Provider  
dextroamphetamine-amphetamine (ADDERALL) 20 mg tablet Earliest Fill Date: 12/18/18. Take 20mg in AM and 20mg again in afternoon as needed. 12/18/18  Yes Marlon Sandoval MD  
dextroamphetamine-amphetamine (ADDERALL) 20 mg tablet Take 20mg in AM and 20mg again in afternoon as needed. 9/20/18  Yes Marlon Sandoval MD  
dextroamphetamine-amphetamine (ADDERALL) 20 mg tablet Take 20mg in AM and 20mg again in afternoon as needed.  10/20/18  Yes Marlon Sandoval MD  
 ondansetron (ZOFRAN ODT) 4 mg disintegrating tablet Take 1 Tab by mouth every eight (8) hours as needed for Nausea. 8/4/18   Katarina Santizo MD  
  
 
ROS Vitals:  
 01/09/19 1118 BP: 125/67 Pulse: (!) 112 Resp: 18 Temp: 98 °F (36.7 °C) TempSrc: Oral  
SpO2: 96% PainSc:   0 - No pain LMP: 03/14/2016 There is no height or weight on file to calculate BMI. Physical Exam:  
 
Physical Exam  
Constitutional: She appears well-developed and well-nourished. No distress. HENT:  
Head: Normocephalic and atraumatic. Right Ear: External ear normal.  
Left Ear: External ear normal.  
Mouth/Throat: Oropharynx is clear and moist. No oropharyngeal exudate. TM's normal bilat. Eyes: Conjunctivae are normal. Right eye exhibits no discharge. Left eye exhibits no discharge. No scleral icterus. Neck: Neck supple. No tracheal deviation present. No thyromegaly present. Cardiovascular: Normal rate, regular rhythm, normal heart sounds and intact distal pulses. Exam reveals no gallop and no friction rub. No murmur heard. Pulmonary/Chest: Effort normal and breath sounds normal. No stridor. No respiratory distress. She has no wheezes. She has no rales. She exhibits no tenderness. No wheezing bilat. Abdominal: Soft. Bowel sounds are normal. She exhibits no distension. There is no tenderness. Musculoskeletal: She exhibits no edema or tenderness. Localizes pain to midline L3-4--no deformity noted, but pt thinks feels abnormal. 
Possible loss lumbar lordosis slighlty. Lymphadenopathy:  
  She has no cervical adenopathy. Neurological: She is alert. She exhibits normal muscle tone. Coordination normal.  
Skin: Skin is warm. No rash noted. She is not diaphoretic. No erythema. No pallor. Psychiatric: She has a normal mood and affect. Her behavior is normal. Judgment and thought content normal.  
 
Assessment and Plan: ICD-10-CM ICD-9-CM 1. Attention deficit disorder, unspecified hyperactivity presence F98.8 314.00 dextroamphetamine-amphetamine (ADDERALL) 20 mg tablet  
   dextroamphetamine-amphetamine (ADDERALL) 20 mg tablet  
   dextroamphetamine-amphetamine (ADDERALL) 20 mg tablet 2. Chronic midline low back pain without sciatica M54.5 724.2 REFERRAL TO ORTHOPEDICS  
 G89.29 338.29   
3. History of spinal fracture Z87.81 V15.51 REFERRAL TO ORTHOPEDICS 4. Bronchitis J40 490 cefdinir (OMNICEF) 300 mg capsule  
   benzonatate (TESSALON) 200 mg capsule 1. Refills reviewed. 2,3. [facility]--at location preferred by pt. 4.  Treatment based on cough persistence reviewed. Follow-up Disposition: 
Return in about 3 months (around 4/9/2019), or if symptoms worsen or fail to improve, for medication follow-up. 
reviewed medications and side effects in detail 
radiology results and schedule of future radiology studies reviewed with patient--likely MRI with orthopedics For additional documentation of information and/or recommendations discussed this visit, please see notes in instructions. Plan and evaluation (above) reviewed with pt at visit Patient voiced understanding of plan and provided with time to ask/review questions. After Visit Summary (AVS) provided to pt after visit with additional instructions as needed/reviewed.

## 2019-01-09 NOTE — PROGRESS NOTES
RM 18 Chief Complaint Patient presents with  Medication Evaluation  Cough Patient reports a cough occuring for about a month. Patient has tried OTC drugs, without any effects noted. Patient also c/o runny nose., Mucus noted brown. Fever and body aches reported when symptoms first started.  Spasms Patient request referral for ortho for back concerns. 1. Have you been to the ER, urgent care clinic since your last visit? Hospitalized since your last visit? No 
 
2. Have you seen or consulted any other health care providers outside of the [address] [place] [address] since your last visit? Include any pap smears or colon screening. No 
 
Health Maintenance Due Topic Date Due  Pneumococcal 19-64 Medium Risk (1 of 1 - PPSV23) 11/09/2001  DTaP/Tdap/Td series (1 - Tdap) 11/09/2003  PAP AKA CERVICAL CYTOLOGY  08/12/2017 PHQ over the last two weeks 1/9/2019 Little interest or pleasure in doing things Not at all Feeling down, depressed, irritable, or hopeless Not at all Total Score PHQ 2 0 Trouble falling or staying asleep, or sleeping too much - Feeling tired or having little energy - Poor appetite, weight loss, or overeating - Feeling bad about yourself - or that you are a failure or have let yourself or your family down - Trouble concentrating on things such as school, work, reading, or watching TV - Moving or speaking so slowly that other people could have noticed; or the opposite being so fidgety that others notice - Thoughts of being better off dead, or hurting yourself in some way -  
PHQ 9 Score -  
 
Abuse Screening Questionnaire 1/9/2019 Do you ever feel afraid of your partner? Kassandra Dora Are you in a relationship with someone who physically or mentally threatens you? Kassandra Dora Is it safe for you to go home? Ivan Peters Learning Assessment 1/9/2019 PRIMARY LEARNER Patient HIGHEST LEVEL OF EDUCATION - PRIMARY LEARNER  -  
BARRIERS PRIMARY LEARNER NONE PRIMARY LANGUAGE ENGLISH  NEED -  
LEARNER PREFERENCE PRIMARY DEMONSTRATION  
ANSWERED BY patient RELATIONSHIP SELF

## 2019-01-16 ENCOUNTER — OFFICE VISIT (OUTPATIENT)
Dept: INTERNAL MEDICINE CLINIC | Age: 39
End: 2019-01-16

## 2019-01-16 VITALS
OXYGEN SATURATION: 99 % | WEIGHT: 172.13 LBS | HEART RATE: 106 BPM | HEIGHT: 65 IN | SYSTOLIC BLOOD PRESSURE: 108 MMHG | TEMPERATURE: 98.3 F | BODY MASS INDEX: 28.68 KG/M2 | RESPIRATION RATE: 16 BRPM | DIASTOLIC BLOOD PRESSURE: 55 MMHG

## 2019-01-16 DIAGNOSIS — J11.1 INFLUENZA-LIKE ILLNESS: Primary | ICD-10-CM

## 2019-01-16 LAB
FLUAV+FLUBV AG NOSE QL IA.RAPID: NEGATIVE POS/NEG
FLUAV+FLUBV AG NOSE QL IA.RAPID: NEGATIVE POS/NEG
VALID INTERNAL CONTROL?: YES

## 2019-01-16 RX ORDER — CODEINE PHOSPHATE AND GUAIFENESIN 10; 100 MG/5ML; MG/5ML
SOLUTION ORAL
Qty: 120 ML | Refills: 0 | Status: SHIPPED | OUTPATIENT
Start: 2019-01-16 | End: 2019-03-30

## 2019-01-16 RX ORDER — OSELTAMIVIR PHOSPHATE 75 MG/1
75 CAPSULE ORAL 2 TIMES DAILY
Qty: 10 CAP | Refills: 0 | Status: SHIPPED | OUTPATIENT
Start: 2019-01-16 | End: 2019-01-21

## 2019-01-16 NOTE — PROGRESS NOTES
History of Present Illness:  
Jen Zamorano is a 41 y.o. female here for evaluation: Chief Complaint Patient presents with  Cough Patient out on ABT and Benzonate, with not effects noted. Occuring for the past 2 weeks, got better over weekend now has worsened.  Fever  Chills  Nasal Congestion Patient states feels like mucus is stuck on nose. Here to evaluate above. She had treatment on 1/9 with cefdinir with Tessalon PRN cough. She notes fever chills and body aches. Feels like \"hit by train\" Notes burning in chest, but with minimal sputum production. Cough did not improve. Head congestion worsened. Worsening symptoms past 2-3 days. Notes completed cefdinir without problems. Tessalon not helping cough that much. Notes no typical sinus infection symptoms. Reviewed testing and empiric anti-influenza therapy at visit. Prior to Admission medications Medication Sig Start Date End Date Taking? Authorizing Provider  
dextroamphetamine-amphetamine (ADDERALL) 20 mg tablet Earliest Fill Date: 3/18/19. Take 20mg in AM and 20mg again in afternoon as needed. 3/18/19  Yes Evelina Campbell MD  
dextroamphetamine-amphetamine (ADDERALL) 20 mg tablet Earliest Fill Date: 2/16/19. Take 20mg in AM and 20mg again in afternoon as needed. 2/16/19  Yes Evelina Campbell MD  
dextroamphetamine-amphetamine (ADDERALL) 20 mg tablet Earliest Fill Date: 1/17/19. Take 20mg in AM and 20mg again in afternoon as needed. 1/17/19  Yes Evelina Campbell MD  
cefdinir (OMNICEF) 300 mg capsule Take 1 Cap by mouth two (2) times a day for 10 days. 1/9/19 1/19/19 Yes Evelina Campbell MD  
benzonatate (TESSALON) 200 mg capsule Take 1 Cap by mouth three (3) times daily as needed for Cough for up to 7 days.  1/9/19 1/16/19 Yes Evelina Campbell MD  
ondansetron (ZOFRAN ODT) 4 mg disintegrating tablet Take 1 Tab by mouth every eight (8) hours as needed for Nausea. 8/4/18  Yes MD PAARS Box Vitals:  
 01/16/19 0759 BP: 108/55 Pulse: (!) 106 Resp: 16 Temp: 98.3 °F (36.8 °C) TempSrc: Oral  
SpO2: 99% Weight: 172 lb 2 oz (78.1 kg) Height: 5' 5\" (1.651 m) PainSc:   0 - No pain LMP: 03/14/2016 Body mass index is 28.64 kg/m². Physical Exam:  
 
Physical Exam  
Constitutional: She appears well-developed and well-nourished. No distress. HENT:  
Head: Normocephalic and atraumatic. Right Ear: External ear normal.  
Left Ear: External ear normal.  
Mouth/Throat: Oropharynx is clear and moist. No oropharyngeal exudate. Minimal injection centrally left TM. No ear fluid or TM erythema bilat. Eyes: Conjunctivae are normal. Right eye exhibits no discharge. Left eye exhibits no discharge. No scleral icterus. Neck: Neck supple. No tracheal deviation present. No thyromegaly present. Cardiovascular: Normal rate, regular rhythm, normal heart sounds and intact distal pulses. Exam reveals no gallop and no friction rub. No murmur heard. Pulmonary/Chest: Effort normal and breath sounds normal. No stridor. No respiratory distress. She has no wheezes. She has no rales. She exhibits no tenderness. Frequent cough during visit and with deep breathing. No wheezing bilat. Abdominal: Soft. Bowel sounds are normal. She exhibits no distension. There is no tenderness. Musculoskeletal: She exhibits no edema or tenderness. Lymphadenopathy:  
  She has no cervical adenopathy. Neurological: She is alert. She exhibits normal muscle tone. Coordination normal.  
Skin: Skin is warm. No rash noted. She is not diaphoretic. No erythema. No pallor. Psychiatric: She has a normal mood and affect. Her behavior is normal. Judgment and thought content normal.  
 
Results for orders placed or performed in visit on 01/16/19 AMB POC BRONWYN INFLUENZA A/B TEST Result Value Ref Range VALID INTERNAL CONTROL POC Yes Influenza A Ag POC Negative Negative Pos/Neg Influenza B Ag POC Negative Negative Pos/Neg Assessment and Plan: ICD-10-CM ICD-9-CM 1. Influenza-like illness R69 799.89 AMB POC BRONWYN INFLUENZA A/B TEST  
   guaiFENesin-codeine (ROBITUSSIN AC) 100-10 mg/5 mL solution  
   oseltamivir (TAMIFLU) 75 mg capsule 1. Plan for empiric influenza therapy reviewed with patient:  due to timing of presentation in relation to onset of symptoms, treatment with anti-influenza anti-virals (Tamiflu or oseltamivir) would be recommended. Symptomatic care reviewed as well. Additional cough suppressant reviewed with pt at visit-she she will fill printed script if Mucinex-DM not helping. Note:  She is fillling ADHD meds soon. Notes with insurance, may need prior authorization. She will let us know if needed, or make sure pharmacy lets us know. Follow-up Disposition: 
Return if symptoms worsen or fail to improve. 
lab results and schedule of future lab studies reviewed with patient 
reviewed medications and side effects in detail For additional documentation of information and/or recommendations discussed this visit, please see notes in instructions. Plan and evaluation (above) reviewed with pt at visit Patient voiced understanding of plan and provided with time to ask/review questions. After Visit Summary (AVS) provided to pt after visit with additional instructions as needed/reviewed.

## 2019-01-16 NOTE — PROGRESS NOTES
RM 16 Chief Complaint Patient presents with  Cough Patient out on ABT and Benzonate, with not effects noted. Occuring for the past 2 weeks, got better over weekend now has worsened.  Fever  Chills  Nasal Congestion Patient states feels like mucus is stuck on nose. 1. Have you been to the ER, urgent care clinic since your last visit? Hospitalized since your last visit? No 
 
2. Have you seen or consulted any other health care providers outside of the [address] [place] [address] since your last visit? Include any pap smears or colon screening. No  
 
Health Maintenance Due Topic Date Due  Pneumococcal 19-64 Medium Risk (1 of 1 - PPSV23) 11/09/2001  DTaP/Tdap/Td series (1 - Tdap) 11/09/2003  PAP AKA CERVICAL CYTOLOGY  08/12/2017 PHQ over the last two weeks 1/16/2019 Little interest or pleasure in doing things Not at all Feeling down, depressed, irritable, or hopeless Not at all Total Score PHQ 2 0 Trouble falling or staying asleep, or sleeping too much - Feeling tired or having little energy - Poor appetite, weight loss, or overeating - Feeling bad about yourself - or that you are a failure or have let yourself or your family down - Trouble concentrating on things such as school, work, reading, or watching TV - Moving or speaking so slowly that other people could have noticed; or the opposite being so fidgety that others notice - Thoughts of being better off dead, or hurting yourself in some way -  
PHQ 9 Score -

## 2019-01-16 NOTE — PATIENT INSTRUCTIONS
1.   
Results for orders placed or performed in visit on 01/16/19 AMB POC BRONWYN INFLUENZA A/B TEST Result Value Ref Range VALID INTERNAL CONTROL POC Yes Influenza A Ag POC Negative Negative Pos/Neg Influenza B Ag POC Negative Negative Pos/Neg  
 
 
 
2. Instead of the Tessalon, use Mucinex-DM for cough. Eura Britney The guaifenesin cough medicine OTC will help to loosen secretions and cough up mucus. The dextromethorphan (DM) cough medicine OTC will help to suppress cough. May also use honey-based cough meds (lozenges or syrups) in addition to above meds to help suppress/soothe cough. 3.  Please let us know or have pharmacy notify us if you need prior authorization for ADHD medication.

## 2019-02-15 ENCOUNTER — HOSPITAL ENCOUNTER (EMERGENCY)
Age: 39
Discharge: HOME | End: 2019-02-15
Attending: EMERGENCY MEDICINE
Payer: MEDICAID

## 2019-02-15 ENCOUNTER — APPOINTMENT (OUTPATIENT)
Dept: ULTRASOUND IMAGING | Age: 39
End: 2019-02-15
Attending: PHYSICIAN ASSISTANT
Payer: MEDICAID

## 2019-02-15 VITALS
HEART RATE: 81 BPM | OXYGEN SATURATION: 97 % | TEMPERATURE: 98 F | BODY MASS INDEX: 27.47 KG/M2 | HEIGHT: 65 IN | RESPIRATION RATE: 16 BRPM | DIASTOLIC BLOOD PRESSURE: 64 MMHG | SYSTOLIC BLOOD PRESSURE: 108 MMHG | WEIGHT: 164.9 LBS

## 2019-02-15 DIAGNOSIS — R19.7 VOMITING AND DIARRHEA: Primary | ICD-10-CM

## 2019-02-15 DIAGNOSIS — R11.10 VOMITING AND DIARRHEA: Primary | ICD-10-CM

## 2019-02-15 DIAGNOSIS — R10.84 ABDOMINAL PAIN, GENERALIZED: ICD-10-CM

## 2019-02-15 LAB
ALBUMIN SERPL-MCNC: 3.6 G/DL (ref 3.5–5)
ALBUMIN/GLOB SERPL: 0.9 {RATIO} (ref 1.1–2.2)
ALP SERPL-CCNC: 127 U/L (ref 45–117)
ALT SERPL-CCNC: 59 U/L (ref 12–78)
ANION GAP SERPL CALC-SCNC: 7 MMOL/L (ref 5–15)
APPEARANCE UR: CLEAR
AST SERPL-CCNC: 40 U/L (ref 15–37)
BACTERIA URNS QL MICRO: NEGATIVE /HPF
BASOPHILS # BLD: 0.1 K/UL (ref 0–0.1)
BASOPHILS NFR BLD: 1 % (ref 0–1)
BILIRUB SERPL-MCNC: 1.2 MG/DL (ref 0.2–1)
BILIRUB UR QL: NEGATIVE
BUN SERPL-MCNC: 25 MG/DL (ref 6–20)
BUN/CREAT SERPL: 28 (ref 12–20)
CALCIUM SERPL-MCNC: 8.4 MG/DL (ref 8.5–10.1)
CHLORIDE SERPL-SCNC: 104 MMOL/L (ref 97–108)
CO2 SERPL-SCNC: 22 MMOL/L (ref 21–32)
COLOR UR: NORMAL
CREAT SERPL-MCNC: 0.9 MG/DL (ref 0.55–1.02)
DIFFERENTIAL METHOD BLD: ABNORMAL
EOSINOPHIL # BLD: 0.1 K/UL (ref 0–0.4)
EOSINOPHIL NFR BLD: 1 % (ref 0–7)
EPITH CASTS URNS QL MICRO: NORMAL /LPF
ERYTHROCYTE [DISTWIDTH] IN BLOOD BY AUTOMATED COUNT: 12 % (ref 11.5–14.5)
GLOBULIN SER CALC-MCNC: 3.9 G/DL (ref 2–4)
GLUCOSE SERPL-MCNC: 89 MG/DL (ref 65–100)
GLUCOSE UR STRIP.AUTO-MCNC: NEGATIVE MG/DL
HCG UR QL: NEGATIVE
HCT VFR BLD AUTO: 45 % (ref 35–47)
HGB BLD-MCNC: 15.9 G/DL (ref 11.5–16)
HGB UR QL STRIP: NEGATIVE
HYALINE CASTS URNS QL MICRO: NORMAL /LPF (ref 0–5)
IMM GRANULOCYTES # BLD AUTO: 0 K/UL (ref 0–0.04)
IMM GRANULOCYTES NFR BLD AUTO: 0 % (ref 0–0.5)
KETONES UR QL STRIP.AUTO: NEGATIVE MG/DL
LEUKOCYTE ESTERASE UR QL STRIP.AUTO: NEGATIVE
LIPASE SERPL-CCNC: 164 U/L (ref 73–393)
LYMPHOCYTES # BLD: 4.1 K/UL (ref 0.8–3.5)
LYMPHOCYTES NFR BLD: 40 % (ref 12–49)
MCH RBC QN AUTO: 31.3 PG (ref 26–34)
MCHC RBC AUTO-ENTMCNC: 35.3 G/DL (ref 30–36.5)
MCV RBC AUTO: 88.6 FL (ref 80–99)
MONOCYTES # BLD: 0.4 K/UL (ref 0–1)
MONOCYTES NFR BLD: 4 % (ref 5–13)
NEUTS SEG # BLD: 5.6 K/UL (ref 1.8–8)
NEUTS SEG NFR BLD: 55 % (ref 32–75)
NITRITE UR QL STRIP.AUTO: NEGATIVE
NRBC # BLD: 0 K/UL (ref 0–0.01)
NRBC BLD-RTO: 0 PER 100 WBC
PH UR STRIP: 6 [PH] (ref 5–8)
PLATELET # BLD AUTO: 392 K/UL (ref 150–400)
PMV BLD AUTO: 9 FL (ref 8.9–12.9)
POTASSIUM SERPL-SCNC: 4.9 MMOL/L (ref 3.5–5.1)
PROT SERPL-MCNC: 7.5 G/DL (ref 6.4–8.2)
PROT UR STRIP-MCNC: NEGATIVE MG/DL
RBC # BLD AUTO: 5.08 M/UL (ref 3.8–5.2)
RBC #/AREA URNS HPF: NORMAL /HPF (ref 0–5)
SODIUM SERPL-SCNC: 133 MMOL/L (ref 136–145)
SP GR UR REFRACTOMETRY: 1 (ref 1–1.03)
UA: UC IF INDICATED,UAUC: NORMAL
UROBILINOGEN UR QL STRIP.AUTO: 0.2 EU/DL (ref 0.2–1)
WBC # BLD AUTO: 10.3 K/UL (ref 3.6–11)
WBC URNS QL MICRO: NORMAL /HPF (ref 0–4)

## 2019-02-15 PROCEDURE — 76705 ECHO EXAM OF ABDOMEN: CPT

## 2019-02-15 PROCEDURE — 96374 THER/PROPH/DIAG INJ IV PUSH: CPT

## 2019-02-15 PROCEDURE — 81025 URINE PREGNANCY TEST: CPT

## 2019-02-15 PROCEDURE — 85025 COMPLETE CBC W/AUTO DIFF WBC: CPT

## 2019-02-15 PROCEDURE — 81001 URINALYSIS AUTO W/SCOPE: CPT

## 2019-02-15 PROCEDURE — 74011250636 HC RX REV CODE- 250/636: Performed by: EMERGENCY MEDICINE

## 2019-02-15 PROCEDURE — 96375 TX/PRO/DX INJ NEW DRUG ADDON: CPT

## 2019-02-15 PROCEDURE — 96361 HYDRATE IV INFUSION ADD-ON: CPT

## 2019-02-15 PROCEDURE — 83690 ASSAY OF LIPASE: CPT

## 2019-02-15 PROCEDURE — 74011250636 HC RX REV CODE- 250/636: Performed by: PHYSICIAN ASSISTANT

## 2019-02-15 PROCEDURE — 99283 EMERGENCY DEPT VISIT LOW MDM: CPT

## 2019-02-15 PROCEDURE — 74011000250 HC RX REV CODE- 250: Performed by: EMERGENCY MEDICINE

## 2019-02-15 PROCEDURE — 36415 COLL VENOUS BLD VENIPUNCTURE: CPT

## 2019-02-15 PROCEDURE — C9113 INJ PANTOPRAZOLE SODIUM, VIA: HCPCS | Performed by: PHYSICIAN ASSISTANT

## 2019-02-15 PROCEDURE — 74011000250 HC RX REV CODE- 250: Performed by: PHYSICIAN ASSISTANT

## 2019-02-15 PROCEDURE — 80053 COMPREHEN METABOLIC PANEL: CPT

## 2019-02-15 RX ORDER — ONDANSETRON 4 MG/1
4 TABLET, ORALLY DISINTEGRATING ORAL
Qty: 6 TAB | Refills: 0 | Status: SHIPPED | OUTPATIENT
Start: 2019-02-15 | End: 2019-02-17

## 2019-02-15 RX ORDER — SUCRALFATE 1 G/1
1 TABLET ORAL
Status: DISCONTINUED | OUTPATIENT
Start: 2019-02-15 | End: 2019-02-15

## 2019-02-15 RX ORDER — RANITIDINE 150 MG/1
150 TABLET, FILM COATED ORAL
Qty: 14 TAB | Refills: 0 | Status: SHIPPED | OUTPATIENT
Start: 2019-02-15 | End: 2019-03-01

## 2019-02-15 RX ORDER — SODIUM CHLORIDE 9 MG/ML
1000 INJECTION, SOLUTION INTRAVENOUS ONCE
Status: DISCONTINUED | OUTPATIENT
Start: 2019-02-15 | End: 2019-02-15

## 2019-02-15 RX ORDER — ONDANSETRON 2 MG/ML
4 INJECTION INTRAMUSCULAR; INTRAVENOUS ONCE
Status: COMPLETED | OUTPATIENT
Start: 2019-02-15 | End: 2019-02-15

## 2019-02-15 RX ADMIN — ONDANSETRON 4 MG: 2 INJECTION INTRAMUSCULAR; INTRAVENOUS at 18:11

## 2019-02-15 RX ADMIN — FAMOTIDINE 20 MG: 10 INJECTION INTRAVENOUS at 18:43

## 2019-02-15 RX ADMIN — SODIUM CHLORIDE 40 MG: 9 INJECTION, SOLUTION INTRAMUSCULAR; INTRAVENOUS; SUBCUTANEOUS at 18:15

## 2019-02-15 RX ADMIN — SODIUM CHLORIDE 1000 ML: 900 INJECTION, SOLUTION INTRAVENOUS at 18:00

## 2019-02-16 NOTE — ED NOTES
Bedside and Verbal shift change report given to Ariadna Barr RN (oncoming nurse) by Cherylin Lennox, RN (offgoing nurse). Report included the following information SBAR, Kardex, ED Summary, Intake/Output, MAR and Recent Results.

## 2019-02-16 NOTE — ED NOTES
Dr Nixon Crespo has reviewed discharge instructions with the patient. The patient verbalized understanding. Pt. A&Ox4, respirations even and unlabored. VS stable as noted in flowsheet. Pt Declined wheelchair assist from department; paperwork in hand.

## 2019-02-16 NOTE — ED PROVIDER NOTES
EMERGENCY DEPARTMENT HISTORY AND PHYSICAL EXAM 
 
 
Date: 2/15/2019 Patient Name: Janet Dumont History of Presenting Illness Chief Complaint Patient presents with  Abdominal Pain Ambulatory into the ED with c/o upper abdominal pain and n/v x 2 days.  Vomiting History Provided By: Patient HPI: Janet Dumont, 41 y.o. female with PMHx significant for gastric ulcer, migraines and seizures, presents ambulatory to the ED with cc of persistent worsening upper abd pain, nausea, vomiting, and diarrhea since Tuesday (2/12/19). Pt further notes hematemesis, and minimal cough. Additionally, pt notes experiencing light-headedness upon standing. Pt specifically denies fever, chest pain, urinary changes, and dysuria. Not on anticoagulation. There are no other complaints, changes, or physical findings at this time. PCP: Niko Gardner MD 
 
No current facility-administered medications on file prior to encounter. Current Outpatient Medications on File Prior to Encounter Medication Sig Dispense Refill  guaiFENesin-codeine (ROBITUSSIN AC) 100-10 mg/5 mL solution 5-10mL PO every 6 hours as needed for cough. 120 mL 0  
 [START ON 3/18/2019] dextroamphetamine-amphetamine (ADDERALL) 20 mg tablet Earliest Fill Date: 3/18/19. Take 20mg in AM and 20mg again in afternoon as needed. 60 Tab 0  
 [START ON 2/16/2019] dextroamphetamine-amphetamine (ADDERALL) 20 mg tablet Earliest Fill Date: 2/16/19. Take 20mg in AM and 20mg again in afternoon as needed. 60 Tab 0  
 dextroamphetamine-amphetamine (ADDERALL) 20 mg tablet Earliest Fill Date: 1/17/19. Take 20mg in AM and 20mg again in afternoon as needed. 60 Tab 0 Past History Past Medical History: 
Past Medical History:  
Diagnosis Date  ADD (attention deficit disorder) 17-19yo Treated with Adderall since dx. Nov 2013 dosing 20mg AM and 10mg PM.  Trial/change to Vyvanse Nov-Dec 2015--not as effective.  Gynecologic disorder History of miscarriages. History of Mirena IUD placed on 4/17/15  HX OTHER MEDICAL   
  -BUFA baby  HX OTHER MEDICAL   
 HPV positive  Idiopathic vulvodynia 2014  L1 vertebral fracture (HCC) 2017 [facility] imaging/admissoin: Mild acute two column compression fracture of L1 without evidence of retropulsion into the spinal canal.  Managed non-operatively.  Migraines 2013  Neurological disorder  Pelvic pain in female 9/15/2014 2015 gyn laparoscopy/hysteroscopy with endometriosis worse right.  Psychiatric disorder   
 depression  Secondary dysmenorrhea 2014 With menorraghia  Seizures ([facility] [address]   
 never on meds--had appr 4-5 in a short amt of time and none since Past Surgical History: 
Past Surgical History:  
Procedure Laterality Date  ENDOMETRIAL CRYOABLATION    
 HX GYN  4/17/15  
 laparoscopy and hysteroscopy and IUD placement  HX HYSTERECTOMY  2016 Complete Hysterectomy Family History: 
Family History Problem Relation Age of Onset  Cancer Mother  Diabetes Mother  Alcohol abuse Father   
     and drug  Psychiatric Disorder Father  Cancer Father  Diabetes Maternal Grandmother  Hypertension Maternal Grandmother  Thyroid Disease Maternal Grandmother  Diabetes Maternal Grandfather  Hypertension Maternal Grandfather  Cancer Maternal Grandfather  Heart Disease Maternal Grandfather  Cancer Paternal Grandmother  Diabetes Paternal Grandmother Social History: 
Social History Tobacco Use  Smoking status: Current Every Day Smoker Packs/day: 0.50 Years: 18.00 Pack years: 9.00 Types: Cigarettes  Smokeless tobacco: Never Used Substance Use Topics  Alcohol use: Yes Alcohol/week: 0.0 oz  
  Comment: occasionally  Drug use: No  
 
 
Allergies: Allergies Allergen Reactions  Aspirin Other (comments) Hot sweats and passed out; tolerated ibuprofen  Penicillins Other (comments) Childhood. Does not remember reaction. Is not aware if she has ever taken cephalosporins in the past.   
Jan 2019: Pt notes no problems with cephalosporins. Review of Systems Review of Systems Constitutional: Negative for chills and fever. HENT: Negative for congestion. Eyes: Negative for visual disturbance. Respiratory: Negative for chest tightness. Cardiovascular: Negative for chest pain and leg swelling. Gastrointestinal: Positive for diarrhea, nausea and vomiting (with blood). Negative for abdominal pain. Endocrine: Negative for polyuria. Genitourinary: Negative for decreased urine volume, dysuria and frequency. Musculoskeletal: Negative for myalgias. Skin: Negative for color change. Allergic/Immunologic: Negative for immunocompromised state. Neurological: Negative for numbness. Physical Exam  
Physical Exam  
Nursing note and vitals reviewed. General appearance: non-toxic, no acute distress Eyes: PERRL, EOMI, conjunctiva normal, anicteric sclera HEENT: mucous membranes tachy, oropharynx is clear Pulmonary: clear to auscultation bilaterally Cardiac: normal rate and regular rhythm, no murmurs, gallops, or rubs, 2+DP pulses, 2+ radial pulses Abdomen: soft, non-focal tenderness to palpation, no rebound, not rigid, nondistended, bowel sounds present; no cvat MSK: no pre-tibial edema. Neuro: Alert, answers questions appropriately Skin: capillary refill brisk Diagnostic Study Results Labs - Recent Results (from the past 12 hour(s)) URINALYSIS W/ REFLEX CULTURE Collection Time: 02/15/19  4:00 PM  
Result Value Ref Range Color YELLOW/STRAW Appearance CLEAR CLEAR Specific gravity 1.005 1.003 - 1.030    
 pH (UA) 6.0 5.0 - 8.0 Protein NEGATIVE  NEG mg/dL Glucose NEGATIVE  NEG mg/dL Ketone NEGATIVE  NEG mg/dL  Bilirubin NEGATIVE  NEG    
 Blood NEGATIVE  NEG Urobilinogen 0.2 0.2 - 1.0 EU/dL Nitrites NEGATIVE  NEG Leukocyte Esterase NEGATIVE  NEG    
 WBC 0-4 0 - 4 /hpf  
 RBC 0-5 0 - 5 /hpf Epithelial cells FEW FEW /lpf Bacteria NEGATIVE  NEG /hpf  
 UA:UC IF INDICATED CULTURE NOT INDICATED BY UA RESULT CNI Hyaline cast 0-2 0 - 5 /lpf  
CBC WITH AUTOMATED DIFF Collection Time: 02/15/19  4:13 PM  
Result Value Ref Range WBC 10.3 3.6 - 11.0 K/uL  
 RBC 5.08 3.80 - 5.20 M/uL  
 HGB 15.9 11.5 - 16.0 g/dL HCT 45.0 35.0 - 47.0 % MCV 88.6 80.0 - 99.0 FL  
 MCH 31.3 26.0 - 34.0 PG  
 MCHC 35.3 30.0 - 36.5 g/dL  
 RDW 12.0 11.5 - 14.5 % PLATELET 626 794 - 261 K/uL MPV 9.0 8.9 - 12.9 FL  
 NRBC 0.0 0  WBC ABSOLUTE NRBC 0.00 0.00 - 0.01 K/uL NEUTROPHILS 55 32 - 75 % LYMPHOCYTES 40 12 - 49 % MONOCYTES 4 (L) 5 - 13 % EOSINOPHILS 1 0 - 7 % BASOPHILS 1 0 - 1 % IMMATURE GRANULOCYTES 0 0.0 - 0.5 % ABS. NEUTROPHILS 5.6 1.8 - 8.0 K/UL  
 ABS. LYMPHOCYTES 4.1 (H) 0.8 - 3.5 K/UL  
 ABS. MONOCYTES 0.4 0.0 - 1.0 K/UL  
 ABS. EOSINOPHILS 0.1 0.0 - 0.4 K/UL  
 ABS. BASOPHILS 0.1 0.0 - 0.1 K/UL  
 ABS. IMM. GRANS. 0.0 0.00 - 0.04 K/UL  
 DF AUTOMATED METABOLIC PANEL, COMPREHENSIVE Collection Time: 02/15/19  4:13 PM  
Result Value Ref Range Sodium 133 (L) 136 - 145 mmol/L Potassium 4.9 3.5 - 5.1 mmol/L Chloride 104 97 - 108 mmol/L  
 CO2 22 21 - 32 mmol/L Anion gap 7 5 - 15 mmol/L Glucose 89 65 - 100 mg/dL BUN 25 (H) 6 - 20 MG/DL Creatinine 0.90 0.55 - 1.02 MG/DL  
 BUN/Creatinine ratio 28 (H) 12 - 20 GFR est AA >60 >60 ml/min/1.73m2 GFR est non-AA >60 >60 ml/min/1.73m2 Calcium 8.4 (L) 8.5 - 10.1 MG/DL Bilirubin, total 1.2 (H) 0.2 - 1.0 MG/DL  
 ALT (SGPT) 59 12 - 78 U/L  
 AST (SGOT) 40 (H) 15 - 37 U/L Alk. phosphatase 127 (H) 45 - 117 U/L Protein, total 7.5 6.4 - 8.2 g/dL Albumin 3.6 3.5 - 5.0 g/dL Globulin 3.9 2.0 - 4.0 g/dL A-G Ratio 0.9 (L) 1.1 - 2.2 LIPASE Collection Time: 02/15/19  4:13 PM  
Result Value Ref Range Lipase 164 73 - 393 U/L Radiologic Studies -  
US ABD LTD Final Result IMPRESSION: Normal ultrasound examination of the right upper quadrant. INDICATION: RUQ abdominal pain. COMPARISON: None. 
  
TECHNIQUE:  
Limited real-time sonography of the right upper quadrant of the abdomen was 
performed with multiple static images of the liver, gallbladder, pancreatic head 
and right kidney obtained. 
  
FINDINGS: 
GALLBLADDER: The gallbladder is normal. There is no wall thickening or fluid 
around the gallbladder. COMMON BILE DUCT: There is no biliary duct dilatation and the common duct 
measures 2 mm in diameter. LIVER: The liver is normal in echotexture with no mass or other focal 
abnormality. LIVER VASCULATURE: The portal vein flow is towards the liver. PANCREAS: The pancreatic head is normal. 
RIGHT KIDNEY: The right kidney demonstrates normal echogenicity with no mass, 
stone or hydronephrosis. The right kidney measures 9.7 cm in length. 
  
The body and tail of the pancreas, left kidney, spleen and retroperitoneum were 
not evaluated on this right upper quadrant examination. 
  
IMPRESSION IMPRESSION: Normal ultrasound examination of the right upper quadrant. Medical Decision Making I am the first provider for this patient. I reviewed the vital signs, available nursing notes, past medical history, past surgical history, family history and social history. Vital Signs-Reviewed the patient's vital signs. Patient Vitals for the past 12 hrs: 
 Temp Pulse Resp BP SpO2  
02/15/19 1916  81 16 108/64 97 % 02/15/19 1552 98.1 °F (36.7 °C) 100 15 117/89 100 % Pulse Oximetry Analysis - 100% on Ra Records Reviewed: Nursing Notes and Old Medical Records Provider Notes (Medical Decision Making): DDx: Vomiting and diarrhea, suspicious for viral syndrome.  Consider gastric irritation with reported history of ulcer. Will check labs, Hgb, provide anti-emetics, and PO challenge and reassess. ED Course:  
Initial assessment performed. The patients presenting problems have been discussed, and they are in agreement with the care plan formulated and outlined with them. I have encouraged them to ask questions as they arise throughout their visit. PROGRESS NOTE: 
7:56 PM 
Has re-evaluated the pt. Pt reports feeling a lot better and passed PO challenge. Will discharge the pt. Written by Piyush Godwin ED Scribe, as dictated by Ron Yoo. Franko Villalpando MD. Critical Care Time:  
None Disposition: 
DISCHARGE NOTE: 
8:18 PM 
The patient is ready for discharge. The patients signs, symptoms, diagnosis, and instructions for discharge have been discussed and the pt has conveyed their understanding. The patient is to follow up as recommended with PCP or return to the ER should their symptoms worsen. Plan has been discussed and patient has conveyed their agreement. PLAN: 
1. Discharge home Current Discharge Medication List  
  
START taking these medications Details  
raNITIdine (ZANTAC) 150 mg tablet Take 1 Tab by mouth nightly for 14 days. Qty: 14 Tab, Refills: 0 CONTINUE these medications which have CHANGED Details  
ondansetron (ZOFRAN ODT) 4 mg disintegrating tablet Take 1 Tab by mouth every eight (8) hours as needed for Nausea for up to 2 days. Qty: 6 Tab, Refills: 0 CONTINUE these medications which have NOT CHANGED Details  
guaiFENesin-codeine (ROBITUSSIN AC) 100-10 mg/5 mL solution 5-10mL PO every 6 hours as needed for cough. Qty: 120 mL, Refills: 0 Associated Diagnoses: Influenza-like illness  
  
!! dextroamphetamine-amphetamine (ADDERALL) 20 mg tablet Earliest Fill Date: 3/18/19. Take 20mg in AM and 20mg again in afternoon as needed. Qty: 60 Tab, Refills: 0  Associated Diagnoses: Attention deficit disorder, unspecified hyperactivity presence  
  
!! dextroamphetamine-amphetamine (ADDERALL) 20 mg tablet Earliest Fill Date: 2/16/19. Take 20mg in AM and 20mg again in afternoon as needed. Qty: 60 Tab, Refills: 0 Associated Diagnoses: Attention deficit disorder, unspecified hyperactivity presence  
  
!! dextroamphetamine-amphetamine (ADDERALL) 20 mg tablet Earliest Fill Date: 1/17/19. Take 20mg in AM and 20mg again in afternoon as needed. Qty: 60 Tab, Refills: 0 Associated Diagnoses: Attention deficit disorder, unspecified hyperactivity presence !! - Potential duplicate medications found. Please discuss with provider. 2.  
Follow-up Information Follow up With Specialties Details Why Contact Info Jennifer Dugan MD Infectious Diseases Schedule an appointment as soon as possible for a visit in 3 days  [address] [place] [address] 
[phone] [facility] EMERGENCY DEPT Emergency Medicine Go in 1 day If symptoms worsen [address] [facility] [place] 
[phone] Return to ED if worse Diagnosis Clinical Impression: 1. Vomiting and diarrhea 2. Abdominal pain, generalized Attestations: This note is prepared by Neftali Santacruz, acting as Scribe for [employer]. Zoie Padgett MD. [employer]. Zoie Padgett MD: The scribe's documentation has been prepared under my direction and personally reviewed by me in its entirety. I confirm that the note above accurately reflects all work, treatment, procedures, and medical decision making performed by me.

## 2019-02-16 NOTE — DISCHARGE INSTRUCTIONS
Patient Education        Abdominal Pain: Care Instructions  Your Care Instructions    Abdominal pain has many possible causes. Some aren't serious and get better on their own in a few days. Others need more testing and treatment. If your pain continues or gets worse, you need to be rechecked and may need more tests to find out what is wrong. You may need surgery to correct the problem. Don't ignore new symptoms, such as fever, nausea and vomiting, urination problems, pain that gets worse, and dizziness. These may be signs of a more serious problem. Your doctor may have recommended a follow-up visit in the next 8 to 12 hours. If you are not getting better, you may need more tests or treatment. The doctor has checked you carefully, but problems can develop later. If you notice any problems or new symptoms, get medical treatment right away. Follow-up care is a key part of your treatment and safety. Be sure to make and go to all appointments, and call your doctor if you are having problems. It's also a good idea to know your test results and keep a list of the medicines you take. How can you care for yourself at home? · Rest until you feel better. · To prevent dehydration, drink plenty of fluids, enough so that your urine is light yellow or clear like water. Choose water and other caffeine-free clear liquids until you feel better. If you have kidney, heart, or liver disease and have to limit fluids, talk with your doctor before you increase the amount of fluids you drink. · If your stomach is upset, eat mild foods, such as rice, dry toast or crackers, bananas, and applesauce. Try eating several small meals instead of two or three large ones. · Wait until 48 hours after all symptoms have gone away before you have spicy foods, alcohol, and drinks that contain caffeine. · Do not eat foods that are high in fat. · Avoid anti-inflammatory medicines such as aspirin, ibuprofen (Advil, Motrin), and naproxen (Aleve). These can cause stomach upset. Talk to your doctor if you take daily aspirin for another health problem. When should you call for help? Call 911 anytime you think you may need emergency care. For example, call if:    · You passed out (lost consciousness).     · You pass maroon or very bloody stools.     · You vomit blood or what looks like coffee grounds.     · You have new, severe belly pain.    Call your doctor now or seek immediate medical care if:    · Your pain gets worse, especially if it becomes focused in one area of your belly.     · You have a new or higher fever.     · Your stools are black and look like tar, or they have streaks of blood.     · You have unexpected vaginal bleeding.     · You have symptoms of a urinary tract infection. These may include:  ? Pain when you urinate. ? Urinating more often than usual.  ? Blood in your urine.     · You are dizzy or lightheaded, or you feel like you may faint.    Watch closely for changes in your health, and be sure to contact your doctor if:    · You are not getting better after 1 day (24 hours). Where can you learn more? Go to http://jalilBlackstone Digital Agencymo.info/. Enter V049 in the search box to learn more about \"Abdominal Pain: Care Instructions. \"  Current as of: September 23, 2018  Content Version: 11.9  © 4045-0446 [employer]. Care instructions adapted under license by [employer] (which disclaims liability or warranty for this information). If you have questions about a medical condition or this instruction, always ask your healthcare professional. [place] [address] any warranty or liability for your use of this information. Patient Education        Diarrhea: Care Instructions  Your Care Instructions    Diarrhea is loose, watery stools (bowel movements). The exact cause is often hard to find. Sometimes diarrhea is your body's way of getting rid of what caused an upset stomach.  Viruses, food poisoning, and many medicines can cause diarrhea. Some people get diarrhea in response to emotional stress, anxiety, or certain foods. Almost everyone has diarrhea now and then. It usually isn't serious, and your stools will return to normal soon. The important thing to do is replace the fluids you have lost, so you can prevent dehydration. The doctor has checked you carefully, but problems can develop later. If you notice any problems or new symptoms, get medical treatment right away. Follow-up care is a key part of your treatment and safety. Be sure to make and go to all appointments, and call your doctor if you are having problems. It's also a good idea to know your test results and keep a list of the medicines you take. How can you care for yourself at home? · Watch for signs of dehydration, which means your body has lost too much water. Dehydration is a serious condition and should be treated right away. Signs of dehydration are:  ? Increasing thirst and dry eyes and mouth. ? Feeling faint or lightheaded. ? Darker urine, and a smaller amount of urine than normal.  · To prevent dehydration, drink plenty of fluids, enough so that your urine is light yellow or clear like water. Choose water and other caffeine-free clear liquids until you feel better. If you have kidney, heart, or liver disease and have to limit fluids, talk with your doctor before you increase the amount of fluids you drink. · Begin eating small amounts of mild foods the next day, if you feel like it. ? Try yogurt that has live cultures of Lactobacillus. (Check the label.)  ? Avoid spicy foods, fruits, alcohol, and caffeine until 48 hours after all symptoms are gone. ? Avoid chewing gum that contains sorbitol. ? Avoid dairy products (except for yogurt with Lactobacillus) while you have diarrhea and for 3 days after symptoms are gone.   · The doctor may recommend that you take over-the-counter medicine, such as loperamide (Imodium), if you still have diarrhea after 6 hours. Read and follow all instructions on the label. Do not use this medicine if you have bloody diarrhea, a high fever, or other signs of serious illness. Call your doctor if you think you are having a problem with your medicine. When should you call for help? Call 911 anytime you think you may need emergency care. For example, call if:    · You passed out (lost consciousness).     · Your stools are maroon or very bloody.    Call your doctor now or seek immediate medical care if:    · You are dizzy or lightheaded, or you feel like you may faint.     · Your stools are black and look like tar, or they have streaks of blood.     · You have new or worse belly pain.     · You have symptoms of dehydration, such as:  ? Dry eyes and a dry mouth. ? Passing only a little dark urine. ? Feeling thirstier than usual.     · You have a new or higher fever.    Watch closely for changes in your health, and be sure to contact your doctor if:    · Your diarrhea is getting worse.     · You see pus in the diarrhea.     · You are not getting better after 2 days (48 hours). Where can you learn more? Go to http://jalil-om.info/. Enter R305 in the search box to learn more about \"Diarrhea: Care Instructions. \"  Current as of: September 23, 2018  Content Version: 11.9  © 5529-5647 [employer]. Care instructions adapted under license by [employer] (which disclaims liability or warranty for this information). If you have questions about a medical condition or this instruction, always ask your healthcare professional. [place] [address] any warranty or liability for your use of this information. Patient Education        Nausea and Vomiting: Care Instructions  Your Care Instructions    When you are nauseated, you may feel weak and sweaty and notice a lot of saliva in your mouth. Nausea often leads to vomiting.  Most of the time you do not need to worry about nausea and vomiting, but they can be signs of other illnesses. Two common causes of nausea and vomiting are stomach flu and food poisoning. Nausea and vomiting from viral stomach flu will usually start to improve within 24 hours. Nausea and vomiting from food poisoning may last from 12 to 48 hours. The doctor has checked you carefully, but problems can develop later. If you notice any problems or new symptoms, get medical treatment right away. Follow-up care is a key part of your treatment and safety. Be sure to make and go to all appointments, and call your doctor if you are having problems. It's also a good idea to know your test results and keep a list of the medicines you take. How can you care for yourself at home? · To prevent dehydration, drink plenty of fluids, enough so that your urine is light yellow or clear like water. Choose water and other caffeine-free clear liquids until you feel better. If you have kidney, heart, or liver disease and have to limit fluids, talk with your doctor before you increase the amount of fluids you drink. · Rest in bed until you feel better. · When you are able to eat, try clear soups, mild foods, and liquids until all symptoms are gone for 12 to 48 hours. Other good choices include dry toast, crackers, cooked cereal, and gelatin dessert, such as Jell-O. When should you call for help? Call 911 anytime you think you may need emergency care. For example, call if:    · You passed out (lost consciousness).    Call your doctor now or seek immediate medical care if:    · You have symptoms of dehydration, such as:  ? Dry eyes and a dry mouth. ? Passing only a little dark urine. ?  Feeling thirstier than usual.     · You have new or worsening belly pain.     · You have a new or higher fever.     · You vomit blood or what looks like coffee grounds.    Watch closely for changes in your health, and be sure to contact your doctor if:    · You have ongoing nausea and vomiting.     · Your vomiting is getting worse.     · Your vomiting lasts longer than 2 days.     · You are not getting better as expected. Where can you learn more? Go to http://jalil-mo.info/. Enter 25 380450 in the search box to learn more about \"Nausea and Vomiting: Care Instructions. \"  Current as of: September 23, 2018  Content Version: 11.9  © 2018-1730 [employer]. Care instructions adapted under license by [employer] (which disclaims liability or warranty for this information). If you have questions about a medical condition or this instruction, always ask your healthcare professional. [address] any warranty or liability for your use of this information.

## 2019-02-16 NOTE — ED NOTES
Pt c/o nausea. She is coughing into the emesis bag at this time. No emesis noted. Only administered 40 mg of Protonix, instead of the 80 mg ordered-Per JULIENNE Kamara.

## 2019-03-25 ENCOUNTER — OFFICE VISIT (OUTPATIENT)
Dept: INTERNAL MEDICINE CLINIC | Age: 39
End: 2019-03-25

## 2019-03-25 VITALS
HEART RATE: 104 BPM | DIASTOLIC BLOOD PRESSURE: 73 MMHG | HEIGHT: 65 IN | SYSTOLIC BLOOD PRESSURE: 110 MMHG | OXYGEN SATURATION: 97 % | WEIGHT: 170 LBS | BODY MASS INDEX: 28.32 KG/M2 | RESPIRATION RATE: 16 BRPM | TEMPERATURE: 98.2 F

## 2019-03-25 DIAGNOSIS — J32.0 MAXILLARY SINUSITIS, UNSPECIFIED CHRONICITY: Primary | ICD-10-CM

## 2019-03-25 DIAGNOSIS — L84 FOOT CALLUS: ICD-10-CM

## 2019-03-25 DIAGNOSIS — Z88.0 PENICILLIN ALLERGY: ICD-10-CM

## 2019-03-25 DIAGNOSIS — M79.671 RIGHT FOOT PAIN: ICD-10-CM

## 2019-03-25 DIAGNOSIS — J40 BRONCHITIS: ICD-10-CM

## 2019-03-25 RX ORDER — IBUPROFEN 800 MG/1
TABLET ORAL
COMMUNITY
End: 2019-03-30

## 2019-03-25 RX ORDER — CEFDINIR 300 MG/1
300 CAPSULE ORAL 2 TIMES DAILY
Qty: 20 CAP | Refills: 0 | Status: SHIPPED | OUTPATIENT
Start: 2019-03-25 | End: 2019-04-04

## 2019-03-25 NOTE — PROGRESS NOTES
RM 19 Denies pain. Reports grandmother was recently in hospital for pneumonia. Chief Complaint Patient presents with  Cough  Chest Congestion  Nasal Congestion Patient reports for the past 3 weeks she is waking up with increased brown-green mucus, occurs for about a hour upon waking up then stops  Callouses Patient reports callus to the bottom of both feet, right side is larger. C/o 10/10 pain at the end of the day, currently using warm water and elevation. 1. Have you been to the ER, urgent care clinic since your last visit? Hospitalized since your last visit? No 
 
2. Have you seen or consulted any other health care providers outside of the [address] [place] [address] since your last visit? Include any pap smears or colon screening. No 
 
Health Maintenance Due Topic Date Due  Pneumococcal 0-64 years (1 of 1 - PPSV23) 11/09/1988  
 DTaP/Tdap/Td series (1 - Tdap) 11/09/2003  PAP AKA CERVICAL CYTOLOGY  08/12/2017 Abuse Screening Questionnaire 3/25/2019 Do you ever feel afraid of your partner? Dufm Salvia Are you in a relationship with someone who physically or mentally threatens you? Dufm Salvia Is it safe for you to go home? Y  
 
 
3 most recent PHQ Screens 3/25/2019 Little interest or pleasure in doing things Not at all Feeling down, depressed, irritable, or hopeless Not at all Total Score PHQ 2 0 Trouble falling or staying asleep, or sleeping too much - Feeling tired or having little energy - Poor appetite, weight loss, or overeating - Feeling bad about yourself - or that you are a failure or have let yourself or your family down - Trouble concentrating on things such as school, work, reading, or watching TV - Moving or speaking so slowly that other people could have noticed; or the opposite being so fidgety that others notice - Thoughts of being better off dead, or hurting yourself in some way -  
PHQ 9 Score -

## 2019-03-25 NOTE — PATIENT INSTRUCTIONS
1.  If you worsen after completing the antibiotic, call as may need refill to treat for chronic sinus infection, as reviewed. 2.  Please follow the following instructions to process/authorize your referral, if needed: 
 
Referrals processing Please verify with your insurance IF you need referral authorization submitted. For insurance plans which require this, please follow the following steps. FAILURE TO DO SO MAY RESULT IN INABILITY TO SEE THE SPECIALIST YOU HAVE BEEN REFERRED TO (once you are scheduled to see them). 1. Call and schedule appointment with specialist 
2. Call our clinic and leave message with provider name, and date of appointment 3. We will then submit the referral to your insurance. This process takes 2-5 business days. If you have questions about scheduling or authorizing referral, you can review with our referral coordinators Iker Jarquin. or Nida Hall) at the . You can review with them today if available/if you have time, or you can call to review with them once you have made your referral/appointment. If you are not sure if you need referral authorizations, please review with the referral coordinators, either prior to or after you have made the appointment, as reviewed. Bronchitis: Care Instructions Your Care Instructions Bronchitis is inflammation of the bronchial tubes, which carry air to the lungs. The tubes swell and produce mucus, or phlegm. The mucus and inflamed bronchial tubes make you cough. You may have trouble breathing. Most cases of bronchitis are caused by viruses like those that cause colds. Antibiotics usually do not help and they may be harmful. Bronchitis usually develops rapidly and lasts about 2 to 3 weeks in otherwise healthy people. Follow-up care is a key part of your treatment and safety.  Be sure to make and go to all appointments, and call your doctor if you are having problems. It's also a good idea to know your test results and keep a list of the medicines you take. How can you care for yourself at home? · Take all medicines exactly as prescribed. Call your doctor if you think you are having a problem with your medicine. · Get some extra rest. 
· Take an over-the-counter pain medicine, such as acetaminophen (Tylenol), ibuprofen (Advil, Motrin), or naproxen (Aleve) to reduce fever and relieve body aches. Read and follow all instructions on the label. · Do not take two or more pain medicines at the same time unless the doctor told you to. Many pain medicines have acetaminophen, which is Tylenol. Too much acetaminophen (Tylenol) can be harmful. · Take an over-the-counter cough medicine that contains dextromethorphan to help quiet a dry, hacking cough so that you can sleep. Avoid cough medicines that have more than one active ingredient. Read and follow all instructions on the label. · Breathe moist air from a humidifier, hot shower, or sink filled with hot water. The heat and moisture will thin mucus so you can cough it out. · Do not smoke. Smoking can make bronchitis worse. If you need help quitting, talk to your doctor about stop-smoking programs and medicines. These can increase your chances of quitting for good. When should you call for help? Call 911 anytime you think you may need emergency care. For example, call if: 
  · You have severe trouble breathing.  
 Call your doctor now or seek immediate medical care if: 
  · You have new or worse trouble breathing.  
  · You cough up dark brown or bloody mucus (sputum).  
  · You have a new or higher fever.  
  · You have a new rash.  
 Watch closely for changes in your health, and be sure to contact your doctor if: 
  · You cough more deeply or more often, especially if you notice more mucus or a change in the color of your mucus.  
  · You are not getting better as expected. Where can you learn more? Go to http://jalil-mo.info/. Enter H333 in the search box to learn more about \"Bronchitis: Care Instructions. \" Current as of: September 5, 2018 Content Version: 11.9 © 4497-1840 [employer]. Care instructions adapted under license by [employer] (which disclaims liability or warranty for this information). If you have questions about a medical condition or this instruction, always ask your healthcare professional. [address] any warranty or liability for your use of this information. Sinusitis: Care Instructions Your Care Instructions Sinusitis is an infection of the lining of the sinus cavities in your head. Sinusitis often follows a cold. It causes pain and pressure in your head and face. In most cases, sinusitis gets better on its own in 1 to 2 weeks. But some mild symptoms may last for several weeks. Sometimes antibiotics are needed. Follow-up care is a key part of your treatment and safety. Be sure to make and go to all appointments, and call your doctor if you are having problems. It's also a good idea to know your test results and keep a list of the medicines you take. How can you care for yourself at home? · Take an over-the-counter pain medicine, such as acetaminophen (Tylenol), ibuprofen (Advil, Motrin), or naproxen (Aleve). Read and follow all instructions on the label. · If the doctor prescribed antibiotics, take them as directed. Do not stop taking them just because you feel better. You need to take the full course of antibiotics. · Be careful when taking over-the-counter cold or flu medicines and Tylenol at the same time. Many of these medicines have acetaminophen, which is Tylenol. Read the labels to make sure that you are not taking more than the recommended dose. Too much acetaminophen (Tylenol) can be harmful.  
· Breathe warm, moist air from a steamy shower, a hot bath, or a sink filled with hot water. Avoid cold, dry air. Using a humidifier in your home may help. Follow the directions for cleaning the machine. · Use saline (saltwater) nasal washes to help keep your nasal passages open and wash out mucus and bacteria. You can buy saline nose drops at a grocery store or drugstore. Or you can make your own at home by adding 1 teaspoon of salt and 1 teaspoon of baking soda to 2 cups of distilled water. If you make your own, fill a bulb syringe with the solution, insert the tip into your nostril, and squeeze gently. Irl Ebbs your nose. · Put a hot, wet towel or a warm gel pack on your face 3 or 4 times a day for 5 to 10 minutes each time. · Try a decongestant nasal spray like oxymetazoline (Afrin). Do not use it for more than 3 days in a row. Using it for more than 3 days can make your congestion worse. When should you call for help? Call your doctor now or seek immediate medical care if: 
  · You have new or worse swelling or redness in your face or around your eyes.  
  · You have a new or higher fever.  
 Watch closely for changes in your health, and be sure to contact your doctor if: 
  · You have new or worse facial pain.  
  · The mucus from your nose becomes thicker (like pus) or has new blood in it.  
  · You are not getting better as expected. Where can you learn more? Go to http://jalil-mo.info/. Enter M158 in the search box to learn more about \"Sinusitis: Care Instructions. \" Current as of: March 27, 2018 Content Version: 11.9 © 0388-1471 [employer]. Care instructions adapted under license by [employer] (which disclaims liability or warranty for this information). If you have questions about a medical condition or this instruction, always ask your healthcare professional. [address] any warranty or liability for your use of this information.

## 2019-03-25 NOTE — PROGRESS NOTES
History of Present Illness:  
Emani Mccann is a 41 y.o. female here for evaluation: Chief Complaint Patient presents with  Cough  Chest Congestion  Nasal Congestion Patient reports for the past 3 weeks she is waking up with increased brown-green mucus, occurs for about a hour upon waking up then stops  Callouses Patient reports callus to the bottom of both feet, right side is larger. C/o 10/10 pain at the end of the day, currently using warm water and elevation. Here to evaluate above. Prescription Monitoring Program ([place]) database query with fills: 
02/16/2019 3 01/09/2019 Dextroamp-Amphetamin 20 Mg Tab 60 30 Cl [facility]  
01/17/2019 4 01/09/2019 Dextroamp-Amphetamin 20 Mg Tab 60 30 Cl [facility]  
01/16/2019 3 01/16/2019 Codeine-Guaifen  Mg/5 Ml 120 3 Cl [facility]  
--Should have remaining refill on Adderall above. From 1-9-19 visit. She notes refilled just last week. Plan for follow-up reviewed as below. Notes symptoms above. Notes feels fine--\"not feeling bad\". She has productive cough and has sinus problems drainage throughout day and worse in morning. Notes no typical allergy symtpoms Notes specific allergy symptoms currently or typically this season. Notes breathing fine. No nasal congestion or wheezing. Has 2nd job now, and is on her feet more. She notes pain related to callus right mid-foot--ball of foot. No prior topical therapies. Just bought inserts. Reviewed foot support and seeing podiatry. Reviewed due to size, topical salicylic acid may not be effective, but could use if podiatry eval delayed. Nursing screenings reviewed by provider at visit. Prior to Admission medications Medication Sig Start Date End Date Taking? Authorizing Provider  
ibuprofen (MOTRIN) 800 mg tablet Take  by mouth.    Yes Provider, Historical  
dextroamphetamine-amphetamine (ADDERALL) 20 mg tablet Earliest Fill Date: 3/18/19. Take 20mg in AM and 20mg again in afternoon as needed. 3/18/19  Yes Zohra Reina MD  
dextroamphetamine-amphetamine (ADDERALL) 20 mg tablet Earliest Fill Date: 2/16/19. Take 20mg in AM and 20mg again in afternoon as needed. 2/16/19  Yes Zohra Reina MD  
dextroamphetamine-amphetamine (ADDERALL) 20 mg tablet Earliest Fill Date: 1/17/19. Take 20mg in AM and 20mg again in afternoon as needed. 1/17/19  Yes Zohra Reina MD  
guaiFENesin-codeine [facility]) 100-10 mg/5 mL solution 5-10mL PO every 6 hours as needed for cough. 1/16/19   Zohra Reina MD  
  
 
ROS Vitals:  
 03/25/19 7435 BP: 110/73 Pulse: (!) 104 Resp: 16 Temp: 98.2 °F (36.8 °C) TempSrc: Oral  
SpO2: 97% Weight: 170 lb (77.1 kg) Height: 5' 5\" (1.651 m) PainSc:   0 - No pain LMP: 03/14/2016 Body mass index is 28.29 kg/m². Physical Exam:  
 
Physical Exam  
Constitutional: She appears well-developed and well-nourished. No distress. HENT:  
Head: Normocephalic and atraumatic. Right Ear: External ear normal.  
Left Ear: External ear normal.  
Mouth/Throat: Oropharynx is clear and moist. No oropharyngeal exudate. TM's normal bilat. Eyes: Conjunctivae are normal. Right eye exhibits no discharge. Left eye exhibits no discharge. No scleral icterus. Neck: Neck supple. No tracheal deviation present. No thyromegaly present. Cardiovascular: Normal rate, regular rhythm, normal heart sounds and intact distal pulses. Exam reveals no gallop and no friction rub. No murmur heard. Pulmonary/Chest: Effort normal and breath sounds normal. No stridor. No respiratory distress. She has no wheezes. She has no rales. She exhibits no tenderness. Abdominal: Soft. Bowel sounds are normal. She exhibits no distension. There is no tenderness. Musculoskeletal: She exhibits no edema or tenderness. Lymphadenopathy:  
  She has no cervical adenopathy. Neurological: She is alert. She exhibits normal muscle tone. Coordination normal.  
Skin: Skin is warm. No rash noted. She is not diaphoretic. No erythema. No pallor. Psychiatric: She has a normal mood and affect. Her behavior is normal. Judgment and thought content normal.  
 
 
Assessment and Plan: ICD-10-CM ICD-9-CM 1. Maxillary sinusitis, unspecified chronicity J32.0 473.0 cefdinir (OMNICEF) 300 mg capsule 2. Bronchitis J40 490 cefdinir (OMNICEF) 300 mg capsule 3. Penicillin allergy Z88.0 V14.0 cefdinir (OMNICEF) 300 mg capsule 4. Foot callus L84 700 REFERRAL TO PODIATRY 5. Right foot pain--related to weight-bearing on callus at work M79.671 729.5 REFERRAL TO PODIATRY  
 
 
1,2,3:  Cefdinir reviewed. Plan refill if symptoms return once completed, for possible chronic sinusitis, given duration symptoms. 4,5:  Referral reviewed. Supportive care in interim reviewed at visit. Follow-up and Dispositions · Return in about 3 weeks (around 4/15/2019) for cough, ADHD medication follow-up--2-3 weeks. reviewed medications and side effects in detail For additional documentation of information and/or recommendations discussed this visit, please see notes in instructions. Plan and evaluation (above) reviewed with pt at visit Patient voiced understanding of plan and provided with time to ask/review questions. After Visit Summary (AVS) provided to pt after visit with additional instructions as needed/reviewed.

## 2019-03-28 ENCOUNTER — TELEPHONE (OUTPATIENT)
Dept: INTERNAL MEDICINE CLINIC | Age: 39
End: 2019-03-28

## 2019-03-28 NOTE — TELEPHONE ENCOUNTER
Writer spoke with patient after verifying name and . Patient states she is now having nasal congestion all day, \"feels like cement is in my head\". Voiced 5/10 sinus pain. States mucus was coming out but now \"it's stuck\". States mucus has a greenish color. Patient is currently taking prescribed ABT (omnicef), Sudafed, and Ibuprofen. Would you like to prescribe alternate ABT? Please advise.

## 2019-03-28 NOTE — TELEPHONE ENCOUNTER
Pt was seen on 3/25/19 she was prescribed Omnicef 300 mg pt has been taking it since 3/25/19 and feel's that she isn't getting any better if anything she feel's worse. Pt would like if  could call her in a different antibiotic pt use's the [facility] there # [phone].

## 2019-03-28 NOTE — TELEPHONE ENCOUNTER
Called pt back to obtain more information, no answer.  Left vm for pt to return call to office at [phone]

## 2019-03-30 ENCOUNTER — OFFICE VISIT (OUTPATIENT)
Dept: URGENT CARE | Age: 39
End: 2019-03-30

## 2019-03-30 VITALS
HEIGHT: 65 IN | TEMPERATURE: 98.4 F | SYSTOLIC BLOOD PRESSURE: 117 MMHG | BODY MASS INDEX: 28.32 KG/M2 | DIASTOLIC BLOOD PRESSURE: 70 MMHG | RESPIRATION RATE: 18 BRPM | OXYGEN SATURATION: 97 % | HEART RATE: 79 BPM | WEIGHT: 170 LBS

## 2019-03-30 DIAGNOSIS — J01.90 ACUTE SINUSITIS, RECURRENCE NOT SPECIFIED, UNSPECIFIED LOCATION: Primary | ICD-10-CM

## 2019-03-30 NOTE — PROGRESS NOTES
Rhea Kilpatrick is a 41 y.o. Female presenting to clinic today with continued sinus pain since PCP appointment on 3/25/19. She states that she was prescribed omnicef (x10 days, today is day 6) for sinusitis and has not had any improvement. Reports that she has had URI symptoms for about 3 weeks total. States that her hearing is muffled, she cannot taste anything, and endorses a spastic, productive cough (brown sputum). Also reports rhinorrhea. Denies fevers or chills, states that she is tolerating PO as usual. Reports taking tessalon, sudafed, mucinex, and claritin, each one time without relief, so she is currently not taking any OTCs. Denies other complaint today. The history is provided by the patient. History limited by: nothing. Cold Symptoms   Associated symptoms include rhinorrhea. Pertinent negatives include no chest pain, no chills, no sore throat, no shortness of breath and no wheezing. Past Medical History:   Diagnosis Date    ADD (attention deficit disorder) 17-19yo    Treated with Adderall since dx. 2013 dosing 20mg AM and 10mg PM.  Trial/change to Vyvanse Nov-Dec 2015--not as effective.  Gynecologic disorder     History of miscarriages. History of Mirena IUD placed on 4/17/15    HX OTHER MEDICAL      -BUFA baby    HX OTHER MEDICAL     HPV positive    Idiopathic vulvodynia 2014    L1 vertebral fracture (HCC) 2017    [facility] imaging/admissoin: Mild acute two column compression fracture of L1 without evidence of retropulsion into the spinal canal.  Managed non-operatively.  Migraines 2013    Neurological disorder     Pelvic pain in female 9/15/2014    2015 gyn laparoscopy/hysteroscopy with endometriosis worse right.     Psychiatric disorder     depression    Secondary dysmenorrhea 2014    With menorraghia    Seizures ([facility] [address]     never on meds--had appr 4-5 in a short amt of time and none since        Past Surgical History:   Procedure Laterality Date    ENDOMETRIAL CRYOABLATION      HX GYN  4/17/15    laparoscopy and hysteroscopy and IUD placement    HX HYSTERECTOMY  04/04/2016    Complete Hysterectomy         Family History   Problem Relation Age of Onset    Cancer Mother     Diabetes Mother     Alcohol abuse Father         and drug    Psychiatric Disorder Father     Cancer Father     Diabetes Maternal Grandmother     Hypertension Maternal Grandmother     Thyroid Disease Maternal Grandmother     Diabetes Maternal Grandfather     Hypertension Maternal Grandfather     Cancer Maternal Grandfather     Heart Disease Maternal Grandfather     Cancer Paternal Grandmother     Diabetes Paternal Grandmother         Social History     Socioeconomic History    Marital status: SINGLE     Spouse name: Not on file    Number of children: Not on file    Years of education: Not on file    Highest education level: Not on file   Occupational History    Not on file   Social Needs    Financial resource strain: Not on file    Food insecurity:     Worry: Not on file     Inability: Not on file    Transportation needs:     Medical: Not on file     Non-medical: Not on file   Tobacco Use    Smoking status: Current Every Day Smoker     Packs/day: 0.50     Years: 18.00     Pack years: 9.00     Types: Cigarettes    Smokeless tobacco: Never Used   Substance and Sexual Activity    Alcohol use: Yes     Alcohol/week: 0.0 oz     Comment: rarely.      Drug use: No    Sexual activity: Yes     Partners: Male     Birth control/protection: Surgical   Lifestyle    Physical activity:     Days per week: Not on file     Minutes per session: Not on file    Stress: Not on file   Relationships    Social connections:     Talks on phone: Not on file     Gets together: Not on file     Attends Church service: Not on file     Active member of club or organization: Not on file     Attends meetings of clubs or organizations: Not on file     Relationship status: Not on file    Intimate partner violence:     Fear of current or ex partner: Not on file     Emotionally abused: Not on file     Physically abused: Not on file     Forced sexual activity: Not on file   Other Topics Concern    Not on file   Social History Narrative    ** Merged History Encounter **                     ALLERGIES: Aspirin and Penicillins    Review of Systems   Constitutional: Negative for chills and fever. HENT: Positive for congestion and rhinorrhea. Negative for sneezing and sore throat. Respiratory: Positive for cough. Negative for chest tightness, shortness of breath and wheezing. Cardiovascular: Negative for chest pain. Gastrointestinal: Negative for abdominal pain. Vitals:    03/30/19 0857   BP: 117/70   Pulse: 79   Resp: 18   Temp: 98.4 °F (36.9 °C)   SpO2: 97%   Weight: 170 lb (77.1 kg)   Height: 5' 5\" (1.651 m)       Physical Exam   Constitutional: She is oriented to person, place, and time. She appears well-developed and well-nourished. No distress. HENT:   Head: Normocephalic and atraumatic. BL TM pearly gray, no erythema, no effusion, no bulging  Tonsils 2+BL, no erythema, no exudate, uvula midline. Overall good dentition. No visible nasal congestion. No obvious palpable lymphadenopathy. Mild BL maxillary sinus tenderness and BL frontal sinus tenderness. Eyes: EOM are normal.   Neck: Normal range of motion. Cardiovascular: Normal rate, regular rhythm and normal heart sounds. Pulmonary/Chest: Effort normal and breath sounds normal. No respiratory distress. Abdominal: Soft. She exhibits no distension. There is no tenderness. Musculoskeletal: Normal range of motion. Neurological: She is alert and oriented to person, place, and time. Skin: Skin is warm and dry. She is not diaphoretic. Psychiatric: She has a normal mood and affect. Her behavior is normal. Judgment and thought content normal.   Nursing note and vitals reviewed.       MDM   PLAN:  Patient presents to clinic today with continued URI symptoms (x3 weeks). Started Omnicef on 3/25/19, reports no improvement. Not currently taking any OTCs for symptom improvement. Afebrile, nontoxic appearing, exam unremarkable except for some mild sinus tenderness. 1. Advised patient to continue taking omnicef and return or follow up with PCP if symptoms persist after she finishes the antibiotic course. 2. Advised use of OTC medications for symptom management (and discussed multiple agents at length with patient). 3. Advised to increase fluid intake, maintain adequate nutrition, and rest.  4. Follow up with PCP this week if symptoms persist.  5. Go to ED with worsening symptoms, failure to improve, or any new symptoms. DIAGNOSIS:    ICD-10-CM ICD-9-CM    1. Acute sinusitis, recurrence not specified, unspecified location J01.90 461.9      No orders of the defined types were placed in this encounter.     Procedures

## 2019-03-30 NOTE — PATIENT INSTRUCTIONS
Follow up with your primary care provider if your symptoms persist.  Go to the Emergency Department with worsening symptoms, failure to improve, or any new symptoms. CONTINUE:   - Whitehead Violet as prescribed for the entire course. If your symptoms do not improve by the time you finish the antibiotic, return to clinic or follow up with your primary care provider. START:  - Mucinex (Guaifenesin) 1200mg every 12 hours as needed for congestion (this will help mobilize your secretions)- take with a full glass of water.  - Phenylephrine 10mg every 4 hours as needed for congestion (this will help thin your secretions)  - Dextromethorphan 10-20mg every 4 hours as needed for cough    Sinusitis: Care Instructions  Your Care Instructions    Sinusitis is an infection of the lining of the sinus cavities in your head. Sinusitis often follows a cold. It causes pain and pressure in your head and face. In most cases, sinusitis gets better on its own in 1 to 2 weeks. But some mild symptoms may last for several weeks. Sometimes antibiotics are needed. Follow-up care is a key part of your treatment and safety. Be sure to make and go to all appointments, and call your doctor if you are having problems. It's also a good idea to know your test results and keep a list of the medicines you take. How can you care for yourself at home? · Take an over-the-counter pain medicine, such as acetaminophen (Tylenol), ibuprofen (Advil, Motrin), or naproxen (Aleve). Read and follow all instructions on the label. · If the doctor prescribed antibiotics, take them as directed. Do not stop taking them just because you feel better. You need to take the full course of antibiotics. · Be careful when taking over-the-counter cold or flu medicines and Tylenol at the same time. Many of these medicines have acetaminophen, which is Tylenol. Read the labels to make sure that you are not taking more than the recommended dose.  Too much acetaminophen (Tylenol) can be harmful. · Breathe warm, moist air from a steamy shower, a hot bath, or a sink filled with hot water. Avoid cold, dry air. Using a humidifier in your home may help. Follow the directions for cleaning the machine. · Use saline (saltwater) nasal washes to help keep your nasal passages open and wash out mucus and bacteria. You can buy saline nose drops at a grocery store or drugstore. Or you can make your own at home by adding 1 teaspoon of salt and 1 teaspoon of baking soda to 2 cups of distilled water. If you make your own, fill a bulb syringe with the solution, insert the tip into your nostril, and squeeze gently. Jody Carp your nose. · Put a hot, wet towel or a warm gel pack on your face 3 or 4 times a day for 5 to 10 minutes each time. · Try a decongestant nasal spray like oxymetazoline (Afrin). Do not use it for more than 3 days in a row. Using it for more than 3 days can make your congestion worse. When should you call for help? Call your doctor now or seek immediate medical care if:    · You have new or worse swelling or redness in your face or around your eyes.     · You have a new or higher fever.    Watch closely for changes in your health, and be sure to contact your doctor if:    · You have new or worse facial pain.     · The mucus from your nose becomes thicker (like pus) or has new blood in it.     · You are not getting better as expected. Where can you learn more? Go to http://jalil-mo.info/. Enter R098 in the search box to learn more about \"Sinusitis: Care Instructions. \"  Current as of: March 27, 2018  Content Version: 11.9  © 8323-2049 [employer]. Care instructions adapted under license by [employer] (which disclaims liability or warranty for this information).  If you have questions about a medical condition or this instruction, always ask your healthcare professional. [address]  any warranty or liability for your use of this information.

## 2019-04-01 ENCOUNTER — OFFICE VISIT (OUTPATIENT)
Dept: INTERNAL MEDICINE CLINIC | Age: 39
End: 2019-04-01

## 2019-04-01 VITALS
HEART RATE: 86 BPM | DIASTOLIC BLOOD PRESSURE: 54 MMHG | BODY MASS INDEX: 29.16 KG/M2 | WEIGHT: 175 LBS | TEMPERATURE: 98.4 F | SYSTOLIC BLOOD PRESSURE: 96 MMHG | OXYGEN SATURATION: 96 % | HEIGHT: 65 IN | RESPIRATION RATE: 17 BRPM

## 2019-04-01 DIAGNOSIS — J30.1 SEASONAL ALLERGIC RHINITIS DUE TO POLLEN: ICD-10-CM

## 2019-04-01 DIAGNOSIS — R09.81 CHRONIC NASAL CONGESTION: Primary | ICD-10-CM

## 2019-04-01 RX ORDER — CETIRIZINE HCL 10 MG
10 TABLET ORAL
Qty: 30 TAB | Refills: 3 | Status: SHIPPED | OUTPATIENT
Start: 2019-04-01 | End: 2019-06-24 | Stop reason: SDUPTHER

## 2019-04-01 RX ORDER — FLUTICASONE PROPIONATE 50 MCG
2 SPRAY, SUSPENSION (ML) NASAL DAILY
Qty: 1 BOTTLE | Refills: 4 | Status: SHIPPED | OUTPATIENT
Start: 2019-04-01 | End: 2019-07-19

## 2019-04-01 NOTE — PATIENT INSTRUCTIONS
If symptoms not improving over next 2 weeks, plan to follow-up with ENT. If new fever, worsening congestion or blood in nasal discharge, please return earlier than 2 weeks. Seasonal Allergies: Care Instructions  Your Care Instructions  Allergies occur when your body's defense system (immune system) overreacts to certain substances. The immune system treats a harmless substance as if it were a harmful germ or virus. Many things can cause this to happen. Examples include pollens, medicine, food, dust, animal dander, and mold. Your allergies are seasonal if you have symptoms just at certain times of the year. In that case, you are probably allergic to pollens from certain trees, grasses, or weeds. Allergies can be mild or severe. Over-the-counter allergy medicine may help with some symptoms. Read and follow all instructions on the label. Managing your allergies is an important part of staying healthy. Your doctor may suggest that you have tests to help find the cause of your allergies. When you know what things trigger your symptoms, you can avoid them. This can prevent allergy symptoms and other health problems. In some cases, immunotherapy might help. For this treatment, you get shots or use pills that have a small amount of certain allergens in them. Your body \"gets used to\" the allergen, so you react less to it over time. This kind of treatment may help prevent or reduce some allergy symptoms. Follow-up care is a key part of your treatment and safety. Be sure to make and go to all appointments, and call your doctor if you are having problems. It's also a good idea to know your test results and keep a list of the medicines you take. How can you care for yourself at home? · Be safe with medicines. Take your medicines exactly as prescribed. Call your doctor if you think you are having a problem with your medicine. · During your allergy season, keep windows closed.  If you need to use air-conditioning, change or clean all filters every month. Take a shower and change your clothes after you have been outside. · Stay inside when pollen counts are high. Vacuum once or twice a week. Use a vacuum  with a HEPA filter or a double-thickness filter. When should you call for help? Give an epinephrine shot if:    · You think you are having a severe allergic reaction.    After giving an epinephrine shot, call 911, even if you feel better.   Call 911 if:    · You have symptoms of a severe allergic reaction. These may include:  ? Sudden raised, red areas (hives) all over your body. ? Swelling of the throat, mouth, lips, or tongue. ? Trouble breathing. ? Passing out (losing consciousness). Or you may feel very lightheaded or suddenly feel weak, confused, or restless.     · You have been given an epinephrine shot, even if you feel better.    Call your doctor now or seek immediate medical care if:    · You have symptoms of an allergic reaction, such as:  ? A rash or hives (raised, red areas on the skin). ? Itching. ? Swelling. ? Belly pain, nausea, or vomiting.    Watch closely for changes in your health, and be sure to contact your doctor if:    · You do not get better as expected. Where can you learn more? Go to http://jalil-mo.info/. Enter J912 in the search box to learn more about \"Seasonal Allergies: Care Instructions. \"  Current as of: June 27, 2018  Content Version: 11.9  © 7057-3938 [employer]. Care instructions adapted under license by [employer] (which disclaims liability or warranty for this information). If you have questions about a medical condition or this instruction, always ask your healthcare professional. [place] [address] any warranty or liability for your use of this information.

## 2019-04-01 NOTE — PROGRESS NOTES
HPI   Jose Rafael Young is a 41 y.o. female, she presents today for:    Reports that 3 weeks ago she started having a lot of mucous. Has been coughing and not responding to antibiotics. Feels like cement that wont come out. Has been taking mucinex, sudafed, nasal decongestant not spraying anything in the nose. No itchy eyes, no rashes. Is having green thick discharge. No shortness of breath nor chest pain. PMH/PSH: reviewed and updated  Sochx:  reports that she has been smoking cigarettes. She has a 9.00 pack-year smoking history. She has never used smokeless tobacco. She reports that she drinks alcohol. She reports that she does not use drugs. Famhx: reviewed and updated     All: Allergies   Allergen Reactions    Aspirin Other (comments)     Hot sweats and passed out; tolerated ibuprofen    Penicillins Other (comments)     Childhood. Does not remember reaction. Is not aware if she has ever taken cephalosporins in the past.    Jan 2019: Pt notes no problems with cephalosporins. Med:   Current Outpatient Medications   Medication Sig    cefdinir (OMNICEF) 300 mg capsule Take 1 Cap by mouth two (2) times a day for 10 days.  dextroamphetamine-amphetamine (ADDERALL) 20 mg tablet Earliest Fill Date: 3/18/19. Take 20mg in AM and 20mg again in afternoon as needed.  dextroamphetamine-amphetamine (ADDERALL) 20 mg tablet Earliest Fill Date: 2/16/19. Take 20mg in AM and 20mg again in afternoon as needed.  dextroamphetamine-amphetamine (ADDERALL) 20 mg tablet Earliest Fill Date: 1/17/19. Take 20mg in AM and 20mg again in afternoon as needed. No current facility-administered medications for this visit. Review of Systems   Constitutional: Negative for chills, fever and malaise/fatigue. Respiratory: Negative for shortness of breath. Cardiovascular: Negative for chest pain. PE:  Blood pressure 96/54, pulse 86, temperature 98.4 °F (36.9 °C), temperature source Oral, resp.  rate 17, height 5' 5\" (1.651 m), weight 175 lb (79.4 kg), last menstrual period 03/14/2016, SpO2 96 %. Body mass index is 29.12 kg/m². Physical Exam   Constitutional: She is oriented to person, place, and time. She appears well-developed and well-nourished. No distress. HENT:   Head: Normocephalic. Mouth/Throat: Oropharynx is clear and moist.   Eyes: Conjunctivae and EOM are normal.   Neck: Neck supple. No thyromegaly present. Cardiovascular: Normal rate, regular rhythm and normal heart sounds. No murmur heard. Pulmonary/Chest: Effort normal and breath sounds normal. No respiratory distress. Abdominal: Soft. She exhibits no distension. Musculoskeletal: She exhibits no edema. Lymphadenopathy:     She has no cervical adenopathy. Neurological: She is alert and oriented to person, place, and time. Skin: Skin is warm and dry. Capillary refill takes less than 2 seconds. Psychiatric: She has a normal mood and affect. Her behavior is normal.   Nursing note and vitals reviewed. Labs:   No results found for any visits on 04/01/19. A/P:  41 y.o. female    ICD-10-CM ICD-9-CM    1. Chronic nasal congestion R09.81 478.19    2. Seasonal allergic rhinitis due to pollen J30.1 477.0 fluticasone propionate (FLONASE) 50 mcg/actuation nasal spray      cetirizine (ZYRTEC) 10 mg tablet     3 weeks of severe nasal congestion. Not responding to 3rd gen cephalosporin.    - given lack of fever, blood in mucous. Unclear if this is bacterial in origin   - trial anti-allergy medications. Nasal steroid and antihsitamine.    - disucssed with patient follow-up with ENT if no improvement over next 2 weeks. - also discussed reasons to return earlier.     - She was given AVS and expressed understanding with the diagnosis and plan as discussed.     Future Appointments   Date Time Provider Vera Heide   4/11/2019  8:30 AM Marilee Mcghee MD [id number] [facility]

## 2019-04-01 NOTE — PROGRESS NOTES
Exam room 3    Bria Mulligan is a 41 y.o. female    Chief Complaint   Patient presents with    Cough     per patient given antibiotic but not working    Nasal Congestion     per patient given antibiotic but not working     1. Have you been to the ER, urgent care clinic since your last visit? Hospitalized since your last visit? Yes, 3/30/2019, [facility], cough, nasal and chest congestion    2. Have you seen or consulted any other health care providers outside of the [address] [place] [address] since your last visit? Include any pap smears or colon screening.  No     Health Maintenance Due   Topic Date Due    Pneumococcal 0-64 years (1 of 1 - PPSV23) 11/09/1988    DTaP/Tdap/Td series (1 - Tdap) 11/09/2003    PAP AKA CERVICAL CYTOLOGY  08/12/2017

## 2019-04-11 ENCOUNTER — OFFICE VISIT (OUTPATIENT)
Dept: INTERNAL MEDICINE CLINIC | Age: 39
End: 2019-04-11

## 2019-04-11 VITALS
TEMPERATURE: 98.3 F | SYSTOLIC BLOOD PRESSURE: 93 MMHG | DIASTOLIC BLOOD PRESSURE: 61 MMHG | HEIGHT: 65 IN | OXYGEN SATURATION: 95 % | WEIGHT: 174.13 LBS | HEART RATE: 87 BPM | RESPIRATION RATE: 16 BRPM | BODY MASS INDEX: 29.01 KG/M2

## 2019-04-11 DIAGNOSIS — F98.8 ATTENTION DEFICIT DISORDER, UNSPECIFIED HYPERACTIVITY PRESENCE: ICD-10-CM

## 2019-04-11 DIAGNOSIS — J30.9 ALLERGIC SINUSITIS: Primary | ICD-10-CM

## 2019-04-11 RX ORDER — AZELASTINE 1 MG/ML
2 SPRAY, METERED NASAL
Qty: 1 BOTTLE | Refills: 5 | Status: SHIPPED | OUTPATIENT
Start: 2019-04-11 | End: 2019-04-11 | Stop reason: SDUPTHER

## 2019-04-11 RX ORDER — AZELASTINE 1 MG/ML
2 SPRAY, METERED NASAL
Qty: 1 BOTTLE | Refills: 5 | Status: SHIPPED | OUTPATIENT
Start: 2019-04-11 | End: 2019-11-07

## 2019-04-11 RX ORDER — DEXTROAMPHETAMINE SACCHARATE, AMPHETAMINE ASPARTATE, DEXTROAMPHETAMINE SULFATE AND AMPHETAMINE SULFATE 5; 5; 5; 5 MG/1; MG/1; MG/1; MG/1
TABLET ORAL
Qty: 60 TAB | Refills: 0 | Status: SHIPPED | OUTPATIENT
Start: 2019-05-18 | End: 2019-07-19 | Stop reason: SDUPTHER

## 2019-04-11 RX ORDER — DEXTROAMPHETAMINE SACCHARATE, AMPHETAMINE ASPARTATE, DEXTROAMPHETAMINE SULFATE AND AMPHETAMINE SULFATE 5; 5; 5; 5 MG/1; MG/1; MG/1; MG/1
TABLET ORAL
Qty: 60 TAB | Refills: 0 | Status: SHIPPED | OUTPATIENT
Start: 2019-04-18 | End: 2019-07-19 | Stop reason: SDUPTHER

## 2019-04-11 RX ORDER — DEXTROAMPHETAMINE SACCHARATE, AMPHETAMINE ASPARTATE, DEXTROAMPHETAMINE SULFATE AND AMPHETAMINE SULFATE 5; 5; 5; 5 MG/1; MG/1; MG/1; MG/1
TABLET ORAL
Qty: 60 TAB | Refills: 0 | Status: SHIPPED | OUTPATIENT
Start: 2019-06-17 | End: 2019-07-19 | Stop reason: SDUPTHER

## 2019-04-11 NOTE — PATIENT INSTRUCTIONS
1.  Continue the Flonase and cetirizine as reviewed. Please start the asteline nasal spray as reviewed for additional allergy symptom control. Once improved, you can use this as needed for symptoms. 2.  Ortho On-Call Locations (with [facility]) as below: 
 
--[employer]: [place], [address] [place], [address] [facility] Phone: [phone] Hours: Monday - Friday 5:30 pm  9:00 pm 
Saturday 8:00 am  noon --[place] Address: [address] Phone: [phone] Hours: Monday-Saturday 9:00 am  9:00 pm 
Sunday 11:00 am  5:00 pm 
 
--[facility] Address: [address] [place] [address] [address], [address] [facility] Phone:  [phone] Hours:  Monday - Saturday 9:00 am  9:00 pm 
Sunday 11:00 am  5:00 pm 
 
You can also find the above information at:  [email or web address]

## 2019-04-11 NOTE — PROGRESS NOTES
History of Present Illness:  
Hannah Mcneil is a 41 y.o. female here for evaluation: Chief Complaint Patient presents with  Cough  
  followup. Patient reports coughing is now causing migraines. Patient has c/o stabbing pain, pressure to front of head/nose area, and nasal congestion.  Medication Evaluation ADHD medication Here for ADHD follow-up. Seen and treated for sinusitis on 3/25. Seen by Dr. Talia Novoa on 4/1 and treated for allergies with cetirizine and Flonase. Notes: 
Patient reports 7/10 left heel pain when applying pressure to area, denies injury. Has been using ibuprofen and helping. No injury. Noted one morning--she is on feet all day at work, but notes no specific injury. Reviewed Ortho On-Call Locations if needed. She sees provider there for prior back injury, but reviewed he may not see for heel/Achilles pain. Pain is located over Achilles with superficial bruise. Reviewed temporary immobilization with walking cast/boot if persists--through ortho as reviewed. Notes allergy meds are helping. She has not had full response. Reviewed asteline nasal, then Singulair if needed. Notes no problems with ADHD medications. Prescription Monitoring Program ([place]) database query with fills: 
02/16/2019 3 01/09/2019 Dextroamp-Amphetamin 20 Mg Tab 60 30 Cl [facility]  
01/17/2019 4 01/09/2019 Dextroamp-Amphetamin 20 Mg Tab 60 30 Cl [facility]  
01/16/2019 3 01/16/2019 Codeine-Guaifen  Mg/5 Ml 120 3 Cl [facility]  
12/18/2018 4 12/17/2018 Dextroamp-Amphetamin 20 Mg Tab 60 30 Cl [facility]  
11/18/2018 4 09/20/2018 Dextroamp-Amphetamin 20 Mg Tab 60 30 Cl [facility]  
10/20/2018 4 09/20/2018 Dextroamp-Amphetamin 20 Mg Tab 60 30 Cl [facility]  
09/20/2018 4 09/20/2018 Dextroamp-Amphetamin 20 Mg Tab March 2019 script not recorded in . Pt notes Filled at [facility]. Nursing called #[facility] and they both stated last fill there was Jan 2019. Refills updated based on last script here, since  not updated. Wt Readings from Last 3 Encounters:  
04/11/19 174 lb 2 oz (79 kg) 04/01/19 175 lb (79.4 kg) 03/30/19 170 lb (77.1 kg) BP Readings from Last 3 Encounters:  
04/11/19 93/61  
04/01/19 96/54  
03/30/19 117/70 Pulse Readings from Last 3 Encounters:  
04/11/19 87  
04/01/19 86  
03/30/19 79 Refills reviewed as below. Prior to Admission medications Medication Sig Start Date End Date Taking? Authorizing Provider  
cetirizine (ZYRTEC) 10 mg tablet Take 1 Tab by mouth nightly. As needed for congestion and allergies 4/1/19  Yes Cristina Cox MD  
dextroamphetamine-amphetamine (ADDERALL) 20 mg tablet Earliest Fill Date: 3/18/19. Take 20mg in AM and 20mg again in afternoon as needed. 3/18/19  Yes Baljinder Flynn MD  
dextroamphetamine-amphetamine (ADDERALL) 20 mg tablet Earliest Fill Date: 2/16/19. Take 20mg in AM and 20mg again in afternoon as needed. 2/16/19  Yes Baljinder Flynn MD  
dextroamphetamine-amphetamine (ADDERALL) 20 mg tablet Earliest Fill Date: 1/17/19. Take 20mg in AM and 20mg again in afternoon as needed. 1/17/19  Yes Baljinder Flynn MD  
fluticasone propionate (FLONASE) 50 mcg/actuation nasal spray 2 Sprays by Both Nostrils route daily. Use 2 times daily over next 2 weeks) then 1 time daily. 4/1/19   Cristina Cox MD  
  
 
ROS Vitals:  
 04/11/19 4157 BP: 93/61 Pulse: 87 Resp: 16 Temp: 98.3 °F (36.8 °C) TempSrc: Oral  
SpO2: 95% Weight: 174 lb 2 oz (79 kg) Height: 5' 5\" (1.651 m) PainSc:   5 PainLoc: Face LMP: 03/14/2016 Body mass index is 28.98 kg/m². Physical Exam:  
 
Physical Exam  
Constitutional: She appears well-developed and well-nourished. No distress. HENT:  
Head: Normocephalic and atraumatic. Eyes: Conjunctivae are normal. Right eye exhibits no discharge. Left eye exhibits no discharge. No scleral icterus. Cardiovascular: Normal rate, regular rhythm, normal heart sounds and intact distal pulses. Exam reveals no gallop and no friction rub. No murmur heard. Pulmonary/Chest: Effort normal and breath sounds normal. No respiratory distress. She has no wheezes. She has no rales. She exhibits no tenderness. Abdominal: She exhibits no distension. Musculoskeletal: She exhibits tenderness. She exhibits no deformity. Feet: 
 
~1cm bruise overlying lower aspect left Achilles, just proximal to insertion site left heel. Neurological: She is alert. She exhibits normal muscle tone. Coordination normal.  
Skin: Skin is warm. No rash noted. She is not diaphoretic. No erythema. No pallor. Psychiatric: She has a normal mood and affect. Her behavior is normal. Judgment and thought content normal.  
 
 
Assessment and Plan: ICD-10-CM ICD-9-CM 1. Allergic sinusitis J30.9 477.9 azelastine (ASTELIN) 137 mcg (0.1 %) nasal spray DISCONTINUED: azelastine (ASTELIN) 137 mcg (0.1 %) nasal spray 2. Attention deficit disorder, unspecified hyperactivity presence F98.8 314.00 dextroamphetamine-amphetamine (ADDERALL) 20 mg tablet  
   dextroamphetamine-amphetamine (ADDERALL) 20 mg tablet  
   dextroamphetamine-amphetamine (ADDERALL) 20 mg tablet 1. Script printed then eRx'd. 
 
2.  Refills reviewed. Monitor fills on  at next visit due to problems with pharmacy reporting as above. Follow-up and Dispositions · Return in about 3 months (around 7/11/2019), or if symptoms worsen or fail to improve, for medication follow-up. 
  
 
reviewed medications and side effects in detail For additional documentation of information and/or recommendations discussed this visit, please see notes in instructions. Plan and evaluation (above) reviewed with pt at visit Patient voiced understanding of plan and provided with time to ask/review questions. After Visit Summary (AVS) provided to pt after visit with additional instructions as needed/reviewed.

## 2019-04-11 NOTE — PROGRESS NOTES
RM 16 Patient reports 7/10 left heel pain when applying pressure to area, denies injury. Chief Complaint Patient presents with  Cough  
  followup. Patient reports coughing is now causing migraines. Patient has c/o stabbing pain, pressure to front of head/nose area, and nasal congestion.  Medication Evaluation ADHD medication 1. Have you been to the ER, urgent care clinic since your last visit? Hospitalized since your last visit? No 
 
2. Have you seen or consulted any other health care providers outside of the [address] [place] [address] since your last visit? Include any pap smears or colon screening. No 
 
Health Maintenance Due Topic Date Due  Pneumococcal 0-64 years (1 of 1 - PPSV23) 11/09/1988  
 DTaP/Tdap/Td series (1 - Tdap) 11/09/2003  PAP AKA CERVICAL CYTOLOGY  08/12/2017 Abuse Screening Questionnaire 4/11/2019 Do you ever feel afraid of your partner? Hannah Distel Are you in a relationship with someone who physically or mentally threatens you? Hannah Distel Is it safe for you to go home? Y  
 
3 most recent PHQ Screens 4/11/2019 Little interest or pleasure in doing things Not at all Feeling down, depressed, irritable, or hopeless Not at all Total Score PHQ 2 0 Trouble falling or staying asleep, or sleeping too much - Feeling tired or having little energy - Poor appetite, weight loss, or overeating - Feeling bad about yourself - or that you are a failure or have let yourself or your family down - Trouble concentrating on things such as school, work, reading, or watching TV - Moving or speaking so slowly that other people could have noticed; or the opposite being so fidgety that others notice - Thoughts of being better off dead, or hurting yourself in some way -  
PHQ 9 Score -

## 2019-06-24 ENCOUNTER — OFFICE VISIT (OUTPATIENT)
Dept: INTERNAL MEDICINE CLINIC | Age: 39
End: 2019-06-24

## 2019-06-24 VITALS
HEIGHT: 65 IN | SYSTOLIC BLOOD PRESSURE: 130 MMHG | BODY MASS INDEX: 29.16 KG/M2 | DIASTOLIC BLOOD PRESSURE: 83 MMHG | RESPIRATION RATE: 16 BRPM | HEART RATE: 105 BPM | WEIGHT: 175 LBS | TEMPERATURE: 97.7 F | OXYGEN SATURATION: 97 %

## 2019-06-24 DIAGNOSIS — L29.9 PRURITIC DERMATITIS: Primary | ICD-10-CM

## 2019-06-24 DIAGNOSIS — L29.9 ITCHING: ICD-10-CM

## 2019-06-24 RX ORDER — PREDNISONE 20 MG/1
TABLET ORAL
Qty: 14 TAB | Refills: 0 | Status: SHIPPED | OUTPATIENT
Start: 2019-06-24 | End: 2019-07-19

## 2019-06-24 RX ORDER — HYDROXYZINE 25 MG/1
25-50 TABLET, FILM COATED ORAL
Qty: 30 TAB | Refills: 1 | Status: SHIPPED | OUTPATIENT
Start: 2019-06-24 | End: 2019-07-19

## 2019-06-24 RX ORDER — CETIRIZINE HCL 10 MG
10 TABLET ORAL
Qty: 30 TAB | Refills: 5 | Status: SHIPPED | OUTPATIENT
Start: 2019-06-24 | End: 2019-11-07

## 2019-06-24 NOTE — PROGRESS NOTES
History of Present Illness:   Petey Aleman is a 41 y.o. female here for evaluation:    Chief Complaint   Patient presents with    Rash     itchy legs x 2 days both legs , 1 spot on right arm     Notes seen in ED on 6/23 and given hydroxyzine there. Still having rash and itching. Not sure what cause was--not sleeping in different place or camping. Out in park, but no new exposures. No poison ivy/oak exposures. She notes took hydroxyzine from ED last night, and took again today. She notes new meds or generics. No new soaps or foods. Topical rash for detergent in past not like this. Just on exposed areas--not under clothing. Prior to Admission medications    Medication Sig Start Date End Date Taking? Authorizing Provider   triamcinolone-dimethicone 0.1-5 % ktoc by Apply Externally route. Yes Provider, Historical   HYDROXYZINE HCL PO Take  by mouth. Yes Provider, Historical   dextroamphetamine-amphetamine (ADDERALL) 20 mg tablet Take 20mg in AM and 20mg again in afternoon as needed. 6/17/19  Yes Parul Ash MD   dextroamphetamine-amphetamine (ADDERALL) 20 mg tablet Take 20mg in AM and 20mg again in afternoon as needed. 5/18/19  Yes Parul Ash MD   dextroamphetamine-amphetamine (ADDERALL) 20 mg tablet Take 20mg in AM and 20mg again in afternoon as needed. 4/18/19  Yes Parul Ash MD   cetirizine (ZYRTEC) 10 mg tablet Take 1 Tab by mouth nightly. As needed for congestion and allergies 4/1/19  Yes Radha Phillips MD   azelastine (ASTELIN) 137 mcg (0.1 %) nasal spray 2 Sprays by Both Nostrils route two (2) times daily as needed for Rhinitis. 4/11/19   Parul Ash MD   fluticasone propionate (FLONASE) 50 mcg/actuation nasal spray 2 Sprays by Both Nostrils route daily. Use 2 times daily over next 2 weeks) then 1 time daily.  4/1/19   Radha Phillips MD        ROS    Vitals:    06/24/19 1523   BP: 130/83   Pulse: (!) 105   Resp: 16 Temp: 97.7 °F (36.5 °C)   TempSrc: Oral   SpO2: 97%   Weight: 175 lb (79.4 kg)   Height: 5' 5\" (1.651 m)   PainSc:   0 - No pain   LMP: 03/14/2016      Body mass index is 29.12 kg/m². Physical Exam:     Physical Exam   Constitutional: She appears well-developed and well-nourished. No distress. HENT:   Head: Normocephalic and atraumatic. Eyes: Conjunctivae are normal. Right eye exhibits no discharge. Left eye exhibits no discharge. No scleral icterus. Cardiovascular: Normal rate, regular rhythm, normal heart sounds and intact distal pulses. Exam reveals no gallop and no friction rub. No murmur heard. Pulmonary/Chest: Effort normal and breath sounds normal. No respiratory distress. She has no wheezes. She has no rales. She exhibits no tenderness. Abdominal: Soft. Bowel sounds are normal. She exhibits no distension. There is no tenderness. Musculoskeletal: She exhibits no edema or tenderness. Neurological: She is alert. She exhibits normal muscle tone. Coordination normal.   Skin: Skin is warm. Rash noted. She is not diaphoretic. No erythema. No pallor. Psychiatric: She has a normal mood and affect. Her behavior is normal. Judgment and thought content normal.     Skin exam (continued):    Scattered LE and UE lesions. Different morphology, with some being slightly purulent center, but most excoriated irregular rash. Not typical of contact derm or insect bites. Mgt reviewed. Assessment and Plan:       ICD-10-CM ICD-9-CM    1. Pruritic dermatitis L29.9 698.9 predniSONE (DELTASONE) 20 mg tablet   2. Itching L29.9 698.9 hydrOXYzine HCl (ATARAX) 25 mg tablet      cetirizine (ZYRTEC) 10 mg tablet       Symptomatic mgt reviewed with pt at visit. Reviewed trial steroid in case allergic reaction/contact derm, but no typical exposures. Allergy evaluation planned if recurring or not improving.       Follow-up and Dispositions    · Return in about 1 month (around 7/22/2019), or if symptoms worsen or fail to improve, for ADHD medication follow-up.       reviewed medications and side effects in detail    For additional documentation of information and/or recommendations discussed this visit, please see notes in instructions. Plan and evaluation (above) reviewed with pt at visit  Patient voiced understanding of plan and provided with time to ask/review questions. After Visit Summary (AVS) provided to pt after visit with additional instructions as needed/reviewed.

## 2019-06-24 NOTE — PATIENT INSTRUCTIONS
1.  Start prednisone and cetirizine when you fill today. Use hydroxyzine as needed for itching. You should not need topical steroid (triamcinolone) with the oral steroids. 2.  For itching: 
--Use over the counter, topical calamine or caladryl or topical benadryl for the itching/rash. --Use colloidal oatmeal baths and/or moisturizers to help with itching.

## 2019-06-24 NOTE — PROGRESS NOTES
RM 17    Chief Complaint   Patient presents with    Rash     itchy legs x 2 days both legs , 1 spot on right arm       1. Have you been to the ER, urgent care clinic since your last visit? Hospitalized since your last visit? Yes Where: [facility] 6/23/19- rash/bites    2. Have you seen or consulted any other health care providers outside of the [address] [place] [address] since your last visit? Include any pap smears or colon screening.  No    Health Maintenance Due   Topic Date Due    Pneumococcal 0-64 years (1 of 1 - PPSV23) 11/09/1988    DTaP/Tdap/Td series (1 - Tdap) 11/09/2003    PAP AKA CERVICAL CYTOLOGY  08/12/2017

## 2019-06-27 ENCOUNTER — OFFICE VISIT (OUTPATIENT)
Dept: INTERNAL MEDICINE CLINIC | Age: 39
End: 2019-06-27

## 2019-06-27 ENCOUNTER — TELEPHONE (OUTPATIENT)
Dept: INTERNAL MEDICINE CLINIC | Age: 39
End: 2019-06-27

## 2019-06-27 VITALS
WEIGHT: 174 LBS | HEIGHT: 65 IN | RESPIRATION RATE: 24 BRPM | OXYGEN SATURATION: 97 % | SYSTOLIC BLOOD PRESSURE: 121 MMHG | HEART RATE: 128 BPM | DIASTOLIC BLOOD PRESSURE: 80 MMHG | TEMPERATURE: 98.5 F | BODY MASS INDEX: 28.99 KG/M2

## 2019-06-27 DIAGNOSIS — R21 RASH AND OTHER NONSPECIFIC SKIN ERUPTION: Primary | ICD-10-CM

## 2019-06-27 NOTE — TELEPHONE ENCOUNTER
Spoke with pt, after verifying ot name and . Pt stated her rash is spreading and now has blisters all over her foot. Pt was seen in Ed last night and given a steroid shot, which pt states is not helping. Pt has used OTC calamine and caladryl lotion with not relief from itching. Advised pt that she would need to be seen to be reevaluated . Pt agreeable . Pt scheduled today with Ms. Umana . Pt voiced understanding.

## 2019-06-27 NOTE — PATIENT INSTRUCTIONS
Rash: Care Instructions  Your Care Instructions  A rash is any irritation or inflammation of the skin. Rashes have many possible causes, including allergy, infection, illness, heat, and emotional stress. Follow-up care is a key part of your treatment and safety. Be sure to make and go to all appointments, and call your doctor if you are having problems. It's also a good idea to know your test results and keep a list of the medicines you take. How can you care for yourself at home? · Wash the area with water only. Soap can make dryness and itching worse. Pat dry. · Put cold, wet cloths on the rash to reduce itching. · Keep cool, and stay out of the sun. · Leave the rash open to the air as much of the time as possible. · Sometimes petroleum jelly (Vaseline) can help relieve the discomfort caused by a rash. A moisturizing lotion, such as Cetaphil, also may help. Calamine lotion may help for rashes caused by contact with something (such as a plant or soap) that irritated the skin. Use it 3 or 4 times a day. · If your doctor prescribed a cream, use it as directed. If your doctor prescribed medicine, take it exactly as directed. · If your rash itches so badly that it interferes with your normal activities, take an over-the-counter antihistamine, such as diphenhydramine (Benadryl) or loratadine (Claritin). Read and follow all instructions on the label. When should you call for help? Call your doctor now or seek immediate medical care if:    · You have signs of infection, such as:  ? Increased pain, swelling, warmth, or redness. ? Red streaks leading from the area. ? Pus draining from the area. ? A fever.     · You have joint pain along with the rash.    Watch closely for changes in your health, and be sure to contact your doctor if:    · Your rash is changing or getting worse.  For example, call if you have pain along with the rash, the rash is spreading, or you have new blisters.     · You do not get better after 1 week. Where can you learn more? Go to http://jalil-mo.info/. Enter Z648 in the search box to learn more about \"Rash: Care Instructions. \"  Current as of: April 17, 2018  Content Version: 11.9  © 9829-3709 [employer]. Care instructions adapted under license by [employer] (which disclaims liability or warranty for this information). If you have questions about a medical condition or this instruction, always ask your healthcare professional. [address] any warranty or liability for your use of this information.

## 2019-06-27 NOTE — TELEPHONE ENCOUNTER
Pt seen 6/24/19 for poison oak, pt given steroids. Pt went to [facility] ER 6/25/19, pt was given a steroid shot. Pt states that poison ivy is still spreading and blistering. Please recommend what else pt can do / take. Please call pt - ph# [phone].

## 2019-06-27 NOTE — PROGRESS NOTES
Rm#[id number]  Went to [facility] on [id number] 6-23-19 gave hydroxyizine   Then saw Dr. Savannah Meyer 6-24-19  Went back to [facility] 6-25-19 gave steroid injection   Chief Complaint   Patient presents with    Rash     x5 days. rash/blisters-painful and itching. legs, arms, and face. 1. Have you been to the ER, urgent care clinic since your last visit? Hospitalized since your last visit? Yes [facility] 6-23 and 6-25 rash     2. Have you seen or consulted any other health care providers outside of the [address] [place] [address] since your last visit? Include any pap smears or colon screening.  No  Health Maintenance Due   Topic Date Due    Pneumococcal 0-64 years (1 of 1 - PPSV23) 11/09/1988    DTaP/Tdap/Td series (1 - Tdap) 11/09/2003    PAP AKA CERVICAL CYTOLOGY  08/12/2017     3 most recent PHQ Screens 6/24/2019   Little interest or pleasure in doing things Not at all   Feeling down, depressed, irritable, or hopeless Not at all   Total Score PHQ 2 0   Trouble falling or staying asleep, or sleeping too much -   Feeling tired or having little energy -   Poor appetite, weight loss, or overeating -   Feeling bad about yourself - or that you are a failure or have let yourself or your family down -   Trouble concentrating on things such as school, work, reading, or watching TV -   Moving or speaking so slowly that other people could have noticed; or the opposite being so fidgety that others notice -   Thoughts of being better off dead, or hurting yourself in some way -   PHQ 9 Score -

## 2019-06-27 NOTE — PROGRESS NOTES
HISTORY OF PRESENT ILLNESS  Dona Ana Joana is a 41 y.o. female presents for acute care   HPI  She reports, Itchy painful lesions legs,  right side face, both arms for 5 days     Seen by [facility] provider X2 and  PCP for similar lesions. Received hydroxyzine, NSAIDs and steroids without significant improvement     Denies contact to known allergen, travel, new medication, skin care products or  dietary change     No history of similar lesion    Family unaffected       Past Medical History:   Diagnosis Date    ADD (attention deficit disorder) 17-17yo    Treated with Adderall since dx. 2013 dosing 20mg AM and 10mg PM.  Trial/change to Vyvanse Nov-Dec 2015--not as effective.  Gynecologic disorder     History of miscarriages. History of Mirena IUD placed on 4/17/15    HX OTHER MEDICAL      -BUFA baby    HX OTHER MEDICAL     HPV positive    Idiopathic vulvodynia 2014    L1 vertebral fracture (HCC) 2017    [facility] imaging/admissoin: Mild acute two column compression fracture of L1 without evidence of retropulsion into the spinal canal.  Managed non-operatively.  Migraines 2013    Neurological disorder     Pelvic pain in female 9/15/2014    2015 gyn laparoscopy/hysteroscopy with endometriosis worse right.  Psychiatric disorder     depression    Secondary dysmenorrhea 2014    With menorraghia    Seizures ([facility] [address]     never on meds--had appr 4-5 in a short amt of time and none since     Review of Systems   Constitutional: Positive for malaise/fatigue. Negative for chills and fever. HENT: Negative. Eyes: Negative. Respiratory: Negative. Gastrointestinal: Negative. Genitourinary: Negative. Musculoskeletal: Negative. Skin: Positive for itching and rash. Neurological: Negative.       Visit Vitals  /80 (BP 1 Location: Right arm, BP Patient Position: Sitting)   Pulse (!) 128   Temp 98.5 °F (36.9 °C) (Oral)   Resp 24   Ht 5' 5\" (1.651 m)   Wt 174 lb (78.9 kg)   LMP 03/14/2016   SpO2 97%   BMI 28.96 kg/m²     Physical Exam   Constitutional: She is oriented to person, place, and time. She appears well-developed and well-nourished. She appears distressed. Cardiovascular: Regular rhythm and normal heart sounds. Tachycardia present. Pulmonary/Chest: Effort normal and breath sounds normal.   Neurological: She is alert and oriented to person, place, and time. No cranial nerve deficit. Skin: Skin is warm and dry. Rash noted. Rash is macular and vesicular. She is not diaphoretic. There is erythema. Scattered, erythematous, macula, bullous and blister like lesions bilateral upper and lower extremities including dorsum of feet. Psychiatric: Her mood appears anxious. Cognition and memory are normal.       ASSESSMENT and PLAN    ICD-10-CM ICD-9-CM    1. Rash and other nonspecific skin eruption R21 782.1 CBC WITH AUTOMATED DIFF      METABOLIC PANEL, COMPREHENSIVE      SED RATE (ESR)      JENNY BY MULTIPLEX FLOW IA, QL     Follow-up and Dispositions    · Return in about 1 week (around 7/4/2019), or if symptoms worsen or fail to improve, for rash. lab results and schedule of future lab studies reviewed with patient  reviewed medications and side effects in detail    Defer new medication. Encouraged to take NSAID on schedule  Labs today   Encouraged to call if symptoms worsen    Patient voices understanding and acceptance of this advice and will call back if any further questions or concerns. An After Visit Summary was printed and given to the patient.

## 2019-06-28 LAB
ALBUMIN SERPL-MCNC: 4.2 G/DL (ref 3.5–5.5)
ALBUMIN/GLOB SERPL: 1.7 {RATIO} (ref 1.2–2.2)
ALP SERPL-CCNC: 103 IU/L (ref 39–117)
ALT SERPL-CCNC: 12 IU/L (ref 0–32)
ANA SER QL: NEGATIVE
AST SERPL-CCNC: 10 IU/L (ref 0–40)
BASOPHILS # BLD AUTO: 0 X10E3/UL (ref 0–0.2)
BASOPHILS NFR BLD AUTO: 0 %
BILIRUB SERPL-MCNC: 0.3 MG/DL (ref 0–1.2)
BUN SERPL-MCNC: 22 MG/DL (ref 6–20)
BUN/CREAT SERPL: 29 (ref 9–23)
CALCIUM SERPL-MCNC: 9.2 MG/DL (ref 8.7–10.2)
CHLORIDE SERPL-SCNC: 108 MMOL/L (ref 96–106)
CO2 SERPL-SCNC: 21 MMOL/L (ref 20–29)
CREAT SERPL-MCNC: 0.75 MG/DL (ref 0.57–1)
EOSINOPHIL # BLD AUTO: 0.1 X10E3/UL (ref 0–0.4)
EOSINOPHIL NFR BLD AUTO: 1 %
ERYTHROCYTE [DISTWIDTH] IN BLOOD BY AUTOMATED COUNT: 14.1 % (ref 12.3–15.4)
ERYTHROCYTE [SEDIMENTATION RATE] IN BLOOD BY WESTERGREN METHOD: 4 MM/HR (ref 0–32)
GLOBULIN SER CALC-MCNC: 2.5 G/DL (ref 1.5–4.5)
GLUCOSE SERPL-MCNC: 118 MG/DL (ref 65–99)
HCT VFR BLD AUTO: 42.5 % (ref 34–46.6)
HGB BLD-MCNC: 14.5 G/DL (ref 11.1–15.9)
IMM GRANULOCYTES # BLD AUTO: 0 X10E3/UL (ref 0–0.1)
IMM GRANULOCYTES NFR BLD AUTO: 0 %
LYMPHOCYTES # BLD AUTO: 1.7 X10E3/UL (ref 0.7–3.1)
LYMPHOCYTES NFR BLD AUTO: 16 %
MCH RBC QN AUTO: 30.7 PG (ref 26.6–33)
MCHC RBC AUTO-ENTMCNC: 34.1 G/DL (ref 31.5–35.7)
MCV RBC AUTO: 90 FL (ref 79–97)
MONOCYTES # BLD AUTO: 0.2 X10E3/UL (ref 0.1–0.9)
MONOCYTES NFR BLD AUTO: 2 %
NEUTROPHILS # BLD AUTO: 8.6 X10E3/UL (ref 1.4–7)
NEUTROPHILS NFR BLD AUTO: 81 %
PLATELET # BLD AUTO: 380 X10E3/UL (ref 150–450)
POTASSIUM SERPL-SCNC: 3.9 MMOL/L (ref 3.5–5.2)
PROT SERPL-MCNC: 6.7 G/DL (ref 6–8.5)
RBC # BLD AUTO: 4.72 X10E6/UL (ref 3.77–5.28)
SODIUM SERPL-SCNC: 144 MMOL/L (ref 134–144)
WBC # BLD AUTO: 10.7 X10E3/UL (ref 3.4–10.8)

## 2019-07-01 ENCOUNTER — TELEPHONE (OUTPATIENT)
Dept: INTERNAL MEDICINE CLINIC | Age: 39
End: 2019-07-01

## 2019-07-01 NOTE — TELEPHONE ENCOUNTER
Spoke with pt, after verifying pt name and . Informed pt that we have received lab results just awaiting review from provider. Pt stated she ia not getting better. Rash is now spreading to her legs , arms and a spot on her face. Pt stated the rash is turning into blisters . Pt stated she has a blister the size of a nickel and a  Quarter on her foot. I informed pt that provider is put today but would speek to another provider and give her a call back. Answered any and all questions pt had . Pt verbalized understanding.

## 2019-07-01 NOTE — TELEPHONE ENCOUNTER
Spoke with pt, after verifying pt name and . Let pt know I had spoken to another provider about her rash. Per Dr. Ember Evans pt needs to be seen by dermatology, provided P# [phone] . Also scheduled pt to be seen here tomorrow with Dr. Luanne Rain. Pt stated no fevers, just spreading rash and increased itching . Pt stated she is taking her abx and has 2 steroids left . Pt stated she will be calling derm today. Answered any and all questions pt had. Pt voiced understanding.

## 2019-07-01 NOTE — TELEPHONE ENCOUNTER
----- Message from Henrry Goodman sent at 7/1/2019 10:42 AM EDT -----  Regarding: Dr Swartz/telephone  Pt is calling to get Test Result, and to let the doctor know that she is getting worst, please call pt at [phone].

## 2019-07-02 ENCOUNTER — OFFICE VISIT (OUTPATIENT)
Dept: INTERNAL MEDICINE CLINIC | Age: 39
End: 2019-07-02

## 2019-07-02 VITALS
WEIGHT: 174 LBS | OXYGEN SATURATION: 98 % | RESPIRATION RATE: 16 BRPM | HEART RATE: 95 BPM | TEMPERATURE: 98 F | HEIGHT: 65 IN | SYSTOLIC BLOOD PRESSURE: 114 MMHG | DIASTOLIC BLOOD PRESSURE: 74 MMHG | BODY MASS INDEX: 28.99 KG/M2

## 2019-07-02 DIAGNOSIS — L98.9 PAINFUL SKIN LESION: ICD-10-CM

## 2019-07-02 DIAGNOSIS — R21 BULLOUS RASH: Primary | ICD-10-CM

## 2019-07-02 RX ORDER — GABAPENTIN 300 MG/1
300 CAPSULE ORAL 3 TIMES DAILY
Qty: 90 CAP | Refills: 1 | Status: SHIPPED | OUTPATIENT
Start: 2019-07-02 | End: 2019-10-16 | Stop reason: SDUPTHER

## 2019-07-02 NOTE — PROGRESS NOTES
RM 15    Pt has appt. tomorrow with [facility]  dermatology at 8:30    Pt now has blisters and when they pop , they become very painful and burn    Chief Complaint   Patient presents with    Rash     follow up/ getting worse           1. Have you been to the ER, urgent care clinic since your last visit? Hospitalized since your last visit? No    2. Have you seen or consulted any other health care providers outside of the [address] [place] [address] since your last visit? Include any pap smears or colon screening.  No    Health Maintenance Due   Topic Date Due    Pneumococcal 0-64 years (1 of 1 - PPSV23) 11/09/1988    DTaP/Tdap/Td series (1 - Tdap) 11/09/2003    PAP AKA CERVICAL CYTOLOGY  08/12/2017

## 2019-07-02 NOTE — PROGRESS NOTES
History of Present Illness:   Lorraine Garsia is a 41 y.o. female here for evaluation:    Chief Complaint   Patient presents with    Rash     follow up/ getting worse     Notes:  Pt has appt. tomorrow with [facility]  dermatology at 8:30  Pt now has blisters and when they pop , they become very painful and burn    Seen here by me on 6/24 and by NP on 6/27. No new scripts/therapies from 6/27 visit. Labs done 6/27 reviewed at visit--copy to review with dermatology tomorrow. She notes when called, was asked to come in to review rash. She notes continued new lesions and problems with prior lesions healing. She has new rash on feet currently. She notes burning pain in areas rash. She notes nothing helping rash. She had steroid taper which didn't help. She completed 1-2 days ago, and notes no improvement at starting dose and no improvement or worsening with taper--no effect. Reviewed derm eval, gabapentin for burning pain and follow-up below. She was dizzy/light-headed with BP below, but improved with water in clinic. Prior to Admission medications    Medication Sig Start Date End Date Taking? Authorizing Provider   triamcinolone-dimethicone 0.1-5 % ktoc by Apply Externally route. Yes Provider, Historical   predniSONE (DELTASONE) 20 mg tablet 60mg PO daily today, then 40mg PO daily for 3 days, then 20mg PO daily for 3 days, then 10mg daily PO for 4 days, then stop 6/24/19  Yes Mikey Alvarado MD   hydrOXYzine HCl (ATARAX) 25 mg tablet Take 1-2 Tabs by mouth every six (6) hours as needed for Itching. Use instead of Benadryl/diphenhydramine. 6/24/19  Yes Mikey Alvarado MD   cetirizine (ZYRTEC) 10 mg tablet Take 1 Tab by mouth daily as needed for Allergies or Itching. 6/24/19  Yes Mikey Alvarado MD   dextroamphetamine-amphetamine (ADDERALL) 20 mg tablet Take 20mg in AM and 20mg again in afternoon as needed.  6/17/19  Yes Mikey Alvarado MD dextroamphetamine-amphetamine (ADDERALL) 20 mg tablet Take 20mg in AM and 20mg again in afternoon as needed. 5/18/19  Yes Lissy Blank MD   dextroamphetamine-amphetamine (ADDERALL) 20 mg tablet Take 20mg in AM and 20mg again in afternoon as needed. 4/18/19  Yes Lsisy Blank MD   azelastine (ASTELIN) 137 mcg (0.1 %) nasal spray 2 Sprays by Both Nostrils route two (2) times daily as needed for Rhinitis. 4/11/19  Yes Lissy Blank MD   fluticasone propionate (FLONASE) 50 mcg/actuation nasal spray 2 Sprays by Both Nostrils route daily. Use 2 times daily over next 2 weeks) then 1 time daily. 4/1/19  Yes Pato Tay MD        ROS    Vitals:    07/02/19 1028 07/02/19 1034   BP: (!) 69/47--lying down--left side 114/74--sitting   Pulse: 93 95   Resp: 16 16   Temp: 98 °F (36.7 °C) 98 °F (36.7 °C)   TempSrc: Oral Oral   SpO2: 97% 98%   Weight: 174 lb (78.9 kg) 174 lb (78.9 kg)   Height: 5' 5\" (1.651 m) 5' 5\" (1.651 m)   PainSc:   6   6   LMP: 03/14/2016      Body mass index is 28.96 kg/m². Physical Exam:     Physical Exam   Constitutional: She appears well-developed and well-nourished. No distress. HENT:   Head: Normocephalic and atraumatic. Eyes: Conjunctivae are normal. Right eye exhibits no discharge. Left eye exhibits no discharge. No scleral icterus. Cardiovascular: Normal rate. Pulmonary/Chest: Effort normal. No stridor. No respiratory distress. Abdominal: She exhibits no distension. Neurological: She is alert. Coordination normal.   Skin: Skin is warm. Rash noted. She is not diaphoretic. No pallor. Psychiatric: She has a normal mood and affect. Her behavior is normal. Judgment and thought content normal.     Skin exam (continued):    Scattered rashes in different stages healing. Left thigh lesion is slightly dry, flattened lesion with mild erythema and no blistering.   New lesion left posterior foot/heel is a slightly tense circular blister with mild erythema. There are scattered bilat LE lesions, which are either raised linear papules (healing lesions), or newer linear papules with blisters. Agree with derm eval tomorrow as scheduled. Assessment and Plan:       ICD-10-CM ICD-9-CM    1. Bullous rash R21 782.1 gabapentin (NEURONTIN) 300 mg capsule   2. Painful skin lesion L98.9 709.9 gabapentin (NEURONTIN) 300 mg capsule       1. Eval with dermatology tomorrow. Remain off steroids, since not effective for treatment to date. Reviewed gabapentin PRN burning pain related to rash. She took for chronic abdominal/gyn pain in past, and tolerated fine. She prefers as printed script and will review starting once seen with dermatology tomorrow. Reviewed with pt at visit:  If dermatology has specific/directed therapy for rash, may not need to start gabapentin. Follow-up and Dispositions    · Return in about 1 month (around 7/30/2019) for rash/medication follow-up.       lab results and schedule of future lab studies reviewed with patient  reviewed medications and side effects in detail    For additional documentation of information and/or recommendations discussed this visit, please see notes in instructions. Plan and evaluation (above) reviewed with pt at visit  Patient voiced understanding of plan and provided with time to ask/review questions. After Visit Summary (AVS) provided to pt after visit with additional instructions as needed/reviewed.

## 2019-07-19 ENCOUNTER — OFFICE VISIT (OUTPATIENT)
Dept: INTERNAL MEDICINE CLINIC | Age: 39
End: 2019-07-19

## 2019-07-19 VITALS
HEIGHT: 65 IN | TEMPERATURE: 98.3 F | RESPIRATION RATE: 14 BRPM | SYSTOLIC BLOOD PRESSURE: 119 MMHG | WEIGHT: 189 LBS | HEART RATE: 100 BPM | BODY MASS INDEX: 31.49 KG/M2 | OXYGEN SATURATION: 95 % | DIASTOLIC BLOOD PRESSURE: 65 MMHG

## 2019-07-19 DIAGNOSIS — Z71.6 ENCOUNTER FOR SMOKING CESSATION COUNSELING: ICD-10-CM

## 2019-07-19 DIAGNOSIS — L25.5 RHUS DERMATITIS: Primary | ICD-10-CM

## 2019-07-19 DIAGNOSIS — F98.8 ATTENTION DEFICIT DISORDER, UNSPECIFIED HYPERACTIVITY PRESENCE: ICD-10-CM

## 2019-07-19 RX ORDER — DEXTROAMPHETAMINE SACCHARATE, AMPHETAMINE ASPARTATE, DEXTROAMPHETAMINE SULFATE AND AMPHETAMINE SULFATE 5; 5; 5; 5 MG/1; MG/1; MG/1; MG/1
TABLET ORAL
Qty: 60 TAB | Refills: 0 | Status: SHIPPED | OUTPATIENT
Start: 2019-07-19 | End: 2019-11-07

## 2019-07-19 RX ORDER — DEXTROAMPHETAMINE SACCHARATE, AMPHETAMINE ASPARTATE, DEXTROAMPHETAMINE SULFATE AND AMPHETAMINE SULFATE 5; 5; 5; 5 MG/1; MG/1; MG/1; MG/1
TABLET ORAL
Qty: 60 TAB | Refills: 0 | Status: SHIPPED | OUTPATIENT
Start: 2019-09-17 | End: 2019-10-16 | Stop reason: SDUPTHER

## 2019-07-19 RX ORDER — DEXTROAMPHETAMINE SACCHARATE, AMPHETAMINE ASPARTATE, DEXTROAMPHETAMINE SULFATE AND AMPHETAMINE SULFATE 5; 5; 5; 5 MG/1; MG/1; MG/1; MG/1
TABLET ORAL
Qty: 60 TAB | Refills: 0 | Status: SHIPPED | OUTPATIENT
Start: 2019-08-18 | End: 2019-11-07

## 2019-07-19 NOTE — PROGRESS NOTES
RM 18    Chief Complaint   Patient presents with    Medication Evaluation     1. Have you been to the ER, urgent care clinic since your last visit? Hospitalized since your last visit? No    2. Have you seen or consulted any other health care providers outside of the [address] [place] [address] since your last visit? Include any pap smears or colon screening. Yes Reason for visit: July 2019, Dermatology, rash on legs     Health Maintenance Due   Topic Date Due    Pneumococcal 0-64 years (1 of 1 - PPSV23) 11/09/1988    DTaP/Tdap/Td series (1 - Tdap) 11/09/2003    PAP AKA CERVICAL CYTOLOGY  08/12/2017     Abuse Screening Questionnaire 7/19/2019   Do you ever feel afraid of your partner? N   Are you in a relationship with someone who physically or mentally threatens you? N   Is it safe for you to go home?  Y     3 most recent PHQ Screens 7/2/2019   Little interest or pleasure in doing things Not at all   Feeling down, depressed, irritable, or hopeless Not at all   Total Score PHQ 2 0   Trouble falling or staying asleep, or sleeping too much -   Feeling tired or having little energy -   Poor appetite, weight loss, or overeating -   Feeling bad about yourself - or that you are a failure or have let yourself or your family down -   Trouble concentrating on things such as school, work, reading, or watching TV -   Moving or speaking so slowly that other people could have noticed; or the opposite being so fidgety that others notice -   Thoughts of being better off dead, or hurting yourself in some way -   PHQ 9 Score -

## 2019-07-19 NOTE — PROGRESS NOTES
History of Present Illness:   Nabila Lozano is a 41 y.o. female here for evaluation:    Chief Complaint   Patient presents with    Medication Evaluation       Seen 7/3 by dermatology for rash eval.  She notes related to detergent. She was given PO/topical steroids there and notes resolving    Seen at [facility] by Dr. Anshu Mejia MD.  Treated with 24day steroid taper. Dx \"favor rhus dermatitis given linear patterns on legs, with autoeczematization\"  She notes was deteregent related. She had not had significant problems with detergents prior. Just prior to derm eval, she put on new undergarment/bra washed in different detergent, and had localized rash in same distribution of clothing. She realized she had accidentally purchased detergent next to one she normally buys at same store. Once realized, she has re-washed clothing in prior detergent and no new or recurring rash. Notes no problems with ADHD medications. Prescription Monitoring Program ([place]) database query with fills:  07/02/2019 3 07/02/2019 Gabapentin 300 Mg Capsule 90 30 Cl [facility] [id number] [employer] ([id number]) 0 [facility] [place]   06/17/2019 2 04/11/2019 Dextroamp-Amphetamin 20 Mg Tab 60 30 Cl [facility] [id number] [facility] ([id number]) 0 [facility] [place]   05/17/2019 3 04/11/2019 Dextroamp-Amphetamin 20 Mg Tab 60 30 Cl [facility] [id number] [facility] (8579) 0 [place] [facility] [place]   04/18/2019 3 04/11/2019 Dextroamp-Amphetamin 20 Mg Tab 60 30 Cl Chi [id number] [facility] (6301) 0 [facility] [place]      Wt Readings from Last 3 Encounters:   07/19/19 189 lb (85.7 kg)   07/02/19 174 lb (78.9 kg)   06/27/19 174 lb (78.9 kg)     BP Readings from Last 3 Encounters:   07/19/19 119/65   07/02/19 114/74   06/27/19 121/80     Pulse Readings from Last 3 Encounters:   07/19/19 100   07/02/19 95   06/27/19 (!) 128       Refills reviewed as below. Nursing screenings reviewed by provider at visit. Prior to Admission medications    Medication Sig Start Date End Date Taking?  Authorizing Provider cetirizine (ZYRTEC) 10 mg tablet Take 1 Tab by mouth daily as needed for Allergies or Itching. 6/24/19  Yes Cee Roberts MD   dextroamphetamine-amphetamine (ADDERALL) 20 mg tablet Take 20mg in AM and 20mg again in afternoon as needed. 6/17/19  Yes Cee Roberts MD   dextroamphetamine-amphetamine (ADDERALL) 20 mg tablet Take 20mg in AM and 20mg again in afternoon as needed. 5/18/19  Yes Cee Roberts MD   dextroamphetamine-amphetamine (ADDERALL) 20 mg tablet Take 20mg in AM and 20mg again in afternoon as needed. 4/18/19  Yes Cee Roberts MD   azelastine (ASTELIN) 137 mcg (0.1 %) nasal spray 2 Sprays by Both Nostrils route two (2) times daily as needed for Rhinitis. 4/11/19  Yes Cee Roberts MD   gabapentin (NEURONTIN) 300 mg capsule Take 1 Cap by mouth three (3) times daily. Max Daily Amount: 900 mg. As directed for itching. 7/2/19   Cee Roberts MD   triamcinolone-dimethicone 0.1-5 % ktoc by Apply Externally route. Provider, Historical   predniSONE (DELTASONE) 20 mg tablet 60mg PO daily today, then 40mg PO daily for 3 days, then 20mg PO daily for 3 days, then 10mg daily PO for 4 days, then stop 6/24/19   Cee Roberts MD   hydrOXYzine HCl (ATARAX) 25 mg tablet Take 1-2 Tabs by mouth every six (6) hours as needed for Itching. Use instead of Benadryl/diphenhydramine. 6/24/19   Cee Roberts MD   fluticasone propionate (FLONASE) 50 mcg/actuation nasal spray 2 Sprays by Both Nostrils route daily. Use 2 times daily over next 2 weeks) then 1 time daily. 4/1/19   Saul Johnson MD        ROS    Vitals:    07/19/19 1109   BP: 119/65   Pulse: 100   Resp: 14   Temp: 98.3 °F (36.8 °C)   TempSrc: Oral   SpO2: 95%   Weight: 189 lb (85.7 kg)   Height: 5' 5\" (1.651 m)   PainSc:   0 - No pain   LMP: 03/14/2016      Body mass index is 31.45 kg/m². Physical Exam:     Physical Exam   Constitutional: She appears well-developed and well-nourished. No distress. HENT:   Head: Normocephalic and atraumatic. Eyes: Conjunctivae are normal. Right eye exhibits no discharge. Left eye exhibits no discharge. No scleral icterus. Neck: Neck supple. Cardiovascular: Normal rate, regular rhythm, normal heart sounds and intact distal pulses. Exam reveals no gallop and no friction rub. No murmur heard. Pulmonary/Chest: Effort normal and breath sounds normal. No respiratory distress. She has no wheezes. She has no rales. She exhibits no tenderness. Abdominal: Soft. Bowel sounds are normal. She exhibits no distension. There is no tenderness. Musculoskeletal: She exhibits no edema or tenderness. Neurological: She is alert. She exhibits normal muscle tone. Coordination normal.   Skin: Skin is warm. Rash (~5-7 individual residual lesiosn left lateral leg. No other rash noted.) noted. She is not diaphoretic. No erythema. No pallor. Psychiatric: She has a normal mood and affect. Her behavior is normal. Judgment and thought content normal.       Assessment and Plan:       ICD-10-CM ICD-9-CM    1. Rhus dermatitis--improving--related to detergent L25.5 692.6    2. Attention deficit disorder, unspecified hyperactivity presence F98.8 314.00 dextroamphetamine-amphetamine (ADDERALL) 20 mg tablet      dextroamphetamine-amphetamine (ADDERALL) 20 mg tablet      dextroamphetamine-amphetamine (ADDERALL) 20 mg tablet   3. Encounter for smoking cessation counseling--using inhaled nicotine replacement to stop cigarettes Z71.6 V65.42      305.1        1. Improving--derm note reviewed and scanned to chart. 2.  Refills reviewed. 3.  Monitor with current replacement. She seems very focused on stopping and staying off tobacco at this time. Reviewed limited safety data with non-cigarette nicotine products. Likely lower cancer risks, but similar rish with nicotine exposure.       Follow-up and Dispositions    · Return in about 3 months (around 10/19/2019), or if symptoms worsen or fail to improve, for medication follow-up.       reviewed medications and side effects in detail    Plan and evaluation (above) reviewed with pt at visit  Patient voiced understanding of plan and provided with time to ask/review questions. After Visit Summary (AVS) provided to pt after visit with additional instructions as needed/reviewed.

## 2019-08-23 ENCOUNTER — HOSPITAL ENCOUNTER (EMERGENCY)
Age: 39
Discharge: HOME | End: 2019-08-23
Attending: EMERGENCY MEDICINE
Payer: SELF-PAY

## 2019-08-23 VITALS
BODY MASS INDEX: 30.22 KG/M2 | HEART RATE: 100 BPM | OXYGEN SATURATION: 99 % | DIASTOLIC BLOOD PRESSURE: 68 MMHG | TEMPERATURE: 98.1 F | RESPIRATION RATE: 20 BRPM | SYSTOLIC BLOOD PRESSURE: 109 MMHG | HEIGHT: 64 IN | WEIGHT: 177.03 LBS

## 2019-08-23 DIAGNOSIS — H57.11 ACUTE RIGHT EYE PAIN: Primary | ICD-10-CM

## 2019-08-23 DIAGNOSIS — S05.01XD ABRASION OF RIGHT CORNEA, SUBSEQUENT ENCOUNTER: ICD-10-CM

## 2019-08-23 PROCEDURE — 74011000250 HC RX REV CODE- 250: Performed by: EMERGENCY MEDICINE

## 2019-08-23 PROCEDURE — 99283 EMERGENCY DEPT VISIT LOW MDM: CPT

## 2019-08-23 RX ORDER — HOMATROPINE HYDROBROMIDE OPHTHALMIC 50 MG/ML
1 SOLUTION OPHTHALMIC DAILY
Qty: 1 BOTTLE | Refills: 0 | Status: SHIPPED | OUTPATIENT
Start: 2019-08-23 | End: 2019-08-23 | Stop reason: ALTCHOICE

## 2019-08-23 RX ORDER — TETRACAINE HYDROCHLORIDE 5 MG/ML
2 SOLUTION OPHTHALMIC
Status: COMPLETED | OUTPATIENT
Start: 2019-08-23 | End: 2019-08-23

## 2019-08-23 RX ORDER — DICLOFENAC SODIUM 1 MG/ML
1 SOLUTION/ DROPS OPHTHALMIC 4 TIMES DAILY
Qty: 3 ML | Refills: 0 | Status: SHIPPED | OUTPATIENT
Start: 2019-08-23 | End: 2019-08-26

## 2019-08-23 RX ORDER — CYCLOPENTOLATE HYDROCHLORIDE 10 MG/ML
1 SOLUTION/ DROPS OPHTHALMIC DAILY
Qty: 2 ML | Refills: 0 | Status: SHIPPED | OUTPATIENT
Start: 2019-08-23 | End: 2019-08-26

## 2019-08-23 RX ADMIN — TETRACAINE HYDROCHLORIDE 2 DROP: 5 SOLUTION OPHTHALMIC at 07:30

## 2019-08-23 RX ADMIN — FLUORESCEIN SODIUM 1 STRIP: 1 STRIP OPHTHALMIC at 07:30

## 2019-08-23 NOTE — ED PROVIDER NOTES
EMERGENCY DEPARTMENT HISTORY AND PHYSICAL EXAM      Date: 8/23/2019  Patient Name: Wing Harvey  Patient Age and Sex: 41 y.o. female     History of Presenting Illness     Chief Complaint   Patient presents with    Eye Pain     onset of sxs 1.5 days - seen at urgent care, Dxed with corneal abrasion, and given Rx (erythromycin oitment / cipro eye drops) no relief, no improvement - increased\ burning pain / discharge - denies fevers / nausea / headache        History Provided By: Patient     HPI: Wing Harvey  Is a 28-year-old female with no pertinent past medical history presenting today for right eye pain. Patient states that she had a  drop on her right eye 1.5 days ago. She was seen at the urgent care and was diagnosed with a corneal abrasion on treated with erythromycin and Cipro eyedrops. She states that she has continued to have pain in her eye and is particularly sensitive to light. She denies any fevers, nausea, headache, or purulent drainage. There are no other complaints, changes, or physical findings at this time. PCP: Tanya Valdes MD    No current facility-administered medications on file prior to encounter. Current Outpatient Medications on File Prior to Encounter   Medication Sig Dispense Refill    [START ON 9/17/2019] dextroamphetamine-amphetamine (ADDERALL) 20 mg tablet Take 20mg in AM and 20mg again in afternoon as needed. 60 Tab 0    dextroamphetamine-amphetamine (ADDERALL) 20 mg tablet Take 20mg in AM and 20mg again in afternoon as needed. 60 Tab 0    dextroamphetamine-amphetamine (ADDERALL) 20 mg tablet Take 20mg in AM and 20mg again in afternoon as needed. 60 Tab 0    gabapentin (NEURONTIN) 300 mg capsule Take 1 Cap by mouth three (3) times daily. Max Daily Amount: 900 mg. As directed for itching. 90 Cap 1    triamcinolone-dimethicone 0.1-5 % ktoc by Apply Externally route.       cetirizine (ZYRTEC) 10 mg tablet Take 1 Tab by mouth daily as needed for Allergies or Itching. 30 Tab 5    azelastine (ASTELIN) 137 mcg (0.1 %) nasal spray 2 Sprays by Both Nostrils route two (2) times daily as needed for Rhinitis. 1 Bottle 5       Past History     Past Medical History:  Past Medical History:   Diagnosis Date    ADD (attention deficit disorder) 17-17yo    Treated with Adderall since dx. 2013 dosing 20mg AM and 10mg PM.  Trial/change to Vyvanse Nov-Dec 2015--not as effective.  Gynecologic disorder     History of miscarriages. History of Mirena IUD placed on 4/17/15    HX OTHER MEDICAL      -BUFA baby    HX OTHER MEDICAL     HPV positive    Idiopathic vulvodynia 2014    L1 vertebral fracture (HCC) 2017    [facility] imaging/admissoin: Mild acute two column compression fracture of L1 without evidence of retropulsion into the spinal canal.  Managed non-operatively.  Migraines 2013    Neurological disorder     Pelvic pain in female 9/15/2014    2015 gyn laparoscopy/hysteroscopy with endometriosis worse right.     Psychiatric disorder     depression    Secondary dysmenorrhea 2014    With menorraghia    Seizures ([facility] [address]     never on meds--had appr 4-5 in a short amt of time and none since       Past Surgical History:  Past Surgical History:   Procedure Laterality Date    ENDOMETRIAL CRYOABLATION      HX GYN  4/17/15    laparoscopy and hysteroscopy and IUD placement    HX HYSTERECTOMY  2016    Complete Hysterectomy       Family History:  Family History   Problem Relation Age of Onset    Cancer Mother     Diabetes Mother     Alcohol abuse Father         and drug    Psychiatric Disorder Father     Cancer Father     Diabetes Maternal Grandmother     Hypertension Maternal Grandmother     Thyroid Disease Maternal Grandmother     Diabetes Maternal Grandfather     Hypertension Maternal Grandfather     Cancer Maternal Grandfather     Heart Disease Maternal Grandfather     Cancer Paternal Grandmother     Diabetes Paternal Grandmother        Social History:  Social History     Tobacco Use    Smoking status: Current Every Day Smoker     Packs/day: 0.50     Years: 18.00     Pack years: 9.00     Types: Cigarettes    Smokeless tobacco: Never Used   Substance Use Topics    Alcohol use: Yes     Alcohol/week: 0.0 standard drinks     Comment: rarely.  Drug use: No       Allergies: Allergies   Allergen Reactions    Aspirin Other (comments)     Hot sweats and passed out; tolerated ibuprofen    Penicillins Other (comments)     Childhood. Does not remember reaction. Is not aware if she has ever taken cephalosporins in the past.    Jan 2019: Pt notes no problems with cephalosporins. Review of Systems   Constitutional: No  fever,  No  headache  HEENT:+  eye drainage. Resp: No cough,         Physical Exam     Patient Vitals for the past 12 hrs:   Temp Pulse Resp BP SpO2   08/23/19 0657 98.1 °F (36.7 °C) 100 20 109/68 99 %       General: alert, No acute distress  Eyes: EOMI, conjunctival injection, right eye with a 3 mm x 3 mm area of corneal abrasion at the 9 o'clock position, no evidence of ulceration, conjunctival injection, no hypopyon no foreign body in the upper light lid, normal pupillary reaction both direct and consensual  ENT: moist mucous membranes. Neck: Active, full ROM of neck. Skin: No rashes. no jaundice              Lungs: Equal chest expansion. no respiratory distress. Heart: regular rate     no peripheral edema    Abd:  non distended soft  Back: Full ROM  MSK: Full, active ROM in all 4 extremities. Neuro: alert  Person, Place, Time and Situation; normal speech;   Psych: Cooperative with exam; Appropriate mood and affect             Diagnostic Study Results     Labs -   No results found for this or any previous visit (from the past 12 hour(s)).     Radiologic Studies -   No orders to display     CT Results  (Last 48 hours)    None        CXR Results  (Last 48 hours)    None Medical Decision Making     Differential Diagnosis: Corneal abrasion, corneal ulcer, retained foreign body    I reviewed the vital signs, available nursing notes, past medical history, past surgical history, family history and social history and old medical records. Management/ED course: Patient presents today with eye pain that is continued after having a  hit her eye 2 days ago. She does have corneal abrasion as described above, she was treated with ciprofloxacin and erythromycin. Added topical NSAID and cycloplegic and provided with follow up information for ophthalmology. Procedure Note - Wood's lamp exam:  07:45 PM  Performed by: Senia Hernandez MD  Pts right eye was anesthetized with tetracaine, stained with fluorescein, and examined with a Wood's lamp, using lid eversion. Foreign body: no  Fluorescein uptake: yes, showing Corneal abrasion [id number] at 9 oclock position  The procedure took 1-15 minutes, and pt tolerated well. Dispo: Discharged. The patient has been re-evaluated and is ready for discharge. Reviewed available results with patient. Counseled patient on diagnosis and care plan. Patient has expressed understanding, and all questions have been answered. Patient agrees with plan and agrees to follow up as recommended, or to return to the ED if their symptoms worsen. Discharge instructions have been provided and explained to the patient, along with reasons to return to the ED. PLAN:  Discharge Medication List as of 8/23/2019  7:59 AM      START taking these medications    Details   diclofenac (VOLTAREN) 0.1 % ophthalmic solution Administer 1 Drop to right eye four (4) times daily for 3 days. , Normal, Disp-3 mL, R-0      homatropine (HOMATROPAIRE) 5 % ophthalmic solution Administer 1 Drop to right eye daily for 3 doses. , Normal, Disp-1 Bottle, R-0         CONTINUE these medications which have NOT CHANGED    Details   !! dextroamphetamine-amphetamine (ADDERALL) 20 mg tablet Take 20mg in AM and 20mg again in afternoon as needed. , Print, Disp-60 Tab, R-0      !! dextroamphetamine-amphetamine (ADDERALL) 20 mg tablet Take 20mg in AM and 20mg again in afternoon as needed. , Print, Disp-60 Tab, R-0      !! dextroamphetamine-amphetamine (ADDERALL) 20 mg tablet Take 20mg in AM and 20mg again in afternoon as needed. , Print, Disp-60 Tab, R-0      gabapentin (NEURONTIN) 300 mg capsule Take 1 Cap by mouth three (3) times daily. Max Daily Amount: 900 mg. As directed for itching., Print, Disp-90 Cap, R-1      triamcinolone-dimethicone 0.1-5 % ktoc by Apply Externally route., Historical Med      cetirizine (ZYRTEC) 10 mg tablet Take 1 Tab by mouth daily as needed for Allergies or Itching., Normal, Disp-30 Tab, R-5      azelastine (ASTELIN) 137 mcg (0.1 %) nasal spray 2 Sprays by Both Nostrils route two (2) times daily as needed for Rhinitis., Normal, Disp-1 Bottle, R-5       !! - Potential duplicate medications found. Please discuss with provider. 2.     Follow-up Information     Follow up With Specialties Details Why Kaity Welch MD Ophthalmology   [address]  [phone]          3. Return to ED if worse     Diagnosis     Clinical Impression:   1. Acute right eye pain    2.  Abrasion of right cornea, subsequent encounter        Attestations:    Homero Pruitt MD

## 2019-08-23 NOTE — ED NOTES
Patient given discharge instructions By Dr Jolene Waters. Patient was able to verbalize understanding of instructions. Questions answered  Patient ambulated out of ED in stable condition.

## 2019-09-30 ENCOUNTER — HOSPITAL ENCOUNTER (EMERGENCY)
Age: 39
Discharge: LEFT BEFORE DISCHARGE | End: 2019-09-30
Attending: EMERGENCY MEDICINE
Payer: SELF-PAY

## 2019-09-30 VITALS
DIASTOLIC BLOOD PRESSURE: 74 MMHG | RESPIRATION RATE: 17 BRPM | HEART RATE: 88 BPM | HEIGHT: 64 IN | WEIGHT: 188.49 LBS | TEMPERATURE: 97.8 F | OXYGEN SATURATION: 99 % | BODY MASS INDEX: 32.18 KG/M2 | SYSTOLIC BLOOD PRESSURE: 127 MMHG

## 2019-09-30 PROCEDURE — 75810000275 HC EMERGENCY DEPT VISIT NO LEVEL OF CARE

## 2019-10-01 ENCOUNTER — HOSPITAL ENCOUNTER (EMERGENCY)
Age: 39
Discharge: HOME | End: 2019-10-01
Attending: EMERGENCY MEDICINE
Payer: SELF-PAY

## 2019-10-01 VITALS
RESPIRATION RATE: 18 BRPM | SYSTOLIC BLOOD PRESSURE: 150 MMHG | BODY MASS INDEX: 31.45 KG/M2 | OXYGEN SATURATION: 98 % | DIASTOLIC BLOOD PRESSURE: 123 MMHG | HEART RATE: 108 BPM | WEIGHT: 183.2 LBS | TEMPERATURE: 98.5 F

## 2019-10-01 DIAGNOSIS — F17.218 CIGARETTE NICOTINE DEPENDENCE WITH OTHER NICOTINE-INDUCED DISORDER: ICD-10-CM

## 2019-10-01 DIAGNOSIS — K02.9 DENTAL CARIES: ICD-10-CM

## 2019-10-01 DIAGNOSIS — Z71.6 TOBACCO ABUSE COUNSELING: ICD-10-CM

## 2019-10-01 DIAGNOSIS — Z90.710 HISTORY OF HYSTERECTOMY: ICD-10-CM

## 2019-10-01 DIAGNOSIS — K08.89 DENTALGIA: Primary | ICD-10-CM

## 2019-10-01 PROCEDURE — 99281 EMR DPT VST MAYX REQ PHY/QHP: CPT

## 2019-10-01 RX ORDER — CLINDAMYCIN HYDROCHLORIDE 300 MG/1
300 CAPSULE ORAL 4 TIMES DAILY
Qty: 40 CAP | Refills: 0 | Status: SHIPPED | OUTPATIENT
Start: 2019-10-01 | End: 2019-10-11

## 2019-10-01 RX ORDER — DICLOFENAC SODIUM 75 MG/1
75 TABLET, DELAYED RELEASE ORAL 2 TIMES DAILY
Qty: 20 TAB | Refills: 0 | Status: SHIPPED | OUTPATIENT
Start: 2019-10-01 | End: 2019-10-16 | Stop reason: SDUPTHER

## 2019-10-01 NOTE — ED TRIAGE NOTES
The patient presents to the ED with \"I broke off a tooth\". Reports top right and left.     Pain 7/10

## 2019-10-01 NOTE — DISCHARGE INSTRUCTIONS
Patient 1501 [facility] Departments     For adult and child immunizations, family planning, TB screening, STD testing and women's health services. [facility]: [place] [phone]      [facility] 25   [address] [place] [address]   [address]   170 [facility]: Daniel Villeda [address] [facility] [address]  [phone]      [address]          [place] [place] [address]     For primary care services, woman and child wellness, and some clinics providing specialty care. [facility] -- [facility] [facility] [phone]   411 [facility] 200 [facility] 3614 [facility] [phone]   339 [facility] [address] [facility] [address] [phone]   [address] [place] [address] [address] [place] [address] [phone]   [address] [place] [address] [address] [place] [address] [phone]   [facility] 81 [facility] [phone]   [facility] [address] [place] [address] [phone]   [facility]: [facility] 700 [address], [address] [place] [address], [address] [phone]     [address] [phone]   [facility] 20000 [facility] [phone]   [employer] [facility]  [address], [place] [address] ([email or web address]) [phone]         Sexual Health/Woman Wellness Clinics    For STD/HIV testing and treatment, pregnancy testing and services, men's health, birth control services, LGBT services, and hepatitis/HPV vaccine services. [employer] [facility] for [place] [employer] 201 [address]. [facility] [address] [facility] [address] [facility] 1579 600 [facility] [place] [phone]   [facility] [address], 5th floor [phone]   Pregnancy [address] for Women [facility]  [place] [phone] [address]67 [facility] [address] [phone]   6655 [facility]   [phone]   [place]   [phone]   Women, Infant and Children's Services: [address] [phone]       600 [facility]   [phone]   [address]   4201 [facility]     [phone]   [address] Crisis   1212 [facility] [phone]     Local Primary Care Physicians  [facility] [phone]  MD Derrick Trevino MD Therese Kyle, MD [facility] [phone]  Tom Douglas, FNP  MD Antonio Rg MD Dawayne Ness, MD [address] [place] [address] [phone]  [place] [address] [facility] [phone]  MD Phi Simental MD Edrie Gee, MD Claudius Flies, MD   [facility] [phone]  ALENA TRAORE HG, MD Juarez Allred, MD Tana Durham, NP [address] [facility] [address] [phone]  Jere Fines, MD Jearld Lesches, MD Cloria Slain, MD Donice Brave, MD Rexine Fritter, MD Lissa Atta, MD Regena Monday, MD   94 76 [facility]  Jennifer Marie MD 1300 [facility] [phone]  Pascual Soulier, MD Suzzanne Larger, NP  MD Jessie Sanders MD Venancio Rundle, MD Mejia Mittal MD   9190 [facility] [phone]  MD Eunice Bain, P  Nasreen Briones, NP  MD Gema House MD Billey Pray, MD Gearline Eaton, MD [facility] [phone]  Royce Cheeks, MD  [place] MD Alexis Dhaliwal MD Loralyn Ditto, MD Justino Bombard, MD   [address] [phone]  MD Mckinley Archer MD [place] [address] [phone]  MD [facility] MD Lalita Michaels MD   1439 [facility] [phone]  MD Flako Hamlin MD Marlyse Colder, MD Tyree Lota, MD Lynnann Nixon, MD Gustave Atkinson, AVNI Vance MD 1619  66   [phone]  MD Solo Markham MD Dariel Given, MD   6516 [facility] [phone]  [address] [place] [address] MD Jignesh Sanderson, ALEXP  Subhash Pizarro, PA-C  Subhash Pizarro, FNP  DAVID Denis MD Melina Haws, AVNI Phelan, DO Miscellaneous:  Harriet Scott [phone]            Head or Face Pain: Care Instructions  Your Care Instructions    Common causes of head or face pain are allergies, stress, and injuries. Other causes include tooth problems and sinus infections. Eating certain foods, such as chocolate or cheese, or drinking certain liquids, such as coffee or cola, can cause head pain for some people. If you have mild head pain, you may not need treatment. It is important to watch your symptoms and talk to your doctor if your pain continues or gets worse. Follow-up care is a key part of your treatment and safety. Be sure to make and go to all appointments, and call your doctor if you are having problems. It's also a good idea to know your test results and keep a list of the medicines you take. How can you care for yourself at home? · Take pain medicines exactly as directed. ? If the doctor gave you a prescription medicine for pain, take it as prescribed. ? If you are not taking a prescription pain medicine, ask your doctor if you can take an over-the-counter pain medicine. · Take it easy for the next few days or longer if you are not feeling well. · Use a warm, moist towel or heating pad set on low to relax tight muscles in your shoulder and neck. Have someone gently massage your neck and shoulders.   · Put ice or a cold pack on the area for 10 to 20 minutes at a time. Put a thin cloth between the ice and your skin. When should you call for help? Call 911 anytime you think you may need emergency care. For example, call if:    · You have twitching, jerking, or a seizure.     · You passed out (lost consciousness).     · You have symptoms of a stroke. These may include:  ? Sudden numbness, tingling, weakness, or loss of movement in your face, arm, or leg, especially on only one side of your body. ? Sudden vision changes. ? Sudden trouble speaking. ? Sudden confusion or trouble understanding simple statements. ? Sudden problems with walking or balance. ? A sudden, severe headache that is different from past headaches.     · You have jaw pain and pain in your chest, shoulder, neck, or arm.    Call your doctor now or seek immediate medical care if:    · You have a fever with a stiff neck or a severe headache.     · You have nausea and vomiting, or you cannot keep food or liquids down.    Watch closely for changes in your health, and be sure to contact your doctor if:    · Your head or face pain does not get better as expected. Where can you learn more? Go to http://jalil-mo.info/. Enter P568 in the search box to learn more about \"Head or Face Pain: Care Instructions. \"  Current as of: June 26, 2019  Content Version: 12.2  © 0702-6509 Healthwise, Incorporated. Care instructions adapted under license by [employer] (which disclaims liability or warranty for this information). If you have questions about a medical condition or this instruction, always ask your healthcare professional. [place] [address] any warranty or liability for your use of this information. Patient Education        Periodontal Conditions: Care Instructions  Your Care Instructions    Periodontal conditions affect the gums, bone, and tissue that surround and support the teeth. The most common problems are caused by plaque. Plaque is a thin film of bacteria that sticks to teeth above and below the gum line. It can build up and harden into tartar. The bacteria in plaque and tartar can cause gum disease. Gingivitis is a disease that affects the gums (gingiva). The gums are the soft tissue that surrounds the teeth. Gingivitis causes red, swollen, tender gums that bleed easily when brushed, persistent bad breath, and sensitive teeth. Because it is not painful, many people do not get treatment when they should. Gingivitis can be reversed with good dental care. Periodontitis is a more advanced disease that affects more than the gums. The gums pull away from the teeth. This leaves deep pockets where bacteria can grow. The disease can damage the bones that support the teeth. The teeth may get loose and fall out. A periodontal condition should be treated as soon as it is found. Finding gum problems early, treating them right away, and having regular checkups bring the best results. You can treat mild periodontal conditions by brushing and flossing your teeth every day. Your dentist may prescribe a mouthwash to kill the bacteria that can damage teeth and gums. Your dentist may have you take antibiotics to treat infection from moderate periodontal disease. If your gums have pulled away from your teeth, you may need cleaning between the teeth and gums right down to the teeth roots. This is called root planing and scaling. If you have severe periodontal disease, you may need surgery to remove diseased gum tissue or repair bone damage. Follow-up care is a key part of your treatment and safety. Be sure to make and go to all appointments, and call your dentist if you are having problems. It's also a good idea to know your test results and keep a list of the medicines you take. How can you care for yourself at home? · If your dentist prescribed antibiotics, take them as directed. Do not stop taking them just because you feel better.  You need to take the full course of antibiotics. · Brush your teeth twice a day, in the morning and at night. ? Use a toothbrush with soft, rounded-end bristles and a head that is small enough to reach all parts of your teeth and mouth. Replace your toothbrush every 3 to 4 months. ? Use a fluoride toothpaste. ? Place the brush at a 45-degree angle where the teeth meet the gums. Press firmly, and gently rock the brush back and forth using small circular movements. ? Brush chewing surfaces vigorously with short back-and-forth strokes. ? Brush your tongue from back to front. · Floss at least once a day. Choose the type and flavor that you like best.  · Have your teeth cleaned by a professional at least twice a year. · Ask your dentist about using an antibacterial mouthwash to help reduce bacteria. · Rinse your mouth with water or chew sugar-free gum after meals if you can't brush your teeth. · Do not smoke or use smokeless tobacco. Tobacco use can cause periodontal disease. When should you call for help? Call your dentist now or seek immediate medical care if:    · You have symptoms of infection, such as:  ? Increased pain, swelling, warmth, or redness. ? Red streaks leading from the area. ? Pus draining from the area. ? A fever.    Watch closely for changes in your health, and be sure to contact your dentist if:    · You have new or worse tooth pain.     · You do not get better as expected. Where can you learn more? Go to http://jalil-mo.info/. Enter O868 in the search box to learn more about \"Periodontal Conditions: Care Instructions. \"  Current as of: October 3, 2018  Content Version: 12.2  © 6388-1969 [employer]. Care instructions adapted under license by [employer] (which disclaims liability or warranty for this information).  If you have questions about a medical condition or this instruction, always ask your healthcare professional. [employer] disclaims any warranty or liability for your use of this information. Patient Education        Learning About Benefits From Quitting Smoking  How does quitting smoking make you healthier? If you're thinking about quitting smoking, you may have a few reasons to be smoke-free. Your health may be one of them. · When you quit smoking, you lower your risks for cancer, lung disease, heart attack, stroke, blood vessel disease, and blindness from macular degeneration. · When you're smoke-free, you get sick less often, and you heal faster. You are less likely to get colds, flu, bronchitis, and pneumonia. · As a nonsmoker, you may find that your mood is better and you are less stressed. When and how will you feel healthier? Quitting has real health benefits that start from day 1 of being smoke-free. And the longer you stay smoke-free, the healthier you get and the better you feel. The first hours  · After just 20 minutes, your blood pressure and heart rate go down. That means there's less stress on your heart and blood vessels. · Within 12 hours, the level of carbon monoxide in your blood drops back to normal. That makes room for more oxygen. With more oxygen in your body, you may notice that you have more energy than when you smoked. After 2 weeks  · Your lungs start to work better. · Your risk of heart attack starts to drop. After 1 month  · When your lungs are clear, you cough less and breathe deeper, so it's easier to be active. · Your sense of taste and smell return. That means you can enjoy food more than you have since you started smoking. Over the years  · After 1 year, your risk of heart disease is half what it would be if you kept smoking. · After 5 years, your risk of stroke starts to shrink. Within a few years after that, it's about the same as if you'd never smoked. · After 10 years, your risk of dying from lung cancer is cut by about half.  And your risk for many other types of cancer is lower too. How would quitting help others in your life? When you quit smoking, you improve the health of everyone who now breathes in your smoke. · Their heart, lung, and cancer risks drop, much like yours. · They are sick less. For babies and small children, living smoke-free means they're less likely to have ear infections, pneumonia, and bronchitis. · If you're a woman who is or will be pregnant someday, quitting smoking means a healthier . · Children who are close to you are less likely to become adult smokers. Where can you learn more? Go to http://jalilEcoSynthmo.info/. Enter 052 [id number] 72 11 in the search box to learn more about \"Learning About Benefits From Quitting Smoking. \"  Current as of: 2018  Content Version: 12.2  © 7033-2713 [employer]. Care instructions adapted under license by [employer] (which disclaims liability or warranty for this information). If you have questions about a medical condition or this instruction, always ask your healthcare professional. [place] [address] any warranty or liability for your use of this information.

## 2019-10-01 NOTE — ED PROVIDER NOTES
EMERGENCY DEPARTMENT HISTORY AND PHYSICAL EXAM      Date: 10/1/2019  Patient Name: Lamonte Fragoso    Please note that this dictation was completed with [employer], the computer voice recognition software. Quite often unanticipated grammatical, syntax, homophones, and other interpretive errors are inadvertently transcribed by the computer software. Please disregard these errors. Please excuse any errors that have escaped final proofreading. History of Presenting Illness     Chief Complaint   Patient presents with    Dental Pain       History Provided By: Patient    HPI: Lamonte Fragoso, 41 y.o. female with PMHx significant for ADD, endometriosis, tobacco abuse, presents ambulatory to the ED with cc of right and left upper dental pain for the last 3 days. Patient states that the teeth have broken off. She states she has not seen a dentist on a number of years. She is an active tobacco user. She does not have any dental insurance at this point time but she has been to the [facility] in the past for teeth extraction. She denies any fevers, facial swelling, sore throat, voice changes. PCP: Tobi Villeda MD    There are no other complaints, changes, or physical findings at this time. Current Outpatient Medications   Medication Sig Dispense Refill    clindamycin (CLEOCIN) 300 mg capsule Take 1 Cap by mouth four (4) times daily for 10 days. 40 Cap 0    diclofenac EC (VOLTAREN) 75 mg EC tablet Take 1 Tab by mouth two (2) times a day. 20 Tab 0    dextroamphetamine-amphetamine (ADDERALL) 20 mg tablet Take 20mg in AM and 20mg again in afternoon as needed. 60 Tab 0    dextroamphetamine-amphetamine (ADDERALL) 20 mg tablet Take 20mg in AM and 20mg again in afternoon as needed. 60 Tab 0    dextroamphetamine-amphetamine (ADDERALL) 20 mg tablet Take 20mg in AM and 20mg again in afternoon as needed.  60 Tab 0    gabapentin (NEURONTIN) 300 mg capsule Take 1 Cap by mouth three (3) times daily. Max Daily Amount: 900 mg. As directed for itching. 90 Cap 1    triamcinolone-dimethicone 0.1-5 % ktoc by Apply Externally route.  cetirizine (ZYRTEC) 10 mg tablet Take 1 Tab by mouth daily as needed for Allergies or Itching. 30 Tab 5    azelastine (ASTELIN) 137 mcg (0.1 %) nasal spray 2 Sprays by Both Nostrils route two (2) times daily as needed for Rhinitis. 1 Bottle 5       Past History     Past Medical History:  Past Medical History:   Diagnosis Date    ADD (attention deficit disorder) [age]yo    Treated with Adderall since dx. 2013 dosing 20mg AM and 10mg PM.  Trial/change to Vyvanse Nov-Dec 2015--not as effective.  Gynecologic disorder     History of miscarriages. History of Mirena IUD placed on 4/17/15    HX OTHER MEDICAL      -BUFA baby    HX OTHER MEDICAL     HPV positive    Idiopathic vulvodynia 2014    L1 vertebral fracture (HCC) 2017    [facility] imaging/admissoin: Mild acute two column compression fracture of L1 without evidence of retropulsion into the spinal canal.  Managed non-operatively.  Migraines 2013    Neurological disorder     Pelvic pain in female 9/15/2014    2015 gyn laparoscopy/hysteroscopy with endometriosis worse right.     Psychiatric disorder     depression    Secondary dysmenorrhea 2014    With menorraghia    Se[facility]ures ([facility] [address]     never on meds--had appr 4-5 in a short amt of time and none since       Past Surgical History:  Past Surgical History:   Procedure Laterality Date    ENDOMETRIAL CRYOABLATION      HX GYN  4/17/15    laparoscopy and hysteroscopy and IUD placement    HX HYSTERECTOMY  2016    Complete Hysterectomy       Family History:  Family History   Problem Relation Age of Onset    Cancer Mother     Diabetes Mother     Alcohol abuse Father         and drug    Psychiatric Disorder Father     Cancer Father     Diabetes Maternal Grandmother     Hypertension Maternal Grandmother     Thyroid Disease Maternal Grandmother     Diabetes Maternal Grandfather     Hypertension Maternal Grandfather     Cancer Maternal Grandfather     Heart Disease Maternal Grandfather     Cancer Paternal Grandmother     Diabetes Paternal Grandmother        Social History:  Social History     Tobacco Use    Smoking status: Current Every Day Smoker     Packs/day: 0.50     Years: 18.00     Pack years: 9.00     Types: Cigarettes    Smokeless tobacco: Never Used   Substance Use Topics    Alcohol use: Yes     Alcohol/week: 0.0 standard drinks     Comment: rarely.  Drug use: No       Allergies: Allergies   Allergen Reactions    Aspirin Other (comments)     Hot sweats and passed out; tolerated ibuprofen    Penicillins Other (comments)     Childhood. Does not remember reaction. Is not aware if she has ever taken cephalosporins in the past.    Jan 2019: Pt notes no problems with cephalosporins. Review of Systems   Review of Systems   Constitutional: Negative. Negative for activity change, appetite change, chills and fever. HENT: Positive for dental problem. Negative for facial swelling, rhinorrhea and sore throat. Respiratory: Negative for cough and shortness of breath. Cardiovascular: Negative for chest pain and palpitations. Gastrointestinal: Negative for nausea and vomiting. Musculoskeletal: Negative for arthralgias and myalgias. Skin: Negative for color change, rash and wound. Neurological: Negative for dizziness, numbness and headaches. All other systems reviewed and are negative. Physical Exam   Physical Exam   Constitutional: She is oriented to person, place, and time. She appears well-developed and well-nourished. No distress. 41 y.o.  female in NAD  Communicates appropriately and in full sentences   HENT:   Head: Normocephalic and atraumatic.    Right Ear: External ear normal.   Left Ear: External ear normal.   Mouth/Throat: Oropharynx is clear and moist. No oropharyngeal exudate. First molar is cracked on the upper right jaw. The second molar is cracked on the upper left jaw. Uvula is midline. No trismus or stridor. Tolerating secretions. Eyes: Pupils are equal, round, and reactive to light. Conjunctivae are normal. Right eye exhibits no discharge. Left eye exhibits no discharge. Neck: Normal range of motion. Neck supple. No nuchal rigidity or meningeal signs   Pulmonary/Chest: Effort normal. No respiratory distress. Musculoskeletal: Normal range of motion. She exhibits no deformity. Neurological: She is alert and oriented to person, place, and time. Skin: Skin is warm and dry. No rash noted. She is not diaphoretic. No erythema. Psychiatric: She has a normal mood and affect. Her behavior is normal.   Nursing note and vitals reviewed. Diagnostic Study Results     No formal testing initiated. Medical Decision Making   I am the first provider for this patient. I reviewed the vital signs, available nursing notes, past medical history, past surgical history, family history and social history. Vital Signs-Reviewed the patient's vital signs. Patient Vitals for the past 12 hrs:   Temp Pulse Resp BP SpO2   10/01/19 1241 98.5 °F (36.9 °C) (!) 108 18 (!) 150/123 98 %         Records Reviewed: Nursing Notes and Old Medical Records    Provider Notes (Medical Decision Making):   DDx: sialadenitis, dental caries, fractured tooth, poor compliance with regular dental follow-up, lack of dental insurance, trigeminal neuralgia, retropharyngeal abscess, Brittany Breath, tobacco abuse    Patient presents with dental pain without facial swelling. No obvious abscess that needs drainage. No red flags that make PTA, RPA, ludwigs angina concerning. Pt's vital signs have been stable while in the ED [facility] ED. Will tx with cetacaine/benadryl/lidocaine cotton balls, antibiotics and outpatient analgesics. Given information on dentists and importance of followup and no smoking.  Pt expresses that they will be compliant with the discussed plan. ED Course:   Initial assessment performed. The patients presenting problems have been discussed, and they are in agreement with the care plan formulated and outlined with them. I have encouraged them to ask questions as they arise throughout their visit. TOBACCO COUNSELING:  Spent 1-5 minutes discussing the risks of smoking and the benefits of smoking cessation as well as the long term sequelae of smoking with the pt who verbalized his understanding. Reviewed strategies for success, including gradually decreasing the number of cigarettes smoked a day. Progress Note:  1:15 PM  Offered patient dental block but she declines. DISCHARGE NOTE:  Joaquín Milligan's  results have been reviewed with her. She has been counseled regarding her diagnosis. She verbally conveys understanding and agreement of the signs, symptoms, diagnosis, treatment and prognosis and additionally agrees to follow up as recommended with Dr. Rolando Montiel MD in 24 - 48 hours. She also agrees with the care-plan and conveys that all of her questions have been answered. I have also put together some discharge instructions for her that include: 1) educational information regarding their diagnosis, 2) how to care for their diagnosis at home, as well a 3) list of reasons why they would want to return to the ED prior to their follow-up appointment, should their condition change. She and/or family's questions have been answered. I have encouraged them to see the official results in [facility]\" or to retrieve the specifics of their results from medical records. PLAN:  1. Return precautions as discussed  2. Follow-up with providers as directed  3. Medications as prescribed    Return to ED if worse     Diagnosis     Clinical Impression:   1. Dentalgia    2. Dental caries    3. History of hysterectomy    4. Tobacco abuse counseling    5.  Cigarette nicotine dependence with other nicotine-induced disorder        Current Discharge Medication List      START taking these medications    Details   clindamycin (CLEOCIN) 300 mg capsule Take 1 Cap by mouth four (4) times daily for 10 days. Qty: 40 Cap, Refills: 0      diclofenac EC (VOLTAREN) 75 mg EC tablet Take 1 Tab by mouth two (2) times a day. Qty: 20 Tab, Refills: 0             Follow-up Information     Follow up With Specialties Details Why Contact Info    [place] MD Infectious Diseases Schedule an appointment as soon as possible for a visit in 2 days As needed, If symptoms worsen [facility] Allen Suggs  [phone]                This note will not be viewable in [address]

## 2019-10-16 DIAGNOSIS — F98.8 ATTENTION DEFICIT DISORDER, UNSPECIFIED HYPERACTIVITY PRESENCE: ICD-10-CM

## 2019-10-16 DIAGNOSIS — L98.9 PAINFUL SKIN LESION: ICD-10-CM

## 2019-10-16 DIAGNOSIS — R21 BULLOUS RASH: ICD-10-CM

## 2019-10-16 RX ORDER — DICLOFENAC SODIUM 75 MG/1
75 TABLET, DELAYED RELEASE ORAL
Qty: 20 TAB | Refills: 1 | Status: SHIPPED | OUTPATIENT
Start: 2019-10-16 | End: 2019-11-07

## 2019-10-16 RX ORDER — GABAPENTIN 300 MG/1
300 CAPSULE ORAL 3 TIMES DAILY
Qty: 90 CAP | Refills: 1 | Status: SHIPPED | OUTPATIENT
Start: 2019-10-16 | End: 2019-10-16

## 2019-10-16 RX ORDER — DEXTROAMPHETAMINE SACCHARATE, AMPHETAMINE ASPARTATE, DEXTROAMPHETAMINE SULFATE AND AMPHETAMINE SULFATE 5; 5; 5; 5 MG/1; MG/1; MG/1; MG/1
TABLET ORAL
Qty: 60 TAB | Refills: 0 | Status: SHIPPED | OUTPATIENT
Start: 2019-10-18 | End: 2019-10-16 | Stop reason: SDUPTHER

## 2019-10-16 RX ORDER — DEXTROAMPHETAMINE SACCHARATE, AMPHETAMINE ASPARTATE, DEXTROAMPHETAMINE SULFATE AND AMPHETAMINE SULFATE 5; 5; 5; 5 MG/1; MG/1; MG/1; MG/1
TABLET ORAL
Qty: 60 TAB | Refills: 0 | Status: SHIPPED | OUTPATIENT
Start: 2019-10-18 | End: 2019-11-18 | Stop reason: SDUPTHER

## 2019-10-16 NOTE — TELEPHONE ENCOUNTER
Patient request 1 month supply of medications. States she is not able to make an appt at this time due to not having insurance.

## 2019-10-16 NOTE — TELEPHONE ENCOUNTER
Reviewed request--pt at visit with her son. Prescription Monitoring Program ([place]) database query with fills:  09/17/2019 1 07/19/2019 Dextroamp-Amphetamin 20 Mg Tab 60.00 30 Cl [facility] [id number] [facility] ([id number]) 0 Comm Ins [place]   08/18/2019 1 07/19/2019 Dextroamp-Amphetamin 20 Mg Tab 60.00 30 Cl Chi [id number] [facility] ([id number]) 0 [place]   07/19/2019 1 07/19/2019 Dextroamp-Amphetamin 20 Mg Tab 60.00 30 Cl Chi [id number] [facility] ([id number]) 0 Comm Ins [place]    Refill request(s) approved--diclofenac, gabapentin, Adderall. Printed scripts provided to mom at visit. She notes not using gabapentin, so re-printed script with just Adderall and removed from med list.  Provider gave printed script to pt at visit.

## 2019-11-07 ENCOUNTER — HOSPITAL ENCOUNTER (EMERGENCY)
Age: 39
Discharge: HOME | End: 2019-11-07
Attending: EMERGENCY MEDICINE
Payer: SELF-PAY

## 2019-11-07 VITALS
WEIGHT: 181.44 LBS | HEART RATE: 131 BPM | HEIGHT: 65 IN | OXYGEN SATURATION: 94 % | TEMPERATURE: 98.4 F | RESPIRATION RATE: 18 BRPM | BODY MASS INDEX: 30.23 KG/M2 | SYSTOLIC BLOOD PRESSURE: 111 MMHG | DIASTOLIC BLOOD PRESSURE: 90 MMHG

## 2019-11-07 DIAGNOSIS — K52.9 GASTROENTERITIS: Primary | ICD-10-CM

## 2019-11-07 LAB
ALBUMIN SERPL-MCNC: 3.7 G/DL (ref 3.5–5)
ALBUMIN/GLOB SERPL: 0.9 {RATIO} (ref 1.1–2.2)
ALP SERPL-CCNC: 122 U/L (ref 45–117)
ALT SERPL-CCNC: 34 U/L (ref 12–78)
ANION GAP SERPL CALC-SCNC: 16 MMOL/L (ref 5–15)
AST SERPL-CCNC: 17 U/L (ref 15–37)
BASOPHILS # BLD: 0.1 K/UL (ref 0–0.1)
BASOPHILS NFR BLD: 1 % (ref 0–1)
BILIRUB SERPL-MCNC: 0.7 MG/DL (ref 0.2–1)
BUN SERPL-MCNC: 15 MG/DL (ref 6–20)
BUN/CREAT SERPL: 19 (ref 12–20)
CALCIUM SERPL-MCNC: 8.7 MG/DL (ref 8.5–10.1)
CHLORIDE SERPL-SCNC: 99 MMOL/L (ref 97–108)
CO2 SERPL-SCNC: 20 MMOL/L (ref 21–32)
COMMENT, HOLDF: NORMAL
CREAT SERPL-MCNC: 0.79 MG/DL (ref 0.55–1.02)
DIFFERENTIAL METHOD BLD: ABNORMAL
EOSINOPHIL # BLD: 0.1 K/UL (ref 0–0.4)
EOSINOPHIL NFR BLD: 0 % (ref 0–7)
ERYTHROCYTE [DISTWIDTH] IN BLOOD BY AUTOMATED COUNT: 12.7 % (ref 11.5–14.5)
GLOBULIN SER CALC-MCNC: 4 G/DL (ref 2–4)
GLUCOSE SERPL-MCNC: 99 MG/DL (ref 65–100)
HCT VFR BLD AUTO: 45.6 % (ref 35–47)
HGB BLD-MCNC: 15.7 G/DL (ref 11.5–16)
IMM GRANULOCYTES # BLD AUTO: 0.1 K/UL (ref 0–0.04)
IMM GRANULOCYTES NFR BLD AUTO: 1 % (ref 0–0.5)
LIPASE SERPL-CCNC: 218 U/L (ref 73–393)
LYMPHOCYTES # BLD: 3.2 K/UL (ref 0.8–3.5)
LYMPHOCYTES NFR BLD: 22 % (ref 12–49)
MAGNESIUM SERPL-MCNC: 2.4 MG/DL (ref 1.6–2.4)
MCH RBC QN AUTO: 30.1 PG (ref 26–34)
MCHC RBC AUTO-ENTMCNC: 34.4 G/DL (ref 30–36.5)
MCV RBC AUTO: 87.4 FL (ref 80–99)
MONOCYTES # BLD: 1.1 K/UL (ref 0–1)
MONOCYTES NFR BLD: 7 % (ref 5–13)
NEUTS SEG # BLD: 10 K/UL (ref 1.8–8)
NEUTS SEG NFR BLD: 69 % (ref 32–75)
NRBC # BLD: 0 K/UL (ref 0–0.01)
NRBC BLD-RTO: 0 PER 100 WBC
PLATELET # BLD AUTO: 497 K/UL (ref 150–400)
PMV BLD AUTO: 11.3 FL (ref 8.9–12.9)
POTASSIUM SERPL-SCNC: 2.9 MMOL/L (ref 3.5–5.1)
PROT SERPL-MCNC: 7.7 G/DL (ref 6.4–8.2)
RBC # BLD AUTO: 5.22 M/UL (ref 3.8–5.2)
SAMPLES BEING HELD,HOLD: NORMAL
SODIUM SERPL-SCNC: 135 MMOL/L (ref 136–145)
WBC # BLD AUTO: 14.5 K/UL (ref 3.6–11)

## 2019-11-07 PROCEDURE — 85025 COMPLETE CBC W/AUTO DIFF WBC: CPT

## 2019-11-07 PROCEDURE — 36415 COLL VENOUS BLD VENIPUNCTURE: CPT

## 2019-11-07 PROCEDURE — 99283 EMERGENCY DEPT VISIT LOW MDM: CPT

## 2019-11-07 PROCEDURE — 96375 TX/PRO/DX INJ NEW DRUG ADDON: CPT

## 2019-11-07 PROCEDURE — 96376 TX/PRO/DX INJ SAME DRUG ADON: CPT

## 2019-11-07 PROCEDURE — 83690 ASSAY OF LIPASE: CPT

## 2019-11-07 PROCEDURE — 80053 COMPREHEN METABOLIC PANEL: CPT

## 2019-11-07 PROCEDURE — 83735 ASSAY OF MAGNESIUM: CPT

## 2019-11-07 PROCEDURE — 96374 THER/PROPH/DIAG INJ IV PUSH: CPT

## 2019-11-07 PROCEDURE — 74011250636 HC RX REV CODE- 250/636: Performed by: EMERGENCY MEDICINE

## 2019-11-07 RX ORDER — DICYCLOMINE HYDROCHLORIDE 10 MG/ML
20 INJECTION INTRAMUSCULAR
Status: COMPLETED | OUTPATIENT
Start: 2019-11-07 | End: 2019-11-07

## 2019-11-07 RX ORDER — ONDANSETRON 4 MG/1
4 TABLET, ORALLY DISINTEGRATING ORAL
Qty: 12 TAB | Refills: 0 | Status: SHIPPED | OUTPATIENT
Start: 2019-11-07 | End: 2020-08-25

## 2019-11-07 RX ORDER — ONDANSETRON 2 MG/ML
4 INJECTION INTRAMUSCULAR; INTRAVENOUS
Status: COMPLETED | OUTPATIENT
Start: 2019-11-07 | End: 2019-11-07

## 2019-11-07 RX ORDER — DICYCLOMINE HYDROCHLORIDE 10 MG/1
10 CAPSULE ORAL
Qty: 12 CAP | Refills: 0 | Status: SHIPPED | OUTPATIENT
Start: 2019-11-07 | End: 2019-11-12

## 2019-11-07 RX ADMIN — ONDANSETRON 4 MG: 2 INJECTION INTRAMUSCULAR; INTRAVENOUS at 19:06

## 2019-11-07 RX ADMIN — SODIUM CHLORIDE 1000 ML: 900 INJECTION, SOLUTION INTRAVENOUS at 18:03

## 2019-11-07 RX ADMIN — DICYCLOMINE HYDROCHLORIDE 20 MG: 20 INJECTION, SOLUTION INTRAMUSCULAR at 18:04

## 2019-11-07 RX ADMIN — ONDANSETRON 4 MG: 2 INJECTION INTRAMUSCULAR; INTRAVENOUS at 18:03

## 2019-11-07 NOTE — ED PROVIDER NOTES
HPI     Pt is a 41 y.o. F presenting to ED with c/o N/V/D and abdominal cramping x 2 days. She has hx of endometriosis and had hysterectomy a few years ago and thus denies pregnancy. She has had sick contacts with the same recently. No new foods or drinks. No drug use, no opiods and no etoh. Her abd cramping is diffuse. No radiation. No urinary sx. Unknown amount of episodes of vomiting or diarrhea. They have been non-bloody. No other complaints at this time. Past Medical History:   Diagnosis Date    ADD (attention deficit disorder) [age]yo    Treated with Adderall since dx. 2013 dosing 20mg AM and 10mg PM.  Trial/change to Vyvanse Nov-Dec 2015--not as effective.  Gynecologic disorder     History of miscarriages. History of Mirena IUD placed on 4/17/15    HX OTHER MEDICAL      -BUFA baby    HX OTHER MEDICAL     HPV positive    Idiopathic vulvodynia 2014    L1 vertebral fracture (HCC) 2017    [facility] imaging/admissoin: Mild acute two column compression fracture of L1 without evidence of retropulsion into the spinal canal.  Managed non-operatively.  Migraines 2013    Neurological disorder     Pelvic pain in female 9/15/2014    2015 gyn laparoscopy/hysteroscopy with endometriosis worse right.     Psychiatric disorder     depression    Secondary dysmenorrhea 2014    With menorraghia    Seizures ([facility] [address]     never on meds--had appr 4-5 in a short amt of time and none since       Past Surgical History:   Procedure Laterality Date    ENDOMETRIAL CRYOABLATION      HX GYN  4/17/15    laparoscopy and hysteroscopy and IUD placement    HX HYSTERECTOMY  2016    Complete Hysterectomy         Family History:   Problem Relation Age of Onset    Cancer Mother     Diabetes Mother     Alcohol abuse Father         and drug    Psychiatric Disorder Father     Cancer Father     Diabetes Maternal Grandmother     Hypertension Maternal Grandmother     Thyroid Disease Maternal Grandmother     Diabetes Maternal Grandfather     Hypertension Maternal Grandfather     Cancer Maternal Grandfather     Heart Disease Maternal Grandfather     Cancer Paternal Grandmother     Diabetes Paternal Grandmother        Social History     Socioeconomic History    Marital status: SINGLE     Spouse name: Not on file    Number of children: Not on file    Years of education: Not on file    Highest education level: Not on file   Occupational History    Not on file   Social Needs    Financial resource strain: Not on file    Food insecurity:     Worry: Not on file     Inability: Not on file    Transportation needs:     Medical: Not on file     Non-medical: Not on file   Tobacco Use    Smoking status: Current Every Day Smoker     Packs/day: 0.50     Years: 18.00     Pack years: 9.00     Types: Cigarettes    Smokeless tobacco: Never Used   Substance and Sexual Activity    Alcohol use: Yes     Alcohol/week: 0.0 standard drinks     Comment: rarely.  Drug use: No    Sexual activity: Yes     Partners: Male     Birth control/protection: Surgical   Lifestyle    Physical activity:     Days per week: Not on file     Minutes per session: Not on file    Stress: Not on file   Relationships    Social connections:     Talks on phone: Not on file     Gets together: Not on file     Attends Spiritism service: Not on file     Active member of club or organization: Not on file     Attends meetings of clubs or organizations: Not on file     Relationship status: Not on file    Intimate partner violence:     Fear of current or ex partner: Not on file     Emotionally abused: Not on file     Physically abused: Not on file     Forced sexual activity: Not on file   Other Topics Concern    Not on file   Social History Narrative    ** Merged History Encounter **              ALLERGIES: Aspirin and Penicillins    Review of Systems   Constitutional: Negative for chills, diaphoresis and fever.    HENT: Negative for congestion and trouble swallowing. Eyes: Negative for photophobia and visual disturbance. Respiratory: Negative for cough, chest tightness and shortness of breath. Cardiovascular: Negative for chest pain, palpitations and leg swelling. Gastrointestinal: Positive for abdominal pain, diarrhea, nausea and vomiting. Genitourinary: Negative for difficulty urinating, dysuria, flank pain, frequency and pelvic pain. Musculoskeletal: Negative for back pain and myalgias. Skin: Negative for rash and wound. Neurological: Negative for dizziness, weakness, light-headedness and headaches. Hematological: Negative for adenopathy. Does not bruise/bleed easily. Psychiatric/Behavioral: Negative for agitation and confusion. All other systems reviewed and are negative. Vitals:    11/07/19 1727 11/07/19 1730   BP: 117/74 125/78   Pulse: (!) 131    Resp: 18    Temp: 98.4 °F (36.9 °C)    SpO2: 98% 95%   Weight: 82.3 kg (181 lb 7 oz)    Height: 5' 4.5\" (1.638 m)             Physical Exam   Constitutional: She is oriented to person, place, and time. She appears well-developed and well-nourished. No distress. HENT:   Head: Normocephalic. Mouth/Throat: Oropharynx is clear and moist. Mucous membranes are dry. Eyes: Pupils are equal, round, and reactive to light. Conjunctivae and EOM are normal.   Neck: Normal range of motion. Neck supple. No JVD present. Cardiovascular: Regular rhythm, normal heart sounds and intact distal pulses. Tachycardia present. Pulmonary/Chest: Effort normal and breath sounds normal.   Abdominal: Soft. Bowel sounds are normal. She exhibits no distension. There is no tenderness. Musculoskeletal: Normal range of motion. She exhibits no edema, tenderness or deformity. Lymphadenopathy:     She has no cervical adenopathy. Neurological: She is alert and oriented to person, place, and time. No cranial nerve deficit or sensory deficit. Skin: Skin is warm and dry. Capillary refill takes less than 2 seconds. No rash noted. She is not diaphoretic. No erythema. Psychiatric: She has a normal mood and affect. Nursing note and vitals reviewed. MDM       Procedures    6:37 PM  Pt resting comfortably with no N/V/D or pain at this time. Awaiting CMP. CBC shows leukocytosis this may be 2/2 to multiple episodes of vomiting as she has no abdominal tenderness and no bloody stool to suggest any bacterial/infectious cause of sx or other surgical process thus no CT or imaging obtained. Will continue to monitor. 6:52 PM  Pt says she feels much better. Nausea still present but much better. Myalgias have resolved. Abdominal pain resolved. No further vomiting or diarrhea. Awaiting CMP, lipase, magnesium. 7:02 PM  Change of shift. Care of patient signed over to Dr. Paula Fleming. Bedside handoff complete. Awaiting CMP, mag, lipase.      Rosie Griffith MD

## 2019-11-07 NOTE — LETTER
[address] 
[facility] 
[address] [address] [phone] Work/School Note Date: 11/7/2019 To Whom It May concern: 
 
Marion Rosen was seen and treated today. Marion Rosen may return to work on 11/09/19.  
 
Sincerely, 
 
 
 
 
Amelia Rodriguez MD

## 2019-11-07 NOTE — DISCHARGE INSTRUCTIONS
Patient Education        Gastroenteritis: Care Instructions  Your Care Instructions    Gastroenteritis is an illness that may cause nausea, vomiting, and diarrhea. It is sometimes called \"stomach flu. \" It can be caused by bacteria or a virus. You will probably begin to feel better in 1 to 2 days. In the meantime, get plenty of rest and make sure you do not become dehydrated. Dehydration occurs when your body loses too much fluid. Follow-up care is a key part of your treatment and safety. Be sure to make and go to all appointments, and call your doctor if you are having problems. It's also a good idea to know your test results and keep a list of the medicines you take. How can you care for yourself at home? · If your doctor prescribed antibiotics, take them as directed. Do not stop taking them just because you feel better. You need to take the full course of antibiotics. · Drink plenty of fluids to prevent dehydration, enough so that your urine is light yellow or clear like water. Choose water and other caffeine-free clear liquids until you feel better. If you have kidney, heart, or liver disease and have to limit fluids, talk with your doctor before you increase your fluid intake. · Drink fluids slowly, in frequent, small amounts, because drinking too much too fast can cause vomiting. · Begin eating mild foods, such as dry toast, yogurt, applesauce, bananas, and rice. Avoid spicy, hot, or high-fat foods, and do not drink alcohol or caffeine for a day or two. Do not drink milk or eat ice cream until you are feeling better. How to prevent gastroenteritis  · Keep hot foods hot and cold foods cold. · Do not eat meats, dressings, salads, or other foods that have been kept at room temperature for more than 2 hours. · Use a thermometer to check your refrigerator. It should be between 34°F and 40°F.  · Defrost meats in the refrigerator or microwave, not on the kitchen counter.   · Keep your hands and your kitchen clean. Wash your hands, cutting boards, and countertops with hot soapy water frequently. · Cook meat until it is well done. · Do not eat raw eggs or uncooked sauces made with raw eggs. · Do not take chances. If food looks or tastes spoiled, throw it out. When should you call for help? Call 911 anytime you think you may need emergency care. For example, call if:    · You vomit blood or what looks like coffee grounds.     · You passed out (lost consciousness).     · You pass maroon or very bloody stools.    Call your doctor now or seek immediate medical care if:    · You have severe belly pain.     · You have signs of needing more fluids. You have sunken eyes, a dry mouth, and pass only a little dark urine.     · You feel like you are going to faint.     · You have increased belly pain that does not go away in 1 to 2 days.     · You have new or increased nausea, or you are vomiting.     · You have a new or higher fever.     · Your stools are black and tarlike or have streaks of blood.    Watch closely for changes in your health, and be sure to contact your doctor if:    · You are dizzy or lightheaded.     · You urinate less than usual, or your urine is dark yellow or brown.     · You do not feel better with each day that goes by. Where can you learn more? Go to http://jalil-mo.info/. Enter N142 in the search box to learn more about \"Gastroenteritis: Care Instructions. \"  Current as of: June 9, 2019  Content Version: 12.2  © 8280-0517 [employer]. Care instructions adapted under license by [employer] (which disclaims liability or warranty for this information). If you have questions about a medical condition or this instruction, always ask your healthcare professional. [place] [address] any warranty or liability for your use of this information.

## 2019-11-07 NOTE — ED TRIAGE NOTES
Triage: pt c/o Wednesday around 0200 with abd pain and N/V/D. Denies fever, urinary symptoms, chest pain or SOB.

## 2019-11-08 NOTE — ED NOTES
Patient provided with discharge instructions and verbalized understanding. Patient ambulatory out of department with steady gait.

## 2019-11-08 NOTE — ED NOTES
Assumed care of patient from [facility]. Patient resting on stretcher, NAD noted at this time; PIV infiltrated, discontinued, provider notified. Bed low and locked, call bell in reach. Will continue to monitor.

## 2019-11-08 NOTE — ED NOTES
Bedside and Verbal shift change report given to Devonte Rodriguez RN (oncoming nurse) by Tyron Louise RN (offgoing nurse). Report included the following information SBAR, ED Summary, MAR and Recent Results.

## 2019-11-18 ENCOUNTER — OFFICE VISIT (OUTPATIENT)
Dept: INTERNAL MEDICINE CLINIC | Age: 39
End: 2019-11-18

## 2019-11-18 VITALS
HEART RATE: 107 BPM | OXYGEN SATURATION: 95 % | SYSTOLIC BLOOD PRESSURE: 131 MMHG | TEMPERATURE: 97.6 F | BODY MASS INDEX: 31.36 KG/M2 | DIASTOLIC BLOOD PRESSURE: 76 MMHG | WEIGHT: 188.25 LBS | HEIGHT: 65 IN | RESPIRATION RATE: 15 BRPM

## 2019-11-18 DIAGNOSIS — K52.9 GASTROENTERITIS: ICD-10-CM

## 2019-11-18 DIAGNOSIS — F98.8 ATTENTION DEFICIT DISORDER, UNSPECIFIED HYPERACTIVITY PRESENCE: Primary | ICD-10-CM

## 2019-11-18 DIAGNOSIS — E87.6 HYPOKALEMIA: ICD-10-CM

## 2019-11-18 RX ORDER — DEXTROAMPHETAMINE SACCHARATE, AMPHETAMINE ASPARTATE, DEXTROAMPHETAMINE SULFATE AND AMPHETAMINE SULFATE 5; 5; 5; 5 MG/1; MG/1; MG/1; MG/1
TABLET ORAL
Qty: 60 TAB | Refills: 0 | Status: SHIPPED | OUTPATIENT
Start: 2019-12-18 | End: 2020-08-25 | Stop reason: SDUPTHER

## 2019-11-18 RX ORDER — DEXTROAMPHETAMINE SACCHARATE, AMPHETAMINE ASPARTATE, DEXTROAMPHETAMINE SULFATE AND AMPHETAMINE SULFATE 5; 5; 5; 5 MG/1; MG/1; MG/1; MG/1
TABLET ORAL
Qty: 60 TAB | Refills: 0 | Status: SHIPPED | OUTPATIENT
Start: 2020-01-17 | End: 2020-06-16 | Stop reason: SDUPTHER

## 2019-11-18 RX ORDER — DEXTROAMPHETAMINE SACCHARATE, AMPHETAMINE ASPARTATE, DEXTROAMPHETAMINE SULFATE AND AMPHETAMINE SULFATE 5; 5; 5; 5 MG/1; MG/1; MG/1; MG/1
TABLET ORAL
Qty: 60 TAB | Refills: 0 | Status: SHIPPED | OUTPATIENT
Start: 2019-11-18 | End: 2020-02-18 | Stop reason: SDUPTHER

## 2019-11-18 NOTE — PROGRESS NOTES
History of Present Illness:   Jose Nichols is a 42 y.o. female here for evaluation:    Chief Complaint   Patient presents with    Medication Evaluation     follow up        She had interim GI illness--had to have IVF in [place] ED. Has recovered--had hypokelamia with K 2.9, but myalgias/clinical symptoms all improved/resolved with fluids. Not interested in repeat today. Not repeated in ED. No other meds to cause low potassium. Mg with that testing in ED normal.  She has no residual cramping. Notes no problems with ADHD medications. Prescription Monitoring Program ([place]) database query with fills:  10/18/2019 1 10/16/2019 Dextroamp-Amphetamin 20 Mg Tab 60.00 30 Cl Chi [id number] Vir ([id number]) 0 [facility] [place]   09/17/2019 2 07/19/2019 Dextroamp-Amphetamin 20 Mg Tab 60.00 30 Cl Chi [id number] Kro (5918) 0 [place]   08/18/2019 2 07/19/2019 Dextroamp-Amphetamin 20 Mg Tab 60.00 30 Cl Chi [id number] Kro (5918) 0 [place]   07/19/2019 2 07/19/2019 Dextroamp-Amphetamin 20 Mg Tab 60.00 30 Cl Chi [id number] Kro (1884) 0 [place]      Wt Readings from Last 3 Encounters:   11/18/19 188 lb 4 oz (85.4 kg)   11/07/19 181 lb 7 oz (82.3 kg)   10/01/19 183 lb 3.2 oz (83.1 kg)     BP Readings from Last 3 Encounters:   11/18/19 131/76   11/07/19 111/90   10/01/19 (!) 150/123     Pulse Readings from Last 3 Encounters:   11/18/19 (!) 107   11/07/19 (!) 131   10/01/19 (!) 108       Refills reviewed as below. Nursing screenings reviewed by provider at visit. Prior to Admission medications    Medication Sig Start Date End Date Taking? Authorizing Provider   ondansetron (ZOFRAN ODT) 4 mg disintegrating tablet Take 1 Tab by mouth every eight (8) hours as needed for Nausea. 11/7/19  Yes Emelia Shaikh MD   dextroamphetamine-amphetamine (ADDERALL) 20 mg tablet Take 20mg in AM and 20mg again in afternoon as needed.  10/18/19  Yes Lexus Nogueira MD        ROS    Vitals:    11/18/19 1409   BP: 131/76 Pulse: (!) 107   Resp: 15   Temp: 97.6 °F (36.4 °C)   TempSrc: Oral   SpO2: 95%   Weight: 188 lb 4 oz (85.4 kg)   Height: 5' 4.5\" (1.638 m)   PainSc:   0 - No pain   LMP: 03/14/2016      Body mass index is 31.81 kg/m². Physical Exam:     Physical Exam   Constitutional: She appears well-developed and well-nourished. No distress. HENT:   Head: Normocephalic and atraumatic. Eyes: Conjunctivae are normal. Right eye exhibits no discharge. Left eye exhibits no discharge. No scleral icterus. Cardiovascular: Normal rate, regular rhythm, normal heart sounds and intact distal pulses. Exam reveals no gallop and no friction rub. No murmur heard. Pulmonary/Chest: Effort normal and breath sounds normal. No respiratory distress. She has no wheezes. She has no rales. She exhibits no tenderness. Abdominal: She exhibits no distension. Musculoskeletal: She exhibits no edema or tenderness. Neurological: She is alert. She exhibits normal muscle tone. Coordination normal.   Skin: Skin is warm. No rash noted. She is not diaphoretic. No erythema. No pallor. Psychiatric: She has a normal mood and affect. Her behavior is normal. Judgment and thought content normal.       Assessment and Plan:       ICD-10-CM ICD-9-CM    1. Attention deficit disorder, unspecified hyperactivity presence F98.8 314.00 dextroamphetamine-amphetamine (ADDERALL) 20 mg tablet      dextroamphetamine-amphetamine (ADDERALL) 20 mg tablet      dextroamphetamine-amphetamine (ADDERALL) 20 mg tablet   2. Hypokalemia E87.6 276.8    3. Gastroenteritis--resolved K52.9 558.9        1. Refills reviewed. Current dose effective. Prefers printed scripts, as fills at different pharmacies based on cost.    2.  Clinical symptoms resolved. Not interested in repeating at this time. 3.  Resolved symptoms currently. Had IVF in ED as reviewed above. Follow-up and Dispositions    · Return in about 3 months (around 2/18/2020) for medication follow-up. lab results and schedule of future lab studies reviewed with patient  reviewed diet, exercise and weight control  reviewed medications and side effects in detail    For additional documentation of information and/or recommendations discussed this visit, please see notes in instructions. Plan and evaluation (above) reviewed with pt at visit  Patient voiced understanding of plan and provided with time to ask/review questions. After Visit Summary (AVS) provided to pt after visit with additional instructions as needed/reviewed. No future appointments.

## 2019-11-18 NOTE — PROGRESS NOTES
RM 16    Patient refused flu vaccine. Chief Complaint   Patient presents with    Medication Evaluation     follow up      1. Have you been to the ER, urgent care clinic since your last visit? Hospitalized since your last visit? Yes Reason for visit: 11/7/19, UC, stomach virus and dehydration. 2. Have you seen or consulted any other health care providers outside of the [address] [place] [address] since your last visit? Include any pap smears or colon screening. No    Health Maintenance Due   Topic Date Due    Pneumococcal 0-64 years (1 of 1 - PPSV23) 11/09/1988    DTaP/Tdap/Td series (1 - Tdap) 11/09/2003    PAP AKA CERVICAL CYTOLOGY  08/12/2017     Abuse Screening Questionnaire 11/18/2019   Do you ever feel afraid of your partner? N   Are you in a relationship with someone who physically or mentally threatens you? N   Is it safe for you to go home?  Y     3 most recent PHQ Screens 11/18/2019   Little interest or pleasure in doing things Not at all   Feeling down, depressed, irritable, or hopeless Not at all   Total Score PHQ 2 0   Trouble falling or staying asleep, or sleeping too much -   Feeling tired or having little energy -   Poor appetite, weight loss, or overeating -   Feeling bad about yourself - or that you are a failure or have let yourself or your family down -   Trouble concentrating on things such as school, work, reading, or watching TV -   Moving or speaking so slowly that other people could have noticed; or the opposite being so fidgety that others notice -   Thoughts of being better off dead, or hurting yourself in some way -   PHQ 9 Score -

## 2020-02-18 DIAGNOSIS — F98.8 ATTENTION DEFICIT DISORDER, UNSPECIFIED HYPERACTIVITY PRESENCE: ICD-10-CM

## 2020-02-18 NOTE — TELEPHONE ENCOUNTER
Pt's requesting a one month supply of her Adderall 20 mg she is now completely out. Pt is scheduled to see  on 3/16/20  however currently her Medicaid is not eligible. Pt state's in the past that  would give her a month to bridge her pt can be reached at # [phone].

## 2020-02-23 RX ORDER — DEXTROAMPHETAMINE SACCHARATE, AMPHETAMINE ASPARTATE, DEXTROAMPHETAMINE SULFATE AND AMPHETAMINE SULFATE 5; 5; 5; 5 MG/1; MG/1; MG/1; MG/1
TABLET ORAL
Qty: 60 TAB | Refills: 0 | Status: SHIPPED | OUTPATIENT
Start: 2020-02-23 | End: 2020-04-16 | Stop reason: SDUPTHER

## 2020-02-24 NOTE — TELEPHONE ENCOUNTER
Refill request(s) approved--Adderall.     Prescription Monitoring Program ([place]) database query with fills:  01/17/2020 2 11/18/2019 Dextroamp-Amphetamin 20 Mg Tab 60.00 30 Cl Chi [id number] Wal ([id number]) 0 [place]  12/18/2019 2 11/18/2019 Dextroamp-Amphetamin 20 Mg Tab 60.00 30 Cl Chi [id number] Wal (6301) 0 [place]  11/18/2019 1 11/18/2019 Dextroamp-Amphetamin 20 Mg Tab 60.00 30 Cl Chi [id number] Vir (9677) 0 [facility] [place]  10/18/2019 1 10/16/2019 Dextroamp-Amphetamin 20 Mg Tab 60.00 30 Cl Chi [id number] Vir (4382) 0 [place]  09/17/2019 2 07/19/2019 Dextroamp-Amphetamin 20 Mg Tab 60.00 30 Cl Chi [id number] Kro (7519) 0 [place]       Future Appointments   Date Time Provider Vera Jaeger   3/16/2020  2:00 PM Rakesh Haro MD [address] [facility]

## 2020-04-16 ENCOUNTER — VIRTUAL VISIT (OUTPATIENT)
Dept: INTERNAL MEDICINE CLINIC | Age: 40
End: 2020-04-16

## 2020-04-16 DIAGNOSIS — F98.8 ATTENTION DEFICIT DISORDER, UNSPECIFIED HYPERACTIVITY PRESENCE: Primary | ICD-10-CM

## 2020-04-16 DIAGNOSIS — F33.2 SEVERE EPISODE OF RECURRENT MAJOR DEPRESSIVE DISORDER, WITHOUT PSYCHOTIC FEATURES (HCC): ICD-10-CM

## 2020-04-16 RX ORDER — CETIRIZINE HYDROCHLORIDE 10 MG/1
CAPSULE, LIQUID FILLED ORAL
COMMUNITY
End: 2022-02-03

## 2020-04-16 RX ORDER — DEXTROAMPHETAMINE SACCHARATE, AMPHETAMINE ASPARTATE, DEXTROAMPHETAMINE SULFATE AND AMPHETAMINE SULFATE 5; 5; 5; 5 MG/1; MG/1; MG/1; MG/1
TABLET ORAL
Qty: 60 TAB | Refills: 0 | Status: SHIPPED | OUTPATIENT
Start: 2020-04-16 | End: 2020-05-18 | Stop reason: SDUPTHER

## 2020-04-16 RX ORDER — VENLAFAXINE HYDROCHLORIDE 75 MG/1
CAPSULE, EXTENDED RELEASE ORAL
COMMUNITY
Start: 2020-04-02 | End: 2020-08-25

## 2020-04-16 NOTE — PROGRESS NOTES
Virtual visit-doxy video    Chief Complaint   Patient presents with    Medication Evaluation     ADHD med follow up      1. Have you been to the ER, urgent care clinic since your last visit? Hospitalized since your last visit? Yes Reason for visit: [facility], depression, 3 weeks ago       2. Have you seen or consulted any other health care providers outside of the [address] [place] [address] since your last visit? Include any pap smears or colon screening.  No    Health Maintenance Due   Topic Date Due    Pneumococcal 0-64 years (1 of 1 - PPSV23) 11/09/1988    DTaP/Tdap/Td series (1 - Tdap) 11/09/2003    PAP AKA CERVICAL CYTOLOGY  08/12/2017
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
## 2020-04-16 NOTE — PATIENT INSTRUCTIONS
Please let us know when you need other ADHD medication refills, or can update at follow-up visit if changes needed. Glad you have taken positive steps with your mental health care, and with stopping smoking!

## 2020-05-11 DIAGNOSIS — F98.8 ATTENTION DEFICIT DISORDER, UNSPECIFIED HYPERACTIVITY PRESENCE: ICD-10-CM

## 2020-05-11 NOTE — TELEPHONE ENCOUNTER
----- Message from Merline Ambrosia sent at 5/11/2020  8:36 AM EDT -----  Regarding: PATRICIA/MD/REFILL  Contact: [phone]  Caller (if not patient):   Relationship of caller (if not patient):  Best contact number(s): [phone]    Name of medication and dosage if known: Generic for Adderall  20 mg  Is patient out of this medication (yes/no): No  Pharmacy name: List in chart: [address])  Pharmacy listed in chart? (yes/no): Yes  Pharmacy phone number: N/A  Date of last visit: 04/16/20  Details to clarify the request: Refill request for generic for Adderall.

## 2020-05-13 RX ORDER — DEXTROAMPHETAMINE SACCHARATE, AMPHETAMINE ASPARTATE, DEXTROAMPHETAMINE SULFATE AND AMPHETAMINE SULFATE 5; 5; 5; 5 MG/1; MG/1; MG/1; MG/1
TABLET ORAL
Qty: 60 TAB | Refills: 0 | Status: CANCELLED | OUTPATIENT
Start: 2020-05-13

## 2020-05-13 NOTE — TELEPHONE ENCOUNTER
No access to      Last visit 04/16/2020 Virtual visit MD Rusty Tejeda   Next appointment 3 months (7/2020)   Previous refill encounter(s) 04/16/2020 Adderall #60     Requested Prescriptions     Pending Prescriptions Disp Refills    dextroamphetamine-amphetamine (ADDERALL) 20 mg tablet 60 Tab 0     Sig: Take 20mg in AM and 20mg again in afternoon as needed.

## 2020-05-18 RX ORDER — DEXTROAMPHETAMINE SACCHARATE, AMPHETAMINE ASPARTATE, DEXTROAMPHETAMINE SULFATE AND AMPHETAMINE SULFATE 5; 5; 5; 5 MG/1; MG/1; MG/1; MG/1
TABLET ORAL
Qty: 60 TAB | Refills: 0 | Status: SHIPPED | OUTPATIENT
Start: 2020-05-18 | End: 2020-08-25 | Stop reason: SDUPTHER

## 2020-05-19 NOTE — TELEPHONE ENCOUNTER
Refill request(s) approved--Adderall.     Prescription Monitoring Program ([place]) database query with fills:  04/29/2020  1   04/29/2020  Gabapentin 300 MG Capsule  90.00 30 Veronica Jacques   [id number]   Wal ([id number])   0   [facility] [place]   04/16/2020  2   04/16/2020  Dextroamp-Amphetamin 20 MG Tab  60.00 30 Cl Chi   [id number]   Wal (7613)   0   [place]

## 2020-06-14 ENCOUNTER — TELEPHONE (OUTPATIENT)
Dept: PRIMARY CARE CLINIC | Age: 40
End: 2020-06-14

## 2020-06-14 NOTE — TELEPHONE ENCOUNTER
On call note:    Patient called and was asking for refill. States that she thought today is Monday that's why she called for refill.  She apologized and states that she will call tomorrow to Dr. Clari Nunes office for refill( she did not tell me what refill she was asking for)

## 2020-06-16 DIAGNOSIS — F98.8 ATTENTION DEFICIT DISORDER, UNSPECIFIED HYPERACTIVITY PRESENCE: ICD-10-CM

## 2020-06-16 NOTE — TELEPHONE ENCOUNTER
----- Message from Bette Burdick sent at 6/16/2020 11:19 AM EDT -----  Regarding: Dr. Walker Gongora  Medication Refill    Caller (if not patient):  PT      Relationship of caller (if not patient):      Best contact number(s):  [phone]      Name of medication and dosage if known:  \"Adderall 20mg\"      Is patient out of this medication (yes/no): No, enough left for next 2 days      Pharmacy name:  [facility], [address] [place] [address] listed in chart? (yes/no):  Yes  Pharmacy phone number:  [phone]      Details to clarify the request: Please call when processed.    Pt's last Telemedicine was on 04/16/20      Bette Burdick

## 2020-06-19 RX ORDER — DEXTROAMPHETAMINE SACCHARATE, AMPHETAMINE ASPARTATE, DEXTROAMPHETAMINE SULFATE AND AMPHETAMINE SULFATE 5; 5; 5; 5 MG/1; MG/1; MG/1; MG/1
TABLET ORAL
Qty: 60 TAB | Refills: 0 | Status: SHIPPED | OUTPATIENT
Start: 2020-06-19 | End: 2020-07-17 | Stop reason: SDUPTHER

## 2020-06-19 NOTE — TELEPHONE ENCOUNTER
Pt requesting status check and also to confirm medication be sent to Emily in [facility] on [address]

## 2020-06-19 NOTE — TELEPHONE ENCOUNTER
Refill request(s) approved--Adderall. Prescription Monitoring Program ([place]) database query with fills:  05/26/2020  2   04/29/2020  Gabapentin 300 MG Capsule  90.00 30 Tiffany Walls   [id number]   Wal ([id number])   1   [place]   05/19/2020  1   05/18/2020  Dextroamp-Amphetamin 20 MG Tab  60.00 30 Cl Chi   [id number]   Wal (6302)   0   [place]   04/29/2020  2   04/29/2020  Gabapentin 300 MG Capsule  90.00 30 Tiffany Walls   [id number]   Wal ([id number])   0   [place]   04/16/2020  1   04/16/2020  Dextroamp-Amphetamin 20 MG Tab  60.00 30 Cl Chi   [id number]   Wal ([id number])   [place]     Receipt electronically verified by pharmacy.

## 2020-07-17 DIAGNOSIS — F98.8 ATTENTION DEFICIT DISORDER, UNSPECIFIED HYPERACTIVITY PRESENCE: ICD-10-CM

## 2020-07-17 NOTE — TELEPHONE ENCOUNTER
----- Message from Kelle Mcconnell sent at 7/17/2020  9:54 AM EDT -----  Regarding: Dr. Alejo Rubio  Medication Refill    Caller (if not patient):      Relationship of caller (if not patient):      Best contact number(s):[phone]      Name of medication and dosage if known:\"Adderall\" 20 mg      Is patient out of this medication (yes/no):no      Pharmacy name:[facility]    Pharmacy listed in chart? (yes/no):no  Pharmacy phone number: [phone]      Details to clarify the request:Pt requested a refill on the Rx.       Kelle Mcconnell

## 2020-07-17 NOTE — TELEPHONE ENCOUNTER
Please contact patient for scheduling. Thanks    Writer sent patient a message to contact office to schedule a visit via [address] No access to      Last visit 04/16/2020 Virtual visit MD Rod Cullen   Next appointment 3 months (07/2020)   Previous refill encounter(s) 06/19/2020 Adderall #60     Requested Prescriptions     Pending Prescriptions Disp Refills    dextroamphetamine-amphetamine (ADDERALL) 20 mg tablet 60 Tab 0     Sig: Take 20mg in AM and 20mg again in afternoon as needed.

## 2020-07-18 RX ORDER — DEXTROAMPHETAMINE SACCHARATE, AMPHETAMINE ASPARTATE, DEXTROAMPHETAMINE SULFATE AND AMPHETAMINE SULFATE 5; 5; 5; 5 MG/1; MG/1; MG/1; MG/1
TABLET ORAL
Qty: 60 TAB | Refills: 0 | Status: SHIPPED | OUTPATIENT
Start: 2020-07-19 | End: 2020-08-25 | Stop reason: SDUPTHER

## 2020-07-18 NOTE — TELEPHONE ENCOUNTER
Refill request(s) approved--Adderall immed-release. Prescription Monitoring Program ([place]) database query with fills:  07/11/2020  2   07/11/2020  Tramadol Hcl 50 MG Tablet  8.00 2  Rodolfo   [id number]   Vir (3207)   0  20.00 MME  Private Pay   [place]   06/25/2020  2   06/25/2020  Tramadol Hcl 50 MG Tablet  12.00 3 Da Upd   [id number]   Vir (3207)   0  20.00 MME  [place]   06/19/2020  1   06/19/2020  Dextroamp-Amphetamin 20 MG Tab  60.00 30 Cl Chi   [id number]   Wal (3013)   0   [place]     05/26/2020  2 *   04/29/2020  Gabapentin 300 MG Capsule  90.00 30 Arianne Good   [id number]   Wal ([id number])   1   [place]   05/19/2020  1   05/18/2020  Dextroamp-Amphetamin 20 MG Tab  60.00 30 Cl Chi   [id number]   Wal (5615)   0   [place]     Requested Prescriptions     Signed Prescriptions Disp Refills    dextroamphetamine-amphetamine (ADDERALL) 20 mg tablet 60 Tab 0     Sig: Take 20mg in AM and 20mg again in afternoon as needed. Authorizing Provider: Tanesha Nixon     Due to fill on 7-19-20. Receipt electronically verified by pharmacy.

## 2020-08-25 ENCOUNTER — VIRTUAL VISIT (OUTPATIENT)
Dept: INTERNAL MEDICINE CLINIC | Age: 40
End: 2020-08-25

## 2020-08-25 DIAGNOSIS — F98.8 ATTENTION DEFICIT DISORDER, UNSPECIFIED HYPERACTIVITY PRESENCE: Primary | ICD-10-CM

## 2020-08-25 DIAGNOSIS — Z87.891 FORMER SMOKER: ICD-10-CM

## 2020-08-25 DIAGNOSIS — R63.5 UNINTENDED WEIGHT GAIN: ICD-10-CM

## 2020-08-25 PROCEDURE — 99214 OFFICE O/P EST MOD 30 MIN: CPT | Performed by: INTERNAL MEDICINE

## 2020-08-25 RX ORDER — DEXTROAMPHETAMINE SACCHARATE, AMPHETAMINE ASPARTATE, DEXTROAMPHETAMINE SULFATE AND AMPHETAMINE SULFATE 5; 5; 5; 5 MG/1; MG/1; MG/1; MG/1
TABLET ORAL
Qty: 60 TAB | Refills: 0 | Status: SHIPPED | OUTPATIENT
Start: 2020-09-24 | End: 2021-01-05 | Stop reason: SDUPTHER

## 2020-08-25 RX ORDER — DEXTROAMPHETAMINE SACCHARATE, AMPHETAMINE ASPARTATE, DEXTROAMPHETAMINE SULFATE AND AMPHETAMINE SULFATE 5; 5; 5; 5 MG/1; MG/1; MG/1; MG/1
TABLET ORAL
Qty: 60 TAB | Refills: 0 | Status: SHIPPED | OUTPATIENT
Start: 2020-08-25 | End: 2021-01-05 | Stop reason: SDUPTHER

## 2020-08-25 RX ORDER — DEXTROAMPHETAMINE SACCHARATE, AMPHETAMINE ASPARTATE, DEXTROAMPHETAMINE SULFATE AND AMPHETAMINE SULFATE 5; 5; 5; 5 MG/1; MG/1; MG/1; MG/1
TABLET ORAL
Qty: 60 TAB | Refills: 0 | Status: SHIPPED | OUTPATIENT
Start: 2020-10-24 | End: 2020-10-22 | Stop reason: SDUPTHER

## 2020-08-25 NOTE — PROGRESS NOTES
Virtual Visit-doxy video     Patient reports quit smoking 3 weeks ago, having difficulty sleeping at bedtime and increased anxiety. Chief Complaint   Patient presents with    Medication Refill     1. Have you been to the ER, urgent care clinic since your last visit? Hospitalized since your last visit? Yes Reason for visit: Last week, [facility], anxiety. 2. Have you seen or consulted any other health care providers outside of the [address] [place] [address] since your last visit? Include any pap smears or colon screening. No    Health Maintenance Due   Topic Date Due    DTaP/Tdap/Td series (1 - Tdap) 11/09/2003    PAP AKA CERVICAL CYTOLOGY  08/12/2017       Abuse Screening Questionnaire 8/25/2020   Do you ever feel afraid of your partner? N   Are you in a relationship with someone who physically or mentally threatens you? N   Is it safe for you to go home?  Y     3 most recent PHQ Screens 8/25/2020   Little interest or pleasure in doing things Not at all   Feeling down, depressed, irritable, or hopeless Not at all   Total Score PHQ 2 0   Trouble falling or staying asleep, or sleeping too much -   Feeling tired or having little energy -   Poor appetite, weight loss, or overeating -   Feeling bad about yourself - or that you are a failure or have let yourself or your family down -   Trouble concentrating on things such as school, work, reading, or watching TV -   Moving or speaking so slowly that other people could have noticed; or the opposite being so fidgety that others notice -   Thoughts of being better off dead, or hurting yourself in some way -   PHQ 9 Score -

## 2020-08-25 NOTE — PROGRESS NOTES
Faith Allen is a 42 y.o. female who was seen by synchronous (real-time) audio-video technology on 8/25/2020. Consent: Faith Allen, who was seen by synchronous (real-time) audio-video technology, and/or her healthcare decision maker, is aware that this patient-initiated, Telehealth encounter on 8/25/2020 is a billable service, with coverage as determined by her insurance carrier. She is aware that she may receive a bill and has provided verbal consent to proceed: Yes. I was in the office while conducting this encounter. Subjective:   Faith Allen was seen for Medication Refill      Notes:    She stopped both Sandra and cigarettes. Approx 3 weeks ago. She has been furloughed off both her jobs. She is planning to do therapy to help with anxiety. Notes no problems with ADHD medications. Prescription Monitoring Program ([place]) database query with fills:  07/20/2020  1   07/18/2020  Dextroamp-Amphetamin 20 MG Tab  60.00 30 Cl Chi   [id number]   Wal ([id number][place]   07/11/2020  2   07/11/2020  Tramadol Hcl 50 MG Tablet  8.00 2 Ch Rodolfo   [id number]   Vir ([id number])   0  20.00 MME  Private Pay   [place]   06/25/2020  2   06/25/2020  Tramadol Hcl 50 MG Tablet  12.00 3 Da Upd   [id number]   Vir (3207)   0  20.00 [place]   06/19/2020  1   06/19/2020  Dextroamp-Amphetamin 20 MG Tab  60.00 30 Cl Chi   [id number]   Wal ([id number])   [place]   05/26/2020  2 *   04/29/2020  Gabapentin 300 MG Capsule  90.00 30 Heddy Bulls   [id number]   Wal ([id number])   1   [place]         Wt Readings from Last 3 Encounters:   11/18/19 188 lb 4 oz (85.4 kg)   11/07/19 181 lb 7 oz (82.3 kg)   10/01/19 183 lb 3.2 oz (83.1 kg)     BP Readings from Last 3 Encounters:   11/18/19 131/76   11/07/19 111/90   10/01/19 (!) 150/123     Pulse Readings from Last 3 Encounters:   11/18/19 (!) 107   11/07/19 (!) 131   10/01/19 (!) 108       Refills reviewed as below.       Nursing screenings reviewed by provider at visit. Allergies   Allergen Reactions    Aspirin Other (comments)     Hot sweats and passed out; tolerated ibuprofen    Penicillins Other (comments)     Childhood. Does not remember reaction. Is not aware if she has ever taken cephalosporins in the past.    Jan 2019: Pt notes no problems with cephalosporins. Prior to Admission medications    Medication Sig Start Date End Date Taking? Authorizing Provider   dextroamphetamine-amphetamine (ADDERALL) 20 mg tablet Take 20mg in AM and 20mg again in afternoon as needed. 7/19/20  Yes Letty Orozco MD   dextroamphetamine-amphetamine (ADDERALL) 20 mg tablet Take 20mg in AM and 20mg again in afternoon as needed. 5/18/20  Yes Letty Orozco MD   Cetirizine (ZyrTEC) 10 mg cap Take  by mouth. Yes Provider, Historical   dextroamphetamine-amphetamine (ADDERALL) 20 mg tablet Take 20mg in AM and 20mg again in afternoon as needed. 12/18/19  Yes Letty Orozco MD   venlafaxine-SR [facility] 75 mg capsule TAKE 1 CAPSULE BY MOUTH ONCE DAILY FOR MOOD 4/2/20   Provider, Historical   ondansetron (ZOFRAN ODT) 4 mg disintegrating tablet Take 1 Tab by mouth every eight (8) hours as needed for Nausea. Patient not taking: Reported on 8/25/2020 11/7/19   MD PARAS Hill    PHYSICAL EXAMINATION:    Vital Signs:  Last menstrual period 03/14/2016. No flowsheet data found.      Constitutional: [x] Appears well-developed and well-nourished [x] No apparent distress      Mental status: [x] Alert and awake  [x] Oriented [x] Able to follow commands       Eyes:   EOM    [x]  Normal      Sclera  [x]  Normal              Discharge [x]  None visible       HENT: [x] Normocephalic, atraumatic    [x] Mouth/Throat: Mucous membranes are moist    External Ears [x] Normal      Neck: [x] No visualized mass     Pulmonary/Chest: [x] Respiratory effort normal   [x] No visualized signs of difficulty breathing or respiratory distress    Musculoskeletal:  [x] Normal range of motion of neck    Neurological:        [x] No Facial Asymmetry (Cranial nerve 7 motor function) (limited exam due to video visit)          [x] No gaze palsy     Skin:        [x] No significant exanthematous lesions or discoloration noted on facial skin             Psychiatric:       [x] Normal Affect       Other pertinent observable physical exam findings:  None. We discussed the expected course, resolution and complications of the diagnosis(es) in detail. Medication risks, benefits, costs, interactions, and alternatives were discussed as indicated. I advised her to contact the office if her condition worsens, changes or fails to improve as anticipated. She expressed understanding with the diagnosis(es) and plan. Hamilton Hercules is a 42 y.o. female who was evaluated by a video visit encounter for concerns as above. Patient identification was verified prior to start of the visit. A caregiver was present when appropriate. Due to this being a TeleHealth encounter (During [facility]-56 public health emergency), evaluation of the following organ systems was limited: Vitals/Constitutional/EENT/Resp/CV/GI//MS/Neuro/Skin/Heme-Lymph-Imm. Pursuant to the emergency declaration under the [address] [place] [address], [facility] waiver authority and the [employer] Act, this Virtual  Visit was conducted, with patient's (and/or legal guardian's) consent, to reduce the patient's risk of exposure to COVID-19 and provide necessary medical care. Services were provided through a video synchronous discussion virtually to substitute for in-person clinic visit. Assessment & Plan:   Diagnoses and all orders for this visit:      ICD-10-CM ICD-9-CM    1.  Attention deficit disorder, unspecified hyperactivity presence  F98.8 314.00 dextroamphetamine-amphetamine (ADDERALL) 20 mg tablet      dextroamphetamine-amphetamine (ADDERALL) 20 mg tablet      dextroamphetamine-amphetamine (ADDERALL) 20 mg tablet   2. Former smoker  Z87.891 V15.82    3. Unintended weight gain  R63.5 783.1        1. Refills reviewed at visit. 2.  Reviewed improvement at visit. 3.  Diet mgt reviewed at visit. Follow-up and Dispositions    · Return in about 3 months (around 11/25/2020), or if symptoms worsen or fail to improve, for medication follow-up.       reviewed medications and side effects in detail    Plan and evaluation (above) reviewed with pt at visit  Patient voiced understanding of plan and provided with time to ask/review questions. After Visit Summary (AVS) provided to pt after visit with additional instructions as needed/reviewed. AVS:  [x]  Sent to patient as [employer] message after visit. []  Mailed to patient after visit. []  Not sent to patient after visit. No future appointments.

## 2020-08-28 ENCOUNTER — TELEPHONE (OUTPATIENT)
Dept: INTERNAL MEDICINE CLINIC | Age: 40
End: 2020-08-28

## 2020-08-28 NOTE — TELEPHONE ENCOUNTER
On-Call Note  Received call from patient. She reports having 3 days of generalized face swelling. Also orange colored urine and a blood shot eye. She denies any focal swelling, pain, itching, fever. No change in vision, no pain in eye. No cough no congestion. She has treated the face swelling with ice and \"the normal things\" never describe what that was. Though she has not taken benadryl or other antihistamine. States that benadryl doesn't help her sleep. Advised patient that she may be having an allergic reaction, recommended trial of benadryl. Asked if she is on any new medications or other changes, patient denied this. She is sure she isn't haven't an allergic reaction because she has never had one. Discussed possibility of first time reaction. She plans to take 1/2 tablet of benadryl. Wanted to know if I would prescribe her a steroid. Declined to prescribe or diagnose over the phone given unclear history. She wondered if she could call Dr. Lilian Velasquez tomorrow for a prescription. Strongly advised patient to make an appointment to be evaluated, or go to urgent care. She could also consider trying the benadryl and increasing fluid intake in case that improved or resolved symptoms. Patient stated understanding.      Terra Conroy MD

## 2020-10-22 DIAGNOSIS — F98.8 ATTENTION DEFICIT DISORDER, UNSPECIFIED HYPERACTIVITY PRESENCE: ICD-10-CM

## 2020-10-23 RX ORDER — DEXTROAMPHETAMINE SACCHARATE, AMPHETAMINE ASPARTATE, DEXTROAMPHETAMINE SULFATE AND AMPHETAMINE SULFATE 5; 5; 5; 5 MG/1; MG/1; MG/1; MG/1
TABLET ORAL
Qty: 60 TAB | Refills: 0 | Status: SHIPPED | OUTPATIENT
Start: 2020-10-24 | End: 2021-01-05 | Stop reason: SDUPTHER

## 2021-01-05 ENCOUNTER — VIRTUAL VISIT (OUTPATIENT)
Dept: INTERNAL MEDICINE CLINIC | Age: 41
End: 2021-01-05

## 2021-01-05 DIAGNOSIS — F33.9 RECURRENT DEPRESSION (HCC): ICD-10-CM

## 2021-01-05 DIAGNOSIS — F43.21 ADJUSTMENT DISORDER WITH DEPRESSED MOOD: ICD-10-CM

## 2021-01-05 DIAGNOSIS — F98.8 ATTENTION DEFICIT DISORDER, UNSPECIFIED HYPERACTIVITY PRESENCE: Primary | ICD-10-CM

## 2021-01-05 PROCEDURE — 99214 OFFICE O/P EST MOD 30 MIN: CPT | Performed by: INTERNAL MEDICINE

## 2021-01-05 RX ORDER — DEXTROAMPHETAMINE SACCHARATE, AMPHETAMINE ASPARTATE, DEXTROAMPHETAMINE SULFATE AND AMPHETAMINE SULFATE 5; 5; 5; 5 MG/1; MG/1; MG/1; MG/1
TABLET ORAL
Qty: 60 TAB | Refills: 0 | Status: SHIPPED | OUTPATIENT
Start: 2021-01-05 | End: 2021-09-14

## 2021-01-05 RX ORDER — FLUOXETINE HYDROCHLORIDE 40 MG/1
40 CAPSULE ORAL DAILY
Qty: 30 CAP | Refills: 5 | Status: SHIPPED | OUTPATIENT
Start: 2021-01-05 | End: 2021-10-01 | Stop reason: SDUPTHER

## 2021-01-05 RX ORDER — DEXTROAMPHETAMINE SACCHARATE, AMPHETAMINE ASPARTATE, DEXTROAMPHETAMINE SULFATE AND AMPHETAMINE SULFATE 5; 5; 5; 5 MG/1; MG/1; MG/1; MG/1
TABLET ORAL
Qty: 60 TAB | Refills: 0 | Status: SHIPPED | OUTPATIENT
Start: 2021-03-06 | End: 2021-09-14

## 2021-01-05 RX ORDER — DEXTROAMPHETAMINE SACCHARATE, AMPHETAMINE ASPARTATE, DEXTROAMPHETAMINE SULFATE AND AMPHETAMINE SULFATE 5; 5; 5; 5 MG/1; MG/1; MG/1; MG/1
TABLET ORAL
Qty: 60 TAB | Refills: 0 | Status: SHIPPED | OUTPATIENT
Start: 2021-02-04 | End: 2021-09-14

## 2021-01-05 NOTE — PROGRESS NOTES
Josse Joy (: 1980) is a 47 y.o. female, established patient, here for evaluation of the following chief complaint(s):  Medication Refill (adderall discuss getting back on prozac)     Consent: Josse Joy, who was seen by synchronous (real-time) audio-video technology, and/or her healthcare decision maker, is aware that this patient-initiated, Telehealth encounter on 2021 is a billable service, with coverage as determined by her insurance carrier. She is aware that she may receive a bill and has provided verbal consent to proceed: Yes. Notes (nursing/rooming note copied below in italics):    Notes no problems with ADHD medications. Prescription Monitoring Program ([place]) database query with fills:  2020  2   2020  Dextroamp-Amphetamin 20 MG Tab  60.00  30 Cl Chi   [id number]   Wal ([id number])   [place]   10/24/2020  2   10/23/2020  Dextroamp-Amphetamin 20 MG Tab  60.00  30 Cl Chi   [id number]   Wal ([id number])   [place]   2020  2   2020  Dextroamp-Amphetamin 20 MG Tab  60.00  30 Cl Chi   [id number]   Wal ([id number])   [place]   [place]  2   2020  Dextroamp-Amphetamin 20 MG Tab  60.00  30 Cl Chi   [id number]   Wal ([id number])   [place]   [place]  2   2020  Dextroamp-Amphetamin 20 MG Tab  60.00  30 Cl Chi   [id number]   Wal ([id number])   [place]         Wt Readings from Last 3 Encounters:   19 188 lb 4 oz (85.4 kg)   19 181 lb 7 oz (82.3 kg)   10/01/19 183 lb 3.2 oz (83.1 kg)     BP Readings from Last 3 Encounters:   19 131/76   19 111/90   10/01/19 (!) 150/123     Pulse Readings from Last 3 Encounters:   19 (!) 107   19 (!) 131   10/01/19 (!) 108       Refills reviewed as below. She has used Prozac 20mg-40mg. She notes stress related to not being able to work, and home with her son, and 2 of her boyfriend's children. She is not interested in counseling at this time.     She notes 8yr old and 9yr old children in home. Notes losing her patience more       She has stopped smoking. Nursing screenings reviewed by provider at visit. Prior to Admission medications    Medication Sig Start Date End Date Taking? Authorizing Provider   dextroamphetamine-amphetamine (ADDERALL) 20 mg tablet Take 20mg in AM and 20mg again in afternoon as needed. 9/24/20  Yes Keyon Bello MD   Cetirizine (ZyrTEC) 10 mg cap Take  by mouth. Yes Provider, Historical   dextroamphetamine-amphetamine (ADDERALL) 20 mg tablet Take 20mg in AM and 20mg again in afternoon as needed. 10/24/20   Keyon Bello MD   dextroamphetamine-amphetamine (ADDERALL) 20 mg tablet Take 20mg in AM and 20mg again in afternoon as needed. 8/25/20   MD PARAS Jain    PHYSICAL EXAMINATION:    Vital Signs:  Last menstrual period 03/14/2016. No flowsheet data found. Constitutional: [x] Appears well-developed and well-nourished [x] No apparent distress      Mental status: [x] Alert and awake  [x] Oriented [x] Able to follow commands       Eyes:   EOM    [x]  Normal      Sclera  [x]  Normal              Discharge [x]  None visible       HENT: [x] Normocephalic, atraumatic    [x] Mouth/Throat: Mucous membranes are moist    External Ears [x] Normal      Neck: [x] No visualized mass     Pulmonary/Chest: [x] Respiratory effort normal   [x] No visualized signs of difficulty breathing or respiratory distress    Musculoskeletal:  [x] Normal range of motion of neck    Neurological:        [x] No Facial Asymmetry (Cranial nerve 7 motor function) (limited exam due to video visit)          [x] No gaze palsy     Skin:        [x] No significant exanthematous lesions or discoloration noted on facial skin             Psychiatric:       [x] Normal Affect       Other pertinent observable physical exam findings:  None. We discussed the expected course, resolution and complications of the diagnosis(es) in detail. Medication risks, benefits, costs, interactions, and alternatives were discussed as indicated. I advised her to contact the office if her condition worsens, changes or fails to improve as anticipated. She expressed understanding with the diagnosis(es) and plan. Assessment & Plan:   Diagnoses and all orders for this visit:      ICD-10-CM ICD-9-CM    1. Attention deficit disorder, unspecified hyperactivity presence  F98.8 314.00 dextroamphetamine-amphetamine (ADDERALL) 20 mg tablet      dextroamphetamine-amphetamine (ADDERALL) 20 mg tablet      dextroamphetamine-amphetamine (ADDERALL) 20 mg tablet      REFERRAL TO BEHAVIORAL HEALTH   2. Recurrent depression (HCC)  F33.9 296.30 FLUoxetine (PROzac) 40 mg capsule      REFERRAL TO BEHAVIORAL HEALTH   3. Adjustment disorder with depressed mood  F43.21 309.0 FLUoxetine (PROzac) 40 mg capsule      REFERRAL TO BEHAVIORAL HEALTH       1. Refills reviewed. 2,3:  Medication(s), management and follow-up based on response reviewed at visit. Referral(s) and referral coordination reviewed with patient at visit. Follow-up and Dispositions    · Return in about 3 months (around 4/5/2021), or if symptoms worsen or fail to improve, for medication follow-up.       reviewed medications and side effects in detail    Plan and evaluation (above) reviewed with pt at visit  Patient voiced understanding of plan and provided with time to ask/review questions. After Visit Summary (AVS) provided to pt after visit with additional instructions as needed/reviewed. Follow-up and Dispositions    · Return in about 3 months (around 4/5/2021), or if symptoms worsen or fail to improve, for medication follow-up. AVS:  [x]  Available to patient in [employer] after visit signed--including referral information. []  Mailed to patient after visit. []  Not sent to patient after visit. No future appointments.           Benny Felix is being evaluated by a Virtual Visit (video visit) encounter to address concerns as mentioned above. A caregiver was present when appropriate. Due to this being a TeleHealth encounter (During [facility]-83 public health emergency), evaluation of the following organ systems was limited: Vitals/Constitutional/EENT/Resp/CV/GI//MS/Neuro/Skin/Heme-Lymph-Imm. Pursuant to the emergency declaration under the [address] [place] [address] and the [employer] Act, this Virtual Visit was conducted with patient's (and/or legal guardian's) consent, to reduce the patient's risk of exposure to COVID-19 and provide necessary medical care. The patient (and/or legal guardian) has also been advised to contact this office for worsening conditions or problems, and seek emergency medical treatment and/or call 911 if deemed necessary. Patient identification was verified at the start of the visit: YES. Services were provided through a video synchronous discussion virtually to substitute for in-person clinic visit. Patient was located at their individual home. Provider was located in the medical office. An electronic signature was used to authenticate this note.   -- Darrin Lanza MD

## 2021-01-05 NOTE — PROGRESS NOTES
Virtual visit     Chief Complaint   Patient presents with    Medication Refill     adderall discuss getting back on prozac       1. Have you been to the ER, urgent care clinic since your last visit? Hospitalized since your last visit? No    2. Have you seen or consulted any other health care providers outside of the [address] [place] [address] since your last visit? Include any pap smears or colon screening. No    Health Maintenance Due   Topic Date Due    DTaP/Tdap/Td series (1 - Tdap) 11/09/2003    PAP AKA CERVICAL CYTOLOGY  08/12/2017    Flu Vaccine (1) 09/01/2020       Learning Assessment 1/9/2019   PRIMARY LEARNER Patient   HIGHEST LEVEL OF EDUCATION - PRIMARY LEARNER  -   BARRIERS PRIMARY LEARNER NONE   PRIMARY LANGUAGE ENGLISH    NEED -   LEARNER PREFERENCE PRIMARY DEMONSTRATION   ANSWERED BY patient   RELATIONSHIP SELF         AVS, education, follow up and recommendations provided and addressed with patient.   services used to advise patient no

## 2021-09-14 ENCOUNTER — OFFICE VISIT (OUTPATIENT)
Dept: INTERNAL MEDICINE CLINIC | Age: 41
End: 2021-09-14
Payer: MEDICAID

## 2021-09-14 VITALS
OXYGEN SATURATION: 96 % | TEMPERATURE: 98.3 F | HEART RATE: 102 BPM | HEIGHT: 65 IN | BODY MASS INDEX: 38.22 KG/M2 | SYSTOLIC BLOOD PRESSURE: 129 MMHG | DIASTOLIC BLOOD PRESSURE: 65 MMHG | WEIGHT: 229.4 LBS

## 2021-09-14 DIAGNOSIS — F32.1 CURRENT MODERATE EPISODE OF MAJOR DEPRESSIVE DISORDER WITHOUT PRIOR EPISODE (HCC): ICD-10-CM

## 2021-09-14 DIAGNOSIS — Z11.59 NEED FOR HEPATITIS C SCREENING TEST: ICD-10-CM

## 2021-09-14 DIAGNOSIS — Z00.00 WELL ADULT HEALTH CHECK: ICD-10-CM

## 2021-09-14 DIAGNOSIS — Z00.00 WELL WOMAN EXAM (NO GYNECOLOGICAL EXAM): Primary | ICD-10-CM

## 2021-09-14 DIAGNOSIS — Z83.49 FH: THYROID DISEASE: ICD-10-CM

## 2021-09-14 DIAGNOSIS — R68.89 UNINTENTIONAL WEIGHT CHANGE: ICD-10-CM

## 2021-09-14 DIAGNOSIS — Z11.4 SCREENING FOR HIV WITHOUT PRESENCE OF RISK FACTORS: ICD-10-CM

## 2021-09-14 PROCEDURE — 99213 OFFICE O/P EST LOW 20 MIN: CPT | Performed by: INTERNAL MEDICINE

## 2021-09-14 PROCEDURE — 99395 PREV VISIT EST AGE 18-39: CPT | Performed by: INTERNAL MEDICINE

## 2021-09-14 RX ORDER — ST. JOHN'S WORT 300 MG
CAPSULE ORAL
COMMUNITY
End: 2022-02-03

## 2021-09-14 RX ORDER — IBUPROFEN 800 MG/1
TABLET ORAL
COMMUNITY
Start: 2021-09-09 | End: 2022-03-28

## 2021-09-14 RX ORDER — GLUCOSAMINE SULFATE 1500 MG
POWDER IN PACKET (EA) ORAL DAILY
Status: ON HOLD | COMMUNITY
End: 2022-04-19

## 2021-09-14 RX ORDER — ACETAMINOPHEN 500 MG/1
TABLET ORAL
COMMUNITY
Start: 2021-09-09 | End: 2022-03-28

## 2021-09-14 NOTE — PROGRESS NOTES
RM 17  Pt is not fasting     Chief Complaint   Patient presents with    Complete Physical    Depression       Visit Vitals  /65   Pulse (!) 102   Temp 98.3 °F (36.8 °C)   Ht 5' 4.5\" (1.638 m)   Wt 229 lb 6.4 oz (104.1 kg)   SpO2 96%   BMI 38.77 kg/m²       3 most recent PHQ Screens 9/14/2021   PHQ Not Done (No Data)   Little interest or pleasure in doing things More than half the days   Feeling down, depressed, irritable, or hopeless More than half the days   Total Score PHQ 2 4   Trouble falling or staying asleep, or sleeping too much More than half the days   Feeling tired or having little energy More than half the days   Poor appetite, weight loss, or overeating Several days   Feeling bad about yourself - or that you are a failure or have let yourself or your family down More than half the days   Trouble concentrating on things such as school, work, reading, or watching TV More than half the days   Moving or speaking so slowly that other people could have noticed; or the opposite being so fidgety that others notice Not at all   Thoughts of being better off dead, or hurting yourself in some way Several days   PHQ 9 Score 14         1. Have you been to the ER, urgent care clinic since your last visit? Hospitalized since your last visit? No    2. Have you seen or consulted any other health care providers outside of the [address] [place] [address] since your last visit? Include any pap smears or colon screening.  No    Health Maintenance Due   Topic Date Due    Hepatitis C Screening  Never done    COVID-19 Vaccine (1) Never done    DTaP/Tdap/Td series (1 - Tdap) Never done    Flu Vaccine (1) 09/01/2021       Learning Assessment 1/9/2019   PRIMARY LEARNER Patient   HIGHEST LEVEL OF EDUCATION - PRIMARY LEARNER  -   BARRIERS PRIMARY LEARNER NONE   PRIMARY LANGUAGE ENGLISH    NEED -   LEARNER PREFERENCE PRIMARY DEMONSTRATION   ANSWERED BY patient   RELATIONSHIP SELF       AVS  education, follow up, and recommendations provided and addressed with patient.

## 2021-09-14 NOTE — PROGRESS NOTES
Bonilla Echols (: 1980) is a 47 y.o. female, established patient, here for evaluation of the following chief complaint(s):  Chief Complaint   Patient presents with    Complete Physical    Depression       Assessment and Plan:       ICD-10-CM ICD-9-CM    1. Well woman exam (no gynecological exam)  Z00.00 V70.0    2. Well adult health check  Z00.00 V70.0 HEMOGLOBIN A1C WITH EAG      TSH 3RD GENERATION      T4, FREE      CBC WITH AUTOMATED DIFF      METABOLIC PANEL, COMPREHENSIVE      VITAMIN D, 25 HYDROXY      VITAMIN B12      FOLATE      IRON PROFILE      FERRITIN      FERRITIN      IRON PROFILE      FOLATE      VITAMIN B12      VITAMIN D, 25 HYDROXY      METABOLIC PANEL, COMPREHENSIVE      CBC WITH AUTOMATED DIFF      T4, FREE      TSH 3RD GENERATION      HEMOGLOBIN A1C WITH EAG   3. Current moderate episode of major depressive disorder without prior episode (HCC)  F32.1 296.22 CBC WITH AUTOMATED DIFF      METABOLIC PANEL, COMPREHENSIVE      VITAMIN D, 25 HYDROXY      VITAMIN B12      FOLATE      FOLATE      VITAMIN B12      VITAMIN D, 25 HYDROXY      METABOLIC PANEL, COMPREHENSIVE      CBC WITH AUTOMATED DIFF   4. FH: thyroid disease  Z83.49 V18.19 TSH RECEPTOR AB      THYROID PEROXIDASE (TPO) AB      THYROID PEROXIDASE (TPO) AB      TSH RECEPTOR AB   5. Unintentional weight change  R68.89 780.99 HEMOGLOBIN A1C WITH EAG      TSH 3RD GENERATION      T4, FREE      TSH RECEPTOR AB      THYROID PEROXIDASE (TPO) AB      THYROID PEROXIDASE (TPO) AB      TSH RECEPTOR AB      T4, FREE      TSH 3RD GENERATION      HEMOGLOBIN A1C WITH EAG   6. Screening for HIV without presence of risk factors  Z11.4 V73.89 HIV 1/2 AG/AB, 4TH GENERATION,W RFLX CONFIRM      HIV 1/2 AG/AB, 4TH GENERATION,W RFLX CONFIRM   7. Need for hepatitis C screening test  Z11.59 V73.89 HEPATITIS C AB      HEPATITIS C AB       Diagnoses #1,2,6,7 for Preventive Care/Wellness visit.     Due to significant separate problems unrelated to preventive care needs, also addressed following diagnoses/problems at visit as below (diagnoses #3-5 above). 1-3:  Lab monitoring reviewed. 4,5:  Evaluation and testing reviewed. 6,7:  Screenings reviewed at visit. Follow-up and Dispositions    · Return in about 1 year (around 9/14/2022), or if symptoms worsen or fail to improve, for yearly physical.       lab results and schedule of future lab studies reviewed with patient  reviewed medications and side effects in detail    Plan and evaluation (above) reviewed with pt at visit  Patient voiced understanding of plan and provided with time to ask/review questions. After Visit Summary (AVS) provided to pt after visit with additional instructions as needed/reviewed. No future appointments. --Updated future visits after patient check-out. History of Present Illness:     Notes (nursing/rooming note copied below in italics):  Pt is not fasting     Rviewed labs and monitoring    She is taking B and Vit D supplements. Wt Readings from Last 3 Encounters:   09/14/21 229 lb 6.4 oz (104.1 kg)   11/18/19 188 lb 4 oz (85.4 kg)   11/07/19 181 lb 7 oz (82.3 kg)       Nursing screenings reviewed by provider at visit. Prior to Admission medications    Medication Sig Start Date End Date Taking? Authorizing Provider   Pain Relief Extra Strength 500 mg tablet  9/9/21  Yes Provider, Historical   ibuprofen (MOTRIN) 800 mg tablet  9/9/21  Yes Provider, Historical   VITAMIN B COMPLEX-100 PO Take  by mouth. Yes Provider, Historical   cholecalciferol (Vitamin D3) 25 mcg (1,000 unit) cap Take  by mouth daily. Yes Provider, Historical   simone's wort 300 mg cap Take  by mouth. Yes Provider, Historical   dextroamphetamine-amphetamine (ADDERALL) 20 mg tablet Take 20mg in AM and 20mg again in afternoon as needed.   Patient not taking: Reported on 9/14/2021 3/6/21   Lucero Joyner MD   dextroamphetamine-amphetamine (ADDERALL) 20 mg tablet Take 20mg in AM and 20mg again in afternoon as needed. Patient not taking: Reported on 9/14/2021 2/4/21   Brian Bedoya MD   dextroamphetamine-amphetamine (ADDERALL) 20 mg tablet Take 20mg in AM and 20mg again in afternoon as needed. Patient not taking: Reported on 9/14/2021 1/5/21   Brian Bedoya MD   FLUoxetine (PROzac) 40 mg capsule Take 1 Cap by mouth daily. Patient not taking: Reported on 9/14/2021 1/5/21   Brian Bedoya MD   Cetirizine (ZyrTEC) 10 mg cap Take  by mouth. Patient not taking: Reported on 9/14/2021    Provider, Historical      She notes she has completed and now works for a [employer]-based program that helped her with her mood and ADHD. She is no longer needing/taking ADHD medications above. ROS    Vitals:    09/14/21 1410   BP: 129/65   Pulse: (!) 102   Temp: 98.3 °F (36.8 °C)   SpO2: 96%   Weight: 229 lb 6.4 oz (104.1 kg)   Height: 5' 4.5\" (1.638 m)   PainSc:   0 - No pain   LMP: 03/14/2016      Body mass index is 38.77 kg/m². Physical Exam:     Physical Exam  Vitals and nursing note reviewed. Constitutional:       General: She is not in acute distress. Appearance: Normal appearance. She is well-developed. She is not diaphoretic. HENT:      Head: Normocephalic and atraumatic. Mouth/Throat:      Mouth: Mucous membranes are moist.   Eyes:      General: No scleral icterus. Right eye: No discharge. Left eye: No discharge. Conjunctiva/sclera: Conjunctivae normal.   Cardiovascular:      Rate and Rhythm: Normal rate and regular rhythm. Pulses: Normal pulses. Heart sounds: Normal heart sounds. No murmur heard. No friction rub. No gallop. Pulmonary:      Effort: Pulmonary effort is normal. No respiratory distress. Breath sounds: Normal breath sounds. No stridor. No wheezing, rhonchi or rales. Abdominal:      General: Bowel sounds are normal. There is no distension. Palpations: Abdomen is soft. Tenderness:  There is no abdominal tenderness. There is no guarding. Musculoskeletal:         General: No swelling, tenderness, deformity or signs of injury. Cervical back: Neck supple. No rigidity. No muscular tenderness. Right lower leg: No edema. Left lower leg: No edema. Lymphadenopathy:      Cervical: No cervical adenopathy. Skin:     General: Skin is warm. Coloration: Skin is not jaundiced or pale. Findings: No bruising, erythema or rash. Neurological:      General: No focal deficit present. Mental Status: She is alert. Motor: No abnormal muscle tone. Coordination: Coordination normal.      Gait: Gait normal.   Psychiatric:         Mood and Affect: Mood normal.         Behavior: Behavior normal.         Thought Content: Thought content normal.         Judgment: Judgment normal.         An electronic signature was used to authenticate this note.   -- Inocencia Kyle MD

## 2021-09-14 NOTE — PATIENT INSTRUCTIONS
1.  Weight Loss Education/Recommendations    1. Eat three meals a day, with three snacks in between. Eat approximately every three hours. Never skip a meal.    2.  Eat protein with each meal.    Protein should be 25-30 grams per meal.  This is equal to your palm size. Examples of protein include fish, chicken, pork, lean meats. 3.  Limit carbohydrates. Eat 30 grams or less of carbohydrate per meal.  No carbohydrates before noon. Carbohydrates for snacks should be 15 grams or less. Carbohydrates that are good are vegetables and fruit. Limit amount of fruit intake due to natural sugars. Use carbohydrate count book to help limit carbohydrates. 4.  Drink 6-8 glasses of water a day. 5.  Incorporate exercise if possible. Start with 30 minutes a day for 3-4 days per week. Increase amount and number of days gradually but consistently over time. 6.  Weigh self weekly. Weigh on the same day at the same time each day. 2.  Please call the [facility] to set up an appointment--you can do this directly, without a referral.  Their number is [phone]. [facility] also has a program with [employer] a Dietitian\". You can have a complimentary conversation by calling [employer] at [phone]. --When prompted, enter code [[email or web address]]. --They are available Monday through Friday 9AM to 5PM EST. You can discuss nutrition topics below with them:  --general diet and nutrition,   --help manage blood sugar and weight. If problems, please call our clinic Nurses for assistance or alternative resources. Well Visit, Ages 25 to 48: Care Instructions  Overview     Well visits can help you stay healthy. Your doctor has checked your overall health and may have suggested ways to take good care of yourself. Your doctor also may have recommended tests.  At home, you can help prevent illness with healthy eating, regular exercise, and other steps.  Follow-up care is a key part of your treatment and safety. Be sure to make and go to all appointments, and call your doctor if you are having problems. It's also a good idea to know your test results and keep a list of the medicines you take. How can you care for yourself at home? · Get screening tests that you and your doctor decide on. Screening helps find diseases before any symptoms appear. · Eat healthy foods. Choose fruits, vegetables, whole grains, protein, and low-fat dairy foods. Limit fat, especially saturated fat. Reduce salt in your diet. · Limit alcohol. If you are a man, have no more than 2 drinks a day or 14 drinks a week. If you are a woman, have no more than 1 drink a day or 7 drinks a week. · Get at least 30 minutes of physical activity on most days of the week. Walking is a good choice. You also may want to do other activities, such as running, swimming, cycling, or playing tennis or team sports. Discuss any changes in your exercise program with your doctor. · Reach and stay at a healthy weight. This will lower your risk for many problems, such as obesity, diabetes, heart disease, and high blood pressure. · Do not smoke or allow others to smoke around you. If you need help quitting, talk to your doctor about stop-smoking programs and medicines. These can increase your chances of quitting for good. · Care for your mental health. It is easy to get weighed down by worry and stress. Learn strategies to manage stress, like deep breathing and mindfulness, and stay connected with your family and community. If you find you often feel sad or hopeless, talk with your doctor. Treatment can help. · Talk to your doctor about whether you have any risk factors for sexually transmitted infections (STIs). You can help prevent STIs if you wait to have sex with a new partner (or partners) until you've each been tested for STIs.  It also helps if you use condoms (male or female condoms) and if you limit your sex partners to one person who only has sex with you. Vaccines are available for some STIs, such as HPV. · Use birth control if it's important to you to prevent pregnancy. Talk with your doctor about the choices available and what might be best for you. · If you think you may have a problem with alcohol or drug use, talk to your doctor. This includes prescription medicines (such as amphetamines and opioids) and illegal drugs (such as cocaine and methamphetamine). Your doctor can help you figure out what type of treatment is best for you. · Protect your skin from too much sun. When you're outdoors from 10 a.m. to 4 p.m., stay in the shade or cover up with clothing and a hat with a wide brim. Wear sunglasses that block UV rays. Even when it's cloudy, put broad-spectrum sunscreen (SPF 30 or higher) on any exposed skin. · See a dentist one or two times a year for checkups and to have your teeth cleaned. · Wear a seat belt in the car. When should you call for help? Watch closely for changes in your health, and be sure to contact your doctor if you have any problems or symptoms that concern you. Where can you learn more? Go to http://www.[employer].com/  Enter P072 in the search box to learn more about \"Well Visit, Ages 25 to 48: Care Instructions. \"  Current as of: May 27, 2020               Content Version: 12.8  © 4092-6978 Healthwise, Incorporated. Care instructions adapted under license by [employer] (which disclaims liability or warranty for this information). If you have questions about a medical condition or this instruction, always ask your healthcare professional. [address] any warranty or liability for your use of this information.

## 2021-09-15 LAB
25(OH)D3+25(OH)D2 SERPL-MCNC: 19.9 NG/ML (ref 30–100)
ALBUMIN SERPL-MCNC: 4.3 G/DL (ref 3.8–4.8)
ALBUMIN/GLOB SERPL: 1.7 {RATIO} (ref 1.2–2.2)
ALP SERPL-CCNC: 98 IU/L (ref 44–121)
ALT SERPL-CCNC: 37 IU/L (ref 0–32)
AST SERPL-CCNC: 29 IU/L (ref 0–40)
BASOPHILS # BLD AUTO: 0.1 X10E3/UL (ref 0–0.2)
BASOPHILS NFR BLD AUTO: 1 %
BILIRUB SERPL-MCNC: 0.4 MG/DL (ref 0–1.2)
BUN SERPL-MCNC: 16 MG/DL (ref 6–20)
BUN/CREAT SERPL: 27 (ref 9–23)
CALCIUM SERPL-MCNC: 9.8 MG/DL (ref 8.7–10.2)
CHLORIDE SERPL-SCNC: 104 MMOL/L (ref 96–106)
CO2 SERPL-SCNC: 20 MMOL/L (ref 20–29)
CREAT SERPL-MCNC: 0.6 MG/DL (ref 0.57–1)
EOSINOPHIL # BLD AUTO: 0.2 X10E3/UL (ref 0–0.4)
EOSINOPHIL NFR BLD AUTO: 2 %
ERYTHROCYTE [DISTWIDTH] IN BLOOD BY AUTOMATED COUNT: 13 % (ref 11.7–15.4)
EST. AVERAGE GLUCOSE BLD GHB EST-MCNC: 120 MG/DL
FERRITIN SERPL-MCNC: 59 NG/ML (ref 15–150)
FOLATE SERPL-MCNC: 12.2 NG/ML
GLOBULIN SER CALC-MCNC: 2.6 G/DL (ref 1.5–4.5)
GLUCOSE SERPL-MCNC: 120 MG/DL (ref 65–99)
HBA1C MFR BLD: 5.8 % (ref 4.8–5.6)
HCT VFR BLD AUTO: 37.6 % (ref 34–46.6)
HCV AB S/CO SERPL IA: <0.1 S/CO RATIO (ref 0–0.9)
HGB BLD-MCNC: 12.6 G/DL (ref 11.1–15.9)
HIV 1+2 AB+HIV1 P24 AG SERPL QL IA: NON REACTIVE
IMM GRANULOCYTES # BLD AUTO: 0.1 X10E3/UL (ref 0–0.1)
IMM GRANULOCYTES NFR BLD AUTO: 1 %
IRON SATN MFR SERPL: 13 % (ref 15–55)
IRON SERPL-MCNC: 55 UG/DL (ref 27–159)
LYMPHOCYTES # BLD AUTO: 2.1 X10E3/UL (ref 0.7–3.1)
LYMPHOCYTES NFR BLD AUTO: 23 %
MCH RBC QN AUTO: 29.8 PG (ref 26.6–33)
MCHC RBC AUTO-ENTMCNC: 33.5 G/DL (ref 31.5–35.7)
MCV RBC AUTO: 89 FL (ref 79–97)
MONOCYTES # BLD AUTO: 0.5 X10E3/UL (ref 0.1–0.9)
MONOCYTES NFR BLD AUTO: 6 %
NEUTROPHILS # BLD AUTO: 5.9 X10E3/UL (ref 1.4–7)
NEUTROPHILS NFR BLD AUTO: 67 %
PLATELET # BLD AUTO: 327 X10E3/UL (ref 150–450)
POTASSIUM SERPL-SCNC: 4.3 MMOL/L (ref 3.5–5.2)
PROT SERPL-MCNC: 6.9 G/DL (ref 6–8.5)
RBC # BLD AUTO: 4.23 X10E6/UL (ref 3.77–5.28)
SODIUM SERPL-SCNC: 139 MMOL/L (ref 134–144)
T4 FREE SERPL-MCNC: 0.72 NG/DL (ref 0.82–1.77)
THYROPEROXIDASE AB SERPL-ACNC: 13 IU/ML (ref 0–34)
TIBC SERPL-MCNC: 410 UG/DL (ref 250–450)
TSH RECEP AB SER-ACNC: <1.1 IU/L (ref 0–1.75)
TSH SERPL DL<=0.005 MIU/L-ACNC: 5.88 UIU/ML (ref 0.45–4.5)
UIBC SERPL-MCNC: 355 UG/DL (ref 131–425)
VIT B12 SERPL-MCNC: 1402 PG/ML (ref 232–1245)
WBC # BLD AUTO: 8.8 X10E3/UL (ref 3.4–10.8)

## 2021-09-22 ENCOUNTER — TELEPHONE (OUTPATIENT)
Dept: INTERNAL MEDICINE CLINIC | Age: 41
End: 2021-09-22

## 2021-09-22 NOTE — TELEPHONE ENCOUNTER
----- Message from Jose Hatch sent at 9/22/2021  1:36 PM EDT -----  Regarding: Dr Swartz/telephone  Pt is calling to get her Test Results, please call pt at [phone]

## 2021-09-23 NOTE — TELEPHONE ENCOUNTER
We are not able to email patient information. Please notify patient of this. We could send it in the mail or she could  from the office. It may be possible for a family member to  if on form to disclose information. She could also access this information via [employer].

## 2021-09-23 NOTE — TELEPHONE ENCOUNTER
----- Message from Maurizio Riley sent at 9/23/2021  3:31 PM EDT -----  Regarding: /Telephone  General Message/Vendor Calls    Caller's first and last name:      Reason for call: Patient would like the lab for blood work results to use [email or web address] Jose Suggs required yes/no and why: Yes, to confirm      Best contact number(s): [phone]      Details to clarify the request: n/a      Maurizio Riley

## 2021-09-23 NOTE — TELEPHONE ENCOUNTER
Pt's currently cooper program called [address] and Adults and doesn't have access to her phone. Pt would like if her lab result's could be e-mailed to  [email or web address] the e-mail is that of the director of the program where the pt is currently residing.

## 2021-09-24 NOTE — TELEPHONE ENCOUNTER
Lab results from 9/14/21 printed and placed in mail to address provided/on file address. No further concerns.

## 2021-10-01 DIAGNOSIS — F43.21 ADJUSTMENT DISORDER WITH DEPRESSED MOOD: ICD-10-CM

## 2021-10-01 DIAGNOSIS — F33.9 RECURRENT DEPRESSION (HCC): ICD-10-CM

## 2021-10-01 NOTE — TELEPHONE ENCOUNTER
Pt noted on 9-14-21 visit she was not taking this or her other MH meds, since completing and working for a program.    MyChart message to pt to clarify request.

## 2021-10-06 DIAGNOSIS — F43.21 ADJUSTMENT DISORDER WITH DEPRESSED MOOD: ICD-10-CM

## 2021-10-06 DIAGNOSIS — F33.9 RECURRENT DEPRESSION (HCC): ICD-10-CM

## 2021-10-06 RX ORDER — FLUOXETINE HYDROCHLORIDE 40 MG/1
40 CAPSULE ORAL DAILY
Qty: 30 CAPSULE | Refills: 5 | Status: CANCELLED | OUTPATIENT
Start: 2021-10-06

## 2021-10-06 RX ORDER — FLUOXETINE HYDROCHLORIDE 40 MG/1
40 CAPSULE ORAL DAILY
Qty: 30 CAPSULE | Refills: 5 | Status: SHIPPED | OUTPATIENT
Start: 2021-10-06 | End: 2022-02-03 | Stop reason: SDUPTHER

## 2021-10-07 ENCOUNTER — TRANSCRIBE ORDER (OUTPATIENT)
Dept: INTERNAL MEDICINE CLINIC | Age: 41
End: 2021-10-07

## 2021-10-07 NOTE — TELEPHONE ENCOUNTER
Duplicate request: Prozac 40 mg #30 with 5 refills was sent to [facility] on [address]. on 10/06/2021. Requested Prescriptions     Pending Prescriptions Disp Refills    FLUoxetine (PROzac) 40 mg capsule 30 Capsule 5     Sig: Take 1 Capsule by mouth daily.

## 2022-02-03 ENCOUNTER — OFFICE VISIT (OUTPATIENT)
Dept: INTERNAL MEDICINE CLINIC | Age: 42
End: 2022-02-03
Payer: MEDICAID

## 2022-02-03 VITALS
DIASTOLIC BLOOD PRESSURE: 81 MMHG | HEIGHT: 64 IN | TEMPERATURE: 98.3 F | HEART RATE: 99 BPM | OXYGEN SATURATION: 95 % | BODY MASS INDEX: 37.56 KG/M2 | SYSTOLIC BLOOD PRESSURE: 127 MMHG | WEIGHT: 220 LBS | RESPIRATION RATE: 16 BRPM

## 2022-02-03 DIAGNOSIS — F33.9 RECURRENT DEPRESSION (HCC): Primary | ICD-10-CM

## 2022-02-03 DIAGNOSIS — R94.6 ABNORMAL THYROID FUNCTION TEST: ICD-10-CM

## 2022-02-03 DIAGNOSIS — F98.8 ATTENTION DEFICIT DISORDER, UNSPECIFIED HYPERACTIVITY PRESENCE: ICD-10-CM

## 2022-02-03 DIAGNOSIS — R74.01 ELEVATED ALT MEASUREMENT: ICD-10-CM

## 2022-02-03 DIAGNOSIS — E55.9 HYPOVITAMINOSIS D: ICD-10-CM

## 2022-02-03 DIAGNOSIS — Z00.00 WELL ADULT HEALTH CHECK: ICD-10-CM

## 2022-02-03 DIAGNOSIS — R73.09 ELEVATED HEMOGLOBIN A1C: ICD-10-CM

## 2022-02-03 DIAGNOSIS — F43.21 ADJUSTMENT DISORDER WITH DEPRESSED MOOD: ICD-10-CM

## 2022-02-03 PROCEDURE — 99214 OFFICE O/P EST MOD 30 MIN: CPT | Performed by: INTERNAL MEDICINE

## 2022-02-03 RX ORDER — FLUOXETINE HYDROCHLORIDE 40 MG/1
40 CAPSULE ORAL DAILY
Qty: 90 CAPSULE | Refills: 1 | Status: SHIPPED | OUTPATIENT
Start: 2022-02-03 | End: 2022-04-21

## 2022-02-03 RX ORDER — DEXTROAMPHETAMINE SACCHARATE, AMPHETAMINE ASPARTATE MONOHYDRATE, DEXTROAMPHETAMINE SULFATE AND AMPHETAMINE SULFATE 5; 5; 5; 5 MG/1; MG/1; MG/1; MG/1
20 CAPSULE, EXTENDED RELEASE ORAL
Qty: 30 CAPSULE | Refills: 0 | Status: SHIPPED | OUTPATIENT
Start: 2022-02-03 | End: 2022-02-27 | Stop reason: SDUPTHER

## 2022-02-03 NOTE — PROGRESS NOTES
Shahid Webb (: 1980) is a 46 y.o. female, established patient, here for evaluation of the following chief complaint(s):  Chief Complaint   Patient presents with    Follow-up     meds, labs       Assessment and Plan:       ICD-10-CM ICD-9-CM    1. Recurrent depression (HCC)  F33.9 296.30 FLUoxetine (PROzac) 40 mg capsule   2. Abnormal thyroid function test  R94.6 794.5 T4, FREE      TSH 3RD GENERATION   3. Hypovitaminosis D  E55.9 268.9 VITAMIN D, 25 HYDROXY   4. Elevated hemoglobin A1c  R73.09 790.29 HEMOGLOBIN A1C WITH EAG   5. Elevated ALT measurement  R74.01 790.4 HEPATIC FUNCTION PANEL   6. Well adult health check  Z00.00 V70.0    7. Adjustment disorder with depressed mood  F43.21 309.0 FLUoxetine (PROzac) 40 mg capsule   8. Attention deficit disorder, unspecified hyperactivity presence  F98.8 314.00 10-DRUG SCREEN W/CONF      amphetamine-dextroamphetamine XR (ADDERALL XR) 20 mg XR capsule       1,7. Refill/continuation current medication reviewed. 2-5:  Lab monitoring reviewed. 6.  Plan lipids at future visit--not fasting today; not done with well woman exam.  No prior lipid screening done. 8.  Baseline screening and re-start medication at dose above reviewed. Medication follow-up for dose adjustment reviewed. Prior on short-acting--monitor on long-acting prep as above. Follow-up and Dispositions    · Return in about 1 month (around 3/3/2022) for medication follow-up--as reviewed. lab results and schedule of future lab studies reviewed with patient  reviewed medications and side effects in detail    For additional documentation of information and/or recommendations discussed this visit, please see notes in instructions. Plan and evaluation (above) reviewed with pt at visit  Patient voiced understanding of plan and provided with time to ask/review questions. After Visit Summary (AVS) provided to pt after visit with additional instructions as needed/reviewed.       No future appointments. --Updated future visits after patient check-out. History of Present Illness:     Notes (nursing/rooming note copied below in italics):  As above    Here for medication follow-up. She had [place] refilled Oct as requested. LOV was Sept 2021. Labs and follow-up testing reviewed. Not fasting today. She notes no interim problems except focus. Prozac effective with anxiety/depression symptoms. She is having mild focus problems not related to mood. Had ADHD and used meds to manage in past.  Prior used IR BID dosing due to night-time shift work at [employer]. She has a stable day-job now. Reviewed mgt with long-acting prep and dose as above. Follow-up and refills reviewed. Nursing screenings reviewed by provider at visit. Prior to Admission medications    Medication Sig Start Date End Date Taking? Authorizing Provider   FLUoxetine (PROzac) 40 mg capsule Take 1 Capsule by mouth daily. 10/6/21  Yes Monica Anaya MD   Pain Relief Extra Strength 500 mg tablet  9/9/21  Yes Provider, Historical   ibuprofen (MOTRIN) 800 mg tablet  9/9/21  Yes Provider, Historical   VITAMIN B COMPLEX-100 PO Take  by mouth. Yes Provider, Historical   cholecalciferol (Vitamin D3) 25 mcg (1,000 unit) cap Take  by mouth daily. Yes Provider, Historical   simone's wort 300 mg cap Take  by mouth. Patient not taking: Reported on 2/3/2022    Provider, Historical   Cetirizine (ZyrTEC) 10 mg cap Take  by mouth. Patient not taking: Reported on 9/14/2021    Provider, Historical        ROS    Vitals:    02/03/22 1043   BP: 127/81   Pulse: 99   Resp: 16   Temp: 98.3 °F (36.8 °C)   TempSrc: Oral   SpO2: 95%   Weight: 220 lb (99.8 kg)   Height: 5' 4\" (1.626 m)   LMP: 03/14/2016      Body mass index is 37.76 kg/m². Physical Exam:     Physical Exam  Vitals and nursing note reviewed. Constitutional:       General: She is not in acute distress. Appearance: Normal appearance.  She is well-developed. She is not diaphoretic. HENT:      Head: Normocephalic and atraumatic. Mouth/Throat:      Mouth: Mucous membranes are moist.   Eyes:      General: No scleral icterus. Right eye: No discharge. Left eye: No discharge. Conjunctiva/sclera: Conjunctivae normal.   Cardiovascular:      Rate and Rhythm: Normal rate and regular rhythm. Pulses: Normal pulses. Heart sounds: Normal heart sounds. No murmur heard. No friction rub. No gallop. Pulmonary:      Effort: Pulmonary effort is normal. No respiratory distress. Breath sounds: Normal breath sounds. No stridor. No wheezing, rhonchi or rales. Abdominal:      General: Bowel sounds are normal. There is no distension. Palpations: Abdomen is soft. Tenderness: There is no abdominal tenderness. There is no guarding. Musculoskeletal:         General: No swelling, tenderness, deformity or signs of injury. Skin:     General: Skin is warm. Coloration: Skin is not jaundiced or pale. Findings: No bruising, erythema or rash. Neurological:      General: No focal deficit present. Mental Status: She is alert. Motor: No abnormal muscle tone. Coordination: Coordination normal.      Gait: Gait normal.   Psychiatric:         Mood and Affect: Mood normal.         Behavior: Behavior normal.         Thought Content: Thought content normal.         Judgment: Judgment normal.         An electronic signature was used to authenticate this note.   -- Cipriano Lujan MD

## 2022-02-03 NOTE — PATIENT INSTRUCTIONS
If the Adderall is not approved, please let us know, and will start prior authorization and send insurance drug screening information as reviewed. If dose effective, can provide 2 additional monthly refills prior to follow-up.   If a dose adjustment is needed with your next prescription, please schedule a follow-up to review--in-office or virtual.

## 2022-02-03 NOTE — PROGRESS NOTES
rm 16    Chief Complaint   Patient presents with    Follow-up     meds, labs     1. Have you been to the ER, urgent care clinic since your last visit? Hospitalized since your last visit? No    2. Have you seen or consulted any other health care providers outside of the [address] [place] [address] since your last visit? Include any pap smears or colon screening.  No     Visit Vitals  /81 (BP 1 Location: Left arm, BP Patient Position: Sitting, BP Cuff Size: Adult)   Pulse 99   Temp 98.3 °F (36.8 °C) (Oral)   Resp 16   Ht 5' 4\" (1.626 m)   Wt 220 lb (99.8 kg)   LMP 03/14/2016   SpO2 95%   BMI 37.76 kg/m²

## 2022-02-04 LAB
25(OH)D3+25(OH)D2 SERPL-MCNC: 20.9 NG/ML (ref 30–100)
ALBUMIN SERPL-MCNC: 4.6 G/DL (ref 3.8–4.8)
ALP SERPL-CCNC: 116 IU/L (ref 44–121)
ALT SERPL-CCNC: 150 IU/L (ref 0–32)
AMPHETAMINES UR QL SCN: NEGATIVE NG/ML
AST SERPL-CCNC: 129 IU/L (ref 0–40)
BARBITURATES UR QL SCN: NEGATIVE NG/ML
BENZODIAZ UR QL SCN: NEGATIVE NG/ML
BILIRUB DIRECT SERPL-MCNC: 0.25 MG/DL (ref 0–0.4)
BILIRUB SERPL-MCNC: 0.9 MG/DL (ref 0–1.2)
BZE UR QL SCN: NEGATIVE NG/ML
CANNABINOIDS UR QL SCN: NEGATIVE NG/ML
CREAT UR-MCNC: 158.7 MG/DL (ref 20–300)
EST. AVERAGE GLUCOSE BLD GHB EST-MCNC: 123 MG/DL
HBA1C MFR BLD: 5.9 % (ref 4.8–5.6)
METHADONE UR QL SCN: NEGATIVE NG/ML
OPIATES UR QL SCN: NEGATIVE NG/ML
OXYCODONE+OXYMORPHONE UR QL SCN: NEGATIVE NG/ML
PCP UR QL: NEGATIVE NG/ML
PH UR: 5.8 [PH] (ref 4.5–8.9)
PLEASE NOTE:, 733157: NORMAL
PROPOXYPH UR QL SCN: NEGATIVE NG/ML
PROT SERPL-MCNC: 6.9 G/DL (ref 6–8.5)
T4 FREE SERPL-MCNC: 1.28 NG/DL (ref 0.82–1.77)
TSH SERPL DL<=0.005 MIU/L-ACNC: 1.78 UIU/ML (ref 0.45–4.5)

## 2022-02-27 DIAGNOSIS — F33.9 RECURRENT DEPRESSION (HCC): ICD-10-CM

## 2022-02-27 DIAGNOSIS — F98.8 ATTENTION DEFICIT DISORDER, UNSPECIFIED HYPERACTIVITY PRESENCE: ICD-10-CM

## 2022-02-27 DIAGNOSIS — F43.21 ADJUSTMENT DISORDER WITH DEPRESSED MOOD: ICD-10-CM

## 2022-02-27 RX ORDER — FLUOXETINE HYDROCHLORIDE 40 MG/1
40 CAPSULE ORAL DAILY
Qty: 90 CAPSULE | Refills: 1 | Status: CANCELLED | OUTPATIENT
Start: 2022-02-27

## 2022-02-28 NOTE — TELEPHONE ENCOUNTER
Duplicate request: Prozac 40 mg #90 with 1 refill was sent to [facility] #[id number] on 02/03/2022. Last visit 02/03/2022 MD Radha Watson   Next appointment 1 month (03/2022) - Nothing scheduled   Previous refill encounter(s)   02/03/2022 Adderall-XR #30     No access to      Requested Prescriptions     Pending Prescriptions Disp Refills    FLUoxetine (PROzac) 40 mg capsule 90 Capsule 1     Sig: Take 1 Capsule by mouth daily.  amphetamine-dextroamphetamine XR (ADDERALL XR) 20 mg XR capsule 30 Capsule 0     Sig: Take 1 Capsule by mouth every morning. Max Daily Amount: 20 mg.

## 2022-03-04 RX ORDER — DEXTROAMPHETAMINE SACCHARATE, AMPHETAMINE ASPARTATE MONOHYDRATE, DEXTROAMPHETAMINE SULFATE AND AMPHETAMINE SULFATE 5; 5; 5; 5 MG/1; MG/1; MG/1; MG/1
20 CAPSULE, EXTENDED RELEASE ORAL
Qty: 30 CAPSULE | Refills: 0 | Status: SHIPPED | OUTPATIENT
Start: 2022-03-04 | End: 2022-03-22 | Stop reason: SDUPTHER

## 2022-03-04 NOTE — TELEPHONE ENCOUNTER
Refill request(s) approved--Adderall. Prescription Monitoring Program ([place]) database query with fills:  Filled  Written  Sold  ID  Drug  QTY  Days  Prescriber  RX #  Dispenser  Refill  Daily Dose*  Pymt Type       02/15/2022  02/15/2022   1  Hydrocodone-Acetamin 5-325 Mg   10.00  3  Mo Aung  [id number]  Vir ([id number])  0  16.67 MME  [place]     02/03/2022 02/03/2022   1  Dextroamp-Amphet Er 20 Mg Cap   30.00  30  Cl Chi  [id number]  Vir ([id number])  0   Private Pay  [place]     01/05/2021 01/05/2021   2  Dextroamp-Amphetamin 20 Mg Tab   60.00  30  Cl Chi  [id number]  Wal ([id number])  0   [place]     11/24/2020 08/25/2020   2  Dextroamp-Amphetamin 20 Mg Tab   60.00  30  Cl Chi  [id number]  Wal ([id number])  0             Requested Prescriptions     Signed Prescriptions Disp Refills    amphetamine-dextroamphetamine XR (ADDERALL XR) 20 mg XR capsule 30 Capsule 0     Sig: Take 1 Capsule by mouth every morning. Max Daily Amount: 20 mg. Authorizing Provider: Christie Cespedes     Receipt electronically verified by pharmacy.

## 2022-03-16 ENCOUNTER — PATIENT MESSAGE (OUTPATIENT)
Dept: INTERNAL MEDICINE CLINIC | Age: 42
End: 2022-03-16

## 2022-03-16 DIAGNOSIS — F98.8 ATTENTION DEFICIT DISORDER, UNSPECIFIED HYPERACTIVITY PRESENCE: ICD-10-CM

## 2022-03-16 NOTE — ED NOTES
Danya Granados : 1953  MRN: [id number]    68 year old    No LMP recorded. Patient has had a hysterectomy.      Chaperone: Navarrete      Established Patient    CC:   Chief Complaint   Patient presents with   • Office Visit     for her history of  endometrial adenocarcinoma with mixed serous carcinoma, Stage IIA, grade 3,  S/P ZAHRA/BSO, Staging on 7/15/17. She completed 6 cycles of Carb/Taxol  and WP XRT + Vag Cuff Brachy 18.     ONCOLOGY History:   Oncology History   Endometrial adenocarcinoma (CMS/HCC)   2017 Initial Diagnosis    Endometrial adenocarcinoma (CMS/HCC)serous,  Stage IIA, Grade 3, MSI Stable    Endometrial biopsy :   -Endometrial Adenocarcinoma with mixed serous carcinoma and endometrioid features, FIGO grade 3. See comment   Comment:   The tumor shows mostly solid growth and atypical cytologic features consistent with serous carcinoma. Furthermore, the P53 immunostain shows strong nuclear tumor positivity further confirming serous carcinoma.   ER immunostain is positive ( > 95 % ) while WV is weakly positive ( rare 3 % )   Ki67 immunostains show marked proliferative activity. Alcian blue shows some tumor mucinous features.   Mismatch repair enzyme immunohistochemistry is ABNORMAL with deficient ( less then 1 % ) tumor nuclear staining-expression for MSH2 and MSH6, while retaining normal ( greater than 80 % ) staining for MLH1 and PMS2.   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 Consider genetic counseling that may include detailed family history with possible sequencing and/or large genetic deletion/duplication testing of gennline MSH2 gene. If negative, sequencing an/or large deletion/duplication testing of grniline MSH6 may be indicated.        2017 Surgery    PROCEDURE:   EXPLORATORY LAPAROTOMY, TOTAL ABDOMINAL HYSTERECTOMY, BILATERAL SALPINGO-OOPHORECTOMY, BILATERAL LOW PERIAORTIC AND PELVIC LYMPH NODE DISSECTION, OMENTECTOMY. With Dr Black     Patient resting in bed in no apparent distress. Call bell in reach, bed in low locked position.   7/20/2017 -  Cancer Staged    Pathologic Diagnosis :    A: Right tube and ovary:  - Benign ovary and fallopian tube  B: Sigmoid adhesion:  - Fibrotic tissue with acute and chronic inflammation, negative for malignancy  C: Left cul-de-sac:  - Fibrotic tissue, negative for malignancy  D: Left tube and ovary:  - Benign ovary and fallopian tube  E: Uterus and cervix:  -Invasive high grade serous carcinoma, involving the lower uterine segment and endocervix (measuring 4.9 cm in circumference and 2.6 cm in greatest lateral extent); tumor invades 0.6 cm into a 1.1 cm thick uterine wall; ectocervix is negative for tumor  - No evidence of lymphovascular invasion  - Margins are negative for tumor  - Parametrial tissue with focal endosalpingiosis, negative for tumor  - Uterine fundus with inactive endometrium and normal myometrium  F: Left pelvic lymph node:  - Four lymph nodes, negative for metastatic carcinoma  G: Right pelvic and low periaortic lymph nodes:  - Nine lymph nodes, negative for metastatic carcinoma  H: Omentum:  - Omental adipose tissue, negative for malignancy    Note (part E): The serous carcinoma is positive for CK CAM 5.2, p53 (strong, diffuse), Ki-67 (50-60%), PAX-8, ER (95%, strong), WI (30%, weak to moderate),p16 (patchy) and negative for p40 and CEA (M). Mucicarmine is positive. The immunoprofile supports the diagnosis of serous carcinoma; based on p40 negativity, there is no evidence of squamous differentiation. The patient has a history of high grade endometrial adenocarcinoma with serous differentiation(see [id number]), therefore, this tumor is considered to be an endometrial primary (arising from the lower uterine segment and directly invading endocervix).  TUMOR   Primary Tumor Site: Lower uterine segment with direct extension to endocervix  Histologic Type: Serous carcinoma  Histologic Grade: High grade  Tumor Size: 4.9 cm (circumferential involvement of lower uterine segment and  endocervix)  Tumor Extent   Myometrial Invasion: Present   Depth of Myometrial Invasion: 6.0 mm  Myometrial Thickness: 11.0 mm   Tumor Involvement of Cervix: Invasion of cervical stromal connective tissue  Extent of Involvement of Other Organs:   Right ovary - Not involved  Left ovary - Not involved  Right fallopian tube - Not involved  Left fallopian tube - Not involved  Left parametrium - Not involved  Right parametrium - Not involved  Omentum - Not involved  Pelvic wall - Not involved   Pelvic washings: Negative for malignancy  Lymph-Vascular Invasion: Not identified  MARGINS   Margins: Uninvolved by invasive carcinoma   Distance of Invasive Carcinoma from Closest Margin: 9.0 mm  LYMPH NODES   Regional Lymph Nodes: Pelvic   Number of Pelvic Lymph Nodes Examined: 13   Lymph Node Involvement: None identified  STAGE (pTNM)   Primary Tumor (pT): pT2: Tumor invades stromal connective tissue of the cervix, but does not extend beyond uterus  Regional Lymph Nodes (pN)  Category (pN): pN0: No regional lymph node metastasis  MSI IHC_IL:  Date Ordered: 7/26/2017 Date Reported:7/27/2017  Results-Comments  Immunohistochemistry (IHC) screening for DNA mismatch repair deficiency    hMLH1 IHC Intact (normal)  hMSH2 IHC Intact (normal)  hMSH6 IHC Intact (normal)  hPMS2 IHC Intact (normal)  IHC results indicate presence of DNA mismatch repair proteins and likelihood of microsatellite stability in this tumor      - 12/12/2017 Chemotherapy    Carbo/Taxol completed 6 cycles on 12/12/17.      - 2/20/2018 Radiation Therapy    Radiation WP XRT (Dr Lopez) + Vag cuff Brachy (Dr Santoyo) completed 2/20/18      Tumor Markers     1/21 Ca125 = 10.3;  1/20 = 6;  10/21/19 = 6, 7/19/19 = 5, 1/11/2019=7,  9/24/2018 =8;  6/22/18 = 6, 4/18 = 5, 8/9/17 = 198, 6/30/17 = 13      8/31/2021 Testing    CT Chest Abdomen Pelvis WO Contrast  FINDINGS: The heart size is normal.  No pericardial effusion is seen.  The thoracic aorta shows normal caliber.  The lungs are slightly hyper aerated without acute consolidations or mass. Small subpleural opacity in the bilateral apices are unchanged and most likely chronic in nature.  There is no pneumothorax or pleural effusion. A few punctate lymph nodes in the mediastinum and bilateral axilla are  Nonspecific. The liver, gallbladder, pancreas, spleen, and adrenal glands are unremarkable. The kidneys are normal in size, shape and position without radiopaque calculus, mass, hydronephrosis or hydroureter.  Bladder is unremarkable as  far as seen. The small bowel loops are unremarkable.  Large amount stool is present in the colon.  There is no bowel thickening or dilatation.  Multiple surgical clips are again noted in the pelvis and along the bilateral iliac vasculature.  There are postsurgical changes of hysterectomy.  No mass or abnormal fluid collections are present in the pelvis.    There is diffuse osteopenia.  Patchy sclerosis of the sacrum is unchanged which may be post radiation changes.  Also noted is asymmetrical small area of sclerosis of the left aspect of the pubis symphysis.  There is mild-to-moderate DJD at the lumbar sacral spine.  IMPRESSION:  1.  No noncontrast CT evidence for metastases in the chest, abdomen, and pelvis.  The patchy areas of sclerosis of the sacral and the left pubis symphysis are unchanged, suggestive of post therapy changes?  2.  Post hysterectomy changes.  There is no evidence for abnormal fluid collections or mass in the pelvis.  3.  No acute consolidations.  4.  Constipation.  No bowel obstruction.  5.  Please see detailed discussion.       No specialty comments available.      Current Outpatient Medications   Medication Sig Dispense Refill   • albuterol 108 (90 Base) MCG/ACT inhaler Inhale 2 puffs into the lungs every 4 hours as needed for Shortness of Breath or Wheezing.     • Symbicort 160-4.5 MCG/ACT inhaler Inhale 2 puffs into the lungs 2 times daily. (Patient taking differently:  Inhale 1 puff into the lungs 2 times daily. ) 10.2 g 3   • Unithroid 75 MCG tablet Take 75 mcg by mouth every morning.      • Ascorbic Acid (vitamin C) 100 MG tablet Take 100 mg by mouth every morning.      • timolol (TIMOPTIC) 0.5 % ophthalmic solution Place 1 drop into right eye 2 times daily.      • fluticasone (Flonase) 50 MCG/ACT nasal spray Spray 2 sprays in each nostril daily. (Patient taking differently: Spray 2 sprays in each nostril daily. PRN) 16 g 12   • latanoprost (XALATAN) 0.005 % ophthalmic solution Place 1 drop into right eye nightly.      • brimonidine (ALPHAGAN) 0.2 % ophthalmic solution 1 drop 2 times daily.      • Calcium Carbonate-Vit D-Min (CALCIUM 1200 PO) Take by mouth every morning.      • Probiotic Product (PROBIOTIC-10 PO) Take 1 tablet by mouth every morning.      • Multiple Vitamins-Minerals (PRESERVISION AREDS 2 PO) Take by mouth every morning. STOP NOW     • aspirin 325 MG tablet Take 325 mg by mouth daily. PT HAS INSTRUCTIONS STOPPED 11/12     • Cholecalciferol (VITAMIN D) 2000 units capsule Take 4,000 Units by mouth every morning.        No current facility-administered medications for this visit.       ALLERGIES:  No Known Allergies    Patient Active Problem List   Diagnosis   • Endometrial adenocarcinoma (CMS/HCC)   • Aneurysm (CMS/HCC)   • Hematuria   • Hypothyroid   • History of chemotherapy   • Bright red blood per rectum   • Other specified glaucoma   • Brain aneurysm   • Brain TIA   • Transient cerebral ischemic attack, unspecified   • H/O cerebral aneurysm repair   • Gastroesophageal reflux disease without esophagitis   • Periumbilical abdominal pain   • Palpitations   • Pulmonary emphysema (CMS/HCC)   • Age-related osteoporosis without current pathological fracture   • Gross hematuria     Past Medical History:   Diagnosis Date   • Aneurysm (CMS/HCC) 1/10/2019   • Asthma    • Blind left eye    • Cerebral infarction (CMS/HCC) 2004    TIA  NO RESIDUAL   • COPD (chronic  obstructive pulmonary disease) (CMS/HCC)    • Endometrial adenocarcinoma (CMS/HCC) 01/10/2019    CHEMO AND RADIATION IN 2018   • Hematuria 1/10/2019   • History of chemotherapy 1/10/2019   • Hypothyroid 1/10/2019   • Palpitations 2021     Past Surgical History:   Procedure Laterality Date   • Aneurysm surg/carotid circ      clip- [facility]   • Cystoplasty  [id number]   • Extracorporeal shock wave lithotripsy     • Eye surgery Right     Laser surgery for Glaucoma-    • Thyroidectomy     • Total abdominal hysterectomy w/ bilateral salpingoophorectomy  07/15/2017    radical, Dr Black       Family History   Problem Relation Age of Onset   • Stroke/TIA Father    • Aneurysm Mother         brain   • Patient is unaware of any medical problems Sister    • Patient is unaware of any medical problems Brother        Social History     Tobacco Use   • Smoking status: Former Smoker     Quit date: 9/15/2015     Years since quittin.5   • Smokeless tobacco: Never Used   Vaping Use   • Vaping Use: never used   Substance Use Topics   • Alcohol use: Yes     Comment: socially    • Drug use: Not Currently       Pap History:S/P hyst     Last Mammogram:   Normal,   Breast US normal     Last Colorectal Screenin/17 Polyps, rec 5 year followup    History of Present Illness: Patient is 68 yr old female with stage II mixed serous carcinoma of the endometrium, here for 6 month surveillance exam. Today she reports she is feeling well. She denies vaginal bleeding or discharge. No abdominal or pelvic pain. No changes to her bowel or bladder habits.       Review of Systems   Constitutional: Negative.    HENT: Negative.    Eyes: Negative.    Respiratory: Negative.    Cardiovascular: Negative.    Gastrointestinal: Negative.    Endocrine: Negative.    Genitourinary: Negative.    Musculoskeletal: Negative.    Skin: Negative.    Allergic/Immunologic: Negative.    Neurological: Negative.    Hematological: Negative.     Psychiatric/Behavioral: Negative.        Physical Exam  Vitals and nursing note reviewed. Exam conducted with a chaperone present.   Constitutional:       General: She is not in acute distress.     Appearance: Normal appearance. She is well-developed. She is not diaphoretic.   HENT:      Head: Normocephalic and atraumatic.   Eyes:      General: No scleral icterus.        Right eye: No discharge.         Left eye: No discharge.      Pupils: Pupils are equal, round, and reactive to light.   Neck:      Thyroid: No thyromegaly.      Vascular: No JVD.      Trachea: No tracheal deviation.   Cardiovascular:      Rate and Rhythm: Normal rate and regular rhythm.      Heart sounds: Normal heart sounds. No murmur heard.    No friction rub. No gallop.   Pulmonary:      Effort: Pulmonary effort is normal. No respiratory distress.      Breath sounds: Normal breath sounds. No stridor. No wheezing or rales.   Chest:      Chest wall: No tenderness.   Breasts:      Right: No supraclavicular adenopathy.      Left: No supraclavicular adenopathy.       Abdominal:      General: Abdomen is flat. A surgical scar is present. Bowel sounds are normal. There is no distension.      Palpations: Abdomen is soft. Abdomen is not rigid. There is no fluid wave, hepatomegaly, splenomegaly or mass.      Tenderness: There is no abdominal tenderness. There is no right CVA tenderness, left CVA tenderness, guarding or rebound. Negative signs include Baker's sign and McBurney's sign.      Hernia: No hernia is present. There is no hernia in the ventral area, left inguinal area or right inguinal area.   Genitourinary:     General: Normal vulva.      Exam position: Lithotomy position.      Labia:         Right: No rash, tenderness, lesion or injury.         Left: No rash, tenderness, lesion or injury.       Vagina: Normal. No signs of injury and foreign body. No vaginal discharge, erythema, tenderness, bleeding, lesions or prolapsed vaginal walls.       Uterus: Absent.       Adnexa: Right adnexa normal and left adnexa normal.      Rectum: No mass.   Musculoskeletal:         General: No tenderness or deformity. Normal range of motion.      Right shoulder: No swelling, deformity, effusion, laceration, tenderness, bony tenderness or crepitus. Normal range of motion. Normal strength. Normal pulse.      Cervical back: Normal range of motion and neck supple.   Lymphadenopathy:      Head:      Right side of head: No submandibular or occipital adenopathy.      Left side of head: No submandibular or occipital adenopathy.      Cervical: No cervical adenopathy.      Right cervical: No superficial cervical adenopathy.     Left cervical: No superficial cervical adenopathy.      Upper Body:      Right upper body: No supraclavicular or pectoral adenopathy.      Left upper body: No supraclavicular or pectoral adenopathy.      Lower Body: No right inguinal adenopathy. No left inguinal adenopathy.   Skin:     General: Skin is warm and dry.      Coloration: Skin is not pale.      Findings: No erythema or rash.   Neurological:      Mental Status: She is alert and oriented to person, place, and time.      Cranial Nerves: No cranial nerve deficit.      Motor: No abnormal muscle tone.      Coordination: Coordination normal.      Deep Tendon Reflexes: Reflexes are normal and symmetric.   Psychiatric:         Mood and Affect: Mood is not anxious or depressed. Affect is not labile, blunt, angry or inappropriate.         Behavior: Behavior normal. Behavior is not agitated, slowed, aggressive or withdrawn.         Thought Content: Thought content normal. Thought content does not include suicidal ideation.         Cognition and Memory: Memory is not impaired. She does not exhibit impaired recent memory or impaired remote memory.         Judgment: Judgment normal. Judgment is not impulsive or inappropriate.       There were no vitals taken for this visit.    Assessment & Plan:  68-year-old  female with stage II endometrial cancer mixed serous and high-grade endometrioid adenocarcinoma remains KIM on clinical examination.  Follow-up in 6 months for repeat surveillance visit.  General precautions were provided.    Problem List Items Addressed This Visit     None            Gyn-Onc Signature: Johana Mann MD

## 2022-03-21 RX ORDER — DEXTROAMPHETAMINE SACCHARATE, AMPHETAMINE ASPARTATE MONOHYDRATE, DEXTROAMPHETAMINE SULFATE AND AMPHETAMINE SULFATE 5; 5; 5; 5 MG/1; MG/1; MG/1; MG/1
20 CAPSULE, EXTENDED RELEASE ORAL
Qty: 30 CAPSULE | Refills: 0 | Status: CANCELLED | OUTPATIENT
Start: 2022-03-21

## 2022-03-21 NOTE — TELEPHONE ENCOUNTER
Per pt message on 03/09/2022 pt is requesting the remaining 10 capsules to be sent to the [id number]. Per encounter on 03/17/2022 pt has reached out to the office and Dr. Kirstin Davila regarding the shortage of the [address] [place] [address]. Pt has an OV on 03/22/2022 with Dr. Kirstin Davila. Last visit 02/03/2022 MD Kirstin Davila   Next appointment 03/22/2022 MD Kirstin Davila. Previous refill encounter(s)   03/04/2022 Adderall-XR #30     No access to      Requested Prescriptions     Pending Prescriptions Disp Refills    amphetamine-dextroamphetamine XR (ADDERALL XR) 20 mg XR capsule 30 Capsule 0     Sig: Take 1 Capsule by mouth every morning. Max Daily Amount: 20 mg.                   ----- Message from Samy Parsons sent at 3/18/2022  4:45 PM EDT -----  Subject: Refill Request    QUESTIONS  Name of Medication? amphetamine-dextroamphetamine XR (ADDERALL XR) 20 mg   XR capsule  Patient-reported dosage and instructions? One capsule by mouth daily. How many days do you have left? 20  Preferred Pharmacy? [facility] [facility] [address] #[id number]  Pharmacy phone number (if available)? [phone]  Additional Information for Provider? Pt had this prescription filled on   03/05 and the pharmacy only had 20 of the 30 capsules. Pt stated she spoke   with Dr. Kirstin Davila on 03/09 and he said he would refill the last 10 and pt   never heard or received anything since then. Call pt to advise.  ---------------------------------------------------------------------------  --------------  CALL BACK INFO  What is the best way for the office to contact you? OK to leave message on   voicemail  Preferred Call Back Phone Number?  [phone]

## 2022-03-22 ENCOUNTER — OFFICE VISIT (OUTPATIENT)
Dept: INTERNAL MEDICINE CLINIC | Age: 42
End: 2022-03-22
Payer: MEDICAID

## 2022-03-22 VITALS
WEIGHT: 207 LBS | DIASTOLIC BLOOD PRESSURE: 75 MMHG | SYSTOLIC BLOOD PRESSURE: 109 MMHG | TEMPERATURE: 97.8 F | RESPIRATION RATE: 16 BRPM | OXYGEN SATURATION: 97 % | BODY MASS INDEX: 35.34 KG/M2 | HEIGHT: 64 IN | HEART RATE: 106 BPM

## 2022-03-22 DIAGNOSIS — J30.89 SEASONAL ALLERGIC RHINITIS DUE TO OTHER ALLERGIC TRIGGER: ICD-10-CM

## 2022-03-22 DIAGNOSIS — F98.8 ATTENTION DEFICIT DISORDER, UNSPECIFIED HYPERACTIVITY PRESENCE: Primary | ICD-10-CM

## 2022-03-22 DIAGNOSIS — F33.9 RECURRENT DEPRESSION (HCC): ICD-10-CM

## 2022-03-22 PROCEDURE — 99214 OFFICE O/P EST MOD 30 MIN: CPT | Performed by: INTERNAL MEDICINE

## 2022-03-22 RX ORDER — DEXTROAMPHETAMINE SACCHARATE, AMPHETAMINE ASPARTATE MONOHYDRATE, DEXTROAMPHETAMINE SULFATE AND AMPHETAMINE SULFATE 7.5; 7.5; 7.5; 7.5 MG/1; MG/1; MG/1; MG/1
30 CAPSULE, EXTENDED RELEASE ORAL
Qty: 30 CAPSULE | Refills: 0 | Status: ON HOLD | OUTPATIENT
Start: 2022-05-23 | End: 2022-04-19

## 2022-03-22 RX ORDER — CETIRIZINE HCL 10 MG
10 TABLET ORAL DAILY
Qty: 90 TABLET | Refills: 1 | Status: ON HOLD | OUTPATIENT
Start: 2022-03-22 | End: 2022-06-10

## 2022-03-22 RX ORDER — PHENAZOPYRIDINE HYDROCHLORIDE 200 MG/1
TABLET, FILM COATED ORAL
COMMUNITY
Start: 2022-03-11 | End: 2022-03-28

## 2022-03-22 RX ORDER — BUPROPION HYDROCHLORIDE 150 MG/1
150 TABLET ORAL DAILY
Qty: 30 TABLET | Refills: 0 | Status: ON HOLD | OUTPATIENT
Start: 2022-03-22 | End: 2022-04-19

## 2022-03-22 RX ORDER — DEXTROAMPHETAMINE SACCHARATE, AMPHETAMINE ASPARTATE MONOHYDRATE, DEXTROAMPHETAMINE SULFATE AND AMPHETAMINE SULFATE 7.5; 7.5; 7.5; 7.5 MG/1; MG/1; MG/1; MG/1
30 CAPSULE, EXTENDED RELEASE ORAL
Qty: 30 CAPSULE | Refills: 0 | Status: SHIPPED | OUTPATIENT
Start: 2022-04-23 | End: 2022-04-21

## 2022-03-22 RX ORDER — DEXTROAMPHETAMINE SACCHARATE, AMPHETAMINE ASPARTATE MONOHYDRATE, DEXTROAMPHETAMINE SULFATE AND AMPHETAMINE SULFATE 7.5; 7.5; 7.5; 7.5 MG/1; MG/1; MG/1; MG/1
30 CAPSULE, EXTENDED RELEASE ORAL
Qty: 30 CAPSULE | Refills: 0 | Status: ON HOLD | OUTPATIENT
Start: 2022-03-24 | End: 2022-04-19

## 2022-03-22 RX ORDER — CIPROFLOXACIN 500 MG/1
500 TABLET ORAL 2 TIMES DAILY
COMMUNITY
Start: 2022-03-11 | End: 2022-03-28

## 2022-03-22 RX ORDER — CLINDAMYCIN HYDROCHLORIDE 150 MG/1
CAPSULE ORAL
COMMUNITY
Start: 2022-02-09 | End: 2022-03-28

## 2022-03-22 NOTE — PROGRESS NOTES
rm 16    C/o allergies recently also she only got a partial fill of aderrall as pharmacy was out    Chief Complaint   Patient presents with    Discuss Medications     ADHD     1. Have you been to the ER, urgent care clinic since your last visit? Hospitalized since your last visit? Yes Where: [facility] 3/11/22 bladder infection    2. Have you seen or consulted any other health care providers outside of the [address] [place] [address] since your last visit? Include any pap smears or colon screening.  No     Visit Vitals  /75 (BP 1 Location: Left arm, BP Patient Position: Sitting, BP Cuff Size: Adult)   Pulse (!) 106   Temp 97.8 °F (36.6 °C) (Oral)   Resp 16   Ht 5' 4\" (1.626 m)   Wt 207 lb (93.9 kg)   LMP 03/14/2016   SpO2 97%   BMI 35.53 kg/m²

## 2022-03-22 NOTE — PATIENT INSTRUCTIONS
1.  Plan medication follow-up visit in 4-6 weeks if need to adjust your Wellbutrin dosing. If you are doing well with this combination, can follow-up in 3mo with your regular ADHD medication follow-up. 2.  Please follow the following instructions to process/authorize your referral, if needed:    Referrals processing  Please verify with your insurance IF you need referral authorization submitted. For insurance plans which require this, please follow the following steps. FAILURE TO DO SO MAY RESULT IN INABILITY TO SEE THE SPECIALIST YOU HAVE BEEN REFERRED TO (once you are scheduled to see them). 1. Call and schedule appointment with specialist  2. Call our clinic and leave message with provider name, and date of appointment  3. We will then submit the referral to your insurance. This process takes 2-5 business days. If you have questions about scheduling or authorizing referral, you can review with our referral coordinator. You can review with her today if available/if you have time, or you can call to review once you have made your referral/appointment. If you are not sure if you need referral authorizations, please review with the referral coordinator(s), either prior to or after you have made the appointment, as reviewed.

## 2022-03-22 NOTE — PROGRESS NOTES
Sukhjinder Calderon (: 1980) is a 46 y.o. female, established patient, here for evaluation of the following chief complaint(s):  Chief Complaint   Patient presents with    Discuss Medications     ADHD       Assessment and Plan:       ICD-10-CM ICD-9-CM    1. Attention deficit disorder, unspecified hyperactivity presence  F98.8 314.00 amphetamine-dextroamphetamine XR (ADDERALL XR) 30 mg XR capsule      amphetamine-dextroamphetamine XR (ADDERALL XR) 30 mg XR capsule      amphetamine-dextroamphetamine XR (ADDERALL XR) 30 mg XR capsule   2. Seasonal allergic rhinitis due to other allergic trigger  J30.89 477.8 cetirizine (ZYRTEC) 10 mg tablet   3. Recurrent depression (HCC)  F33.9 296.30 buPROPion XL (WELLBUTRIN XL) 150 mg tablet      REFERRAL TO BEHAVIORAL HEALTH       1. Refills at increased dose reviewed at visit. Prefers 3 x 30-day supplies as reviewed. 2.  Medication(s), management and follow-up based on response reviewed at visit. Reviewed typical course of illness, duration of symptoms, and exam findings. Symptomatic management reviewed at visit. 3.  Adding Wellbutrin to current medication d/w pt at visit. Referral(s) and referral coordination reviewed with patient at visit. Follow-up and Dispositions    · Return in about 6 weeks (around 5/3/2022) for medication follow-up--4-6 weeks. reviewed medications and side effects in detail    For additional documentation of information and/or recommendations discussed this visit, please see notes in instructions. Plan and evaluation (above) reviewed with pt at visit  Patient voiced understanding of plan and provided with time to ask/review questions. After Visit Summary (AVS) provided to pt after visit with additional instructions as needed/reviewed. No future appointments. --Updated future visits after patient check-out.       History of Present Illness:     Notes (nursing/rooming note copied below in italics):  C/o allergies recently also she only got a partial fill of aderrall as pharmacy was out  [facility] alone ER 3/11/22 bladder infection    Prescription Monitoring Program ([place]) database query with fills:  Filled  Written  Sold  ID  Drug  QTY  Days  Prescriber  RX #  Dispenser  Refill  Daily Dose*  Pymt Type       03/11/2022 03/11/2022   1  Oxycodone-Acetaminophen 5-325   15.00  4  Ro Big  [id number]  Vir ([id number])  0  28.13 M[place]     03/04/2022 03/04/2022   1  Dextroamp-Amphet Er 20 Mg Cap   20.00  20  Cl Chi  [id number]  Vir ([id number])  0   Private Pay  [place]     02/15/2022  02/15/2022   1  Hydrocodone-Acetamin 5-325 Mg   10.00  3  Mo Aung  [id number]  Vir ([id number])  0  16.67 MME  [place]     02/03/2022 02/03/2022   1  Dextroamp-Amphet Er 20 Mg Cap   30.00  30  Cl Chi  [id number]  Vir ([id number])  0   Private Pay  [place]     01/05/2021 01/05/2021   2  Dextroamp-Amphetamin 20 Mg Tab   60.00  30  Cl Chi  [id number]  Wal ([id number])  0   [place]     11/24/2020 08/25/2020   2  Dextroamp-Amphetamin 20 Mg Tab   60.00  30  Cl Chi  [id number]  Wal ([id number])  0             She has lost weight and off cigarettes x 1 year. Wt Readings from Last 3 Encounters:   03/22/22 207 lb (93.9 kg)   02/03/22 220 lb (99.8 kg)   09/14/21 229 lb 6.4 oz (104.1 kg)       She notes her Fluoxetine has not been as effective recently. She had completed program and when home, life is stressful. She is struggline to fight depression. She in [facility] Sunday and Wed. This is same [facility] she did the program through. Nursing screenings reviewed by provider at visit. Prior to Admission medications    Medication Sig Start Date End Date Taking? Authorizing Provider   amphetamine-dextroamphetamine XR (ADDERALL XR) 20 mg XR capsule Take 1 Capsule by mouth every morning. Max Daily Amount: 20 mg. 3/4/22  Yes Oniel Wall MD   FLUoxetine (PROzac) 40 mg capsule Take 1 Capsule by mouth daily.  2/3/22  Yes Oniel Wall MD   VITAMIN B COMPLEX-100 PO Take by mouth. Yes Provider, Historical   cholecalciferol (Vitamin D3) 25 mcg (1,000 unit) cap Take  by mouth daily. Yes Provider, Historical   ciprofloxacin HCl (CIPRO) 500 mg tablet Take 500 mg by mouth two (2) times a day. Patient not taking: Reported on 3/22/2022 3/11/22   Provider, Historical   clindamycin (CLEOCIN) 150 mg capsule TAKE 1 CAPSULE BY MOUTH 4 TIMES A DAY  Patient not taking: Reported on 3/22/2022 2/9/22   Provider, Historical   phenazopyridine (PYRIDIUM) 200 mg tablet TAKE 1 TABLET BY MOUTH 3 TIMES A DAY  Patient not taking: Reported on 3/22/2022 3/11/22   Provider, Historical   Pain Relief Extra Strength 500 mg tablet  9/9/21   Provider, Historical   ibuprofen (MOTRIN) 800 mg tablet  9/9/21   Provider, Historical        ROS    Vitals:    03/22/22 1125   BP: 109/75   Pulse: (!) 106   Resp: 16   Temp: 97.8 °F (36.6 °C)   TempSrc: Oral   SpO2: 97%   Weight: 207 lb (93.9 kg)   Height: 5' 4\" (1.626 m)   LMP: 03/14/2016      Body mass index is 35.53 kg/m². Physical Exam:     Physical Exam  Vitals and nursing note reviewed. Constitutional:       General: She is not in acute distress. Appearance: Normal appearance. She is well-developed. She is not diaphoretic. HENT:      Head: Normocephalic and atraumatic. Eyes:      General: No scleral icterus. Right eye: No discharge. Left eye: No discharge. Conjunctiva/sclera: Conjunctivae normal.   Cardiovascular:      Rate and Rhythm: Normal rate and regular rhythm. Pulses: Normal pulses. Heart sounds: Normal heart sounds. No murmur heard. No friction rub. No gallop. Pulmonary:      Effort: Pulmonary effort is normal. No respiratory distress. Breath sounds: Normal breath sounds. No stridor. No wheezing, rhonchi or rales. Abdominal:      General: Bowel sounds are normal. There is no distension. Palpations: Abdomen is soft. Tenderness: There is no abdominal tenderness. There is no guarding. Musculoskeletal:         General: No swelling, tenderness, deformity or signs of injury. Cervical back: Neck supple. No rigidity. No muscular tenderness. Right lower leg: No edema. Left lower leg: No edema. Lymphadenopathy:      Cervical: No cervical adenopathy. Skin:     General: Skin is warm. Coloration: Skin is not jaundiced or pale. Findings: No bruising, erythema or rash. Neurological:      General: No focal deficit present. Mental Status: She is alert. Motor: No abnormal muscle tone. Coordination: Coordination normal.      Gait: Gait normal.   Psychiatric:         Mood and Affect: Mood normal.         Behavior: Behavior normal.         Thought Content: Thought content normal.         Judgment: Judgment normal.         An electronic signature was used to authenticate this note.   -- Dacia Reich MD

## 2022-03-28 ENCOUNTER — HOSPITAL ENCOUNTER (EMERGENCY)
Age: 42
Discharge: LEFT BEFORE DISCHARGE | End: 2022-03-28
Attending: STUDENT IN AN ORGANIZED HEALTH CARE EDUCATION/TRAINING PROGRAM
Payer: MEDICAID

## 2022-03-28 VITALS
HEART RATE: 122 BPM | OXYGEN SATURATION: 100 % | TEMPERATURE: 97.9 F | HEIGHT: 64 IN | RESPIRATION RATE: 16 BRPM | WEIGHT: 215.17 LBS | SYSTOLIC BLOOD PRESSURE: 137 MMHG | BODY MASS INDEX: 36.73 KG/M2 | DIASTOLIC BLOOD PRESSURE: 71 MMHG

## 2022-03-28 PROCEDURE — 75810000275 HC EMERGENCY DEPT VISIT NO LEVEL OF CARE

## 2022-04-12 ENCOUNTER — OFFICE VISIT (OUTPATIENT)
Dept: INTERNAL MEDICINE CLINIC | Age: 42
End: 2022-04-12
Payer: MEDICAID

## 2022-04-12 VITALS
DIASTOLIC BLOOD PRESSURE: 65 MMHG | BODY MASS INDEX: 35.44 KG/M2 | SYSTOLIC BLOOD PRESSURE: 107 MMHG | HEIGHT: 64 IN | TEMPERATURE: 98.2 F | WEIGHT: 207.6 LBS | OXYGEN SATURATION: 95 % | HEART RATE: 118 BPM | RESPIRATION RATE: 18 BRPM

## 2022-04-12 DIAGNOSIS — J06.9 VIRAL URI WITH COUGH: Primary | ICD-10-CM

## 2022-04-12 DIAGNOSIS — J30.9 ALLERGIC RHINITIS, UNSPECIFIED SEASONALITY, UNSPECIFIED TRIGGER: ICD-10-CM

## 2022-04-12 LAB — SARS-COV-2 POC: NEGATIVE

## 2022-04-12 PROCEDURE — 87426 SARSCOV CORONAVIRUS AG IA: CPT | Performed by: INTERNAL MEDICINE

## 2022-04-12 PROCEDURE — 99214 OFFICE O/P EST MOD 30 MIN: CPT | Performed by: INTERNAL MEDICINE

## 2022-04-12 RX ORDER — FLUTICASONE PROPIONATE 50 MCG
SPRAY, SUSPENSION (ML) NASAL
Qty: 1 EACH | Refills: 0 | Status: SHIPPED | OUTPATIENT
Start: 2022-04-12 | End: 2022-06-03

## 2022-04-12 RX ORDER — BENZONATATE 200 MG/1
200 CAPSULE ORAL
Qty: 21 CAPSULE | Refills: 0 | Status: ON HOLD | OUTPATIENT
Start: 2022-04-12 | End: 2022-04-19

## 2022-04-12 RX ORDER — IBUPROFEN 600 MG/1
600 TABLET ORAL
Status: ON HOLD | COMMUNITY
Start: 2022-02-09 | End: 2022-04-19

## 2022-04-12 NOTE — PATIENT INSTRUCTIONS
Viral Respiratory Infection: Care Instructions  Your Care Instructions     Viruses are very small organisms. They grow in number after they enter your body. There are many types that cause different illnesses, such as colds and the mumps. The symptoms of a viral respiratory infection often start quickly. They include a fever, sore throat, and runny nose. You may also just not feel well. Or you may not want to eat much. Most viral respiratory infections are not serious. They usually get better with time and self-care. Antibiotics are not used to treat a viral infection. That's because antibiotics will not help cure a viral illness. In some cases, antiviral medicine can help your body fight a serious viral infection. Follow-up care is a key part of your treatment and safety. Be sure to make and go to all appointments, and call your doctor if you are having problems. It's also a good idea to know your test results and keep a list of the medicines you take. How can you care for yourself at home? · Rest as much as possible until you feel better. · Be safe with medicines. Take your medicine exactly as prescribed. Call your doctor if you think you are having a problem with your medicine. You will get more details on the specific medicine your doctor prescribes. · Take an over-the-counter pain medicine, such as acetaminophen (Tylenol), ibuprofen (Advil, Motrin), or naproxen (Aleve), as needed for pain and fever. Read and follow all instructions on the label. Do not give aspirin to anyone younger than 20. It has been linked to Reye syndrome, a serious illness. · Drink plenty of fluids. Hot fluids, such as tea or soup, may help relieve congestion in your nose and throat. If you have kidney, heart, or liver disease and have to limit fluids, talk with your doctor before you increase the amount of fluids you drink. · Try to clear mucus from your lungs by breathing deeply and coughing.   · Gargle with warm salt water once an hour. This can help reduce swelling and throat pain. Use 1 teaspoon of salt mixed in 1 cup of warm water. · Do not smoke or allow others to smoke around you. If you need help quitting, talk to your doctor about stop-smoking programs and medicines. These can increase your chances of quitting for good. To avoid spreading the virus  · Cough or sneeze into a tissue. Then throw the tissue away. · If you don't have a tissue, use your hand to cover your cough or sneeze. Then clean your hand. You can also cough into your sleeve. · Wash your hands often. Use soap and warm water. Wash for 15 to 20 seconds each time. · If you don't have soap and water near you, you can clean your hands with alcohol wipes or gel. When should you call for help? Call your doctor now or seek immediate medical care if:    · You have a new or higher fever.     · Your fever lasts more than 48 hours.     · You have trouble breathing.     · You have a fever with a stiff neck or a severe headache.     · You are sensitive to light.     · You feel very sleepy or confused. Watch closely for changes in your health, and be sure to contact your doctor if:    · You do not get better as expected. Where can you learn more? Go to http://www.gray.com/  Enter Q795 in the search box to learn more about \"Viral Respiratory Infection: Care Instructions. \"  Current as of: July 6, 2021               Content Version: 13.2  © 2006-2022 [employer]. Care instructions adapted under license by [employer] (which disclaims liability or warranty for this information). If you have questions about a medical condition or this instruction, always ask your healthcare professional. [place] [address] any warranty or liability for your use of this information.

## 2022-04-12 NOTE — PROGRESS NOTES
ACUTE VISIT     A/P:  Pio Rendon is a 46 y.o. y.o. F, she presents today for:    1. Viral URI with cough  -     benzonatate (TESSALON) 200 mg capsule; Take 1 Capsule by mouth three (3) times daily as needed for Cough for up to 7 days. , Normal, Disp-21 Capsule, R-0  -     AMB POC SARS-COV-2  -     NOVEL CORONAVIRUS (COVID-19)  2. Allergic rhinitis, unspecified seasonality, unspecified trigger  -     fluticasone propionate (FLONASE) 50 mcg/actuation nasal spray; 2 sprays to each nostril 1 time daily, Normal, Disp-1 Each, R-0    Viral URI: Discussed supportive care including sleep, fluids, anti-pyretics. Advised to return if any signs of complications including: increased fever, new or worsening headache, change in breathing, or congestion last more than 10 days. - covid test negative. - tessalon prn cough. - increase fluids. Allergic rhinitis:  - continue antihistamine. Add nasal steroid. Return if symptoms worsen or fail to improve. HPI:   Pio Rendon is a 46 y.o. female, she presents today for:     Allergies for a few weeks. Then in the past 3 days much worse cough. Stopped smoking 1 year ago. Has been vaping since then. Notes some vomiting with cough - having spasms. Has never been diagnosed with asthma nor COPD. Has had covid 2 times, most recently November 2021. ROS: No shortness of breath. No N/V/D. No chest pain. Medications used for acute illness: none    Current Outpatient Medications on File Prior to Visit   Medication Sig    [START ON 4/23/2022] amphetamine-dextroamphetamine XR (ADDERALL XR) 30 mg XR capsule Take 1 Capsule by mouth every morning. Max Daily Amount: 30 mg.    cetirizine (ZYRTEC) 10 mg tablet Take 1 Tablet by mouth daily. Take as needed for allergies.  FLUoxetine (PROzac) 40 mg capsule Take 1 Capsule by mouth daily.  VITAMIN B COMPLEX-100 PO Take  by mouth.     cholecalciferol (Vitamin D3) 25 mcg (1,000 unit) cap Take  by mouth daily.    ibuprofen (MOTRIN) 600 mg tablet Take 600 mg by mouth every eight (8) hours as needed.  amphetamine-dextroamphetamine XR (ADDERALL XR) 30 mg XR capsule Take 1 Capsule by mouth every morning. Max Daily Amount: 30 mg. (Patient not taking: Reported on 4/12/2022)    [START ON 5/23/2022] amphetamine-dextroamphetamine XR (ADDERALL XR) 30 mg XR capsule Take 1 Capsule by mouth every morning. Max Daily Amount: 30 mg. (Patient not taking: Reported on 4/12/2022)    buPROPion XL (WELLBUTRIN XL) 150 mg tablet Take 1 Tablet by mouth daily. (Patient not taking: Reported on 4/12/2022)     No current facility-administered medications on file prior to visit. Allergies   Allergen Reactions    Aspirin Other (comments)     Hot sweats and passed out; tolerated ibuprofen    Penicillins Other (comments)     Childhood. Does not remember reaction. Is not aware if she has ever taken cephalosporins in the past.    Jan 2019: Pt notes no problems with cephalosporins. PMH/PSH/FH: reviewed and updated    Sochx:   reports that she has quit smoking. Her smoking use included cigarettes. She has a 18.00 pack-year smoking history. She has never used smokeless tobacco. She reports current alcohol use. She reports that she does not use drugs. PE:  Blood pressure 107/65, pulse (!) 118, temperature 98.2 °F (36.8 °C), temperature source Oral, resp. rate 18, height 5' 4\" (1.626 m), weight 207 lb 9.6 oz (94.2 kg), last menstrual period 03/14/2016, SpO2 95 %. Body mass index is 35.63 kg/m². Physical Exam  Vitals and nursing note reviewed. Constitutional:       General: She is not in acute distress. Appearance: Normal appearance. She is ill-appearing (appears fatigue - lying down on table when waiting for covid results). HENT:      Head: Normocephalic and atraumatic.       Right Ear: Tympanic membrane normal.      Left Ear: Tympanic membrane normal.      Nose: Nose normal.      Mouth/Throat:      Mouth: Mucous membranes are moist.   Eyes:      Extraocular Movements: Extraocular movements intact. Conjunctiva/sclera: Conjunctivae normal.      Pupils: Pupils are equal, round, and reactive to light. Cardiovascular:      Rate and Rhythm: Regular rhythm. Tachycardia present. Heart sounds: Normal heart sounds. Pulmonary:      Effort: Pulmonary effort is normal.      Breath sounds: Normal breath sounds. No wheezing or rales. Comments: Coughing spasms. Resolve with deep breaths  Abdominal:      General: Abdomen is flat. Musculoskeletal:         General: Normal range of motion. Cervical back: Normal range of motion and neck supple. Skin:     General: Skin is warm and dry. Neurological:      Mental Status: She is alert and oriented to person, place, and time.    Psychiatric:         Mood and Affect: Mood normal.         Behavior: Behavior normal.       Labs:  Results for orders placed or performed in visit on 04/12/22   AMB POC SARS-COV-2   Result Value Ref Range    SARS-COV-2 POC Negative Negative

## 2022-04-12 NOTE — PROGRESS NOTES
RM 12    Chief Complaint   Patient presents with    Cold Symptoms     cough and congestion for a couple weeks       Visit Vitals  /65 (BP 1 Location: Left upper arm, BP Patient Position: Sitting, BP Cuff Size: Adult)   Pulse (!) 118   Temp 98.2 °F (36.8 °C) (Oral)   Resp 18   Ht 5' 4\" (1.626 m)   Wt 207 lb 9.6 oz (94.2 kg)   SpO2 95%   BMI 35.63 kg/m²       3 most recent PHQ Screens 4/12/2022   PHQ Not Done -   Little interest or pleasure in doing things Not at all   Feeling down, depressed, irritable, or hopeless Not at all   Total Score PHQ 2 0   Trouble falling or staying asleep, or sleeping too much -   Feeling tired or having little energy -   Poor appetite, weight loss, or overeating -   Feeling bad about yourself - or that you are a failure or have let yourself or your family down -   Trouble concentrating on things such as school, work, reading, or watching TV -   Moving or speaking so slowly that other people could have noticed; or the opposite being so fidgety that others notice -   Thoughts of being better off dead, or hurting yourself in some way -   PHQ 9 Score -         1. Have you been to the ER, urgent care clinic since your last visit? Hospitalized since your last visit? No    2. Have you seen or consulted any other health care providers outside of the [address] [place] [address] since your last visit? Include any pap smears or colon screening. No    Health Maintenance Due   Topic Date Due    COVID-19 Vaccine (1) Never done    DTaP/Tdap/Td series (1 - Tdap) Never done       Learning Assessment 1/9/2019   PRIMARY LEARNER Patient   HIGHEST LEVEL OF EDUCATION - PRIMARY LEARNER  -   BARRIERS PRIMARY LEARNER NONE   PRIMARY LANGUAGE ENGLISH    NEED -   LEARNER PREFERENCE PRIMARY DEMONSTRATION   ANSWERED BY patient   RELATIONSHIP SELF         AVS  education, follow up, and recommendations provided and addressed with patient.  services used to advise patient. No

## 2022-04-13 LAB
SARS-COV-2, NAA 2 DAY TAT: NORMAL
SARS-COV-2, NAA: NOT DETECTED

## 2022-04-13 NOTE — PROGRESS NOTES
Called patient. She notes she has been able to hydrate better today. Started nasal allergy spray.    Given severity of cough and allergies, advised to consider follow-up with allergist.

## 2022-04-18 ENCOUNTER — HOSPITAL ENCOUNTER (INPATIENT)
Age: 42
LOS: 2 days | Discharge: HOME | DRG: 751 | End: 2022-04-21
Attending: EMERGENCY MEDICINE | Admitting: PSYCHIATRY & NEUROLOGY
Payer: MEDICAID

## 2022-04-18 DIAGNOSIS — F19.10 POLYSUBSTANCE ABUSE (HCC): ICD-10-CM

## 2022-04-18 DIAGNOSIS — E87.6 HYPOKALEMIA: ICD-10-CM

## 2022-04-18 DIAGNOSIS — F10.929 ALCOHOLIC INTOXICATION WITH COMPLICATION (HCC): ICD-10-CM

## 2022-04-18 DIAGNOSIS — F32.A DEPRESSION, UNSPECIFIED DEPRESSION TYPE: Primary | ICD-10-CM

## 2022-04-18 LAB
ALBUMIN SERPL-MCNC: 3.5 G/DL (ref 3.5–5)
ALBUMIN/GLOB SERPL: 1 {RATIO} (ref 1.1–2.2)
ALP SERPL-CCNC: 122 U/L (ref 45–117)
ALT SERPL-CCNC: 69 U/L (ref 12–78)
ANION GAP SERPL CALC-SCNC: 10 MMOL/L (ref 5–15)
APAP SERPL-MCNC: <2 UG/ML (ref 10–30)
AST SERPL-CCNC: 33 U/L (ref 15–37)
BASOPHILS # BLD: 0.1 K/UL (ref 0–0.1)
BASOPHILS NFR BLD: 1 % (ref 0–1)
BILIRUB SERPL-MCNC: 1.1 MG/DL (ref 0.2–1)
BUN SERPL-MCNC: 12 MG/DL (ref 6–20)
BUN/CREAT SERPL: 14 (ref 12–20)
CALCIUM SERPL-MCNC: 8.6 MG/DL (ref 8.5–10.1)
CHLORIDE SERPL-SCNC: 102 MMOL/L (ref 97–108)
CO2 SERPL-SCNC: 23 MMOL/L (ref 21–32)
COMMENT, HOLDF: NORMAL
CREAT SERPL-MCNC: 0.86 MG/DL (ref 0.55–1.02)
DIFFERENTIAL METHOD BLD: NORMAL
EOSINOPHIL # BLD: 0 K/UL (ref 0–0.4)
EOSINOPHIL NFR BLD: 0 % (ref 0–7)
ERYTHROCYTE [DISTWIDTH] IN BLOOD BY AUTOMATED COUNT: 12.8 % (ref 11.5–14.5)
ETHANOL SERPL-MCNC: 219 MG/DL
GLOBULIN SER CALC-MCNC: 3.5 G/DL (ref 2–4)
GLUCOSE SERPL-MCNC: 111 MG/DL (ref 65–100)
HCT VFR BLD AUTO: 40.8 % (ref 35–47)
HGB BLD-MCNC: 14.2 G/DL (ref 11.5–16)
IMM GRANULOCYTES # BLD AUTO: 0 K/UL (ref 0–0.04)
IMM GRANULOCYTES NFR BLD AUTO: 0 % (ref 0–0.5)
LIPASE SERPL-CCNC: 92 U/L (ref 73–393)
LYMPHOCYTES # BLD: 2.6 K/UL (ref 0.8–3.5)
LYMPHOCYTES NFR BLD: 26 % (ref 12–49)
MCH RBC QN AUTO: 30.1 PG (ref 26–34)
MCHC RBC AUTO-ENTMCNC: 34.8 G/DL (ref 30–36.5)
MCV RBC AUTO: 86.4 FL (ref 80–99)
MONOCYTES # BLD: 1 K/UL (ref 0–1)
MONOCYTES NFR BLD: 10 % (ref 5–13)
NEUTS SEG # BLD: 6.3 K/UL (ref 1.8–8)
NEUTS SEG NFR BLD: 63 % (ref 32–75)
NRBC # BLD: 0 K/UL (ref 0–0.01)
NRBC BLD-RTO: 0 PER 100 WBC
PLATELET # BLD AUTO: 374 K/UL (ref 150–400)
PMV BLD AUTO: 9.6 FL (ref 8.9–12.9)
POTASSIUM SERPL-SCNC: 2.8 MMOL/L (ref 3.5–5.1)
PROT SERPL-MCNC: 7 G/DL (ref 6.4–8.2)
RBC # BLD AUTO: 4.72 M/UL (ref 3.8–5.2)
SALICYLATES SERPL-MCNC: <1.7 MG/DL (ref 2.8–20)
SAMPLES BEING HELD,HOLD: NORMAL
SODIUM SERPL-SCNC: 135 MMOL/L (ref 136–145)
WBC # BLD AUTO: 10 K/UL (ref 3.6–11)

## 2022-04-18 PROCEDURE — 99285 EMERGENCY DEPT VISIT HI MDM: CPT

## 2022-04-18 PROCEDURE — 80053 COMPREHEN METABOLIC PANEL: CPT

## 2022-04-18 PROCEDURE — 96361 HYDRATE IV INFUSION ADD-ON: CPT

## 2022-04-18 PROCEDURE — 83690 ASSAY OF LIPASE: CPT

## 2022-04-18 PROCEDURE — 96375 TX/PRO/DX INJ NEW DRUG ADDON: CPT

## 2022-04-18 PROCEDURE — 82077 ASSAY SPEC XCP UR&BREATH IA: CPT

## 2022-04-18 PROCEDURE — 74011250637 HC RX REV CODE- 250/637: Performed by: NURSE PRACTITIONER

## 2022-04-18 PROCEDURE — 80143 DRUG ASSAY ACETAMINOPHEN: CPT

## 2022-04-18 PROCEDURE — 80179 DRUG ASSAY SALICYLATE: CPT

## 2022-04-18 PROCEDURE — 85025 COMPLETE CBC W/AUTO DIFF WBC: CPT

## 2022-04-18 PROCEDURE — 36415 COLL VENOUS BLD VENIPUNCTURE: CPT

## 2022-04-18 PROCEDURE — 90791 PSYCH DIAGNOSTIC EVALUATION: CPT

## 2022-04-18 PROCEDURE — 74011250636 HC RX REV CODE- 250/636: Performed by: NURSE PRACTITIONER

## 2022-04-18 PROCEDURE — 74011000250 HC RX REV CODE- 250: Performed by: NURSE PRACTITIONER

## 2022-04-18 RX ORDER — POTASSIUM CHLORIDE 750 MG/1
40 TABLET, FILM COATED, EXTENDED RELEASE ORAL
Status: COMPLETED | OUTPATIENT
Start: 2022-04-18 | End: 2022-04-18

## 2022-04-18 RX ORDER — ONDANSETRON 2 MG/ML
4 INJECTION INTRAMUSCULAR; INTRAVENOUS
Status: COMPLETED | OUTPATIENT
Start: 2022-04-18 | End: 2022-04-18

## 2022-04-18 RX ORDER — POTASSIUM CHLORIDE 7.45 MG/ML
10 INJECTION INTRAVENOUS
Status: COMPLETED | OUTPATIENT
Start: 2022-04-18 | End: 2022-04-19

## 2022-04-18 RX ADMIN — SODIUM CHLORIDE 1000 ML: 9 INJECTION, SOLUTION INTRAVENOUS at 22:51

## 2022-04-18 RX ADMIN — POTASSIUM CHLORIDE 40 MEQ: 750 TABLET, EXTENDED RELEASE ORAL at 23:37

## 2022-04-18 RX ADMIN — SODIUM CHLORIDE, PRESERVATIVE FREE 20 MG: 5 INJECTION INTRAVENOUS at 22:52

## 2022-04-18 RX ADMIN — ONDANSETRON HYDROCHLORIDE 4 MG: 2 SOLUTION INTRAMUSCULAR; INTRAVENOUS at 22:52

## 2022-04-19 PROBLEM — F33.2 MAJOR DEPRESSIVE DISORDER, RECURRENT SEVERE WITHOUT PSYCHOTIC FEATURES (HCC): Status: ACTIVE | Noted: 2022-04-19

## 2022-04-19 LAB
AMPHET UR QL SCN: NEGATIVE
APPEARANCE UR: CLEAR
BACTERIA URNS QL MICRO: ABNORMAL /HPF
BARBITURATES UR QL SCN: NEGATIVE
BENZODIAZ UR QL: NEGATIVE
BILIRUB UR QL: NEGATIVE
CANNABINOIDS UR QL SCN: NEGATIVE
COCAINE UR QL SCN: NEGATIVE
COLOR UR: ABNORMAL
DRUG SCRN COMMENT,DRGCM: NORMAL
EPITH CASTS URNS QL MICRO: ABNORMAL /LPF
FLUAV RNA SPEC QL NAA+PROBE: NOT DETECTED
FLUBV RNA SPEC QL NAA+PROBE: NOT DETECTED
GLUCOSE UR STRIP.AUTO-MCNC: NEGATIVE MG/DL
HCG UR QL: NEGATIVE
HGB UR QL STRIP: NEGATIVE
KETONES UR QL STRIP.AUTO: NEGATIVE MG/DL
LEUKOCYTE ESTERASE UR QL STRIP.AUTO: NEGATIVE
METHADONE UR QL: NEGATIVE
NITRITE UR QL STRIP.AUTO: NEGATIVE
OPIATES UR QL: NEGATIVE
PCP UR QL: NEGATIVE
PH UR STRIP: 7 [PH] (ref 5–8)
PROT UR STRIP-MCNC: NEGATIVE MG/DL
RBC #/AREA URNS HPF: ABNORMAL /HPF (ref 0–5)
SARS-COV-2, COV2: NOT DETECTED
SP GR UR REFRACTOMETRY: 1.01 (ref 1–1.03)
UR CULT HOLD, URHOLD: NORMAL
UROBILINOGEN UR QL STRIP.AUTO: 0.2 EU/DL (ref 0.2–1)
WBC URNS QL MICRO: ABNORMAL /HPF (ref 0–4)

## 2022-04-19 PROCEDURE — 74011250637 HC RX REV CODE- 250/637: Performed by: PSYCHIATRY & NEUROLOGY

## 2022-04-19 PROCEDURE — 81001 URINALYSIS AUTO W/SCOPE: CPT

## 2022-04-19 PROCEDURE — 81025 URINE PREGNANCY TEST: CPT

## 2022-04-19 PROCEDURE — 80307 DRUG TEST PRSMV CHEM ANLYZR: CPT

## 2022-04-19 PROCEDURE — 96376 TX/PRO/DX INJ SAME DRUG ADON: CPT

## 2022-04-19 PROCEDURE — 87636 SARSCOV2 & INF A&B AMP PRB: CPT

## 2022-04-19 PROCEDURE — 96365 THER/PROPH/DIAG IV INF INIT: CPT

## 2022-04-19 PROCEDURE — 65220000003 HC RM SEMIPRIVATE PSYCH

## 2022-04-19 PROCEDURE — 96361 HYDRATE IV INFUSION ADD-ON: CPT

## 2022-04-19 PROCEDURE — 74011250636 HC RX REV CODE- 250/636: Performed by: NURSE PRACTITIONER

## 2022-04-19 PROCEDURE — 74011250637 HC RX REV CODE- 250/637: Performed by: NURSE PRACTITIONER

## 2022-04-19 RX ORDER — HYDROXYZINE 50 MG/1
50 TABLET, FILM COATED ORAL
Status: DISCONTINUED | OUTPATIENT
Start: 2022-04-19 | End: 2022-04-21 | Stop reason: HOSPADM

## 2022-04-19 RX ORDER — ONDANSETRON 4 MG/1
4 TABLET, ORALLY DISINTEGRATING ORAL
Status: DISCONTINUED | OUTPATIENT
Start: 2022-04-19 | End: 2022-04-21 | Stop reason: HOSPADM

## 2022-04-19 RX ORDER — TRAZODONE HYDROCHLORIDE 50 MG/1
50 TABLET ORAL
Status: DISCONTINUED | OUTPATIENT
Start: 2022-04-19 | End: 2022-04-20

## 2022-04-19 RX ORDER — HALOPERIDOL 5 MG/ML
5 INJECTION INTRAMUSCULAR
Status: DISCONTINUED | OUTPATIENT
Start: 2022-04-19 | End: 2022-04-21 | Stop reason: HOSPADM

## 2022-04-19 RX ORDER — ADHESIVE BANDAGE
30 BANDAGE TOPICAL DAILY PRN
Status: DISCONTINUED | OUTPATIENT
Start: 2022-04-19 | End: 2022-04-21 | Stop reason: HOSPADM

## 2022-04-19 RX ORDER — DM/P-EPHED/ACETAMINOPH/DOXYLAM 30-7.5/3
2 LIQUID (ML) ORAL
Status: DISCONTINUED | OUTPATIENT
Start: 2022-04-19 | End: 2022-04-21 | Stop reason: HOSPADM

## 2022-04-19 RX ORDER — PHENOBARBITAL 32.4 MG/1
32.4 TABLET ORAL 2 TIMES DAILY
Status: DISCONTINUED | OUTPATIENT
Start: 2022-04-20 | End: 2022-04-21 | Stop reason: HOSPADM

## 2022-04-19 RX ORDER — THERA TABS 400 MCG
1 TAB ORAL DAILY
Status: DISCONTINUED | OUTPATIENT
Start: 2022-04-20 | End: 2022-04-21 | Stop reason: HOSPADM

## 2022-04-19 RX ORDER — HYDROXYZINE 50 MG/1
50 TABLET, FILM COATED ORAL
Status: DISCONTINUED | OUTPATIENT
Start: 2022-04-19 | End: 2022-04-19

## 2022-04-19 RX ORDER — ACETAMINOPHEN 325 MG/1
650 TABLET ORAL
Status: DISCONTINUED | OUTPATIENT
Start: 2022-04-19 | End: 2022-04-21 | Stop reason: HOSPADM

## 2022-04-19 RX ORDER — DIPHENHYDRAMINE HYDROCHLORIDE 50 MG/ML
50 INJECTION, SOLUTION INTRAMUSCULAR; INTRAVENOUS
Status: DISCONTINUED | OUTPATIENT
Start: 2022-04-19 | End: 2022-04-21 | Stop reason: HOSPADM

## 2022-04-19 RX ORDER — LANOLIN ALCOHOL/MO/W.PET/CERES
100 CREAM (GRAM) TOPICAL DAILY
Status: DISCONTINUED | OUTPATIENT
Start: 2022-04-20 | End: 2022-04-21 | Stop reason: HOSPADM

## 2022-04-19 RX ORDER — ONDANSETRON 2 MG/ML
4 INJECTION INTRAMUSCULAR; INTRAVENOUS
Status: COMPLETED | OUTPATIENT
Start: 2022-04-19 | End: 2022-04-19

## 2022-04-19 RX ORDER — LORAZEPAM 2 MG/ML
1 INJECTION INTRAMUSCULAR
Status: DISCONTINUED | OUTPATIENT
Start: 2022-04-19 | End: 2022-04-21 | Stop reason: HOSPADM

## 2022-04-19 RX ORDER — PHENOBARBITAL 32.4 MG/1
16.2 TABLET ORAL
Status: DISCONTINUED | OUTPATIENT
Start: 2022-04-21 | End: 2022-04-21 | Stop reason: HOSPADM

## 2022-04-19 RX ORDER — OLANZAPINE 5 MG/1
5 TABLET ORAL
Status: DISCONTINUED | OUTPATIENT
Start: 2022-04-19 | End: 2022-04-21 | Stop reason: HOSPADM

## 2022-04-19 RX ORDER — BENZTROPINE MESYLATE 1 MG/1
1 TABLET ORAL
Status: DISCONTINUED | OUTPATIENT
Start: 2022-04-19 | End: 2022-04-21 | Stop reason: HOSPADM

## 2022-04-19 RX ORDER — FLUOXETINE HYDROCHLORIDE 20 MG/1
60 CAPSULE ORAL DAILY
Status: DISCONTINUED | OUTPATIENT
Start: 2022-04-19 | End: 2022-04-21 | Stop reason: HOSPADM

## 2022-04-19 RX ORDER — PHENOBARBITAL 32.4 MG/1
16.2 TABLET ORAL 2 TIMES DAILY
Status: DISCONTINUED | OUTPATIENT
Start: 2022-04-21 | End: 2022-04-21 | Stop reason: HOSPADM

## 2022-04-19 RX ORDER — PHENOBARBITAL 32.4 MG/1
32.4 TABLET ORAL
Status: DISCONTINUED | OUTPATIENT
Start: 2022-04-19 | End: 2022-04-21 | Stop reason: HOSPADM

## 2022-04-19 RX ORDER — PHENOBARBITAL 32.4 MG/1
32.4 TABLET ORAL 4 TIMES DAILY
Status: COMPLETED | OUTPATIENT
Start: 2022-04-19 | End: 2022-04-20

## 2022-04-19 RX ORDER — FOLIC ACID 1 MG/1
1 TABLET ORAL DAILY
Status: DISCONTINUED | OUTPATIENT
Start: 2022-04-20 | End: 2022-04-21 | Stop reason: HOSPADM

## 2022-04-19 RX ADMIN — PHENOBARBITAL 32.4 MG: 32.4 TABLET ORAL at 20:47

## 2022-04-19 RX ADMIN — ONDANSETRON HYDROCHLORIDE 4 MG: 2 SOLUTION INTRAMUSCULAR; INTRAVENOUS at 01:37

## 2022-04-19 RX ADMIN — ONDANSETRON 4 MG: 4 TABLET, ORALLY DISINTEGRATING ORAL at 09:22

## 2022-04-19 RX ADMIN — FLUOXETINE 60 MG: 20 CAPSULE ORAL at 16:31

## 2022-04-19 RX ADMIN — POTASSIUM CHLORIDE 10 MEQ: 7.45 INJECTION INTRAVENOUS at 00:02

## 2022-04-19 RX ADMIN — PHENOBARBITAL 32.4 MG: 32.4 TABLET ORAL at 18:03

## 2022-04-19 RX ADMIN — SODIUM CHLORIDE 500 ML: 9 INJECTION, SOLUTION INTRAVENOUS at 01:52

## 2022-04-19 NOTE — ED TRIAGE NOTES
TRIAGE NOTE:  Patient arrives by EMS with c/o depression. Patient reports was in a year long [place] based [facility] new life and graduated in January and reports having difficulties for past few days now. Patient reports doesn't think that her prozac is working anymore. Patient reports has not slept or eaten very well the past 4 days. Denies SI/HI.

## 2022-04-19 NOTE — INTERDISCIPLINARY ROUNDS
Behavioral Health Interdisciplinary Rounds     Patient Name: Una Horta  Age: 46 y.o. Room/Bed:  8/  Primary Diagnosis: <principal problem not specified>   Admission Status: Voluntary     Readmission within 30 days: no  Power of  in place: no  Patient requires a blocked bed: no          Reason for blocked bed:     VTE Prophylaxis: No    Mobility needs/Fall risk: no  Flu Vaccine : no   Nutritional Plan: no  Consults:          Labs/Testing due today?: no    Sleep hours: 0.5       Participation in Care/Groups:  New pt   Medication Compliant?: new pt  PRNS (last 24 hours): new pt    Restraints (last 24 hours):  no     CIWA (range last 24 hours):     COWS (range last 24 hours):      Alcohol screening (AUDIT) completed -   AUDIT Score: 1     If applicable, date SBIRT discussed in treatment team AND documented:   AUDIT Screen Score: AUDIT Score: 1      Document Brief Intervention (corresponds directly with the 5 A's, Ask, Advise, Assess, Assist, and Arrange): At- Risk Patients (Score 7-15 for women; 8-15 for men)  Discuss concern patient is drinking at unhealthy levels known to increase risk of alcohol-related health problems. Is Patient ready to commit to change? If No:   Encourage reflection   Discuss short term and long term health risks of consuming alcohol   Barriers to change   Reaffirm willingness to help / Educational materials provided  If Yes:   Set goal  [employer] provided    Harmful use or Dependence (Score 16 or greater)   Discuss short term and long term health risks of consuming alcohol   Recommendations   Negotiate drinking goal   Recommend addiction specialist/center   Arrange follow-up appointments.     Tobacco - patient is a smoker: Have You Used Tobacco in the Past 30 Days: Yes (vapes)  Illegal Drugs use: Have You Used Any Illegal Substances Over the Past 12 Months: No    24 hour chart check complete: yes ____________________________________________________________________________________________________________    Patient goal(s) for today: Communicate needs to staff  Treatment team focus/goals:  Assess pt, manage medications, discharge planning   Progress note : Pt reports less depressive symptoms. Pt denies SI, HI, AVH. Pts medication was adjusted. Sw will refer pt to outpatient counseling/ med management.     LOS:  0  Expected LOS: 4/22/22    Financial concerns/prescription coverage:    Family contact: Ramses Shook, [phone]      Family requesting physician contact today:    Discharge plan: TBD  Access to weapons :  no       Outpatient provider(s): Dr. Oralia Diamond, PCP  Patient's preferred phone number for follow up call :   Patient's preferred e-mail address :  Participating treatment team members: Dyllan Stevens, Ilah Severin, MSW, Lisa Alarcon, ADAMS, Phill Jordan, PharmD, Donna Paul, NP

## 2022-04-19 NOTE — ED NOTES
TRANSFER - OUT REPORT:    Verbal report given to 7W RN(name) on Hortencia Files  being transferred to 7W(unit) for routine progression of care       Report consisted of patients Situation, Background, Assessment and   Recommendations(SBAR). Information from the following report(s) SBAR, ED Summary, [facility] and Recent Results was reviewed with the receiving nurse. Lines:   Peripheral IV 04/18/22 Left Antecubital (Active)   Site Assessment Clean, dry, & intact 04/18/22 2239   Phlebitis Assessment 0 04/18/22 2239   Infiltration Assessment 0 04/18/22 2239   Dressing Status Clean, dry, & intact 04/18/22 2239   Dressing Type Transparent 04/18/22 2239   Hub Color/Line Status Pink 04/18/22 2239        Opportunity for questions and clarification was provided.       Patient transported with:   Registered Nurse

## 2022-04-19 NOTE — PROGRESS NOTES
Problem: Depressed Mood (Adult/Pediatric)  Goal: *STG: Participates in treatment plan  Outcome: Progressing Towards Goal  Note: In bed report nausea and feeling tired. Mood sad to depressed, affect flat to sad. Denies SI, reports feeling safe. Daily goal is to discuss social stressors and review medications.  Staff focus is on offering support  Goal: *STG: Demonstrates reduction in symptoms and increase in insight into coping skills/future focused  Outcome: Progressing Towards Goal  Goal: Interventions  Outcome: Alexander Morrell Treatment Plan for Mariposa Feliz Treatment Plan Initiated: 4/19/22    Treatment Plan Modalities:  Type of Modality Amount  (x minutes) Frequency (x/week) Duration (x days) Name of Responsible Staff   Community & wrap-up meetings to encourage peer interactions 15 7 1 Deirdre Tamayo     Group psychotherapy to assist in building coping skills and internal controls 60 7 1 Cody Santana MS   Therapeutic activity groups to build coping skills 60 7 1 Cody Santana MS   Psychoeducation in group setting to address:   Medication education   [phone] Kei Tan [address] [place] [address] Maged Gold [facility]   Relaxation techniques         Symptom management         Discharge planning   60 2 1400 [facility]   Spirituality    60 2 1 Chaplain SNIDER   60 1 1 volunteer   Recovery/AA/NA      volunteer   Physician medication management   15 7 1 Dr. Travis Fine   15 2 1 Shawnee Russell and John Brennan

## 2022-04-19 NOTE — PROGRESS NOTES
Pt is isolative to room. Denies SI/HI. Med and meal compliant. Will continue to monitor q15 min rounds.        Problem: Depressed Mood (Adult/Pediatric)  Goal: *STG: Participates in treatment plan  Outcome: Progressing Towards Goal  Goal: *STG: Attends activities and groups  Outcome: Progressing Towards Goal  Goal: *STG: Demonstrates reduction in symptoms and increase in insight into coping skills/future focused  Outcome: Progressing Towards Goal

## 2022-04-19 NOTE — BH NOTES
GROUP THERAPY PROGRESS NOTE    Patient is participating in Self-care group. Group time: 45 minutes    Personal goal for participation: To develop an understanding of stress including symptoms and cycles. To begin to learn how to manage stress and achieve balance    Goal orientation: Personal    Group therapy participation: Passive     Therapeutic interventions reviewed and discussed:  Members developed an understanding of stress and what causes it. Group members were given opportunity to engage in conversation about stress and stress management. Members were guided through assessing internal and external stressors. Handout provided. Impression of participation:  SW provided stress management booklet for pts to fill out independently.        MARCELO Chapa, QMHP-A

## 2022-04-19 NOTE — BH NOTES
Admission Note      Admitting Diagnosis: MDD      Admitting Status: Voluntary      Patient Received From: [facility] ED      Patient Condition Upon Arrival: Cooperative      BAL & UDS: 219 in ED & negative      Reason For Admission: Patient arrived to ED via EMS w/ c/o increased depression. Patient reports poor sleep, decreased eating and decline in ADLs. Prior to admission patient reported increased drinking in attempt to cope  on arrival to ED. Patient states she currently lives with mother who causes her increased stress. Upon arrival to unit patient was cooperative with admission process. Currently denies SI/HI/AH/VH. Patient consented to safety while on unit, consent for treatment signed and placed in chart. Patient vapes tobacco everyday, information cessation provided. Skin assessment completed by Glenys Martines RN and Joe Becerril. No impairment noted Elijah Scale 23. Patient offered and accepted beverage, no other requests noted at this time. Staff will continue to monitor safety q15 and provide support.

## 2022-04-19 NOTE — PROGRESS NOTES
Admission Medication Reconciliation:    Information obtained from:  patient interview, Insurance claims data, and [place] [facility]  RxQuery data available¹:  YES    Comments/Recommendations: Updated PTA meds/reviewed patient's allergies. 1)  Patient reports history of abusing opioid pain medications in the past. She reports graduating from a rehab program in 2022. She has been taking fluoxetine 40 mg daily since August. Bupropion was added as an augmenting agent in 3/2022 but the patient reports that she only took for 4 days - went \"crazy\" and started seeing things. 2)  The [place] Prescription Monitoring Program () was assessed to determine fill history of any controlled medications. The patient has filled the following controlled medications in the last  2 years. - 3/24/22: dextroamphetamine/amphetamine XR 30 mg, #30 for 30 day supply  - 3/11/22: oxycodone/APAP 5/325 mg, #15 for 4 day supply  - 3/4/22: dextroamphetamine/amphetamine XR 20 mg, #20 for 20 day supply  - 2/15/22: hydrocodone/APAP 5/325 mg, #10 for 3 day supply  - 2/3/22: dextroamphetamine/amphetamine XR 20 mg, #30 for 30 day supply    3)  Medication changes (since last review):  Removed  - dextroamphetamine/amphetamine duplicates, benzonatate, bupropion, cholecalciferol, ibuprofen, vitamin B complex   ¹RxQuery pharmacy benefit data reflects medications filled and processed through the patient's insurance, however this data does NOT capture whether the medication was picked up or is currently being taken by the patient. Allergies:  Aspirin and Penicillins    Significant PMH/Disease States:   Past Medical History:   Diagnosis Date    ADD (attention deficit disorder) [age]yo    Treated with Adderall since dx. 2013 dosing 20mg AM and 10mg PM.  Trial/change to Vyvanse Nov-Dec 2015--not as effective. Gynecologic disorder     History of miscarriages.   History of Mirena IUD placed on 4/17/15    HX OTHER MEDICAL      -BUFA baby    HX OTHER MEDICAL     HPV positive    Idiopathic vulvodynia 2014    L1 vertebral fracture (HCC) 2017    [facility] imaging/admissoin: Mild acute two column compression fracture of L1 without evidence of retropulsion into the spinal canal.  Managed non-operatively. Migraines 2013    Neurological disorder     Pelvic pain in female 9/15/2014    2015 gyn laparoscopy/hysteroscopy with endometriosis worse right. Psychiatric disorder     depression    Secondary dysmenorrhea 2014    With menorraghia    Seizures ([facility] [address]     never on meds--had appr 4-5 in a short amt of time and none since     Chief Complaint for this Admission:    Chief Complaint   Patient presents with    Mental Health Problem     Prior to Admission Medications:   Prior to Admission Medications   Prescriptions Last Dose Informant Taking? FLUoxetine (PROzac) 40 mg capsule   Yes   Sig: Take 1 Capsule by mouth daily. amphetamine-dextroamphetamine XR (ADDERALL XR) 30 mg XR capsule   Yes   Sig: Take 1 Capsule by mouth every morning. Max Daily Amount: 30 mg.   cetirizine (ZYRTEC) 10 mg tablet   Yes   Sig: Take 1 Tablet by mouth daily. Take as needed for allergies.    fluticasone propionate (FLONASE) 50 mcg/actuation nasal spray   Yes   Si sprays to each nostril 1 time daily      Facility-Administered Medications: None     Serena Doty, CLEMENTED

## 2022-04-19 NOTE — ED PROVIDER NOTES
HPI     Fidelina Shea is a 46 y.o. female with Hx of ADD, substance abuse, HPV, depression, migraines, seizures who presents via EMS to [facility] with cc of mental health concerns. Patient reports that she feels depressed and is concerned about possible escalation of symptoms to the point where she may think about self-harm. Denies any suicidal or homicidal ideations at this point. States that she was released from a year-long rehab \"[facility]\" for pain pill addiction in 2022. Felt they did not manage some of her mental health issues as well. Reports that she was on Prozac. Her primary care just recently switched her over to Wellbutrin. Since leaving the program in January, patient has felt that she has not adjusted well to life outside of rehab. Reports that she has been drinking alcohol for the last 2 days with associated vomiting, diarrhea, insomnia. She reports generalized abdominal pain that is associated with vomiting episodes. Denies any fevers, chills, cough, congestion, CP, SOB, urinary concerns, flank pain. Denies blood in stool/ emesis. Pt denies any tobacco/ ETOh/ substance abuse. Denies any recent COVID or flu exposures. Last mental health admission was several years ago. Denies chance of pregnancy. PCP: Javier Carty MD    There are no other complaints, changes or physical findings at this time. Past Medical History:   Diagnosis Date    ADD (attention deficit disorder) [age]yo    Treated with Adderall since dx. 2013 dosing 20mg AM and 10mg PM.  Trial/change to Vyvanse Nov-Dec 2015--not as effective.  Gynecologic disorder     History of miscarriages.   History of Mirena IUD placed on 4/17/15    HX OTHER MEDICAL      -BUFA baby    HX OTHER MEDICAL     HPV positive    Idiopathic vulvodynia 2014    L1 vertebral fracture (HCC) 2017    [facility] imaging/admissoin: Mild acute two column compression fracture of L1 without evidence of retropulsion into the spinal canal.  Managed non-operatively.  Migraines 6/12/2013    Neurological disorder     Pelvic pain in female 9/15/2014    April 2015 gyn laparoscopy/hysteroscopy with endometriosis worse right.  Psychiatric disorder     depression    Secondary dysmenorrhea 8/12/2014    With menorraghia    Seizures ([facility] [address] 2008    never on meds--had appr 4-5 in a short amt of time and none since       Past Surgical History:   Procedure Laterality Date    ENDOMETRIAL CRYOABLATION      HX GYN  4/17/15    laparoscopy and hysteroscopy and IUD placement    HX HYSTERECTOMY  04/04/2016    Complete Hysterectomy         Family History:   Problem Relation Age of Onset    Cancer Mother     Diabetes Mother     Alcohol abuse Father         and drug    Psychiatric Disorder Father     Cancer Father     Diabetes Maternal Grandmother     Hypertension Maternal Grandmother     Thyroid Disease Maternal Grandmother     Diabetes Maternal Grandfather     Hypertension Maternal Grandfather     Cancer Maternal Grandfather     Heart Disease Maternal Grandfather     Cancer Paternal Grandmother     Diabetes Paternal Grandmother        Social History     Socioeconomic History    Marital status: SINGLE     Spouse name: Not on file    Number of children: Not on file    Years of education: Not on file    Highest education level: Not on file   Occupational History    Not on file   Tobacco Use    Smoking status: Former Smoker     Packs/day: 1.00     Years: 18.00     Pack years: 18.00     Types: Cigarettes    Smokeless tobacco: Never Used   Substance and Sexual Activity    Alcohol use: Yes     Alcohol/week: 0.0 standard drinks     Comment: rarely.      Drug use: No    Sexual activity: Yes     Partners: Male     Birth control/protection: Surgical   Other Topics Concern    Not on file   Social History Narrative    ** Merged History Encounter **          Social Determinants of Health     Financial Resource Strain:     Difficulty of Paying Living Expenses: Not on file   Food Insecurity:     Worried About [address] [employer] in the Last Year: Not on file    Ran Out of Food in the Last Year: Not on file   Transportation Needs:     Lack of Transportation (Medical): Not on file    Lack of Transportation (Non-Medical): Not on file   Physical Activity:     Days of Exercise per Week: Not on file    Minutes of Exercise per Session: Not on file   Stress:     Feeling of Stress : Not on file   Social Connections:     Frequency of Communication with Friends and Family: Not on file    Frequency of Social Gatherings with Friends and Family: Not on file    Attends Bahai Services: Not on file    Active Member of [address] [place] [address] or Organizations: Not on file    Attends Club or Organization Meetings: Not on file    Marital Status: Not on file   Intimate Partner Violence:     Fear of Current or Ex-Partner: Not on file    Emotionally Abused: Not on file    Physically Abused: Not on file    Sexually Abused: Not on file   Housing Stability:     Unable to Pay for Housing in the Last Year: Not on file    Number of [place] in the Last Year: Not on file    Unstable Housing in the Last Year: Not on file         ALLERGIES: Aspirin and Penicillins    Review of Systems   Constitutional: Negative for activity change, appetite change, chills and fever. HENT: Negative for congestion, rhinorrhea, sinus pressure and sore throat. Eyes: Negative for visual disturbance. Respiratory: Negative for cough and shortness of breath. Cardiovascular: Negative for chest pain. Gastrointestinal: Positive for abdominal pain, diarrhea, nausea and vomiting. Genitourinary: Negative for dysuria, flank pain, frequency and urgency. Musculoskeletal: Negative for arthralgias, back pain, myalgias and neck pain. Skin: Negative for color change and rash. Neurological: Negative for dizziness, speech difficulty, weakness, light-headedness and headaches. Psychiatric/Behavioral: Positive for agitation, dysphoric mood and sleep disturbance. Negative for behavioral problems and confusion. The patient is nervous/anxious. All other systems reviewed and are negative. Vitals:    04/18/22 2136 04/19/22 0146   BP: 107/61 110/75   Pulse: (!) 118 100   Resp: 18 18   Temp: 97.5 °F (36.4 °C) 98 °F (36.7 °C)   SpO2: 97% 98%            Physical Exam  Vitals and nursing note reviewed. Constitutional:       General: She is not in acute distress. Appearance: She is well-developed. HENT:      Head: Normocephalic and atraumatic. Right Ear: External ear normal.      Left Ear: External ear normal.   Eyes:      Conjunctiva/sclera: Conjunctivae normal.      Pupils: Pupils are equal, round, and reactive to light. Cardiovascular:      Rate and Rhythm: Normal rate and regular rhythm. Heart sounds: Normal heart sounds. Pulmonary:      Effort: Pulmonary effort is normal.      Breath sounds: Normal breath sounds. Abdominal:      Palpations: Abdomen is soft. Tenderness: There is no abdominal tenderness. There is no guarding or rebound. Musculoskeletal:         General: Normal range of motion. Cervical back: Normal range of motion and neck supple. Skin:     General: Skin is warm and dry. Neurological:      Mental Status: She is alert and oriented to person, place, and time. Psychiatric:         Attention and Perception: She is inattentive. Mood and Affect: Mood is anxious. Affect is labile and tearful. Speech: Speech is rapid and pressured. Behavior: Behavior is agitated. Behavior is cooperative. Thought Content: Thought content normal.         Cognition and Memory: Cognition and memory normal.         Judgment: Judgment is impulsive and inappropriate.           MDM  Number of Diagnoses or Management Options  Al[facility] ([facility]  Depression, unspecified depression type  Hypokalemia  Polysubstance abuse ([facility] [address]  Diagnosis management comments: Ddx: substance induced mood d/o, anxiety, depression, dehydration     Patient presents emergency department with concern for evolving depression for which she has been \"self-medicating\" with alcohol over the last 2 days. Patient was evaluated by [facility] felt she would benefit from inpatient admission. Patient had a low potassium which was replaced. She was giving antiemetic, IV fluids, improvement of symptoms. Pt medically clear for inpatient psychiatric admission at this time. Amount and/or Complexity of Data Reviewed  Clinical lab tests: ordered and reviewed  Discuss the patient with other providers: yes           Procedures      LABORATORY TESTS:  Recent Results (from the past 12 hour(s))   CBC WITH AUTOMATED DIFF    Collection Time: 04/18/22 10:38 PM   Result Value Ref Range    WBC 10.0 3.6 - 11.0 K/uL    RBC 4.72 3.80 - 5.20 M/uL    HGB 14.2 11.5 - 16.0 g/dL    HCT 40.8 35.0 - 47.0 %    MCV 86.4 80.0 - 99.0 FL    MCH 30.1 26.0 - 34.0 PG    MCHC 34.8 30.0 - 36.5 g/dL    RDW 12.8 11.5 - 14.5 %    PLATELET 776 522 - 823 K/uL    MPV 9.6 8.9 - 12.9 FL    NRBC 0.0 0  WBC    ABSOLUTE NRBC 0.00 0.00 - 0.01 K/uL    NEUTROPHILS 63 32 - 75 %    LYMPHOCYTES 26 12 - 49 %    MONOCYTES 10 5 - 13 %    EOSINOPHILS 0 0 - 7 %    BASOPHILS 1 0 - 1 %    IMMATURE GRANULOCYTES 0 0.0 - 0.5 %    ABS. NEUTROPHILS 6.3 1.8 - 8.0 K/UL    ABS. LYMPHOCYTES 2.6 0.8 - 3.5 K/UL    ABS. MONOCYTES 1.0 0.0 - 1.0 K/UL    ABS. EOSINOPHILS 0.0 0.0 - 0.4 K/UL    ABS. BASOPHILS 0.1 0.0 - 0.1 K/UL    ABS. IMM.  GRANS. 0.0 0.00 - 0.04 K/UL    DF AUTOMATED     METABOLIC PANEL, COMPREHENSIVE    Collection Time: 04/18/22 10:38 PM   Result Value Ref Range    Sodium 135 (L) 136 - 145 mmol/L    Potassium 2.8 (L) 3.5 - 5.1 mmol/L    Chloride 102 97 - 108 mmol/L    CO2 23 21 - 32 mmol/L    Anion gap 10 5 - 15 mmol/L    Glucose 111 (H) 65 - 100 mg/dL    BUN 12 6 - 20 MG/DL Creatinine 0.86 0.55 - 1.02 MG/DL    BUN/Creatinine ratio 14 12 - 20      GFR est AA >60 >60 ml/min/1.73m2    GFR est non-AA >60 >60 ml/min/1.73m2    Calcium 8.6 8.5 - 10.1 MG/DL    Bilirubin, total 1.1 (H) 0.2 - 1.0 MG/DL    ALT (SGPT) 69 12 - 78 U/L    AST (SGOT) 33 15 - 37 U/L    Alk. phosphatase 122 (H) 45 - 117 U/L    Protein, total 7.0 6.4 - 8.2 g/dL    Albumin 3.5 3.5 - 5.0 g/dL    Globulin 3.5 2.0 - 4.0 g/dL    A-G Ratio 1.0 (L) 1.1 - 2.2     LIPASE    Collection Time: 04/18/22 10:38 PM   Result Value Ref Range    Lipase 92 73 - 393 U/L   ETHYL ALCOHOL    Collection Time: 04/18/22 10:38 PM   Result Value Ref Range    ALCOHOL(ETHYL),SERUM 219 (H) <05 MG/DL   SALICYLATE    Collection Time: 04/18/22 10:38 PM   Result Value Ref Range    Salicylate level <4.9 (L) 2.8 - 20.0 MG/DL   ACETAMINOPHEN    Collection Time: 04/18/22 10:38 PM   Result Value Ref Range    Acetaminophen level <2 (L) 10 - 30 ug/mL   SAMPLES BEING HELD    Collection Time: 04/18/22 10:38 PM   Result Value Ref Range    SAMPLES BEING HELD 1blu     COMMENT        Add-on orders for these samples will be processed based on acceptable specimen integrity and analyte stability, which may vary by analyte.    DRUG SCREEN, URINE    Collection Time: 04/19/22  1:53 AM   Result Value Ref Range    AMPHETAMINES Negative NEG      BARBITURATES Negative NEG      BENZODIAZEPINES Negative NEG      COCAINE Negative NEG      METHADONE Negative NEG      OPIATES Negative NEG      PCP(PHENCYCLIDINE) Negative NEG      THC (TH-CANNABINOL) Negative NEG      Drug screen comment (NOTE)    URINALYSIS W/MICROSCOPIC    Collection Time: 04/19/22  1:53 AM   Result Value Ref Range    Color YELLOW/STRAW      Appearance CLEAR CLEAR      Specific gravity 1.015 1.003 - 1.030      pH (UA) 7.0 5.0 - 8.0      Protein Negative NEG mg/dL    Glucose Negative NEG mg/dL    Ketone Negative NEG mg/dL    Bilirubin Negative NEG      Blood Negative NEG      Urobilinogen 0.2 0.2 - 1.0 EU/dL Nitrites Negative NEG      Leukocyte Esterase Negative NEG      WBC 0-4 0 - 4 /hpf    RBC 0-5 0 - 5 /hpf    Epithelial cells FEW FEW /lpf    Bacteria 1+ (A) NEG /hpf   URINE CULTURE HOLD SAMPLE    Collection Time: 04/19/22  1:53 AM    Specimen: Serum; Urine   Result Value Ref Range    Urine culture hold        Urine on hold in Microbiology dept for 2 days. If unpreserved urine is submitted, it cannot be used for addtional testing after 24 hours, recollection will be required. COVID-19 WITH INFLUENZA A/B    Collection Time: 04/19/22  1:53 AM   Result Value Ref Range    SARS-CoV-2 by PCR Not detected NOTD      Influenza A by PCR Not detected NOTD      Influenza B by PCR Not detected NOTD     HCG URINE, QL. - POC    Collection Time: 04/19/22  2:55 AM   Result Value Ref Range    Pregnancy test,urine (POC) Negative NEG         IMAGING RESULTS:  No orders to display       MEDICATIONS GIVEN:  Medications   sodium chloride 0.9 % bolus infusion 1,000 mL (0 mL IntraVENous IV Completed 4/18/22 2338)   ondansetron (ZOFRAN) injection 4 mg (4 mg IntraVENous Given 4/18/22 2252)   famotidine (PF) (PEPCID) 20 mg in sodium chloride (NS) 10 mL injection (20 mg IntraVENous Given 4/18/22 2252)   potassium chloride 10 mEq in 100 ml IVPB (0 mEq IntraVENous IV Completed 4/19/22 0135)   potassium chloride SR (KLOR-CON 10) tablet 40 mEq (40 mEq Oral Given 4/18/22 2337)   ondansetron (ZOFRAN) injection 4 mg (4 mg IntraVENous Given 4/19/22 0137)   sodium chloride 0.9 % bolus infusion 500 mL (0 mL IntraVENous IV Completed 4/19/22 0249)       IMPRESSION:  1. Depression, unspecified depression type    2. Polysubstance abuse ([address]    3. Hypokalemia    4. Alcoholic intoxication with complication [facility])        PLAN:  Presentation, management, and disposition were discussed with the attending physician, Dr. Angie Horton, who is in agreement with plan of care. 2:58 AM  Change of shift. Care of patient signed over to Angie Horton MD.  Bedside handoff complete. Awaiting bed placement, medically clear for inpatient psychiatric admission. [facility] notified @ [phone] that pt is medically cleared.    Bernadette Wilburn, NP

## 2022-04-19 NOTE — BSMART NOTE
Comprehensive Assessment Form Part 1      Section I - Disposition    Axis I - Major Depressive Disorder by hx  ADHD by hx  Past Medical History:   Diagnosis Date    ADD (attention deficit disorder) [age]yo    Treated with Adderall since dx. 2013 dosing 20mg AM and 10mg PM.  Trial/change to Vyvanse Nov-Dec 2015--not as effective.  Gynecologic disorder     History of miscarriages. History of Mirena IUD placed on 4/17/15    HX OTHER MEDICAL      -BUFA baby    HX OTHER MEDICAL     HPV positive    Idiopathic vulvodynia 2014    L1 vertebral fracture (HCC) 2017    [facility] imaging/admissoin: Mild acute two column compression fracture of L1 without evidence of retropulsion into the spinal canal.  Managed non-operatively.  Migraines 2013    Neurological disorder     Pelvic pain in female 9/15/2014    2015 gyn laparoscopy/hysteroscopy with endometriosis worse right.  Psychiatric disorder     depression    Secondary dysmenorrhea 2014    With menorraghia    Seizures ([facility] [address]     never on meds--had appr 4-5 in a short amt of time and none since       The Medical Doctor to Psychiatrist conference was not completed. The Medical Doctor is in agreement with Psychiatrist disposition because of (reason) N/A. The plan is present for admission. The on-call Psychiatrist consulted was Dr. Sammy Robles. The admitting Psychiatrist will be Dr. Sammy Robles. The admitting Diagnosis is MDD. The Payor source is Medicaid. Based on the CSSRS, the risk of suicide is low. Based on this assessment, the patient will be presented for admission. Patient is voluntary and requesting admission. Section II - Integrated Summary  Summary:  Patient is a 46 y.o. female presenting to the ED per triage, \"TRIAGE NOTE:  Patient arrives by EMS with c/o depression. Patient reports was in a year long [facility] based program [facility] and graduated in January and reports having difficulties for past few days now.   Patient reports doesn't think that her prozac is working anymore. Patient reports has not slept or eaten very well the past 4 days. Denies SI/HI. \"    Patient presents as depressed and is tearful during assessment. Patient is alert and oriented x 4. Patient reported suffering from depression her whole life. Patient reported she just recently graduated from [employer] in January 2022. Patient reported it was a year long program and she continues to attend [employer] 3 times per week and works for [employer]. Patient reported a history of opoid pill use. Patient reported, \"I am tired of living like this. \" Patient denies SI/HI/AVH but states sometimes I think \"Everything would be better if I wouldn't have to be here anymore. \" Patient reported she has not eaten in 3 days and can't stomach any food. Patient reported poor sleep and is tossing and turning every 30 minutes. Patient reported she has to literally crawl to the bathroom to bathe herself. Patient reported she started drinking Verdia Pali and vodka for the past two day. Patient reported her last drink was at 5 PM this evening. Patient reported she does not even know why she started drinking because alcohol has never been a thing for her. Patient reported no recent stressors but feels that living with her mother is a major stressor because of her mother's drinking and the patient feels that no matter how hard she tries, she is not good enough for her mother. Patient reported she was hospitalized 2 years ago and it helped her a lot. Patient reported a history of MDD and ADHD. Patient has been on Prozac since August 2021 and does not feel like it is working for her. Patient reported she is prescribed Adderall for her ADHD but she does not take it consistently because it is makes her feel jittery. Patient's PCP, Dr. Ruben Solorzano, prescribes her medication. Patient last saw a therapist at [address] [place] [address] about 2 years ago but has not seen a therapist since.  Patient does not have a psychiatrist. Patient reports Zoroastrian friends and family as her biggest supports. Patient lives with her mother. Patient has a 5year old son who lives with her grandparents. Patient reports she gets to see her son everyday. Patient reported, \"I just want to be able to throw the football around with my son without faking it. \"    Patient is seeking admission. Patient reported when she was admitted 2 years ago at [facility], it helped her so much and she got back on track. This writer will consult on-call PMHNP for possible admission. The patienthas demonstrated mental capacity to provide informed consent. The information is given by the patient. The Chief Complaint is depression. The Precipitant Factors are relationship conflicts, history of trauma. Previous Hospitalizations: Yes- 2 years ago  The patient has not previously been in restraints. Current Psychiatrist and/or  is PCP-Dr. Casper Mckinley. Lethality Assessment:    The potential for suicide noted by the following: not noted. The potential for homicide is not noted. The patient has not been a perpetrator of sexual or physical abuse. There are not pending charges. The patient is not felt to be at risk for self harm or harm to others. The attending nurse was advised N/A. Section III - Psychosocial  The patient's overall mood and attitude is depressed and cooperative. Feelings of helplessness and hopelessness are observed by verbal statements. Generalized anxiety is not observed. Panic is not observed. Phobias are not observed. Obsessive compulsive tendencies are not observed. Section IV - Mental Status Exam  The patient's appearance shows no evidence of impairment. The patient's behavior shows no evidence of impairment. The patient is oriented to time, place, person and situation. The patient's speech shows no evidence of impairment. The patient's mood is depressed. The range of affect is flat.   The patient's thought content demonstrates no evidence of impairment. The thought process shows no evidence of impairment. The patient's perception shows no evidence of impairment. The patient's memory shows no evidence of impairment. The patient's appetite is decreased and shows signs of weight loss. The patient's sleep has evidence of insomnia. The patient's insight shows no evidence of impairment. The patient's judgement shows no evidence of impairment. Section V - Substance Abuse  The patient is using substances. The patient is using alcohol for 2 days with last use on today at 5 PM. The patient has experienced the following withdrawal symptoms: N/A. Section VI - Living Arrangements  The patient is single. The patient lives with a parent. The patient has one child age 5. The patient does plan to return home upon discharge. The patient does not have legal issues pending. The patient's source of income comes from employment. Jew and cultural practices have been noted and include: [employer], very involved. The patient's greatest support comes from [employer] members and family and this person will not be involved with the treatment. The patient has been in an event described as horrible or outside the realm of ordinary life experience either currently or in the past.  The patient has not been a victim of sexual/physical abuse. Section VII - Other Areas of Clinical Concern  The highest grade achieved is not noted with the overall quality of school experience being described as not noted. The patient is currently employed and speaks [place] as a primary language. The patient has no communication impairments affecting communication. The patient's preference for learning can be described as: can read and write adequately.   The patient's hearing is normal.  The patient's vision is normal.      MARCELO Gordon

## 2022-04-19 NOTE — PROGRESS NOTES
Problem: Falls - Risk of  Goal: *Absence of Falls  Description: Document Marciana Distance Fall Risk and appropriate interventions in the flowsheet.   Outcome: Progressing Towards Goal  Note: Fall Risk Interventions:            Medication Interventions: Teach patient to arise slowly

## 2022-04-19 NOTE — BH NOTES
PSYCHOSOCIAL ASSESSMENT  :Patient identifying info:   Yossi Loera is a 46 y.o., female admitted 4/18/2022  9:46 PM     Presenting problem and precipitating factors: Pt presents to ED by EMS with heightened depressive symptoms. Pt reports finishing a year long taylor based treatment for her depression which she finished in January. Pt reports not sleeping or eating the past 4 days. Pt denies SI/HI/AVH. Mental status assessment: Pt is alert and oriented x4. Pts speech and eye contact are both normal. Pt does not appear to be responding to internal stimuli. Pt is calm, cooperative throughout assessment. Strengths/Recreation/Coping Skills: Taylor, support     Collateral information:     Current psychiatric /substance abuse providers and contact info: No current psychiatric care. Dr. Eliezer Segura, PCP [phone]    Previous psychiatric/substance abuse providers and response to treatment:     Family history of mental illness or substance abuse: Yes, Father     Substance abuse history:  Pt reports prior abuse of pain medication- has been clean 1 yr, 3 mos. Pt also reports ETOH hx  Social History     Tobacco Use    Smoking status: Current Every Day Smoker     Packs/day: 1.00     Years: 18.00     Pack years: 18.00     Types: Cigarettes    Smokeless tobacco: Never Used    Tobacco comment: Vapes   Substance Use Topics    Alcohol use: Yes     Alcohol/week: 0.0 standard drinks     Comment: rarely. History of biomedical complications associated with substance abuse: none    Patient's current acceptance of treatment or motivation for change: Voluntary    Family constellation: Single, 1 child (9)    Is significant other involved?  No     Describe support system: Mormonism    Describe living arrangements and home environment: Pt currently lives with Mother    GUARDIAN/POA:no    Guardian Name:     Guardian Contact:    Health issues:   Hospital Problems  Date Reviewed: 4/12/2022          Codes Class Noted POA Major depressive disorder, recurrent severe without psychotic features [facility]) ICD-10-CM: F33.2  ICD-9-CM: 296.33  2022 Unknown              Trauma history: Yes, pt did not elaborate     Legal issues: yes, pt did not elaborate    History of  service: no     Financial status: stable     Tenriism/cultural factors: Quaker    Education/work history: 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

## 2022-04-19 NOTE — BH NOTES
TRANSFER - IN REPORT:    Verbal report received from [address], RN (name) on Jeni Bell  being received from [facility] [place] [facility] (unit) for routine progression of care      Report consisted of patients Situation, Background, Assessment and   Recommendations(SBAR). Information from the following report(s) SBAR, ED Summary, [facility] and Recent Results was reviewed with the receiving nurse. Opportunity for questions and clarification was provided. Assessment completed upon patients arrival to unit and care assumed.

## 2022-04-19 NOTE — PROGRESS NOTES
Problem: Depressed Mood (Adult/Pediatric)  Goal: *STG: Participates in treatment plan  Outcome: Progressing Towards Goal  Note: In bed report nausea and feeling tired. Mood sad to depressed, affect flat to sad. Denies SI, reports feeling safe. Daily goal is to discuss social stressors and review medications.  Staff focus is on offering support  Goal: *STG: Demonstrates reduction in symptoms and increase in insight into coping skills/future focused  Outcome: Progressing Towards Goal  Goal: Interventions  Outcome: Progressing Towards Goal

## 2022-04-19 NOTE — BH NOTES
PRN Medication Documentation    Specific patient behavior that led to need for PRN medication: pt request for c/o nausea   Staff interventions attempted prior to PRN being given: gingerale  PRN medication given: Zofran 4mg PO given @ 7495  Patient response/effectiveness of PRN medication: no further complaints at this time

## 2022-04-20 PROCEDURE — 65220000003 HC RM SEMIPRIVATE PSYCH

## 2022-04-20 PROCEDURE — 74011250637 HC RX REV CODE- 250/637: Performed by: NURSE PRACTITIONER

## 2022-04-20 RX ORDER — ARIPIPRAZOLE 2 MG/1
2 TABLET ORAL DAILY
Status: DISCONTINUED | OUTPATIENT
Start: 2022-04-20 | End: 2022-04-21 | Stop reason: HOSPADM

## 2022-04-20 RX ORDER — TRAZODONE HYDROCHLORIDE 50 MG/1
50 TABLET ORAL
Status: DISCONTINUED | OUTPATIENT
Start: 2022-04-20 | End: 2022-04-21 | Stop reason: HOSPADM

## 2022-04-20 RX ADMIN — Medication 100 MG: at 09:31

## 2022-04-20 RX ADMIN — PHENOBARBITAL 32.4 MG: 32.4 TABLET ORAL at 17:36

## 2022-04-20 RX ADMIN — ARIPIPRAZOLE 2 MG: 2 TABLET ORAL at 12:25

## 2022-04-20 RX ADMIN — FOLIC ACID 1 MG: 1 TABLET ORAL at 09:32

## 2022-04-20 RX ADMIN — PHENOBARBITAL 32.4 MG: 32.4 TABLET ORAL at 09:31

## 2022-04-20 RX ADMIN — TRAZODONE HYDROCHLORIDE 50 MG: 50 TABLET ORAL at 21:40

## 2022-04-20 RX ADMIN — FLUOXETINE 60 MG: 20 CAPSULE ORAL at 09:32

## 2022-04-20 RX ADMIN — THERA TABS 1 TABLET: TAB at 09:32

## 2022-04-20 RX ADMIN — PHENOBARBITAL 32.4 MG: 32.4 TABLET ORAL at 12:25

## 2022-04-20 NOTE — BH NOTES
PSYCHIATRIC PROGRESS NOTE       Patient: Sarah Cobb MRN: [id number]  SSN: [id number]    YOB: 1980  Age: 46 y.o. Sex: female      Admit Date: 2022    LOS: 1 day       Chief Complaint:  I am very depressed. Interval History:  Sarah Cobb says prozac has not been woking for her. She says depression is about 8/10. She feels hopeless and helpless. She did not sleep at all last night, which she says has been an ongoing issue. She says when she lays down, she starts thinking of how she could get better. She denies any major stressor. While she is feeling hopeless she will not do anything to hurt herself. She says she will do anything to get better. She says etoh was a problem to her this week but normally its not. She denies etoh withdrawal symptoms at this point. Her motivation is low, anhedonia. She has been isolated to her room. She agreed to start taking abilify. At the present time the patient Sarah Cobb remains compliant with taking medications. Denies any adverse events from taking them and feels they have been beneficial.         Past Medical History:  Past Medical History:   Diagnosis Date    ADD (attention deficit disorder) [age]yo    Treated with Adderall since dx. 2013 dosing 20mg AM and 10mg PM.  Trial/change to Vyvanse Nov-Dec 2015--not as effective.  Gynecologic disorder     History of miscarriages. History of Mirena IUD placed on 4/17/15    HX OTHER MEDICAL      -BUFA baby    HX OTHER MEDICAL     HPV positive    Idiopathic vulvodynia 2014    L1 vertebral fracture (HCC) 2017    [facility] imaging/admissoin: Mild acute two column compression fracture of L1 without evidence of retropulsion into the spinal canal.  Managed non-operatively.  Migraines 2013    Neurological disorder     Pelvic pain in female 9/15/2014    2015 gyn laparoscopy/hysteroscopy with endometriosis worse right.     Psychiatric disorder     depression    Secondary dysmenorrhea 8/12/2014    With menorraghia    Seizures ([address] 2008    never on meds--had appr 4-5 in a short amt of time and none since         ALLERGIES:(reviewed/updated 4/20/2022)  Allergies   Allergen Reactions    Aspirin Other (comments)     Hot sweats and passed out; tolerated ibuprofen    Penicillins Other (comments)     Childhood. Does not remember reaction. Is not aware if she has ever taken cephalosporins in the past.    Jan 2019: Pt notes no problems with cephalosporins. Laboratory report:  Lab Results   Component Value Date/Time    WBC 10.0 04/18/2022 10:38 PM    Hemoglobin (POC) 14.3 04/04/2016 07:26 AM    HGB 14.2 04/18/2022 10:38 PM    Hematocrit (POC) 42 04/04/2016 07:26 AM    HCT 40.8 04/18/2022 10:38 PM    PLATELET 895 83/07/7066 10:38 PM    MCV 86.4 04/18/2022 10:38 PM    Hgb, External 11.6 05/03/2012 12:00 AM    Hct, External 33.4 05/03/2012 12:00 AM    Platelet cnt., External 332K 05/03/2012 12:00 AM      Lab Results   Component Value Date/Time    Sodium 135 (L) 04/18/2022 10:38 PM    Potassium 2.8 (L) 04/18/2022 10:38 PM    Chloride 102 04/18/2022 10:38 PM    CO2 23 04/18/2022 10:38 PM    Anion gap 10 04/18/2022 10:38 PM    Glucose 111 (H) 04/18/2022 10:38 PM    BUN 12 04/18/2022 10:38 PM    Creatinine 0.86 04/18/2022 10:38 PM    BUN/Creatinine ratio 14 04/18/2022 10:38 PM    GFR est AA >60 04/18/2022 10:38 PM    GFR est non-AA >60 04/18/2022 10:38 PM    Calcium 8.6 04/18/2022 10:38 PM    Bilirubin, total 1.1 (H) 04/18/2022 10:38 PM    Alk.  phosphatase 122 (H) 04/18/2022 10:38 PM    Protein, total 7.0 04/18/2022 10:38 PM    Albumin 3.5 04/18/2022 10:38 PM    Globulin 3.5 04/18/2022 10:38 PM    A-G Ratio 1.0 (L) 04/18/2022 10:38 PM    ALT (SGPT) 69 04/18/2022 10:38 PM      Vitals:    04/19/22 1203 04/19/22 1605 04/19/22 2044 04/20/22 0858   BP: 127/82 134/85 (!) 144/87 117/76   Pulse: 91 75 90 74   Resp: 16 16 16 16   Temp: 99.1 °F (37.3 °C) 99 °F (37.2 °C) 99 °F (37.2 °C) 98 °F (36.7 °C)   SpO2: 98% 97% 99% 99%   Weight:       Height:       LMP: 03/14/2016       No results found for: VALF2, VALAC, VALP, VALPR, DS6, CRBAM, CRBAMP, CARB2, XCRBAM  No results found for: LITHM    Vital Signs  Patient Vitals for the past 24 hrs:   Temp Pulse Resp BP SpO2   04/20/22 0858 98 °F (36.7 °C) 74 16 117/76 99 %   04/19/22 2044 99 °F (37.2 °C) 90 16 (!) 144/87 99 %   04/19/22 1605 99 °F (37.2 °C) 75 16 134/85 97 %   04/19/22 1203 99.1 °F (37.3 °C) 91 16 127/82 98 %     Wt Readings from Last 3 Encounters:   04/19/22 93.9 kg (207 lb)   04/12/22 94.2 kg (207 lb 9.6 oz)   03/28/22 97.6 kg (215 lb 2.7 oz)     Temp Readings from Last 3 Encounters:   04/20/22 98 °F (36.7 °C)   04/12/22 98.2 °F (36.8 °C) (Oral)   03/28/22 97.9 °F (36.6 °C)     BP Readings from Last 3 Encounters:   04/20/22 117/76   04/12/22 107/65   03/28/22 137/71     Pulse Readings from Last 3 Encounters:   04/20/22 74   04/12/22 (!) 118   03/28/22 (!) 122       Radiology (reviewed/updated 4/20/2022)  No results found.     Current Facility-Administered Medications   Medication Dose Route Frequency Provider Last Rate Last Admin    OLANZapine (ZyPREXA) tablet 5 mg  5 mg Oral Q6H PRN Natalie Medley' Ilona Momin, FNP        haloperidol lactate (HALDOL) injection 5 mg  5 mg IntraMUSCular Q6H PRN Natalie Medley' V, FNP        benztropine (COGENTIN) tablet 1 mg  1 mg Oral BID PRN Natalie MedeyalMarna Nails, FNP        diphenhydrAMINE (BENADRYL) injection 50 mg  50 mg IntraMUSCular BID PRN Natalie Medley' V, FNP        LORazepam (ATIVAN) injection 1 mg  1 mg IntraMUSCular Q4H PRN Natalie Sullivan V, FNP        traZODone (DESYREL) tablet 50 mg  50 mg Oral QHS PRN Natalie Sullivan V, ALEXP        acetaminophen (TYLENOL) tablet 650 mg  650 mg Oral Q4H PRN Natalie Sullivan V, FNP        magnesium hydroxide (MILK OF MAGNESIA) 400 mg/5 mL oral suspension 30 mL  30 mL Oral DAILY PRN Natalie Momin, ALEXP        nicotine buccal (POLACRILEX) lozenge 2 mg  2 mg Oral Q2H PRN [facility]        ondansetron (ZOFRAN ODT) tablet 4 mg  4 mg Oral Q6H PRN Radha Hutson MD   4 mg at 04/19/22 9668    hydrOXYzine HCL (ATARAX) tablet 50 mg  50 mg Oral Q6H PRN Sameera Rizzo, NP        FLUoxetine (PROzac) capsule 60 mg  60 mg Oral DAILY Sameera Rizzo, NP   60 mg at 04/20/22 0932    PHENobarbitaL (LUMINAL) tablet 32.4 mg  32.4 mg Oral QID Sameera Rizzo, NP   32.4 mg at 04/20/22 4339    Followed by   Angelique Daigle PHENobarbitaL (LUMINAL) tablet 32.4 mg  32.4 mg Oral BID Sameera Rizzo NP        Followed by   Efe Joiner ON 4/21/2022] PHENobarbitaL (LUMINAL) tablet 16.2 mg  16.2 mg Oral BID Sameera Rizzo NP        PHENobarbitaL (LUMINAL) tablet 32.4 mg  32.4 mg Oral Q6H PRN Sameera Rizzo NP        Followed by   Efe Joiner ON 4/21/2022] PHENobarbitaL (LUMINAL) tablet 16.2 mg  16.2 mg Oral Q6H PRN Sameera Rizzo, NP        thiamine HCL (B-1) tablet 100 mg  100 mg Oral DAILY Sameera Rizzo, NP   100 mg at 26/77/12 4308    folic acid (FOLVITE) tablet 1 mg  1 mg Oral DAILY Sameera Rizoz, NP   1 mg at 04/20/22 0932    therapeutic multivitamin (THERAGRAN) tablet 1 Tablet  1 Tablet Oral DAILY Sameera Rizzo, NP   1 Tablet at 04/20/22 0932       Side Effects: (reviewed/updated 4/20/2022)  None reported or admitted to.     Review of Systems: (reviewed/updated 4/20/2022)  Appetite: good  Sleep: good   All other Review of Systems: insomnia    Mental Status Exam:  Eye contact: Good eye contact  Psychomotor activity: mildly reduced  Speech is spontaneous  Thought process: Logical and goal directed   Mood is \"depressed\"  Affect: Blunted  Perception: No avh  Suicidal ideation: No si  Homicidal ideation: No hi  Insight/judgment: Partial  Cognition is grossly intact      Physical Exam:  Musculoskeletal system: steady gait  Tremor not present  Cog wheeling not present      Assessment and Plan:  Ericka Mane meets criteria for a diagnosis of Major depressive disorder, recurrent, moderate, without psychotic features. Add low dose abilify 2 mg daily. Change trazodone to schedule dosing. Continue rest of medications as prescribed. We will closely monitor for safety. We will encourage reality orientation. Disposition planning to continue. I certify that this patients inpatient psychiatric hospital services furnished since the previous certification were, and continue to be, required for treatment that could reasonably be expected to improve the patient's condition, or for diagnostic study, and that the patient continues to need, on a daily basis, active treatment furnished directly by or requiring the supervision of inpatient psychiatric facility personnel. In addition, the hospital records show that services furnished were intensive treatment services, admission or related services, or equivalent services.       Signed:  Dontrell Houston NP  4/20/2022

## 2022-04-20 NOTE — PROGRESS NOTES
Patient isolative to room. Compliant with medications. Overall cooperative. Problem: Falls - Risk of  Goal: *Absence of Falls  Description: Document Cindia Neigh Fall Risk and appropriate interventions in the flowsheet. Outcome: Progressing Towards Goal  Note: Fall Risk Interventions:            Medication Interventions: Teach patient to arise slowly                   Problem: Patient Education: Go to Patient Education Activity  Goal: Patient/Family Education  Outcome: Progressing Towards Goal     Problem: Depressed Mood (Adult/Pediatric)  Goal: *STG: Participates in treatment plan  Outcome: Progressing Towards Goal  Goal: *STG: Verbalizes anger, guilt, and other feelings in a constructive manor  Outcome: Progressing Towards Goal  Goal: *STG: Attends activities and groups  Outcome: Progressing Towards Goal  Goal: *STG: Demonstrates reduction in symptoms and increase in insight into coping skills/future focused  Outcome: Progressing Towards Goal     Problem: Patient Education: Go to Patient Education Activity  Goal: Patient/Family Education  Outcome: Progressing Towards Goal     Problem: Falls - Risk of  Goal: *Absence of Falls  Description: Document Rema Fall Risk and appropriate interventions in the flowsheet.   Outcome: Progressing Towards Goal  Note: Fall Risk Interventions:            Medication Interventions: Teach patient to arise slowly                     Problem: Patient Education: Go to Patient Education Activity  Goal: Patient/Family Education  Outcome: Progressing Towards Goal     Problem: Depressed Mood (Adult/Pediatric)  Goal: *STG: Participates in treatment plan  Outcome: Progressing Towards Goal  Goal: *STG: Verbalizes anger, guilt, and other feelings in a constructive manor  Outcome: Progressing Towards Goal  Goal: *STG: Attends activities and groups  Outcome: Progressing Towards Goal  Goal: *STG: Demonstrates reduction in symptoms and increase in insight into coping skills/future focused  Outcome: Progressing Towards Goal     Problem: Patient Education: Go to Patient Education Activity  Goal: Patient/Family Education  Outcome: Progressing Towards Goal

## 2022-04-20 NOTE — BH NOTES
Patient is cooperative, depressed. Denies si. Endorses feelings of helplessness and hopelessness. \"just want to feel normal\" Poor sleep. Denies a/v hallucinations. Staff focus on coping skills, sharing, attending groups.  Patient has positive outlet in her Buddhist

## 2022-04-20 NOTE — H&P
[address] [facility] HISTORY AND PHYSICAL    Name:  Juan Salmon  MR#:  [id number]  :  1980  ACCOUNT #:  [de-identified]  ADMIT DATE:  2022    INITIAL PSYCHIATRIC EVALUATION    CHIEF COMPLAINT:  \"I've always suffered with depression. \"    HISTORY OF PRESENTING ILLNESS:  The patient is a 68-year-old female who is currently admitted at [address] on a voluntary basis. She presented to the emergency room with worsening depression. She reports that she was in a year long state-based program, and she graduated in January, and states that she has been having difficulties for the past few days. She has a history of substance use, primarily narcotics, which led to her program.  She is currently prescribed Prozac, which is being managed by her primary care physician. She reports that she has not eaten or slept well in the last several days. She reports that she has struggled with depression her whole life. She denies any major stressor other than staying with her mother. She states her mother's drinking has been an issue. She states that she has been feeling increasingly depressed, that her outpatient provider recently started her on Wellbutrin, but after 4 days of taking it, she had to stop it because it made her hallucinate and it made her psychotic. Her blood alcohol level on admission was 219. Her urine drug screen was negative. She states that she drank for 2 days straight. She denies any history of suicide attempt. She reported on admission \"everything would be better if I wouldn't have to be here anymore. \"  During the interview, she denies SI, HI, or AVH. PAST MEDICAL HISTORY:  See H and P. Past Medical History:   Diagnosis Date    ADD (attention deficit disorder) 19-19yo    Treated with Adderall since dx. 2013 dosing 20mg AM and 10mg PM.  Trial/change to Vyvanse Nov-Dec 2015--not as effective.     Gynecologic disorder History of miscarriages. History of Mirena IUD placed on 4/17/15    HX OTHER MEDICAL      -BUFA baby    HX OTHER MEDICAL     HPV positive    Idiopathic vulvodynia 2014    L1 vertebral fracture (HCC) 2017    [facility] imaging/admissoin: Mild acute two column compression fracture of L1 without evidence of retropulsion into the spinal canal.  Managed non-operatively.  Migraines 2013    Neurological disorder     Pelvic pain in female 9/15/2014    2015 gyn laparoscopy/hysteroscopy with endometriosis worse right.  Psychiatric disorder     depression    Secondary dysmenorrhea 2014    With menorraghia    Seizures ([facility] [address]     never on meds--had appr 4-5 in a short amt of time and none since       Labs: (reviewed/updated 2022)  Patient Vitals for the past 8 hrs:   BP Temp Pulse Resp SpO2   22 0813 106/69 98.7 °F (37.1 °C) 81 16 98 %     Labs Reviewed   METABOLIC PANEL, COMPREHENSIVE - Abnormal; Notable for the following components:       Result Value    Sodium 135 (*)     Potassium 2.8 (*)     Glucose 111 (*)     Bilirubin, total 1.1 (*)     Alk.  phosphatase 122 (*)     A-G Ratio 1.0 (*)     All other components within normal limits   ETHYL ALCOHOL - Abnormal; Notable for the following components:    ALCOHOL(ETHYL),SERUM 219 (*)     All other components within normal limits   SALICYLATE - Abnormal; Notable for the following components:    Salicylate level <8.3 (*)     All other components within normal limits   ACETAMINOPHEN - Abnormal; Notable for the following components:    Acetaminophen level <2 (*)     All other components within normal limits   URINALYSIS W/MICROSCOPIC - Abnormal; Notable for the following components:    Bacteria 1+ (*)     All other components within normal limits   URINE CULTURE HOLD SAMPLE   COVID-19 WITH INFLUENZA A/B   CBC WITH AUTOMATED DIFF   LIPASE   DRUG SCREEN, URINE   SAMPLES BEING HELD   LIPID PANEL   METABOLIC PANEL, COMPREHENSIVE HCG URINE, QL. - POC     Lab Results   Component Value Date/Time    Sodium 135 (L) 04/18/2022 10:38 PM    Potassium 2.8 (L) 04/18/2022 10:38 PM    Chloride 102 04/18/2022 10:38 PM    CO2 23 04/18/2022 10:38 PM    Anion gap 10 04/18/2022 10:38 PM    Glucose 111 (H) 04/18/2022 10:38 PM    BUN 12 04/18/2022 10:38 PM    Creatinine 0.86 04/18/2022 10:38 PM    BUN/Creatinine ratio 14 04/18/2022 10:38 PM    GFR est AA >60 04/18/2022 10:38 PM    GFR est non-AA >60 04/18/2022 10:38 PM    Calcium 8.6 04/18/2022 10:38 PM    Bilirubin, total 1.1 (H) 04/18/2022 10:38 PM    Alk. phosphatase 122 (H) 04/18/2022 10:38 PM    Protein, total 7.0 04/18/2022 10:38 PM    Albumin 3.5 04/18/2022 10:38 PM    Globulin 3.5 04/18/2022 10:38 PM    A-G Ratio 1.0 (L) 04/18/2022 10:38 PM    ALT (SGPT) 69 04/18/2022 10:38 PM     Admission on 04/18/2022   Component Date Value Ref Range Status    WBC 04/18/2022 10.0  3.6 - 11.0 K/uL Final    RBC 04/18/2022 4.72  3.80 - 5.20 M/uL Final    HGB 04/18/2022 14.2  11.5 - 16.0 g/dL Final    HCT 04/18/2022 40.8  35.0 - 47.0 % Final    MCV 04/18/2022 86.4  80.0 - 99.0 FL Final    MCH 04/18/2022 30.1  26.0 - 34.0 PG Final    MCHC 04/18/2022 34.8  30.0 - 36.5 g/dL Final    RDW 04/18/2022 12.8  11.5 - 14.5 % Final    PLATELET 64/54/8950 103  150 - 400 K/uL Final    MPV 04/18/2022 9.6  8.9 - 12.9 FL Final    NRBC 04/18/2022 0.0  0  WBC Final    ABSOLUTE NRBC 04/18/2022 0.00  0.00 - 0.01 K/uL Final    NEUTROPHILS 04/18/2022 63  32 - 75 % Final    LYMPHOCYTES 04/18/2022 26  12 - 49 % Final    MONOCYTES 04/18/2022 10  5 - 13 % Final    EOSINOPHILS 04/18/2022 0  0 - 7 % Final    BASOPHILS 04/18/2022 1  0 - 1 % Final    IMMATURE GRANULOCYTES 04/18/2022 0  0.0 - 0.5 % Final    ABS. NEUTROPHILS 04/18/2022 6.3  1.8 - 8.0 K/UL Final    ABS. LYMPHOCYTES 04/18/2022 2.6  0.8 - 3.5 K/UL Final    ABS. MONOCYTES 04/18/2022 1.0  0.0 - 1.0 K/UL Final    ABS.  EOSINOPHILS 04/18/2022 0.0  0.0 - 0.4 K/UL Final    ABS. BASOPHILS 04/18/2022 0.1  0.0 - 0.1 K/UL Final    ABS. IMM. GRANS. 04/18/2022 0.0  0.00 - 0.04 K/UL Final    DF 04/18/2022 AUTOMATED    Final    Sodium 04/18/2022 135* 136 - 145 mmol/L Final    Potassium 04/18/2022 2.8* 3.5 - 5.1 mmol/L Final    Chloride 04/18/2022 102  97 - 108 mmol/L Final    CO2 04/18/2022 23  21 - 32 mmol/L Final    Anion gap 04/18/2022 10  5 - 15 mmol/L Final    Glucose 04/18/2022 111* 65 - 100 mg/dL Final    BUN 04/18/2022 12  6 - 20 MG/DL Final    Creatinine 04/18/2022 0.86  0.55 - 1.02 MG/DL Final    BUN/Creatinine ratio 04/18/2022 14  12 - 20   Final    GFR est AA 04/18/2022 >60  >60 ml/min/1.73m2 Final    GFR est non-AA 04/18/2022 >60  >60 ml/min/1.73m2 Final    Calcium 04/18/2022 8.6  8.5 - 10.1 MG/DL Final    Bilirubin, total 04/18/2022 1.1* 0.2 - 1.0 MG/DL Final    ALT (SGPT) 04/18/2022 69  12 - 78 U/L Final    AST (SGOT) 04/18/2022 33  15 - 37 U/L Final    Alk.  phosphatase 04/18/2022 122* 45 - 117 U/L Final    Protein, total 04/18/2022 7.0  6.4 - 8.2 g/dL Final    Albumin 04/18/2022 3.5  3.5 - 5.0 g/dL Final    Globulin 04/18/2022 3.5  2.0 - 4.0 g/dL Final    A-G Ratio 04/18/2022 1.0* 1.1 - 2.2   Final    Lipase 04/18/2022 92  73 - 393 U/L Final    ALCOHOL(ETHYL),SERUM 04/18/2022 219* <10 MG/DL Final    Salicylate level 16/79/3135 <1.7* 2.8 - 20.0 MG/DL Final    Acetaminophen level 04/18/2022 <2* 10 - 30 ug/mL Final    AMPHETAMINES 04/19/2022 Negative  NEG   Final    BARBITURATES 04/19/2022 Negative  NEG   Final    BENZODIAZEPINES 04/19/2022 Negative  NEG   Final    COCAINE 04/19/2022 Negative  NEG   Final    METHADONE 04/19/2022 Negative  NEG   Final    OPIATES 04/19/2022 Negative  NEG   Final    PCP(PHENCYCLIDINE) 04/19/2022 Negative  NEG   Final    THC (TH-CANNABINOL) 04/19/2022 Negative  NEG   Final    Drug screen comment 04/19/2022 (NOTE)   Final    Color 04/19/2022 YELLOW/STRAW    Final    Appearance 04/19/2022 CLEAR  CLEAR Final    Specific gravity 04/19/2022 1.015  1.003 - 1.030   Final    pH (UA) 04/19/2022 7.0  5.0 - 8.0   Final    Protein 04/19/2022 Negative  NEG mg/dL Final    Glucose 04/19/2022 Negative  NEG mg/dL Final    Ketone 04/19/2022 Negative  NEG mg/dL Final    Bilirubin 04/19/2022 Negative  NEG   Final    Blood 04/19/2022 Negative  NEG   Final    Urobilinogen 04/19/2022 0.2  0.2 - 1.0 EU/dL Final    Nitrites 04/19/2022 Negative  NEG   Final    Leukocyte Esterase 04/19/2022 Negative  NEG   Final    WBC 04/19/2022 0-4  0 - 4 /hpf Final    RBC 04/19/2022 0-5  0 - 5 /hpf Final    Epithelial cells 04/19/2022 FEW  FEW /lpf Final    Bacteria 04/19/2022 1+* NEG /hpf Final    Urine culture hold 04/19/2022 Urine on hold in Microbiology dept for 2 days. If unpreserved urine is submitted, it cannot be used for addtional testing after 24 hours, recollection will be required. Final    SARS-CoV-2 by PCR 04/19/2022 Not detected  NOTD   Final    Influenza A by PCR 04/19/2022 Not detected  NOTD   Final    Influenza B by PCR 04/19/2022 Not detected  NOTD   Final    SAMPLES BEING HELD 04/18/2022 1blu   Final    COMMENT 04/18/2022 Add-on orders for these samples will be processed based on acceptable specimen integrity and analyte stability, which may vary by analyte. Final    Pregnancy test,urine (POC) 04/19/2022 Negative  NEG   Final     Vitals:    04/20/22 1232 04/20/22 1706 04/20/22 2113 04/21/22 0813   BP: 118/79 132/76 (!) 138/98 106/69   Pulse: 81 83 82 81   Resp: 16 16 16 16   Temp: 97.8 °F (36.6 °C) 97.9 °F (36.6 °C) 98.4 °F (36.9 °C) 98.7 °F (37.1 °C)   SpO2: 98% 96% 99% 98%   Weight:       Height:       LMP: 03/14/2016     No results found for this or any previous visit (from the past 24 hour(s)). RADIOLOGY REPORTS:  Results from Hospital Encounter encounter on 08/04/18    XR CHEST PORT    Narrative  PORTABLE CHEST RADIOGRAPH/S: 8/4/2018 2:31 PM    INDICATION: Meets SIRS criteria.     COMPARISON: 7/16/2017, 10/31/2016, 12/20/2014. TECHNIQUE: Portable frontal upright radiograph/s of the chest.    FINDINGS:  The lungs are clear. The central airways are patent. No pneumothorax or pleural  effusion. Impression  IMPRESSION:  Clear lungs. No results found. PAST PSYCHIATRIC HISTORY:  She states that 2 years ago she was admitted at [facility] for depression. She is currently not in therapy. Her primary care physician is currently prescribing her Prozac. She has a history of opioid use as described above. PSYCHOSOCIAL HISTORY:  She is single. She has a 5year-old son, who is currently staying with her grandparents. She currently resides with her mother. She works at [employer]. She completed 8th [employer]. MENTAL STATUS EXAM:  She is alert and oriented in all spheres. She is dressed in street clothes. She reports her mood is depressed. Affect is blunted. Speech, normal rate and rhythm. Thought process, logical and goal directed. She denies suicidal ideation, homicidal ideation, auditory or visual hallucination. Memory is intact. Intelligence is average. Insight is poor. Judgment is poor. DIAGNOSES:  Major depressive disorder, recurrent, moderate, without psychotic features. TREATMENT PLANNING:  I will continue her inpatient stay. She will be provided with support and encouraged to attend groups. Her safety will be monitored. Her medications will be modified and assessed. Case Management will work on discharge planning. ASSETS AND STRENGTHS:  She is willing to seek help. She is willing to take medications. ESTIMATED LENGTH OF STAY:  5-7 days. This note was dictated with an electronic dictation software. Quite often, unanticipated grammatical, syntax, homophones, and other interpretive errors are inadvertently transcribed. Please disregard these errors. Please excuse any errors that have escaped final proofreading.  If there are any questions, please contact me directly for clarification.             ALINA SNOW NP      [id number]  D:  04/19/2022 23:19  T:  04/20/2022 2:14  JOB #:  [id number]

## 2022-04-20 NOTE — PROGRESS NOTES
Problem: Depressed Mood (Adult/Pediatric)  Goal: *STG: Participates in treatment plan  Outcome: Progressing Towards Goal  Goal: *STG: Attends activities and groups  Outcome: Progressing Towards Goal     Patient is resting quietly in bed with eyes closed. Appears to be asleep. No respiratory distress noted.    minutes safety monitoring continues

## 2022-04-20 NOTE — PROGRESS NOTES
Problem: Depressed Mood (Adult/Pediatric)  Goal: *STG: Participates in treatment plan  Outcome: Progressing Towards Goal  Goal: *STG: Verbalizes anger, guilt, and other feelings in a constructive manor  Outcome: Progressing Towards Goal  Goal: *STG: Attends activities and groups  Outcome: Progressing Towards Goal  Goal: *STG: Demonstrates reduction in symptoms and increase in insight into coping skills/future focused  Outcome: Progressing Towards Goal  Goal: Interventions  Outcome: Progressing Towards Goal

## 2022-04-21 VITALS
RESPIRATION RATE: 16 BRPM | WEIGHT: 207 LBS | TEMPERATURE: 98.7 F | HEART RATE: 81 BPM | HEIGHT: 64 IN | DIASTOLIC BLOOD PRESSURE: 69 MMHG | BODY MASS INDEX: 35.34 KG/M2 | SYSTOLIC BLOOD PRESSURE: 106 MMHG | OXYGEN SATURATION: 98 %

## 2022-04-21 LAB
ALBUMIN SERPL-MCNC: 2.9 G/DL (ref 3.5–5)
ALBUMIN/GLOB SERPL: 0.9 {RATIO} (ref 1.1–2.2)
ALP SERPL-CCNC: 102 U/L (ref 45–117)
ALT SERPL-CCNC: 53 U/L (ref 12–78)
ANION GAP SERPL CALC-SCNC: 7 MMOL/L (ref 5–15)
AST SERPL-CCNC: 37 U/L (ref 15–37)
BILIRUB SERPL-MCNC: 0.4 MG/DL (ref 0.2–1)
BUN SERPL-MCNC: 12 MG/DL (ref 6–20)
BUN/CREAT SERPL: 16 (ref 12–20)
CALCIUM SERPL-MCNC: 8.9 MG/DL (ref 8.5–10.1)
CHLORIDE SERPL-SCNC: 105 MMOL/L (ref 97–108)
CHOLEST SERPL-MCNC: 126 MG/DL
CO2 SERPL-SCNC: 24 MMOL/L (ref 21–32)
CREAT SERPL-MCNC: 0.76 MG/DL (ref 0.55–1.02)
GLOBULIN SER CALC-MCNC: 3.4 G/DL (ref 2–4)
GLUCOSE SERPL-MCNC: 97 MG/DL (ref 65–100)
HDLC SERPL-MCNC: 58 MG/DL
HDLC SERPL: 2.2 {RATIO} (ref 0–5)
LDLC SERPL CALC-MCNC: 31.4 MG/DL (ref 0–100)
POTASSIUM SERPL-SCNC: 4 MMOL/L (ref 3.5–5.1)
PROT SERPL-MCNC: 6.3 G/DL (ref 6.4–8.2)
SODIUM SERPL-SCNC: 136 MMOL/L (ref 136–145)
TRIGL SERPL-MCNC: 183 MG/DL (ref ?–150)
VLDLC SERPL CALC-MCNC: 36.6 MG/DL

## 2022-04-21 PROCEDURE — 80053 COMPREHEN METABOLIC PANEL: CPT

## 2022-04-21 PROCEDURE — 80061 LIPID PANEL: CPT

## 2022-04-21 PROCEDURE — 74011250637 HC RX REV CODE- 250/637: Performed by: NURSE PRACTITIONER

## 2022-04-21 PROCEDURE — 36415 COLL VENOUS BLD VENIPUNCTURE: CPT

## 2022-04-21 RX ORDER — FLUOXETINE HYDROCHLORIDE 20 MG/1
60 CAPSULE ORAL DAILY
Qty: 90 CAPSULE | Refills: 0 | Status: SHIPPED | OUTPATIENT
Start: 2022-04-22 | End: 2022-05-22

## 2022-04-21 RX ORDER — TRAZODONE HYDROCHLORIDE 50 MG/1
50 TABLET ORAL
Qty: 30 TABLET | Refills: 0 | Status: SHIPPED | OUTPATIENT
Start: 2022-04-21 | End: 2022-05-21

## 2022-04-21 RX ORDER — ARIPIPRAZOLE 2 MG/1
2 TABLET ORAL DAILY
Qty: 30 TABLET | Refills: 0 | Status: SHIPPED | OUTPATIENT
Start: 2022-04-22 | End: 2022-05-22

## 2022-04-21 RX ADMIN — FLUOXETINE 60 MG: 20 CAPSULE ORAL at 09:21

## 2022-04-21 RX ADMIN — THERA TABS 1 TABLET: TAB at 09:21

## 2022-04-21 RX ADMIN — ARIPIPRAZOLE 2 MG: 2 TABLET ORAL at 09:21

## 2022-04-21 RX ADMIN — Medication 100 MG: at 09:21

## 2022-04-21 RX ADMIN — FOLIC ACID 1 MG: 1 TABLET ORAL at 09:21

## 2022-04-21 NOTE — BH NOTES
Behavioral Health Transition Record to Provider    Patient Name: Wilmar Camilo  YOB: 1980  Medical Record Number: [id number]  Date of Admission: 4/18/2022  Date of Discharge: 4/21/22    Attending Provider: No att. providers found  Discharging Provider: Kya Odell, NP  To contact this individual call [phone] and ask the  to page. If unavailable, ask to be transferred to [address] [place] [address] Provider on call. [facility] Provider will be available on call 24/7 and during holidays. Primary Care Provider: Dale Cedeño MD    Allergies   Allergen Reactions    Aspirin Other (comments)     Hot sweats and passed out; tolerated ibuprofen    Penicillins Other (comments)     Childhood. Does not remember reaction. Is not aware if she has ever taken cephalosporins in the past.    Jan 2019: Pt notes no problems with cephalosporins. Reason for Admission: The patient is a 45-year-old female who is currently admitted at [address] on a voluntary basis. She presented to the emergency room with worsening depression. She reports that she was in a year long state-based program, and she graduated in January, and states that she has been having difficulties for the past few days. She has a history of substance use, primarily narcotics, which led to her program.  She is currently prescribed Prozac, which is being managed by her primary care physician. She reports that she has not eaten or slept well in the last several days. She reports that she has struggled with depression her whole life. She denies any major stressor other than staying with her mother. She states her mother's drinking has been an issue.   She states that she has been feeling increasingly depressed, that her outpatient provider recently started her on Wellbutrin, but after 4 days of taking it, she had to stop it because it made her hallucinate and it made her psychotic. Her blood alcohol level on admission was 219. Her urine drug screen was negative. She states that she drank for 2 days straight. She denies any history of suicide attempt. She reported on admission \"everything would be better if I wouldn't have to be here anymore. \"  During the interview, she denies SI, HI, or AVH. Admission Diagnosis: Major depressive disorder, recurrent severe without psychotic features ([facility] [address] [F33.2]    * No surgery found *    Results for orders placed or performed during the hospital encounter of 04/18/22   URINE CULTURE HOLD SAMPLE    Specimen: Serum; Urine   Result Value Ref Range    Urine culture hold        Urine on hold in Microbiology dept for 2 days. If unpreserved urine is submitted, it cannot be used for addtional testing after 24 hours, recollection will be required. COVID-19 WITH INFLUENZA A/B   Result Value Ref Range    SARS-CoV-2 by PCR Not detected NOTD      Influenza A by PCR Not detected NOTD      Influenza B by PCR Not detected NOTD     CBC WITH AUTOMATED DIFF   Result Value Ref Range    WBC 10.0 3.6 - 11.0 K/uL    RBC 4.72 3.80 - 5.20 M/uL    HGB 14.2 11.5 - 16.0 g/dL    HCT 40.8 35.0 - 47.0 %    MCV 86.4 80.0 - 99.0 FL    MCH 30.1 26.0 - 34.0 PG    MCHC 34.8 30.0 - 36.5 g/dL    RDW 12.8 11.5 - 14.5 %    PLATELET 992 524 - 126 K/uL    MPV 9.6 8.9 - 12.9 FL    NRBC 0.0 0  WBC    ABSOLUTE NRBC 0.00 0.00 - 0.01 K/uL    NEUTROPHILS 63 32 - 75 %    LYMPHOCYTES 26 12 - 49 %    MONOCYTES 10 5 - 13 %    EOSINOPHILS 0 0 - 7 %    BASOPHILS 1 0 - 1 %    IMMATURE GRANULOCYTES 0 0.0 - 0.5 %    ABS. NEUTROPHILS 6.3 1.8 - 8.0 K/UL    ABS. LYMPHOCYTES 2.6 0.8 - 3.5 K/UL    ABS. MONOCYTES 1.0 0.0 - 1.0 K/UL    ABS. EOSINOPHILS 0.0 0.0 - 0.4 K/UL    ABS. BASOPHILS 0.1 0.0 - 0.1 K/UL    ABS. IMM.  GRANS. 0.0 0.00 - 0.04 K/UL    DF AUTOMATED     METABOLIC PANEL, COMPREHENSIVE   Result Value Ref Range    Sodium 135 (L) 136 - 145 mmol/L    Potassium 2.8 (L) 3.5 - 5.1 mmol/L Chloride 102 97 - 108 mmol/L    CO2 23 21 - 32 mmol/L    Anion gap 10 5 - 15 mmol/L    Glucose 111 (H) 65 - 100 mg/dL    BUN 12 6 - 20 MG/DL    Creatinine 0.86 0.55 - 1.02 MG/DL    BUN/Creatinine ratio 14 12 - 20      GFR est AA >60 >60 ml/min/1.73m2    GFR est non-AA >60 >60 ml/min/1.73m2    Calcium 8.6 8.5 - 10.1 MG/DL    Bilirubin, total 1.1 (H) 0.2 - 1.0 MG/DL    ALT (SGPT) 69 12 - 78 U/L    AST (SGOT) 33 15 - 37 U/L    Alk.  phosphatase 122 (H) 45 - 117 U/L    Protein, total 7.0 6.4 - 8.2 g/dL    Albumin 3.5 3.5 - 5.0 g/dL    Globulin 3.5 2.0 - 4.0 g/dL    A-G Ratio 1.0 (L) 1.1 - 2.2     LIPASE   Result Value Ref Range    Lipase 92 73 - 393 U/L   ETHYL ALCOHOL   Result Value Ref Range    ALCOHOL(ETHYL),SERUM 219 (H) <12 MG/DL   SALICYLATE   Result Value Ref Range    Salicylate level <7.3 (L) 2.8 - 20.0 MG/DL   ACETAMINOPHEN   Result Value Ref Range    Acetaminophen level <2 (L) 10 - 30 ug/mL   DRUG SCREEN, URINE   Result Value Ref Range    AMPHETAMINES Negative NEG      BARBITURATES Negative NEG      BENZODIAZEPINES Negative NEG      COCAINE Negative NEG      METHADONE Negative NEG      OPIATES Negative NEG      PCP(PHENCYCLIDINE) Negative NEG      THC (TH-CANNABINOL) Negative NEG      Drug screen comment (NOTE)    URINALYSIS W/MICROSCOPIC   Result Value Ref Range    Color YELLOW/STRAW      Appearance CLEAR CLEAR      Specific gravity 1.015 1.003 - 1.030      pH (UA) 7.0 5.0 - 8.0      Protein Negative NEG mg/dL    Glucose Negative NEG mg/dL    Ketone Negative NEG mg/dL    Bilirubin Negative NEG      Blood Negative NEG      Urobilinogen 0.2 0.2 - 1.0 EU/dL    Nitrites Negative NEG      Leukocyte Esterase Negative NEG      WBC 0-4 0 - 4 /hpf    RBC 0-5 0 - 5 /hpf    Epithelial cells FEW FEW /lpf    Bacteria 1+ (A) NEG /hpf   SAMPLES BEING HELD   Result Value Ref Range    SAMPLES BEING HELD 1blu     COMMENT        Add-on orders for these samples will be processed based on acceptable specimen integrity and analyte stability, which may vary by analyte. LIPID PANEL   Result Value Ref Range    Cholesterol, total 126 <200 MG/DL    Triglyceride 183 (H) <150 MG/DL    HDL Cholesterol 58 MG/DL    LDL, calculated 31.4 0 - 100 MG/DL    VLDL, calculated 36.6 MG/DL    CHOL/HDL Ratio 2.2 0.0 - 5.0     METABOLIC PANEL, COMPREHENSIVE   Result Value Ref Range    Sodium 136 136 - 145 mmol/L    Potassium 4.0 3.5 - 5.1 mmol/L    Chloride 105 97 - 108 mmol/L    CO2 24 21 - 32 mmol/L    Anion gap 7 5 - 15 mmol/L    Glucose 97 65 - 100 mg/dL    BUN 12 6 - 20 MG/DL    Creatinine 0.76 0.55 - 1.02 MG/DL    BUN/Creatinine ratio 16 12 - 20      GFR est AA >60 >60 ml/min/1.73m2    GFR est non-AA >60 >60 ml/min/1.73m2    Calcium 8.9 8.5 - 10.1 MG/DL    Bilirubin, total 0.4 0.2 - 1.0 MG/DL    ALT (SGPT) 53 12 - 78 U/L    AST (SGOT) 37 15 - 37 U/L    Alk. phosphatase 102 45 - 117 U/L    Protein, total 6.3 (L) 6.4 - 8.2 g/dL    Albumin 2.9 (L) 3.5 - 5.0 g/dL    Globulin 3.4 2.0 - 4.0 g/dL    A-G Ratio 0.9 (L) 1.1 - 2.2     HCG URINE, QL. - POC   Result Value Ref Range    Pregnancy test,urine (POC) Negative NEG         Immunizations administered during this encounter:   Immunization History   Administered Date(s) Administered    Influenza Vaccine 09/26/2013    Influenza Vaccine (Quad) PF (>6 Mo Flulaval, Fluarix, and >3 Yrs Afluria, Fluzone 70143) 12/12/2014, 10/13/2015, 10/12/2016    Influenza Vaccine Split 09/27/2012       Screening for Metabolic Disorders for Patients on Antipsychotic Medications  (Data obtained from the EMR)    Estimated Body Mass Index  Estimated body mass index is 35.53 kg/m² as calculated from the following:    Height as of this encounter: 5' 4\" (1.626 m). Weight as of this encounter: 93.9 kg (207 lb).      Vital Signs/Blood Pressure  Visit Vitals  /69   Pulse 81   Temp 98.7 °F (37.1 °C)   Resp 16   Ht 5' 4\" (1.626 m)   Wt 93.9 kg (207 lb)   LMP 03/14/2016   SpO2 98%   BMI 35.53 kg/m²       Blood Glucose/Hemoglobin A1c  Lab Results   Component Value Date/Time    Glucose 97 04/21/2022 11:13 AM    Glucose (POC) 94 04/04/2016 07:26 AM       Lab Results   Component Value Date/Time    Hemoglobin A1c 5.9 (H) 02/03/2022 12:00 AM        Lipid Panel  Lab Results   Component Value Date/Time    Cholesterol, total 126 04/21/2022 11:13 AM    HDL Cholesterol 58 04/21/2022 11:13 AM    LDL, calculated 31.4 04/21/2022 11:13 AM    Triglyceride 183 (H) 04/21/2022 11:13 AM    CHOL/HDL Ratio 2.2 04/21/2022 11:13 AM        Discharge Diagnosis: Major depressive disorder, recurrent, moderate, without psychotic features. Discharge Plan: The patient Mariposa Arroyo exhibits the ability to control behavior in a less restrictive environment. Patient's level of functioning is improving. No assaultive/destructive behavior has been observed for the past 24 hours. No suicidal/homicidal threat or behavior has been observed for the past 24 hours. There is no evidence of serious medication side effects. Patient has not been in physical or protective restraints for at least the past 24 hours. If weapons involved, how are they secured? none    Is patient aware of and in agreement with discharge plan? yes    Arrangements for medication:  Prescriptions sent to pharmacy     Copy of discharge instructions to provider?:  yes    Arrangements for transportation home: Mom will     Keep all follow up appointments as scheduled, continue to take prescribed medications per physician instructions.   Mental health crisis number:  [phone] or your local mental health crisis line number at [place] [address] Emergency WARM LINE      [phone] ([phone])      M-F: 9am to 9pm      Sat & Sun: 5pm - 9pm  National suicide prevention lines:                             6-853-LWMASEF ([phone])       3-010-031-TALK ([phone])   24/7 Crisis Text Line:  Text HOME to [phone]              Discharge Medication List and Instructions:   Discharge Medication List as of 4/21/2022 11:06 AM      START taking these medications    Details   ARIPiprazole (ABILIFY) 2 mg tablet Take 1 Tablet by mouth daily for 30 days. Indications: additional treatment for major depressive disorder, Normal, Disp-30 Tablet, R-0      traZODone (DESYREL) 50 mg tablet Take 1 Tablet by mouth nightly for 30 days. Indications: insomnia associated with depression, Normal, Disp-30 Tablet, R-0         CONTINUE these medications which have CHANGED    Details   FLUoxetine (PROzac) 20 mg capsule Take 3 Capsules by mouth daily for 30 days. Indications: anxiousness associated with depression, Normal, Disp-90 Capsule, R-0         CONTINUE these medications which have NOT CHANGED    Details   fluticasone propionate (FLONASE) 50 mcg/actuation nasal spray 2 sprays to each nostril 1 time daily, Normal, Disp-1 Each, R-0      cetirizine (ZYRTEC) 10 mg tablet Take 1 Tablet by mouth daily. Take as needed for allergies. , Normal, Disp-90 Tablet, R-1         STOP taking these medications       amphetamine-dextroamphetamine XR (ADDERALL XR) 30 mg XR capsule Comments:   Reason for Stopping:               Unresulted Labs (24h ago, onward)            None        To obtain results of studies pending at discharge, please contact [phone]    Follow-up Information     Follow up With Specialties Details Why Contact Info    Taryn Liz MD Infectious Disease   [address] [place] [address]  [phone]            Advanced Directive:   Does the patient have an appointed surrogate decision maker?  No  Does the patient have a Medical Advance Directive?no  Does the patient have a Psychiatric Advance Directive?noIf the patient does not have a surrogate or Medical Advance Directive AND Psychiatric Advance Directive, the patient was offered information on these advance directives Patient declined to complete    Patient Instructions: Please continue all medications until otherwise directed by physician. Tobacco Cessation Discharge Plan:   Is the patient a smoker and needs referral for smoking cessation? no  Patient referred to the following for smoking cessation with an appointment? no     Patient was offered medication to assist with smoking cessation at discharge?no  Was education for smoking cessation added to the discharge instructions?  no    Alcohol/Substance Abuse Discharge Plan:   Does the patient have a history of substance/alcohol abuse and requires a referral for treatment?no  Patient referred to the following for substance/alcohol abuse treatment with an appointment?no  Patient was offered medication to assist with alcohol cessation at discharge? no  Was education for substance/alcohol abuse added to discharge instructions?no    Patient discharged to Home; discussed with patient/caregiver

## 2022-04-21 NOTE — PROGRESS NOTES
Problem: Depressed Mood (Adult/Pediatric)  Goal: *STG: Participates in treatment plan  Outcome: Progressing Towards Goal  Note: Magdaleno affect, mood stable reports hopeful, feeling safe and confident with her thoughts and feelings. Denies SI, motivated to work on her sobriety plan. Daily goal is to review d/c plans.  Staff ofcus is on offering support and d/c education  Goal: *STG: Verbalizes anger, guilt, and other feelings in a constructive manor  Outcome: Progressing Towards Goal  Goal: *STG: Attends activities and groups  Outcome: Progressing Towards Goal  Goal: *STG: Demonstrates reduction in symptoms and increase in insight into coping skills/future focused  Outcome: Progressing Towards Goal  Goal: Interventions  Outcome: Progressing Towards Goal

## 2022-04-21 NOTE — PROGRESS NOTES
Laboratory Monitoring for Antipsychotics: This patient is currently prescribed the following medication(s):   Current Facility-Administered Medications   Medication Dose Route Frequency    traZODone (DESYREL) tablet 50 mg  50 mg Oral QHS    ARIPiprazole (ABILIFY) tablet 2 mg  2 mg Oral DAILY    FLUoxetine (PROzac) capsule 60 mg  60 mg Oral DAILY    PHENobarbitaL (LUMINAL) tablet 32.4 mg  32.4 mg Oral BID    Followed by    PHENobarbitaL (LUMINAL) tablet 16.2 mg  16.2 mg Oral BID    thiamine HCL (B-1) tablet 100 mg  100 mg Oral DAILY    folic acid (FOLVITE) tablet 1 mg  1 mg Oral DAILY    therapeutic multivitamin (THERAGRAN) tablet 1 Tablet  1 Tablet Oral DAILY     The following labs have been completed for monitoring of antipsychotics and/or mood stabilizers:    Height, Weight, BMI Estimation  Estimated body mass index is 35.53 kg/m² as calculated from the following:    Height as of this encounter: 162.6 cm (64\"). Weight as of this encounter: 93.9 kg (207 lb). Vital Signs/Blood Pressure  Visit Vitals  /69   Pulse 81   Temp 98.7 °F (37.1 °C)   Resp 16   Ht 162.6 cm (64\")   Wt 93.9 kg (207 lb)   LMP 03/14/2016   SpO2 98%   BMI 35.53 kg/m²     Renal Function, Hepatic Function and Chemistry  Estimated Creatinine Clearance: 97.6 mL/min (by C-G formula based on SCr of 0.86 mg/dL). Lab Results   Component Value Date/Time    Sodium 135 (L) 04/18/2022 10:38 PM    Potassium 2.8 (L) 04/18/2022 10:38 PM    Chloride 102 04/18/2022 10:38 PM    CO2 23 04/18/2022 10:38 PM    Anion gap 10 04/18/2022 10:38 PM    BUN 12 04/18/2022 10:38 PM    Creatinine 0.86 04/18/2022 10:38 PM    BUN/Creatinine ratio 14 04/18/2022 10:38 PM    Bilirubin, total 1.1 (H) 04/18/2022 10:38 PM    Protein, total 7.0 04/18/2022 10:38 PM    Albumin 3.5 04/18/2022 10:38 PM    Globulin 3.5 04/18/2022 10:38 PM    A-G Ratio 1.0 (L) 04/18/2022 10:38 PM    ALT (SGPT) 69 04/18/2022 10:38 PM    AST (SGOT) 33 04/18/2022 10:38 PM    Alk.  phosphatase 122 (H) 04/18/2022 10:38 PM     Lab Results   Component Value Date/Time    Glucose 111 (H) 04/18/2022 10:38 PM    Glucose (POC) 94 04/04/2016 07:26 AM     Lab Results   Component Value Date/Time    Hemoglobin A1c 5.9 (H) 02/03/2022 12:00 AM     Hematology  Lab Results   Component Value Date/Time    WBC 10.0 04/18/2022 10:38 PM    RBC 4.72 04/18/2022 10:38 PM    HGB 14.2 04/18/2022 10:38 PM    HCT 40.8 04/18/2022 10:38 PM    MCV 86.4 04/18/2022 10:38 PM    MCH 30.1 04/18/2022 10:38 PM    MCHC 34.8 04/18/2022 10:38 PM    RDW 12.8 04/18/2022 10:38 PM    PLATELET 099 58/96/9552 10:38 PM    Hgb, External 11.6 05/03/2012 12:00 AM    Hct, External 33.4 05/03/2012 12:00 AM    Platelet cnt., External 332K 05/03/2012 12:00 AM     Lipids  No results found for: CHOL, CHOLX, CHLST, CHOLV, 421944, HDL, HDLP, LDL, LDLC, DLDLP, TGLX, TRIGL, TRIGP, CHHD, CHHDX    Thyroid Function  Lab Results   Component Value Date/Time    TSH 1.780 02/03/2022 12:00 AM    T4, Free 1.28 02/03/2022 12:00 AM     Pregnancy Status  Lab Results   Component Value Date/Time    HCG urine, QL NEGATIVE  02/15/2019 04:13 PM    Pregnancy test,urine (POC) Negative 04/19/2022 02:55 AM    HCG urine, Ql. (POC) Negative 10/22/2014 09:27 AM     Assessment/Plan:  Will order lipid panel to complete the recommended baseline laboratory monitoring based on the patient's current medication regimen.         Romulo Cobb, CLEMENTED

## 2022-04-21 NOTE — INTERDISCIPLINARY ROUNDS
Behavioral Health Interdisciplinary Rounds     Patient Name: Nadja Amaya  Age: 46 y.o. Room/Bed:  748/02  Primary Diagnosis: <principal problem not specified>   Admission Status: Voluntary     Readmission within 30 days: no  Power of  in place: no  Patient requires a blocked bed: no            VTE Prophylaxis: Not indicated    Mobility needs/Fall risk: no  Flu Vaccine : no   Nutritional Plan: no  Consults: none         Labs/Testing due today?: yes    Sleep hours: 8       Participation in Care/Groups:  yes  Medication Compliant?: Yes  PRNS (last 24 hours): None    Restraints (last 24 hours):  no     CIWA (range last 24 hours): CIWA-Ar Total: 0   COWS (range last 24 hours):      Alcohol screening (AUDIT) completed -   AUDIT Score: 1     If applicable, date SBIRT discussed in treatment team AND documented:   AUDIT Screen Score: AUDIT Score: 1      Document Brief Intervention (corresponds directly with the 5 A's, Ask, Advise, Assess, Assist, and Arrange): At- Risk Patients (Score 7-15 for women; 8-15 for men)  Discuss concern patient is drinking at unhealthy levels known to increase risk of alcohol-related health problems. Is Patient ready to commit to change? If No:   Encourage reflection   Discuss short term and long term health risks of consuming alcohol   Barriers to change   Reaffirm willingness to help / Educational materials provided  If Yes:   Set goal  Infinite Z provided    Harmful use or Dependence (Score 16 or greater)   Discuss short term and long term health risks of consuming alcohol   Recommendations   Negotiate drinking goal   Recommend addiction specialist/center   Arrange follow-up appointments.     Tobacco - patient is a smoker: Have You Used Tobacco in the Past 30 Days: Yes (vapes)  Illegal Drugs use: Have You Used Any Illegal Substances Over the Past 12 Months: No    24 hour chart check complete: yes ____________________________________________________________________________________________________________    Patient goal(s) for today: Discharge   Treatment team focus/goals: Assess pt, manage medication, discharge   Progress note: Pt is stable. Pt denies Si, HI, AVH. Pt will follow up with Minerva KING and ABUNDIO    LOS:  2  Expected LOS: 4/21/22    Financial concerns/prescription coverage:  Family contact:  Chucky Press, [phone]     Family requesting physician contact today:   Discharge plan:Pt will discharge home  Access to weapons : no      Outpatient provider(s): Minerva RUCKER  Patient's preferred phone number for follow up call : [phone]  Patient's preferred e-mail address :  Participating treatment team members: Sherman Sierra, Hugh Soto, MSW, Claire Wing, RN, Yohana Yeung, PharmD, Jamia Covington NP

## 2022-04-21 NOTE — PROGRESS NOTES
Pharmacist Discharge Medication Reconciliation    Discharging Provider: Xiomara Hankins NP    Significant PMH:   Past Medical History:   Diagnosis Date    ADD (attention deficit disorder) 19-19yo    Treated with Adderall since dx. 2013 dosing 20mg AM and 10mg PM.  Trial/change to Vyvanse Nov-Dec 2015--not as effective. Gynecologic disorder     History of miscarriages. History of Mirena IUD placed on 4/17/15    HX OTHER MEDICAL      -BUFA baby    HX OTHER MEDICAL     HPV positive    Idiopathic vulvodynia 2014    L1 vertebral fracture (HCC) 2017    [facility] imaging/admissoin: Mild acute two column compression fracture of L1 without evidence of retropulsion into the spinal canal.  Managed non-operatively. Migraines 2013    Neurological disorder     Pelvic pain in female 9/15/2014    2015 gyn laparoscopy/hysteroscopy with endometriosis worse right. Psychiatric disorder     depression    Secondary dysmenorrhea 2014    With menorraghia    Seizures ([facility] [address] 2008    never on meds--had appr 4-5 in a short amt of time and none since     Chief Complaint for this Admission:   Chief Complaint   Patient presents with    Mental Health Problem     Allergies: Aspirin and Penicillins    Discharge Medications:   Current Discharge Medication List        START taking these medications    Details   ARIPiprazole (ABILIFY) 2 mg tablet Take 1 Tablet by mouth daily for 30 days. Indications: additional treatment for major depressive disorder  Qty: 30 Tablet, Refills: 0  Start date: 2022, End date: 2022      traZODone (DESYREL) 50 mg tablet Take 1 Tablet by mouth nightly for 30 days. Indications: insomnia associated with depression  Qty: 30 Tablet, Refills: 0  Start date: 2022, End date: 2022           CONTINUE these medications which have CHANGED    Details   FLUoxetine (PROzac) 20 mg capsule Take 3 Capsules by mouth daily for 30 days.  Indications: anxiousness associated with depression  Qty: 90 Capsule, Refills: 0  Start date: 4/22/2022, End date: 5/22/2022           CONTINUE these medications which have NOT CHANGED    Details   fluticasone propionate (FLONASE) 50 mcg/actuation nasal spray 2 sprays to each nostril 1 time daily  Qty: 1 Each, Refills: 0    Associated Diagnoses: Allergic rhinitis, unspecified seasonality, unspecified trigger      cetirizine (ZYRTEC) 10 mg tablet Take 1 Tablet by mouth daily. Take as needed for allergies.   Qty: 90 Tablet, Refills: 1    Associated Diagnoses: Seasonal allergic rhinitis due to other allergic trigger           STOP taking these medications       amphetamine-dextroamphetamine XR (ADDERALL XR) 30 mg XR capsule Comments:   Reason for Stopping:             The patient's chart, MAR and AVS were reviewed by Stan Higgins, CLEMENTED.

## 2022-04-21 NOTE — PROGRESS NOTES
Patient received resting in bed with eyes closed. NAD and no complaints noted. Safety measures in place. Will continue to monitor with q15min rounds. Problem: Falls - Risk of  Goal: *Absence of Falls  Description: Document [facility] Fall Risk and appropriate interventions in the flowsheet. Outcome: Progressing Towards Goal  Note: Fall Risk Interventions:            Medication Interventions: Teach patient to arise slowly    Elimination Interventions:  Toilet paper/wipes in reach

## 2022-04-21 NOTE — DISCHARGE INSTRUCTIONS
DISCHARGE SUMMARY    Jennifer Morales  : 1980  MRN: [id number]    The patient Nadja Amaya exhibits the ability to control behavior in a less restrictive environment. Patient's level of functioning is improving. No assaultive/destructive behavior has been observed for the past 24 hours. No suicidal/homicidal threat or behavior has been observed for the past 24 hours. There is no evidence of serious medication side effects. Patient has not been in physical or protective restraints for at least the past 24 hours. If weapons involved, how are they secured? none    Is patient aware of and in agreement with discharge plan? yes    Arrangements for medication:  Prescriptions sent to pharmacy     Copy of discharge instructions to provider?:  yes    Arrangements for transportation home: Mom will     Keep all follow up appointments as scheduled, continue to take prescribed medications per physician instructions. Mental health crisis number:  [phone] or your local mental health crisis line number at [place] [address] Emergency WARM LINE      [phone] ([phone])      M-F: 9am to 9pm      Sat & Sun: [facility] suicide prevention lines:                             [phone] ([phone])       [phone])    Crisis Text Line:  Text HOME to [address] [place] [address] Santana Garcia from Nurse    PATIENT INSTRUCTIONS:      What to do at Home:  Recommended activity: Activity as tolerated,         *  Please give a list of your current medications to your Primary Care Provider. *  Please update this list whenever your medications are discontinued, doses are      changed, or new medications (including over-the-counter products) are added. *  Please carry medication information at all times in case of emergency situations.     These are general instructions for a healthy lifestyle:    No smoking/ No tobacco products/ Avoid exposure to second hand smoke  Surgeon General's Warning:  Quitting smoking now greatly reduces serious risk to your health. Obesity, smoking, and sedentary lifestyle greatly increases your risk for illness    A healthy diet, regular physical exercise & weight monitoring are important for maintaining a healthy lifestyle    You may be retaining fluid if you have a history of heart failure or if you experience any of the following symptoms:  Weight gain of 3 pounds or more overnight or 5 pounds in a week, increased swelling in our hands or feet or shortness of breath while lying flat in bed. Please call your doctor as soon as you notice any of these symptoms; do not wait until your next office visit. The discharge information has been reviewed with the patient. The patient verbalized understanding. Discharge medications reviewed with the patient and appropriate educational materials and side effects teaching were provided.   ___________________________________________________________________________________________________________________________________

## 2022-04-21 NOTE — DISCHARGE SUMMARY
PSYCHIATRIC DISCHARGE SUMMARY      Patient: Wilmar Camilo MRN: [id number]  SSN: [id number]    YOB: 1980  Age: 46 y.o. Sex: female        Date of Admission: 4/18/2022  Date of Discharge:4/21/2022       Type of Discharge:  REGULAR     Admission data:     CHIEF COMPLAINT:  \"I've always suffered with depression. \"     HISTORY OF PRESENTING ILLNESS:  The patient is a 45-year-old female who is currently admitted at [address] on a voluntary basis. She presented to the emergency room with worsening depression. She reports that she was in a year long state-based program, and she graduated in January, and states that she has been having difficulties for the past few days. She has a history of substance use, primarily narcotics, which led to her program.  She is currently prescribed Prozac, which is being managed by her primary care physician. She reports that she has not eaten or slept well in the last several days. She reports that she has struggled with depression her whole life. She denies any major stressor other than staying with her mother. She states her mother's drinking has been an issue. She states that she has been feeling increasingly depressed, that her outpatient provider recently started her on Wellbutrin, but after 4 days of taking it, she had to stop it because it made her hallucinate and it made her psychotic. Her blood alcohol level on admission was 219. Her urine drug screen was negative. She states that she drank for 2 days straight. She denies any history of suicide attempt. She reported on admission \"everything would be better if I wouldn't have to be here anymore. \"  During the interview, she denies SI, HI, or AVH. PAST MEDICAL HISTORY:  See H and P. Past Medical History:   Diagnosis Date    ADD (attention deficit disorder) 19-19yo     Treated with Adderall since dx. Nov 2013 dosing 20mg AM and 10mg PM.  Trial/change to Vyvanse Nov-Dec 2015--not as effective. Gynecologic disorder       History of miscarriages. History of Mirena IUD placed on 4/17/15    HX OTHER MEDICAL        -BUFA baby    HX OTHER MEDICAL       HPV positive    Idiopathic vulvodynia 2014    L1 vertebral fracture (HCC) 2017     [facility] imaging/admissoin: Mild acute two column compression fracture of L1 without evidence of retropulsion into the spinal canal.  Managed non-operatively. Migraines 2013    Neurological disorder      Pelvic pain in female 9/15/2014     2015 gyn laparoscopy/hysteroscopy with endometriosis worse right. Psychiatric disorder       depression    Secondary dysmenorrhea 2014     With menorraghia    Seizures ([facility] [address]      never on meds--had appr 4-5 in a short amt of time and none since         Labs: (reviewed/updated 2022)  Patient Vitals for the past 8 hrs:    BP Temp Pulse Resp SpO2   22 0813 106/69 98.7 °F (37.1 °C) 81 16 98 %            Labs Reviewed   METABOLIC PANEL, COMPREHENSIVE - Abnormal; Notable for the following components:       Result Value      Sodium 135 (*)       Potassium 2.8 (*)       Glucose 111 (*)       Bilirubin, total 1.1 (*)       Alk.  phosphatase 122 (*)       A-G Ratio 1.0 (*)       All other components within normal limits   ETHYL ALCOHOL - Abnormal; Notable for the following components:     ALCOHOL(ETHYL),SERUM 219 (*)       All other components within normal limits   SALICYLATE - Abnormal; Notable for the following components:     Salicylate level <1.6 (*)       All other components within normal limits   ACETAMINOPHEN - Abnormal; Notable for the following components:     Acetaminophen level <2 (*)       All other components within normal limits   URINALYSIS W/MICROSCOPIC - Abnormal; Notable for the following components:     Bacteria 1+ (*)       All other components within normal limits   URINE CULTURE HOLD SAMPLE   COVID-19 WITH INFLUENZA A/B   CBC WITH AUTOMATED DIFF   LIPASE   DRUG SCREEN, URINE   SAMPLES BEING HELD   LIPID PANEL   METABOLIC PANEL, COMPREHENSIVE   HCG URINE, QL. - POC            Lab Results   Component Value Date/Time     Sodium 135 (L) 04/18/2022 10:38 PM     Potassium 2.8 (L) 04/18/2022 10:38 PM     Chloride 102 04/18/2022 10:38 PM     CO2 23 04/18/2022 10:38 PM     Anion gap 10 04/18/2022 10:38 PM     Glucose 111 (H) 04/18/2022 10:38 PM     BUN 12 04/18/2022 10:38 PM     Creatinine 0.86 04/18/2022 10:38 PM     BUN/Creatinine ratio 14 04/18/2022 10:38 PM     GFR est AA >60 04/18/2022 10:38 PM     GFR est non-AA >60 04/18/2022 10:38 PM     Calcium 8.6 04/18/2022 10:38 PM     Bilirubin, total 1.1 (H) 04/18/2022 10:38 PM     Alk. phosphatase 122 (H) 04/18/2022 10:38 PM     Protein, total 7.0 04/18/2022 10:38 PM     Albumin 3.5 04/18/2022 10:38 PM     Globulin 3.5 04/18/2022 10:38 PM     A-G Ratio 1.0 (L) 04/18/2022 10:38 PM     ALT (SGPT) 69 04/18/2022 10:38 PM              Admission on 04/18/2022   Component Date Value Ref Range Status    WBC 04/18/2022 10.0  3.6 - 11.0 K/uL Final    RBC 04/18/2022 4.72  3.80 - 5.20 M/uL Final    HGB 04/18/2022 14.2  11.5 - 16.0 g/dL Final    HCT 04/18/2022 40.8  35.0 - 47.0 % Final    MCV 04/18/2022 86.4  80.0 - 99.0 FL Final    MCH 04/18/2022 30.1  26.0 - 34.0 PG Final    MCHC 04/18/2022 34.8  30.0 - 36.5 g/dL Final    RDW 04/18/2022 12.8  11.5 - 14.5 % Final    PLATELET 54/70/3974 104  150 - 400 K/uL Final    MPV 04/18/2022 9.6  8.9 - 12.9 FL Final    NRBC 04/18/2022 0.0  0  WBC Final    ABSOLUTE NRBC 04/18/2022 0.00  0.00 - 0.01 K/uL Final    NEUTROPHILS 04/18/2022 63  32 - 75 % Final    LYMPHOCYTES 04/18/2022 26  12 - 49 % Final    MONOCYTES 04/18/2022 10  5 - 13 % Final    EOSINOPHILS 04/18/2022 0  0 - 7 % Final    BASOPHILS 04/18/2022 1  0 - 1 % Final    IMMATURE GRANULOCYTES 04/18/2022 0  0.0 - 0.5 % Final    ABS. NEUTROPHILS 04/18/2022 6.3  1.8 - 8.0 K/UL Final    ABS.  LYMPHOCYTES 04/18/2022 2.6  0.8 - 3.5 K/UL Final    ABS. MONOCYTES 04/18/2022 1.0  0.0 - 1.0 K/UL Final    ABS. EOSINOPHILS 04/18/2022 0.0  0.0 - 0.4 K/UL Final    ABS. BASOPHILS 04/18/2022 0.1  0.0 - 0.1 K/UL Final    ABS. IMM. GRANS. 04/18/2022 0.0  0.00 - 0.04 K/UL Final    DF 04/18/2022 AUTOMATED    Final    Sodium 04/18/2022 135* 136 - 145 mmol/L Final    Potassium 04/18/2022 2.8* 3.5 - 5.1 mmol/L Final    Chloride 04/18/2022 102  97 - 108 mmol/L Final    CO2 04/18/2022 23  21 - 32 mmol/L Final    Anion gap 04/18/2022 10  5 - 15 mmol/L Final    Glucose 04/18/2022 111* 65 - 100 mg/dL Final    BUN 04/18/2022 12  6 - 20 MG/DL Final    Creatinine 04/18/2022 0.86  0.55 - 1.02 MG/DL Final    BUN/Creatinine ratio 04/18/2022 14  12 - 20   Final    GFR est AA 04/18/2022 >60  >60 ml/min/1.73m2 Final    GFR est non-AA 04/18/2022 >60  >60 ml/min/1.73m2 Final    Calcium 04/18/2022 8.6  8.5 - 10.1 MG/DL Final    Bilirubin, total 04/18/2022 1.1* 0.2 - 1.0 MG/DL Final    ALT (SGPT) 04/18/2022 69  12 - 78 U/L Final    AST (SGOT) 04/18/2022 33  15 - 37 U/L Final    Alk.  phosphatase 04/18/2022 122* 45 - 117 U/L Final    Protein, total 04/18/2022 7.0  6.4 - 8.2 g/dL Final    Albumin 04/18/2022 3.5  3.5 - 5.0 g/dL Final    Globulin 04/18/2022 3.5  2.0 - 4.0 g/dL Final    A-G Ratio 04/18/2022 1.0* 1.1 - 2.2   Final    Lipase 04/18/2022 92  73 - 393 U/L Final    ALCOHOL(ETHYL),SERUM 04/18/2022 219* <10 MG/DL Final    Salicylate level 36/47/8717 <1.7* 2.8 - 20.0 MG/DL Final    Acetaminophen level 04/18/2022 <2* 10 - 30 ug/mL Final    AMPHETAMINES 04/19/2022 Negative  NEG   Final    BARBITURATES 04/19/2022 Negative  NEG   Final    BENZODIAZEPINES 04/19/2022 Negative  NEG   Final    COCAINE 04/19/2022 Negative  NEG   Final    METHADONE 04/19/2022 Negative  NEG   Final    OPIATES 04/19/2022 Negative  NEG   Final    PCP(PHENCYCLIDINE) 04/19/2022 Negative  NEG   Final    THC (TH-CANNABINOL) 04/19/2022 Negative  NEG   Final    Drug screen comment 04/19/2022 (NOTE)    Final Color 04/19/2022 YELLOW/STRAW    Final    Appearance 04/19/2022 CLEAR  CLEAR   Final    Specific gravity 04/19/2022 1.015  1.003 - 1.030   Final    pH (UA) 04/19/2022 7.0  5.0 - 8.0   Final    Protein 04/19/2022 Negative  NEG mg/dL Final    Glucose 04/19/2022 Negative  NEG mg/dL Final    Ketone 04/19/2022 Negative  NEG mg/dL Final    Bilirubin 04/19/2022 Negative  NEG   Final    Blood 04/19/2022 Negative  NEG   Final    Urobilinogen 04/19/2022 0.2  0.2 - 1.0 EU/dL Final    Nitrites 04/19/2022 Negative  NEG   Final    Leukocyte Esterase 04/19/2022 Negative  NEG   Final    WBC 04/19/2022 0-4  0 - 4 /hpf Final    RBC 04/19/2022 0-5  0 - 5 /hpf Final    Epithelial cells 04/19/2022 FEW  FEW /lpf Final    Bacteria 04/19/2022 1+* NEG /hpf Final    Urine culture hold 04/19/2022 Urine on hold in Microbiology dept for 2 days. If unpreserved urine is submitted, it cannot be used for addtional testing after 24 hours, recollection will be required. Final    SARS-CoV-2 by PCR 04/19/2022 Not detected  NOTD   Final    Influenza A by PCR 04/19/2022 Not detected  NOTD   Final    Influenza B by PCR 04/19/2022 Not detected  NOTD   Final    SAMPLES BEING HELD 04/18/2022 1blu    Final    COMMENT 04/18/2022 Add-on orders for these samples will be processed based on acceptable specimen integrity and analyte stability, which may vary by analyte. Final    Pregnancy test,urine (POC) 04/19/2022 Negative  NEG   Final             Vitals:     04/20/22 1232 04/20/22 1706 04/20/22 2113 04/21/22 0813   BP: 118/79 132/76 (!) 138/98 106/69   Pulse: 81 83 82 81   Resp: 16 16 16 16   Temp: 97.8 °F (36.6 °C) 97.9 °F (36.6 °C) 98.4 °F (36.9 °C) 98.7 °F (37.1 °C)   SpO2: 98% 96% 99% 98%   Weight:           Height:           LMP: 03/14/2016      Recent Results   No results found for this or any previous visit (from the past 24 hour(s)).         RADIOLOGY REPORTS:  Results from Hospital Encounter encounter on 08/04/18     XR CHEST PORT Narrative  PORTABLE CHEST RADIOGRAPH/S: 8/4/2018 2:31 PM     INDICATION: Meets SIRS criteria. COMPARISON: 7/16/2017, 10/31/2016, 12/20/2014. TECHNIQUE: Portable frontal upright radiograph/s of the chest.     FINDINGS:  The lungs are clear. The central airways are patent. No pneumothorax or pleural  effusion. Impression  IMPRESSION:  Clear lungs. No results found. PAST PSYCHIATRIC HISTORY:  She states that 2 years ago she was admitted at [facility] for depression. She is currently not in therapy. Her primary care physician is currently prescribing her Prozac. She has a history of opioid use as described above. PSYCHOSOCIAL HISTORY:  She is single. She has a 5year-old son, who is currently staying with her grandparents. She currently resides with her mother. She works at [employer]. She completed GED. MENTAL STATUS EXAM:  She is alert and oriented in all spheres. She is dressed in street clothes. She reports her mood is depressed. Affect is blunted. Speech, normal rate and rhythm. Thought process, logical and goal directed. She denies suicidal ideation, homicidal ideation, auditory or visual hallucination. Memory is intact. Intelligence is average. Insight is poor. Judgment is poor. DIAGNOSES:  Major depressive disorder, recurrent, moderate, without psychotic features. TREATMENT PLANNING:  I will continue her inpatient stay. She will be provided with support and encouraged to attend groups. Her safety will be monitored. Her medications will be modified and assessed. Case Management will work on discharge planning. ASSETS AND STRENGTHS:  She is willing to seek help. She is willing to take medications. ESTIMATED LENGTH OF STAY:  5-7 days.              Hospital Course:    Patient was admitted to the Psychiatric services for acute psychiatric stabilization in regards to symptomatology as described in the HPI above and placed on Q15 minute checks and suicide and withdrawal precautions. She was started back on her usual medication regimen as well as PRN medications including on prozac which was increased. She was started on abilify. She was placed on detox per protocol. While on the unit Fabio Pascual was involved in individual, group, occupational and milieu therapy. She improved gradually and was able to integrate into the milieu with help from the nursing staff. Patients symptoms improved gradually including si, worsening mood, depression, anxiety, poor sleep. She was appropriate in her interactions, and cooperative with medications and the unit routine. Please see individual progress notes for more specific details regarding patient's hospitalization course. Patient was discharged as per the plan. She had been doing well on the unit as per the report of the nursing staff and my observations. No PRN medication for agitation, seclusion or restraints were required during the last 48 hours of her stay. Fabio Pascual had improved progressively to the point of being stable for discharge and outpatient FU. At this time she did not offer any complaints. Patient denied any SI or HI. Denied any AH or VH. She denied any delusions. Was not considered a danger to self or to others and is safe for discharge. Will FU with her appointments and remains motivated to be in treatment. The patient verbalized understanding of her discharge instructions. Allergies:(reviewed/updated 4/21/2022)  Allergies   Allergen Reactions    Aspirin Other (comments)     Hot sweats and passed out; tolerated ibuprofen    Penicillins Other (comments)     Childhood. Does not remember reaction. Is not aware if she has ever taken cephalosporins in the past.    Jan 2019: Pt notes no problems with cephalosporins. Side Effects: (reviewed/updated 4/21/2022)  None reported or admitted to.     Vital Signs:  Patient Vitals for the past 24 hrs:   Temp Pulse Resp BP SpO2   04/21/22 0813 98.7 °F (37.1 °C) 81 16 106/69 98 %   04/20/22 2113 98.4 °F (36.9 °C) 82 16 (!) 138/98 99 %   04/20/22 1706 97.9 °F (36.6 °C) 83 16 132/76 96 %   04/20/22 1232 97.8 °F (36.6 °C) 81 16 118/79 98 %     Wt Readings from Last 3 Encounters:   04/19/22 93.9 kg (207 lb)   04/12/22 94.2 kg (207 lb 9.6 oz)   03/28/22 97.6 kg (215 lb 2.7 oz)     Temp Readings from Last 3 Encounters:   04/21/22 98.7 °F (37.1 °C)   04/12/22 98.2 °F (36.8 °C) (Oral)   03/28/22 97.9 °F (36.6 °C)     BP Readings from Last 3 Encounters:   04/21/22 106/69   04/12/22 107/65   03/28/22 137/71     Pulse Readings from Last 3 Encounters:   04/21/22 81   04/12/22 (!) 118   03/28/22 (!) 122       Labs: (reviewed/updated 4/21/2022)  No results found for this or any previous visit (from the past 24 hour(s)). No results found for: VALF2, VALAC, VALP, VALPR, DS6, CRBAM, CRBAMP, CARB2, XCRBAM  No results found for: [place] [facility] EVAL[facility] [place]    Radiology (reviewed/updated 4/21/2022)  No results found. Mental Status Exam on Discharge:  General appearance:   Jeni Bell is a 46 y.o. WHITE/NON- female who is well groomed, psychomotor activity is WNL  Eye contact: makes good eye contact  Speech: Spontaneous and coherent  Affect : Euthymic  Mood: \"OK\"  Thought Process: Logical, goal directed  Perception: Denies any AH or VH. Thought Content: Denies any SI or Plan  Insight: Partial  Judgement: Fair  Cognition: Intact grossly. Discharge Diagnosis:   Major depressive disorder, recurrent, moderate, without psychotic features. Current Discharge Medication List        START taking these medications    Details   ARIPiprazole (ABILIFY) 2 mg tablet Take 1 Tablet by mouth daily for 30 days. Indications: additional treatment for major depressive disorder  Qty: 30 Tablet, Refills: 0  Start date: 4/22/2022, End date: 5/22/2022      traZODone (DESYREL) 50 mg tablet Take 1 Tablet by mouth nightly for 30 days.  Indications: insomnia associated with depression  Qty: 30 Tablet, Refills: 0  Start date: 4/21/2022, End date: 5/21/2022           CONTINUE these medications which have CHANGED    Details   FLUoxetine (PROzac) 20 mg capsule Take 3 Capsules by mouth daily for 30 days. Indications: anxiousness associated with depression  Qty: 90 Capsule, Refills: 0  Start date: 4/22/2022, End date: 5/22/2022           CONTINUE these medications which have NOT CHANGED    Details   fluticasone propionate (FLONASE) 50 mcg/actuation nasal spray 2 sprays to each nostril 1 time daily  Qty: 1 Each, Refills: 0    Associated Diagnoses: Allergic rhinitis, unspecified seasonality, unspecified trigger      cetirizine (ZYRTEC) 10 mg tablet Take 1 Tablet by mouth daily. Take as needed for allergies. Qty: 90 Tablet, Refills: 1    Associated Diagnoses: Seasonal allergic rhinitis due to other allergic trigger           STOP taking these medications       amphetamine-dextroamphetamine XR (ADDERALL XR) 30 mg XR capsule Comments:   Reason for Stopping: Follow-up Information       Follow up With Specialties Details Why Contact Info    Alma Rice MD Infectious Disease   [address] [place] [address]  [phone]            WOUND CARE: none needed. Prognosis:   Good / Eh Falter based on nature of patient's pathology/ies and treatment compliance issues. Prognosis is greatly dependent upon patient's ability to  follow up on psychiatric/psychotherapy appointments as well as to comply with psychiatric medications as prescribed.        I certify that this patients inpatient psychiatric hospital services furnished since the previous certification were, and continue to be, required for treatment that could reasonably be expected to improve the patient's condition, or for diagnostic study, and that the patient continues to need, on a daily basis, active treatment furnished directly by or requiring the supervision of inpatient psychiatric facility personnel. In addition, the hospital records show that services furnished were intensive treatment services, admission or related services, or equivalent services.      Signed:  Fred Pierce NP  4/21/2022

## 2022-04-25 ENCOUNTER — PATIENT MESSAGE (OUTPATIENT)
Dept: INTERNAL MEDICINE CLINIC | Age: 42
End: 2022-04-25

## 2022-04-25 DIAGNOSIS — F98.8 ATTENTION DEFICIT DISORDER, UNSPECIFIED HYPERACTIVITY PRESENCE: ICD-10-CM

## 2022-04-25 RX ORDER — DEXTROAMPHETAMINE SACCHARATE, AMPHETAMINE ASPARTATE MONOHYDRATE, DEXTROAMPHETAMINE SULFATE AND AMPHETAMINE SULFATE 7.5; 7.5; 7.5; 7.5 MG/1; MG/1; MG/1; MG/1
30 CAPSULE, EXTENDED RELEASE ORAL
Qty: 30 CAPSULE | Refills: 0 | Status: ON HOLD | OUTPATIENT
Start: 2022-04-25 | End: 2022-06-10

## 2022-04-25 NOTE — TELEPHONE ENCOUNTER
Refill request(s) approved--Adderall XR. Prescription Monitoring Program ([place]) database query with fills:  Filled  Written  Sold  ID  Drug  QTY  Days  Prescriber  RX #  Dispenser  Refill  Daily Dose*  Pymt Type       03/24/2022 03/22/2022   1  Dextroamp-Amphet Er 30 Mg Cap   30.00  30  Cl Chi  [id number]  Vir ([id number])  0   [facility] Pay  [facility]     03/11/2022 03/11/2022   1  Oxycodone-Acetaminophen 5-325   15.00  4  Ro Big  [id number]  Vir ([id number])  0  28.13 MME  [facility] [place]     03/04/2022 03/04/2022   1  Dextroamp-Amphet Er 20 Mg Cap   20.00  20  Cl Chi  [id number]  Vir ([id number])  0   Private Pay  [facility]     02/15/2022  02/15/2022   1  Hydrocodone-Acetamin 5-325 Mg   10.00  3  Mo Aung  [id number]  Vir ([id number])  0  16.67 MME            Requested Prescriptions     Signed Prescriptions Disp Refills    amphetamine-dextroamphetamine XR (ADDERALL XR) 30 mg XR capsule 30 Capsule 0     Sig: Take 1 Capsule by mouth every morning. Max Daily Amount: 30 mg. Authorizing Provider: Lucas Wilson message to pt regarding refill and delay in eRx. She had discontinued with inpt  stay for depression.

## 2022-05-14 ENCOUNTER — HOSPITAL ENCOUNTER (EMERGENCY)
Age: 42
Discharge: HOME | End: 2022-05-15
Attending: EMERGENCY MEDICINE | Admitting: EMERGENCY MEDICINE
Payer: MEDICAID

## 2022-05-14 DIAGNOSIS — R45.851 SUICIDAL IDEATION: Primary | ICD-10-CM

## 2022-05-14 DIAGNOSIS — F33.2 SEVERE EPISODE OF RECURRENT MAJOR DEPRESSIVE DISORDER, WITHOUT PSYCHOTIC FEATURES (HCC): ICD-10-CM

## 2022-05-14 LAB
ALBUMIN SERPL-MCNC: 3.6 G/DL (ref 3.5–5)
ALBUMIN/GLOB SERPL: 0.9 {RATIO} (ref 1.1–2.2)
ALP SERPL-CCNC: 122 U/L (ref 45–117)
ALT SERPL-CCNC: 272 U/L (ref 12–78)
AMPHET UR QL SCN: NEGATIVE
ANION GAP SERPL CALC-SCNC: 11 MMOL/L (ref 5–15)
AST SERPL-CCNC: 260 U/L (ref 15–37)
BARBITURATES UR QL SCN: NEGATIVE
BASOPHILS # BLD: 0.1 K/UL (ref 0–0.1)
BASOPHILS NFR BLD: 1 % (ref 0–1)
BENZODIAZ UR QL: NEGATIVE
BILIRUB SERPL-MCNC: 1.8 MG/DL (ref 0.2–1)
BUN SERPL-MCNC: 26 MG/DL (ref 6–20)
BUN/CREAT SERPL: 29 (ref 12–20)
CALCIUM SERPL-MCNC: 8.9 MG/DL (ref 8.5–10.1)
CANNABINOIDS UR QL SCN: NEGATIVE
CHLORIDE SERPL-SCNC: 99 MMOL/L (ref 97–108)
CO2 SERPL-SCNC: 24 MMOL/L (ref 21–32)
COCAINE UR QL SCN: NEGATIVE
COVID-19 RAPID TEST, COVR: NOT DETECTED
CREAT SERPL-MCNC: 0.9 MG/DL (ref 0.55–1.02)
DIFFERENTIAL METHOD BLD: ABNORMAL
DRUG SCRN COMMENT,DRGCM: NORMAL
EOSINOPHIL # BLD: 0 K/UL (ref 0–0.4)
EOSINOPHIL NFR BLD: 0 % (ref 0–7)
ERYTHROCYTE [DISTWIDTH] IN BLOOD BY AUTOMATED COUNT: 13.1 % (ref 11.5–14.5)
ETHANOL SERPL-MCNC: 140 MG/DL
ETHANOL SERPL-MCNC: 205 MG/DL
ETHANOL SERPL-MCNC: 266 MG/DL
FLUAV RNA SPEC QL NAA+PROBE: NOT DETECTED
FLUBV RNA SPEC QL NAA+PROBE: NOT DETECTED
GLOBULIN SER CALC-MCNC: 3.9 G/DL (ref 2–4)
GLUCOSE SERPL-MCNC: 145 MG/DL (ref 65–100)
HCT VFR BLD AUTO: 43.2 % (ref 35–47)
HGB BLD-MCNC: 15.1 G/DL (ref 11.5–16)
IMM GRANULOCYTES # BLD AUTO: 0 K/UL (ref 0–0.04)
IMM GRANULOCYTES NFR BLD AUTO: 0 % (ref 0–0.5)
LIPASE SERPL-CCNC: 98 U/L (ref 73–393)
LYMPHOCYTES # BLD: 2.5 K/UL (ref 0.8–3.5)
LYMPHOCYTES NFR BLD: 33 % (ref 12–49)
MCH RBC QN AUTO: 30.4 PG (ref 26–34)
MCHC RBC AUTO-ENTMCNC: 35 G/DL (ref 30–36.5)
MCV RBC AUTO: 86.9 FL (ref 80–99)
METHADONE UR QL: NEGATIVE
MONOCYTES # BLD: 0.6 K/UL (ref 0–1)
MONOCYTES NFR BLD: 8 % (ref 5–13)
NEUTS SEG # BLD: 4.3 K/UL (ref 1.8–8)
NEUTS SEG NFR BLD: 58 % (ref 32–75)
NRBC # BLD: 0 K/UL (ref 0–0.01)
NRBC BLD-RTO: 0 PER 100 WBC
OPIATES UR QL: NEGATIVE
PCP UR QL: NEGATIVE
PLATELET # BLD AUTO: 442 K/UL (ref 150–400)
PMV BLD AUTO: 9 FL (ref 8.9–12.9)
POTASSIUM SERPL-SCNC: 3.5 MMOL/L (ref 3.5–5.1)
PROT SERPL-MCNC: 7.5 G/DL (ref 6.4–8.2)
RBC # BLD AUTO: 4.97 M/UL (ref 3.8–5.2)
SARS-COV-2, COV2: NORMAL
SARS-COV-2, COV2: NOT DETECTED
SODIUM SERPL-SCNC: 134 MMOL/L (ref 136–145)
SOURCE, COVRS: NORMAL
WBC # BLD AUTO: 7.5 K/UL (ref 3.6–11)

## 2022-05-14 PROCEDURE — 74011250636 HC RX REV CODE- 250/636: Performed by: EMERGENCY MEDICINE

## 2022-05-14 PROCEDURE — 80053 COMPREHEN METABOLIC PANEL: CPT

## 2022-05-14 PROCEDURE — 96374 THER/PROPH/DIAG INJ IV PUSH: CPT

## 2022-05-14 PROCEDURE — 87635 SARS-COV-2 COVID-19 AMP PRB: CPT

## 2022-05-14 PROCEDURE — 96375 TX/PRO/DX INJ NEW DRUG ADDON: CPT

## 2022-05-14 PROCEDURE — 96361 HYDRATE IV INFUSION ADD-ON: CPT

## 2022-05-14 PROCEDURE — 82077 ASSAY SPEC XCP UR&BREATH IA: CPT

## 2022-05-14 PROCEDURE — 85025 COMPLETE CBC W/AUTO DIFF WBC: CPT

## 2022-05-14 PROCEDURE — 74011250637 HC RX REV CODE- 250/637: Performed by: EMERGENCY MEDICINE

## 2022-05-14 PROCEDURE — 99285 EMERGENCY DEPT VISIT HI MDM: CPT

## 2022-05-14 PROCEDURE — 80307 DRUG TEST PRSMV CHEM ANLYZR: CPT

## 2022-05-14 PROCEDURE — 83690 ASSAY OF LIPASE: CPT

## 2022-05-14 PROCEDURE — 90791 PSYCH DIAGNOSTIC EVALUATION: CPT

## 2022-05-14 PROCEDURE — 36415 COLL VENOUS BLD VENIPUNCTURE: CPT

## 2022-05-14 PROCEDURE — 87636 SARSCOV2 & INF A&B AMP PRB: CPT

## 2022-05-14 RX ORDER — ONDANSETRON 2 MG/ML
4 INJECTION INTRAMUSCULAR; INTRAVENOUS
Status: COMPLETED | OUTPATIENT
Start: 2022-05-14 | End: 2022-05-14

## 2022-05-14 RX ORDER — ONDANSETRON 4 MG/1
4 TABLET, ORALLY DISINTEGRATING ORAL
Status: DISCONTINUED | OUTPATIENT
Start: 2022-05-14 | End: 2022-05-15 | Stop reason: HOSPADM

## 2022-05-14 RX ORDER — ONDANSETRON 2 MG/ML
4 INJECTION INTRAMUSCULAR; INTRAVENOUS
Status: DISCONTINUED | OUTPATIENT
Start: 2022-05-14 | End: 2022-05-14

## 2022-05-14 RX ORDER — LORAZEPAM 2 MG/ML
1 INJECTION INTRAMUSCULAR
Status: COMPLETED | OUTPATIENT
Start: 2022-05-14 | End: 2022-05-14

## 2022-05-14 RX ADMIN — SODIUM CHLORIDE 1000 ML: 9 INJECTION, SOLUTION INTRAVENOUS at 13:30

## 2022-05-14 RX ADMIN — ONDANSETRON 4 MG: 4 TABLET, ORALLY DISINTEGRATING ORAL at 20:39

## 2022-05-14 RX ADMIN — ONDANSETRON 4 MG: 2 INJECTION INTRAMUSCULAR; INTRAVENOUS at 13:32

## 2022-05-14 RX ADMIN — LORAZEPAM 1 MG: 2 INJECTION INTRAMUSCULAR; INTRAVENOUS at 13:54

## 2022-05-14 NOTE — BSMART NOTE
Bed board has presented patient to on call psychiatrist for admission. However, on call psych has declined patient stating she needs a medical admission for alcohol withdrawal with a psych consult.

## 2022-05-14 NOTE — ED NOTES
BSMART consult called to Kaylee Suero at this time. Kaylee Suero to speak with Dr. Mega Herrera regarding patient plan of care.

## 2022-05-14 NOTE — ED NOTES
Chief Complaint   Patient presents with   [address] Problem     pt ambulatory into triage with cc of depression, pt denies SI and HI, but states she is very sad, has been drinking a shot of vodka every day, seeking psych admission    Abdominal Pain     upper abd pain x3 days, +N/V/D     Denies SI/HI. Reports worsening depression and increased drinking urge. Last drink was 1 hours, drinks vodka everyday, approx 1 shot a day. Pt states she was recently admitted to inpatient psych at [facility] and has not been able to get in touch with any mental health resources. Support system: mom and parents.   On Prozac and Abilify
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
## 2022-05-14 NOTE — ED PROVIDER NOTES
EMERGENCY DEPARTMENT HISTORY AND PHYSICAL EXAM      Date: 5/14/2022  Patient Name: Una Horta  Patient Age and Sex: 46 y.o. female     History of Presenting Illness     Chief Complaint   Patient presents with   [address] Problem     pt ambulatory into triage with cc of depression, pt denies SI and HI, but states she is very sad, has been drinking a shot of vodka every day, seeking psych admission    Abdominal Pain     upper abd pain x3 days, +N/V/D       History Provided By: Patient    HPI: Una Horta is a 24-year-old female with a history of depression, attention deficit disorder, presenting for depression. Patient states has been increasing over the last couple of weeks to the point that over the last couple of days has been drinking alcohol again. States that she has been drinking a shot of vodka and last drink was prior to coming in. Patient states that after hospital admission at [facility] last month was prescribed Prozac and Abilify which she has been taking however was supposed to be set up with outpatient therapy but has not had that. States that she is called about 30 different places and no luck in getting a therapist, psychologist or psychiatrist.  Patient also states that she has been having some upper abdominal pain with nausea vomiting and diarrhea secondary to the drinking. States that this is common with her drinking. States that she does have a support system with her mom and grandparents. Denies any drug use. Denies any chest pain, shortness of breath, fevers. Patient states that she feels like she needs psychiatric admission. Denies any SI, HI, hallucinations. Just feels like her depression is worsening to the point that she is drinking again and is scared about that. Very embarrassed about it 2. There are no other complaints, changes, or physical findings at this time.     PCP: Zheng Wan MD    No current facility-administered medications on file prior to encounter. Current Outpatient Medications on File Prior to Encounter   Medication Sig Dispense Refill    amphetamine-dextroamphetamine XR (ADDERALL XR) 30 mg XR capsule Take 1 Capsule by mouth every morning. Max Daily Amount: 30 mg. 30 Capsule 0    FLUoxetine (PROzac) 20 mg capsule Take 3 Capsules by mouth daily for 30 days. Indications: anxiousness associated with depression 90 Capsule 0    ARIPiprazole (ABILIFY) 2 mg tablet Take 1 Tablet by mouth daily for 30 days. Indications: additional treatment for major depressive disorder 30 Tablet 0    traZODone (DESYREL) 50 mg tablet Take 1 Tablet by mouth nightly for 30 days. Indications: insomnia associated with depression 30 Tablet 0    fluticasone propionate (FLONASE) 50 mcg/actuation nasal spray 2 sprays to each nostril 1 time daily 1 Each 0    cetirizine (ZYRTEC) 10 mg tablet Take 1 Tablet by mouth daily. Take as needed for allergies. 90 Tablet 1       Past History     Past Medical History:  Past Medical History:   Diagnosis Date    ADD (attention deficit disorder) 17-17yo    Treated with Adderall since dx. 2013 dosing 20mg AM and 10mg PM.  Trial/change to Vyvanse Nov-Dec 2015--not as effective.  Gynecologic disorder     History of miscarriages. History of Mirena IUD placed on 4/17/15    HX OTHER MEDICAL      -BUFA baby    HX OTHER MEDICAL     HPV positive    Idiopathic vulvodynia 2014    L1 vertebral fracture (HCC) 2017    [facility] imaging/admissoin: Mild acute two column compression fracture of L1 without evidence of retropulsion into the spinal canal.  Managed non-operatively.  Migraines 2013    Neurological disorder     Pelvic pain in female 9/15/2014    2015 gyn laparoscopy/hysteroscopy with endometriosis worse right.     Psychiatric disorder     depression    Secondary dysmenorrhea 2014    With menorraghia    Seizures ([facility] [address]     never on meds--had appr 4-5 in a short amt of time and none since       Past Surgical History:  Past Surgical History:   Procedure Laterality Date    ENDOMETRIAL CRYOABLATION      HX GYN  4/17/15    laparoscopy and hysteroscopy and IUD placement    HX HYSTERECTOMY  04/04/2016    Complete Hysterectomy       Family History:  Family History   Problem Relation Age of Onset    Cancer Mother     Diabetes Mother     Alcohol abuse Father         and drug    Psychiatric Disorder Father     Cancer Father     Diabetes Maternal Grandmother     Hypertension Maternal Grandmother     Thyroid Disease Maternal Grandmother     Diabetes Maternal Grandfather     Hypertension Maternal Grandfather     Cancer Maternal Grandfather     Heart Disease Maternal Grandfather     Cancer Paternal Grandmother     Diabetes Paternal Grandmother        Social History:  Social History     Tobacco Use    Smoking status: Current Every Day Smoker     Packs/day: 1.00     Years: 18.00     Pack years: 18.00     Types: Cigarettes    Smokeless tobacco: Never Used    Tobacco comment: Vapes   Vaping Use    Vaping Use: Not on file   Substance Use Topics    Alcohol use: Yes     Alcohol/week: 0.0 standard drinks     Comment: rarely.  Drug use: No       Allergies: Allergies   Allergen Reactions    Aspirin Other (comments)     Hot sweats and passed out; tolerated ibuprofen    Penicillins Other (comments)     Childhood. Does not remember reaction. Is not aware if she has ever taken cephalosporins in the past.    Jan 2019: Pt notes no problems with cephalosporins. Review of Systems   Review of Systems   Constitutional: Negative for chills and fever. Respiratory: Negative for cough and shortness of breath. Cardiovascular: Negative for chest pain. Gastrointestinal: Positive for abdominal pain, diarrhea, nausea and vomiting. Negative for constipation. Genitourinary: Negative for dysuria, frequency and hematuria. Neurological: Negative for weakness and numbness. Psychiatric/Behavioral: Positive for dysphoric mood, self-injury (alcohol use) and sleep disturbance. Negative for agitation and suicidal ideas. The patient is nervous/anxious. All other systems reviewed and are negative. Physical Exam   Physical Exam  Vitals and nursing note reviewed. Constitutional:       Appearance: She is well-developed. HENT:      Head: Normocephalic and atraumatic. Nose: Nose normal.      Mouth/Throat:      Comments: Slightly dry mucous membranes  Eyes:      Extraocular Movements: Extraocular movements intact. Conjunctiva/sclera: Conjunctivae normal.   Cardiovascular:      Rate and Rhythm: Regular rhythm. Tachycardia present. Pulmonary:      Effort: Pulmonary effort is normal. No respiratory distress. Breath sounds: Normal breath sounds. Abdominal:      General: There is no distension. Palpations: Abdomen is soft. Tenderness: There is no abdominal tenderness. Comments: No tenderness on exam   Musculoskeletal:         General: Normal range of motion. Cervical back: Normal range of motion and neck supple. Skin:     General: Skin is warm and dry. Neurological:      General: No focal deficit present. Mental Status: She is alert and oriented to person, place, and time. Mental status is at baseline. Psychiatric:         Mood and Affect: Mood normal.          Diagnostic Study Results     Labs -   No results found for this or any previous visit (from the past 12 hour(s)). Radiologic Studies -   No orders to display     CT Results  (Last 48 hours)    None        CXR Results  (Last 48 hours)    None            Medical Decision Making   I am the first provider for this patient. I reviewed the vital signs, available nursing notes, past medical history, past surgical history, family history and social history. Vital Signs-Reviewed the patient's vital signs.   Patient Vitals for the past 12 hrs:   Temp Pulse Resp BP SpO2   05/15/22 4852 -- -- -- 130/80 98 %   05/15/22 0913 -- 88 16 130/80 94 %   05/15/22 0900 -- -- -- -- 93 %   05/15/22 0613 98.7 °F (37.1 °C) 89 17 111/66 95 %   05/15/22 0513 -- -- -- -- 98 %   05/15/22 0423 -- -- -- -- 92 %   05/15/22 0243 -- -- -- 133/87 96 %       Records Reviewed: Nursing Notes and Old Medical Records    Provider Notes (Medical Decision Making):   Patient presenting with increased depression, increased drinking of alcohol, some tachycardia as well as some upper abdominal pain associated with the drinking. Most likely some alcoholic gastritis. Will get labs to make sure no pancreatitis, hepatitis and check her electrolytes as well as signs of dehydration. We will also speak with the [facility] for evaluation for psychiatric admission. ED Course:   Initial assessment performed. The patients presenting problems have been discussed, and they are in agreement with the care plan formulated and outlined with them. I have encouraged them to ask questions as they arise throughout their visit. ED Course as of 05/15/22 1424   Sat May 14, 2022   1338 Nurse informing that patient CIWA score is 7 due to anxiety, headache and nausea. We will give her 1 mg of Ativan and reassess. Spoke with Penny Tracey from [facility] who will evaluate the patient [JS]   [phone] with Penny Tracey who spoke with the patient and stated that she is saying that she is suicidal and she did not follow-up with any of the resources that [facility] have provided her on discharge. She also states that her symptoms are escalating. [JS]   1 Counseled the patient on her increased liver function test likely from alcoholic hepatitis given her drinking over the last couple of days. No signs of any alcoholic pancreatitis, scleral icterus. If she stops drinking, these labs should improve on their own. CIWA has improved. [JS]   2037 Patient CIWA score continues to be low.   When BSMART spoke with [place] [facility] who spoke with the psychiatrist, psychiatrist refused stating the patient needed medical admission however patient would have been discharged home given her ciwa was a 0 and does not show any signs of alcohol withdrawal necessitating medical admission. Patient is medically cleared and safe to be admitted to a psychiatric facility. [JS]      ED Course User Index  [JS] Wei Dorantes MD     Critical Care Time:   0    Disposition:  Transfer Note:   Patient is being transferred to [facility]. . The reasons for their transfer have been discussed with them and available family. They convey agreement and understanding for the need to be transferred as explained to them by me. Diagnosis     Clinical Impression:   1. Suicidal ideation    2. Severe episode of recurrent major depressive disorder, without psychotic features ([facility] [address]        Attestations:    William Armendariz M.D. Please note that this dictation was completed with [employer], the computer voice recognition software. Quite often unanticipated grammatical, syntax, homophones, and other interpretive errors are inadvertently transcribed by the computer software. Please disregard these errors. Please excuse any errors that have escaped final proofreading. Thank you.

## 2022-05-14 NOTE — PROGRESS NOTES
BSMART assessment completed, and suicide risk level noted to be moderate. Primary Nurse/Samara and ED provider/Dr Sruthi Vasquez notified. Concerns not observed. Security/Off- has not been notified.

## 2022-05-15 VITALS
HEIGHT: 64 IN | TEMPERATURE: 98.7 F | DIASTOLIC BLOOD PRESSURE: 80 MMHG | SYSTOLIC BLOOD PRESSURE: 130 MMHG | WEIGHT: 207.23 LBS | OXYGEN SATURATION: 98 % | RESPIRATION RATE: 16 BRPM | BODY MASS INDEX: 35.38 KG/M2 | HEART RATE: 88 BPM

## 2022-05-15 NOTE — ED NOTES
Spoke to Esme from [facility]; updated on patient condition and inquired about bed search. Per BSMART, unsure if patient would be admitted to inpatient psych. BSMART to call with update.

## 2022-05-15 NOTE — ED NOTES
Nurse-to-nurse report called to [address] at [facility], including patient diagnosis, recent results, current status, vital signs, and interventions. [facility] notified, estimated arrival time is 1045. EMTALA completed.

## 2022-05-15 NOTE — ED NOTES
Patient accepted to [facility], in bed 304A. Nurse-to-nurse report number is [phone] ex. [phone]    [phone]: Attempted to call report on patient at this time; left message for facility. [phone]: Attempted to call report at this time. [phone]: Report called to Lindsey Bedoya RN at [facility].

## 2022-05-15 NOTE — BSMART NOTE
Per Lonna Homans @ [address] [place], [place] [address], patient has been accepted by Dr. Du Shelley - [facility] 304/A. Report to [phone]. Bsmart notified Varghese Hair, RN @ [address] [address] [facility].

## 2022-05-15 NOTE — ED NOTES
Patient assisted to BR to void prior to leaving ED [facility]- all personal belongings packed in bags and given to [facility], patient alert skin warm dry pink amb to [facility] stretcher [FreeTextEntry1] : I have personally reviewed all relevant imaging studies (MRI) and I have discussed the case with the referring physician.\par

## 2022-05-15 NOTE — ED NOTES
Zuri from [facility] notified RN of bed placement; patient accepted to [facility] into bed 304A.

## 2022-05-15 NOTE — BSMART NOTE
ADULT VOLUNTARY BED SEARCH STARTED/RESUMED AT 5/14/2022:    [facility] ([address], [address], [address], [facility] [address]): contacted at 22:00 spoke with Helga Padron reported at [address]: contacted at 23:00 spoke with [facility] fax clinicals    [facility] (Ashlee Ag[facility]wal [facility]  [facility], [facility]): contacted at 2400 spoke with Roderick Jimenez reported at [address]: contacted at 2400 spoke with Rand Mejia fax clinicals - refaxed clinicals @ 3:00      SHARON Dupree

## 2022-05-24 DIAGNOSIS — F98.8 ATTENTION DEFICIT DISORDER, UNSPECIFIED HYPERACTIVITY PRESENCE: ICD-10-CM

## 2022-05-24 RX ORDER — DEXTROAMPHETAMINE SACCHARATE, AMPHETAMINE ASPARTATE MONOHYDRATE, DEXTROAMPHETAMINE SULFATE AND AMPHETAMINE SULFATE 7.5; 7.5; 7.5; 7.5 MG/1; MG/1; MG/1; MG/1
30 CAPSULE, EXTENDED RELEASE ORAL
Qty: 30 CAPSULE | Refills: 0 | Status: CANCELLED | OUTPATIENT
Start: 2022-05-24

## 2022-05-24 NOTE — TELEPHONE ENCOUNTER
Last visit 04/12/2022 MD Marion Vang   Next appointment Nothing scheduled   Previous refill encounter(s)   04/25/2022 Adderall-XR #30    No access to PDMP     Requested Prescriptions     Pending Prescriptions Disp Refills    amphetamine-dextroamphetamine XR (ADDERALL XR) 30 mg XR capsule 30 Capsule 0     Sig: Take 1 Capsule by mouth every morning. Max Daily Amount: 30 mg.         ----- Message from [employer] sent at 5/24/2022  9:20 AM EDT -----  Subject: Refill Request    QUESTIONS  Name of Medication? amphetamine-dextroamphetamine XR (ADDERALL XR) 30 mg   XR capsule  Patient-reported dosage and instructions? 1 tablet a day  How many days do you have left? 0  Preferred Pharmacy? [id number] Pharmacy phone number (if available)? [phone]  Additional Information for Provider? patient is out of her medication.  ---------------------------------------------------------------------------  --------------  CALL BACK INFO  What is the best way for the office to contact you? OK to leave message on   voicemail  Preferred Call Back Phone Number? [phone]  ---------------------------------------------------------------------------  --------------  SCRIPT ANSWERS  Relationship to Patient?  Self

## 2022-05-25 NOTE — TELEPHONE ENCOUNTER
[facility] message pt is following up on the requested refill for the [address] [place] [address].     --------------------------------------------  Duplicate request

## 2022-05-27 ENCOUNTER — APPOINTMENT (OUTPATIENT)
Dept: ULTRASOUND IMAGING | Age: 42
DRG: 280 | End: 2022-05-27
Attending: EMERGENCY MEDICINE
Payer: MEDICAID

## 2022-05-27 ENCOUNTER — HOSPITAL ENCOUNTER (INPATIENT)
Age: 42
LOS: 5 days | Discharge: HOME | DRG: 280 | End: 2022-06-01
Attending: EMERGENCY MEDICINE | Admitting: STUDENT IN AN ORGANIZED HEALTH CARE EDUCATION/TRAINING PROGRAM
Payer: MEDICAID

## 2022-05-27 DIAGNOSIS — K76.0 HEPATIC STEATOSIS: ICD-10-CM

## 2022-05-27 DIAGNOSIS — R79.89 ELEVATED LFTS: ICD-10-CM

## 2022-05-27 DIAGNOSIS — F10.20 ALCOHOLISM (HCC): ICD-10-CM

## 2022-05-27 DIAGNOSIS — F33.1 MODERATE EPISODE OF RECURRENT MAJOR DEPRESSIVE DISORDER (HCC): ICD-10-CM

## 2022-05-27 DIAGNOSIS — R45.851 PASSIVE SUICIDAL IDEATIONS: ICD-10-CM

## 2022-05-27 DIAGNOSIS — K70.10 ALCOHOLIC HEPATITIS WITHOUT ASCITES: Primary | ICD-10-CM

## 2022-05-27 PROBLEM — F32.A DEPRESSION: Status: ACTIVE | Noted: 2022-05-27

## 2022-05-27 PROBLEM — F10.10 ALCOHOL ABUSE: Status: ACTIVE | Noted: 2022-05-27

## 2022-05-27 LAB
ALBUMIN SERPL-MCNC: 3.3 G/DL (ref 3.5–5)
ALBUMIN/GLOB SERPL: 1 {RATIO} (ref 1.1–2.2)
ALP SERPL-CCNC: 144 U/L (ref 45–117)
ALT SERPL-CCNC: 986 U/L (ref 12–78)
AMPHET UR QL SCN: NEGATIVE
ANION GAP SERPL CALC-SCNC: 9 MMOL/L (ref 5–15)
APPEARANCE UR: CLEAR
AST SERPL-CCNC: 204 U/L (ref 15–37)
BACTERIA URNS QL MICRO: NEGATIVE /HPF
BARBITURATES UR QL SCN: POSITIVE
BASOPHILS # BLD: 0.1 K/UL (ref 0–0.1)
BASOPHILS NFR BLD: 1 % (ref 0–1)
BENZODIAZ UR QL: NEGATIVE
BILIRUB SERPL-MCNC: 1.1 MG/DL (ref 0.2–1)
BILIRUB UR QL: NEGATIVE
BUN SERPL-MCNC: 19 MG/DL (ref 6–20)
BUN/CREAT SERPL: 28 (ref 12–20)
CALCIUM SERPL-MCNC: 8.4 MG/DL (ref 8.5–10.1)
CANNABINOIDS UR QL SCN: NEGATIVE
CHLORIDE SERPL-SCNC: 97 MMOL/L (ref 97–108)
CO2 SERPL-SCNC: 28 MMOL/L (ref 21–32)
COCAINE UR QL SCN: NEGATIVE
COLOR UR: ABNORMAL
COMMENT, HOLDF: NORMAL
COVID-19 RAPID TEST, COVR: NOT DETECTED
CREAT SERPL-MCNC: 0.69 MG/DL (ref 0.55–1.02)
DIFFERENTIAL METHOD BLD: ABNORMAL
DRUG SCRN COMMENT,DRGCM: ABNORMAL
EOSINOPHIL # BLD: 0 K/UL (ref 0–0.4)
EOSINOPHIL NFR BLD: 0 % (ref 0–7)
EPITH CASTS URNS QL MICRO: ABNORMAL /LPF
ERYTHROCYTE [DISTWIDTH] IN BLOOD BY AUTOMATED COUNT: 13.7 % (ref 11.5–14.5)
ETHANOL SERPL-MCNC: 166 MG/DL
GLOBULIN SER CALC-MCNC: 3.3 G/DL (ref 2–4)
GLUCOSE SERPL-MCNC: 138 MG/DL (ref 65–100)
GLUCOSE UR STRIP.AUTO-MCNC: NEGATIVE MG/DL
HCG UR QL: NEGATIVE
HCT VFR BLD AUTO: 38.5 % (ref 35–47)
HGB BLD-MCNC: 13.3 G/DL (ref 11.5–16)
HGB UR QL STRIP: NEGATIVE
HYALINE CASTS URNS QL MICRO: ABNORMAL /LPF (ref 0–2)
IMM GRANULOCYTES # BLD AUTO: 0 K/UL (ref 0–0.04)
IMM GRANULOCYTES NFR BLD AUTO: 0 % (ref 0–0.5)
KETONES UR QL STRIP.AUTO: NEGATIVE MG/DL
LEUKOCYTE ESTERASE UR QL STRIP.AUTO: NEGATIVE
LIPASE SERPL-CCNC: 95 U/L (ref 73–393)
LYMPHOCYTES # BLD: 2 K/UL (ref 0.8–3.5)
LYMPHOCYTES NFR BLD: 25 % (ref 12–49)
MCH RBC QN AUTO: 30.4 PG (ref 26–34)
MCHC RBC AUTO-ENTMCNC: 34.5 G/DL (ref 30–36.5)
MCV RBC AUTO: 88.1 FL (ref 80–99)
METHADONE UR QL: NEGATIVE
MONOCYTES # BLD: 0.6 K/UL (ref 0–1)
MONOCYTES NFR BLD: 8 % (ref 5–13)
NEUTS SEG # BLD: 5.1 K/UL (ref 1.8–8)
NEUTS SEG NFR BLD: 66 % (ref 32–75)
NITRITE UR QL STRIP.AUTO: NEGATIVE
NRBC # BLD: 0 K/UL (ref 0–0.01)
NRBC BLD-RTO: 0 PER 100 WBC
OPIATES UR QL: POSITIVE
PCP UR QL: NEGATIVE
PH UR STRIP: 6.5 [PH] (ref 5–8)
PLATELET # BLD AUTO: 471 K/UL (ref 150–400)
PMV BLD AUTO: 10.2 FL (ref 8.9–12.9)
POTASSIUM SERPL-SCNC: 3.2 MMOL/L (ref 3.5–5.1)
PROT SERPL-MCNC: 6.6 G/DL (ref 6.4–8.2)
PROT UR STRIP-MCNC: NEGATIVE MG/DL
RBC # BLD AUTO: 4.37 M/UL (ref 3.8–5.2)
RBC #/AREA URNS HPF: ABNORMAL /HPF (ref 0–5)
SAMPLES BEING HELD,HOLD: NORMAL
SODIUM SERPL-SCNC: 134 MMOL/L (ref 136–145)
SOURCE, COVRS: NORMAL
SP GR UR REFRACTOMETRY: 1.02
UA: UC IF INDICATED,UAUC: ABNORMAL
UROBILINOGEN UR QL STRIP.AUTO: 1 EU/DL (ref 0.2–1)
WBC # BLD AUTO: 7.8 K/UL (ref 3.6–11)
WBC URNS QL MICRO: ABNORMAL /HPF (ref 0–4)

## 2022-05-27 PROCEDURE — 74011250636 HC RX REV CODE- 250/636: Performed by: EMERGENCY MEDICINE

## 2022-05-27 PROCEDURE — 87635 SARS-COV-2 COVID-19 AMP PRB: CPT

## 2022-05-27 PROCEDURE — 74011250637 HC RX REV CODE- 250/637: Performed by: EMERGENCY MEDICINE

## 2022-05-27 PROCEDURE — 83690 ASSAY OF LIPASE: CPT

## 2022-05-27 PROCEDURE — 99285 EMERGENCY DEPT VISIT HI MDM: CPT

## 2022-05-27 PROCEDURE — 82077 ASSAY SPEC XCP UR&BREATH IA: CPT

## 2022-05-27 PROCEDURE — 76705 ECHO EXAM OF ABDOMEN: CPT

## 2022-05-27 PROCEDURE — 96374 THER/PROPH/DIAG INJ IV PUSH: CPT

## 2022-05-27 PROCEDURE — 80307 DRUG TEST PRSMV CHEM ANLYZR: CPT

## 2022-05-27 PROCEDURE — 81025 URINE PREGNANCY TEST: CPT

## 2022-05-27 PROCEDURE — 96375 TX/PRO/DX INJ NEW DRUG ADDON: CPT

## 2022-05-27 PROCEDURE — 36415 COLL VENOUS BLD VENIPUNCTURE: CPT

## 2022-05-27 PROCEDURE — 80053 COMPREHEN METABOLIC PANEL: CPT

## 2022-05-27 PROCEDURE — 74011250636 HC RX REV CODE- 250/636: Performed by: STUDENT IN AN ORGANIZED HEALTH CARE EDUCATION/TRAINING PROGRAM

## 2022-05-27 PROCEDURE — 85025 COMPLETE CBC W/AUTO DIFF WBC: CPT

## 2022-05-27 PROCEDURE — 81001 URINALYSIS AUTO W/SCOPE: CPT

## 2022-05-27 PROCEDURE — 65270000029 HC RM PRIVATE

## 2022-05-27 PROCEDURE — 96361 HYDRATE IV INFUSION ADD-ON: CPT

## 2022-05-27 RX ORDER — MORPHINE SULFATE 2 MG/ML
4 INJECTION, SOLUTION INTRAMUSCULAR; INTRAVENOUS
Status: COMPLETED | OUTPATIENT
Start: 2022-05-27 | End: 2022-05-27

## 2022-05-27 RX ORDER — DIAZEPAM 5 MG/1
5 TABLET ORAL
Status: DISCONTINUED | OUTPATIENT
Start: 2022-05-27 | End: 2022-05-28

## 2022-05-27 RX ORDER — ASPIRIN 325 MG/1
100 TABLET, FILM COATED ORAL DAILY
Status: DISCONTINUED | OUTPATIENT
Start: 2022-05-28 | End: 2022-06-01 | Stop reason: HOSPADM

## 2022-05-27 RX ORDER — SODIUM CHLORIDE 9 MG/ML
100 INJECTION, SOLUTION INTRAVENOUS CONTINUOUS
Status: DISCONTINUED | OUTPATIENT
Start: 2022-05-27 | End: 2022-05-29

## 2022-05-27 RX ORDER — SODIUM CHLORIDE 9 MG/ML
1000 INJECTION, SOLUTION INTRAVENOUS ONCE
Status: COMPLETED | OUTPATIENT
Start: 2022-05-27 | End: 2022-05-27

## 2022-05-27 RX ORDER — ONDANSETRON 2 MG/ML
4 INJECTION INTRAMUSCULAR; INTRAVENOUS ONCE
Status: COMPLETED | OUTPATIENT
Start: 2022-05-27 | End: 2022-05-27

## 2022-05-27 RX ORDER — FOLIC ACID 1 MG/1
1 TABLET ORAL DAILY
Status: DISCONTINUED | OUTPATIENT
Start: 2022-05-28 | End: 2022-06-01 | Stop reason: HOSPADM

## 2022-05-27 RX ADMIN — SODIUM CHLORIDE 100 ML/HR: 9 INJECTION, SOLUTION INTRAVENOUS at 23:11

## 2022-05-27 RX ADMIN — ONDANSETRON 4 MG: 2 INJECTION INTRAMUSCULAR; INTRAVENOUS at 17:33

## 2022-05-27 RX ADMIN — ALUMINUM HYDROXIDE AND MAGNESIUM HYDROXIDE 30 ML: 200; 200 SUSPENSION ORAL at 16:36

## 2022-05-27 RX ADMIN — DIAZEPAM 5 MG: 5 TABLET ORAL at 23:11

## 2022-05-27 RX ADMIN — MORPHINE SULFATE 4 MG: 2 INJECTION, SOLUTION INTRAMUSCULAR; INTRAVENOUS at 16:36

## 2022-05-27 RX ADMIN — SODIUM CHLORIDE 1000 ML: 9 INJECTION, SOLUTION INTRAVENOUS at 16:36

## 2022-05-27 NOTE — ED PROVIDER NOTES
EMERGENCY DEPARTMENT HISTORY AND PHYSICAL EXAM      Date: 2022  Patient Name: Monalisa Trivedi  Patient Age and Sex: 46 y.o. female  MRN:  [id number]  CSN:  [id number]    History of Presenting Illness     Chief Complaint   Patient presents with    Anxiety     Pt arrives ambulatory to triage with report of anxiety, depression, and inc. alcohol intake. She reports \"liver pain\" and bilateral flank pain, N/V. Denies SI/HI.  Depression    Alcohol Problem       History Provided By: Patient    Ability to gather history was limited by:     HPI: Monalisa Trivedi, 46 y.o. female with history of anxiety, depression, alcoholism, complains of feeling severely depressed, passive SI, no specific suicidal plan. Symptoms have been worsening for a few months. Reports that she has been drinking alcohol every day including vodka, beer, malt liquor beverages etc.  No drug use. Was seen in the emergency department 2 weeks ago for similar symptoms. Reports that she is also having generalized abdominal pain and pain in the bilateral flanks, aching in nature, moderate severity. No dysuria. Location:    Quality:      Severity:    Duration:   Timing:      Context:    Modifying factors:   Associated symptoms:     Past History      The patient's medical, surgical, and social history on file were reviewed by me today.  The family history was reviewed by me today and was non-contributory, unless otherwise specified below:    Past Medical History:  Past Medical History:   Diagnosis Date    ADD (attention deficit disorder) 19-19yo    Treated with Adderall since  dosing 20mg AM and 10mg PM.  Trial/change to Vyvanse Nov-Dec 2015--not as effective.  Gynecologic disorder     History of miscarriages.   History of Mirena IUD placed on 4/17/15    HX OTHER MEDICAL      -BUFA baby    HX OTHER MEDICAL     HPV positive    Idiopathic vulvodynia 2014    L1 vertebral fracture ([facility] [place] [address] 2017    [facility] imaging/admissoin: Mild acute two column compression fracture of L1 without evidence of retropulsion into the spinal canal.  Managed non-operatively.  Migraines 6/12/2013    Neurological disorder     Pelvic pain in female 9/15/2014    April 2015 gyn laparoscopy/hysteroscopy with endometriosis worse right.  Psychiatric disorder     depression    Secondary dysmenorrhea 8/12/2014    With menorraghia    Seizures ([address] 2008    never on meds--had appr 4-5 in a short amt of time and none since       Past Surgical History:  Past Surgical History:   Procedure Laterality Date    ENDOMETRIAL CRYOABLATION      HX GYN  4/17/15    laparoscopy and hysteroscopy and IUD placement    HX HYSTERECTOMY  04/04/2016    Complete Hysterectomy       Family History:  Family History   Problem Relation Age of Onset    Cancer Mother     Diabetes Mother     Alcohol abuse Father         and drug    Psychiatric Disorder Father     Cancer Father     Diabetes Maternal Grandmother     Hypertension Maternal Grandmother     Thyroid Disease Maternal Grandmother     Diabetes Maternal Grandfather     Hypertension Maternal Grandfather     Cancer Maternal Grandfather     Heart Disease Maternal Grandfather     Cancer Paternal Grandmother     Diabetes Paternal Grandmother        Social History:  Social History     Tobacco Use    Smoking status: Current Every Day Smoker     Packs/day: 1.00     Years: 18.00     Pack years: 18.00     Types: Cigarettes    Smokeless tobacco: Never Used    Tobacco comment: Vapes   Vaping Use    Vaping Use: Not on file   Substance Use Topics    Alcohol use: Yes     Alcohol/week: 0.0 standard drinks     Comment: rarely.  Drug use: No       Current Medications:  No current facility-administered medications on file prior to encounter.      Current Outpatient Medications on File Prior to Encounter   Medication Sig Dispense Refill    amphetamine-dextroamphetamine XR (ADDERALL XR) 30 mg XR capsule Take 1 Capsule by mouth every morning. Max Daily Amount: 30 mg. 30 Capsule 0    fluticasone propionate (FLONASE) 50 mcg/actuation nasal spray 2 sprays to each nostril 1 time daily 1 Each 0    cetirizine (ZYRTEC) 10 mg tablet Take 1 Tablet by mouth daily. Take as needed for allergies. 90 Tablet 1       Allergies: Allergies   Allergen Reactions    Aspirin Other (comments)     Hot sweats and passed out; tolerated ibuprofen    Penicillins Other (comments)     Childhood. Does not remember reaction. Is not aware if she has ever taken cephalosporins in the past.    Jan 2019: Pt notes no problems with cephalosporins. Review of Systems    A complete ROS was reviewed by me today and was negative, unless otherwise specified below:    Review of Systems   Constitutional: Positive for fatigue. Negative for fever. Gastrointestinal: Positive for abdominal pain, nausea and vomiting. Genitourinary: Positive for flank pain. Negative for dysuria. Psychiatric/Behavioral: Positive for behavioral problems and dysphoric mood. Negative for self-injury and suicidal ideas. The patient is nervous/anxious. All other systems reviewed and are negative. Physical Exam   Vital Signs  Patient Vitals for the past 8 hrs:   BP SpO2   05/27/22 2023 98/63 93 %   05/27/22 1923 103/60 92 %   05/27/22 1853 115/78 94 %   05/27/22 1738 (!) 118/91 98 %          Physical Exam  Vitals and nursing note reviewed. Constitutional:       General: She is not in acute distress. Appearance: Normal appearance. She is well-developed. She is not ill-appearing. HENT:      Head: Normocephalic and atraumatic. Mouth/Throat:      Mouth: Mucous membranes are moist.   Eyes:      General:         Right eye: No discharge. Left eye: No discharge. Conjunctiva/sclera: Conjunctivae normal.   Cardiovascular:      Rate and Rhythm: Normal rate and regular rhythm. Heart sounds: Normal heart sounds. No murmur heard.       Pulmonary:      Effort: Pulmonary effort is normal. No respiratory distress. Breath sounds: Normal breath sounds. No wheezing. Abdominal:      General: There is no distension. Palpations: Abdomen is soft. Tenderness: There is no abdominal tenderness. Musculoskeletal:         General: No deformity. Normal range of motion. Cervical back: Normal range of motion and neck supple. Skin:     General: Skin is warm and dry. Findings: No rash. Neurological:      General: No focal deficit present. Mental Status: She is alert and oriented to person, place, and time. Psychiatric:         Mood and Affect: Mood is anxious and depressed. Speech: Speech normal.         Behavior: Behavior normal.         Cognition and Memory: Cognition normal.         Diagnostic Study Results   Labs  Recent Results (from the past 24 hour(s))   CBC WITH AUTOMATED DIFF    Collection Time: 05/27/22  1:25 PM   Result Value Ref Range    WBC 7.8 3.6 - 11.0 K/uL    RBC 4.37 3.80 - 5.20 M/uL    HGB 13.3 11.5 - 16.0 g/dL    HCT 38.5 35.0 - 47.0 %    MCV 88.1 80.0 - 99.0 FL    MCH 30.4 26.0 - 34.0 PG    MCHC 34.5 30.0 - 36.5 g/dL    RDW 13.7 11.5 - 14.5 %    PLATELET 875 (H) 655 - 400 K/uL    MPV 10.2 8.9 - 12.9 FL    NRBC 0.0 0  WBC    ABSOLUTE NRBC 0.00 0.00 - 0.01 K/uL    NEUTROPHILS 66 32 - 75 %    LYMPHOCYTES 25 12 - 49 %    MONOCYTES 8 5 - 13 %    EOSINOPHILS 0 0 - 7 %    BASOPHILS 1 0 - 1 %    IMMATURE GRANULOCYTES 0 0.0 - 0.5 %    ABS. NEUTROPHILS 5.1 1.8 - 8.0 K/UL    ABS. LYMPHOCYTES 2.0 0.8 - 3.5 K/UL    ABS. MONOCYTES 0.6 0.0 - 1.0 K/UL    ABS. EOSINOPHILS 0.0 0.0 - 0.4 K/UL    ABS. BASOPHILS 0.1 0.0 - 0.1 K/UL    ABS. IMM.  GRANS. 0.0 0.00 - 0.04 K/UL    DF AUTOMATED     ETHYL ALCOHOL    Collection Time: 05/27/22  4:37 PM   Result Value Ref Range    ALCOHOL(ETHYL),SERUM 166 (H) <50 MG/DL   METABOLIC PANEL, COMPREHENSIVE    Collection Time: 05/27/22  4:37 PM   Result Value Ref Range    Sodium 134 (L) 136 - 145 mmol/L Potassium 3.2 (L) 3.5 - 5.1 mmol/L    Chloride 97 97 - 108 mmol/L    CO2 28 21 - 32 mmol/L    Anion gap 9 5 - 15 mmol/L    Glucose 138 (H) 65 - 100 mg/dL    BUN 19 6 - 20 MG/DL    Creatinine 0.69 0.55 - 1.02 MG/DL    BUN/Creatinine ratio 28 (H) 12 - 20      GFR est AA >60 >60 ml/min/1.73m2    GFR est non-AA >60 >60 ml/min/1.73m2    Calcium 8.4 (L) 8.5 - 10.1 MG/DL    Bilirubin, total 1.1 (H) 0.2 - 1.0 MG/DL    ALT (SGPT) 986 (H) 12 - 78 U/L    AST (SGOT) 204 (H) 15 - 37 U/L    Alk. phosphatase 144 (H) 45 - 117 U/L    Protein, total 6.6 6.4 - 8.2 g/dL    Albumin 3.3 (L) 3.5 - 5.0 g/dL    Globulin 3.3 2.0 - 4.0 g/dL    A-G Ratio 1.0 (L) 1.1 - 2.2     SAMPLES BEING HELD    Collection Time: 05/27/22  4:37 PM   Result Value Ref Range    SAMPLES BEING HELD PST     COMMENT        Add-on orders for these samples will be processed based on acceptable specimen integrity and analyte stability, which may vary by analyte.    LIPASE    Collection Time: 05/27/22  4:37 PM   Result Value Ref Range    Lipase 95 73 - 393 U/L   COVID-19 RAPID TEST    Collection Time: 05/27/22  8:37 PM   Result Value Ref Range    Specimen source Nasopharyngeal      COVID-19 rapid test Not detected NOTD     URINALYSIS W/ REFLEX CULTURE    Collection Time: 05/27/22  9:16 PM    Specimen: Urine   Result Value Ref Range    Color YELLOW/STRAW      Appearance CLEAR CLEAR      Specific gravity 1.023      pH (UA) 6.5 5.0 - 8.0      Protein Negative NEG mg/dL    Glucose Negative NEG mg/dL    Ketone Negative NEG mg/dL    Bilirubin Negative NEG      Blood Negative NEG      Urobilinogen 1.0 0.2 - 1.0 EU/dL    Nitrites Negative NEG      Leukocyte Esterase Negative NEG      UA:UC IF INDICATED CULTURE NOT INDICATED BY UA RESULT CNI      WBC 0-4 0 - 4 /hpf    RBC 0-5 0 - 5 /hpf    Epithelial cells MODERATE (A) FEW /lpf    Bacteria Negative NEG /hpf    Hyaline cast 0-2 0 - 2 /lpf   DRUG SCREEN, URINE    Collection Time: 05/27/22  9:16 PM   Result Value Ref Range AMPHETAMINES Negative NEG      BARBITURATES Positive (A) NEG      BENZODIAZEPINES Negative NEG      COCAINE Negative NEG      METHADONE Negative NEG      OPIATES Positive (A) NEG      PCP(PHENCYCLIDINE) Negative NEG      THC (TH-CANNABINOL) Negative NEG      Drug screen comment (NOTE)    HCG URINE, QL. - POC    Collection Time: 05/27/22  9:56 PM   Result Value Ref Range    Pregnancy test,urine (POC) Negative NEG         Radiologic Studies  US ABD LTD   Final Result      1. Gallbladder distention but no cholelithiasis or ductal dilatation. 2. Hepatic steatosis. CT Results  (Last 48 hours)    None        CXR Results  (Last 48 hours)    None          Billable Procedures   Procedures    Medical Decision Making     I reviewed the patient's most recent Emergency Dept notes and diagnostic tests in formulating my MDM on today's visit. Provider Notes (Medical Decision Making):   42-year-old female complaining of worsening anxiety and depression, worsening alcoholism, for a few months. Multiple recent ED visits for similar symptoms. Last drink was this morning. On examination she is mildly tearful, seems anxious and depressed. Abdomen is soft, mild generalized nonspecific tenderness. I reviewed her prior medical records including her ED visit notes from May 14, 2022 when she was seen by Dr. Jil Spatz. At that time the patient was medically cleared and was transferred for psychiatric evaluation and treatment at [facility]. No leukocytosis. She does have elevated LFTs, which is likely due to alcoholic hepatitis and hepatic steatosis. Right upper quadrant ultrasound demonstrates unremarkable gallbladder distention, no signs of cholecystitis. She does not appear to be in significant alcohol withdrawal.    Will admit medically given the rapid rise in her LFTs compared to just 2 weeks ago. Patient can be seen by inpatient psychiatry.     I will be the attending of record for this patient. Tammy Salinas MD  4:12 PM  5/27/2022       Social History     Tobacco Use    Smoking status: Current Every Day Smoker     Packs/day: 1.00     Years: 18.00     Pack years: 18.00     Types: Cigarettes    Smokeless tobacco: Never Used    Tobacco comment: Vapes   Vaping Use    Vaping Use: Not on file   Substance Use Topics    Alcohol use: Yes     Alcohol/week: 0.0 standard drinks     Comment: rarely.  Drug use: No       Medications Administered during ED course:  Medications   diazePAM (VALIUM) tablet 5 mg (has no administration in time range)   0.9% sodium chloride infusion (has no administration in time range)   thiamine mononitrate (B-1) tablet 100 mg (has no administration in time range)   folic acid (FOLVITE) tablet 1 mg (has no administration in time range)   0.9% sodium chloride infusion 1,000 mL (0 mL IntraVENous IV Completed 5/27/22 2000)   morphine injection 4 mg (4 mg IntraVENous Given 5/27/22 1636)   aluminum-magnesium hydroxide (MAALOX) oral suspension 30 mL (30 mL Oral Given 5/27/22 1636)   ondansetron (ZOFRAN) injection 4 mg (4 mg IntraVENous Given 5/27/22 1733)          Prescriptions from today's ED visit:  Current Discharge Medication List         Diagnosis and Disposition     Disposition:  Admitted    Clinical Impression:   1. Alcoholic hepatitis without ascites    2. Moderate episode of recurrent major depressive disorder ([facility] [address]    3. Alcoholism ([facility] [address]    4. Passive suicidal ideations    5. Hepatic steatosis    6. Elevated LFTs        Attestation:  I personally performed the services described in this documentation on this date 5/27/2022 for patient Gian Meier. Tammy Salinas MD        I was the first provider for this patient on this visit. To the best of my ability I reviewed relevant prior medical records, electrocardiograms, laboratories, and radiologic studies.     The patient's presenting problems were discussed, and the patient was in agreement with the care plan formulated and outlined with them. Francois Kessler MD    Please note that this dictation was completed with Dragon voice recognition software. Quite often unanticipated grammatical, syntax, homophones, and other interpretive errors are inadvertently transcribed by the computer software. Please disregard these errors and excuse any errors that have escaped final proofreading.

## 2022-05-27 NOTE — BSMART NOTE
Comprehensive Assessment Form Part 1    Section I - Disposition    Axis I - Major Depressive Disorder, Alcohol Use Disorder   Axis II - Deferred  Axis III - Migraines, Seizures, Idiopathic vulvodynia  Axis IV - Relationship stressors  [place] V - 35      The Medical Doctor to Psychiatrist conference was not completed. The Medical Doctor is in agreement with Psychiatrist disposition because of (reason) patient is not medically clear. The plan is medically admit with inpatient psych consult. The on-call Psychiatrist consulted was Dr. Rowan Farah. The admitting Psychiatrist will be Dr. Rowan Farah. The admitting Diagnosis is NA. The Payor source is [employer]. This writer reviewed the [address] in nursing flowsheet and the risk level assigned is no risk. Based on this assessment, the risk of suicide is low and the plan is medically admit. Section II - Integrated Summary  Summary:  Patient is a 46year old female seen via telepsych. She reported that she is extremely depressed and needs to be admitted to inpatient psych. She reported she believes she needs her medication to be adjusted. She has 3 prior admissions, most recently at [facility] in April 2022 and then she was admitted to [facility] on 5/14/22. She reports she was discharged about a week ago. She began drinking again upon returning home from the hospital.  She reported she is hopeless and \"I just don't want to live anymore. \"  She reported passive suicidal thoughts, but denies having a plan. She reported she wishes she were dead. She reported she does not feel safe being at home with weekend, saying \"if I go home I don't know what will happen. \"  She reported she is scared to be by herself and she will be alone this weekend while her mother is at work. She is requesting psychiatric admission. Her liver labs are extremely elevated and she will be a medical admission.     The patient has demonstrated mental capacity to provide informed consent. The information is given by the patient and past medical records. The Chief Complaint is depression, anxiety, and alcohol problem. The Precipitant Factors are relationship stressors, chronic alcohol abuse. Previous Hospitalizations: yes  The patient has not previously been in restraints. Current Psychiatrist and/or  is NA. Lethality Assessment:    The potential for suicide is noted by the following: ideation and current substance abuse . The potential for homicide is not noted. The patient has not been a perpetrator of sexual or physical abuse. There are not pending charges. The patient is felt to be at risk for self harm or harm to others. The attending nurse was advised that security has not been notified. Section III - Psychosocial  The patient's overall mood and attitude is depressed. Feelings of helplessness and hopelessness are observed by patient's self report. Generalized anxiety is not observed. Panic is not observed. Phobias are not observed. Obsessive compulsive tendencies are not observed. Section IV - Mental Status Exam  The patient's appearance shows no evidence of impairment. The patient's behavior shows no evidence of impairment. The patient is oriented to time, place, person and situation. The patient's speech shows no evidence of impairment. The patient's mood is depressed. The range of affect is constricted. The patient's thought content demonstrates no evidence of impairment. The thought process shows no evidence of impairment. The patient's perception shows no evidence of impairment. The patient's memory shows no evidence of impairment. The patient's appetite is decreased. The patient's sleep has evidence of insomnia. The patient's insight is blaming. The patient's judgement is psychologically impaired. Section V - Substance Abuse  The patient is using substances.   The patient is using alcohol for greater than 10 years with last use on today. The patient has experienced the following withdrawal symptoms: tremors, diarrhea, cravings and nausea. Section VI - Living Arrangements  The patient is single. The patient lives with her mother. The patient has one child age 5. The patient does plan to return home upon discharge. The patient does not have legal issues pending. The patient's source of income comes from family. Rastafarian and cultural practices have not been voiced at this time. The patient's greatest support comes from her mother and this person will not be involved with the treatment. The patient has not been in an event described as horrible or outside the realm of ordinary life experience either currently or in the past.  The patient has not been a victim of sexual/physical abuse. Section VII - Other Areas of Clinical Concern  The highest grade achieved is not assessed with the overall quality of school experience being described as unknown. The patient is currently unemployed and speaks [place] as a primary language. The patient has no communication impairments affecting communication. The patient's preference for learning can be described as: can read and write adequately.   The patient's hearing is normal.  The patient's vision is normal.      Bernardino Ferguson MA

## 2022-05-28 LAB
ALBUMIN SERPL-MCNC: 3.1 G/DL (ref 3.5–5)
ALBUMIN/GLOB SERPL: 0.9 {RATIO} (ref 1.1–2.2)
ALP SERPL-CCNC: 136 U/L (ref 45–117)
ALT SERPL-CCNC: 822 U/L (ref 12–78)
AMMONIA PLAS-SCNC: 34 UMOL/L
ANION GAP SERPL CALC-SCNC: 8 MMOL/L (ref 5–15)
AST SERPL-CCNC: 154 U/L (ref 15–37)
BASOPHILS # BLD: 0.1 K/UL (ref 0–0.1)
BASOPHILS NFR BLD: 1 % (ref 0–1)
BILIRUB SERPL-MCNC: 1.1 MG/DL (ref 0.2–1)
BUN SERPL-MCNC: 17 MG/DL (ref 6–20)
BUN/CREAT SERPL: 25 (ref 12–20)
CALCIUM SERPL-MCNC: 8 MG/DL (ref 8.5–10.1)
CHLORIDE SERPL-SCNC: 99 MMOL/L (ref 97–108)
CO2 SERPL-SCNC: 27 MMOL/L (ref 21–32)
CREAT SERPL-MCNC: 0.68 MG/DL (ref 0.55–1.02)
DIFFERENTIAL METHOD BLD: ABNORMAL
EOSINOPHIL # BLD: 0.1 K/UL (ref 0–0.4)
EOSINOPHIL NFR BLD: 1 % (ref 0–7)
ERYTHROCYTE [DISTWIDTH] IN BLOOD BY AUTOMATED COUNT: 13.8 % (ref 11.5–14.5)
FOLATE SERPL-MCNC: 7.2 NG/ML (ref 5–21)
GLOBULIN SER CALC-MCNC: 3.3 G/DL (ref 2–4)
GLUCOSE SERPL-MCNC: 115 MG/DL (ref 65–100)
HCT VFR BLD AUTO: 37.6 % (ref 35–47)
HGB BLD-MCNC: 12.8 G/DL (ref 11.5–16)
IMM GRANULOCYTES # BLD AUTO: 0 K/UL (ref 0–0.04)
IMM GRANULOCYTES NFR BLD AUTO: 0 % (ref 0–0.5)
INR PPP: 1.2 (ref 0.9–1.1)
LYMPHOCYTES # BLD: 3.9 K/UL (ref 0.8–3.5)
LYMPHOCYTES NFR BLD: 38 % (ref 12–49)
MAGNESIUM SERPL-MCNC: 2.4 MG/DL (ref 1.6–2.4)
MCH RBC QN AUTO: 30.8 PG (ref 26–34)
MCHC RBC AUTO-ENTMCNC: 34 G/DL (ref 30–36.5)
MCV RBC AUTO: 90.4 FL (ref 80–99)
MONOCYTES # BLD: 0.7 K/UL (ref 0–1)
MONOCYTES NFR BLD: 7 % (ref 5–13)
NEUTS SEG # BLD: 5.5 K/UL (ref 1.8–8)
NEUTS SEG NFR BLD: 53 % (ref 32–75)
NRBC # BLD: 0 K/UL (ref 0–0.01)
NRBC BLD-RTO: 0 PER 100 WBC
PLATELET # BLD AUTO: 344 K/UL (ref 150–400)
PMV BLD AUTO: 9.3 FL (ref 8.9–12.9)
POTASSIUM SERPL-SCNC: 3.4 MMOL/L (ref 3.5–5.1)
PROT SERPL-MCNC: 6.4 G/DL (ref 6.4–8.2)
PROTHROMBIN TIME: 12.6 SEC (ref 9–11.1)
RBC # BLD AUTO: 4.16 M/UL (ref 3.8–5.2)
SODIUM SERPL-SCNC: 134 MMOL/L (ref 136–145)
TSH SERPL DL<=0.05 MIU/L-ACNC: 4.36 UIU/ML (ref 0.36–3.74)
VIT B12 SERPL-MCNC: 1452 PG/ML (ref 193–986)
WBC # BLD AUTO: 10.2 K/UL (ref 3.6–11)

## 2022-05-28 PROCEDURE — 82140 ASSAY OF AMMONIA: CPT

## 2022-05-28 PROCEDURE — 74011250636 HC RX REV CODE- 250/636: Performed by: GENERAL ACUTE CARE HOSPITAL

## 2022-05-28 PROCEDURE — 74011000250 HC RX REV CODE- 250: Performed by: GENERAL ACUTE CARE HOSPITAL

## 2022-05-28 PROCEDURE — 85025 COMPLETE CBC W/AUTO DIFF WBC: CPT

## 2022-05-28 PROCEDURE — C9113 INJ PANTOPRAZOLE SODIUM, VIA: HCPCS | Performed by: GENERAL ACUTE CARE HOSPITAL

## 2022-05-28 PROCEDURE — 65270000046 HC RM TELEMETRY

## 2022-05-28 PROCEDURE — 74011250637 HC RX REV CODE- 250/637: Performed by: GENERAL ACUTE CARE HOSPITAL

## 2022-05-28 PROCEDURE — 36415 COLL VENOUS BLD VENIPUNCTURE: CPT

## 2022-05-28 PROCEDURE — 84443 ASSAY THYROID STIM HORMONE: CPT

## 2022-05-28 PROCEDURE — 83735 ASSAY OF MAGNESIUM: CPT

## 2022-05-28 PROCEDURE — 82607 VITAMIN B-12: CPT

## 2022-05-28 PROCEDURE — 82746 ASSAY OF FOLIC ACID SERUM: CPT

## 2022-05-28 PROCEDURE — 80053 COMPREHEN METABOLIC PANEL: CPT

## 2022-05-28 PROCEDURE — 85610 PROTHROMBIN TIME: CPT

## 2022-05-28 PROCEDURE — 74011250637 HC RX REV CODE- 250/637: Performed by: INTERNAL MEDICINE

## 2022-05-28 RX ORDER — POTASSIUM CHLORIDE 750 MG/1
40 TABLET, FILM COATED, EXTENDED RELEASE ORAL
Status: COMPLETED | OUTPATIENT
Start: 2022-05-28 | End: 2022-05-28

## 2022-05-28 RX ORDER — ARIPIPRAZOLE 10 MG/1
10 TABLET ORAL DAILY
COMMUNITY
End: 2022-06-08 | Stop reason: SDUPTHER

## 2022-05-28 RX ORDER — ENOXAPARIN SODIUM 100 MG/ML
40 INJECTION SUBCUTANEOUS DAILY
Status: DISCONTINUED | OUTPATIENT
Start: 2022-05-28 | End: 2022-06-01 | Stop reason: HOSPADM

## 2022-05-28 RX ORDER — ACETAMINOPHEN 650 MG/1
650 SUPPOSITORY RECTAL
Status: DISCONTINUED | OUTPATIENT
Start: 2022-05-28 | End: 2022-05-28

## 2022-05-28 RX ORDER — ONDANSETRON 2 MG/ML
4 INJECTION INTRAMUSCULAR; INTRAVENOUS
Status: DISCONTINUED | OUTPATIENT
Start: 2022-05-28 | End: 2022-06-01 | Stop reason: HOSPADM

## 2022-05-28 RX ORDER — LORAZEPAM 1 MG/1
4 TABLET ORAL
Status: DISCONTINUED | OUTPATIENT
Start: 2022-05-28 | End: 2022-06-01 | Stop reason: HOSPADM

## 2022-05-28 RX ORDER — ARIPIPRAZOLE 5 MG/1
10 TABLET ORAL DAILY
Status: DISCONTINUED | OUTPATIENT
Start: 2022-05-28 | End: 2022-06-01 | Stop reason: HOSPADM

## 2022-05-28 RX ORDER — HYDROXYZINE 25 MG/1
50 TABLET, FILM COATED ORAL
Status: DISCONTINUED | OUTPATIENT
Start: 2022-05-28 | End: 2022-05-31 | Stop reason: SDUPTHER

## 2022-05-28 RX ORDER — MELATONIN
1000 DAILY
Status: DISCONTINUED | OUTPATIENT
Start: 2022-05-28 | End: 2022-06-01 | Stop reason: HOSPADM

## 2022-05-28 RX ORDER — SODIUM CHLORIDE 0.9 % (FLUSH) 0.9 %
5-40 SYRINGE (ML) INJECTION AS NEEDED
Status: DISCONTINUED | OUTPATIENT
Start: 2022-05-28 | End: 2022-06-01 | Stop reason: HOSPADM

## 2022-05-28 RX ORDER — OXYCODONE HYDROCHLORIDE 5 MG/1
5 TABLET ORAL
Status: DISCONTINUED | OUTPATIENT
Start: 2022-05-28 | End: 2022-05-31

## 2022-05-28 RX ORDER — MELATONIN
1000 DAILY
Status: ON HOLD | COMMUNITY
End: 2022-06-10

## 2022-05-28 RX ORDER — FLUOXETINE 10 MG/1
60 CAPSULE ORAL DAILY
COMMUNITY
End: 2022-06-01

## 2022-05-28 RX ORDER — ONDANSETRON 4 MG/1
4 TABLET, ORALLY DISINTEGRATING ORAL
Status: DISCONTINUED | OUTPATIENT
Start: 2022-05-28 | End: 2022-06-01 | Stop reason: HOSPADM

## 2022-05-28 RX ORDER — POLYETHYLENE GLYCOL 3350 17 G/17G
17 POWDER, FOR SOLUTION ORAL DAILY PRN
Status: DISCONTINUED | OUTPATIENT
Start: 2022-05-28 | End: 2022-06-01 | Stop reason: HOSPADM

## 2022-05-28 RX ORDER — LORAZEPAM 2 MG/ML
4 INJECTION INTRAMUSCULAR
Status: DISCONTINUED | OUTPATIENT
Start: 2022-05-28 | End: 2022-06-01 | Stop reason: HOSPADM

## 2022-05-28 RX ORDER — LORAZEPAM 1 MG/1
2 TABLET ORAL
Status: DISCONTINUED | OUTPATIENT
Start: 2022-05-28 | End: 2022-06-01 | Stop reason: HOSPADM

## 2022-05-28 RX ORDER — ACETAMINOPHEN 325 MG/1
650 TABLET ORAL
Status: DISCONTINUED | OUTPATIENT
Start: 2022-05-28 | End: 2022-05-28

## 2022-05-28 RX ORDER — FLUOXETINE HYDROCHLORIDE 20 MG/1
60 CAPSULE ORAL DAILY
Status: DISCONTINUED | OUTPATIENT
Start: 2022-05-28 | End: 2022-05-29

## 2022-05-28 RX ORDER — LORAZEPAM 2 MG/ML
2 INJECTION INTRAMUSCULAR
Status: DISCONTINUED | OUTPATIENT
Start: 2022-05-28 | End: 2022-06-01 | Stop reason: HOSPADM

## 2022-05-28 RX ORDER — SODIUM CHLORIDE 0.9 % (FLUSH) 0.9 %
5-40 SYRINGE (ML) INJECTION EVERY 8 HOURS
Status: DISCONTINUED | OUTPATIENT
Start: 2022-05-28 | End: 2022-06-01 | Stop reason: HOSPADM

## 2022-05-28 RX ORDER — FLUTICASONE PROPIONATE 50 MCG
2 SPRAY, SUSPENSION (ML) NASAL DAILY
Status: DISCONTINUED | OUTPATIENT
Start: 2022-05-28 | End: 2022-06-01 | Stop reason: HOSPADM

## 2022-05-28 RX ADMIN — THIAMINE HCL TAB 100 MG 100 MG: 100 TAB at 10:10

## 2022-05-28 RX ADMIN — ACETAMINOPHEN 650 MG: 325 TABLET ORAL at 03:15

## 2022-05-28 RX ADMIN — HYDROXYZINE HYDROCHLORIDE 50 MG: 25 TABLET, FILM COATED ORAL at 16:54

## 2022-05-28 RX ADMIN — LORAZEPAM 2 MG: 2 INJECTION INTRAMUSCULAR; INTRAVENOUS at 06:05

## 2022-05-28 RX ADMIN — HYDROXYZINE HYDROCHLORIDE 50 MG: 25 TABLET, FILM COATED ORAL at 23:23

## 2022-05-28 RX ADMIN — ENOXAPARIN SODIUM 40 MG: 100 INJECTION SUBCUTANEOUS at 10:15

## 2022-05-28 RX ADMIN — LORAZEPAM 2 MG: 1 TABLET ORAL at 10:11

## 2022-05-28 RX ADMIN — CHOLECALCIFEROL TAB 25 MCG (1000 UNIT) 1000 UNITS: 25 TAB at 10:10

## 2022-05-28 RX ADMIN — ONDANSETRON 4 MG: 2 INJECTION INTRAMUSCULAR; INTRAVENOUS at 16:54

## 2022-05-28 RX ADMIN — SODIUM CHLORIDE, PRESERVATIVE FREE 10 ML: 5 INJECTION INTRAVENOUS at 16:55

## 2022-05-28 RX ADMIN — ARIPIPRAZOLE 10 MG: 5 TABLET ORAL at 10:17

## 2022-05-28 RX ADMIN — FLUOXETINE HYDROCHLORIDE 60 MG: 20 CAPSULE ORAL at 10:10

## 2022-05-28 RX ADMIN — POTASSIUM CHLORIDE 40 MEQ: 750 TABLET, FILM COATED, EXTENDED RELEASE ORAL at 12:02

## 2022-05-28 RX ADMIN — OXYCODONE 5 MG: 5 TABLET ORAL at 10:10

## 2022-05-28 RX ADMIN — FOLIC ACID 1 MG: 1 TABLET ORAL at 10:11

## 2022-05-28 RX ADMIN — LORAZEPAM 4 MG: 2 INJECTION INTRAMUSCULAR; INTRAVENOUS at 23:24

## 2022-05-28 RX ADMIN — ONDANSETRON 4 MG: 2 INJECTION INTRAMUSCULAR; INTRAVENOUS at 03:15

## 2022-05-28 RX ADMIN — SODIUM CHLORIDE, PRESERVATIVE FREE 10 ML: 5 INJECTION INTRAVENOUS at 10:18

## 2022-05-28 RX ADMIN — LORAZEPAM 4 MG: 2 INJECTION INTRAMUSCULAR; INTRAVENOUS at 19:41

## 2022-05-28 RX ADMIN — SODIUM CHLORIDE 40 MG: 9 INJECTION INTRAMUSCULAR; INTRAVENOUS; SUBCUTANEOUS at 02:16

## 2022-05-28 RX ADMIN — ONDANSETRON 4 MG: 4 TABLET, ORALLY DISINTEGRATING ORAL at 10:10

## 2022-05-28 NOTE — TELEPHONE ENCOUNTER
Refill request(s) reviewed--Adderall.     Prescription Monitoring Program ([place]) database query with fills:  Filled  Written  Sold  ID  Drug  QTY  Days  Prescriber  RX #  Dispenser  Refill  Daily Dose*  Pymt Type       04/25/2022 04/25/2022   2  Dextroamp-Amphet Er 30 Mg Cap   30.00  30  Cl Chi  [id number]  Vir ([id number])  0   [facility] Pay  [place]     03/24/2022 03/22/2022   2  Dextroamp-Amphet Er 30 Mg Cap   30.00  30  Cl Chi  [id number]  Vir (9677)  0   [facility]     03/11/2022 03/11/2022   2  Oxycodone-Acetaminophen 5-325   15.00  4  [place]  [id number]  Vir (9677)  0  28.13 MME  [place]     03/04/2022 03/04/2022   2  Dextroamp-Amphet Er 20 Mg Cap   20.00  20  Cl Chi  [id number]  Vir (9677)  0   [facility]     02/15/2022  02/15/2022   2  Hydrocodone-Acetamin 5-325 Mg   10.00  3  Mo Aung  [id number]  Vir (8217)  0  16.67 MME  [place] [place]     02/03/2022 02/03/2022   2  Dextroamp-Amphet Er 20 Mg Cap   30.00  30  Cl Chi  [id number]  Vir (1071)  0           She is currently hospitalized with admit dx's as below:  Alcoholic hepatitis   Alcohol withdrawals  Hypokalemia   Severe depression  Passive suicidal ideation

## 2022-05-28 NOTE — ED NOTES
TRANSFER - OUT REPORT:    Verbal report given to ADAMS Clay on Marcela Harrison  being transferred to PCU for routine progression of care       Report consisted of patients Situation, Background, Assessment and   Recommendations(SBAR). Information from the following report(s) SBAR, Kardex, ED Summary, Intake/Output, MAR, Recent Results, Med Rec Status and Cardiac Rhythm and vital signs was reviewed with the receiving nurse. Advised Danna that patient is nauseous, but wants to try Apple Juice to see if she can tolerate before she gets her medications. If she tolerates Apple Juice without vomiting, she can take her medications and eat breakfast. Danna advised that she is on Seizure and Suicide Precautions, Neuro and CIWA checks. Lines:   Peripheral IV 05/28/22 Anterior;Left;Proximal Forearm (Active)        Opportunity for questions and clarification was provided.       Patient transported with:   Transportation Tech

## 2022-05-28 NOTE — ED NOTES
I confirmed with Dr. Yun Hinson that patient still needs to be on Suicide Precautions. He stated that patient needs to be on Suicide Precautions because she stated that she had SI when speaking with the Hospitalist yesterday. I advised charge nurse Leighton Boucher that I confirmed with Dr. Yun Hinson that patient still needs a sitter.

## 2022-05-28 NOTE — ED NOTES
I called Nursing Supervisor and advised that I attempted to call report twice, nurse did not come to phone. I advised Supervisor that I cant put in transport for patient because the patient needs a sitter,  Cristy Stokes, Nursing Supervisor advised that the Tech on the floor will need to sit with the patient and for me to advise the floor nurse.

## 2022-05-28 NOTE — ED NOTES
Patient is laying in bed, resting quietly. She states that she was hospitalized last week in [facility] due to her Anxiety and Depression. She says she, \"drinks alcohol to make the pain go away and I would never hurt myself because I have a son. \" She is trying to get her mental health medications adjusted so she will feel better and not have to drink. She admits to abusing narcotics in the past to help her mental pain, after she had them prescribed for Endometriosis. She has been on Prozac since last August and was prescribed Abilify about 1 1/2 months ago, she states she doesn't feel any better since starting them. She states her last alcoholic beverage was yesterday morning, she states that she will drink anything she can, no particular alcohol of choice. Patient is A&O x4. She currently denies SI/HI.

## 2022-05-28 NOTE — ED NOTES
Spoke with supervisor in regards to suicide precautions and need for sitter, per supervisor the suicide precaution order mandates a required sitter. Patient still denies SI/HI plan, MD called and notified of sitter requirement with the current orders in place, 1:1 observation initiated.     Patient resting in bed with eyes closed, side rails up, on monitor x3, call bell within reach, no needs at this time

## 2022-05-28 NOTE — ED NOTES
Shift report received from [facility]. This RN introduced self to patient, patient provided with warm blanket and drink.  Patient endorses depression but states she is not having thoughts of harming herself or others, patient on monitor x2, no other needs at this time

## 2022-05-28 NOTE — ED NOTES
Spoke with Cele Kline MD regarding possible need for 1:1 observation/safe room, because patient is not endorsing SI plan no need for close watch at this time, continue with medical monitoring

## 2022-05-28 NOTE — PROGRESS NOTES
Transition of Care Plan:    RUR: 13%  Disposition: Home with family support-additional substance use services   Follow up appointments: Follow up with PCP and/or Specialist   DME needed: N/A  Transportation at Discharge: Family will assist   [place] or means to access home:     Family will provide    IM Medicare Letter: N/A  Is patient a BCPI-A Bundle:     CM will provide if required       If yes, was Bundle Letter given?:    Is patient a  and connected with the [address] [employer] [employer] ? N/A     If yes, was [employer] transfer form completed and [facility] notified? Caregiver Contact: Isabell Baker (mother) [phone] and Rosendo Feliciano (other relative) [phone]  Discharge Caregiver contacted prior to discharge? Family to be contacted  Care Conference needed?:   Not at this time    Reason for Admission:  Alcholic Hepatitis without ascites                      RUR Score:       13%              Plan for utilizing home health:      Not at this time     PCP: First and Last name:  Sonya Todd MD     Name of Practice:    Are you a current patient: Yes/No: No    Approximate date of last visit: UNKNOWN    Can you participate in a virtual visit with your PCP: Yes, if recommended by physician                     Current Advanced Directive/Advance Care Plan: Full Code    Shahram 13 (ACP) Conversation      Date of Conversation: 5/28/22  Conducted with: Patient with Decision Making Capacity    Healthcare Decision Maker:   No healthcare decision makers have been documented. Click here to complete [employer] including selection of the Healthcare Decision Maker Relationship (ie \"Primary\")    Today we documented Decision Maker(s) consistent with Legal Next of Kin hierarchy.     Content/Action Overview:   DECLINED ACP conversation - will revisit periodically   Reviewed DNR/DNI and patient elects Full Code (Attempt Resuscitation)      Length of Voluntary ACP Conversation in minutes:  16 minutes    Elvira Salinas                     Transition of Care Plan:                        CM completed assessment with pt, via telephone. Pt is known to reside with family in their one story home. Pt doesn't recall the last time she was seen by PCP. Pt is known to use [facility]. Pt is known to be independent with ADLs, and doesn't drive. Pt's family will assist with transport at the time of d/c. Pt reported no HHC, SNF, and DME    CM reviewed clinicals and pt is currently being followed by [facility]. CM will provide substance use resources to pt at the time of d/c.  Pt can review resources independently, for additional services once d/c. CM will continue to follow. Care Management Interventions  PCP Verified by CM: Yes  Mode of Transport at Discharge:  Other (see comment)  Transition of Care Consult (CM Consult): Discharge Planning  Discharge Durable Medical Equipment: No  Physical Therapy Consult: No  Occupational Therapy Consult: No  Speech Therapy Consult: No  Support Systems: Other Family Member(s),Parent(s)  Confirm Follow Up Transport: Family  Discharge Location  Patient Expects to be Discharged to[de-identified] BARBARA/ Layton Morrissey 43, MSW, [facility]

## 2022-05-28 NOTE — H&P
Hospitalist Admission Note    NAME: Winsome Tiwari   :  1980   MRN:  [id number]     Date/Time:  2022 1:25 AM    Patient PCP: Taryn Liz MD  ______________________________________________________________________  Given the patient's current clinical presentation, I have a high level of concern for decompensation if discharged from the emergency department. Complex decision making was performed, which includes reviewing the patient's available past medical records, laboratory results, and x-ray films. My assessment of this patient's clinical condition and my plan of care is as follows. Assessment / Plan:    Alcohol abuse  Alcoholic hepatitis   Alcohol withdrawals  Hypokalemia   Severe depression  Passive suicidal ideation  Admit to telemetry floor  RUQ US 1. Gallbladder distention but no cholelithiasis or ductal dilatation. 2. Hepatic steatosis. Rapid covid neg  Ur Drug screen + for opioid and barbiturates. Pt stated that she was given morphine prior to given urine sample   CIWA protocol  Start folic and thiamine  IV fluids  Start PPI daily given epigastric tenderness/N/V  Check INR and ammonia level  If LFTs not improving then consider GI consult   Resume antidepressant medications  Seen by behavioral health counselor, due to have low risk for suicide and recommend medical admission. Follow-up with psychiatry when medically cleared. Cont suicide precaution and sitter  Replete electrolytes    Code Status: full   Surrogate Decision Maker: Mother  DVT Prophylaxis: Lovenox   GI Prophylaxis: not indicated  Baseline: independent       Subjective:   CHIEF COMPLAINT: abd pain    HISTORY OF PRESENT ILLNESS:     Rekha Collins is a 46 y.o.  female who presents with the above CC.   Patient with known history of alcohol abuse, was seen in the emergency room 2 weeks ago with similar symptoms and she was discharged to inpatient psychiatry unit from when she was released almost 10 days ago. Patient went back to drinking alcohol again, she said that she drinks around 4 drinks a day. Patient started having severe right upper quadrant and epigastric pain associated with severe nausea and vomiting. Patient denies fever, chills, diarrhea, productive cough, flulike symptoms, bright red bleeding per rectum, melena and hematemesis. Patient last drink was today  Patient mentioned severe depression and feeling that she is not interested in living anymore but she has no plan to hurt herself and she denies history of suicidal attempts in the past. Denies drug abuse. Blood work significant for potassium 3.2, , , alk phos is 144, lipase within normal limits. US ABD LTD    Result Date: 2022  1. Gallbladder distention but no cholelithiasis or ductal dilatation. 2. Hepatic steatosis. We were asked to admit for work up and evaluation of the above problems. Past Medical History:   Diagnosis Date    ADD (attention deficit disorder) 19-19yo    Treated with Adderall since dx. 2013 dosing 20mg AM and 10mg PM.  Trial/change to Vyvanse Nov-Dec 2015--not as effective.  Gynecologic disorder     History of miscarriages. History of Mirena IUD placed on 4/17/15    HX OTHER MEDICAL      -BUFA baby    HX OTHER MEDICAL     HPV positive    Idiopathic vulvodynia 2014    L1 vertebral fracture (HCC) 2017    [facility] imaging/admissoin: Mild acute two column compression fracture of L1 without evidence of retropulsion into the spinal canal.  Managed non-operatively.  Migraines 2013    Neurological disorder     Pelvic pain in female 9/15/2014    2015 gyn laparoscopy/hysteroscopy with endometriosis worse right.     Psychiatric disorder     depression    Secondary dysmenorrhea 2014    With menorraghia    Seizures ([facility] [address]     never on meds--had appr 4-5 in a short amt of time and none since        Past Surgical History:   Procedure Laterality Date    ENDOMETRIAL CRYOABLATION      HX GYN  4/17/15    laparoscopy and hysteroscopy and IUD placement    HX HYSTERECTOMY  04/04/2016    Complete Hysterectomy       Social History     Tobacco Use    Smoking status: Current Every Day Smoker     Packs/day: 1.00     Years: 18.00     Pack years: 18.00     Types: Cigarettes    Smokeless tobacco: Never Used    Tobacco comment: Vapes   Substance Use Topics    Alcohol use: Yes     Alcohol/week: 0.0 standard drinks     Comment: rarely. Family History   Problem Relation Age of Onset    Cancer Mother     Diabetes Mother     Alcohol abuse Father         and drug    Psychiatric Disorder Father     Cancer Father     Diabetes Maternal Grandmother     Hypertension Maternal Grandmother     Thyroid Disease Maternal Grandmother     Diabetes Maternal Grandfather     Hypertension Maternal Grandfather     Cancer Maternal Grandfather     Heart Disease Maternal Grandfather     Cancer Paternal Grandmother     Diabetes Paternal Grandmother      Allergies   Allergen Reactions    Aspirin Other (comments)     Hot sweats and passed out; tolerated ibuprofen    Penicillins Other (comments)     Childhood. Does not remember reaction. Is not aware if she has ever taken cephalosporins in the past.    Jan 2019: Pt notes no problems with cephalosporins. Prior to Admission medications    Medication Sig Start Date End Date Taking? Authorizing Provider   amphetamine-dextroamphetamine XR (ADDERALL XR) 30 mg XR capsule Take 1 Capsule by mouth every morning. Max Daily Amount: 30 mg. 4/25/22   Dale Cedeño MD   fluticasone propionate [facility]) 50 mcg/actuation nasal spray 2 sprays to each nostril 1 time daily 4/12/22   Jesus Newton MD   cetirizine (ZYRTEC) 10 mg tablet Take 1 Tablet by mouth daily. Take as needed for allergies.  3/22/22   Dale Cedeño MD       REVIEW OF SYSTEMS:     Total of 12 systems reviewed, negative except for the above. I am not able to complete the review of systems because:    The patient is intubated and sedated    The patient has altered mental status due to his acute medical problems    The patient has baseline aphasia from prior stroke(s)    The patient has baseline dementia and is not reliable historian    The patient is in acute medical distress and unable to provide information             POSITIVE= underlined text  Negative = text not underlined  General:  fever, chills, sweats, generalized weakness, weight loss/gain,      loss of appetite   Eyes:    blurred vision, eye pain, loss of vision, double vision  ENT:    rhinorrhea, pharyngitis   Respiratory:   cough, sputum production, SOB, FIGUEROA, wheezing, pleuritic pain   Cardiology:   chest pain, palpitations, orthopnea, PND, edema, syncope   Gastrointestinal:  abdominal pain , N/V, diarrhea, dysphagia, constipation, bleeding   Genitourinary:  frequency, urgency, dysuria, hematuria, incontinence   Muskuloskeletal :  arthralgia, myalgia, back pain  Hematology:  easy bruising, nose or gum bleeding, lymphadenopathy   Dermatological: rash, ulceration, pruritis, color change / jaundice  Endocrine:   hot flashes or polydipsia   Neurological:  headache, dizziness, confusion, focal weakness, paresthesia,     Speech difficulties, memory loss, gait difficulty  Psychological: Feelings of anxiety, depression, agitation    Objective:   VITALS:    Visit Vitals  /81   Pulse 82   Temp 97.9 °F (36.6 °C)   Resp 18   Ht 5' 4.5\" (1.638 m)   Wt 86.2 kg (190 lb)   SpO2 93%   BMI 32.11 kg/m²       Intake/Output Summary (Last 24 hours) at 5/28/2022 0125  Last data filed at 5/27/2022 2000  Gross per 24 hour   Intake 1000 ml   Output --   Net 1000 ml      Wt Readings from Last 10 Encounters:   05/27/22 86.2 kg (190 lb)   05/14/22 94 kg (207 lb 3.7 oz)   04/19/22 93.9 kg (207 lb)   04/12/22 94.2 kg (207 lb 9.6 oz)   03/28/22 97.6 kg (215 lb 2.7 oz)   03/22/22 93.9 kg (207 lb) 02/03/22 99.8 kg (220 lb)   09/14/21 104.1 kg (229 lb 6.4 oz)   11/18/19 85.4 kg (188 lb 4 oz)   11/07/19 82.3 kg (181 lb 7 oz)       PHYSICAL EXAM:    General:    Alert, cooperative, no distress, appears stated age. HEENT: Atraumatic, anicteric sclerae, pink conjunctivae, MMM  Neck:  Supple, symmetrical  Lungs:   CTA. No Wheezing/Rhonchi. No rales. No tenderness  No Accessory muscle use. Heart:   Regular rhythm. No murmur. No JVD   Abdomen:   Soft. ND.  BS normal. Epigastric and RUQ tenderness  Extremities: No edema. No cyanosis. No clubbing. Skin:     Not pale. Not Jaundiced. No rashes   Psych:  Good insight. Not depressed. Not anxious or agitated. Neurologic: Alert and oriented X 4. EOMs intact. No facial asymmetry. No slurred speech. Symmetrical strength, Sensation grossly intact.     _______________________________________________________________________  Care Plan discussed with:    Comments   Patient x    Family      RN x    Care Manager                    Consultant:      _______________________________________________________________________  Expected  Disposition:   Home with Family    HH/PT/OT/RN    SNF/LTC    ABE    ________________________________________________________________________  TOTAL TIME:  48 Minutes    Critical Care Provided     Minutes non procedure based      Comments    x Reviewed previous records   >50% of visit spent in counseling and coordination of care x Discussion with patient and/or family and questions answered       ________________________________________________________________________  Signed: Marlyn Lino MD    Procedures: see electronic medical records for all procedures/Xrays and details which were not copied into this note but were reviewed prior to creation of Plan.     LAB DATA REVIEWED:    Recent Results (from the past 24 hour(s))   CBC WITH AUTOMATED DIFF    Collection Time: 05/27/22  1:25 PM   Result Value Ref Range    WBC 7.8 3.6 - 11.0 K/uL    RBC 4. 37 3.80 - 5.20 M/uL    HGB 13.3 11.5 - 16.0 g/dL    HCT 38.5 35.0 - 47.0 %    MCV 88.1 80.0 - 99.0 FL    MCH 30.4 26.0 - 34.0 PG    MCHC 34.5 30.0 - 36.5 g/dL    RDW 13.7 11.5 - 14.5 %    PLATELET 968 (H) 640 - 400 K/uL    MPV 10.2 8.9 - 12.9 FL    NRBC 0.0 0  WBC    ABSOLUTE NRBC 0.00 0.00 - 0.01 K/uL    NEUTROPHILS 66 32 - 75 %    LYMPHOCYTES 25 12 - 49 %    MONOCYTES 8 5 - 13 %    EOSINOPHILS 0 0 - 7 %    BASOPHILS 1 0 - 1 %    IMMATURE GRANULOCYTES 0 0.0 - 0.5 %    ABS. NEUTROPHILS 5.1 1.8 - 8.0 K/UL    ABS. LYMPHOCYTES 2.0 0.8 - 3.5 K/UL    ABS. MONOCYTES 0.6 0.0 - 1.0 K/UL    ABS. EOSINOPHILS 0.0 0.0 - 0.4 K/UL    ABS. BASOPHILS 0.1 0.0 - 0.1 K/UL    ABS. IMM. GRANS. 0.0 0.00 - 0.04 K/UL    DF AUTOMATED     ETHYL ALCOHOL    Collection Time: 05/27/22  4:37 PM   Result Value Ref Range    ALCOHOL(ETHYL),SERUM 166 (H) <79 MG/DL   METABOLIC PANEL, COMPREHENSIVE    Collection Time: 05/27/22  4:37 PM   Result Value Ref Range    Sodium 134 (L) 136 - 145 mmol/L    Potassium 3.2 (L) 3.5 - 5.1 mmol/L    Chloride 97 97 - 108 mmol/L    CO2 28 21 - 32 mmol/L    Anion gap 9 5 - 15 mmol/L    Glucose 138 (H) 65 - 100 mg/dL    BUN 19 6 - 20 MG/DL    Creatinine 0.69 0.55 - 1.02 MG/DL    BUN/Creatinine ratio 28 (H) 12 - 20      GFR est AA >60 >60 ml/min/1.73m2    GFR est non-AA >60 >60 ml/min/1.73m2    Calcium 8.4 (L) 8.5 - 10.1 MG/DL    Bilirubin, total 1.1 (H) 0.2 - 1.0 MG/DL    ALT (SGPT) 986 (H) 12 - 78 U/L    AST (SGOT) 204 (H) 15 - 37 U/L    Alk. phosphatase 144 (H) 45 - 117 U/L    Protein, total 6.6 6.4 - 8.2 g/dL    Albumin 3.3 (L) 3.5 - 5.0 g/dL    Globulin 3.3 2.0 - 4.0 g/dL    A-G Ratio 1.0 (L) 1.1 - 2.2     SAMPLES BEING HELD    Collection Time: 05/27/22  4:37 PM   Result Value Ref Range    SAMPLES BEING HELD PST     COMMENT        Add-on orders for these samples will be processed based on acceptable specimen integrity and analyte stability, which may vary by analyte.    LIPASE    Collection Time: 05/27/22 4:37 PM   Result Value Ref Range    Lipase 95 73 - 393 U/L   COVID-19 RAPID TEST    Collection Time: 05/27/22  8:37 PM   Result Value Ref Range    Specimen source Nasopharyngeal      COVID-19 rapid test Not detected NOTD     URINALYSIS W/ REFLEX CULTURE    Collection Time: 05/27/22  9:16 PM    Specimen: Urine   Result Value Ref Range    Color YELLOW/STRAW      Appearance CLEAR CLEAR      Specific gravity 1.023      pH (UA) 6.5 5.0 - 8.0      Protein Negative NEG mg/dL    Glucose Negative NEG mg/dL    Ketone Negative NEG mg/dL    Bilirubin Negative NEG      Blood Negative NEG      Urobilinogen 1.0 0.2 - 1.0 EU/dL    Nitrites Negative NEG      Leukocyte Esterase Negative NEG      UA:UC IF INDICATED CULTURE NOT INDICATED BY UA RESULT CNI      WBC 0-4 0 - 4 /hpf    RBC 0-5 0 - 5 /hpf    Epithelial cells MODERATE (A) FEW /lpf    Bacteria Negative NEG /hpf    Hyaline cast 0-2 0 - 2 /lpf   DRUG SCREEN, URINE    Collection Time: 05/27/22  9:16 PM   Result Value Ref Range    AMPHETAMINES Negative NEG      BARBITURATES Positive (A) NEG      BENZODIAZEPINES Negative NEG      COCAINE Negative NEG      METHADONE Negative NEG      OPIATES Positive (A) NEG      PCP(PHENCYCLIDINE) Negative NEG      THC (TH-CANNABINOL) Negative NEG      Drug screen comment (NOTE)    HCG URINE, QL. - POC    Collection Time: 05/27/22  9:56 PM   Result Value Ref Range    Pregnancy test,urine (POC) Negative NEG       US ABD LTD    Result Date: 5/27/2022  1. Gallbladder distention but no cholelithiasis or ductal dilatation. 2. Hepatic steatosis.            Current Medications:     Current Facility-Administered Medications:     diazePAM (VALIUM) tablet 5 mg, 5 mg, Oral, Q6H PRN, Jaylyn Vyas MD, 5 mg at 05/27/22 2311    0.9% sodium chloride infusion, 100 mL/hr, IntraVENous, CONTINUOUS, Roland Kelly MD, Last Rate: 100 mL/hr at 05/27/22 2311, 100 mL/hr at 05/27/22 2311    thiamine mononitrate (B-1) tablet 100 mg, 100 mg, Oral, DAILY, Jesus Stokes, MD Pavel    folic acid (FOLVITE) tablet 1 mg, 1 mg, Oral, DAILY, Kelin Daugherty MD    Current Outpatient Medications:     amphetamine-dextroamphetamine XR (ADDERALL XR) 30 mg XR capsule, Take 1 Capsule by mouth every morning. Max Daily Amount: 30 mg., Disp: 30 Capsule, Rfl: 0    fluticasone propionate (FLONASE) 50 mcg/actuation nasal spray, 2 sprays to each nostril 1 time daily, Disp: 1 Each, Rfl: 0    cetirizine (ZYRTEC) 10 mg tablet, Take 1 Tablet by mouth daily. Take as needed for allergies. , Disp: 90 Tablet, Rfl: 1

## 2022-05-29 LAB
ALBUMIN SERPL-MCNC: 2.4 G/DL (ref 3.5–5)
ALBUMIN/GLOB SERPL: 1 {RATIO} (ref 1.1–2.2)
ALP SERPL-CCNC: 104 U/L (ref 45–117)
ALT SERPL-CCNC: 452 U/L (ref 12–78)
ANION GAP SERPL CALC-SCNC: 6 MMOL/L (ref 5–15)
AST SERPL-CCNC: 73 U/L (ref 15–37)
BILIRUB SERPL-MCNC: 1 MG/DL (ref 0.2–1)
BUN SERPL-MCNC: 10 MG/DL (ref 6–20)
BUN/CREAT SERPL: 13 (ref 12–20)
CALCIUM SERPL-MCNC: 8 MG/DL (ref 8.5–10.1)
CHLORIDE SERPL-SCNC: 109 MMOL/L (ref 97–108)
CO2 SERPL-SCNC: 24 MMOL/L (ref 21–32)
CREAT SERPL-MCNC: 0.79 MG/DL (ref 0.55–1.02)
ERYTHROCYTE [DISTWIDTH] IN BLOOD BY AUTOMATED COUNT: 13.9 % (ref 11.5–14.5)
GLOBULIN SER CALC-MCNC: 2.5 G/DL (ref 2–4)
GLUCOSE SERPL-MCNC: 110 MG/DL (ref 65–100)
HCT VFR BLD AUTO: 32.8 % (ref 35–47)
HGB BLD-MCNC: 10.6 G/DL (ref 11.5–16)
MCH RBC QN AUTO: 30.3 PG (ref 26–34)
MCHC RBC AUTO-ENTMCNC: 32.3 G/DL (ref 30–36.5)
MCV RBC AUTO: 93.7 FL (ref 80–99)
NRBC # BLD: 0 K/UL (ref 0–0.01)
NRBC BLD-RTO: 0 PER 100 WBC
PLATELET # BLD AUTO: 208 K/UL (ref 150–400)
PMV BLD AUTO: 9.4 FL (ref 8.9–12.9)
POTASSIUM SERPL-SCNC: 3.7 MMOL/L (ref 3.5–5.1)
PROT SERPL-MCNC: 4.9 G/DL (ref 6.4–8.2)
RBC # BLD AUTO: 3.5 M/UL (ref 3.8–5.2)
SODIUM SERPL-SCNC: 139 MMOL/L (ref 136–145)
WBC # BLD AUTO: 6.6 K/UL (ref 3.6–11)

## 2022-05-29 PROCEDURE — 85027 COMPLETE CBC AUTOMATED: CPT

## 2022-05-29 PROCEDURE — 74011000250 HC RX REV CODE- 250: Performed by: GENERAL ACUTE CARE HOSPITAL

## 2022-05-29 PROCEDURE — 65270000046 HC RM TELEMETRY

## 2022-05-29 PROCEDURE — 36415 COLL VENOUS BLD VENIPUNCTURE: CPT

## 2022-05-29 PROCEDURE — 74011250636 HC RX REV CODE- 250/636: Performed by: GENERAL ACUTE CARE HOSPITAL

## 2022-05-29 PROCEDURE — 74011250637 HC RX REV CODE- 250/637: Performed by: GENERAL ACUTE CARE HOSPITAL

## 2022-05-29 PROCEDURE — 80053 COMPREHEN METABOLIC PANEL: CPT

## 2022-05-29 PROCEDURE — C9113 INJ PANTOPRAZOLE SODIUM, VIA: HCPCS | Performed by: GENERAL ACUTE CARE HOSPITAL

## 2022-05-29 RX ORDER — HYDROXYZINE 25 MG/1
50 TABLET, FILM COATED ORAL
Status: DISCONTINUED | OUTPATIENT
Start: 2022-05-29 | End: 2022-06-01 | Stop reason: HOSPADM

## 2022-05-29 RX ORDER — TRAZODONE HYDROCHLORIDE 50 MG/1
50 TABLET ORAL
Status: DISCONTINUED | OUTPATIENT
Start: 2022-05-29 | End: 2022-06-01 | Stop reason: HOSPADM

## 2022-05-29 RX ORDER — FLUOXETINE HYDROCHLORIDE 20 MG/1
20 CAPSULE ORAL DAILY
Status: DISCONTINUED | OUTPATIENT
Start: 2022-06-06 | End: 2022-06-01 | Stop reason: HOSPADM

## 2022-05-29 RX ORDER — PANTOPRAZOLE SODIUM 40 MG/1
40 TABLET, DELAYED RELEASE ORAL
Status: DISCONTINUED | OUTPATIENT
Start: 2022-05-30 | End: 2022-06-01 | Stop reason: HOSPADM

## 2022-05-29 RX ORDER — VENLAFAXINE 37.5 MG/1
37.5 TABLET ORAL DAILY
Status: DISCONTINUED | OUTPATIENT
Start: 2022-05-30 | End: 2022-06-01 | Stop reason: HOSPADM

## 2022-05-29 RX ORDER — FLUOXETINE 10 MG/1
10 CAPSULE ORAL DAILY
Status: DISCONTINUED | OUTPATIENT
Start: 2022-06-13 | End: 2022-06-01 | Stop reason: HOSPADM

## 2022-05-29 RX ORDER — FLUOXETINE HYDROCHLORIDE 20 MG/1
40 CAPSULE ORAL DAILY
Status: DISCONTINUED | OUTPATIENT
Start: 2022-05-30 | End: 2022-06-01 | Stop reason: HOSPADM

## 2022-05-29 RX ADMIN — SODIUM CHLORIDE 40 MG: 9 INJECTION INTRAMUSCULAR; INTRAVENOUS; SUBCUTANEOUS at 00:36

## 2022-05-29 RX ADMIN — ARIPIPRAZOLE 10 MG: 5 TABLET ORAL at 11:59

## 2022-05-29 RX ADMIN — LORAZEPAM 4 MG: 2 INJECTION INTRAMUSCULAR; INTRAVENOUS at 08:57

## 2022-05-29 RX ADMIN — LORAZEPAM 2 MG: 2 INJECTION INTRAMUSCULAR; INTRAVENOUS at 12:12

## 2022-05-29 RX ADMIN — LORAZEPAM 4 MG: 2 INJECTION INTRAMUSCULAR; INTRAVENOUS at 22:23

## 2022-05-29 RX ADMIN — SODIUM CHLORIDE, PRESERVATIVE FREE 10 ML: 5 INJECTION INTRAVENOUS at 18:54

## 2022-05-29 RX ADMIN — OXYCODONE 5 MG: 5 TABLET ORAL at 19:31

## 2022-05-29 RX ADMIN — FLUOXETINE HYDROCHLORIDE 60 MG: 20 CAPSULE ORAL at 09:57

## 2022-05-29 RX ADMIN — FOLIC ACID 1 MG: 1 TABLET ORAL at 08:56

## 2022-05-29 RX ADMIN — OXYCODONE 5 MG: 5 TABLET ORAL at 12:12

## 2022-05-29 RX ADMIN — SODIUM CHLORIDE, PRESERVATIVE FREE 10 ML: 5 INJECTION INTRAVENOUS at 22:23

## 2022-05-29 RX ADMIN — THIAMINE HCL TAB 100 MG 100 MG: 100 TAB at 08:56

## 2022-05-29 RX ADMIN — SODIUM CHLORIDE, PRESERVATIVE FREE 10 ML: 5 INJECTION INTRAVENOUS at 08:57

## 2022-05-29 RX ADMIN — ENOXAPARIN SODIUM 40 MG: 100 INJECTION SUBCUTANEOUS at 08:56

## 2022-05-29 RX ADMIN — LORAZEPAM 4 MG: 2 INJECTION INTRAMUSCULAR; INTRAVENOUS at 19:33

## 2022-05-29 RX ADMIN — CHOLECALCIFEROL TAB 25 MCG (1000 UNIT) 1000 UNITS: 25 TAB at 08:56
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
## 2022-05-29 NOTE — PROGRESS NOTES
IV to PO Conversion    Pt taking other po meds, on adult diet    Changed PPI to PO daily    Thank you,  Nicole Paul, [facility]

## 2022-05-29 NOTE — PROGRESS NOTES
Hospitalist Progress Note    NAME: Tiffany Morocho   :  1980   MRN:  [id number]       Assessment / Plan:  Alcohol abuse  Alcoholic hepatitis   Alcohol withdrawals  Hypokalemia   Severe depression  Passive suicidal ideation    RUQ US 1. Gallbladder distention but no cholelithiasis or ductal dilatation. 2. Hepatic steatosis. Rapid covid neg  Ur Drug screen + for opioid and barbiturates. Pt stated that she was given morphine prior to given urine sample   CIWA protocol  Cont  folic and thiamine  IV fluids  Start PPI daily given epigastric tenderness/N/V  INR is 1.2  Check INR and ammonia level  Check cmp in am  Consult psych     Code Status: full   Surrogate Decision Maker: Mother  DVT Prophylaxis: Lovenox   GI Prophylaxis: not indicated  Baseline: independent       Body mass index is 32.11 kg/m². Subjective:     Chief Complaint / Reason for Physician Visit  Reports no SI but feeling depressed   No abd pain but reports back pain     Review of Systems:  Symptom Y/N Comments  Symptom Y/N Comments   Fever/Chills    Chest Pain     Poor Appetite    Edema     Cough    Abdominal Pain     Sputum    Joint Pain     SOB/FIGUEROA    Pruritis/Rash     Nausea/vomit    Tolerating PT/OT     Diarrhea    Tolerating Diet     Constipation    Other       Could NOT obtain due to:      Objective:     VITALS:   Last 24hrs VS reviewed since prior progress note.  Most recent are:  Patient Vitals for the past 24 hrs:   Temp Pulse Resp BP SpO2   22 1917 97.7 °F (36.5 °C) (!) 110 19 115/69 96 %   22 1511 98.1 °F (36.7 °C) (!) 101 18 107/70 95 %   22 1211 98.2 °F (36.8 °C) 100 20 109/72 95 %   22 0947 98.1 °F (36.7 °C) 91 15 124/79 99 %   22 0732 98.2 °F (36.8 °C) 88 25 127/71 93 %   22 0700 -- 82 21 127/71 93 %   22 0608 -- 86 19 (!) 142/75 94 %   22 0453 -- 81 21 123/82 90 %   22 0353 -- 90 21 130/75 94 %   22 0253 -- 94 21 125/87 95 %   22 0153 98.2 °F (36.8 °C) 82 23 115/84 94 %   05/28/22 0053 -- 82 18 127/81 93 %   05/27/22 2353 -- 78 19 113/63 95 %       Intake/Output Summary (Last 24 hours) at 5/28/2022 2322  Last data filed at 5/28/2022 1917  Gross per 24 hour   Intake 1950 ml   Output --   Net 1950 ml        PHYSICAL EXAM:  General: WD, WN. Alert, cooperative, no acute distress    EENT:  EOMI. Anicteric sclerae. MMM  Resp:  CTA bilaterally, no wheezing or rales. No accessory muscle use  CV:  Regular  rhythm,  No edema  GI:  Soft, Non distended, Non tender.  +Bowel sounds  Neurologic:  Alert and oriented X 3, normal speech,   Psych:   Good insight. Not anxious nor agitated  Skin:  No rashes.   No jaundice    Reviewed most current lab test results and cultures  YES  Reviewed most current radiology test results   YES  Review and summation of old records today    NO  Reviewed patient's current orders and MAR    YES  PMH/SH reviewed - no change compared to H&P          Current Facility-Administered Medications:     ARIPiprazole (ABILIFY) tablet 10 mg, 10 mg, Oral, DAILY, Susan Enciso MD, 10 mg at 05/28/22 1017    cholecalciferol (VITAMIN D3) (1000 Units /25 mcg) tablet 1,000 Units, 1,000 Units, Oral, DAILY, Susan Enciso MD, 1,000 Units at 05/28/22 1010    FLUoxetine (PROzac) capsule 60 mg, 60 mg, Oral, DAILY, Susan Enciso MD, 60 mg at 05/28/22 1010    fluticasone propionate (FLONASE) 50 mcg/actuation nasal spray 2 Spray, 2 Spray, Both Nostrils, DAILY, Susan Enciso MD    sodium chloride (NS) flush 5-40 mL, 5-40 mL, IntraVENous, Q8H, Viraj Enciso MD, 10 mL at 05/28/22 1655    sodium chloride (NS) flush 5-40 mL, 5-40 mL, IntraVENous, PRN, Susan Enciso MD    polyethylene glycol (MIRALAX) packet 17 g, 17 g, Oral, DAILY PRN, Zaria Susan MD Anna    ondansetron (ZOFRAN ODT) tablet 4 mg, 4 mg, Oral, Q8H PRN, 4 mg at 05/28/22 1010 **OR** ondansetron (ZOFRAN) injection 4 mg, 4 mg, IntraVENous, Q6H PRN, Zaria, Leona Faust MD, 4 mg at 05/28/22 1654    enoxaparin (LOVENOX) injection 40 mg, 40 mg, SubCUTAneous, DAILY, Leona Enciso MD, 40 mg at 05/28/22 1015    hydrOXYzine HCL (ATARAX) tablet 50 mg, 50 mg, Oral, Q6H PRN, Leona Enciso MD, 50 mg at 05/28/22 1654    LORazepam (ATIVAN) injection 2 mg, 2 mg, IntraVENous, Q1H PRN, Leona Enciso MD, 2 mg at 05/28/22 0605    LORazepam (ATIVAN) injection 4 mg, 4 mg, IntraVENous, Q1H PRN, Leona Enciso MD, 4 mg at 05/28/22 1941    LORazepam (ATIVAN) tablet 2 mg, 2 mg, Oral, Q1H PRN, Leona Enciso MD, 2 mg at 05/28/22 1011    LORazepam (ATIVAN) tablet 4 mg, 4 mg, Oral, Q1H PRN, Leona Enciso MD    pantoprazole (PROTONIX) 40 mg in 0.9% sodium chloride 10 mL injection, 40 mg, IntraVENous, Q24H, Viraj Enciso MD, 40 mg at 05/28/22 0216    oxyCODONE IR (ROXICODONE) tablet 5 mg, 5 mg, Oral, Q4H PRN, Viraj Enciso MD, 5 mg at 05/28/22 1010    0.9% sodium chloride infusion, 100 mL/hr, IntraVENous, CONTINUOUS, Leona Enciso MD, Last Rate: 100 mL/hr at 05/27/22 2311, 100 mL/hr at 05/27/22 2311    thiamine mononitrate (B-1) tablet 100 mg, 100 mg, Oral, DAILY, Viraj Enciso MD, 100 mg at 10/45/81 1374    folic acid (FOLVITE) tablet 1 mg, 1 mg, Oral, DAILY, Leona Enciso MD, 1 mg at 05/28/22 1011  ________________________________________________________________________  Care Plan discussed with:    Comments   Patient y    Family      RN y    Care Manager     Consultant                        Multidiciplinary team rounds were held today with , nursing, pharmacist and clinical coordinator. Patient's plan of care was discussed; medications were reviewed and discharge planning was addressed.      ________________________________________________________________________  Total NON critical care TIME:  35    Minutes    Total CRITICAL CARE TIME Spent:   Minutes non procedure based      Comments >50% of visit spent in counseling and coordination of care     ________________________________________________________________________  Araceli Ferro MD     Procedures: see electronic medical records for all procedures/Xrays and details which were not copied into this note but were reviewed prior to creation of Plan. LABS:  I reviewed today's most current labs and imaging studies.   Pertinent labs include:  Recent Labs     05/28/22  0233 05/27/22  1325   WBC 10.2 7.8   HGB 12.8 13.3   HCT 37.6 38.5    471*     Recent Labs     05/28/22  0233 05/27/22  1637   * 134*   K 3.4* 3.2*   CL 99 97   CO2 27 28   * 138*   BUN 17 19   CREA 0.68 0.69   CA 8.0* 8.4*   MG 2.4  --    ALB 3.1* 3.3*   TBILI 1.1* 1.1*   * 986*   INR 1.2*  --        Signed: Araceli Ferro MD

## 2022-05-30 LAB
ALBUMIN SERPL-MCNC: 2.7 G/DL (ref 3.5–5)
ALBUMIN/GLOB SERPL: 0.8 {RATIO} (ref 1.1–2.2)
ALP SERPL-CCNC: 106 U/L (ref 45–117)
ALT SERPL-CCNC: 355 U/L (ref 12–78)
ANION GAP SERPL CALC-SCNC: 4 MMOL/L (ref 5–15)
AST SERPL-CCNC: 55 U/L (ref 15–37)
BILIRUB SERPL-MCNC: 0.7 MG/DL (ref 0.2–1)
BUN SERPL-MCNC: 14 MG/DL (ref 6–20)
BUN/CREAT SERPL: 17 (ref 12–20)
CALCIUM SERPL-MCNC: 8.7 MG/DL (ref 8.5–10.1)
CHLORIDE SERPL-SCNC: 105 MMOL/L (ref 97–108)
CO2 SERPL-SCNC: 28 MMOL/L (ref 21–32)
CREAT SERPL-MCNC: 0.82 MG/DL (ref 0.55–1.02)
ERYTHROCYTE [DISTWIDTH] IN BLOOD BY AUTOMATED COUNT: 13.8 % (ref 11.5–14.5)
GLOBULIN SER CALC-MCNC: 3.2 G/DL (ref 2–4)
GLUCOSE SERPL-MCNC: 106 MG/DL (ref 65–100)
HCT VFR BLD AUTO: 35.8 % (ref 35–47)
HGB BLD-MCNC: 11.7 G/DL (ref 11.5–16)
MCH RBC QN AUTO: 31 PG (ref 26–34)
MCHC RBC AUTO-ENTMCNC: 32.7 G/DL (ref 30–36.5)
MCV RBC AUTO: 95 FL (ref 80–99)
NRBC # BLD: 0 K/UL (ref 0–0.01)
NRBC BLD-RTO: 0 PER 100 WBC
PLATELET # BLD AUTO: 220 K/UL (ref 150–400)
PMV BLD AUTO: 9.8 FL (ref 8.9–12.9)
POTASSIUM SERPL-SCNC: 3.9 MMOL/L (ref 3.5–5.1)
PROT SERPL-MCNC: 5.9 G/DL (ref 6.4–8.2)
RBC # BLD AUTO: 3.77 M/UL (ref 3.8–5.2)
SODIUM SERPL-SCNC: 137 MMOL/L (ref 136–145)
WBC # BLD AUTO: 7.3 K/UL (ref 3.6–11)

## 2022-05-30 PROCEDURE — 74011250637 HC RX REV CODE- 250/637: Performed by: HOSPITALIST

## 2022-05-30 PROCEDURE — 85027 COMPLETE CBC AUTOMATED: CPT

## 2022-05-30 PROCEDURE — 74011250637 HC RX REV CODE- 250/637: Performed by: GENERAL ACUTE CARE HOSPITAL

## 2022-05-30 PROCEDURE — 80053 COMPREHEN METABOLIC PANEL: CPT

## 2022-05-30 PROCEDURE — 74011250636 HC RX REV CODE- 250/636: Performed by: GENERAL ACUTE CARE HOSPITAL

## 2022-05-30 PROCEDURE — 65270000046 HC RM TELEMETRY

## 2022-05-30 PROCEDURE — 74011250637 HC RX REV CODE- 250/637: Performed by: NURSE PRACTITIONER

## 2022-05-30 PROCEDURE — 74011250637 HC RX REV CODE- 250/637: Performed by: INTERNAL MEDICINE

## 2022-05-30 PROCEDURE — 74011000250 HC RX REV CODE- 250: Performed by: GENERAL ACUTE CARE HOSPITAL

## 2022-05-30 PROCEDURE — 36415 COLL VENOUS BLD VENIPUNCTURE: CPT

## 2022-05-30 PROCEDURE — 74011250636 HC RX REV CODE- 250/636: Performed by: HOSPITALIST

## 2022-05-30 RX ORDER — SODIUM CHLORIDE 9 MG/ML
75 INJECTION, SOLUTION INTRAVENOUS CONTINUOUS
Status: DISCONTINUED | OUTPATIENT
Start: 2022-05-30 | End: 2022-06-01 | Stop reason: HOSPADM

## 2022-05-30 RX ORDER — TRAMADOL HYDROCHLORIDE 50 MG/1
50 TABLET ORAL
Status: DISCONTINUED | OUTPATIENT
Start: 2022-05-30 | End: 2022-06-01 | Stop reason: HOSPADM

## 2022-05-30 RX ADMIN — VENLAFAXINE 37.5 MG: 37.5 TABLET ORAL at 08:41

## 2022-05-30 RX ADMIN — TRAMADOL HYDROCHLORIDE 50 MG: 50 TABLET ORAL at 19:40

## 2022-05-30 RX ADMIN — LORAZEPAM 2 MG: 2 INJECTION INTRAMUSCULAR; INTRAVENOUS at 12:18

## 2022-05-30 RX ADMIN — LORAZEPAM 4 MG: 2 INJECTION INTRAMUSCULAR; INTRAVENOUS at 23:11

## 2022-05-30 RX ADMIN — THIAMINE HCL TAB 100 MG 100 MG: 100 TAB at 08:40

## 2022-05-30 RX ADMIN — TRAMADOL HYDROCHLORIDE 50 MG: 50 TABLET ORAL at 12:18

## 2022-05-30 RX ADMIN — ARIPIPRAZOLE 10 MG: 5 TABLET ORAL at 08:42

## 2022-05-30 RX ADMIN — OXYCODONE 5 MG: 5 TABLET ORAL at 08:41

## 2022-05-30 RX ADMIN — SODIUM CHLORIDE, PRESERVATIVE FREE 10 ML: 5 INJECTION INTRAVENOUS at 16:34

## 2022-05-30 RX ADMIN — HYDROXYZINE HYDROCHLORIDE 50 MG: 25 TABLET, FILM COATED ORAL at 08:40

## 2022-05-30 RX ADMIN — OXYCODONE 5 MG: 5 TABLET ORAL at 16:32

## 2022-05-30 RX ADMIN — PANTOPRAZOLE SODIUM 40 MG: 40 TABLET, DELAYED RELEASE ORAL at 08:40

## 2022-05-30 RX ADMIN — FLUOXETINE HYDROCHLORIDE 40 MG: 20 CAPSULE ORAL at 08:40

## 2022-05-30 RX ADMIN — CHOLECALCIFEROL TAB 25 MCG (1000 UNIT) 1000 UNITS: 25 TAB at 08:40

## 2022-05-30 RX ADMIN — LORAZEPAM 4 MG: 2 INJECTION INTRAMUSCULAR; INTRAVENOUS at 16:31

## 2022-05-30 RX ADMIN — SODIUM CHLORIDE, PRESERVATIVE FREE 10 ML: 5 INJECTION INTRAVENOUS at 05:54

## 2022-05-30 RX ADMIN — HYDROXYZINE HYDROCHLORIDE 50 MG: 25 TABLET, FILM COATED ORAL at 16:31

## 2022-05-30 RX ADMIN — FOLIC ACID 1 MG: 1 TABLET ORAL at 08:40

## 2022-05-30 RX ADMIN — SODIUM CHLORIDE 75 ML/HR: 9 INJECTION, SOLUTION INTRAVENOUS at 16:41

## 2022-05-30 RX ADMIN — ENOXAPARIN SODIUM 40 MG: 100 INJECTION SUBCUTANEOUS at 08:40

## 2022-05-30 NOTE — PROGRESS NOTES
End of Shift Note    Bedside shift change report given to Sydney Jacques RN (oncoming nurse) by Lauren Stacy RN (offgoing nurse). Report included the following information SBAR, MAR and Recent Results    Shift worked:  [id number]     Shift summary and any significant changes:    Unable to measure urine output as pt can void independently. Concerns for physician to address:  n/a     Zone phone for oncoming shift:   [phone]       Activity:  Activity Level: Up with Assistance  Number times ambulated in hallways past shift: 0  Number of times OOB to chair past shift: 2    Cardiac:   Cardiac Monitoring: Yes      Cardiac Rhythm: Sinus Rhythm    Access:   Current line(s): PIV     Genitourinary:   Urinary status: voiding    Respiratory:   O2 Device: None (Room air)  Chronic home O2 use?: NO  Incentive spirometer at bedside: NO       GI:  Last Bowel Movement Date: 05/28/22  Current diet:  ADULT DIET Regular  DIET ONE TIME MESSAGE  Passing flatus: YES  Tolerating current diet: YES       Pain Management:   Patient states pain is manageable on current regimen: YES    Skin:  Elijah Score: 21  Interventions: increase time out of bed    Patient Safety:  Fall Score:  Total Score: 1  Interventions: gripper socks and pt to call before getting OOB       Length of Stay:  Expected LOS: - - -  Actual LOS: 3      Lauren Stacy RN

## 2022-05-30 NOTE — PROGRESS NOTES
Hospitalist Progress Note    NAME: Buck Guallpa   :  1980   MRN:  [id number]       Assessment / Plan:  Alcohol abuse, at high risk for alcohol withdrawal  Alcoholic hepatitis   -Continue CIWA protocol with Ativan. Continue multivitamins. CIWA score currently is around 10.  -Consider starting on scheduled Librium if CIWA score is still high  -She was counseled regarding alcohol cessation  -Madrey score is low and does not qualify for steroids  -Transaminases are trending down. Bilirubin is normal.        Hypokalemia   -Potassium is normal.  Check BMP in a.m. tomorrow    Severe depression  Passive suicidal ideation  -Appreciate recommendations from psych  -Continue tapering dose of Prozac, continue with Abilify, Effexor  -No need for inpatient psych admission per psychiatrist  -Discontinue sitter  -Patient can have outpatient psychiatric follow-up           Code Status: full   Surrogate Decision Maker: Mother  DVT Prophylaxis: Lovenox   GI Prophylaxis: not indicated  Baseline: independent       Body mass index is 32.11 kg/m². Subjective:     Chief Complaint / Reason for Physician Visit  She reports some anxiety and tremors this AM.  CIWA score this morning was around 14. Does not report any nausea, vomiting. Does not report any abdominal pain    Review of Systems:  Symptom Y/N Comments  Symptom Y/N Comments   Fever/Chills    Chest Pain     Poor Appetite    Edema     Cough    Abdominal Pain     Sputum    Joint Pain     SOB/FIGUEROA    Pruritis/Rash     Nausea/vomit    Tolerating PT/OT     Diarrhea    Tolerating Diet     Constipation    Other       Could NOT obtain due to:      Objective:     VITALS:   Last 24hrs VS reviewed since prior progress note.  Most recent are:  Patient Vitals for the past 24 hrs:   Temp Pulse Resp BP SpO2   22 2237 97.4 °F (36.3 °C) 99 18 111/74 97 %   22 1926 97.6 °F (36.4 °C) (!) 101 19 114/71 100 %   22 1635 98.6 °F (37 °C) 86 18 113/69 99 % 05/29/22 1037 98.4 °F (36.9 °C) 96 18 (!) 120/90 98 %   05/29/22 1035 -- -- -- -- 97 %   05/29/22 0800 -- -- -- -- 98 %   05/29/22 0726 97 °F (36.1 °C) 93 18 116/80 96 %   05/29/22 0430 98.2 °F (36.8 °C) (!) 112 18 104/80 --   05/29/22 0002 98.5 °F (36.9 °C) 100 21 (!) 91/56 96 %       Intake/Output Summary (Last 24 hours) at 5/29/2022 2320  Last data filed at 5/29/2022 1635  Gross per 24 hour   Intake 1911.67 ml   Output 2 ml   Net 1909.67 ml        PHYSICAL EXAM:  General: Alert, cooperative, no acute distress    EENT:  EOMI. Anicteric sclerae. MMM  Resp:  CTA bilaterally, no wheezing or rales. No accessory muscle use  CV:  Regular  rhythm,  No edema  GI:  Soft, Non distended, Non tender.  +Bowel sounds  Neurologic:  Alert and oriented X 3, normal speech,   Psych:   Not anxious nor agitated  Skin:  No rashes.   No jaundice    Reviewed most current lab test results and cultures  YES  Reviewed most current radiology test results   YES  Review and summation of old records today    NO  Reviewed patient's current orders and MAR    YES  PMH/SH reviewed - no change compared to H&P          Current Facility-Administered Medications:     [START ON 5/30/2022] pantoprazole (PROTONIX) tablet 40 mg, 40 mg, Oral, ACB, Jorge Luis Roman MD    [START ON 5/30/2022] FLUoxetine (PROzac) capsule 40 mg, 40 mg, Oral, DAILY, Alonzo Nicholson Doing, NP    [START ON 6/6/2022] FLUoxetine (PROzac) capsule 20 mg, 20 mg, Oral, DAILY, Alonzo Nicholson Doing, NP    [START ON 6/13/2022] FLUoxetine (PROzac) capsule 10 mg, 10 mg, Oral, DAILY, Raysa Nicholson M, NP    [START ON 5/30/2022] venlafaxine (EFFEXOR) tablet 37.5 mg, 37.5 mg, Oral, DAILY, Raysa Nicholson, NP    hydrOXYzine HCL (ATARAX) tablet 50 mg, 50 mg, Oral, Q6H PRN, Alonzo Nicholson, NP    traZODone (DESYREL) tablet 50 mg, 50 mg, Oral, QHS PRN, Ryasa Nicholson, NP    ARIPiprazole (ABILIFY) tablet 10 mg, 10 mg, Oral, DAILY, Luna Enciso MD, 10 mg at 05/29/22 1403   cholecalciferol (VITAMIN D3) (1000 Units /25 mcg) tablet 1,000 Units, 1,000 Units, Oral, DAILY, Kristian Enciso MD, 1,000 Units at 05/29/22 0856    fluticasone propionate (FLONASE) 50 mcg/actuation nasal spray 2 Spray, 2 Spray, Both Nostrils, DAILY, Kristian Enciso MD    sodium chloride (NS) flush 5-40 mL, 5-40 mL, IntraVENous, Q8H, Kristian Enciso MD, 10 mL at 05/29/22 2223    sodium chloride (NS) flush 5-40 mL, 5-40 mL, IntraVENous, PRN, Kristian Enciso MD, 10 mL at 05/29/22 0857    polyethylene glycol (MIRALAX) packet 17 g, 17 g, Oral, DAILY PRN, Kristian Enciso MD    ondansetron (ZOFRAN ODT) tablet 4 mg, 4 mg, Oral, Q8H PRN, 4 mg at 05/28/22 1010 **OR** ondansetron (ZOFRAN) injection 4 mg, 4 mg, IntraVENous, Q6H PRN, Viraj Enciso MD, 4 mg at 05/28/22 1654    enoxaparin (LOVENOX) injection 40 mg, 40 mg, SubCUTAneous, DAILY, Viraj Enciso MD, 40 mg at 05/29/22 0856    hydrOXYzine HCL (ATARAX) tablet 50 mg, 50 mg, Oral, Q6H PRN, Viraj Enciso MD, 50 mg at 05/28/22 2323    LORazepam (ATIVAN) injection 2 mg, 2 mg, IntraVENous, Q1H PRN, Viraj Enciso MD, 2 mg at 05/29/22 1212    LORazepam (ATIVAN) injection 4 mg, 4 mg, IntraVENous, Q1H PRN, Viraj Enciso MD, 4 mg at 05/29/22 2223    LORazepam (ATIVAN) tablet 2 mg, 2 mg, Oral, Q1H PRN, Viraj Enciso MD, 2 mg at 05/28/22 1011    LORazepam (ATIVAN) tablet 4 mg, 4 mg, Oral, Q1H PRN, Kristian Enciso MD    oxyCODONE IR (ROXICODONE) tablet 5 mg, 5 mg, Oral, Q4H PRN, Viraj Enciso MD, 5 mg at 05/29/22 1931    thiamine mononitrate (B-1) tablet 100 mg, 100 mg, Oral, DAILY, Viraj Enciso MD, 100 mg at 61/76/28 3711    folic acid (FOLVITE) tablet 1 mg, 1 mg, Oral, DAILY, Kristian Enciso MD, 1 mg at 05/29/22 4811  ________________________________________________________________________  Care Plan discussed with:    Comments   Patient y    Family      RN y Care Manager     Consultant                        Multidiciplinary team rounds were held today with , nursing, pharmacist and clinical coordinator. Patient's plan of care was discussed; medications were reviewed and discharge planning was addressed. ________________________________________________________________________  Total NON critical care TIME:  35    Minutes    Total CRITICAL CARE TIME Spent:   Minutes non procedure based      Comments   >50% of visit spent in counseling and coordination of care     ________________________________________________________________________  Tiffany Smith MD     Procedures: see electronic medical records for all procedures/Xrays and details which were not copied into this note but were reviewed prior to creation of Plan. LABS:  I reviewed today's most current labs and imaging studies.   Pertinent labs include:  Recent Labs     05/29/22  0242 05/28/22  0233 05/27/22  1325   WBC 6.6 10.2 7.8   HGB 10.6* 12.8 13.3   HCT 32.8* 37.6 38.5    344 471*     Recent Labs     05/29/22  0242 05/28/22  0233 05/27/22  1637    134* 134*   K 3.7 3.4* 3.2*   * 99 97   CO2 24 27 28   * 115* 138*   BUN 10 17 19   CREA 0.79 0.68 0.69   CA 8.0* 8.0* 8.4*   MG  --  2.4  --    ALB 2.4* 3.1* 3.3*   TBILI 1.0 1.1* 1.1*   * 822* 986*   INR  --  1.2*  --        Signed: Tiffany Smith MD

## 2022-05-30 NOTE — PROGRESS NOTES
Hospitalist Progress Note    NAME: Sarah Cobb   :  1980   MRN:  [id number]       Assessment / Plan:  Alcohol abuse, at high risk for alcohol withdrawal  Alcoholic hepatitis   -Continue CIWA protocol with Ativan. Continue multivitamins. CIWA score currently is around 10.  -Consider starting on scheduled Librium if CIWA score is still high  -Continue gentle NS IVF hydration  -Madrey score is low and does not qualify for steroids  -Transaminases are trending down. Bilirubin is normal.  - Avoid tylenol. Can add tramadol for generalized body pains  - thiamine 100 mg po daily    Hypokalemia   -repleted    Severe depression  Passive suicidal ideation  -Appreciate recommendations from psych  -Continue tapering dose of Prozac, continue with Abilify, Effexor  -No need for inpatient psych admission per psychiatrist  -Discontinue sitter  -Patient can have outpatient psychiatric follow-up    Obesity    Alcoholic fatty liver    Code Status: full   Surrogate Decision Maker: Mother  DVT Prophylaxis: Lovenox   GI Prophylaxis: not indicated  Baseline: independent       Body mass index is 32.11 kg/m². Dispo: Likely discharge 2-3 days. ZAFAR        Subjective:     Chief Complaint / Reason for Physician Visit  C/o generalized body pains and nausea    Review of Systems:  Symptom Y/N Comments  Symptom Y/N Comments   Fever/Chills    Chest Pain     Poor Appetite    Edema     Cough    Abdominal Pain     Sputum    Joint Pain     SOB/FIGUEROA    Pruritis/Rash     Nausea/vomit    Tolerating PT/OT     Diarrhea    Tolerating Diet     Constipation    Other       Could NOT obtain due to:      Objective:     VITALS:   Last 24hrs VS reviewed since prior progress note.  Most recent are:  Patient Vitals for the past 24 hrs:   Temp Pulse Resp BP SpO2   22 0315 97.9 °F (36.6 °C) 92 18 106/67 99 %   22 2237 97.4 °F (36.3 °C) 99 18 111/74 97 %   22 1926 97.6 °F (36.4 °C) (!) 101 19 114/71 100 %   22 1635 98.6 °F (37 °C) 86 18 113/69 99 %   05/29/22 1037 98.4 °F (36.9 °C) 96 18 (!) 120/90 98 %   05/29/22 1035 -- -- -- -- 97 %       Intake/Output Summary (Last 24 hours) at 5/30/2022 0957  Last data filed at 5/29/2022 1635  Gross per 24 hour   Intake 940 ml   Output --   Net 940 ml        PHYSICAL EXAM:  General: Alert, cooperative, no acute distress    EENT:  EOMI. Anicteric sclerae. MMM  Resp:  CTA bilaterally, no wheezing or rales. No accessory muscle use  CV:  Regular  rhythm,  No edema  GI:  Soft, Non distended but obese. Non tender.  +Bowel sounds  Neurologic:  Alert and oriented X 3, normal speech,   Psych:   Not anxious nor agitated  Skin:  No rashes.   No jaundice    Reviewed most current lab test results and cultures  YES  Reviewed most current radiology test results   YES  Review and summation of old records today    NO  Reviewed patient's current orders and MAR    YES  PMH/SH reviewed - no change compared to H&P          Current Facility-Administered Medications:     pantoprazole (PROTONIX) tablet 40 mg, 40 mg, Oral, ACB, Jessie, Evette Brasher MD, 40 mg at 05/30/22 0840    FLUoxetine (PROzac) capsule 40 mg, 40 mg, Oral, DAILY, Raysa Nicholson NP, 40 mg at 05/30/22 0840    [START ON 6/6/2022] FLUoxetine (PROzac) capsule 20 mg, 20 mg, Oral, DAILY, Ishan Nicholson NP    [START ON 6/13/2022] FLUoxetine (PROzac) capsule 10 mg, 10 mg, Oral, DAILY, Ishan Nicholson, NP    venlafaxine (EFFEXOR) tablet 37.5 mg, 37.5 mg, Oral, DAILY, Raysa Nicholson NP, 37.5 mg at 05/30/22 0841    hydrOXYzine HCL (ATARAX) tablet 50 mg, 50 mg, Oral, Q6H PRN, Ishan Nicholson, NP    traZODone (DESYREL) tablet 50 mg, 50 mg, Oral, QHS PRN, Raysa Nicholson, NP    ARIPiprazole (ABILIFY) tablet 10 mg, 10 mg, Oral, DAILY, Amadou Enciso MD, 10 mg at 05/30/22 0842    cholecalciferol (VITAMIN D3) (1000 Units /25 mcg) tablet 1,000 Units, 1,000 Units, Oral, DAILY, Jaclyn Nichols MD, 1,000 Units at 05/30/22 0840   fluticasone propionate (FLONASE) 50 mcg/actuation nasal spray 2 Spray, 2 Spray, Both Nostrils, DAILY, Ethan Enciso MD    sodium chloride (NS) flush 5-40 mL, 5-40 mL, IntraVENous, Q8H, Ethan Enciso MD, 10 mL at 05/30/22 0554    sodium chloride (NS) flush 5-40 mL, 5-40 mL, IntraVENous, PRN, Ethan Enciso MD, 10 mL at 05/29/22 0857    polyethylene glycol (MIRALAX) packet 17 g, 17 g, Oral, DAILY PRN, Ethan Enciso MD    ondansetron (ZOFRAN ODT) tablet 4 mg, 4 mg, Oral, Q8H PRN, 4 mg at 05/28/22 1010 **OR** ondansetron (ZOFRAN) injection 4 mg, 4 mg, IntraVENous, Q6H PRN, Viraj Enciso MD, 4 mg at 05/28/22 1654    enoxaparin (LOVENOX) injection 40 mg, 40 mg, SubCUTAneous, DAILY, Viraj Enciso MD, 40 mg at 05/30/22 0840    hydrOXYzine HCL (ATARAX) tablet 50 mg, 50 mg, Oral, Q6H PRN, Viraj Enciso MD, 50 mg at 05/30/22 0840    LORazepam (ATIVAN) injection 2 mg, 2 mg, IntraVENous, Q1H PRN, Viraj Enciso MD, 2 mg at 05/29/22 1212    LORazepam (ATIVAN) injection 4 mg, 4 mg, IntraVENous, Q1H PRN, Viraj Enciso MD, 4 mg at 05/29/22 2223    LORazepam (ATIVAN) tablet 2 mg, 2 mg, Oral, Q1H PRN, Viraj Enciso MD, 2 mg at 05/28/22 1011    LORazepam (ATIVAN) tablet 4 mg, 4 mg, Oral, Q1H PRN, Ethan Enciso MD    oxyCODONE IR (ROXICODONE) tablet 5 mg, 5 mg, Oral, Q4H PRN, Viraj Enciso MD, 5 mg at 05/30/22 0841    thiamine mononitrate (B-1) tablet 100 mg, 100 mg, Oral, DAILY, Viraj Enciso MD, 100 mg at 74/28/95 6191    folic acid (FOLVITE) tablet 1 mg, 1 mg, Oral, DAILY, Ethan Enciso MD, 1 mg at 05/30/22 0840  ________________________________________________________________________  Care Plan discussed with:    Comments   Patient y    Family      RN y    Care Manager     Consultant                        Multidiciplinary team rounds were held today with , nursing, pharmacist and clinical coordinator. Patient's plan of care was discussed; medications were reviewed and discharge planning was addressed. ________________________________________________________________________  Total NON critical care TIME:  35    Minutes    Total CRITICAL CARE TIME Spent:   Minutes non procedure based      Comments   >50% of visit spent in counseling and coordination of care     ________________________________________________________________________  Krish Lock MD     Procedures: see electronic medical records for all procedures/Xrays and details which were not copied into this note but were reviewed prior to creation of Plan. LABS:  I reviewed today's most current labs and imaging studies.   Pertinent labs include:  Recent Labs     05/30/22  0304 05/29/22  0242 05/28/22  0233   WBC 7.3 6.6 10.2   HGB 11.7 10.6* 12.8   HCT 35.8 32.8* 37.6    208 344     Recent Labs     05/30/22  0304 05/29/22  0242 05/28/22  0233    139 134*   K 3.9 3.7 3.4*    109* 99   CO2 28 24 27   * 110* 115*   BUN 14 10 17   CREA 0.82 0.79 0.68   CA 8.7 8.0* 8.0*   MG  --   --  2.4   ALB 2.7* 2.4* 3.1*   TBILI 0.7 1.0 1.1*   * 452* 822*   INR  --   --  1.2*       Signed: Krish Lock MD

## 2022-05-30 NOTE — PROGRESS NOTES
End of Shift Note    Bedside shift change report given to Violet Galeas (oncoming nurse) by Naye Kang RN (offgoing nurse). Report included the following information SBAR, Kardex and MAR    Shift worked:  7a-7p     Shift summary and any significant changes:          Concerns for physician to address:       Zone phone for oncoming shift:          Activity:  Activity Level: Up ad gerda  Number times ambulated in hallways past shift: 0  Number of times OOB to chair past shift: 0    Cardiac:   Cardiac Monitoring: Yes      Cardiac Rhythm: Sinus Rhythm    Access:   Current line(s): PIV     Genitourinary:   Urinary status: voiding    Respiratory:   O2 Device: None (Room air)  Chronic home O2 use?: NO  Incentive spirometer at bedside: NO       GI:  Last Bowel Movement Date: 05/28/22  Current diet:  ADULT DIET Regular  DIET ONE TIME MESSAGE  Passing flatus: YES  Tolerating current diet: YES       Pain Management:   Patient states pain is manageable on current regimen: NO    Skin:  Elijah Score: 21  Interventions: increase time out of bed    Patient Safety:  Fall Score:  Total Score: 1  Interventions: pt to call before getting OOB       Length of Stay:  Expected LOS: - - -  Actual LOS: 3      Naye Kang, RN

## 2022-05-30 NOTE — PROGRESS NOTES
Problem: Falls - Risk of  Goal: *Absence of Falls  Description: Document Shiva Fitzgerald Fall Risk and appropriate interventions in the flowsheet.   Outcome: Progressing Towards Goal  Note: Fall Risk Interventions:            Medication Interventions: Assess postural VS orthostatic hypotension,Evaluate medications/consider consulting pharmacy,Teach patient to arise slowly                   Problem: Patient Education: Go to Patient Education Activity  Goal: Patient/Family Education  Outcome: Progressing Towards Goal     Problem: Alcohol Withdrawal  Goal: *STG: Remains safe in hospital  Outcome: Progressing Towards Goal  Goal: *STG: Seeks staff when symptoms of withdrawal increase  Outcome: Progressing Towards Goal  Goal: *STG: Complies with medication therapy  Outcome: Progressing Towards Goal  Goal: *STG: Will identify negative impact of chemical dependency including the use of tobacco, alcohol, and other substances  Outcome: Progressing Towards Goal  Goal: *STG: Verbalizes abstinence as an achievable goal  Outcome: Progressing Towards Goal  Goal: *STG: Identify lifestyle changes to support long term sobriety such as vocation, employment, education, and legal issues  Outcome: Progressing Towards Goal  Goal: *STG: Maintains appropriate nutrition and hydration  Outcome: Progressing Towards Goal  Goal: *STG: Vital signs within defined limits  Outcome: Progressing Towards Goal  Goal: *STG/LTG: Relapse prevention plan in place to include housing/aftercare, leisure activities, and spirituality  Outcome: Progressing Towards Goal  Goal: Interventions  Outcome: Progressing Towards Goal     Problem: Patient Education: Go to Patient Education Activity  Goal: Patient/Family Education  Outcome: Progressing Towards Goal

## 2022-05-30 NOTE — PROGRESS NOTES
Hospitalist Progress Note    NAME: Sarah Cobb   :  1980   MRN:  [id number]       Assessment / Plan:  Alcohol abuse, at high risk for alcohol withdrawal  Alcoholic hepatitis   -Continue CIWA protocol with Ativan. Continue multivitamins. CIWA score currently is around 10.  -Consider starting on scheduled Librium if CIWA score is still high  -She was counseled regarding alcohol cessation  -Madrey score is low and does not qualify for steroids  -Transaminases are trending down. Bilirubin is normal.        Hypokalemia   -Potassium is normal.  Check BMP in a.m. tomorrow    Severe depression  Passive suicidal ideation  -Appreciate recommendations from psych  -Continue tapering dose of Prozac, continue with Abilify, Effexor  -No need for inpatient psych admission per psychiatrist  -Discontinue sitter  -Patient can have outpatient psychiatric follow-up           Code Status: full   Surrogate Decision Maker: Mother  DVT Prophylaxis: Lovenox   GI Prophylaxis: not indicated  Baseline: independent       Body mass index is 32.11 kg/m². Subjective:     Chief Complaint / Reason for Physician Visit  She reports some anxiety and tremors this AM.  CIWA score this morning was around 14. Does not report any nausea, vomiting. Does not report any abdominal pain    Review of Systems:  Symptom Y/N Comments  Symptom Y/N Comments   Fever/Chills    Chest Pain     Poor Appetite    Edema     Cough    Abdominal Pain     Sputum    Joint Pain     SOB/FIGUEROA    Pruritis/Rash     Nausea/vomit    Tolerating PT/OT     Diarrhea    Tolerating Diet     Constipation    Other       Could NOT obtain due to:      Objective:     VITALS:   Last 24hrs VS reviewed since prior progress note.  Most recent are:  Patient Vitals for the past 24 hrs:   Temp Pulse Resp BP SpO2   22 0315 97.9 °F (36.6 °C) 92 18 106/67 99 %   22 2237 97.4 °F (36.3 °C) 99 18 111/74 97 %   22 1926 97.6 °F (36.4 °C) (!) 101 19 114/71 100 % 05/29/22 1635 98.6 °F (37 °C) 86 18 113/69 99 %   05/29/22 1037 98.4 °F (36.9 °C) 96 18 (!) 120/90 98 %   05/29/22 1035 -- -- -- -- 97 %       Intake/Output Summary (Last 24 hours) at 5/30/2022 0953  Last data filed at 5/29/2022 1635  Gross per 24 hour   Intake 940 ml   Output --   Net 940 ml        PHYSICAL EXAM:  General: Alert, cooperative, no acute distress    EENT:  EOMI. Anicteric sclerae. MMM  Resp:  CTA bilaterally, no wheezing or rales. No accessory muscle use  CV:  Regular  rhythm,  No edema  GI:  Soft, Non distended, Non tender.  +Bowel sounds  Neurologic:  Alert and oriented X 3, normal speech,   Psych:   Not anxious nor agitated  Skin:  No rashes.   No jaundice    Reviewed most current lab test results and cultures  YES  Reviewed most current radiology test results   YES  Review and summation of old records today    NO  Reviewed patient's current orders and MAR    YES  PMH/SH reviewed - no change compared to H&P          Current Facility-Administered Medications:     pantoprazole (PROTONIX) tablet 40 mg, 40 mg, Oral, ACB, Kya Roman MD, 40 mg at 05/30/22 0840    FLUoxetine (PROzac) capsule 40 mg, 40 mg, Oral, DAILY, Raysa Nicholson NP, 40 mg at 05/30/22 0840    [START ON 6/6/2022] FLUoxetine (PROzac) capsule 20 mg, 20 mg, Oral, DAILY, Tyrell Nicholson NP    [START ON 6/13/2022] FLUoxetine (PROzac) capsule 10 mg, 10 mg, Oral, DAILY, Tyrell Nicholson NP    venlafaxine (EFFEXOR) tablet 37.5 mg, 37.5 mg, Oral, DAILY, Raysa Nicholson NP, 37.5 mg at 05/30/22 0841    hydrOXYzine HCL (ATARAX) tablet 50 mg, 50 mg, Oral, Q6H PRN, Tyrell Nicholson, NP    traZODone (DESYREL) tablet 50 mg, 50 mg, Oral, QHS PRN, [place], Raysa M, NP    ARIPiprazole (ABILIFY) tablet 10 mg, 10 mg, Oral, DAILY, Greg Enciso MD, 10 mg at 05/30/22 0842    cholecalciferol (VITAMIN D3) (1000 Units /25 mcg) tablet 1,000 Units, 1,000 Units, Oral, DAILY, Greg Enciso MD, 1,000 Units at 05/30/22 0840    fluticasone propionate (FLONASE) 50 mcg/actuation nasal spray 2 Spray, 2 Spray, Both Nostrils, DAILY, Diane Enciso MD    sodium chloride (NS) flush 5-40 mL, 5-40 mL, IntraVENous, Q8H, Viraj Enciso MD, 10 mL at 05/30/22 0554    sodium chloride (NS) flush 5-40 mL, 5-40 mL, IntraVENous, PRN, Diane Enciso MD, 10 mL at 05/29/22 0857    polyethylene glycol (MIRALAX) packet 17 g, 17 g, Oral, DAILY PRN, Diane Enciso MD    ondansetron (ZOFRAN ODT) tablet 4 mg, 4 mg, Oral, Q8H PRN, 4 mg at 05/28/22 1010 **OR** ondansetron (ZOFRAN) injection 4 mg, 4 mg, IntraVENous, Q6H PRN, Viraj Enciso MD, 4 mg at 05/28/22 1654    enoxaparin (LOVENOX) injection 40 mg, 40 mg, SubCUTAneous, DAILY, Viraj Enciso MD, 40 mg at 05/30/22 0840    hydrOXYzine HCL (ATARAX) tablet 50 mg, 50 mg, Oral, Q6H PRN, Viraj Enciso MD, 50 mg at 05/30/22 0840    LORazepam (ATIVAN) injection 2 mg, 2 mg, IntraVENous, Q1H PRN, Viraj Enciso MD, 2 mg at 05/29/22 1212    LORazepam (ATIVAN) injection 4 mg, 4 mg, IntraVENous, Q1H PRN, Viraj Enciso MD, 4 mg at 05/29/22 2223    LORazepam (ATIVAN) tablet 2 mg, 2 mg, Oral, Q1H PRN, Viraj Enciso MD, 2 mg at 05/28/22 1011    LORazepam (ATIVAN) tablet 4 mg, 4 mg, Oral, Q1H PRN, Diane Enciso MD    oxyCODONE IR (ROXICODONE) tablet 5 mg, 5 mg, Oral, Q4H PRN, Viraj Enciso MD, 5 mg at 05/30/22 0841    thiamine mononitrate (B-1) tablet 100 mg, 100 mg, Oral, DAILY, Viraj Enciso MD, 100 mg at 77/04/50 2106    folic acid (FOLVITE) tablet 1 mg, 1 mg, Oral, DAILY, Diane Enciso MD, 1 mg at 05/30/22 0840  ________________________________________________________________________  Care Plan discussed with:    Comments   Patient y    Family      RN y    Care Manager     Consultant                        Multidiciplinary team rounds were held today with , nursing, pharmacist and clinical coordinator. Patient's plan of care was discussed; medications were reviewed and discharge planning was addressed. ________________________________________________________________________  Total NON critical care TIME:  35    Minutes    Total CRITICAL CARE TIME Spent:   Minutes non procedure based      Comments   >50% of visit spent in counseling and coordination of care     ________________________________________________________________________  Dillon Delgado MD     Procedures: see electronic medical records for all procedures/Xrays and details which were not copied into this note but were reviewed prior to creation of Plan. LABS:  I reviewed today's most current labs and imaging studies.   Pertinent labs include:  Recent Labs     05/30/22  0304 05/29/22  0242 05/28/22  0233   WBC 7.3 6.6 10.2   HGB 11.7 10.6* 12.8   HCT 35.8 32.8* 37.6    208 344     Recent Labs     05/30/22  0304 05/29/22  0242 05/28/22  0233    139 134*   K 3.9 3.7 3.4*    109* 99   CO2 28 24 27   * 110* 115*   BUN 14 10 17   CREA 0.82 0.79 0.68   CA 8.7 8.0* 8.0*   MG  --   --  2.4   ALB 2.7* 2.4* 3.1*   TBILI 0.7 1.0 1.1*   * 452* 822*   INR  --   --  1.2*       Signed: Dillon Delgado MD

## 2022-05-31 ENCOUNTER — VIRTUAL VISIT (OUTPATIENT)
Dept: INTERNAL MEDICINE CLINIC | Age: 42
DRG: 280 | End: 2022-05-31
Payer: MEDICAID

## 2022-05-31 LAB
ALBUMIN SERPL-MCNC: 2.5 G/DL (ref 3.5–5)
ALBUMIN/GLOB SERPL: 0.8 {RATIO} (ref 1.1–2.2)
ALP SERPL-CCNC: 88 U/L (ref 45–117)
ALT SERPL-CCNC: 236 U/L (ref 12–78)
ANION GAP SERPL CALC-SCNC: 5 MMOL/L (ref 5–15)
AST SERPL-CCNC: 44 U/L (ref 15–37)
BILIRUB SERPL-MCNC: 0.5 MG/DL (ref 0.2–1)
BUN SERPL-MCNC: 17 MG/DL (ref 6–20)
BUN/CREAT SERPL: 25 (ref 12–20)
CALCIUM SERPL-MCNC: 8.4 MG/DL (ref 8.5–10.1)
CHLORIDE SERPL-SCNC: 106 MMOL/L (ref 97–108)
CO2 SERPL-SCNC: 27 MMOL/L (ref 21–32)
CREAT SERPL-MCNC: 0.68 MG/DL (ref 0.55–1.02)
GLOBULIN SER CALC-MCNC: 3 G/DL (ref 2–4)
GLUCOSE SERPL-MCNC: 114 MG/DL (ref 65–100)
POTASSIUM SERPL-SCNC: 3.9 MMOL/L (ref 3.5–5.1)
PROT SERPL-MCNC: 5.5 G/DL (ref 6.4–8.2)
SODIUM SERPL-SCNC: 138 MMOL/L (ref 136–145)

## 2022-05-31 PROCEDURE — 74011250637 HC RX REV CODE- 250/637: Performed by: HOSPITALIST

## 2022-05-31 PROCEDURE — 74011250637 HC RX REV CODE- 250/637: Performed by: GENERAL ACUTE CARE HOSPITAL

## 2022-05-31 PROCEDURE — 74011250637 HC RX REV CODE- 250/637: Performed by: NURSE PRACTITIONER

## 2022-05-31 PROCEDURE — 36415 COLL VENOUS BLD VENIPUNCTURE: CPT

## 2022-05-31 PROCEDURE — 80053 COMPREHEN METABOLIC PANEL: CPT

## 2022-05-31 PROCEDURE — 74011000250 HC RX REV CODE- 250: Performed by: GENERAL ACUTE CARE HOSPITAL

## 2022-05-31 PROCEDURE — 65270000046 HC RM TELEMETRY

## 2022-05-31 PROCEDURE — 74011250637 HC RX REV CODE- 250/637: Performed by: INTERNAL MEDICINE

## 2022-05-31 RX ADMIN — SODIUM CHLORIDE, PRESERVATIVE FREE 10 ML: 5 INJECTION INTRAVENOUS at 21:06

## 2022-05-31 RX ADMIN — HYDROXYZINE HYDROCHLORIDE 50 MG: 25 TABLET, FILM COATED ORAL at 15:05

## 2022-05-31 RX ADMIN — CHOLECALCIFEROL TAB 25 MCG (1000 UNIT) 1000 UNITS: 25 TAB at 08:02

## 2022-05-31 RX ADMIN — ARIPIPRAZOLE 10 MG: 5 TABLET ORAL at 09:56

## 2022-05-31 RX ADMIN — THIAMINE HCL TAB 100 MG 100 MG: 100 TAB at 08:03

## 2022-05-31 RX ADMIN — OXYCODONE 5 MG: 5 TABLET ORAL at 13:43

## 2022-05-31 RX ADMIN — OXYCODONE 5 MG: 5 TABLET ORAL at 08:01

## 2022-05-31 RX ADMIN — VENLAFAXINE 37.5 MG: 37.5 TABLET ORAL at 09:56

## 2022-05-31 RX ADMIN — PANTOPRAZOLE SODIUM 40 MG: 40 TABLET, DELAYED RELEASE ORAL at 08:02

## 2022-05-31 RX ADMIN — HYDROXYZINE HYDROCHLORIDE 50 MG: 25 TABLET, FILM COATED ORAL at 21:05

## 2022-05-31 RX ADMIN — FLUOXETINE HYDROCHLORIDE 40 MG: 20 CAPSULE ORAL at 08:01

## 2022-05-31 RX ADMIN — FOLIC ACID 1 MG: 1 TABLET ORAL at 08:03

## 2022-05-31 RX ADMIN — HYDROXYZINE HYDROCHLORIDE 50 MG: 25 TABLET, FILM COATED ORAL at 08:01

## 2022-05-31 RX ADMIN — TRAMADOL HYDROCHLORIDE 50 MG: 50 TABLET ORAL at 21:05

## 2022-05-31 NOTE — PROGRESS NOTES
[phone]    Still inpatient at [facility]  States she will be out tomorrow  Needs adderral refill  Wants to start effexor instead of prozac    Chief Complaint   Patient presents with    Medication Evaluation     f/u     1. Have you been to the ER, urgent care clinic since your last visit? Hospitalized since your last visit? Yes Where: [facility]    2. Have you seen or consulted any other health care providers outside of the [address] [place] [address] since your last visit? Include any pap smears or colon screening.  No

## 2022-05-31 NOTE — PROGRESS NOTES
Transition of Care Plan:     RUR: 16% moderate risk  Disposition: Home with family support-additional substance use services   Follow up appointments: Follow up with PCP and/or Specialist   DME needed: N/A  Transportation at Discharge: Family will assist   [place] or means to access home:     Family will provide    IM Medicare Letter: N/A  Is patient a BCPI-A Bundle:     CM will provide if required                  If yes, was Bundle Letter given?:    Is patient a  and connected with the [place]? N/A     If yes, was [facility] form completed and [facility] notified? Caregiver Contact: Stephanie Lua (mother) [phone] and Emily Cruzew (other relative) [phone]  Discharge Caregiver contacted prior to discharge? Family to be contacted  Care Conference needed?:   Not at this time    Chart reviewed. Pt may be ready for discharge on 6/1/2022. CM will work with patient to provide appropriate resources as needed for discharge. Care Management Interventions  PCP Verified by CM: Yes  Mode of Transport at Discharge: Other (see comment)  Transition of Care Consult (CM Consult): Discharge Planning  Discharge Durable Medical Equipment: No  Physical Therapy Consult: No  Occupational Therapy Consult: No  Speech Therapy Consult: No  Support Systems: Other Family Member(s),Parent(s)  Confirm Follow Up Transport: Family  Discharge [facility]  [facility] [address] [facility]  [employer] ACP Facilitator  Zone Phone: [phone]

## 2022-05-31 NOTE — PROGRESS NOTES
Hospitalist Progress Note    NAME: Michael Lei   :  1980   MRN:  [id number]       Assessment / Plan:  Alcohol abuse, at high risk for alcohol withdrawal  Alcoholic hepatitis   -Continue CIWA protocol with Ativan. Continue multivitamins. CIWA protocol is 12 this morning  - Received 10 mg of Ativan less than 24 hours continue to be tachycardic's morning  -Consider Librium if she continues to have high CIWA protocol  -Continue gentle NS IVF hydration  -Madrey score is low and does not qualify for steroids  -Transaminases are trending down. Bilirubin is normal.  - thiamine 100 mg po daily    Hypokalemia   -repleted    Severe depression  Passive suicidal ideation  -Appreciate recommendations from psych  -Continue tapering dose of Prozac, continue with Abilify, Effexor  -No need for inpatient psych admission per psychiatrist  -Patient can have outpatient psychiatric follow-up    Obesity  Counseling was provided about weight loss    Alcoholic fatty liver    Code Status: full   Surrogate Decision Maker: Mother  DVT Prophylaxis: Lovenox   GI Prophylaxis: not indicated  Baseline: independent       Body mass index is 32.11 kg/m². Dispo: Anticipated discharge   Barriers improvement of her CIWA protocol       Subjective:     Patient was seen and examined. No acute events overnight. Discussed with RN overnight events. All patient's questions were answered. \"Feeling well but anxious\"    Review of Systems:  Symptom Y/N Comments  Symptom Y/N Comments   Fever/Chills n   Chest Pain n    Poor Appetite    Edema     Cough n   Abdominal Pain n    Sputum    Joint Pain     SOB/FIGUEROA n   Pruritis/Rash     Nausea/vomit n   Tolerating PT/OT     Diarrhea    Tolerating Diet y    Constipation    Other       Could NOT obtain due to:          Objective:     VITALS:   Last 24hrs VS reviewed since prior progress note.  Most recent are:  Patient Vitals for the past 24 hrs:   Temp Pulse Resp BP SpO2   22 1043 97.4 °F (36.3 °C) 96 16 106/67 100 %   05/31/22 0807 97.8 °F (36.6 °C) 91 15 115/65 99 %   05/31/22 0318 98.4 °F (36.9 °C) 88 16 111/76 99 %   05/30/22 2242 98.1 °F (36.7 °C) 99 22 108/71 98 %   05/30/22 1930 98.1 °F (36.7 °C) 92 19 112/73 95 %   05/30/22 1540 98.3 °F (36.8 °C) (!) 109 23 113/70 99 %     No intake or output data in the 24 hours ending 05/31/22 1329     PHYSICAL EXAM:  General: Alert, cooperative, no acute distress    EENT:  EOMI. Anicteric sclerae. MMM  Resp:  CTA bilaterally, no wheezing or rales. No accessory muscle use  CV:  Regular  rhythm,  No edema  GI:  Soft, Non distended but obese. Non tender.  +Bowel sounds  Neurologic:  EOMs intact. No facial asymmetry. No aphasia or slurred speech. Symmetrical strength, Sensation grossly intact. Alert and oriented X 4.     Psych:   Not anxious nor agitated  Skin:  No rashes.   No jaundice    Reviewed most current lab test results and cultures  YES  Reviewed most current radiology test results   YES  Review and summation of old records today    NO  Reviewed patient's current orders and MAR    YES  PMH/SH reviewed - no change compared to H&P          Current Facility-Administered Medications:     traMADoL (ULTRAM) tablet 50 mg, 50 mg, Oral, Q6H PRN, Khadra Taylor MD, 50 mg at 05/30/22 1940    0.9% sodium chloride infusion, 75 mL/hr, IntraVENous, CONTINUOUS, Khadra Taylor MD, Last Rate: 75 mL/hr at 05/30/22 1641, 75 mL/hr at 05/30/22 1641    pantoprazole (PROTONIX) tablet 40 mg, 40 mg, Oral, ACB, Jorge Luis Roman MD, 40 mg at 05/31/22 0802    FLUoxetine (PROzac) capsule 40 mg, 40 mg, Oral, DAILY, Raysa Nicholson NP, 40 mg at 05/31/22 0801    [START ON 6/6/2022] FLUoxetine (PROzac) capsule 20 mg, 20 mg, Oral, DAILY, Diana Nicholson NP    [START ON 6/13/2022] FLUoxetine (PROzac) capsule 10 mg, 10 mg, Oral, DAILY, Raysa Nicholson NP    venlafaxine (EFFEXOR) tablet 37.5 mg, 37.5 mg, Oral, DAILY, Raysa Nicholson NP, 37.5 mg at 05/31/22 0956    hydrOXYzine HCL (ATARAX) tablet 50 mg, 50 mg, Oral, Q6H PRN, Jose Nicholson, NP    traZODone (DESYREL) tablet 50 mg, 50 mg, Oral, QHS PRN, Jose Nicholson, NP    ARIPiprazole (ABILIFY) tablet 10 mg, 10 mg, Oral, DAILY, Hunter Enciso MD, 10 mg at 05/31/22 3699    cholecalciferol (VITAMIN D3) (1000 Units /25 mcg) tablet 1,000 Units, 1,000 Units, Oral, DAILY, Hunter Enciso MD, 1,000 Units at 05/31/22 0802    fluticasone propionate (FLONASE) 50 mcg/actuation nasal spray 2 Spray, 2 Spray, Both Nostrils, DAILY, Hunter Enciso MD    sodium chloride (NS) flush 5-40 mL, 5-40 mL, IntraVENous, Q8H, Viraj Enciso MD, 10 mL at 05/30/22 1634    sodium chloride (NS) flush 5-40 mL, 5-40 mL, IntraVENous, PRN, Hunter Enciso MD, 10 mL at 05/29/22 0857    polyethylene glycol (MIRALAX) packet 17 g, 17 g, Oral, DAILY PRN, Hunter Enciso MD    ondansetron (ZOFRAN ODT) tablet 4 mg, 4 mg, Oral, Q8H PRN, 4 mg at 05/28/22 1010 **OR** ondansetron (ZOFRAN) injection 4 mg, 4 mg, IntraVENous, Q6H PRN, Viraj Enciso MD, 4 mg at 05/28/22 1654    enoxaparin (LOVENOX) injection 40 mg, 40 mg, SubCUTAneous, DAILY, Viraj Enciso MD, 40 mg at 05/30/22 0840    LORazepam (ATIVAN) injection 2 mg, 2 mg, IntraVENous, Q1H PRN, Viraj Enciso MD, 2 mg at 05/30/22 1218    LORazepam (ATIVAN) injection 4 mg, 4 mg, IntraVENous, Q1H PRN, Viraj Enciso MD, 4 mg at 05/30/22 2311    LORazepam (ATIVAN) tablet 2 mg, 2 mg, Oral, Q1H PRN, Viraj Enciso MD, 2 mg at 05/28/22 1011    LORazepam (ATIVAN) tablet 4 mg, 4 mg, Oral, Q1H PRN, Hunter Enciso MD    oxyCODONE IR (ROXICODONE) tablet 5 mg, 5 mg, Oral, Q4H PRN, Viraj Enciso MD, 5 mg at 05/31/22 0801    thiamine mononitrate (B-1) tablet 100 mg, 100 mg, Oral, DAILY, Viraj Enciso MD, 100 mg at 65/77/70 4229    folic acid (FOLVITE) tablet 1 mg, 1 mg, Oral, DAILY, Zaria Arnoldo Campos MD, 1 mg at 05/31/22 7147  ________________________________________________________________________  Care Plan discussed with:    Comments   Patient y    Family      RN y    Care Manager     Consultant                        Multidiciplinary team rounds were held today with , nursing, pharmacist and clinical coordinator. Patient's plan of care was discussed; medications were reviewed and discharge planning was addressed. ________________________________________________________________________  Total NON critical care TIME:  35    Minutes    Total CRITICAL CARE TIME Spent:   Minutes non procedure based      Comments   >50% of visit spent in counseling and coordination of care     ________________________________________________________________________  Sp Lyon MD     Procedures: see electronic medical records for all procedures/Xrays and details which were not copied into this note but were reviewed prior to creation of Plan. LABS:  I reviewed today's most current labs and imaging studies.   Pertinent labs include:  Recent Labs     05/30/22  0304 05/29/22  0242   WBC 7.3 6.6   HGB 11.7 10.6*   HCT 35.8 32.8*    208     Recent Labs     05/31/22  0330 05/30/22  0304 05/29/22  0242    137 139   K 3.9 3.9 3.7    105 109*   CO2 27 28 24   * 106* 110*   BUN 17 14 10   CREA 0.68 0.82 0.79   CA 8.4* 8.7 8.0*   ALB 2.5* 2.7* 2.4*   TBILI 0.5 0.7 1.0   * 355* 452*       Signed: Sp Lyon MD

## 2022-05-31 NOTE — PROGRESS NOTES
End of Shift Note    Bedside shift change report given to Helen Barron RN (oncoming nurse) by Brianna King (offgoing nurse). Report included the following information SBAR, Kardex, ED Summary, Intake/Output, MAR and Recent Results    Shift worked:  3301-8220     Shift summary and any significant changes:    Pt still receiving ativan for CIWA. Concerns for physician to address:       Zone phone for oncoming shift:          Activity:  Activity Level: Up ad gerda  Number times ambulated in hallways past shift: 1    Number of times OOB to chair past shift: 0    Cardiac:   Cardiac Monitoring: Yes      Cardiac Rhythm: Sinus Rhythm,Sinus Tachy    Access:   Current line(s): PIV     Genitourinary:   Urinary status: voiding    Respiratory:   O2 Device: None (Room air)  Chronic home O2 use?: NO  Incentive spirometer at bedside: NO       GI:  Last Bowel Movement Date: 05/28/22  Current diet:  ADULT DIET Regular  DIET ONE TIME MESSAGE  Passing flatus: YES  Tolerating current diet: YES       Pain Management:   Patient states pain is manageable on current regimen: YES    Skin:  Elijah Score: 21  Interventions: float heels, increase time out of bed and PT/OT consult    Patient Safety:  Fall Score:  Total Score: 1  Interventions: bed/chair alarm, gripper socks, pt to call before getting OOB and stay with me (per policy)       Length of Stay:  Expected LOS: - - -  Actual LOS: Michael Kendrick 14.

## 2022-05-31 NOTE — PROGRESS NOTES
1515:  Walked into patients room and she was having what seemed to be a panic attack. Patient was crying and states, \"I am scared to go home because I won't have anyone helping me or taking care of me, and I am afraid I may turn to drinking or using. \"   This Rn explained options such as outpatient psychiatry and Psychology as well as an inpatient psychiatric facility. I medicated patient with some atarax as well. She is not showing any signs of ETOH withdrawal at this time.

## 2022-06-01 VITALS
SYSTOLIC BLOOD PRESSURE: 126 MMHG | HEIGHT: 65 IN | BODY MASS INDEX: 38.16 KG/M2 | HEART RATE: 87 BPM | WEIGHT: 229.06 LBS | RESPIRATION RATE: 18 BRPM | OXYGEN SATURATION: 96 % | TEMPERATURE: 98.4 F | DIASTOLIC BLOOD PRESSURE: 80 MMHG

## 2022-06-01 LAB
ANION GAP SERPL CALC-SCNC: 4 MMOL/L (ref 5–15)
BUN SERPL-MCNC: 12 MG/DL (ref 6–20)
BUN/CREAT SERPL: 20 (ref 12–20)
CALCIUM SERPL-MCNC: 8.6 MG/DL (ref 8.5–10.1)
CHLORIDE SERPL-SCNC: 107 MMOL/L (ref 97–108)
CO2 SERPL-SCNC: 28 MMOL/L (ref 21–32)
CREAT SERPL-MCNC: 0.61 MG/DL (ref 0.55–1.02)
ERYTHROCYTE [DISTWIDTH] IN BLOOD BY AUTOMATED COUNT: 14.2 % (ref 11.5–14.5)
GLUCOSE SERPL-MCNC: 109 MG/DL (ref 65–100)
HCT VFR BLD AUTO: 33.6 % (ref 35–47)
HGB BLD-MCNC: 10.8 G/DL (ref 11.5–16)
MCH RBC QN AUTO: 30.9 PG (ref 26–34)
MCHC RBC AUTO-ENTMCNC: 32.1 G/DL (ref 30–36.5)
MCV RBC AUTO: 96.3 FL (ref 80–99)
NRBC # BLD: 0 K/UL (ref 0–0.01)
NRBC BLD-RTO: 0 PER 100 WBC
PLATELET # BLD AUTO: 221 K/UL (ref 150–400)
PMV BLD AUTO: 9.9 FL (ref 8.9–12.9)
POTASSIUM SERPL-SCNC: 3.9 MMOL/L (ref 3.5–5.1)
RBC # BLD AUTO: 3.49 M/UL (ref 3.8–5.2)
SODIUM SERPL-SCNC: 139 MMOL/L (ref 136–145)
WBC # BLD AUTO: 7.8 K/UL (ref 3.6–11)

## 2022-06-01 PROCEDURE — 74011250637 HC RX REV CODE- 250/637: Performed by: INTERNAL MEDICINE

## 2022-06-01 PROCEDURE — 74011250637 HC RX REV CODE- 250/637: Performed by: GENERAL ACUTE CARE HOSPITAL

## 2022-06-01 PROCEDURE — 80048 BASIC METABOLIC PNL TOTAL CA: CPT

## 2022-06-01 PROCEDURE — 74011250637 HC RX REV CODE- 250/637: Performed by: NURSE PRACTITIONER

## 2022-06-01 PROCEDURE — 74011250636 HC RX REV CODE- 250/636: Performed by: GENERAL ACUTE CARE HOSPITAL

## 2022-06-01 PROCEDURE — 36415 COLL VENOUS BLD VENIPUNCTURE: CPT

## 2022-06-01 PROCEDURE — 85027 COMPLETE CBC AUTOMATED: CPT

## 2022-06-01 RX ORDER — FLUOXETINE 10 MG/1
10 CAPSULE ORAL DAILY
Qty: 7 CAPSULE | Refills: 0 | Status: SHIPPED | OUTPATIENT
Start: 2022-06-13 | End: 2022-06-03

## 2022-06-01 RX ORDER — FLUOXETINE HYDROCHLORIDE 20 MG/1
20 CAPSULE ORAL DAILY
Qty: 7 CAPSULE | Refills: 0 | Status: SHIPPED | OUTPATIENT
Start: 2022-06-06 | End: 2022-06-03

## 2022-06-01 RX ORDER — VENLAFAXINE 37.5 MG/1
37.5 TABLET ORAL DAILY
Qty: 60 TABLET | Refills: 2 | Status: SHIPPED | OUTPATIENT
Start: 2022-06-02 | End: 2022-06-17

## 2022-06-01 RX ORDER — HYDROXYZINE 50 MG/1
50 TABLET, FILM COATED ORAL
Qty: 20 TABLET | Refills: 0 | Status: SHIPPED | OUTPATIENT
Start: 2022-06-01 | End: 2022-06-03

## 2022-06-01 RX ORDER — FLUOXETINE HYDROCHLORIDE 40 MG/1
40 CAPSULE ORAL DAILY
Qty: 4 CAPSULE | Refills: 0 | Status: SHIPPED | OUTPATIENT
Start: 2022-06-02 | End: 2022-06-03

## 2022-06-01 RX ORDER — TRAZODONE HYDROCHLORIDE 50 MG/1
50 TABLET ORAL
Qty: 15 TABLET | Refills: 0 | Status: SHIPPED | OUTPATIENT
Start: 2022-06-01 | End: 2022-06-03

## 2022-06-01 RX ADMIN — VENLAFAXINE 37.5 MG: 37.5 TABLET ORAL at 10:22

## 2022-06-01 RX ADMIN — ARIPIPRAZOLE 10 MG: 5 TABLET ORAL at 10:22

## 2022-06-01 RX ADMIN — HYDROXYZINE HYDROCHLORIDE 50 MG: 25 TABLET, FILM COATED ORAL at 03:34

## 2022-06-01 RX ADMIN — HYDROXYZINE HYDROCHLORIDE 50 MG: 25 TABLET, FILM COATED ORAL at 10:18

## 2022-06-01 RX ADMIN — PANTOPRAZOLE SODIUM 40 MG: 40 TABLET, DELAYED RELEASE ORAL at 07:50

## 2022-06-01 RX ADMIN — FOLIC ACID 1 MG: 1 TABLET ORAL at 08:00

## 2022-06-01 RX ADMIN — FLUOXETINE HYDROCHLORIDE 40 MG: 20 CAPSULE ORAL at 08:00

## 2022-06-01 RX ADMIN — THIAMINE HCL TAB 100 MG 100 MG: 100 TAB at 08:00

## 2022-06-01 RX ADMIN — ENOXAPARIN SODIUM 40 MG: 100 INJECTION SUBCUTANEOUS at 09:00

## 2022-06-01 RX ADMIN — CHOLECALCIFEROL TAB 25 MCG (1000 UNIT) 1000 UNITS: 25 TAB at 08:00

## 2022-06-01 NOTE — DISCHARGE INSTRUCTIONS
HOSPITALIST DISCHARGE INSTRUCTIONS    NAME: Wilmar Camilo   :  1980   MRN:  [id number]     Date/Time:  2022 11:11 AM    ADMIT DATE: 2022     DISCHARGE DATE: 2022     DISCHARGE DIAGNOSIS:   Alcohol abuse  Depression  Elevated LFTS    Follow-up Information     Follow up With Specialties Details Why Contact Info    Dale Cedeño MD Infectious Disease Physician   [address] [place] [address]  [phone]            MEDICATIONS:  As per medication reconciliation  list  · It is important that you take the medication exactly as they are prescribed. · Keep your medication in the bottles provided by the pharmacist and keep a list of the medication names, dosages, and times to be taken in your wallet. · Do not take other medications without consulting your doctor. Pain Management: per above medications    What to do at Home    Recommended diet:  Regular Diet    Recommended activity: Activity as tolerated    If you have questions regarding the hospital related prescriptions or hospital related issues please call [facility] manuel Gifford at [phone]. If you experience any of the following symptoms then please call your primary care physician or return to the emergency room if you cannot get hold of your doctor:  Fever, chills, nausea, vomiting, diarrhea, change in mentation, falling, bleeding, shortness of breath    Follow Up:  Dr. Dale Cedeño MD  you are to call and set up an appointment to see them in 7-10 days. Information obtained by :  I understand that if any problems occur once I am at home I am to contact my physician. I understand and acknowledge receipt of the instructions indicated above.                                                                                                                                            Physician's or R.N.'s Signature Date/Time                                                                                                                                              Patient or Representative Signature                                                          Date/Time

## 2022-06-01 NOTE — PROGRESS NOTES
End of Shift Note    Bedside shift change report given to Denver Hamburg, RN (oncoming nurse) by Heath Diego (offgoing nurse). Report included the following information SBAR, Kardex, ED Summary, Intake/Output, MAR and Recent Results    Shift worked:  8423-8277     Shift summary and any significant changes:     No longer scoring on CIWA's     Concerns for physician to address:  D/c today      Zone phone for oncoming shift:          Activity:  Activity Level: Up ad gerda  Number times ambulated in hallways past shift: 1  Number of times OOB to chair past shift: 0    Cardiac:   Cardiac Monitoring: Yes      Cardiac Rhythm: Sinus Rhythm    Access:   Current line(s): PIV     Genitourinary:   Urinary status: voiding    Respiratory:   O2 Device: None (Room air)  Chronic home O2 use?: NO  Incentive spirometer at bedside: NO       GI:  Last Bowel Movement Date: 05/30/22  Current diet:  ADULT DIET Regular  DIET ONE TIME MESSAGE  Passing flatus: YES  Tolerating current diet: YES       Pain Management:   Patient states pain is manageable on current regimen: YES    Skin:  Elijah Score: 22  Interventions: float heels, increase time out of bed, PT/OT consult and internal/external urinary devices    Patient Safety:  Fall Score:  Total Score: 1  Interventions: bed/chair alarm, gripper socks, pt to call before getting OOB and stay with me (per policy)       Length of Stay:  Expected LOS: - - -  Actual LOS: [address]

## 2022-06-01 NOTE — DISCHARGE SUMMARY
Hospitalist Discharge Summary     Patient ID:  Ericka Mane  [id number]  45 y.o.  1980 5/27/2022    PCP on record: Sayra Madera MD    Admit date: 5/27/2022  Discharge date and time: 6/1/2022    DISCHARGE DIAGNOSIS:  ETOH abuse  Depression  Elevated LFTS    CONSULTATIONS:  IP CONSULT TO HOSPITALIST  IP CONSULT TO PSYCHIATRY    Excerpted HPI from H&P of Bong Tomlin MD:  Wendy Rocha is a 46 y.o.  female who presents with the above CC. Patient with known history of alcohol abuse, was seen in the emergency room 2 weeks ago with similar symptoms and she was discharged to inpatient psychiatry unit from when she was released almost 10 days ago. Patient went back to drinking alcohol again, she said that she drinks around 4 drinks a day. Patient started having severe right upper quadrant and epigastric pain associated with severe nausea and vomiting. Patient denies fever, chills, diarrhea, productive cough, flulike symptoms, bright red bleeding per rectum, melena and hematemesis. Patient last drink was today  Patient mentioned severe depression and feeling that she is not interested in living anymore but she has no plan to hurt herself and she denies history of suicidal attempts in the past. Denies drug abuse. Blood work significant for potassium 3.2, , , alk phos is 144, lipase within normal limits.       ______________________________________________________________________  DISCHARGE SUMMARY/HOSPITAL COURSE:  for full details see H&P, daily progress notes, labs, consult notes.    Alcohol abuse, at high risk for alcohol withdrawal  Alcoholic hepatitis   No withdrawal symptoms now  Stable for d/c home  Motivated to stop drinking, counselling done again       Hypokalemia   -repleted     Severe depression  Passive suicidal ideation  -Appreciate recommendations from psych  -Continue tapering dose of Prozac, continue with Abilify, Effexor  -No need for inpatient psych admission per psychiatrist  -Patient can have outpatient psychiatric follow-up     Obesity  Counseling was provided about weight loss     Alcoholic fatty liver          _______________________________________________________________________  Patient seen and examined by me on discharge day. Pertinent Findings:  Gen:    Not in distress  Chest: Clear lungs  CVS:   Regular rhythm. No edema  Abd:  Soft, not distended, not tender  Neuro:  Alert,   _______________________________________________________________________  DISCHARGE MEDICATIONS:   Current Discharge Medication List      START taking these medications    Details   venlafaxine (EFFEXOR) 37.5 mg tablet Take 1 Tablet by mouth daily. Qty: 60 Tablet, Refills: 2  Start date: 6/2/2022      multivitamin,tx-iron-ca-min (THERA-M w/ IRON) 27-0.4 mg tab Take 1 Tablet by mouth daily. Qty: 60 Tablet, Refills: 1  Start date: 6/1/2022      hydrOXYzine HCL (ATARAX) 50 mg tablet Take 1 Tablet by mouth every six (6) hours as needed for Itching or Anxiety for up to 5 days. Qty: 20 Tablet, Refills: 0  Start date: 6/1/2022, End date: 6/6/2022      traZODone (DESYREL) 50 mg tablet Take 1 Tablet by mouth nightly as needed for Sleep for up to 15 days. Qty: 15 Tablet, Refills: 0  Start date: 6/1/2022, End date: 6/16/2022         CONTINUE these medications which have CHANGED    Details   !! FLUoxetine (PROzac) 40 mg capsule Take 1 Capsule by mouth daily for 4 doses. And start 20 mg  Qty: 4 Capsule, Refills: 0  Start date: 6/2/2022, End date: 6/6/2022      !! FLUoxetine (PROzac) 20 mg capsule Take 1 Capsule by mouth daily for 7 doses. Qty: 7 Capsule, Refills: 0  Start date: 6/6/2022, End date: 6/13/2022      !! FLUoxetine (PROzac) 10 mg capsule Take 1 Capsule by mouth daily for 7 doses. And stop  Qty: 7 Capsule, Refills: 0  Start date: 6/13/2022, End date: 6/20/2022       !! - Potential duplicate medications found. Please discuss with provider.       CONTINUE these medications which have NOT CHANGED    Details   ARIPiprazole (Abilify) 10 mg tablet Take 10 mg by mouth daily. b complex-vitamin c-folic acid 0.8 mg (NEPHRO-LIT) 0.8 mg tab tablet Take 1 Tablet by mouth daily. cholecalciferol (VITAMIN D3) (1000 Units /25 mcg) tablet Take 1,000 Units by mouth daily. amphetamine-dextroamphetamine XR (ADDERALL XR) 30 mg XR capsule Take 1 Capsule by mouth every morning. Max Daily Amount: 30 mg.  Qty: 30 Capsule, Refills: 0    Associated Diagnoses: Attention deficit disorder, unspecified hyperactivity presence      fluticasone propionate (FLONASE) 50 mcg/actuation nasal spray 2 sprays to each nostril 1 time daily  Qty: 1 Each, Refills: 0    Associated Diagnoses: Allergic rhinitis, unspecified seasonality, unspecified trigger      cetirizine (ZYRTEC) 10 mg tablet Take 1 Tablet by mouth daily. Take as needed for allergies. Qty: 90 Tablet, Refills: 1    Associated Diagnoses: Seasonal allergic rhinitis due to other allergic trigger               Patient Follow Up Instructions:    Activity: Activity as tolerated  Diet: Regular Diet  Wound Care: None needed    Follow-up with     Follow-up Information     Follow up With Specialties Details Why Contact Info    Jim Recinos MD Infectious Disease Physician   [address] [place] [address]  [phone]          ________________________________________________________________    Risk of deterioration: Low    Condition at Discharge:  Stable  __________________________________________________________________    Disposition  Home with family, no needs    ____________________________________________________________________    Code Status: Full Code  ___________________________________________________________________      Total time in minutes spent coordinating this discharge (includes going over instructions, follow-up, prescriptions, and preparing report for sign off to her PCP) :  >30 minutes    Signed:  Román Quintero MD

## 2022-06-01 NOTE — PROGRESS NOTES
Patient instructed on fall precautions and when to use the call bell, able to verbalize understanding

## 2022-06-02 ENCOUNTER — VIRTUAL VISIT (OUTPATIENT)
Dept: INTERNAL MEDICINE CLINIC | Age: 42
End: 2022-06-02
Payer: MEDICAID

## 2022-06-02 DIAGNOSIS — F33.1 MODERATE EPISODE OF RECURRENT MAJOR DEPRESSIVE DISORDER (HCC): Primary | ICD-10-CM

## 2022-06-02 DIAGNOSIS — F98.8 ATTENTION DEFICIT DISORDER, UNSPECIFIED HYPERACTIVITY PRESENCE: ICD-10-CM

## 2022-06-02 DIAGNOSIS — R79.89 ELEVATED LFTS: ICD-10-CM

## 2022-06-02 PROCEDURE — 99214 OFFICE O/P EST MOD 30 MIN: CPT | Performed by: INTERNAL MEDICINE

## 2022-06-02 NOTE — PROGRESS NOTES
Nirmal Calzada (: 1980) is a 46 y.o. female, established patient, here for evaluation of the following chief complaint(s)--see below:    Nirmal Calzada is a 46 y.o. female who was seen by synchronous (real-time) audio-video technology on 2022. Consent: Nirmal Calzada, who was seen by synchronous (real-time) audio-video technology, and/or her healthcare decision maker, is aware that this patient-initiated, Telehealth encounter on 2022 is a billable service, with coverage as determined by her insurance carrier. She is aware that she may receive a bill and has provided verbal consent to proceed: Yes. I was in the office while conducting this encounter. Assessment & Plan:   Diagnoses and all orders for this visit:      ICD-10-CM ICD-9-CM    1. Moderate episode of recurrent major depressive disorder (HCC)  F33.1 296.32 REFERRAL TO BEHAVIORAL HEALTH      REFERRAL TO PSYCHOLOGY   2. Attention deficit disorder, unspecified hyperactivity presence  F98.8 314.00    3. Elevated LFTs  R79.89 790.6        1. Hospital discharge and change in depression mgt reviewed. Tolerating plan as per discharge instructions. Mgt with referrals above reviewed with pt at visit. Clarify with inpt [place] [id number] team regarding referral coordination PRN/as able. 2.  Discussed need to review plan with discharging psychiatrist regarding ADHD medications with current depression mgt. Plan hold ADHD meds until follow-up/review with [place] [id number] provider. 3.  Monitoring with in-office visit next week reviewed. Follow-up and Dispositions    · Return for as scheduled after visit--in-office for medication follow-up and non-fasting labs. .       lab results and schedule of future lab studies reviewed with patient  reviewed medications and side effects in detail    Plan and evaluation (above) reviewed with pt at visit  Patient voiced understanding of plan and provided with time to ask/review questions.   After Visit Summary (AVS) provided to pt after visit with additional instructions as needed/reviewed. AVS:  [x]  Available to patient in MyChart after visit signed. []  Mailed to patient after visit. []  Not sent to patient after visit. No future appointments. --Updated future visits after patient check-out. Subjective:   Dyllan Stevens was seen for:  Chief Complaint   Patient presents with    Medication Evaluation    ED Follow-up     [facility]         Notes:  Admit date: 5/27/2022  Discharge date and time: 6/1/2022     DISCHARGE DIAGNOSIS:  ETOH abuse  Depression  Elevated LFTS    Hospital course, and mgt reviewed. Cross-tapering Prozac for new Venlafaxine reviewed. Remains off ADHD meds at this time. Alcohol use and mgt reviewed. Reviewed need to follow-up LFT's from hospital--prefers here. Scheduled appt as reviewed after visit. Nursing screenings reviewed by provider at visit. Allergies   Allergen Reactions    Aspirin Other (comments)     Hot sweats and passed out; tolerated ibuprofen    Penicillins Other (comments)     Childhood. Does not remember reaction. Is not aware if she has ever taken cephalosporins in the past.    Jan 2019: Pt notes no problems with cephalosporins. Prior to Admission medications    Medication Sig Start Date End Date Taking? Authorizing Provider   venlafaxine (EFFEXOR) 37.5 mg tablet Take 1 Tablet by mouth daily. 6/2/22  Yes Charmayne Burrows, MD   ARIPiprazole (Abilify) 10 mg tablet Take 10 mg by mouth daily. Yes Provider, Historical   b complex-vitamin c-folic acid 0.8 mg (NEPHRO-LIT) 0.8 mg tab tablet Take 1 Tablet by mouth daily. Yes Provider, Historical   cholecalciferol (VITAMIN D3) (1000 Units /25 mcg) tablet Take 1,000 Units by mouth daily. Yes Provider, Historical   amphetamine-dextroamphetamine XR (ADDERALL XR) 30 mg XR capsule Take 1 Capsule by mouth every morning.  Max Daily Amount: 30 mg. 4/25/22  Yes Anamika Mcallister MD cetirizine (ZYRTEC) 10 mg tablet Take 1 Tablet by mouth daily. Take as needed for allergies. 3/22/22  Yes Shireen Guevara MD   FLUoxetine (PROzac) 40 mg capsule Take 1 Capsule by mouth daily for 4 doses. And start 20 mg  Patient not taking: Reported on 6/2/2022 6/2/22 6/6/22  Kale Gamez MD   FLUoxetine (PROzac) 20 mg capsule Take 1 Capsule by mouth daily for 7 doses. Patient not taking: Reported on 6/2/2022 6/6/22 6/13/22  Kale Gamez MD   FLUoxetine (PROzac) 10 mg capsule Take 1 Capsule by mouth daily for 7 doses. And stop  Patient not taking: Reported on 6/2/2022 6/13/22 6/20/22  Kale Gamez MD   multivitamin,tx-iron-ca-min (THERA-M w/ IRON) 27-0.4 mg tab Take 1 Tablet by mouth daily. Patient not taking: Reported on 6/2/2022 6/1/22   Kale Gamez MD   hydrOXYzine HCL (ATARAX) 50 mg tablet Take 1 Tablet by mouth every six (6) hours as needed for Itching or Anxiety for up to 5 days. Patient not taking: Reported on 6/2/2022 6/1/22 6/6/22  Kale Gamez MD   traZODone (DESYREL) 50 mg tablet Take 1 Tablet by mouth nightly as needed for Sleep for up to 15 days. Patient not taking: Reported on 6/2/2022 6/1/22 6/16/22  Kale Gamez MD   fluticasone propionate (FLONASE) 50 mcg/actuation nasal spray 2 sprays to each nostril 1 time daily  Patient not taking: Reported on 6/2/2022 4/12/22   Julián Issa MD         ROS    PHYSICAL EXAMINATION:    Vital Signs:  Last menstrual period 03/14/2016. No flowsheet data found.      Constitutional: [x] Appears well-developed and well-nourished [x] No apparent distress      Mental status: [x] Alert and awake  [x] Oriented [x] Able to follow commands       Eyes:   EOM    [x]  Normal      Sclera  [x]  Normal              Discharge [x]  None visible       HENT: [x] Normocephalic, atraumatic    [x] Mouth/Throat: Mucous membranes are moist    External Ears [x] Normal      Neck: [x] No visualized mass     Pulmonary/Chest: [x] Respiratory effort normal   [x] No visualized signs of difficulty breathing or respiratory distress    Musculoskeletal:  [x] Normal range of motion of neck    Neurological:        [x] No Facial Asymmetry (Cranial nerve 7 motor function) (limited exam due to video visit)          [x] No gaze palsy     Skin:        [x] No significant exanthematous lesions or discoloration noted on facial skin             Psychiatric:       [x] Normal Affect       Other pertinent observable physical exam findings:  None. We discussed the expected course, resolution and complications of the diagnosis(es) in detail. Medication risks, benefits, costs, interactions, and alternatives were discussed as indicated. I advised her to contact the office if her condition worsens, changes or fails to improve as anticipated. She expressed understanding with the diagnosis(es) and plan. Rosie Witt is a 46 y.o. female who was evaluated by a video visit encounter for concerns as above. Rosie Witt is being evaluated by a Virtual Visit (video visit) encounter to address concerns as mentioned above. A caregiver was present when appropriate. Due to this being a TeleHealth encounter (During [facility] public health emergency), evaluation of the following organ systems was limited: Vitals/Constitutional/EENT/Resp/CV/GI//MS/Neuro/Skin/Heme-Lymph-Imm. Pursuant to the emergency declaration under the [address] [place] [address] authority and the [employer] Act, this Virtual Visit was conducted with patient's (and/or legal guardian's) consent, to reduce the patient's risk of exposure to COVID-19 and provide necessary medical care. The patient (and/or legal guardian) has also been advised to contact this office for worsening conditions or problems, and seek emergency medical treatment and/or call 911 if deemed necessary.     Patient identification was verified at the start of the visit: YES. Services were provided through a video synchronous discussion virtually to substitute for in-person clinic visit. Patient was located at their individual home (or other location as per patient preference). Provider was located in medical office. An electronic signature was used to authenticate this note.   -- Ana Serrano MD

## 2022-06-02 NOTE — PROGRESS NOTES
[phone]    Chief Complaint   Patient presents with    Medication Evaluation    ED Follow-up     [facility]     1. Have you been to the ER, urgent care clinic since your last visit? Hospitalized since your last visit? Yes [facility]    2. Have you seen or consulted any other health care providers outside of the [address] [place] [address] since your last visit? Include any pap smears or colon screening.  No

## 2022-06-03 ENCOUNTER — PATIENT MESSAGE (OUTPATIENT)
Dept: INTERNAL MEDICINE CLINIC | Age: 42
End: 2022-06-03

## 2022-06-03 ENCOUNTER — HOSPITAL ENCOUNTER (EMERGENCY)
Age: 42
Discharge: HOME | End: 2022-06-03
Attending: EMERGENCY MEDICINE
Payer: MEDICAID

## 2022-06-03 VITALS
WEIGHT: 229.06 LBS | DIASTOLIC BLOOD PRESSURE: 70 MMHG | HEART RATE: 90 BPM | HEIGHT: 64 IN | BODY MASS INDEX: 39.11 KG/M2 | TEMPERATURE: 98 F | OXYGEN SATURATION: 93 % | SYSTOLIC BLOOD PRESSURE: 113 MMHG | RESPIRATION RATE: 17 BRPM

## 2022-06-03 DIAGNOSIS — R10.13 ABDOMINAL PAIN, EPIGASTRIC: Primary | ICD-10-CM

## 2022-06-03 DIAGNOSIS — F10.10 ALCOHOL ABUSE: ICD-10-CM

## 2022-06-03 DIAGNOSIS — R11.2 NAUSEA AND VOMITING, UNSPECIFIED VOMITING TYPE: ICD-10-CM

## 2022-06-03 DIAGNOSIS — M54.50 ACUTE RIGHT-SIDED LOW BACK PAIN WITHOUT SCIATICA: ICD-10-CM

## 2022-06-03 LAB
ALBUMIN SERPL-MCNC: 2.8 G/DL (ref 3.5–5)
ALBUMIN/GLOB SERPL: 0.8 {RATIO} (ref 1.1–2.2)
ALP SERPL-CCNC: 78 U/L (ref 45–117)
ALT SERPL-CCNC: 126 U/L (ref 12–78)
ANION GAP SERPL CALC-SCNC: 7 MMOL/L (ref 5–15)
APPEARANCE UR: CLEAR
AST SERPL-CCNC: 32 U/L (ref 15–37)
BACTERIA URNS QL MICRO: NEGATIVE /HPF
BASOPHILS # BLD: 0.1 K/UL (ref 0–0.1)
BASOPHILS NFR BLD: 1 % (ref 0–1)
BILIRUB SERPL-MCNC: 0.3 MG/DL (ref 0.2–1)
BILIRUB UR QL: NEGATIVE
BUN SERPL-MCNC: 16 MG/DL (ref 6–20)
BUN/CREAT SERPL: 24 (ref 12–20)
CALCIUM SERPL-MCNC: 8.4 MG/DL (ref 8.5–10.1)
CHLORIDE SERPL-SCNC: 106 MMOL/L (ref 97–108)
CK SERPL-CCNC: 79 U/L (ref 26–192)
CO2 SERPL-SCNC: 25 MMOL/L (ref 21–32)
COLOR UR: ABNORMAL
CREAT SERPL-MCNC: 0.67 MG/DL (ref 0.55–1.02)
DIFFERENTIAL METHOD BLD: ABNORMAL
EOSINOPHIL # BLD: 0.1 K/UL (ref 0–0.4)
EOSINOPHIL NFR BLD: 1 % (ref 0–7)
EPITH CASTS URNS QL MICRO: ABNORMAL /LPF
ERYTHROCYTE [DISTWIDTH] IN BLOOD BY AUTOMATED COUNT: 14.1 % (ref 11.5–14.5)
ETHANOL SERPL-MCNC: 65 MG/DL
GLOBULIN SER CALC-MCNC: 3.4 G/DL (ref 2–4)
GLUCOSE SERPL-MCNC: 133 MG/DL (ref 65–100)
GLUCOSE UR STRIP.AUTO-MCNC: NEGATIVE MG/DL
HCT VFR BLD AUTO: 34.5 % (ref 35–47)
HGB BLD-MCNC: 11.4 G/DL (ref 11.5–16)
HGB UR QL STRIP: NEGATIVE
HYALINE CASTS URNS QL MICRO: ABNORMAL /LPF (ref 0–2)
IMM GRANULOCYTES # BLD AUTO: 0.1 K/UL (ref 0–0.04)
IMM GRANULOCYTES NFR BLD AUTO: 1 % (ref 0–0.5)
KETONES UR QL STRIP.AUTO: NEGATIVE MG/DL
LEUKOCYTE ESTERASE UR QL STRIP.AUTO: NEGATIVE
LIPASE SERPL-CCNC: 170 U/L (ref 73–393)
LYMPHOCYTES # BLD: 1.8 K/UL (ref 0.8–3.5)
LYMPHOCYTES NFR BLD: 23 % (ref 12–49)
MCH RBC QN AUTO: 31 PG (ref 26–34)
MCHC RBC AUTO-ENTMCNC: 33 G/DL (ref 30–36.5)
MCV RBC AUTO: 93.8 FL (ref 80–99)
MONOCYTES # BLD: 0.5 K/UL (ref 0–1)
MONOCYTES NFR BLD: 6 % (ref 5–13)
NEUTS SEG # BLD: 5.6 K/UL (ref 1.8–8)
NEUTS SEG NFR BLD: 68 % (ref 32–75)
NITRITE UR QL STRIP.AUTO: NEGATIVE
NRBC # BLD: 0 K/UL (ref 0–0.01)
NRBC BLD-RTO: 0 PER 100 WBC
PH UR STRIP: 8 [PH] (ref 5–8)
PLATELET # BLD AUTO: 248 K/UL (ref 150–400)
PMV BLD AUTO: 9.4 FL (ref 8.9–12.9)
POTASSIUM SERPL-SCNC: 3.8 MMOL/L (ref 3.5–5.1)
PROT SERPL-MCNC: 6.2 G/DL (ref 6.4–8.2)
PROT UR STRIP-MCNC: NEGATIVE MG/DL
RBC # BLD AUTO: 3.68 M/UL (ref 3.8–5.2)
RBC #/AREA URNS HPF: ABNORMAL /HPF (ref 0–5)
SODIUM SERPL-SCNC: 138 MMOL/L (ref 136–145)
SP GR UR REFRACTOMETRY: 1.01
UA: UC IF INDICATED,UAUC: ABNORMAL
UROBILINOGEN UR QL STRIP.AUTO: 0.2 EU/DL (ref 0.2–1)
WBC # BLD AUTO: 8.1 K/UL (ref 3.6–11)
WBC URNS QL MICRO: ABNORMAL /HPF (ref 0–4)

## 2022-06-03 PROCEDURE — 99284 EMERGENCY DEPT VISIT MOD MDM: CPT

## 2022-06-03 PROCEDURE — 82077 ASSAY SPEC XCP UR&BREATH IA: CPT

## 2022-06-03 PROCEDURE — 82550 ASSAY OF CK (CPK): CPT

## 2022-06-03 PROCEDURE — 74011250636 HC RX REV CODE- 250/636: Performed by: EMERGENCY MEDICINE

## 2022-06-03 PROCEDURE — 85025 COMPLETE CBC W/AUTO DIFF WBC: CPT

## 2022-06-03 PROCEDURE — 74011000250 HC RX REV CODE- 250: Performed by: EMERGENCY MEDICINE

## 2022-06-03 PROCEDURE — 74011250637 HC RX REV CODE- 250/637: Performed by: EMERGENCY MEDICINE

## 2022-06-03 PROCEDURE — 81001 URINALYSIS AUTO W/SCOPE: CPT

## 2022-06-03 PROCEDURE — 36415 COLL VENOUS BLD VENIPUNCTURE: CPT

## 2022-06-03 PROCEDURE — 80053 COMPREHEN METABOLIC PANEL: CPT

## 2022-06-03 PROCEDURE — 96374 THER/PROPH/DIAG INJ IV PUSH: CPT

## 2022-06-03 PROCEDURE — 96375 TX/PRO/DX INJ NEW DRUG ADDON: CPT

## 2022-06-03 PROCEDURE — 83690 ASSAY OF LIPASE: CPT

## 2022-06-03 RX ORDER — ONDANSETRON 8 MG/1
8 TABLET, ORALLY DISINTEGRATING ORAL
Qty: 12 TABLET | Refills: 0 | Status: ON HOLD | OUTPATIENT
Start: 2022-06-03 | End: 2022-06-10

## 2022-06-03 RX ORDER — MORPHINE SULFATE 2 MG/ML
4 INJECTION, SOLUTION INTRAMUSCULAR; INTRAVENOUS
Status: COMPLETED | OUTPATIENT
Start: 2022-06-03 | End: 2022-06-03

## 2022-06-03 RX ORDER — ONDANSETRON 2 MG/ML
4 INJECTION INTRAMUSCULAR; INTRAVENOUS
Status: COMPLETED | OUTPATIENT
Start: 2022-06-03 | End: 2022-06-03

## 2022-06-03 RX ORDER — OXYCODONE HYDROCHLORIDE 5 MG/1
5 TABLET ORAL
Status: COMPLETED | OUTPATIENT
Start: 2022-06-03 | End: 2022-06-03

## 2022-06-03 RX ORDER — METHOCARBAMOL 750 MG/1
750 TABLET, FILM COATED ORAL
Qty: 15 TABLET | Refills: 0 | Status: ON HOLD | OUTPATIENT
Start: 2022-06-03 | End: 2022-06-10

## 2022-06-03 RX ORDER — FAMOTIDINE 20 MG/1
20 TABLET, FILM COATED ORAL 2 TIMES DAILY
Qty: 20 TABLET | Refills: 0 | Status: ON HOLD | OUTPATIENT
Start: 2022-06-03 | End: 2022-06-10

## 2022-06-03 RX ADMIN — ONDANSETRON 4 MG: 2 INJECTION INTRAMUSCULAR; INTRAVENOUS at 01:31

## 2022-06-03 RX ADMIN — SODIUM CHLORIDE 1000 ML: 9 INJECTION, SOLUTION INTRAVENOUS at 01:32

## 2022-06-03 RX ADMIN — ALUMINUM HYDROXIDE AND MAGNESIUM HYDROXIDE 40 ML: 200; 200 SUSPENSION ORAL at 03:19

## 2022-06-03 RX ADMIN — MORPHINE SULFATE 4 MG: 2 INJECTION, SOLUTION INTRAMUSCULAR; INTRAVENOUS at 01:32

## 2022-06-03 RX ADMIN — OXYCODONE 5 MG: 5 TABLET ORAL at 03:19

## 2022-06-03 NOTE — ED TRIAGE NOTES
Patient presents to the ED via EMS complaining of \"liver pain\" after consuming alcohol. Patient reports she was discharged recently related to elevated liver enzymes and advised NOT to consume any alcohol. Patient reports last night she consumed a few drinks and immediately experienced lower back and abdomen pain.

## 2022-06-03 NOTE — ED PROVIDER NOTES
EMERGENCY DEPARTMENT HISTORY AND PHYSICAL EXAM      Date: 6/3/2022  Patient Name: Dayton Pascual    Please note that this dictation was completed with [employer], the computer voice recognition software. Quite often unanticipated grammatical, syntax, homophones, and other interpretive errors are inadvertently transcribed by the computer software. Please disregard these errors. Please excuse any errors that have escaped final proofreading. History of Presenting Illness     Chief Complaint   Patient presents with    Alcohol Problem       History Provided By: Patient     HPI: Dayton Pascual, 46 y.o. female, With past medical history significant for alcohol abuse presenting the emergency department complaining of epigastric abdominal pain, nausea, vomiting, back pain. She was recently hospitalized for alcoholic hepatitis. She was admitted on 5/27 and discharged on 6/1. She did not drink yesterday, but began drinking again on Thursday. After drinking she reports she developed midthoracic back pain, abdominal pain, nausea and vomiting. On prior hospitalization lipase was not elevated, but LFTs were elevated. Nothing makes her symptoms better. Pain is made worse by moving twisting, bending, coughing, vomiting. Rates the pain as severe    PCP: Dalia Connor MD    No current facility-administered medications on file prior to encounter. Current Outpatient Medications on File Prior to Encounter   Medication Sig Dispense Refill    venlafaxine (EFFEXOR) 37.5 mg tablet Take 1 Tablet by mouth daily. 60 Tablet 2    [DISCONTINUED] FLUoxetine (PROzac) 40 mg capsule Take 1 Capsule by mouth daily for 4 doses. And start 20 mg (Patient not taking: Reported on 6/2/2022) 4 Capsule 0    [DISCONTINUED] FLUoxetine (PROzac) 20 mg capsule Take 1 Capsule by mouth daily for 7 doses.  (Patient not taking: Reported on 6/2/2022) 7 Capsule 0    [DISCONTINUED] FLUoxetine (PROzac) 10 mg capsule Take 1 Capsule by mouth daily for 7 doses. And stop (Patient not taking: Reported on 2022) 7 Capsule 0    [DISCONTINUED] multivitamin,tx-iron-ca-min (THERA-M w/ IRON) 27-0.4 mg tab Take 1 Tablet by mouth daily. (Patient not taking: Reported on 2022) 60 Tablet 1    [DISCONTINUED] hydrOXYzine HCL (ATARAX) 50 mg tablet Take 1 Tablet by mouth every six (6) hours as needed for Itching or Anxiety for up to 5 days. (Patient not taking: Reported on 2022) 20 Tablet 0    [DISCONTINUED] traZODone (DESYREL) 50 mg tablet Take 1 Tablet by mouth nightly as needed for Sleep for up to 15 days. (Patient not taking: Reported on 2022) 15 Tablet 0    ARIPiprazole (Abilify) 10 mg tablet Take 10 mg by mouth daily.  b complex-vitamin c-folic acid 0.8 mg (NEPHRO-LIT) 0.8 mg tab tablet Take 1 Tablet by mouth daily.  cholecalciferol (VITAMIN D3) (1000 Units /25 mcg) tablet Take 1,000 Units by mouth daily.  amphetamine-dextroamphetamine XR (ADDERALL XR) 30 mg XR capsule Take 1 Capsule by mouth every morning. Max Daily Amount: 30 mg. 30 Capsule 0    [DISCONTINUED] fluticasone propionate (FLONASE) 50 mcg/actuation nasal spray 2 sprays to each nostril 1 time daily (Patient not taking: Reported on 2022) 1 Each 0    cetirizine (ZYRTEC) 10 mg tablet Take 1 Tablet by mouth daily. Take as needed for allergies. 90 Tablet 1       Past History     Past Medical History:  Past Medical History:   Diagnosis Date    ADD (attention deficit disorder) 19-19yo    Treated with Adderall since dx. 2013 dosing 20mg AM and 10mg PM.  Trial/change to Vyvanse Nov-Dec 2015--not as effective.  Gynecologic disorder     History of miscarriages.   History of Mirena IUD placed on 4/17/15    HX OTHER MEDICAL      -BUFA baby    HX OTHER MEDICAL     HPV positive    Idiopathic vulvodynia 2014    L1 vertebral fracture (HCC) 2017    [facility] imaging/admissoin: Mild acute two column compression fracture of L1 without evidence of retropulsion into the spinal canal.  Managed non-operatively.  Migraines 6/12/2013    Neurological disorder     Pelvic pain in female 9/15/2014    April 2015 gyn laparoscopy/hysteroscopy with endometriosis worse right.  Psychiatric disorder     depression    Secondary dysmenorrhea 8/12/2014    With menorraghia    Seizures ([facility] [address] 2008    never on meds--had appr 4-5 in a short amt of time and none since       Past Surgical History:  Past Surgical History:   Procedure Laterality Date    ENDOMETRIAL CRYOABLATION      HX GYN  4/17/15    laparoscopy and hysteroscopy and IUD placement    HX HYSTERECTOMY  04/04/2016    Complete Hysterectomy       Family History:  Family History   Problem Relation Age of Onset    Cancer Mother     Diabetes Mother     Alcohol abuse Father         and drug    Psychiatric Disorder Father     Cancer Father     Diabetes Maternal Grandmother     Hypertension Maternal Grandmother     Thyroid Disease Maternal Grandmother     Diabetes Maternal Grandfather     Hypertension Maternal Grandfather     Cancer Maternal Grandfather     Heart Disease Maternal Grandfather     Cancer Paternal Grandmother     Diabetes Paternal Grandmother        Social History:  Social History     Tobacco Use    Smoking status: Current Every Day Smoker     Packs/day: 1.00     Years: 18.00     Pack years: 18.00     Types: Cigarettes    Smokeless tobacco: Never Used    Tobacco comment: Vapes   Vaping Use    Vaping Use: Not on file   Substance Use Topics    Alcohol use: Yes     Alcohol/week: 0.0 standard drinks     Comment: rarely.  Drug use: No       Allergies: Allergies   Allergen Reactions    Aspirin Other (comments)     Hot sweats and passed out; tolerated ibuprofen    Penicillins Other (comments)     Childhood. Does not remember reaction. Is not aware if she has ever taken cephalosporins in the past.    Jan 2019: Pt notes no problems with cephalosporins.          Review of Systems   Review of Systems Constitutional: Negative for chills and fever. HENT: Negative for congestion and sore throat. Eyes: Negative for visual disturbance. Respiratory: Negative for cough and shortness of breath. Cardiovascular: Negative for chest pain and leg swelling. Gastrointestinal: Positive for abdominal pain, nausea and vomiting. Negative for blood in stool and diarrhea. Endocrine: Negative for polyuria. Genitourinary: Negative for dysuria, flank pain, vaginal bleeding and vaginal discharge. Musculoskeletal: Positive for back pain. Negative for myalgias. Skin: Negative for rash. Allergic/Immunologic: Negative for immunocompromised state. Neurological: Negative for weakness and headaches. Psychiatric/Behavioral: Negative for confusion. Physical Exam   Physical Exam  Vitals and nursing note reviewed. Constitutional:       Appearance: She is well-developed. HENT:      Head: Normocephalic and atraumatic. Eyes:      General:         Right eye: No discharge. Left eye: No discharge. Conjunctiva/sclera: Conjunctivae normal.      Pupils: Pupils are equal, round, and reactive to light. Neck:      Trachea: No tracheal deviation. Cardiovascular:      Rate and Rhythm: Regular rhythm. Tachycardia present. Heart sounds: Normal heart sounds. No murmur heard. Pulmonary:      Effort: Pulmonary effort is normal. No respiratory distress. Breath sounds: Normal breath sounds. No wheezing or rales. Abdominal:      General: Bowel sounds are normal.      Palpations: Abdomen is soft. Tenderness: There is abdominal tenderness. There is no guarding or rebound. Comments: Tenderness palpation in the epigastrium, no guarding or rebound   Musculoskeletal:         General: No tenderness or deformity. Normal range of motion. Cervical back: Normal range of motion and neck supple. Skin:     General: Skin is warm and dry. Findings: No erythema or rash.    Neurological: Mental Status: She is alert and oriented to person, place, and time. Psychiatric:         Behavior: Behavior normal.         Diagnostic Study Results     Labs -   No results found for this or any previous visit (from the past 12 hour(s)). Radiologic Studies -   No orders to display     CT Results  (Last 48 hours)    None        CXR Results  (Last 48 hours)    None            Medical Decision Making   I am the first provider for this patient. I reviewed the vital signs, available nursing notes, past medical history, past surgical history, family history and social history. Vital Signs-Reviewed the patient's vital signs. No data found. Records Reviewed:   Nursing notes, Prior visits     Admission summary and discharge summary reviewed from recent hospitalization on 5/27 and discharged on 6/1. Patient had ultrasound which showed no evidence of cholecystitis and no gallstones. Lipase not elevated on that admission. Had elevated LFTs. Provider Notes (Medical Decision Making):   Patient presenting with concern for pancreatitis, exacerbation of alcoholic hepatitis, could also be gastritis, gastroenteritis, doubt diverticulitis, doubt appendicitis. Pain is in the upper abdomen, is worse with movement, is worse with a cough. Clinical suspicion for pulmonary embolus low. Doubt ACS. ED Course:   Initial assessment performed. The patients presenting problems have been discussed, and they are in agreement with the care plan formulated and outlined with them. I have encouraged them to ask questions as they arise throughout their visit. ED Course as of 06/03/22 2226   Fri Jun 03, 2022   0256 Reassessment of patient's back pain and appears to be more lumbar back pain and not midthoracic.   It is in the right paraspinal muscles and she is tender to palpation on exam. [AR]      ED Course User Index  [AR] Kassandra Kapoor,          Lab interpretation:  LFTs trended down from prior admission, lipase not elevated. Alcohol level 65. Most likely alcohol induced gastritis. Will p.o. trial patient, reassess. If able to tolerate p.o. and feeling better can be safely discharged home      Critical Care Time:   none    Disposition:    DISCHARGE NOTE  Patients results have been reviewed with them. Patient and/or family have verbally conveyed their understanding and agreement of the patient's signs, symptoms, diagnosis, treatment and prognosis and additionally agree to follow up as recommended or return to the Emergency Room should their condition change or have any new concerns prior to their follow-up appointment. Patient verbally agrees with the care-plan and verbally conveys that all of their questions have been answered. Discharge instructions have also been provided to the patient with some educational information regarding their diagnosis as well a list of reasons why they would want to return to the ER prior to their follow-up appointment should their condition change. PLAN:  1. Discharge Medication List as of 6/3/2022  3:05 AM      START taking these medications    Details   famotidine (Pepcid) 20 mg tablet Take 1 Tablet by mouth two (2) times a day for 10 days. , Normal, Disp-20 Tablet, R-0      ondansetron (ZOFRAN ODT) 8 mg disintegrating tablet Take 1 Tablet by mouth every eight (8) hours as needed for Nausea or Vomiting., Normal, Disp-12 Tablet, R-0      methocarbamoL (Robaxin-750) 750 mg tablet Take 1 Tablet by mouth three (3) times daily as needed for Muscle Spasm(s). , Normal, Disp-15 Tablet, R-0         CONTINUE these medications which have NOT CHANGED    Details   venlafaxine (EFFEXOR) 37.5 mg tablet Take 1 Tablet by mouth daily. , Normal, Disp-60 Tablet, R-2      ARIPiprazole (Abilify) 10 mg tablet Take 10 mg by mouth daily. , Historical Med      b complex-vitamin c-folic acid 0.8 mg (NEPHRO-LIT) 0.8 mg tab tablet Take 1 Tablet by mouth daily. , Historical Med      cholecalciferol (VITAMIN D3) (1000 Units /25 mcg) tablet Take 1,000 Units by mouth daily. , Historical Med      amphetamine-dextroamphetamine XR (ADDERALL XR) 30 mg XR capsule Take 1 Capsule by mouth every morning. Max Daily Amount: 30 mg., Normal, Disp-30 Capsule, R-0      cetirizine (ZYRTEC) 10 mg tablet Take 1 Tablet by mouth daily. Take as needed for allergies. , Normal, Disp-90 Tablet, R-1           2. Follow-up Information     Follow up With Specialties Details Why Contact Info    Carlos Fonseca MD Infectious Disease Physician Schedule an appointment as soon as possible for a visit   [address] [place] [address]  [phone]      [facility] EMERGENCY DEPT Emergency Medicine  If symptoms worsen [address] [place] [address] Drive  [address] [facility]  [phone]          Return to ED if worse     Diagnosis     Clinical Impression:   1. Abdominal pain, epigastric    2. Alcohol abuse    3. Acute right-sided low back pain without sciatica    4. Nausea and vomiting, unspecified vomiting type        Attestations:   This note was completed by Matthew Hernandez DO

## 2022-06-04 NOTE — TELEPHONE ENCOUNTER
Reviewed Adderall refill request with pt at [phone] on 6-2-22. Request not approved at this time.

## 2022-06-07 ENCOUNTER — HOSPITAL ENCOUNTER (EMERGENCY)
Age: 42
Discharge: HOME | End: 2022-06-08
Attending: EMERGENCY MEDICINE
Payer: MEDICAID

## 2022-06-07 DIAGNOSIS — R10.9 RIGHT FLANK PAIN: Primary | ICD-10-CM

## 2022-06-07 DIAGNOSIS — F10.920 ALCOHOLIC INTOXICATION WITHOUT COMPLICATION (HCC): ICD-10-CM

## 2022-06-07 PROCEDURE — 99284 EMERGENCY DEPT VISIT MOD MDM: CPT

## 2022-06-08 ENCOUNTER — OFFICE VISIT (OUTPATIENT)
Dept: INTERNAL MEDICINE CLINIC | Age: 42
End: 2022-06-08
Payer: MEDICAID

## 2022-06-08 ENCOUNTER — APPOINTMENT (OUTPATIENT)
Dept: CT IMAGING | Age: 42
End: 2022-06-08
Attending: EMERGENCY MEDICINE
Payer: MEDICAID

## 2022-06-08 ENCOUNTER — APPOINTMENT (OUTPATIENT)
Dept: GENERAL RADIOLOGY | Age: 42
End: 2022-06-08
Attending: EMERGENCY MEDICINE
Payer: MEDICAID

## 2022-06-08 VITALS
OXYGEN SATURATION: 97 % | RESPIRATION RATE: 16 BRPM | HEIGHT: 64 IN | WEIGHT: 217 LBS | SYSTOLIC BLOOD PRESSURE: 109 MMHG | BODY MASS INDEX: 37.05 KG/M2 | DIASTOLIC BLOOD PRESSURE: 68 MMHG | HEART RATE: 101 BPM | TEMPERATURE: 98.3 F

## 2022-06-08 VITALS
SYSTOLIC BLOOD PRESSURE: 115 MMHG | RESPIRATION RATE: 22 BRPM | HEART RATE: 109 BPM | HEIGHT: 64 IN | TEMPERATURE: 98.2 F | DIASTOLIC BLOOD PRESSURE: 64 MMHG | WEIGHT: 229.06 LBS | OXYGEN SATURATION: 94 % | BODY MASS INDEX: 39.11 KG/M2

## 2022-06-08 DIAGNOSIS — Z78.9 ALCOHOL USE: ICD-10-CM

## 2022-06-08 DIAGNOSIS — R79.89 ELEVATED LFTS: ICD-10-CM

## 2022-06-08 DIAGNOSIS — F98.8 ATTENTION DEFICIT DISORDER, UNSPECIFIED HYPERACTIVITY PRESENCE: ICD-10-CM

## 2022-06-08 DIAGNOSIS — F33.1 MODERATE EPISODE OF RECURRENT MAJOR DEPRESSIVE DISORDER (HCC): Primary | ICD-10-CM

## 2022-06-08 LAB
ALBUMIN SERPL-MCNC: 3.1 G/DL (ref 3.5–5)
ALBUMIN/GLOB SERPL: 0.8 {RATIO} (ref 1.1–2.2)
ALP SERPL-CCNC: 71 U/L (ref 45–117)
ALT SERPL-CCNC: 62 U/L (ref 12–78)
ANION GAP SERPL CALC-SCNC: 8 MMOL/L (ref 5–15)
APPEARANCE UR: CLEAR
AST SERPL-CCNC: 23 U/L (ref 15–37)
BACTERIA URNS QL MICRO: NEGATIVE /HPF
BASOPHILS # BLD: 0.1 K/UL (ref 0–0.1)
BASOPHILS # BLD: 0.1 K/UL (ref 0–0.1)
BASOPHILS NFR BLD: 1 % (ref 0–1)
BASOPHILS NFR BLD: 1 % (ref 0–1)
BILIRUB SERPL-MCNC: 0.1 MG/DL (ref 0.2–1)
BILIRUB UR QL: NEGATIVE
BUN SERPL-MCNC: 20 MG/DL (ref 6–20)
BUN/CREAT SERPL: 27 (ref 12–20)
CALCIUM SERPL-MCNC: 8.9 MG/DL (ref 8.5–10.1)
CHLORIDE SERPL-SCNC: 107 MMOL/L (ref 97–108)
CO2 SERPL-SCNC: 23 MMOL/L (ref 21–32)
COLOR UR: NORMAL
CREAT SERPL-MCNC: 0.74 MG/DL (ref 0.55–1.02)
DIFFERENTIAL METHOD BLD: ABNORMAL
DIFFERENTIAL METHOD BLD: ABNORMAL
EOSINOPHIL # BLD: 0.1 K/UL (ref 0–0.4)
EOSINOPHIL # BLD: 0.1 K/UL (ref 0–0.4)
EOSINOPHIL NFR BLD: 1 % (ref 0–7)
EOSINOPHIL NFR BLD: 1 % (ref 0–7)
EPITH CASTS URNS QL MICRO: NORMAL /LPF
ERYTHROCYTE [DISTWIDTH] IN BLOOD BY AUTOMATED COUNT: 14.1 % (ref 11.5–14.5)
ERYTHROCYTE [DISTWIDTH] IN BLOOD BY AUTOMATED COUNT: 14.3 % (ref 11.5–14.5)
ETHANOL SERPL-MCNC: 106 MG/DL
GLOBULIN SER CALC-MCNC: 3.9 G/DL (ref 2–4)
GLUCOSE SERPL-MCNC: 118 MG/DL (ref 65–100)
GLUCOSE UR STRIP.AUTO-MCNC: NEGATIVE MG/DL
HCT VFR BLD AUTO: 36.5 % (ref 35–47)
HCT VFR BLD AUTO: 36.6 % (ref 35–47)
HGB BLD-MCNC: 12.2 G/DL (ref 11.5–16)
HGB BLD-MCNC: 12.6 G/DL (ref 11.5–16)
HGB UR QL STRIP: NEGATIVE
HYALINE CASTS URNS QL MICRO: NORMAL /LPF (ref 0–2)
IMM GRANULOCYTES # BLD AUTO: 0.1 K/UL (ref 0–0.04)
IMM GRANULOCYTES # BLD AUTO: 0.1 K/UL (ref 0–0.04)
IMM GRANULOCYTES NFR BLD AUTO: 1 % (ref 0–0.5)
IMM GRANULOCYTES NFR BLD AUTO: 1 % (ref 0–0.5)
KETONES UR QL STRIP.AUTO: NEGATIVE MG/DL
LEUKOCYTE ESTERASE UR QL STRIP.AUTO: NEGATIVE
LIPASE SERPL-CCNC: 300 U/L (ref 73–393)
LYMPHOCYTES # BLD: 3.2 K/UL (ref 0.8–3.5)
LYMPHOCYTES # BLD: 3.9 K/UL (ref 0.8–3.5)
LYMPHOCYTES NFR BLD: 39 % (ref 12–49)
LYMPHOCYTES NFR BLD: 39 % (ref 12–49)
MCH RBC QN AUTO: 31.4 PG (ref 26–34)
MCH RBC QN AUTO: 32 PG (ref 26–34)
MCHC RBC AUTO-ENTMCNC: 33.4 G/DL (ref 30–36.5)
MCHC RBC AUTO-ENTMCNC: 34.4 G/DL (ref 30–36.5)
MCV RBC AUTO: 92.9 FL (ref 80–99)
MCV RBC AUTO: 93.8 FL (ref 80–99)
MONOCYTES # BLD: 0.6 K/UL (ref 0–1)
MONOCYTES # BLD: 0.8 K/UL (ref 0–1)
MONOCYTES NFR BLD: 7 % (ref 5–13)
MONOCYTES NFR BLD: 8 % (ref 5–13)
NEUTS SEG # BLD: 4.3 K/UL (ref 1.8–8)
NEUTS SEG # BLD: 5 K/UL (ref 1.8–8)
NEUTS SEG NFR BLD: 50 % (ref 32–75)
NEUTS SEG NFR BLD: 51 % (ref 32–75)
NITRITE UR QL STRIP.AUTO: NEGATIVE
NRBC # BLD: 0 K/UL (ref 0–0.01)
NRBC # BLD: 0 K/UL (ref 0–0.01)
NRBC BLD-RTO: 0 PER 100 WBC
NRBC BLD-RTO: 0 PER 100 WBC
PH UR STRIP: 5.5 [PH] (ref 5–8)
PLATELET # BLD AUTO: 383 K/UL (ref 150–400)
PLATELET # BLD AUTO: 439 K/UL (ref 150–400)
PMV BLD AUTO: 9.3 FL (ref 8.9–12.9)
PMV BLD AUTO: 9.7 FL (ref 8.9–12.9)
POTASSIUM SERPL-SCNC: 3.9 MMOL/L (ref 3.5–5.1)
PROT SERPL-MCNC: 7 G/DL (ref 6.4–8.2)
PROT UR STRIP-MCNC: NEGATIVE MG/DL
RBC # BLD AUTO: 3.89 M/UL (ref 3.8–5.2)
RBC # BLD AUTO: 3.94 M/UL (ref 3.8–5.2)
RBC #/AREA URNS HPF: NORMAL /HPF (ref 0–5)
SODIUM SERPL-SCNC: 138 MMOL/L (ref 136–145)
SP GR UR REFRACTOMETRY: 1.01
UA: UC IF INDICATED,UAUC: NORMAL
UROBILINOGEN UR QL STRIP.AUTO: 0.2 EU/DL (ref 0.2–1)
WBC # BLD AUTO: 8.4 K/UL (ref 3.6–11)
WBC # BLD AUTO: 9.9 K/UL (ref 3.6–11)
WBC URNS QL MICRO: NORMAL /HPF (ref 0–4)

## 2022-06-08 PROCEDURE — 74176 CT ABD & PELVIS W/O CONTRAST: CPT

## 2022-06-08 PROCEDURE — 83690 ASSAY OF LIPASE: CPT

## 2022-06-08 PROCEDURE — 85025 COMPLETE CBC W/AUTO DIFF WBC: CPT

## 2022-06-08 PROCEDURE — 81001 URINALYSIS AUTO W/SCOPE: CPT

## 2022-06-08 PROCEDURE — 71046 X-RAY EXAM CHEST 2 VIEWS: CPT

## 2022-06-08 PROCEDURE — 36415 COLL VENOUS BLD VENIPUNCTURE: CPT

## 2022-06-08 PROCEDURE — 99214 OFFICE O/P EST MOD 30 MIN: CPT | Performed by: INTERNAL MEDICINE

## 2022-06-08 PROCEDURE — 96375 TX/PRO/DX INJ NEW DRUG ADDON: CPT

## 2022-06-08 PROCEDURE — 74011250636 HC RX REV CODE- 250/636: Performed by: EMERGENCY MEDICINE

## 2022-06-08 PROCEDURE — 82077 ASSAY SPEC XCP UR&BREATH IA: CPT

## 2022-06-08 PROCEDURE — 99284 EMERGENCY DEPT VISIT MOD MDM: CPT

## 2022-06-08 PROCEDURE — 96374 THER/PROPH/DIAG INJ IV PUSH: CPT

## 2022-06-08 PROCEDURE — 80053 COMPREHEN METABOLIC PANEL: CPT

## 2022-06-08 PROCEDURE — 96361 HYDRATE IV INFUSION ADD-ON: CPT

## 2022-06-08 RX ORDER — FLUOXETINE HYDROCHLORIDE 20 MG/1
20 CAPSULE ORAL DAILY
COMMUNITY
End: 2022-06-17

## 2022-06-08 RX ORDER — KETOROLAC TROMETHAMINE 30 MG/ML
15 INJECTION, SOLUTION INTRAMUSCULAR; INTRAVENOUS
Status: COMPLETED | OUTPATIENT
Start: 2022-06-08 | End: 2022-06-08

## 2022-06-08 RX ORDER — ARIPIPRAZOLE 10 MG/1
10 TABLET ORAL DAILY
Qty: 30 TABLET | Refills: 2 | Status: SHIPPED | OUTPATIENT
Start: 2022-06-08 | End: 2022-09-08

## 2022-06-08 RX ORDER — ONDANSETRON 4 MG/1
4 TABLET, FILM COATED ORAL
Qty: 20 TABLET | Refills: 0 | Status: ON HOLD | OUTPATIENT
Start: 2022-06-08 | End: 2022-06-10

## 2022-06-08 RX ORDER — FENTANYL CITRATE 50 UG/ML
25 INJECTION, SOLUTION INTRAMUSCULAR; INTRAVENOUS
Status: COMPLETED | OUTPATIENT
Start: 2022-06-08 | End: 2022-06-08

## 2022-06-08 RX ORDER — IBUPROFEN 600 MG/1
600 TABLET ORAL
Qty: 30 TABLET | Refills: 0 | Status: ON HOLD | OUTPATIENT
Start: 2022-06-08 | End: 2022-06-10

## 2022-06-08 RX ADMIN — KETOROLAC TROMETHAMINE 15 MG: 30 INJECTION, SOLUTION INTRAMUSCULAR at 01:37

## 2022-06-08 RX ADMIN — SODIUM CHLORIDE 1000 ML: 9 INJECTION, SOLUTION INTRAVENOUS at 01:37

## 2022-06-08 RX ADMIN — FENTANYL CITRATE 25 MCG: 50 INJECTION, SOLUTION INTRAMUSCULAR; INTRAVENOUS at 02:04

## 2022-06-08 NOTE — ED TRIAGE NOTES
Patient presents to the ED via EMS complaining of flank pain. Patient was admitted and diagnosis with alcoholic hepatitis without ascites on 05/27/2022. Patient reports for the last 5 days the pain has returned which is similar to the pain she experienced on 05/27/2022. Patient denies consuming any alcohol since her visit and discharge on 06/03/2022.

## 2022-06-08 NOTE — ED NOTES
Pt walked out of department, states that her mom is here and she has to leave. Pt in NAD, voices understanding of return precautions.

## 2022-06-08 NOTE — ED NOTES
Pt requesting to leave stating that her mom can only come and pick her up in the next 10 mins.  Pt A&Ox4, states that her pain is better, pt HR-[id number]

## 2022-06-08 NOTE — PATIENT INSTRUCTIONS
1.  For the fatty liver imaging changes, we need to up date fasting labs. We can do with repeat liver enzymes at your next visit--please fast.      2.  Please follow the following instructions to process/authorize your referral, if needed:    Referrals processing  Please verify with your insurance IF you need referral authorization submitted. For insurance plans which require this, please follow the following steps. FAILURE TO DO SO MAY RESULT IN INABILITY TO SEE THE SPECIALIST YOU HAVE BEEN REFERRED TO (once you are scheduled to see them). 1. Call and schedule appointment with specialist  2. Call our clinic and leave message with provider name, and date of appointment  3. We will then submit the referral to your insurance. This process takes 2-5 business days. If you have questions about scheduling or authorizing referral, you can review with our referral coordinator. You can review with her today if available/if you have time, or you can call to review once you have made your referral/appointment. If you are not sure if you need referral authorizations, please review with the referral coordinator(s), either prior to or after you have made the appointment, as reviewed.

## 2022-06-08 NOTE — PROGRESS NOTES
Vashti Aguiar (: 1980) is a 46 y.o. female, established patient, here for evaluation of the following chief complaint(s):  Chief Complaint   Patient presents with    Medication Evaluation       Assessment and Plan:       ICD-10-CM ICD-9-CM    1. Moderate episode of recurrent major depressive disorder (HCC)  F33.1 296.32 ARIPiprazole (Abilify) 10 mg tablet      REFERRAL TO BEHAVIORAL HEALTH      REFERRAL TO BEHAVIORAL HEALTH   2. Elevated LFTs  R79.89 790.6    3. Attention deficit disorder, unspecified hyperactivity presence  F98.8 314.00 REFERRAL TO BEHAVIORAL HEALTH      REFERRAL TO BEHAVIORAL HEALTH   4. Alcohol use  Z72.89 V49.89 REFERRAL TO BEHAVIORAL HEALTH      REFERRAL TO BEHAVIORAL HEALTH       1. Refill reviewed at visit. Plan to maintain and adjust as able pending MH follow-up from hospitalization. 2.  Monitoring and follow-up reviewed. Further liver eval reviewed at follow-up.    3,4:  Reviewed need to clarify any MH concern/need for ADHD medication and not prescribing with current alcohol use. Reviewed plan to d/w inpt MH consult and have them assist with follow-up resources also. Follow-up and Dispositions    · Return in about 3 weeks (around 2022) for medication follow-up, referral follow-up, fasting labs. lab results and schedule of future lab studies reviewed with patient  reviewed medications and side effects in detail    For additional documentation of information and/or recommendations discussed this visit, please see notes in instructions. Plan and evaluation (above) reviewed with pt at visit  Patient voiced understanding of plan and provided with time to ask/review questions. After Visit Summary (AVS) provided to pt after visit with additional instructions as needed/reviewed.       Future Appointments   Date Time Provider Vera Jaeger   2022  9:00 AM [facility]   2022  9:00 AM [facility]   2022 9:00 AM [facility]   6/27/2022  9:00 AM [facility]   6/28/2022  9:00 AM [facility]   6/29/2022  9:00 AM [facility]   6/30/2022  9:00 AM [facility]   7/1/2022  9:00 AM [facility]   7/5/2022  9:00 AM [facility]   7/6/2022  9:00 AM [facility]   7/7/2022  9:00 AM [facility]   7/8/2022  9:00 AM [facility]   7/11/2022  9:00 AM [facility]   7/12/2022  9:00 AM [facility]   7/13/2022  9:00 AM [facility]   7/14/2022  9:00 AM [facility]   --Updated future visits after patient check-out.  --Pt re-admitted and now in outpt [address] mgt and alcohol rehab as above. History of Present Illness:     Notes (nursing/rooming note copied below in italics):  Decreasing prozac on own down to 20mg daily from 60mg  States \"feeling so much better on effexor\"  Right lower back pain 4/10 seen at [facility] yesterday  Dryness/green bumps left palm, right thumb     Here for follow-up. Tolerating cross-tapering of medication. Continuing Effexor once off Prozac, with Abilify reviewed. Reviewed ER eval and mgt and referrals. Pt voiced understanding of plan and mgt. Nursing screenings reviewed by provider at visit. Prior to Admission medications    Medication Sig Start Date End Date Taking? Authorizing Provider   ibuprofen (MOTRIN) 600 mg tablet Take 1 Tablet by mouth every eight (8) hours as needed for Pain. 6/8/22  Yes Pat Lilly MD   FLUoxetine (PROzac) 20 mg capsule Take 20 mg by mouth daily. Yes Provider, Historical   venlafaxine (EFFEXOR) 37.5 mg tablet Take 1 Tablet by mouth daily. 6/2/22  Yes Frankey Churn, MD   ARIPiprazole (Abilify) 10 mg tablet Take 10 mg by mouth daily.    Yes Provider, Historical   b complex-vitamin c-folic acid 0.8 mg (NEPHRO-LIT) 0.8 mg tab tablet Take 1 Tablet by mouth daily. Yes Provider, Historical   cholecalciferol (VITAMIN D3) (1000 Units /25 mcg) tablet Take 1,000 Units by mouth daily. Yes Provider, Historical   cetirizine (ZYRTEC) 10 mg tablet Take 1 Tablet by mouth daily. Take as needed for allergies. 3/22/22  Yes Dale Cedeño MD   ondansetron hcl (Zofran) 4 mg tablet Take 1 Tablet by mouth every eight (8) hours as needed for Nausea. Patient not taking: Reported on 6/8/2022 6/8/22   Pat Lilly MD   famotidine (Pepcid) 20 mg tablet Take 1 Tablet by mouth two (2) times a day for 10 days. Patient not taking: Reported on 6/8/2022 6/3/22 6/13/22  Oralia Blackburn DO   ondansetron (ZOFRAN ODT) 8 mg disintegrating tablet Take 1 Tablet by mouth every eight (8) hours as needed for Nausea or Vomiting. Patient not taking: Reported on 6/8/2022 6/3/22   Oralia Blackburn DO   methocarbamoL (Robaxin-750) 750 mg tablet Take 1 Tablet by mouth three (3) times daily as needed for Muscle Spasm(s). Patient not taking: Reported on 6/8/2022 6/3/22   Oralia Blackburn DO   amphetamine-dextroamphetamine XR (ADDERALL XR) 30 mg XR capsule Take 1 Capsule by mouth every morning. Max Daily Amount: 30 mg. Patient not taking: Reported on 6/8/2022 4/25/22   Dale Cedeño MD        ROS    Vitals:    06/08/22 1031   BP: 109/68   Pulse: (!) 101   Resp: 16   Temp: 98.3 °F (36.8 °C)   TempSrc: Oral   SpO2: 97%   Weight: 217 lb (98.4 kg)   Height: 5' 4\" (1.626 m)   LMP: 03/14/2016      Body mass index is 37.25 kg/m². Physical Exam:     Physical Exam  Vitals and nursing note reviewed. Constitutional:       General: She is not in acute distress. Appearance: Normal appearance. She is well-developed. She is not diaphoretic. HENT:      Head: Normocephalic and atraumatic. Eyes:      General: No scleral icterus. Right eye: No discharge. Left eye: No discharge.       Conjunctiva/sclera: Conjunctivae normal.   Cardiovascular:      Rate and Rhythm: Normal rate and regular rhythm. Pulses: Normal pulses. Heart sounds: Normal heart sounds. No murmur heard. No friction rub. No gallop. Pulmonary:      Effort: Pulmonary effort is normal. No respiratory distress. Breath sounds: Normal breath sounds. No stridor. No wheezing, rhonchi or rales. Abdominal:      General: Bowel sounds are normal. There is no distension. Palpations: Abdomen is soft. Tenderness: There is no abdominal tenderness. There is no guarding. Musculoskeletal:         General: No swelling, tenderness, deformity or signs of injury. Right lower leg: No edema. Left lower leg: No edema. Skin:     General: Skin is warm. Coloration: Skin is not jaundiced or pale. Findings: No bruising, erythema or rash. Neurological:      General: No focal deficit present. Mental Status: She is alert. Motor: No abnormal muscle tone. Coordination: Coordination normal.      Gait: Gait normal.   Psychiatric:         Mood and Affect: Mood normal.         Behavior: Behavior normal.         Thought Content: Thought content normal.         Judgment: Judgment normal.         An electronic signature was used to authenticate this note.   -- Mariam Briggs MD

## 2022-06-08 NOTE — ED PROVIDER NOTES
EMERGENCY DEPARTMENT HISTORY AND PHYSICAL EXAM      Date: 6/7/2022  Patient Name: Dayton Pascual    History of Presenting Illness     Chief Complaint   Patient presents with    Abdominal Pain       History Provided By: Patient    HPI: Dayton Pascual, 46 y.o. female with PMHx significant for endometriosis, alcohol abuse, alcoholic hepatitis ADD presents to the ED with chief complaint of right flank pain that radiates across her back to the left. Pain is sharp and 8 out of 10 in intensity. Patient denies any nausea, vomiting, fever, chills, dysuria, hematuria, urinary frequency. She reports increased pain with movement. She took ibuprofen 3 hours ago with mild relief in symptoms. She took a warm bath as well but did not have any improvement in symptoms. No history of kidney stones. She was admitted 2 weeks ago with alcoholic hepatitis and reports similar pain at that time, but denies having drunk any alcohol since her discharge. Past surgical history is significant for hysterectomy due to severe endometriosis. Walker Rousseau PCP: Dalia Connor MD    No current facility-administered medications on file prior to encounter. Current Outpatient Medications on File Prior to Encounter   Medication Sig Dispense Refill    famotidine (Pepcid) 20 mg tablet Take 1 Tablet by mouth two (2) times a day for 10 days. 20 Tablet 0    ondansetron (ZOFRAN ODT) 8 mg disintegrating tablet Take 1 Tablet by mouth every eight (8) hours as needed for Nausea or Vomiting. 12 Tablet 0    methocarbamoL (Robaxin-750) 750 mg tablet Take 1 Tablet by mouth three (3) times daily as needed for Muscle Spasm(s). 15 Tablet 0    venlafaxine (EFFEXOR) 37.5 mg tablet Take 1 Tablet by mouth daily. 60 Tablet 2    ARIPiprazole (Abilify) 10 mg tablet Take 10 mg by mouth daily.  b complex-vitamin c-folic acid 0.8 mg (NEPHRO-LIT) 0.8 mg tab tablet Take 1 Tablet by mouth daily.       cholecalciferol (VITAMIN D3) (1000 Units /25 mcg) tablet Take 1,000 Units by mouth daily.  amphetamine-dextroamphetamine XR (ADDERALL XR) 30 mg XR capsule Take 1 Capsule by mouth every morning. Max Daily Amount: 30 mg. 30 Capsule 0    cetirizine (ZYRTEC) 10 mg tablet Take 1 Tablet by mouth daily. Take as needed for allergies. 90 Tablet 1       Past History     Past Medical History:  Past Medical History:   Diagnosis Date    ADD (attention deficit disorder) 19-19yo    Treated with Adderall since dx. 2013 dosing 20mg AM and 10mg PM.  Trial/change to Vyvanse Nov-Dec 2015--not as effective.  Gynecologic disorder     History of miscarriages. History of Mirena IUD placed on 4/17/15    HX OTHER MEDICAL      -BUFA baby    HX OTHER MEDICAL     HPV positive    Idiopathic vulvodynia 2014    L1 vertebral fracture (HCC) 2017    [facility] imaging/admissoin: Mild acute two column compression fracture of L1 without evidence of retropulsion into the spinal canal.  Managed non-operatively.  Migraines 2013    Neurological disorder     Pelvic pain in female 9/15/2014    2015 gyn laparoscopy/hysteroscopy with endometriosis worse right.     Psychiatric disorder     depression    Secondary dysmenorrhea 2014    With menorraghia    Seizures ([facility] [address]     never on meds--had appr 4-5 in a short amt of time and none since       Past Surgical History:  Past Surgical History:   Procedure Laterality Date    ENDOMETRIAL CRYOABLATION      HX GYN  4/17/15    laparoscopy and hysteroscopy and IUD placement    HX HYSTERECTOMY  2016    Complete Hysterectomy       Family History:  Family History   Problem Relation Age of Onset    Cancer Mother     Diabetes Mother     Alcohol abuse Father         and drug    Psychiatric Disorder Father     Cancer Father     Diabetes Maternal Grandmother     Hypertension Maternal Grandmother     Thyroid Disease Maternal Grandmother     Diabetes Maternal Grandfather     Hypertension Maternal Grandfather     Cancer Maternal Grandfather     Heart Disease Maternal Grandfather     Cancer Paternal Grandmother     Diabetes Paternal Grandmother        Social History:  Social History     Tobacco Use    Smoking status: Current Every Day Smoker     Packs/day: 1.00     Years: 18.00     Pack years: 18.00     Types: Cigarettes    Smokeless tobacco: Never Used    Tobacco comment: Vapes   Vaping Use    Vaping Use: Not on file   Substance Use Topics    Alcohol use: Yes     Alcohol/week: 0.0 standard drinks     Comment: rarely.  Drug use: No       Allergies: Allergies   Allergen Reactions    Aspirin Other (comments)     Hot sweats and passed out; tolerated ibuprofen    Penicillins Other (comments)     Childhood. Does not remember reaction. Is not aware if she has ever taken cephalosporins in the past.    Jan 2019: Pt notes no problems with cephalosporins. Review of Systems   Review of Systems   Constitutional: Negative for chills and fever. HENT: Negative for congestion, rhinorrhea and sore throat. Respiratory: Negative for cough, chest tightness, shortness of breath and wheezing. Cardiovascular: Negative for chest pain. Gastrointestinal: Positive for abdominal pain. Negative for blood in stool, diarrhea, nausea and vomiting. Genitourinary: Positive for flank pain. Negative for dysuria. Musculoskeletal: Positive for back pain. Negative for myalgias. Skin: Negative for rash and wound. Neurological: Negative for dizziness, syncope, light-headedness and headaches. Psychiatric/Behavioral: Positive for dysphoric mood. Negative for confusion. The patient is nervous/anxious. All other systems reviewed and are negative.         Physical Exam    General appearance -overweight, well appearing, and in no distress  Eyes - pupils equal and reactive, extraocular eye movements intact  ENT - mucous membranes moist, pharynx normal without lesions  Neck - supple, no significant adenopathy; non-tender to palpation  Chest - clear to auscultation, no wheezes, rales or rhonchi; non-tender to palpation  Heart -tachycardic, regular rhythm, S1 and S2 normal, no murmurs noted  Abdomen - soft, tender to palpation right flank and right upper quadrant, no rebound or guarding nondistended, no masses or organomegaly  Musculoskeletal - no joint tenderness, deformity or swelling; normal ROM  Extremities - peripheral pulses normal, no pedal edema  Skin - normal coloration and turgor, no rashes  Neurological - alert, oriented x3, normal speech, no focal findings or movement disorder noted    Diagnostic Study Results     Labs -     Recent Results (from the past 12 hour(s))   CBC WITH AUTOMATED DIFF    Collection Time: 06/08/22 12:03 AM   Result Value Ref Range    WBC 9.9 3.6 - 11.0 K/uL    RBC 3.94 3.80 - 5.20 M/uL    HGB 12.6 11.5 - 16.0 g/dL    HCT 36.6 35.0 - 47.0 %    MCV 92.9 80.0 - 99.0 FL    MCH 32.0 26.0 - 34.0 PG    MCHC 34.4 30.0 - 36.5 g/dL    RDW 14.3 11.5 - 14.5 %    PLATELET 734 (H) 295 - 400 K/uL    MPV 9.7 8.9 - 12.9 FL    NRBC 0.0 0  WBC    ABSOLUTE NRBC 0.00 0.00 - 0.01 K/uL    NEUTROPHILS 50 32 - 75 %    LYMPHOCYTES 39 12 - 49 %    MONOCYTES 8 5 - 13 %    EOSINOPHILS 1 0 - 7 %    BASOPHILS 1 0 - 1 %    IMMATURE GRANULOCYTES 1 (H) 0.0 - 0.5 %    ABS. NEUTROPHILS 5.0 1.8 - 8.0 K/UL    ABS. LYMPHOCYTES 3.9 (H) 0.8 - 3.5 K/UL    ABS. MONOCYTES 0.8 0.0 - 1.0 K/UL    ABS. EOSINOPHILS 0.1 0.0 - 0.4 K/UL    ABS. BASOPHILS 0.1 0.0 - 0.1 K/UL    ABS. IMM.  GRANS. 0.1 (H) 0.00 - 0.04 K/UL    DF AUTOMATED     URINALYSIS W/ REFLEX CULTURE    Collection Time: 06/08/22 12:05 AM    Specimen: Urine   Result Value Ref Range    Color YELLOW/STRAW      Appearance CLEAR CLEAR      Specific gravity 1.015      pH (UA) 5.5 5.0 - 8.0      Protein Negative NEG mg/dL    Glucose Negative NEG mg/dL    Ketone Negative NEG mg/dL    Bilirubin Negative NEG      Blood Negative NEG      Urobilinogen 0.2 0.2 - 1.0 EU/dL Nitrites Negative NEG      Leukocyte Esterase Negative NEG      UA:UC IF INDICATED CULTURE NOT INDICATED BY UA RESULT      WBC 0-4 0 - 4 /hpf    RBC 0-5 0 - 5 /hpf    Epithelial cells FEW FEW /lpf    Bacteria Negative NEG /hpf    Hyaline cast 0-2 0 - 2 /lpf   ETHYL ALCOHOL    Collection Time: 06/08/22  1:29 AM   Result Value Ref Range    ALCOHOL(ETHYL),SERUM 106 (H) <75 MG/DL   METABOLIC PANEL, COMPREHENSIVE    Collection Time: 06/08/22  1:29 AM   Result Value Ref Range    Sodium 138 136 - 145 mmol/L    Potassium 3.9 3.5 - 5.1 mmol/L    Chloride 107 97 - 108 mmol/L    CO2 23 21 - 32 mmol/L    Anion gap 8 5 - 15 mmol/L    Glucose 118 (H) 65 - 100 mg/dL    BUN 20 6 - 20 MG/DL    Creatinine 0.74 0.55 - 1.02 MG/DL    BUN/Creatinine ratio 27 (H) 12 - 20      GFR est AA >60 >60 ml/min/1.73m2    GFR est non-AA >60 >60 ml/min/1.73m2    Calcium 8.9 8.5 - 10.1 MG/DL    Bilirubin, total 0.1 (L) 0.2 - 1.0 MG/DL    ALT (SGPT) 62 12 - 78 U/L    AST (SGOT) 23 15 - 37 U/L    Alk. phosphatase 71 45 - 117 U/L    Protein, total 7.0 6.4 - 8.2 g/dL    Albumin 3.1 (L) 3.5 - 5.0 g/dL    Globulin 3.9 2.0 - 4.0 g/dL    A-G Ratio 0.8 (L) 1.1 - 2.2     CBC WITH AUTOMATED DIFF    Collection Time: 06/08/22  1:29 AM   Result Value Ref Range    WBC 8.4 3.6 - 11.0 K/uL    RBC 3.89 3.80 - 5.20 M/uL    HGB 12.2 11.5 - 16.0 g/dL    HCT 36.5 35.0 - 47.0 %    MCV 93.8 80.0 - 99.0 FL    MCH 31.4 26.0 - 34.0 PG    MCHC 33.4 30.0 - 36.5 g/dL    RDW 14.1 11.5 - 14.5 %    PLATELET 813 144 - 301 K/uL    MPV 9.3 8.9 - 12.9 FL    NRBC 0.0 0  WBC    ABSOLUTE NRBC 0.00 0.00 - 0.01 K/uL    NEUTROPHILS 51 32 - 75 %    LYMPHOCYTES 39 12 - 49 %    MONOCYTES 7 5 - 13 %    EOSINOPHILS 1 0 - 7 %    BASOPHILS 1 0 - 1 %    IMMATURE GRANULOCYTES 1 (H) 0.0 - 0.5 %    ABS. NEUTROPHILS 4.3 1.8 - 8.0 K/UL    ABS. LYMPHOCYTES 3.2 0.8 - 3.5 K/UL    ABS. MONOCYTES 0.6 0.0 - 1.0 K/UL    ABS. EOSINOPHILS 0.1 0.0 - 0.4 K/UL    ABS. BASOPHILS 0.1 0.0 - 0.1 K/UL    ABS. IMM. GRANS. 0.1 (H) 0.00 - 0.04 K/UL    DF AUTOMATED     LIPASE    Collection Time: 06/08/22  1:29 AM   Result Value Ref Range    Lipase 300 73 - 393 U/L       Radiologic Studies -   CT ABD PELV WO CONT   Final Result   No acute intra-abdominal pathology. XR CHEST PA LAT   Final Result   No acute cardiopulmonary process        CT Results  (Last 48 hours)               06/08/22 0143  CT ABD PELV WO CONT Final result    Impression:  No acute intra-abdominal pathology. Narrative:  EXAM: CT ABD PELV WO CONT       INDICATION: right flank pain       COMPARISON: 8/4/2018       CONTRAST:  None. TECHNIQUE:    Thin axial images were obtained through the abdomen and pelvis. Coronal and   sagittal reformats were generated. Oral contrast was not administered. CT dose   reduction was achieved through use of a standardized protocol tailored for this   examination and automatic exposure control for dose modulation. The absence of intravenous contrast material reduces the sensitivity for   evaluation of the vasculature and solid organs. FINDINGS:    LOWER THORAX: No significant abnormality in the incidentally imaged lower chest.   LIVER: No mass. BILIARY TREE: Gallbladder is within normal limits. CBD is not dilated. SPLEEN: within normal limits. PANCREAS: No focal abnormality. ADRENALS: Unremarkable. KIDNEYS/URETERS: No calculus or hydronephrosis. STOMACH: Unremarkable. SMALL BOWEL: No dilatation or wall thickening. COLON: Colonic diverticulosis. APPENDIX: Unremarkable   PERITONEUM: No ascites or pneumoperitoneum. RETROPERITONEUM: No lymphadenopathy or aortic aneurysm. REPRODUCTIVE ORGANS: Unremarkable   URINARY BLADDER: No mass or calculus. BONES: No destructive bone lesion. ABDOMINAL WALL: No mass or hernia.    ADDITIONAL COMMENTS: N/A               CXR Results  (Last 48 hours)               06/08/22 0136  XR CHEST PA LAT Final result    Impression:  No acute cardiopulmonary process       Narrative:  EXAM: XR CHEST PA LAT       INDICATION: right lower chest pain       COMPARISON: 7/16/2017. FINDINGS: PA and lateral radiographs of the chest demonstrate clear lungs. The   cardiac and mediastinal contours and pulmonary vascularity are normal. The bones   and soft tissues are within normal limits. Medical Decision Making   I am the first provider for this patient. I reviewed the vital signs, available nursing notes, past medical history, past surgical history, family history and social history. Vital Signs-Reviewed the patient's vital signs. Patient Vitals for the past 12 hrs:   Temp Pulse Resp BP SpO2   06/08/22 0215 -- (!) 109 22 115/64 94 %   06/08/22 0201 -- (!) 109 23 115/64 95 %   06/08/22 0158 -- -- -- -- 99 %   06/08/22 0056 -- (!) 111 20 129/61 100 %   06/07/22 2356 98.2 °F (36.8 °C) (!) 114 18 114/64 100 %           Records Reviewed: Nursing Notes and Old Medical Records    Provider Notes (Medical Decision Making):   Differential diagnosis: Gastritis, pancreatitis, cholecystitis, hepatitis, constipation, UTI, kidney stone  We will check CBC, CMP, lipase, UA, EtOH. Will obtain chest x-ray and abdominal pelvis CT. Will treat with Toradol and IV fluids. ED Course:   Initial assessment performed. The patients presenting problems have been discussed, and they are in agreement with the care plan formulated and outlined with them. I have encouraged them to ask questions as they arise throughout their visit. Progress Notes:  ED Course as of 06/09/22 1559   Wed Jun 08, 2022 2041 Lab sand imaging reviewed. CBC is unremarkable. CMP shows glucose of 118, but LFTs and lipase are normal.  Chest x-ray is clear. Abdominal pelvis CT does not show any acute intra-abdominal pathology. Alcohol level is 109 despite patient initially adamantly denying alcohol intake since her last hospitalization.   When  results reviewed with patient, she admits that she has been drinking today and states \"I am not proud of it\". She is feeling better after Toradol in the ED. Now is anxious to leave and requesting discharge paperwork. Tolerating p.o.  [AO]      ED Course User Index  [AO] Ana Henley MD     Of note, discharge instructions were placed into connect care when patient requested to leave, but patient was not yet registered, so paperwork could not be printed. After registration, patient immediately left the department and did not wait for her discharge instructions and paperwork to be printed. Disposition:  Discharge home    PLAN:  1. Current Discharge Medication List      START taking these medications    Details   ondansetron hcl (Zofran) 4 mg tablet Take 1 Tablet by mouth every eight (8) hours as needed for Nausea. Qty: 20 Tablet, Refills: 0  Start date: 6/8/2022      ibuprofen (MOTRIN) 600 mg tablet Take 1 Tablet by mouth every eight (8) hours as needed for Pain. Qty: 30 Tablet, Refills: 0  Start date: 6/8/2022           2. Follow-up Information     Follow up With Specialties Details Why Contact Info    Yessenia Raymond MD Infectious Disease Physician Schedule an appointment as soon as possible for a visit   [facility] [place]  [phone]      [facility] EMERGENCY DEPT Emergency Medicine  If symptoms worsen [address]  [address] [facility]  [phone]        Return to ED if worse     Diagnosis     Clinical Impression:   1. Right flank pain    2.  Alcoholic intoxication without complication ([address]

## 2022-06-08 NOTE — PROGRESS NOTES
rm 16    Decreasing prozac on own down to 20mg daily from 60mg  States \"feeling so much better on effexor\"  Right lower back pain 4/10 seen at [facility] yesterday  Dryness/green bumps left palm, right thumb    Chief Complaint   Patient presents with    Medication Evaluation     1. Have you been to the ER, urgent care clinic since your last visit? Hospitalized since your last visit? Yes Where: [facility] yesterday    2. Have you seen or consulted any other health care providers outside of the [address] [place] [address] since your last visit? Include any pap smears or colon screening.  No     Visit Vitals  /68 (BP 1 Location: Left arm, BP Patient Position: Sitting, BP Cuff Size: Adult)   Pulse (!) 101   Temp 98.3 °F (36.8 °C) (Oral)   Resp 16   Ht 5' 4\" (1.626 m)   Wt 217 lb (98.4 kg)   LMP 03/14/2016   SpO2 97%   BMI 37.25 kg/m²

## 2022-06-09 ENCOUNTER — HOSPITAL ENCOUNTER (INPATIENT)
Age: 42
LOS: 8 days | Discharge: HOME | DRG: 751 | End: 2022-06-17
Attending: EMERGENCY MEDICINE | Admitting: PSYCHIATRY & NEUROLOGY
Payer: MEDICAID

## 2022-06-09 DIAGNOSIS — F10.20 ALCOHOL USE DISORDER, SEVERE, DEPENDENCE (HCC): ICD-10-CM

## 2022-06-09 DIAGNOSIS — F98.8 ATTENTION DEFICIT DISORDER, UNSPECIFIED HYPERACTIVITY PRESENCE: ICD-10-CM

## 2022-06-09 DIAGNOSIS — R45.851 SUICIDAL IDEATION: Primary | ICD-10-CM

## 2022-06-09 DIAGNOSIS — F33.1 MODERATE EPISODE OF RECURRENT MAJOR DEPRESSIVE DISORDER (HCC): ICD-10-CM

## 2022-06-09 LAB
ALBUMIN SERPL-MCNC: 3.4 G/DL (ref 3.5–5)
ALBUMIN/GLOB SERPL: 0.9 {RATIO} (ref 1.1–2.2)
ALP SERPL-CCNC: 80 U/L (ref 45–117)
ALT SERPL-CCNC: 486 U/L (ref 12–78)
AMPHET UR QL SCN: NEGATIVE
ANION GAP SERPL CALC-SCNC: 11 MMOL/L (ref 5–15)
APAP SERPL-MCNC: <2 UG/ML (ref 10–30)
APPEARANCE UR: CLEAR
AST SERPL-CCNC: 364 U/L (ref 15–37)
BACTERIA URNS QL MICRO: NEGATIVE /HPF
BARBITURATES UR QL SCN: NEGATIVE
BASOPHILS # BLD: 0.1 K/UL (ref 0–0.1)
BASOPHILS NFR BLD: 1 % (ref 0–1)
BENZODIAZ UR QL: NEGATIVE
BILIRUB SERPL-MCNC: 0.7 MG/DL (ref 0.2–1)
BILIRUB UR QL: NEGATIVE
BUN SERPL-MCNC: 15 MG/DL (ref 6–20)
BUN/CREAT SERPL: 26 (ref 12–20)
CALCIUM SERPL-MCNC: 8.2 MG/DL (ref 8.5–10.1)
CANNABINOIDS UR QL SCN: NEGATIVE
CHLORIDE SERPL-SCNC: 103 MMOL/L (ref 97–108)
CO2 SERPL-SCNC: 24 MMOL/L (ref 21–32)
COCAINE UR QL SCN: NEGATIVE
COLOR UR: NORMAL
COMMENT, HOLDF: NORMAL
CREAT SERPL-MCNC: 0.57 MG/DL (ref 0.55–1.02)
DIFFERENTIAL METHOD BLD: ABNORMAL
DRUG SCRN COMMENT,DRGCM: NORMAL
EOSINOPHIL # BLD: 0.1 K/UL (ref 0–0.4)
EOSINOPHIL NFR BLD: 1 % (ref 0–7)
EPITH CASTS URNS QL MICRO: NORMAL /LPF
ERYTHROCYTE [DISTWIDTH] IN BLOOD BY AUTOMATED COUNT: 14.2 % (ref 11.5–14.5)
ETHANOL SERPL-MCNC: 87 MG/DL
FLUAV RNA SPEC QL NAA+PROBE: NOT DETECTED
FLUBV RNA SPEC QL NAA+PROBE: NOT DETECTED
GLOBULIN SER CALC-MCNC: 3.6 G/DL (ref 2–4)
GLUCOSE SERPL-MCNC: 150 MG/DL (ref 65–100)
GLUCOSE UR STRIP.AUTO-MCNC: NEGATIVE MG/DL
HCG UR QL: NEGATIVE
HCT VFR BLD AUTO: 36.9 % (ref 35–47)
HGB BLD-MCNC: 13.2 G/DL (ref 11.5–16)
HGB UR QL STRIP: NEGATIVE
IMM GRANULOCYTES # BLD AUTO: 0.1 K/UL (ref 0–0.04)
IMM GRANULOCYTES NFR BLD AUTO: 1 % (ref 0–0.5)
KETONES UR QL STRIP.AUTO: NEGATIVE MG/DL
LEUKOCYTE ESTERASE UR QL STRIP.AUTO: NEGATIVE
LIPASE SERPL-CCNC: 88 U/L (ref 73–393)
LYMPHOCYTES # BLD: 1.8 K/UL (ref 0.8–3.5)
LYMPHOCYTES NFR BLD: 21 % (ref 12–49)
MCH RBC QN AUTO: 32.4 PG (ref 26–34)
MCHC RBC AUTO-ENTMCNC: 35.8 G/DL (ref 30–36.5)
MCV RBC AUTO: 90.7 FL (ref 80–99)
METHADONE UR QL: NEGATIVE
MONOCYTES # BLD: 0.7 K/UL (ref 0–1)
MONOCYTES NFR BLD: 8 % (ref 5–13)
NEUTS SEG # BLD: 6 K/UL (ref 1.8–8)
NEUTS SEG NFR BLD: 68 % (ref 32–75)
NITRITE UR QL STRIP.AUTO: NEGATIVE
NRBC # BLD: 0 K/UL (ref 0–0.01)
NRBC BLD-RTO: 0 PER 100 WBC
OPIATES UR QL: NEGATIVE
PCP UR QL: NEGATIVE
PH UR STRIP: 6 [PH] (ref 5–8)
PLATELET # BLD AUTO: 478 K/UL (ref 150–400)
PMV BLD AUTO: 9.4 FL (ref 8.9–12.9)
POTASSIUM SERPL-SCNC: 3.9 MMOL/L (ref 3.5–5.1)
PROT SERPL-MCNC: 7 G/DL (ref 6.4–8.2)
PROT UR STRIP-MCNC: NEGATIVE MG/DL
RBC # BLD AUTO: 4.07 M/UL (ref 3.8–5.2)
RBC #/AREA URNS HPF: NORMAL /HPF (ref 0–5)
SALICYLATES SERPL-MCNC: <1.7 MG/DL (ref 2.8–20)
SAMPLES BEING HELD,HOLD: NORMAL
SARS-COV-2, COV2: NOT DETECTED
SODIUM SERPL-SCNC: 138 MMOL/L (ref 136–145)
SP GR UR REFRACTOMETRY: 1.02
UA: UC IF INDICATED,UAUC: NORMAL
UROBILINOGEN UR QL STRIP.AUTO: 1 EU/DL (ref 0.2–1)
WBC # BLD AUTO: 8.7 K/UL (ref 3.6–11)
WBC URNS QL MICRO: NORMAL /HPF (ref 0–4)

## 2022-06-09 PROCEDURE — 83690 ASSAY OF LIPASE: CPT

## 2022-06-09 PROCEDURE — 74011000250 HC RX REV CODE- 250: Performed by: EMERGENCY MEDICINE

## 2022-06-09 PROCEDURE — 99285 EMERGENCY DEPT VISIT HI MDM: CPT

## 2022-06-09 PROCEDURE — 80143 DRUG ASSAY ACETAMINOPHEN: CPT

## 2022-06-09 PROCEDURE — 96374 THER/PROPH/DIAG INJ IV PUSH: CPT

## 2022-06-09 PROCEDURE — 81025 URINE PREGNANCY TEST: CPT

## 2022-06-09 PROCEDURE — 65220000003 HC RM SEMIPRIVATE PSYCH

## 2022-06-09 PROCEDURE — 36415 COLL VENOUS BLD VENIPUNCTURE: CPT

## 2022-06-09 PROCEDURE — 80179 DRUG ASSAY SALICYLATE: CPT

## 2022-06-09 PROCEDURE — 74011250637 HC RX REV CODE- 250/637: Performed by: NURSE PRACTITIONER

## 2022-06-09 PROCEDURE — 74011250636 HC RX REV CODE- 250/636: Performed by: EMERGENCY MEDICINE

## 2022-06-09 PROCEDURE — 80307 DRUG TEST PRSMV CHEM ANLYZR: CPT

## 2022-06-09 PROCEDURE — 82077 ASSAY SPEC XCP UR&BREATH IA: CPT

## 2022-06-09 PROCEDURE — 74011250637 HC RX REV CODE- 250/637: Performed by: EMERGENCY MEDICINE

## 2022-06-09 PROCEDURE — 87636 SARSCOV2 & INF A&B AMP PRB: CPT

## 2022-06-09 PROCEDURE — 80053 COMPREHEN METABOLIC PANEL: CPT

## 2022-06-09 PROCEDURE — 93005 ELECTROCARDIOGRAM TRACING: CPT

## 2022-06-09 PROCEDURE — 81001 URINALYSIS AUTO W/SCOPE: CPT

## 2022-06-09 PROCEDURE — 85025 COMPLETE CBC W/AUTO DIFF WBC: CPT

## 2022-06-09 RX ORDER — ACETAMINOPHEN 325 MG/1
650 TABLET ORAL
Status: DISCONTINUED | OUTPATIENT
Start: 2022-06-09 | End: 2022-06-10

## 2022-06-09 RX ORDER — DIPHENHYDRAMINE HYDROCHLORIDE 50 MG/ML
50 INJECTION, SOLUTION INTRAMUSCULAR; INTRAVENOUS
Status: DISCONTINUED | OUTPATIENT
Start: 2022-06-09 | End: 2022-06-17 | Stop reason: HOSPADM

## 2022-06-09 RX ORDER — PHENOBARBITAL 32.4 MG/1
16.2 TABLET ORAL 2 TIMES DAILY
Status: DISCONTINUED | OUTPATIENT
Start: 2022-06-13 | End: 2022-06-10

## 2022-06-09 RX ORDER — LORAZEPAM 1 MG/1
2 TABLET ORAL
Status: COMPLETED | OUTPATIENT
Start: 2022-06-09 | End: 2022-06-09

## 2022-06-09 RX ORDER — HYDROXYZINE 25 MG/1
50 TABLET, FILM COATED ORAL
Status: DISCONTINUED | OUTPATIENT
Start: 2022-06-09 | End: 2022-06-17 | Stop reason: HOSPADM

## 2022-06-09 RX ORDER — SODIUM CHLORIDE 0.9 % (FLUSH) 0.9 %
5-40 SYRINGE (ML) INJECTION EVERY 8 HOURS
Status: DISCONTINUED | OUTPATIENT
Start: 2022-06-09 | End: 2022-06-10

## 2022-06-09 RX ORDER — PHENOBARBITAL 32.4 MG/1
32.4 TABLET ORAL
Status: DISCONTINUED | OUTPATIENT
Start: 2022-06-10 | End: 2022-06-10

## 2022-06-09 RX ORDER — PHENOBARBITAL 32.4 MG/1
64.8 TABLET ORAL 4 TIMES DAILY
Status: DISCONTINUED | OUTPATIENT
Start: 2022-06-09 | End: 2022-06-10

## 2022-06-09 RX ORDER — TRAZODONE HYDROCHLORIDE 50 MG/1
50 TABLET ORAL
Status: DISCONTINUED | OUTPATIENT
Start: 2022-06-09 | End: 2022-06-13

## 2022-06-09 RX ORDER — FOLIC ACID 1 MG/1
1 TABLET ORAL DAILY
Status: DISCONTINUED | OUTPATIENT
Start: 2022-06-10 | End: 2022-06-17 | Stop reason: HOSPADM

## 2022-06-09 RX ORDER — ADHESIVE BANDAGE
30 BANDAGE TOPICAL DAILY PRN
Status: DISCONTINUED | OUTPATIENT
Start: 2022-06-09 | End: 2022-06-17 | Stop reason: HOSPADM

## 2022-06-09 RX ORDER — PHENOBARBITAL 32.4 MG/1
64.8 TABLET ORAL
Status: DISCONTINUED | OUTPATIENT
Start: 2022-06-09 | End: 2022-06-10

## 2022-06-09 RX ORDER — LANOLIN ALCOHOL/MO/W.PET/CERES
100 CREAM (GRAM) TOPICAL DAILY
Status: DISCONTINUED | OUTPATIENT
Start: 2022-06-10 | End: 2022-06-17 | Stop reason: HOSPADM

## 2022-06-09 RX ORDER — HALOPERIDOL 5 MG/ML
5 INJECTION INTRAMUSCULAR
Status: DISCONTINUED | OUTPATIENT
Start: 2022-06-09 | End: 2022-06-17 | Stop reason: HOSPADM

## 2022-06-09 RX ORDER — THERA TABS 400 MCG
1 TAB ORAL DAILY
Status: DISCONTINUED | OUTPATIENT
Start: 2022-06-10 | End: 2022-06-17 | Stop reason: HOSPADM

## 2022-06-09 RX ORDER — IBUPROFEN 200 MG
1 TABLET ORAL DAILY
Status: DISCONTINUED | OUTPATIENT
Start: 2022-06-10 | End: 2022-06-17 | Stop reason: HOSPADM

## 2022-06-09 RX ORDER — OLANZAPINE 5 MG/1
5 TABLET ORAL
Status: DISCONTINUED | OUTPATIENT
Start: 2022-06-09 | End: 2022-06-17 | Stop reason: HOSPADM

## 2022-06-09 RX ORDER — PHENOBARBITAL 32.4 MG/1
32.4 TABLET ORAL 2 TIMES DAILY
Status: DISCONTINUED | OUTPATIENT
Start: 2022-06-12 | End: 2022-06-10

## 2022-06-09 RX ORDER — PHENOBARBITAL 32.4 MG/1
16.2 TABLET ORAL
Status: DISCONTINUED | OUTPATIENT
Start: 2022-06-12 | End: 2022-06-10

## 2022-06-09 RX ORDER — BENZTROPINE MESYLATE 1 MG/1
1 TABLET ORAL
Status: DISCONTINUED | OUTPATIENT
Start: 2022-06-09 | End: 2022-06-17 | Stop reason: HOSPADM

## 2022-06-09 RX ORDER — PHENOBARBITAL 32.4 MG/1
32.4 TABLET ORAL 4 TIMES DAILY
Status: DISCONTINUED | OUTPATIENT
Start: 2022-06-10 | End: 2022-06-10

## 2022-06-09 RX ORDER — LORAZEPAM 2 MG/ML
1 INJECTION INTRAMUSCULAR
Status: DISCONTINUED | OUTPATIENT
Start: 2022-06-09 | End: 2022-06-17 | Stop reason: HOSPADM

## 2022-06-09 RX ORDER — HYDROXYZINE 50 MG/1
50 TABLET, FILM COATED ORAL
COMMUNITY
End: 2022-09-08

## 2022-06-09 RX ADMIN — LORAZEPAM 2 MG: 1 TABLET ORAL at 18:59

## 2022-06-09 RX ADMIN — SODIUM CHLORIDE, PRESERVATIVE FREE 20 MG: 5 INJECTION INTRAVENOUS at 14:18

## 2022-06-09 RX ADMIN — PHENOBARBITAL 64.8 MG: 32.4 TABLET ORAL at 23:00

## 2022-06-09 RX ADMIN — SODIUM CHLORIDE 1000 ML: 9 INJECTION, SOLUTION INTRAVENOUS at 14:17

## 2022-06-09 NOTE — ED NOTES
Made second attempt to call report. Was on hold for over 5 minutes.  Will attempt again in a few minutes

## 2022-06-09 NOTE — ED TRIAGE NOTES
Pt reports she might be going into withdrawls from alcohol. Also feeling suicidal with no plan.  Has tried to get a new psychiatrist but no one is taking new patients

## 2022-06-09 NOTE — ED NOTES
Pt arrives with c/o suicidal thoughts without a plan or any intention. Pt reports a hx of depression and states that she is com,pliant with her meds. Pt reports that she has been weaning off of Prozac because it does not work. Pt reports that she also believes she is withdrawing from alcohol. States she drinks whatever she can get her hands on and drinks every days. Last drink 0500 today. Pt reports nausea, vomiting, HA, and tremors since this am.     Pt is in a gown, security has been called for one belongings bag   Pt is alert and oriented x 4, RR even and unlabored, skin is warm and dry. Assessment completed and pt updated on plan of care. Call bell in reach. Emergency Department Nursing Plan of Care       The Nursing Plan of Care is developed from the Nursing assessment and Emergency Department Attending provider initial evaluation. The plan of care may be reviewed in the ED Provider note.     The Plan of Care was developed with the following considerations:   Patient / Family readiness to learn indicated by:verbalized understanding  Persons(s) to be included in education: patient  Barriers to Learning/Limitations:No    Signed     Griselda Hunt RN    6/9/2022   11:45 AM

## 2022-06-09 NOTE — ED PROVIDER NOTES
EMERGENCY DEPARTMENT HISTORY AND PHYSICAL EXAM      Date: 6/9/2022  Patient Name: Una Horta    History of Presenting Illness     Chief Complaint   Patient presents with    Alcohol Problem    Mental Health Problem       History Provided By: Patient    HPI: Una Horta, 46 y.o. female presents to the ED with cc of epigastric pain and suicidal ideation for the past 2 days. Patient has a history of chronic alcohol abuse and has been suicidal in the past.  Patient was seen at [facility] 2 days ago and discharge. Currently patient does not have a plan to hurt herself but has the idea. There are no other associated symptoms, patient concerns, or physical findings at this time. I reviewed the vital signs, available nursing notes, past medical history, past surgical history, family history and social history. Vital Signs:  Patient Vitals for the past 12 hrs:   Temp Pulse Resp BP SpO2   06/09/22 1344 98.6 °F (37 °C) (!) 112 18 133/70 95 %     Vital signs reviewed. Current Medications:  No current facility-administered medications on file prior to encounter. Current Outpatient Medications on File Prior to Encounter   Medication Sig Dispense Refill    ondansetron hcl (Zofran) 4 mg tablet Take 1 Tablet by mouth every eight (8) hours as needed for Nausea. (Patient not taking: Reported on 6/8/2022) 20 Tablet 0    ibuprofen (MOTRIN) 600 mg tablet Take 1 Tablet by mouth every eight (8) hours as needed for Pain. 30 Tablet 0    FLUoxetine (PROzac) 20 mg capsule Take 20 mg by mouth daily.  ARIPiprazole (Abilify) 10 mg tablet Take 1 Tablet by mouth daily. 30 Tablet 2    famotidine (Pepcid) 20 mg tablet Take 1 Tablet by mouth two (2) times a day for 10 days. (Patient not taking: Reported on 6/8/2022) 20 Tablet 0    ondansetron (ZOFRAN ODT) 8 mg disintegrating tablet Take 1 Tablet by mouth every eight (8) hours as needed for Nausea or Vomiting.  (Patient not taking: Reported on 2022) 12 Tablet 0    methocarbamoL (Robaxin-750) 750 mg tablet Take 1 Tablet by mouth three (3) times daily as needed for Muscle Spasm(s). (Patient not taking: Reported on 2022) 15 Tablet 0    venlafaxine (EFFEXOR) 37.5 mg tablet Take 1 Tablet by mouth daily. 60 Tablet 2    b complex-vitamin c-folic acid 0.8 mg (NEPHRO-LIT) 0.8 mg tab tablet Take 1 Tablet by mouth daily.  cholecalciferol (VITAMIN D3) (1000 Units /25 mcg) tablet Take 1,000 Units by mouth daily.  amphetamine-dextroamphetamine XR (ADDERALL XR) 30 mg XR capsule Take 1 Capsule by mouth every morning. Max Daily Amount: 30 mg. (Patient not taking: Reported on 2022) 30 Capsule 0    cetirizine (ZYRTEC) 10 mg tablet Take 1 Tablet by mouth daily. Take as needed for allergies. 90 Tablet 1       Past History     Past Medical History:  Past Medical History:   Diagnosis Date    ADD (attention deficit disorder) 17-17yo    Treated with Adderall since dx. 2013 dosing 20mg AM and 10mg PM.  Trial/change to Vyvanse Nov-Dec 2015--not as effective.  Gynecologic disorder     History of miscarriages. History of Mirena IUD placed on 4/17/15    HX OTHER MEDICAL      -BUFA baby    HX OTHER MEDICAL     HPV positive    Idiopathic vulvodynia 2014    L1 vertebral fracture (HCC) 2017    [facility] imaging/admissoin: Mild acute two column compression fracture of L1 without evidence of retropulsion into the spinal canal.  Managed non-operatively.  Migraines 2013    Neurological disorder     Pelvic pain in female 9/15/2014    2015 gyn laparoscopy/hysteroscopy with endometriosis worse right.     Psychiatric disorder     depression    Secondary dysmenorrhea 2014    With menorraghia    Seizures ([address]     never on meds--had appr 4-5 in a short amt of time and none since       Past Surgical History:  Past Surgical History:   Procedure Laterality Date    ENDOMETRIAL CRYOABLATION      HX GYN  4/17/15 laparoscopy and hysteroscopy and IUD placement    HX HYSTERECTOMY  04/04/2016    Complete Hysterectomy       Family History:  Family History   Problem Relation Age of Onset    Cancer Mother     Diabetes Mother     Alcohol abuse Father         and drug    Psychiatric Disorder Father     Cancer Father     Diabetes Maternal Grandmother     Hypertension Maternal Grandmother     Thyroid Disease Maternal Grandmother     Diabetes Maternal Grandfather     Hypertension Maternal Grandfather     Cancer Maternal Grandfather     Heart Disease Maternal Grandfather     Cancer Paternal Grandmother     Diabetes Paternal Grandmother        Social History:  Social History     Tobacco Use    Smoking status: Current Every Day Smoker     Packs/day: 1.00     Years: 18.00     Pack years: 18.00     Types: Cigarettes    Smokeless tobacco: Never Used    Tobacco comment: Vapes   Vaping Use    Vaping Use: Not on file   Substance Use Topics    Alcohol use: Yes     Alcohol/week: 0.0 standard drinks     Comment: rarely.  Drug use: No       Allergies: Allergies   Allergen Reactions    Aspirin Other (comments)     Hot sweats and passed out; tolerated ibuprofen    Penicillins Other (comments)     Childhood. Does not remember reaction. Is not aware if she has ever taken cephalosporins in the past.    Jan 2019: Pt notes no problems with cephalosporins. Review of Systems   Review of Systems   Constitutional: Negative for fever. HENT: Negative for sore throat. Eyes: Negative for photophobia and redness. Respiratory: Negative for shortness of breath and wheezing. Cardiovascular: Negative for chest pain and leg swelling. Gastrointestinal: Positive for abdominal pain. Negative for blood in stool, nausea and vomiting. Genitourinary: Negative for difficulty urinating, dysuria, hematuria, menstrual problem and vaginal bleeding. Musculoskeletal: Negative for back pain and joint swelling.    Neurological: Negative for dizziness, seizures, syncope, speech difficulty, weakness, numbness and headaches. Hematological: Negative for adenopathy. Psychiatric/Behavioral: Positive for suicidal ideas. Negative for agitation and confusion. The patient is not nervous/anxious. All other systems reviewed and are negative. Physical Exam   Physical Exam  Vitals and nursing note reviewed. Constitutional:       General: She is not in acute distress. Appearance: She is well-developed. HENT:      Head: Normocephalic and atraumatic. Mouth/Throat:      Pharynx: No oropharyngeal exudate. Eyes:      General:         Right eye: No discharge. Left eye: No discharge. Extraocular Movements: Extraocular movements intact. Conjunctiva/sclera: Conjunctivae normal.      Pupils: Pupils are equal, round, and reactive to light. Neck:      Vascular: No JVD. Cardiovascular:      Rate and Rhythm: Normal rate and regular rhythm. Heart sounds: Normal heart sounds. Pulmonary:      Effort: Pulmonary effort is normal. No respiratory distress. Breath sounds: Normal breath sounds. No wheezing. Abdominal:      General: Bowel sounds are normal. There is no distension. Palpations: Abdomen is soft. Tenderness: There is no abdominal tenderness. There is no guarding or rebound. Musculoskeletal:         General: No tenderness. Normal range of motion. Cervical back: Normal range of motion and neck supple. Lymphadenopathy:      Cervical: No cervical adenopathy. Skin:     General: Skin is warm and dry. Findings: No rash. Neurological:      Mental Status: She is alert and oriented to person, place, and time. Cranial Nerves: No cranial nerve deficit. Deep Tendon Reflexes: Reflexes are normal and symmetric. Psychiatric:         Behavior: Behavior normal.         Emergency Department Course   ED Course:  Initial assessment performed.  The patient's complaints have been discussed, and they are in agreement with the care plan formulated and outlined with them. I have encouraged them to ask questions as they arise throughout their visit. EKG interpretation: (Preliminary)  Rhythm: sinus tachycardia; and regular . Rate (approx.): 104; Axis: normal; KS interval: normal; QRS interval: normal ; ST/T wave: normal; Other findings: borderline ekg. EKG read and interpreted by Sugar Jimenez MD     Medical Decision Making:  Gastritis, pancreatitis, alcohol abuse, major depression, suicidal ideation. Critical Care Time:      Procedure:      Progress note:   Time:5:15 pm patient has been evaluated by Ginger Cheatham from [facility], patient will be admitted to psychiatric floor. Disposition:  ADMITTED at 5:45 pm, patient is being admitted to the hospital. The results of their tests and reasons for their admission have been discussed with them and/or available family. They convey agreement and understanding for the need to be admitted and for their admission diagnosis. [facility]. DISCHARGE PLAN:  1. Current Discharge Medication List        2. Follow-up Information    None       3. Return to ED if current symptoms worsen or new symptoms arise. 4. Follow up with Garo Green MD in 3-5 days. Diagnosis     Clinical Impression: No diagnosis found.

## 2022-06-09 NOTE — BSMART NOTE
Comprehensive Assessment Form Part 1    Section I - Disposition    Axis I - Major Depressive Disorder, Alcohol Use Disorder  Axis II - Deferred  Axis III - Migraines, Seizures, Idiopathic vulvodynia  Axis IV - Relationship stressors  [place] V - 35      The Medical Doctor to Psychiatrist conference was not completed. The Medical Doctor is in agreement with Psychiatrist disposition because of (reason) patient is requesting voluntary admission. The plan is admit to [facility] general unit. The on-call Psychiatrist consulted was AVNI Strong. The admitting Psychiatrist will be Dr. Mikel Carpio. The admitting Diagnosis is Major Depression. The Payor source is [employer]. This writer reviewed the [address] in nursing flowsheet and the risk level assigned is low. Based on this assessment, the risk of suicide is low and the plan is admit to inpatient psych. Section II - Integrated Summary  Summary:  Patient is a 46year old female seen face to face in the ER. She reported suicidal ideation with no plan. She said she feels \"like if I didn't live anymore my family would be better off. \"  She has an alcohol problem and is \"tired of fighting this thing. \"  She also stated \"I cause too many problems for my family. \"  She said her mother drove her to the ER and cried the entire way. She is requesting admission because \"I don't want to do anything stupid. \"  She has 3 prior admissions, most recently at [facility] in April 2022 and then she was admitted to [facility] on 5/14/22. [facility] about a week later but was medically admitted instead. She denied homicidal ideation and symptoms of psychosis. She is requesting psychiatric admission. The patient has demonstrated mental capacity to provide informed consent. The information is given by the patient and past medical records. The Chief Complaint is alcohol problem, mental health problem.   The Precipitant Factors are relationship stressors, chronic alcohol abuse. Previous Hospitalizations: yes  The patient has not previously been in restraints. Current Psychiatrist and/or  is NA. Lethality Assessment:    The potential for suicide is noted by the following: ideation and current substance abuse . The potential for homicide is not noted. The patient has not been a perpetrator of sexual or physical abuse. There are not pending charges. The patient is felt to be at risk for self harm or harm to others. The attending nurse was advised the patient needs supervision. Section III - Psychosocial  The patient's overall mood and attitude is depressed. Feelings of helplessness and hopelessness are not observed. Generalized anxiety is not observed. Panic is not observed. Phobias are not observed. Obsessive compulsive tendencies are not observed. Section IV - Mental Status Exam  The patient's appearance shows no evidence of impairment. The patient's behavior shows no evidence of impairment. The patient is oriented to time, place, person and situation. The patient's speech shows no evidence of impairment. The patient's mood is depressed. The range of affect is flat. The patient's thought content demonstrates no evidence of impairment. The thought process shows no evidence of impairment. The patient's perception shows no evidence of impairment. The patient's memory shows no evidence of impairment. The patient's appetite is decreased. The patient's sleep has evidence of insomnia. The patient's insight is blaming. The patient's judgement is psychologically impaired. Section V - Substance Abuse  The patient is using substances. The patient is using alcohol for greater than 10 years with last use on today. The patient has experienced the following withdrawal symptoms: tremors, cravings and nausea. Section VI - Living Arrangements  The patient is single. The patient lives with her mother.  The patient has one child age 5. The patient does plan to return home upon discharge. The patient does not have legal issues pending. The patient's source of income comes from family. Voodoo and cultural practices have not been voiced at this time. The patient's greatest support comes from her mother and this person will not be involved with the treatment. The patient has not been in an event described as horrible or outside the realm of ordinary life experience either currently or in the past.  The patient has not been a victim of sexual/physical abuse. Section VII - Other Areas of Clinical Concern  The highest grade achieved is not assessed with the overall quality of school experience being described as unknown. The patient is currently unemployed and speaks [place] as a primary language. The patient has no communication impairments affecting communication. The patient's preference for learning can be described as: can read and write adequately.   The patient's hearing is normal.  The patient's vision is normal.      Florette Brittle, MA

## 2022-06-09 NOTE — ED NOTES
TRANSFER - OUT REPORT:    Verbal report given to sony (name) on Zenia Manzano  being transferred to [facility] (unit) for routine progression of care       Report consisted of patients Situation, Background, Assessment and   Recommendations(SBAR). Information from the following report(s) SBAR and ED Summary was reviewed with the receiving nurse. Lines:   Peripheral IV 06/09/22 Right Antecubital (Active)        Opportunity for questions and clarification was provided.       Patient transported with:   jeremy

## 2022-06-10 PROBLEM — F20.0 PARANOID SCHIZOPHRENIA (HCC): Status: ACTIVE | Noted: 2022-06-10

## 2022-06-10 PROBLEM — F33.9 RECURRENT MAJOR DEPRESSIVE DISORDER (HCC): Status: ACTIVE | Noted: 2022-06-10

## 2022-06-10 PROCEDURE — 65220000003 HC RM SEMIPRIVATE PSYCH

## 2022-06-10 PROCEDURE — 74011000250 HC RX REV CODE- 250: Performed by: PSYCHIATRY & NEUROLOGY

## 2022-06-10 PROCEDURE — 74011250637 HC RX REV CODE- 250/637: Performed by: NURSE PRACTITIONER

## 2022-06-10 PROCEDURE — 74011250637 HC RX REV CODE- 250/637: Performed by: PSYCHIATRY & NEUROLOGY

## 2022-06-10 PROCEDURE — 99223 1ST HOSP IP/OBS HIGH 75: CPT | Performed by: PSYCHIATRY & NEUROLOGY

## 2022-06-10 RX ORDER — VENLAFAXINE HYDROCHLORIDE 75 MG/1
75 CAPSULE, EXTENDED RELEASE ORAL
Status: DISCONTINUED | OUTPATIENT
Start: 2022-06-11 | End: 2022-06-15

## 2022-06-10 RX ORDER — LORAZEPAM 1 MG/1
1 TABLET ORAL 3 TIMES DAILY
Status: COMPLETED | OUTPATIENT
Start: 2022-06-10 | End: 2022-06-11

## 2022-06-10 RX ORDER — LORAZEPAM 1 MG/1
1 TABLET ORAL 2 TIMES DAILY
Status: COMPLETED | OUTPATIENT
Start: 2022-06-12 | End: 2022-06-13

## 2022-06-10 RX ORDER — LIDOCAINE 4 G/100G
1 PATCH TOPICAL EVERY 24 HOURS
Status: DISCONTINUED | OUTPATIENT
Start: 2022-06-10 | End: 2022-06-17 | Stop reason: HOSPADM

## 2022-06-10 RX ORDER — NALTREXONE HYDROCHLORIDE 50 MG/1
50 TABLET, FILM COATED ORAL DAILY
Status: DISCONTINUED | OUTPATIENT
Start: 2022-06-11 | End: 2022-06-17 | Stop reason: HOSPADM

## 2022-06-10 RX ORDER — TRAZODONE HYDROCHLORIDE 50 MG/1
50 TABLET ORAL
COMMUNITY
End: 2022-07-07

## 2022-06-10 RX ORDER — IBUPROFEN 600 MG/1
600 TABLET ORAL
Status: DISCONTINUED | OUTPATIENT
Start: 2022-06-10 | End: 2022-06-14

## 2022-06-10 RX ORDER — LORAZEPAM 1 MG/1
1 TABLET ORAL 3 TIMES DAILY
Status: DISCONTINUED | OUTPATIENT
Start: 2022-06-10 | End: 2022-06-10

## 2022-06-10 RX ORDER — FLUOXETINE HYDROCHLORIDE 20 MG/1
20 CAPSULE ORAL DAILY
Status: COMPLETED | OUTPATIENT
Start: 2022-06-11 | End: 2022-06-17

## 2022-06-10 RX ORDER — LORAZEPAM 1 MG/1
2 TABLET ORAL
Status: DISCONTINUED | OUTPATIENT
Start: 2022-06-10 | End: 2022-06-17 | Stop reason: HOSPADM

## 2022-06-10 RX ORDER — ARIPIPRAZOLE 5 MG/1
10 TABLET ORAL DAILY
Status: DISCONTINUED | OUTPATIENT
Start: 2022-06-11 | End: 2022-06-17 | Stop reason: HOSPADM

## 2022-06-10 RX ADMIN — ACETAMINOPHEN 650 MG: 325 TABLET ORAL at 15:00

## 2022-06-10 RX ADMIN — FOLIC ACID 1 MG: 1 TABLET ORAL at 09:07

## 2022-06-10 RX ADMIN — IBUPROFEN 600 MG: 600 TABLET, FILM COATED ORAL at 21:34

## 2022-06-10 RX ADMIN — Medication 100 MG: at 09:06

## 2022-06-10 RX ADMIN — HYDROXYZINE HYDROCHLORIDE 50 MG: 25 TABLET, FILM COATED ORAL at 15:00

## 2022-06-10 RX ADMIN — PHENOBARBITAL 64.8 MG: 32.4 TABLET ORAL at 09:07

## 2022-06-10 RX ADMIN — HYDROXYZINE HYDROCHLORIDE 50 MG: 25 TABLET, FILM COATED ORAL at 21:34

## 2022-06-10 RX ADMIN — TRAZODONE HYDROCHLORIDE 50 MG: 50 TABLET ORAL at 21:34

## 2022-06-10 RX ADMIN — THERA TABS 1 TABLET: TAB at 09:06

## 2022-06-10 RX ADMIN — LORAZEPAM 1 MG: 1 TABLET ORAL at 16:27

## 2022-06-10 RX ADMIN — PHENOBARBITAL 64.8 MG: 32.4 TABLET ORAL at 12:24

## 2022-06-10 NOTE — PROGRESS NOTES
Patient observed resting in bed throughout the night. V/S stable with no new-onset medical or psychiatric concerns observed. Staff will continue to assess and monitor.

## 2022-06-10 NOTE — GROUP NOTE
GRSIELDA  GROUP DOCUMENTATION INDIVIDUAL                                                                          Group Therapy Note    Date: 6/10/2022    Group Start Time: 1400  Group End Time: 1500  Group Topic: Recreational/Music Therapy    [facility] [place] [address] ACUTE BEHAV TH    [facility] GROUP DOCUMENTATION GROUP    Group Therapy Note    Attendees: 8         Attendance: Did not attend    Patient's Goal:      Interventions/techniques:Terra Goodman

## 2022-06-10 NOTE — PROGRESS NOTES
Behavioral Services  Medicare Certification Upon Admission    I certify that this patient's inpatient psychiatric hospital admission is medically necessary for:    [x] (1) Treatment which could reasonably be expected to improve this patient's condition,       [x] (2) Or for diagnostic study;     AND     [x](2) The inpatient psychiatric services are provided while the individual is under the care of a physician and are included in the individualized plan of care.     Estimated length of stay/service 5-7 days    Plan for post-hospital care home    Electronically signed by Juan C Kendall MD on 6/10/2022 at 9:34 AM

## 2022-06-10 NOTE — PROGRESS NOTES
Received patient alert, calm, cooperative. Denies SI/HI. Denies A/V hallucinations. Denies anxiety and depression. Patient complained of mild nausea, stomach pain and headache. She refused Tylenol, \"Ibuprofen works better for me. I told the doctors. \" MD informed. Awaiting orders for new medication. Patient scored a 3 in [address] No distress observed. Medication and meal compliant. Purposeful interaction ongoing.       Problem: Depressed Mood (Adult/Pediatric)  Goal: *STG: Remains safe in hospital  Outcome: Progressing Towards Goal  Goal: *STG: Complies with medication therapy  Outcome: Progressing Towards Goal

## 2022-06-10 NOTE — PROGRESS NOTES
Patient is an 46year old  female admitted to general unit with a primary diagnosis of Depression, Alcohol Use D/O and ADD. Comorbities include HPV, Idopathic vulvodynia, pancreatitis, hx of migraines, secondary dysmenorrhea and hx of seizures when detoxing from ETOH. Patient reported last seizure over a year ago. Patient has allergies to PCN and aspirin. Patient alert and oriented X4. Patient admitted due to feels she might be going into withdrawal from ETOH. Patient reports increased symptoms of depression and S/I with no plan. Patient reports that she is compliant with medications however is, \"Weaning herself off of Prozac because it does not work\". Patient reported nausea, headache and tremors since this am.  Patient reports that she drinks alcohol on almost a daily basis. Patient reported, \"I drink whatever I can get my hands on\". Patient reported that he last drink was on 06/09/22 at 0530. UDS negative. BAL 0.87 while in ED. Patient listed home medications as: Zofran, Motrin, Prozac, Abilify, Effexor, Pepcid, Robaxin, Nephro-darryl, Vitamin D3 and Adderal. AVNI Mireles contacted and ETOH detox protocal and  standard orders implemented.       Problem: Depressed Mood (Adult/Pediatric)  Goal: *STG: Demonstrates reduction in symptoms and increase in insight into coping skills/future focused  Outcome: Progressing Towards Goal  Goal: *STG: Remains safe in hospital  Outcome: Progressing Towards Goal  Goal: *STG: Complies with medication therapy  Outcome: Progressing Towards Goal

## 2022-06-10 NOTE — PROGRESS NOTES
[facility]: MED RECONCILIATION    Comments/Recommendations:   Patient reports she has been cross-tapering off fluoxetine and onto venlafaxine  Aripiprazole is a recently added medication    Medications added:   trazodone    Medications removed: Adderall XR  Cetirizine  Cholecalciferol  Famotidine  Ibuprofen  Methocarbamol  ondansetron    Medications adjusted:  Hydroxyzine to 50 mg PO every 6 hours as needed for anxiety or itching    Information obtained from: Patient and RxQuery refill history    Allergies: Aspirin and Penicillins    Prior to Admission Medications:   Prior to Admission Medications   Prescriptions Last Dose Informant Patient Reported? Taking? ARIPiprazole (Abilify) 10 mg tablet  Self No Yes   Sig: Take 1 Tablet by mouth daily. FLUoxetine (PROzac) 20 mg capsule  Self Yes Yes   Sig: Take 20 mg by mouth daily. b complex-vitamin c-folic acid 0.8 mg (NEPHRO-LIT) 0.8 mg tab tablet  Self Yes Yes   Sig: Take 1 Tablet by mouth daily. hydrOXYzine HCL (ATARAX) 50 mg tablet  Self Yes Yes   Sig: Take 50 mg by mouth every six (6) hours as needed for Itching or Anxiety. traZODone (DESYREL) 50 mg tablet  Self Yes Yes   Sig: Take 50 mg by mouth nightly as needed for Sleep.   venlafaxine (EFFEXOR) 37.5 mg tablet  Self No Yes   Sig: Take 1 Tablet by mouth daily.         Thank you,  Art De La Cruz, PharmD, BCPS  [phone]

## 2022-06-10 NOTE — BH NOTES
PSYCHOSOCIAL ASSESSMENT  :Patient identifying info:   Apple Wang is a 46 y.o., female admitted 6/9/2022  1:46 PM     Presenting problem and precipitating factors: Pt was admitted on a voluntary basis due to increased depression and SI. Pt reports she was in a year long program and graduated in January. Pt reports her medication changes 2 weeks ago have not been working for her. Mental status assessment: depressed, tearful, flat, coherent thought process, calm and cooperative. Strengths/Recreation/Coping Skills: seeking help    Collateral information: No ROSA at this time. Current psychiatric /substance abuse providers and contact info: To be linked. Previous psychiatric/substance abuse providers and response to treatment: Pt reports she has had 3 other inpatient admissions with most recent at [facility] on 5/14/22 and [facility] in April 2022. Family history of mental illness or substance abuse: none reported    Substance abuse history:  UDS negative; BAL=0  Social History     Tobacco Use    Smoking status: Current Every Day Smoker     Packs/day: 1.00     Years: 18.00     Pack years: 18.00     Types: Cigarettes    Smokeless tobacco: Never Used    Tobacco comment: Vapes   Substance Use Topics    Alcohol use: Yes     Alcohol/week: 0.0 standard drinks     Comment: rarely. History of biomedical complications associated with substance abuse: stomach pain    Patient's current acceptance of treatment or motivation for change: Fair    Family constellation: single, 5year old child    Is significant other involved? N/A    Describe support system: Mother and grandparents     Describe living arrangements and home environment: Lives with mother.      GUARDIAN/POA: No    Guardian Name: N/A    Guardian Contact: N/A    Health issues:   Hospital Problems  Date Reviewed: 4/12/2022          Codes Class Noted POA    Alcohol use disorder, severe, dependence ([facility] ICD-10-CM: F10.20  ICD-9-CM: 303.90 2022 Unknown        Depression ICD-10-CM: F32. A  ICD-9-CM: 656  2022 Unknown              Trauma history: Unknown at this time. Legal issues: Denied. History of  service: Denied.      Financial status: Family    Restorationist/cultural factors: Attends [employer]    Education/work history: GED    Have you been licensed as a health care professional (current or ): N/A    Describe coping skills: Limited and ineffectual     Oval Ape  6/10/2022

## 2022-06-10 NOTE — ED NOTES
TRANSFER - OUT REPORT:    Verbal report given to Mariella Covington RN on Apple Wang  being transferred to [facility](unit) for routine progression of care       Report consisted of patients Situation, Background, Assessment and   Recommendations(SBAR). Information from the following report(s) SBAR was reviewed with the receiving nurse. Lines:   Peripheral IV 06/09/22 Right Antecubital (Active)        Opportunity for questions and clarification was provided.       Patient transported with:

## 2022-06-11 PROCEDURE — 74011250637 HC RX REV CODE- 250/637

## 2022-06-11 PROCEDURE — 65220000003 HC RM SEMIPRIVATE PSYCH

## 2022-06-11 PROCEDURE — 74011250637 HC RX REV CODE- 250/637: Performed by: PSYCHIATRY & NEUROLOGY

## 2022-06-11 PROCEDURE — 74011250637 HC RX REV CODE- 250/637: Performed by: NURSE PRACTITIONER

## 2022-06-11 RX ORDER — BACLOFEN 10 MG/1
5 TABLET ORAL 3 TIMES DAILY
Status: DISCONTINUED | OUTPATIENT
Start: 2022-06-11 | End: 2022-06-13

## 2022-06-11 RX ADMIN — HYDROXYZINE HYDROCHLORIDE 50 MG: 25 TABLET, FILM COATED ORAL at 22:22

## 2022-06-11 RX ADMIN — NALTREXONE HYDROCHLORIDE 50 MG: 50 TABLET, FILM COATED ORAL at 08:40

## 2022-06-11 RX ADMIN — LORAZEPAM 1 MG: 1 TABLET ORAL at 16:44

## 2022-06-11 RX ADMIN — IBUPROFEN 600 MG: 600 TABLET, FILM COATED ORAL at 13:46

## 2022-06-11 RX ADMIN — OLANZAPINE 5 MG: 5 TABLET, FILM COATED ORAL at 06:05

## 2022-06-11 RX ADMIN — FLUOXETINE 20 MG: 20 CAPSULE ORAL at 08:39

## 2022-06-11 RX ADMIN — ARIPIPRAZOLE 10 MG: 5 TABLET ORAL at 08:38

## 2022-06-11 RX ADMIN — TRAZODONE HYDROCHLORIDE 50 MG: 50 TABLET ORAL at 22:22

## 2022-06-11 RX ADMIN — Medication 100 MG: at 08:38

## 2022-06-11 RX ADMIN — VENLAFAXINE HYDROCHLORIDE 75 MG: 75 CAPSULE, EXTENDED RELEASE ORAL at 08:39

## 2022-06-11 RX ADMIN — LORAZEPAM 1 MG: 1 TABLET ORAL at 08:39

## 2022-06-11 RX ADMIN — LORAZEPAM 1 MG: 1 TABLET ORAL at 11:14

## 2022-06-11 RX ADMIN — FOLIC ACID 1 MG: 1 TABLET ORAL at 08:38

## 2022-06-11 RX ADMIN — THERA TABS 1 TABLET: TAB at 08:39

## 2022-06-11 RX ADMIN — BACLOFEN 5 MG: 10 TABLET ORAL at 16:44

## 2022-06-11 RX ADMIN — HYDROXYZINE HYDROCHLORIDE 50 MG: 25 TABLET, FILM COATED ORAL at 06:05

## 2022-06-11 NOTE — H&P
INITIAL PSYCHIATRIC EVALUATION            IDENTIFICATION:    Patient Name  Fabio Pascual   Date of Birth 1980   [facility] [id number]   Medical Record Number  [id number]      Age  46 y.o. PCP Luis Lyman MD   Admit date:  6/9/2022    Room Number  319/01  @ [facility]   Date of Service  6/10/2022            HISTORY         REASON FOR HOSPITALIZATION:  CC: \"suicidal ideation / EtOH withdrawal\". Pt admitted under a voluntary basis for suicidal ideations and EtOH withdrawal proving to be an imminent danger to self and an inability to care for self. HISTORY OF PRESENT ILLNESS:    The patient, Fabio Pascual, is a 46 y.o. WHITE/NON- female with a past psychiatric history significant for MDD and EtOH use disorder, who presents at this time with complaints of (and/or evidence of) the following emotional symptoms: depression and suicidal thoughts/threats. Additional symptomatology include escalation of EtOH abuse. The above symptoms have been present for 2+ weeks. These symptoms are of moderate to high severity. These symptoms are constant in nature. The patient's condition has been precipitated by psychosocial stressors. Patient's condition made worse by alcohol use as well as treatment noncompliance. UDS: negative; BAL=106. The patient is a fair historian. The patient corroborates the above narrative. The patient contracts for safety on the unit and gives consent for the team to contact collateral. The patient is amenable to initiating treatment while on the unit. She states that she has been having her medications adjusted as her antidepressants have not been working well for her; and she has been having difficulty getting in to see a psychiatrist. The patient acknowledges that her alcohol use has been problematic. She is open to further adjustment of her regimen as well as a detox from EtOH.  She is ambivalent about rehab having just finished an abstinence based program through her [employer] but struggling to maintain her sobriety without medication. After a discussion of risk and benefit, the patient is amenable to increasing her antidepressant and starting Naltrexone. ALLERGIES:   Allergies   Allergen Reactions    Aspirin Other (comments)     Hot sweats and passed out; tolerated ibuprofen    Penicillins Other (comments)     Childhood. Does not remember reaction. Is not aware if she has ever taken cephalosporins in the past.    2019: Pt notes no problems with cephalosporins. MEDICATIONS PRIOR TO ADMISSION:   Medications Prior to Admission   Medication Sig    traZODone (DESYREL) 50 mg tablet Take 50 mg by mouth nightly as needed for Sleep.  hydrOXYzine HCL (ATARAX) 50 mg tablet Take 50 mg by mouth every six (6) hours as needed for Itching or Anxiety.  FLUoxetine (PROzac) 20 mg capsule Take 20 mg by mouth daily.  ARIPiprazole (Abilify) 10 mg tablet Take 1 Tablet by mouth daily.  venlafaxine (EFFEXOR) 37.5 mg tablet Take 1 Tablet by mouth daily.  b complex-vitamin c-folic acid 0.8 mg (NEPHRO-LIT) 0.8 mg tab tablet Take 1 Tablet by mouth daily. PAST MEDICAL HISTORY:   Past Medical History:   Diagnosis Date    ADD (attention deficit disorder) 19-17yo    Treated with Adderall since dx. 2013 dosing 20mg AM and 10mg PM.  Trial/change to Vyvanse Nov-Dec 2015--not as effective.  Gynecologic disorder     History of miscarriages. History of Mirena IUD placed on 4/17/15    HX OTHER MEDICAL      -BU baby    HX OTHER MEDICAL     HPV positive    Idiopathic vulvodynia 2014    L1 vertebral fracture (HCC) 2017    [facility] imaging/admissoin: Mild acute two column compression fracture of L1 without evidence of retropulsion into the spinal canal.  Managed non-operatively.  Migraines 2013    Neurological disorder     Pelvic pain in female 9/15/2014    2015 gyn laparoscopy/hysteroscopy with endometriosis worse right.  Psychiatric disorder     depression    Secondary dysmenorrhea 8/12/2014    With menorraghia    Seizures ([facility] [address] 2008    never on meds--had appr 4-5 in a short amt of time and none since     Past Surgical History:   Procedure Laterality Date    ENDOMETRIAL CRYOABLATION      HX GYN  4/17/15    laparoscopy and hysteroscopy and IUD placement    HX HYSTERECTOMY  04/04/2016    Complete Hysterectomy      SOCIAL HISTORY:  The patient is currently employed; the patient is not a smoker; the patient's marital status is single; the patient has a 5year old son with whom she shares custody; the patient reports the highest level of education achieved is a GED. She lives with her mother. FAMILY HISTORY: History reviewed, pertinent family history as below:   Family History   Problem Relation Age of Onset    Cancer Mother     Diabetes Mother     Alcohol abuse Father         and drug    Psychiatric Disorder Father     Cancer Father     Diabetes Maternal Grandmother     Hypertension Maternal Grandmother     Thyroid Disease Maternal Grandmother     Diabetes Maternal Grandfather     Hypertension Maternal Grandfather     Cancer Maternal Grandfather     Heart Disease Maternal Grandfather     Cancer Paternal Grandmother     Diabetes Paternal Grandmother        REVIEW OF SYSTEMS:   Pertinent items are noted in the History of Present Illness. All other Systems reviewed and are considered negative. MENTAL STATUS EXAM & VITALS     MENTAL STATUS EXAM (MSE):    MSE FINDINGS ARE WITHIN NORMAL LIMITS (WNL) UNLESS OTHERWISE STATED BELOW. ( ALL OF THE BELOW CATEGORIES OF THE MSE HAVE BEEN REVIEWED (reviewed 6/10/2022) AND UPDATED AS DEEMED APPROPRIATE )  General Presentation age appropriate and disheveled, cooperative and guarded   Orientation oriented to time, place and person   Vital Signs  See below (reviewed 6/10/2022); Vital Signs (BP, Pulse, & Temp) are within normal limits if not listed below.    Gait and Station Stable/steady, no ataxia   Musculoskeletal System No extrapyramidal symptoms (EPS); no abnormal muscular movements or Tardive Dyskinesia (TD); muscle strength and tone are within normal limits   Language No aphasia or dysarthria   Speech:  normal volume and non-pressured   Thought Processes logical; normal rate of thoughts; fair abstract reasoning/computation   Thought Associations goal directed   Thought Content preoccupations   Suicidal Ideations contracts for safety   Homicidal Ideations none   Mood:  depressed and anhedonia   Affect:  constricted and mood-congruent   Memory recent  intact   Memory remote:  fair   Concentration/Attention:  fair   Fund of Knowledge average   Insight:  fair   Reliability good   Judgment:  poor          VITALS:     Patient Vitals for the past 24 hrs:   Temp Pulse Resp BP SpO2   06/10/22 1015 98.4 °F (36.9 °C) 99 16 125/61 96 %   06/10/22 0342 -- 86 17 108/65 98 %   06/09/22 2309 -- 100 17 100/75 97 %   06/09/22 2220 98.6 °F (37 °C) (!) 110 18 (!) 120/59 97 %     Wt Readings from Last 3 Encounters:   06/09/22 96.6 kg (213 lb)   06/08/22 98.4 kg (217 lb)   06/07/22 103.9 kg (229 lb 0.9 oz)     Temp Readings from Last 3 Encounters:   06/10/22 98.4 °F (36.9 °C)   06/08/22 98.3 °F (36.8 °C) (Oral)   06/07/22 98.2 °F (36.8 °C)     BP Readings from Last 3 Encounters:   06/10/22 125/61   06/08/22 109/68   06/08/22 115/64     Pulse Readings from Last 3 Encounters:   06/10/22 99   06/08/22 (!) 101   06/08/22 (!) 109            DATA     LABORATORY DATA:  Labs Reviewed   CBC WITH AUTOMATED DIFF - Abnormal; Notable for the following components:       Result Value    PLATELET 344 (*)     IMMATURE GRANULOCYTES 1 (*)     ABS. IMM.  GRANS. 0.1 (*)     All other components within normal limits   METABOLIC PANEL, COMPREHENSIVE - Abnormal; Notable for the following components:    Glucose 150 (*)     BUN/Creatinine ratio 26 (*)     Calcium 8.2 (*)     ALT (SGPT) 486 (*)     AST (SGOT) 364 (*) Albumin 3.4 (*)     A-G Ratio 0.9 (*)     All other components within normal limits   ETHYL ALCOHOL - Abnormal; Notable for the following components:    ALCOHOL(ETHYL),SERUM 87 (*)     All other components within normal limits   SALICYLATE - Abnormal; Notable for the following components:    Salicylate level <6.7 (*)     All other components within normal limits   ACETAMINOPHEN - Abnormal; Notable for the following components:    Acetaminophen level <2 (*)     All other components within normal limits   COVID-19 WITH INFLUENZA A/B   DRUG SCREEN, URINE   URINALYSIS W/ REFLEX CULTURE   SAMPLES BEING HELD   LIPASE   HCG URINE, QL. - POC     Admission on 06/09/2022   Component Date Value Ref Range Status    WBC 06/09/2022 8.7  3.6 - 11.0 K/uL Final    RBC 06/09/2022 4.07  3.80 - 5.20 M/uL Final    HGB 06/09/2022 13.2  11.5 - 16.0 g/dL Final    HCT 06/09/2022 36.9  35.0 - 47.0 % Final    MCV 06/09/2022 90.7  80.0 - 99.0 FL Final    MCH 06/09/2022 32.4  26.0 - 34.0 PG Final    MCHC 06/09/2022 35.8  30.0 - 36.5 g/dL Final    RDW 06/09/2022 14.2  11.5 - 14.5 % Final    PLATELET 29/09/6106 890* 150 - 400 K/uL Final    MPV 06/09/2022 9.4  8.9 - 12.9 FL Final    NRBC 06/09/2022 0.0  0  WBC Final    ABSOLUTE NRBC 06/09/2022 0.00  0.00 - 0.01 K/uL Final    NEUTROPHILS 06/09/2022 68  32 - 75 % Final    LYMPHOCYTES 06/09/2022 21  12 - 49 % Final    MONOCYTES 06/09/2022 8  5 - 13 % Final    EOSINOPHILS 06/09/2022 1  0 - 7 % Final    BASOPHILS 06/09/2022 1  0 - 1 % Final    IMMATURE GRANULOCYTES 06/09/2022 1* 0.0 - 0.5 % Final    ABS. NEUTROPHILS 06/09/2022 6.0  1.8 - 8.0 K/UL Final    ABS. LYMPHOCYTES 06/09/2022 1.8  0.8 - 3.5 K/UL Final    ABS. MONOCYTES 06/09/2022 0.7  0.0 - 1.0 K/UL Final    ABS. EOSINOPHILS 06/09/2022 0.1  0.0 - 0.4 K/UL Final    ABS. BASOPHILS 06/09/2022 0.1  0.0 - 0.1 K/UL Final    ABS. IMM.  GRANS. 06/09/2022 0.1* 0.00 - 0.04 K/UL Final    DF 06/09/2022 AUTOMATED    Final    Sodium 06/09/2022 138  136 - 145 mmol/L Final    Potassium 06/09/2022 3.9  3.5 - 5.1 mmol/L Final    Chloride 06/09/2022 103  97 - 108 mmol/L Final    CO2 06/09/2022 24  21 - 32 mmol/L Final    Anion gap 06/09/2022 11  5 - 15 mmol/L Final    Glucose 06/09/2022 150* 65 - 100 mg/dL Final    BUN 06/09/2022 15  6 - 20 MG/DL Final    Creatinine 06/09/2022 0.57  0.55 - 1.02 MG/DL Final    BUN/Creatinine ratio 06/09/2022 26* 12 - 20   Final    GFR est AA 06/09/2022 >60  >60 ml/min/1.73m2 Final    GFR est non-AA 06/09/2022 >60  >60 ml/min/1.73m2 Final    Calcium 06/09/2022 8.2* 8.5 - 10.1 MG/DL Final    Bilirubin, total 06/09/2022 0.7  0.2 - 1.0 MG/DL Final    ALT (SGPT) 06/09/2022 486* 12 - 78 U/L Final    AST (SGOT) 06/09/2022 364* 15 - 37 U/L Final    Alk.  phosphatase 06/09/2022 80  45 - 117 U/L Final    Protein, total 06/09/2022 7.0  6.4 - 8.2 g/dL Final    Albumin 06/09/2022 3.4* 3.5 - 5.0 g/dL Final    Globulin 06/09/2022 3.6  2.0 - 4.0 g/dL Final    A-G Ratio 06/09/2022 0.9* 1.1 - 2.2   Final    AMPHETAMINES 06/09/2022 Negative  NEG   Final    BARBITURATES 06/09/2022 Negative  NEG   Final    BENZODIAZEPINES 06/09/2022 Negative  NEG   Final    COCAINE 06/09/2022 Negative  NEG   Final    METHADONE 06/09/2022 Negative  NEG   Final    OPIATES 06/09/2022 Negative  NEG   Final    PCP(PHENCYCLIDINE) 06/09/2022 Negative  NEG   Final    THC (TH-CANNABINOL) 06/09/2022 Negative  NEG   Final    Drug screen comment 06/09/2022 (NOTE)   Final    Color 06/09/2022 YELLOW/STRAW    Final    Appearance 06/09/2022 CLEAR  CLEAR   Final    Specific gravity 06/09/2022 1.020    Final    pH (UA) 06/09/2022 6.0  5.0 - 8.0   Final    Protein 06/09/2022 Negative  NEG mg/dL Final    Glucose 06/09/2022 Negative  NEG mg/dL Final    Ketone 06/09/2022 Negative  NEG mg/dL Final    Bilirubin 06/09/2022 Negative  NEG   Final    Blood 06/09/2022 Negative  NEG   Final    Urobilinogen 06/09/2022 1.0  0.2 - 1.0 EU/dL Final    Nitrites 06/09/2022 Negative  NEG   Final    Leukocyte Esterase 06/09/2022 Negative  NEG   Final    WBC 06/09/2022 0-4  0 - 4 /hpf Final    RBC 06/09/2022 0-5  0 - 5 /hpf Final    Epithelial cells 06/09/2022 FEW  FEW /lpf Final    Bacteria 06/09/2022 Negative  NEG /hpf Final    UA:UC IF INDICATED 06/09/2022 CULTURE NOT INDICATED BY UA RESULT  CNI   Final    Ventricular Rate 06/09/2022 104  BPM Preliminary    Atrial Rate 06/09/2022 104  BPM Preliminary    P-R Interval 06/09/2022 126  ms Preliminary    QRS Duration 06/09/2022 102  ms Preliminary    Q-T Interval 06/09/2022 374  ms Preliminary    QTC Calculation (Bezet) 06/09/2022 491  ms Preliminary    Calculated P Axis 06/09/2022 35  degrees Preliminary    Calculated R Axis 06/09/2022 24  degrees Preliminary    Calculated T Axis 06/09/2022 43  degrees Preliminary    Diagnosis 06/09/2022    Preliminary                    Value:Sinus tachycardia  Incomplete right bundle branch block  Borderline ECG  When compared with ECG of 04-AUG-2018 14:56,  Incomplete right bundle branch block is now present      SAMPLES BEING HELD 06/09/2022 Add-on orders for these samples will be processed based on acceptable specimen integrity and analyte stability, which may vary by analyte. Final    COMMENT 06/09/2022 Add-on orders for these samples will be processed based on acceptable specimen integrity and analyte stability, which may vary by analyte.     Final    Lipase 06/09/2022 88  73 - 393 U/L Final    ALCOHOL(ETHYL),SERUM 06/09/2022 87* <10 MG/DL Final    Salicylate level 98/58/8086 <1.7* 2.8 - 20.0 MG/DL Final    Acetaminophen level 06/09/2022 <2* 10 - 30 ug/mL Final    SARS-CoV-2 by PCR 06/09/2022 Not detected  NOTD   Final    Influenza A by PCR 06/09/2022 Not detected    Final    Influenza B by PCR 06/09/2022 Not detected    Final    Pregnancy test,urine (POC) 06/09/2022 Negative  NEG   Final        RADIOLOGY REPORTS:  [place] [facility]    XR CHEST PA LAT    Narrative  EXAM: XR CHEST PA LAT    INDICATION: right lower chest pain    COMPARISON: 7/16/2017. FINDINGS: PA and lateral radiographs of the chest demonstrate clear lungs. The  cardiac and mediastinal contours and pulmonary vascularity are normal. The bones  and soft tissues are within normal limits. Impression  No acute cardiopulmonary process  XR CHEST PA LAT    Result Date: 6/8/2022  EXAM: XR CHEST PA LAT INDICATION: right lower chest pain COMPARISON: 7/16/2017. FINDINGS: PA and lateral radiographs of the chest demonstrate clear lungs. The cardiac and mediastinal contours and pulmonary vascularity are normal. The bones and soft tissues are within normal limits. No acute cardiopulmonary process    CT ABD PELV WO CONT    Result Date: 6/8/2022  EXAM: CT ABD PELV WO CONT INDICATION: right flank pain COMPARISON: 8/4/2018 CONTRAST:  None. TECHNIQUE: Thin axial images were obtained through the abdomen and pelvis. Coronal and sagittal reformats were generated. Oral contrast was not administered. CT dose reduction was achieved through use of a standardized protocol tailored for this examination and automatic exposure control for dose modulation. The absence of intravenous contrast material reduces the sensitivity for evaluation of the vasculature and solid organs. FINDINGS: LOWER THORAX: No significant abnormality in the incidentally imaged lower chest. LIVER: No mass. BILIARY TREE: Gallbladder is within normal limits. CBD is not dilated. SPLEEN: within normal limits. PANCREAS: No focal abnormality. ADRENALS: Unremarkable. KIDNEYS/URETERS: No calculus or hydronephrosis. STOMACH: Unremarkable. SMALL BOWEL: No dilatation or wall thickening. COLON: Colonic diverticulosis. APPENDIX: Unremarkable PERITONEUM: No ascites or pneumoperitoneum. RETROPERITONEUM: No lymphadenopathy or aortic aneurysm.  REPRODUCTIVE ORGANS: Unremarkable URINARY BLADDER: No mass or calculus. BONES: No destructive bone lesion. ABDOMINAL WALL: No mass or hernia. ADDITIONAL COMMENTS: N/A     No acute intra-abdominal pathology. US [place]    Result Date: 5/27/2022  ULTRASOUND OF THE RIGHT UPPER QUADRANT INDICATION: elevated lft. alcoholic COMPARISON: None. TECHNIQUE: Sonography of the right upper quadrant was performed. FINDINGS: GALLBLADDER: Distended but No gallstones, wall thickening, or pericholecystic fluid. COMMON DUCT: 0.4 cm in diameter. The duct is normal caliber. LIVER: Increased echogenicity, suggesting steatosis. No focal hepatic mass or intrahepatic biliary dilatation is shown. PANCREAS: Not well visualized. RIGHT KIDNEY: 10.0 cm in length. No hydronephrosis, shadowing calculus, or contour-deforming renal mass. 1. Gallbladder distention but no cholelithiasis or ductal dilatation. 2. Hepatic steatosis.              MEDICATIONS       ALL MEDICATIONS  Current Facility-Administered Medications   Medication Dose Route Frequency    LORazepam (ATIVAN) tablet 1 mg  1 mg Oral TID    [START ON 6/12/2022] LORazepam (ATIVAN) tablet 1 mg  1 mg Oral BID    [START ON 6/11/2022] naltrexone (DEPADE) tablet 50 mg  50 mg Oral DAILY    [START ON 6/11/2022] ARIPiprazole (ABILIFY) tablet 10 mg  10 mg Oral DAILY    [START ON 6/11/2022] FLUoxetine (PROzac) capsule 20 mg  20 mg Oral DAILY    [START ON 6/11/2022] venlafaxine-SR (EFFEXOR-XR) capsule 75 mg  75 mg Oral DAILY WITH BREAKFAST    lidocaine 4 % patch 1 Patch  1 Patch TransDERmal Q24H    ibuprofen (MOTRIN) tablet 600 mg  600 mg Oral Q6H PRN    OLANZapine (ZyPREXA) tablet 5 mg  5 mg Oral Q6H PRN    haloperidol lactate (HALDOL) injection 5 mg  5 mg IntraMUSCular Q6H PRN    benztropine (COGENTIN) tablet 1 mg  1 mg Oral BID PRN    diphenhydrAMINE (BENADRYL) injection 50 mg  50 mg IntraMUSCular BID PRN    hydrOXYzine HCL (ATARAX) tablet 50 mg  50 mg Oral TID PRN    LORazepam (ATIVAN) injection 1 mg  1 mg IntraMUSCular Q4H PRN    traZODone (DESYREL) tablet 50 mg  50 mg Oral QHS PRN    magnesium hydroxide (MILK OF MAGNESIA) 400 mg/5 mL oral suspension 30 mL  30 mL Oral DAILY PRN    thiamine HCL (B-1) tablet 100 mg  100 mg Oral DAILY    folic acid (FOLVITE) tablet 1 mg  1 mg Oral DAILY    therapeutic multivitamin (THERAGRAN) tablet 1 Tablet  1 Tablet Oral DAILY    nicotine (NICODERM CQ) 21 mg/24 hr patch 1 Patch  1 Patch TransDERmal DAILY      SCHEDULED MEDICATIONS  Current Facility-Administered Medications   Medication Dose Route Frequency    LORazepam (ATIVAN) tablet 1 mg  1 mg Oral TID    [START ON 6/12/2022] LORazepam (ATIVAN) tablet 1 mg  1 mg Oral BID    [START ON 6/11/2022] naltrexone (DEPADE) tablet 50 mg  50 mg Oral DAILY    [START ON 6/11/2022] ARIPiprazole (ABILIFY) tablet 10 mg  10 mg Oral DAILY    [START ON 6/11/2022] FLUoxetine (PROzac) capsule 20 mg  20 mg Oral DAILY    [START ON 6/11/2022] venlafaxine-SR (EFFEXOR-XR) capsule 75 mg  75 mg Oral DAILY WITH BREAKFAST    lidocaine 4 % patch 1 Patch  1 Patch TransDERmal Q24H    thiamine HCL (B-1) tablet 100 mg  100 mg Oral DAILY    folic acid (FOLVITE) tablet 1 mg  1 mg Oral DAILY    therapeutic multivitamin (THERAGRAN) tablet 1 Tablet  1 Tablet Oral DAILY    nicotine (NICODERM CQ) 21 mg/24 hr patch 1 Patch  1 Patch TransDERmal DAILY                ASSESSMENT & PLAN        The patient, Dayton Pascual, is a 46 y.o.  female who presents at this time for treatment of the following diagnoses:  Patient Active Hospital Problem List:   Recurrent major depressive disorder ([facility] [address] (6/10/2022)    Assessment: the patient presents following an episode of severe depression exacerbated by relapse on EtOH. She has good insight into the situation and is hopeful for change. While patient is a good rehab candidate, she has employment and childcare obligations that would make outpatient more treatment more palatable.  Will detox with Ativan given transaminitis, start agent to reduce craving and continue cross titration of antidepressant. Plan:   - START Naltrexone 50 mg QDAY for EtOH cravings  - RESTART Prozac 20 mg QDAY for MDD adjunct,  after 7 days  - RESTART and INCREASE Effexor to 75 mg QDAY for MDD  - START Ativan taper x3 days for EtOH withdrawal  - CIWA monitoring + Ativan PRN.  - IGM therapy as tolerated  - Expand database / obtain collateral  - Dispo planning (home + outpatient rehab)           A coordinated, multidisplinary treatment team (includes the nurse, unit pharmacist,  and writer) round was conducted for this initial evaluation with the patient present. The following regarding medications was addressed during rounds with patient: the risks and benefits of the proposed medication. The patient was given the opportunity to ask questions. Informed consent given to the use of the above medications. I will continue to adjust psychiatric and non-psychiatric medications (see above \"medication\" section and orders section for details) as deemed appropriate & based upon diagnoses and response to treatment. I have reviewed admission (and previous/old) labs and medical tests in the EHR and or transferring hospital documents. I will continue to order blood tests/labs and diagnostic tests as deemed appropriate and review results as they become available (see orders for details). I have reviewed old psychiatric and medical records available in the EHR. I Will order additional psychiatric records from other institutions to further elucidate the nature of patient's psychopathology and review once available. I will gather additional collateral information from friends, family and o/p treatment team to further elucidate the nature of patient's psychopathology and baselline level of psychiatric functioning.     I certify that this patient's inpatient psychiatric hospital services are required for treatment that could reasonably be expected to improve the patient's condition, or for diagnostic study, and that the patient continues to need, on a daily basis, active treatment furnished directly by or requiring the supervision of inpatient psychiatric facility personnel. In addition, the hospital records show that services furnished were intensive treatment services, admission or related services, or equivalent services.       ESTIMATED LENGTH OF STAY:  3-5 days       STRENGTHS:  Exercising self-direction/Resourceful, Knowledge of medications and Awareness of Substance abuse issues                                        SIGNED:    Ari Martinez MD  6/10/2022

## 2022-06-11 NOTE — PROGRESS NOTES
Problem: Depressed Mood (Adult/Pediatric)  Goal: *STG: Participates in treatment plan  Outcome: Progressing Towards Goal  Goal: *STG: Verbalizes anger, guilt, and other feelings in a constructive manor  Outcome: Progressing Towards Goal  Goal: *STG: Attends activities and groups  Outcome: Progressing Towards Goal  Goal: *STG: Demonstrates reduction in symptoms and increase in insight into coping skills/future focused  Outcome: Progressing Towards Goal    [id number] Shift report received from Jhonny Roach Marie, RN   1930- 2200: Patient met resting in the room, she was calm and cooperative.  She denies SI/HI/VAH, rates anxiety 9/10 and depression 6/10. Patient is medication and meal compliant, prn Atarax and Trazodone given. No violence recorded.      2300 Patient is resting in bed, respiration is even and unlabored. No violence recorded.   Quick 15 minutes checks and RN hourly rounding ongoing for safety. 0000- 0700, patient had a quiet shift, Q15 and RN hourly rounding maintained. Prn Atarax and Zyprexa given at 0605  He slept a total of 10 hours. no

## 2022-06-11 NOTE — PROGRESS NOTES
Bedside shift change report given to Abbie Wilson RN (oncoming nurse) by Vera Jaeger RN (offgoing nurse). Report included the following information SBAR, Kardex, MAR, Accordion, and Recent Results. Assumed care of patient ambulating in the hallway. AO x 4. Rated depression 7/10 and anxiety 10/10. Did not endorse pain, SI, HI, or AVH. Pleasant and cooperative. Med/meal compliant. Uneventful shift. Isolative to room. Hourly rounds completed by RN. Bedside shift change report given to Monica Restrepo RN (oncoming nurse) by Abbie Wilson RN (offgoing nurse). Report included the following information SBAR, Kardex, MAR and Accordion. Problem: Depressed Mood (Adult/Pediatric)  Goal: *STG: Remains safe in hospital  Outcome: Progressing Towards Goal  Goal: *STG: Complies with medication therapy  Outcome: Progressing Towards Goal     Problem: Falls - Risk of  Goal: *Absence of Falls  Description: Document Rema Fall Risk and appropriate interventions in the flowsheet.   Outcome: Progressing Towards Goal  Note: Fall Risk Interventions:            Medication Interventions: Teach patient to arise slowly

## 2022-06-11 NOTE — PROGRESS NOTES
Chief Complaint: \"My back hurts. \"    Length of Stay: 2 Days    Interval History: Pt states that her liver levels are off again and the last time this happened her back hurt then too and ibuprofen is not helping. She states sleep is poor due to pain. Staff reports pt slept 10 hours last night and received Atarax, trazodone, and Zyprexa. She states withdrawals are \"better\" today. She states she is craving nicotine but not alcohol currently. She denies any SI/HI/AVH currently. No issues with medications and no side effects to report at this time. Past Medical History:  Past Medical History:   Diagnosis Date    ADD (attention deficit disorder) [age]yo    Treated with Adderall since dx. 2013 dosing 20mg AM and 10mg PM.  Trial/change to Vyvanse Nov-Dec 2015--not as effective.  Gynecologic disorder     History of miscarriages. History of Mirena IUD placed on 4/17/15    HX OTHER MEDICAL      -BUFA baby    HX OTHER MEDICAL     HPV positive    Idiopathic vulvodynia 2014    L1 vertebral fracture (HCC) 2017    [facility] imaging/admissoin: Mild acute two column compression fracture of L1 without evidence of retropulsion into the spinal canal.  Managed non-operatively.  Migraines 2013    Neurological disorder     Pelvic pain in female 9/15/2014    2015 gyn laparoscopy/hysteroscopy with endometriosis worse right.     Psychiatric disorder     depression    Secondary dysmenorrhea 2014    With menorraghia    Seizures ([facility] [address]     never on meds--had appr 4-5 in a short amt of time and none since           Labs:  Lab Results   Component Value Date/Time    WBC 8.7 2022 02:00 PM    Hemoglobin (POC) 14.3 2016 07:26 AM    HGB 13.2 2022 02:00 PM    Hematocrit (POC) 42 2016 07:26 AM    HCT 36.9 2022 02:00 PM    PLATELET 469 (H)  02:00 PM    MCV 90.7 2022 02:00 PM    Hgb, External 11.6 2012 12:00 AM    Hct, External 33.4 05/03/2012 12:00 AM    Platelet cnt., External 332K 05/03/2012 12:00 AM      Lab Results   Component Value Date/Time    Sodium 138 06/09/2022 02:00 PM    Potassium 3.9 06/09/2022 02:00 PM    Chloride 103 06/09/2022 02:00 PM    CO2 24 06/09/2022 02:00 PM    Anion gap 11 06/09/2022 02:00 PM    Glucose 150 (H) 06/09/2022 02:00 PM    BUN 15 06/09/2022 02:00 PM    Creatinine 0.57 06/09/2022 02:00 PM    BUN/Creatinine ratio 26 (H) 06/09/2022 02:00 PM    GFR est AA >60 06/09/2022 02:00 PM    GFR est non-AA >60 06/09/2022 02:00 PM    Calcium 8.2 (L) 06/09/2022 02:00 PM    Bilirubin, total 0.7 06/09/2022 02:00 PM    Alk.  phosphatase 80 06/09/2022 02:00 PM    Protein, total 7.0 06/09/2022 02:00 PM    Albumin 3.4 (L) 06/09/2022 02:00 PM    Globulin 3.6 06/09/2022 02:00 PM    A-G Ratio 0.9 (L) 06/09/2022 02:00 PM    ALT (SGPT) 486 (H) 06/09/2022 02:00 PM      Vitals:    06/10/22 0342 06/10/22 1015 06/10/22 1950 06/11/22 0802   BP: 108/65 125/61 (!) 117/59 112/62   Pulse: 86 99 95 83   Resp: 17 16 16 18   Temp:  98.4 °F (36.9 °C) 98.8 °F (37.1 °C) 98.3 °F (36.8 °C)   SpO2: 98% 96% 98% 96%   Weight:       Height:       LMP: 03/14/2016         Current Facility-Administered Medications   Medication Dose Route Frequency Provider Last Rate Last Admin    LORazepam (ATIVAN) tablet 1 mg  1 mg Oral TID Sarbjit Langford MD   1 mg at 06/11/22 1114    [START ON 6/12/2022] LORazepam (ATIVAN) tablet 1 mg  1 mg Oral BID Sarbjit Langford MD        naltrexone (DEPADE) tablet 50 mg  50 mg Oral DAILY Sarbjit Langford MD   50 mg at 06/11/22 0840    ARIPiprazole (ABILIFY) tablet 10 mg  10 mg Oral DAILY Sarbjit Langford MD   10 mg at 06/11/22 0838    FLUoxetine (PROzac) capsule 20 mg  20 mg Oral DAILY Sarbjit Langford MD   20 mg at 06/11/22 0839    venlafaxine-SR (EFFEXOR-XR) capsule 75 mg  75 mg Oral DAILY WITH BREAKFAST Sarbjit Langford MD   75 mg at 06/11/22 0839    lidocaine 4 % patch 1 Patch  1 Patch TransDERmal Q24H Glen Lin MD   1 Patch at 06/10/22 1903    ibuprofen (MOTRIN) tablet 600 mg  600 mg Oral Q6H PRN Sameera Rizzo NP   600 mg at 06/10/22 2134    LORazepam (ATIVAN) tablet 2 mg  2 mg Oral Q4H PRN Sarbjit Langford MD        OLANZapine (ZyPREXA) tablet 5 mg  5 mg Oral Q6H PRN Brandyn Avalos NP   5 mg at 06/11/22 1025    haloperidol lactate (HALDOL) injection 5 mg  5 mg IntraMUSCular Q6H PRN Brandyn Avalos NP        benztropine (COGENTIN) tablet 1 mg  1 mg Oral BID PRN Brandyn Avalos NP        diphenhydrAMINE (BENADRYL) injection 50 mg  50 mg IntraMUSCular BID PRN Brandyn Avalos NP        hydrOXYzine HCL (ATARAX) tablet 50 mg  50 mg Oral TID PRN Brandyn Avalos NP   50 mg at 06/11/22 4419    LORazepam (ATIVAN) injection 1 mg  1 mg IntraMUSCular Q4H PRN Brandyn Avalos NP        traZODone (DESYREL) tablet 50 mg  50 mg Oral QHS PRN Brandyn Avalos NP   50 mg at 06/10/22 2134    magnesium hydroxide (MILK OF MAGNESIA) 400 mg/5 mL oral suspension 30 mL  30 mL Oral DAILY PRN Brandyn Avalos NP        thiamine HCL (B-1) tablet 100 mg  100 mg Oral DAILY Brandyn Avalos NP   100 mg at 64/39/15 4351    folic acid (FOLVITE) tablet 1 mg  1 mg Oral DAILY Brandyn Avalos NP   1 mg at 06/11/22 0195    therapeutic multivitamin (THERAGRAN) tablet 1 Tablet  1 Tablet Oral DAILY Brandyn Avalos NP   1 Tablet at 06/11/22 0839    nicotine (NICODERM CQ) 21 mg/24 hr patch 1 Patch  1 Patch TransDERmal DAILY Brandyn Avalos NP   1 Patch at 06/11/22 0840         Mental Status Exam:  Eye contact: good  Grooming: fair  Psychomotor activity: intact  Speech is spontaneous  Mood is \"okay \"  Affect: anxious  Perception: No AVH  Suicidal ideation: Denies  Cognition is grossly intact         Physical Exam:  Body habitus: Body mass index is 36.56 kg/m².   Musculoskeletal system: normal gait  Tremor - neg  Cog wheeling - neg      Assessment and Plan:  Delma Klinefelter Favio meets criteria for a diagnosis of Recurrent major depressive disorder    Plan:   - CONTINUE Naltrexone 50 mg QDAY for EtOH cravings  - CONTINUE Prozac 20 mg QDAY for MDD adjunct,  after 7 days  - CONTINUE and INCREASE Effexor to 75 mg QDAY for MDD  - CONTINUE Ativan taper x3 days for EtOH withdrawal  - CIWA monitoring + Ativan PRN.  - IGM therapy as tolerated  - Expand database / obtain collateral  - Dispo planning (home + outpatient rehab)    A coordinated, multidisplinary treatment team round was conducted with the patient, nurses, pharmcist,  and writer present. Discussions held with , and/or with family members; Complete current electronic health record for patient was reviewed in full including consultant notes, ancillary staff notes, nurses and tech notes, labs and vitals. I certify that this patients inpatient psychiatric hospital services furnished since the previous certification were, and continue to be, required for treatment that could reasonably be expected to improve the patient's condition, or for diagnostic study, and that the patient continues to need, on a daily basis, active treatment furnished directly by or requiring the supervision of inpatient psychiatric facility personnel. In addition, the hospital records show that services furnished were intensive treatment services, admission or related services, or equivalent services.

## 2022-06-11 NOTE — PROGRESS NOTES
Spiritual Care Assessment/Progress Note  [facility]      NAME: Zenia Manzano      MRN: [id number]  AGE: 46 y.o.  SEX: female  [employer] Affiliation: Cristina   Language: English     6/11/2022     Total Time (in minutes): 15     Spiritual Assessment begun in [place] [id number] through conversation with:         [x]Patient        [] Family    [] Friend(s)        Reason for Consult: Request by patient     Spiritual beliefs: (Please include comment if needed)     [x] Identifies with a melony tradition:  Anabaptism      [] Supported by a melony community:            [] Claims no spiritual orientation:           [] Seeking spiritual identity:                [] Adheres to an individual form of spirituality:           [] Not able to assess:                           Identified resources for coping:      [x] Prayer                               [] Music                  [] Guided Imagery     [x] Family/friends                 [] Pet visits     [] Devotional reading                         [] Unknown     [] Other:                                               Interventions offered during this visit: (See comments for more details)    Patient Interventions: Affirmation of emotions/emotional suffering,Coping skills reviewed/reinforced,Initial/Spiritual assessment, patient floor,Life review/legacy,Normalization of emotional/spiritual concerns,Prayer (actual),Integration of medical assessment with existing values and beliefs,Affirmation of melony,Iconic (affirming the presence of God/Higher Power),Guidance concerning next steps/process to be expected           Plan of Care:     [x] Support spiritual and/or cultural needs    [] Support AMD and/or advance care planning process      [] Support grieving process   [] Coordinate Rites and/or Rituals    [] Coordination with community clergy   [] No spiritual needs identified at this time   [] Detailed Plan of Care below (See Comments)  [] Make referral to Music Therapy  [] Make referral to Pet Therapy     [] Make referral to Addiction services  [] Make referral to [facility]  [] Make referral to Spiritual Care Partner  [] No future visits requested        [x] Contact Spiritual Care for further referrals     Comments:   called unit, in response to in basket request from staff for spiritual support. Patient was put on the line for and she received 's visit warmly. She shared her struggles and indicated she realized it was time to seek for help and also ask other's to help her in prayer. Patient shared that she has a very supportive family and melony is very important in her life. 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  listened actively and affirmed thoughts and emotions. Patient's requested prayer offered at the end of the conversation. She thanked  for the call and prayer. Visited by: Roger Guzmán Paging Service: [phone])

## 2022-06-11 NOTE — PROGRESS NOTES
Auto-MST trigger per RN admission assessment. No acute nutrition issues identified. Pt's blood sugar is running high and her BMI is elevated, so I changed her supplement order to once per day and to Ensure High Protein. Pt meal compliant. Cont folvite, thiamine and MV.   Hx notable for elev BMI, ETOH abuse/withdrawal, tobacco abuse, elev LFTs  Ht: 5'4\"  Wt: 213 lb  BMI: 36.56 kg/(m^2) c/w obesity class II  Est energy needs: 1815 kcal, 65 g protein, 2200 mL fluids  Pt will consume > 75% of meals at follow up 7-10 days  LOS, MST

## 2022-06-12 PROCEDURE — 74011250637 HC RX REV CODE- 250/637: Performed by: PSYCHIATRY & NEUROLOGY

## 2022-06-12 PROCEDURE — 74011250637 HC RX REV CODE- 250/637: Performed by: NURSE PRACTITIONER

## 2022-06-12 PROCEDURE — 74011250637 HC RX REV CODE- 250/637

## 2022-06-12 PROCEDURE — 65220000003 HC RM SEMIPRIVATE PSYCH

## 2022-06-12 RX ADMIN — LORAZEPAM 1 MG: 1 TABLET ORAL at 16:51

## 2022-06-12 RX ADMIN — TRAZODONE HYDROCHLORIDE 50 MG: 50 TABLET ORAL at 21:20

## 2022-06-12 RX ADMIN — BACLOFEN 5 MG: 10 TABLET ORAL at 08:35

## 2022-06-12 RX ADMIN — Medication 100 MG: at 08:37

## 2022-06-12 RX ADMIN — ARIPIPRAZOLE 10 MG: 5 TABLET ORAL at 08:36

## 2022-06-12 RX ADMIN — BACLOFEN 5 MG: 10 TABLET ORAL at 11:31

## 2022-06-12 RX ADMIN — NALTREXONE HYDROCHLORIDE 50 MG: 50 TABLET, FILM COATED ORAL at 08:36

## 2022-06-12 RX ADMIN — BACLOFEN 5 MG: 10 TABLET ORAL at 16:50

## 2022-06-12 RX ADMIN — THERA TABS 1 TABLET: TAB at 08:37

## 2022-06-12 RX ADMIN — HYDROXYZINE HYDROCHLORIDE 50 MG: 25 TABLET, FILM COATED ORAL at 21:20

## 2022-06-12 RX ADMIN — FOLIC ACID 1 MG: 1 TABLET ORAL at 08:36

## 2022-06-12 RX ADMIN — FLUOXETINE 20 MG: 20 CAPSULE ORAL at 08:35

## 2022-06-12 RX ADMIN — VENLAFAXINE HYDROCHLORIDE 75 MG: 75 CAPSULE, EXTENDED RELEASE ORAL at 08:36

## 2022-06-12 RX ADMIN — LORAZEPAM 1 MG: 1 TABLET ORAL at 08:37

## 2022-06-12 NOTE — PROGRESS NOTES
Chief Complaint: \"My back hurts. \"    Length of Stay: 3 Days    Interval History: Pt states that her liver levels are off again and the last time this happened her back hurt then too and ibuprofen is not helping. She states sleep is poor due to pain. Staff reports pt slept 10 hours last night and received Atarax, trazodone, and Zyprexa. She states withdrawals are \"better\" today. She states she is craving nicotine but not alcohol currently. She denies any SI/HI/AVH currently. No issues with medications and no side effects to report at this time.  - Staff reports pt slept 8.5 hours and had Atarax and Trazodone. She reports feeling a little better this morning. She states she did not sleep great last night. She is eating well. She states vomiting last night. She states withdrawals are improving today. She denies SI/HI/AVH. She states some improvement in back pain. No issues with medication and no side effects to report at this time. Past Medical History:  Past Medical History:   Diagnosis Date    ADD (attention deficit disorder) [age]yo    Treated with Adderall since dx. 2013 dosing 20mg AM and 10mg PM.  Trial/change to Vyvanse Nov-Dec 2015--not as effective.  Gynecologic disorder     History of miscarriages. History of Mirena IUD placed on 4/17/15    HX OTHER MEDICAL      -BUFA baby    HX OTHER MEDICAL     HPV positive    Idiopathic vulvodynia 2014    L1 vertebral fracture (HCC) 2017    [facility] imaging/admissoin: Mild acute two column compression fracture of L1 without evidence of retropulsion into the spinal canal.  Managed non-operatively.  Migraines 2013    Neurological disorder     Pelvic pain in female 9/15/2014    2015 gyn laparoscopy/hysteroscopy with endometriosis worse right.     Psychiatric disorder     depression    Secondary dysmenorrhea 2014    With menorraghia    Seizures ([facility] [address]     never on meds--had appr 4-5 in a short amt of time and none since           Labs:  Lab Results   Component Value Date/Time    WBC 8.7 06/09/2022 02:00 PM    Hemoglobin (POC) 14.3 04/04/2016 07:26 AM    HGB 13.2 06/09/2022 02:00 PM    Hematocrit (POC) 42 04/04/2016 07:26 AM    HCT 36.9 06/09/2022 02:00 PM    PLATELET 348 (H) 65/34/6173 02:00 PM    MCV 90.7 06/09/2022 02:00 PM    Hgb, External 11.6 05/03/2012 12:00 AM    Hct, External 33.4 05/03/2012 12:00 AM    Platelet cnt., External 332K 05/03/2012 12:00 AM      Lab Results   Component Value Date/Time    Sodium 138 06/09/2022 02:00 PM    Potassium 3.9 06/09/2022 02:00 PM    Chloride 103 06/09/2022 02:00 PM    CO2 24 06/09/2022 02:00 PM    Anion gap 11 06/09/2022 02:00 PM    Glucose 150 (H) 06/09/2022 02:00 PM    BUN 15 06/09/2022 02:00 PM    Creatinine 0.57 06/09/2022 02:00 PM    BUN/Creatinine ratio 26 (H) 06/09/2022 02:00 PM    GFR est AA >60 06/09/2022 02:00 PM    GFR est non-AA >60 06/09/2022 02:00 PM    Calcium 8.2 (L) 06/09/2022 02:00 PM    Bilirubin, total 0.7 06/09/2022 02:00 PM    Alk.  phosphatase 80 06/09/2022 02:00 PM    Protein, total 7.0 06/09/2022 02:00 PM    Albumin 3.4 (L) 06/09/2022 02:00 PM    Globulin 3.6 06/09/2022 02:00 PM    A-G Ratio 0.9 (L) 06/09/2022 02:00 PM    ALT (SGPT) 486 (H) 06/09/2022 02:00 PM      Vitals:    06/10/22 1950 06/11/22 0802 06/11/22 1930 06/12/22 0757   BP: (!) 117/59 112/62 123/68 115/72   Pulse: 95 83 81 98   Resp: 16 18 16 16   Temp: 98.8 °F (37.1 °C) 98.3 °F (36.8 °C) 98.7 °F (37.1 °C) 98 °F (36.7 °C)   SpO2: 98% 96% 94% 99%   Weight:       Height:       LMP: 03/14/2016         Current Facility-Administered Medications   Medication Dose Route Frequency Provider Last Rate Last Admin    baclofen (LIORESAL) tablet 5 mg  5 mg Oral TID Kassie MARTINEZ NP   5 mg at 06/12/22 0835    LORazepam (ATIVAN) tablet 1 mg  1 mg Oral BID Sarbjit Langford MD   1 mg at 06/12/22 0837    naltrexone (DEPADE) tablet 50 mg  50 mg Oral DAILY Teodora Rico MD   50 mg at 06/12/22 0836    ARIPiprazole (ABILIFY) tablet 10 mg  10 mg Oral DAILY Sarbjit Langford MD   10 mg at 06/12/22 0836    FLUoxetine (PROzac) capsule 20 mg  20 mg Oral DAILY Sarbjit Langford MD   20 mg at 06/12/22 0835    venlafaxine-SR (EFFEXOR-XR) capsule 75 mg  75 mg Oral DAILY WITH BREAKFAST Srabjit Langford MD   75 mg at 06/12/22 0836    lidocaine 4 % patch 1 Patch  1 Patch TransDERmal Q24H Sarbjit Langford MD   1 Patch at 06/10/22 1903    ibuprofen (MOTRIN) tablet 600 mg  600 mg Oral Q6H PRN Sameera Rizzo NP   600 mg at 06/11/22 1346    LORazepam (ATIVAN) tablet 2 mg  2 mg Oral Q4H PRN Sarbjit Langford MD        OLANZapine (ZyPREXA) tablet 5 mg  5 mg Oral Q6H PRN Yasmeen Factor, NP   5 mg at 06/11/22 8976    haloperidol lactate (HALDOL) injection 5 mg  5 mg IntraMUSCular Q6H PRN [facility] Factor, NP        benztropine (COGENTIN) tablet 1 mg  1 mg Oral BID PRN Yasmeen Factor, NP        diphenhydrAMINE (BENADRYL) injection 50 mg  50 mg IntraMUSCular BID PRN Yasmeen Factor, NP        hydrOXYzine HCL (ATARAX) tablet 50 mg  50 mg Oral TID PRN Yasmeen Factor, NP   50 mg at 06/11/22 2222    LORazepam (ATIVAN) injection 1 mg  1 mg IntraMUSCular Q4H PRN Yasmeen Factor, NP        traZODone (DESYREL) tablet 50 mg  50 mg Oral QHS PRN [facility] Factor, NP   50 mg at 06/11/22 2222    magnesium hydroxide (MILK OF MAGNESIA) 400 mg/5 mL oral suspension 30 mL  30 mL Oral DAILY PRN Yasmeen Factor, NP        thiamine HCL (B-1) tablet 100 mg  100 mg Oral DAILY Yasmeen Factor, NP   100 mg at 19/94/64 2161    folic acid (FOLVITE) tablet 1 mg  1 mg Oral DAILY Yasmeen Factor, NP   1 mg at 06/12/22 1091    therapeutic multivitamin (THERAGRAN) tablet 1 Tablet  1 Tablet Oral DAILY Yasmeen Curtis, AVNI   1 Tablet at 06/12/22 0837    nicotine (NICODERM CQ) 21 mg/24 hr patch 1 Patch  1 Patch TransDERmal DAILY Yasmeen Curtis NP   1 Patch at 06/12/22 6572 Mental Status Exam:  Eye contact: good  Grooming: fair  Psychomotor activity: intact  Speech is spontaneous  Mood is \"okay \"  Affect: anxious  Perception: No AVH  Suicidal ideation: Denies  Cognition is grossly intact         Physical Exam:  Body habitus: Body mass index is 36.56 kg/m². Musculoskeletal system: normal gait  Tremor - neg  Cog wheeling - neg      Assessment and Plan:  Dayton Pascual meets criteria for a diagnosis of Recurrent major depressive disorder    Plan:   - CONTINUE Naltrexone 50 mg QDAY for EtOH cravings  - CONTINUE Prozac 20 mg QDAY for MDD adjunct,  after 7 days  - CONTINUE and INCREASE Effexor to 75 mg QDAY for MDD  - CONTINUE Ativan taper x3 days for EtOH withdrawal  - CIWA monitoring + Ativan PRN.  - IGM therapy as tolerated  - Expand database / obtain collateral  - Dispo planning (home + outpatient rehab)    A coordinated, multidisplinary treatment team round was conducted with the patient, nurses, pharmcist,  and writer present. Discussions held with , and/or with family members; Complete current electronic health record for patient was reviewed in full including consultant notes, ancillary staff notes, nurses and tech notes, labs and vitals. I certify that this patients inpatient psychiatric hospital services furnished since the previous certification were, and continue to be, required for treatment that could reasonably be expected to improve the patient's condition, or for diagnostic study, and that the patient continues to need, on a daily basis, active treatment furnished directly by or requiring the supervision of inpatient psychiatric facility personnel. In addition, the hospital records show that services furnished were intensive treatment services, admission or related services, or equivalent services.

## 2022-06-12 NOTE — BH NOTES
Assumed care of the patient. Patient was isolative to her room. She was cooperative with the assessments however, overall she seemed apathetic and interacted minimally. Patient denied S.I/H. I/A/V/H, confirmed anxiety and depression . PRN Trazodone for sleep and Atarax for anxiety administered  at bed time. Will continue to monitor and provide support as needed. Hourly rounds completed. Patient appeared to be sleeping for about 8.5 hours.

## 2022-06-12 NOTE — PROGRESS NOTES
Bedside shift change report given to Julian Partida RN (oncoming nurse) by Kristin Marshall RN (offgoing nurse). Report included the following information SBAR, Kardex, MAR, and Accordion. Assumed care of patient ambulating in hallway. AO x 4. Rated depression 5/10 and anxiety 8/10. Did not endorse SI, HI, or AVH. Pleasant and cooperative. Med/meal compliant. Uneventful shift. Present on unit. NAD. Hourly rounds completed by RN. Bedside shift change report given to Julian Partida RN (oncoming nurse) by Kristin Marshall RN (offgoing nurse). Report included the following information SBAR, Kardex, MAR and Accordion. Problem: Depressed Mood (Adult/Pediatric)  Goal: *STG: Remains safe in hospital  Outcome: Progressing Towards Goal  Goal: *STG: Complies with medication therapy  Outcome: Progressing Towards Goal     Problem: Falls - Risk of  Goal: *Absence of Falls  Description: Document Rema Fall Risk and appropriate interventions in the flowsheet.   Outcome: Progressing Towards Goal  Note: Fall Risk Interventions:            Medication Interventions: Teach patient to arise slowly

## 2022-06-12 NOTE — PROGRESS NOTES
Pt still hospitalized.       Notes (nursing/rooming note):  Still inpatient at [address]  States she will be out tomorrow  Needs adderral refill  Wants to start effexor instead of prozac

## 2022-06-13 LAB
ATRIAL RATE: 104 BPM
CALCULATED P AXIS, ECG09: 35 DEGREES
CALCULATED R AXIS, ECG10: 24 DEGREES
CALCULATED T AXIS, ECG11: 43 DEGREES
DIAGNOSIS, 93000: NORMAL
P-R INTERVAL, ECG05: 126 MS
Q-T INTERVAL, ECG07: 374 MS
QRS DURATION, ECG06: 102 MS
QTC CALCULATION (BEZET), ECG08: 491 MS
VENTRICULAR RATE, ECG03: 104 BPM

## 2022-06-13 PROCEDURE — 65220000003 HC RM SEMIPRIVATE PSYCH

## 2022-06-13 PROCEDURE — 74011250637 HC RX REV CODE- 250/637

## 2022-06-13 PROCEDURE — 74011250637 HC RX REV CODE- 250/637: Performed by: PSYCHIATRY & NEUROLOGY

## 2022-06-13 PROCEDURE — 74011250637 HC RX REV CODE- 250/637: Performed by: NURSE PRACTITIONER

## 2022-06-13 RX ORDER — DM/P-EPHED/ACETAMINOPH/DOXYLAM 30-7.5/3
2 LIQUID (ML) ORAL
Status: DISCONTINUED | OUTPATIENT
Start: 2022-06-13 | End: 2022-06-17 | Stop reason: HOSPADM

## 2022-06-13 RX ORDER — TRAZODONE HYDROCHLORIDE 100 MG/1
100 TABLET ORAL
Status: DISCONTINUED | OUTPATIENT
Start: 2022-06-13 | End: 2022-06-17 | Stop reason: HOSPADM

## 2022-06-13 RX ORDER — BACLOFEN 10 MG/1
10 TABLET ORAL 3 TIMES DAILY
Status: DISCONTINUED | OUTPATIENT
Start: 2022-06-13 | End: 2022-06-17 | Stop reason: HOSPADM

## 2022-06-13 RX ADMIN — BACLOFEN 10 MG: 10 TABLET ORAL at 17:10

## 2022-06-13 RX ADMIN — TRAZODONE HYDROCHLORIDE 100 MG: 100 TABLET ORAL at 21:08

## 2022-06-13 RX ADMIN — FOLIC ACID 1 MG: 1 TABLET ORAL at 09:38

## 2022-06-13 RX ADMIN — LORAZEPAM 1 MG: 1 TABLET ORAL at 09:37

## 2022-06-13 RX ADMIN — FLUOXETINE 20 MG: 20 CAPSULE ORAL at 09:38

## 2022-06-13 RX ADMIN — IBUPROFEN 600 MG: 600 TABLET, FILM COATED ORAL at 02:52

## 2022-06-13 RX ADMIN — THERA TABS 1 TABLET: TAB at 09:39

## 2022-06-13 RX ADMIN — HYDROXYZINE HYDROCHLORIDE 50 MG: 25 TABLET, FILM COATED ORAL at 02:52

## 2022-06-13 RX ADMIN — BACLOFEN 10 MG: 10 TABLET ORAL at 12:06

## 2022-06-13 RX ADMIN — BACLOFEN 5 MG: 10 TABLET ORAL at 09:38

## 2022-06-13 RX ADMIN — ARIPIPRAZOLE 10 MG: 5 TABLET ORAL at 09:37

## 2022-06-13 RX ADMIN — HYDROXYZINE HYDROCHLORIDE 50 MG: 25 TABLET, FILM COATED ORAL at 21:08

## 2022-06-13 RX ADMIN — NALTREXONE HYDROCHLORIDE 50 MG: 50 TABLET, FILM COATED ORAL at 09:38

## 2022-06-13 RX ADMIN — VENLAFAXINE HYDROCHLORIDE 75 MG: 75 CAPSULE, EXTENDED RELEASE ORAL at 09:38

## 2022-06-13 RX ADMIN — Medication 100 MG: at 09:37

## 2022-06-13 RX ADMIN — LORAZEPAM 1 MG: 1 TABLET ORAL at 17:10

## 2022-06-13 NOTE — BH NOTES
Behavioral Health Treatment Team Note         Patient goal(s) for today: continue taking medication as prescribed, engage in unit activities, participate in hygiene/ADLs, follow directions from staff, contact support team, prepare for discharge  Treatment team focus/goals: medication adjustments, dispo planning, update support system    Progress note Pt is goal oriented and affect is brighter. Pt states she would like to involved her mother in her outpatient treatment plan. SW left voicemail for mother to schedule family meeting for 6/14.      LOS:  4  Expected LOS: TBD    Financial concerns/prescription coverage:  Medicaid   Date of last family contact:  on 6/13     Family requesting physician contact today:  No  Discharge plan: Home  Guns in the home: denied        Outpatient provider(s): to be linked     Participating treatment team members: Guilherme Corral LCSW and Quoc Calvo NP

## 2022-06-13 NOTE — PROGRESS NOTES
Laboratory monitoring for mood stabilizer and antipsychotics:    Recommended baseline monitoring has been completed based on this patient's current medication regimen. The patient is currently taking the following medication(s):   Current Facility-Administered Medications   Medication Dose Route Frequency    baclofen (LIORESAL) tablet 10 mg  10 mg Oral TID    LORazepam (ATIVAN) tablet 1 mg  1 mg Oral BID    naltrexone (DEPADE) tablet 50 mg  50 mg Oral DAILY    ARIPiprazole (ABILIFY) tablet 10 mg  10 mg Oral DAILY    FLUoxetine (PROzac) capsule 20 mg  20 mg Oral DAILY    venlafaxine-SR (EFFEXOR-XR) capsule 75 mg  75 mg Oral DAILY WITH BREAKFAST    lidocaine 4 % patch 1 Patch  1 Patch TransDERmal Q24H    thiamine HCL (B-1) tablet 100 mg  100 mg Oral DAILY    folic acid (FOLVITE) tablet 1 mg  1 mg Oral DAILY    therapeutic multivitamin (THERAGRAN) tablet 1 Tablet  1 Tablet Oral DAILY    nicotine (NICODERM CQ) 21 mg/24 hr patch 1 Patch  1 Patch TransDERmal DAILY       Height, Weight, BMI Estimation  Estimated body mass index is 36.56 kg/m² as calculated from the following:    Height as of this encounter: 162.6 cm (64\"). Weight as of this encounter: 96.6 kg (213 lb). Renal Function, Hepatic Function and Chemistry  Estimated Creatinine Clearance: 121.8 mL/min (by C-G formula based on SCr of 0.57 mg/dL). Lab Results   Component Value Date/Time    Sodium 138 06/09/2022 02:00 PM    Potassium 3.9 06/09/2022 02:00 PM    Chloride 103 06/09/2022 02:00 PM    CO2 24 06/09/2022 02:00 PM    Anion gap 11 06/09/2022 02:00 PM    Glucose 150 (H) 06/09/2022 02:00 PM    BUN 15 06/09/2022 02:00 PM    Creatinine 0.57 06/09/2022 02:00 PM    BUN/Creatinine ratio 26 (H) 06/09/2022 02:00 PM    GFR est AA >60 06/09/2022 02:00 PM    GFR est non-AA >60 06/09/2022 02:00 PM    Calcium 8.2 (L) 06/09/2022 02:00 PM    ALT (SGPT) 486 (H) 06/09/2022 02:00 PM    Alk.  phosphatase 80 06/09/2022 02:00 PM    Protein, total 7.0 06/09/2022 02:00 PM    Albumin 3.4 (L) 06/09/2022 02:00 PM    Globulin 3.6 06/09/2022 02:00 PM    A-G Ratio 0.9 (L) 06/09/2022 02:00 PM    Bilirubin, total 0.7 06/09/2022 02:00 PM       Lab Results   Component Value Date/Time    Glucose 150 (H) 06/09/2022 02:00 PM    Glucose (POC) 94 04/04/2016 07:26 AM       Lab Results   Component Value Date/Time    Hemoglobin A1c 5.9 (H) 02/03/2022 12:00 AM       Hematology  Lab Results   Component Value Date/Time    WBC 8.7 06/09/2022 02:00 PM    Hemoglobin (POC) 14.3 04/04/2016 07:26 AM    HGB 13.2 06/09/2022 02:00 PM    Hematocrit (POC) 42 04/04/2016 07:26 AM    HCT 36.9 06/09/2022 02:00 PM    PLATELET 335 (H) 31/86/9906 02:00 PM    MCV 90.7 06/09/2022 02:00 PM    Hgb, External 11.6 05/03/2012 12:00 AM    Hct, External 33.4 05/03/2012 12:00 AM    Platelet cnt., External 332K 05/03/2012 12:00 AM       Lipids  Lab Results   Component Value Date/Time    Cholesterol, total 126 04/21/2022 11:13 AM    HDL Cholesterol 58 04/21/2022 11:13 AM    LDL, calculated 31.4 04/21/2022 11:13 AM    Triglyceride 183 (H) 04/21/2022 11:13 AM    CHOL/HDL Ratio 2.2 04/21/2022 11:13 AM       Thyroid Function  Lab Results   Component Value Date/Time    TSH 4.36 (H) 05/28/2022 02:33 AM    T4, Free 1.28 02/03/2022 12:00 AM       Vitals  Visit Vitals  /66 (BP 1 Location: Left upper arm, BP Patient Position: Sitting)   Pulse 93   Temp 98.4 °F (36.9 °C)   Resp 16   Ht 162.6 cm (64\")   Wt 96.6 kg (213 lb)   SpO2 98%   Breastfeeding No   BMI 36.56 kg/m²       Pregnancy Test  Lab Results   Component Value Date/Time    HCG urine, QL NEGATIVE  02/15/2019 04:13 PM    Pregnancy test,urine (POC) Negative 06/09/2022 05:18 PM    HCG, Ql. NEGATIVE  07/17/2010 04:44 PM    HCG urine, Ql. (POC) Negative 10/22/2014 09:27 AM       WHIT Mohan, [facility]  [phone] (pharmacy)

## 2022-06-13 NOTE — PROGRESS NOTES
Problem: Depressed Mood (Adult/Pediatric)  Goal: *STG: Participates in treatment plan  Outcome: Progressing Towards Goal  Goal: *STG: Verbalizes anger, guilt, and other feelings in a constructive manor  Outcome: Progressing Towards Goal  Goal: *STG: Attends activities and groups  Outcome: Progressing Towards Goal  Goal: *STG: Remains safe in hospital  Outcome: Progressing Towards Goal  Goal: *STG: Complies with medication therapy  Outcome: Progressing Towards Goal     Problem: Falls - Risk of  Goal: *Absence of Falls  Description: Document Rema Fall Risk and appropriate interventions in the flowsheet.   Outcome: Progressing Towards Goal  Note: Fall Risk Interventions:    Medication Interventions: Teach patient to arise slowly

## 2022-06-13 NOTE — PROGRESS NOTES
Problem: Depressed Mood (Adult/Pediatric)  Goal: *STG: Participates in treatment plan  Outcome: Progressing Towards Goal  Goal: *STG: Verbalizes anger, guilt, and other feelings in a constructive manor  Outcome: Progressing Towards Goal

## 2022-06-13 NOTE — BH NOTES
GROUP THERAPY PROGRESS NOTE    Vashti Aguiar is participating in Coping Skills Group. Group time: 1 hour    Personal goal for participation: To participate in breathing exercise, create and discuss a gratitude list, and engage in individual activity. Goal orientation: personal    Group therapy participation: active    Therapeutic interventions reviewed and discussed:     Impression of participation: Xiomara Singh participated in gratitude activity and expressed being grateful for \"everybody here. \" She left early, but returned to play games with the majority of the group. She states that has created a wheeler with Gerri.

## 2022-06-13 NOTE — PROGRESS NOTES
Chief Complaint: \"My back hurts. \"    Length of Stay: 4 Days    Interval History: Pt states that her liver levels are off again and the last time this happened her back hurt then too and ibuprofen is not helping. She states sleep is poor due to pain. Staff reports pt slept 10 hours last night and received Atarax, trazodone, and Zyprexa. She states withdrawals are \"better\" today. She states she is craving nicotine but not alcohol currently. She denies any SI/HI/AVH currently. No issues with medications and no side effects to report at this time. 6/12 - Staff reports pt slept 8.5 hours and had Atarax and Trazodone. She reports feeling a little better this morning. She states she did not sleep great last night. She is eating well. She states vomiting last night. She states withdrawals are improving today. She denies SI/HI/AVH. She states some improvement in back pain. No issues with medication and no side effects to report at this time. 6/13- Pt states her mother wants to be involved in a conversation with the team about what is going on. She states this is part of the problem is that her mother is over  Involved. She states this is where some of her depression comes from. She wants her mom to realize that she is being honest with her and with her providers here. She states back pain/spasms are still an issue. Discussed going up on Baclofen today. She reports sleep as broken at times here. She states she took 100 mg of Trazodone at home and asks if she can try that here. She endorses higher levels of anxiety at the moment compared with depression. Denies SI/HI/AVH currently. Past Medical History:  Past Medical History:   Diagnosis Date    ADD (attention deficit disorder) [age]yo    Treated with Adderall since dx. Nov 2013 dosing 20mg AM and 10mg PM.  Trial/change to Vyvanse Nov-Dec 2015--not as effective.  Gynecologic disorder     History of miscarriages.   History of Mirena IUD placed on 4/17/15    HX OTHER MEDICAL      -BUFA baby    HX OTHER MEDICAL     HPV positive    Idiopathic vulvodynia 2014    L1 vertebral fracture (HCC) 2017    [facility] imaging/admissoin: Mild acute two column compression fracture of L1 without evidence of retropulsion into the spinal canal.  Managed non-operatively.  Migraines 2013    Neurological disorder     Pelvic pain in female 9/15/2014    2015 gyn laparoscopy/hysteroscopy with endometriosis worse right.  Psychiatric disorder     depression    Secondary dysmenorrhea 2014    With menorraghia    Seizures ([facility] [address]     never on meds--had appr 4-5 in a short amt of time and none since           Labs:  Lab Results   Component Value Date/Time    WBC 8.7 2022 02:00 PM    Hemoglobin (POC) 14.3 2016 07:26 AM    HGB 13.2 2022 02:00 PM    Hematocrit (POC) 42 2016 07:26 AM    HCT 36.9 2022 02:00 PM    PLATELET 228 (H)  02:00 PM    MCV 90.7 2022 02:00 PM    Hgb, External 11.6 2012 12:00 AM    Hct, External 33.4 2012 12:00 AM    Platelet cnt., External 332K 2012 12:00 AM      Lab Results   Component Value Date/Time    Sodium 138 2022 02:00 PM    Potassium 3.9 2022 02:00 PM    Chloride 103 2022 02:00 PM    CO2 24 2022 02:00 PM    Anion gap 11 2022 02:00 PM    Glucose 150 (H) 2022 02:00 PM    BUN 15 2022 02:00 PM    Creatinine 0.57 2022 02:00 PM    BUN/Creatinine ratio 26 (H) 2022 02:00 PM    GFR est AA >60 2022 02:00 PM    GFR est non-AA >60 2022 02:00 PM    Calcium 8.2 (L) 2022 02:00 PM    Bilirubin, total 0.7 2022 02:00 PM    Alk.  phosphatase 80 2022 02:00 PM    Protein, total 7.0 2022 02:00 PM    Albumin 3.4 (L) 2022 02:00 PM    Globulin 3.6 2022 02:00 PM    A-G Ratio 0.9 (L) 2022 02:00 PM    ALT (SGPT) 486 (H) 2022 02:00 PM      Vitals:    22 1930 06/12/22 0757 06/12/22 1930 06/13/22 0935   BP: 123/68 115/72 115/74 119/66   Pulse: 81 98 (!) 102 93   Resp: 16 16 18 16   Temp: 98.7 °F (37.1 °C) 98 °F (36.7 °C) 98.9 °F (37.2 °C) 98.4 °F (36.9 °C)   SpO2: 94% 99% 97% 98%   Weight:       Height:       LMP: 03/14/2016         Current Facility-Administered Medications   Medication Dose Route Frequency Provider Last Rate Last Admin    baclofen (LIORESAL) tablet 5 mg  5 mg Oral TID Ginette MARTINEZ NP   5 mg at 06/13/22 0938    LORazepam (ATIVAN) tablet 1 mg  1 mg Oral BID Sarbjit Langford MD   1 mg at 06/13/22 0937    naltrexone (DEPADE) tablet 50 mg  50 mg Oral DAILY Sarbjit Langford MD   50 mg at 06/13/22 0938    ARIPiprazole (ABILIFY) tablet 10 mg  10 mg Oral DAILY Sarbjit Langford MD   10 mg at 06/13/22 0937    FLUoxetine (PROzac) capsule 20 mg  20 mg Oral DAILY Sarbjit Langford MD   20 mg at 06/13/22 0938    venlafaxine-SR (EFFEXOR-XR) capsule 75 mg  75 mg Oral DAILY WITH BREAKFAST Sarbjit Langford MD   75 mg at 06/13/22 0938    lidocaine 4 % patch 1 Patch  1 Patch TransDERmal Q24H Sarbjit Langford MD   1 Patch at 06/10/22 1903    ibuprofen (MOTRIN) tablet 600 mg  600 mg Oral Q6H PRN Sameera Rizzo NP   600 mg at 06/13/22 0252    LORazepam (ATIVAN) tablet 2 mg  2 mg Oral Q4H PRN Sarbjit Langford MD        OLANZapine (ZyPREXA) tablet 5 mg  5 mg Oral Q6H PRN Mariaelena Carty NP   5 mg at 06/11/22 1055    haloperidol lactate (HALDOL) injection 5 mg  5 mg IntraMUSCular Q6H PRN Mariaelena Carty NP        benztropine (COGENTIN) tablet 1 mg  1 mg Oral BID PRN Mariaelena Carty NP        diphenhydrAMINE (BENADRYL) injection 50 mg  50 mg IntraMUSCular BID PRN Mariaelena Carty NP        hydrOXYzine HCL (ATARAX) tablet 50 mg  50 mg Oral TID PRN Mariaelena Carty NP   50 mg at 06/13/22 0252    LORazepam (ATIVAN) injection 1 mg  1 mg IntraMUSCular Q4H PRN Mariaelena Carty NP        traZODone (DESYREL) tablet 50 mg  50 mg Oral QHS PRN a Du, NP   50 mg at 22 2120    magnesium hydroxide (MILK OF MAGNESIA) 400 mg/5 mL oral suspension 30 mL  30 mL Oral DAILY PRN a Du, NP        thiamine HCL (B-1) tablet 100 mg  100 mg Oral DAILY Laquetta Du, NP   100 mg at 15/71/00 1359    folic acid (FOLVITE) tablet 1 mg  1 mg Oral DAILY a Du, NP   1 mg at 22 4393    therapeutic multivitamin (THERAGRAN) tablet 1 Tablet  1 Tablet Oral DAILY  Du, NP   1 Tablet at 22 1336    nicotine (NICODERM CQ) 21 mg/24 hr patch 1 Patch  1 Patch TransDERmal DAILY , NP   1 Patch at 22 7050         Mental Status Exam:  Eye contact: good  Grooming: fair  Psychomotor activity: intact  Speech is spontaneous  Mood is \"okay \"  Affect: anxious  Perception: No AVH  Suicidal ideation: Denies  Cognition is grossly intact         Physical Exam:  Body habitus: Body mass index is 36.56 kg/m². Musculoskeletal system: normal gait  Tremor - neg  Cog wheeling - neg      Assessment and Plan:  Buck Guallpa meets criteria for a diagnosis of Recurrent major depressive disorder    Plan:   - CONTINUE Naltrexone 50 mg QDAY for EtOH cravings  - CONTINUE Prozac 20 mg QDAY for MDD adjunct,  after 7 days  - CONTINUE and INCREASE Effexor to 75 mg QDAY for MDD  - CONTINUE Ativan taper x3 days for EtOH withdrawal  - CIWA monitoring + Ativan PRN.  - IGM therapy as tolerated  - Expand database / obtain collateral  - Dispo planning (home + outpatient rehab)    A coordinated, multidisplinary treatment team round was conducted with the patient, nurses, pharmcist,  and writer present. Discussions held with , and/or with family members; Complete current electronic health record for patient was reviewed in full including consultant notes, ancillary staff notes, nurses and tech notes, labs and vitals.   I certify that this patients inpatient psychiatric hospital services furnished since the previous certification were, and continue to be, required for treatment that could reasonably be expected to improve the patient's condition, or for diagnostic study, and that the patient continues to need, on a daily basis, active treatment furnished directly by or requiring the supervision of inpatient psychiatric facility personnel. In addition, the hospital records show that services furnished were intensive treatment services, admission or related services, or equivalent services.

## 2022-06-14 ENCOUNTER — APPOINTMENT (OUTPATIENT)
Dept: NON INVASIVE DIAGNOSTICS | Age: 42
DRG: 751 | End: 2022-06-14
Attending: STUDENT IN AN ORGANIZED HEALTH CARE EDUCATION/TRAINING PROGRAM
Payer: MEDICAID

## 2022-06-14 LAB
ALBUMIN SERPL-MCNC: 2.7 G/DL (ref 3.5–5)
ALBUMIN/GLOB SERPL: 0.8 {RATIO} (ref 1.1–2.2)
ALP SERPL-CCNC: 62 U/L (ref 45–117)
ALT SERPL-CCNC: 129 U/L (ref 12–78)
ANION GAP SERPL CALC-SCNC: 8 MMOL/L (ref 5–15)
AST SERPL-CCNC: 23 U/L (ref 15–37)
BASOPHILS # BLD: 0.1 K/UL (ref 0–0.1)
BASOPHILS NFR BLD: 1 % (ref 0–1)
BILIRUB SERPL-MCNC: 0.2 MG/DL (ref 0.2–1)
BNP SERPL-MCNC: 24 PG/ML (ref 0–125)
BUN SERPL-MCNC: 22 MG/DL (ref 6–20)
BUN/CREAT SERPL: 31 (ref 12–20)
CALCIUM SERPL-MCNC: 8.7 MG/DL (ref 8.5–10.1)
CHLORIDE SERPL-SCNC: 106 MMOL/L (ref 97–108)
CO2 SERPL-SCNC: 26 MMOL/L (ref 21–32)
CREAT SERPL-MCNC: 0.71 MG/DL (ref 0.55–1.02)
D DIMER PPP FEU-MCNC: <0.19 MG/L FEU (ref 0–0.65)
DIFFERENTIAL METHOD BLD: ABNORMAL
ECHO AO ROOT DIAM: 2.3 CM
ECHO AO ROOT INDEX: 1.14 CM/M2
ECHO AV AREA PLAN/BSA: 1.19 CM2/M2
ECHO AV AREA PLAN: 2.4 CM2
ECHO LA DIAMETER INDEX: 1.79 CM/M2
ECHO LA DIAMETER: 3.6 CM
ECHO LA TO AORTIC ROOT RATIO: 1.57
ECHO LA VOL 4C: 47 ML (ref 22–52)
ECHO LA VOLUME INDEX A4C: 23 ML/M2 (ref 16–34)
ECHO LV EDV A2C: 89 ML
ECHO LV EDV A4C: 81 ML
ECHO LV EDV BP: 89 ML (ref 56–104)
ECHO LV EDV INDEX A4C: 40 ML/M2
ECHO LV EDV INDEX BP: 44 ML/M2
ECHO LV EDV NDEX A2C: 44 ML/M2
ECHO LV EJECTION FRACTION A2C: 83 %
ECHO LV EJECTION FRACTION A4C: 59 %
ECHO LV EJECTION FRACTION BIPLANE: 74 % (ref 55–100)
ECHO LV ESV A2C: 15 ML
ECHO LV ESV A4C: 33 ML
ECHO LV ESV BP: 23 ML (ref 19–49)
ECHO LV ESV INDEX A2C: 7 ML/M2
ECHO LV ESV INDEX A4C: 16 ML/M2
ECHO LV ESV INDEX BP: 11 ML/M2
ECHO LV FRACTIONAL SHORTENING: 46 % (ref 28–44)
ECHO LV INTERNAL DIMENSION DIASTOLE INDEX: 1.94 CM/M2
ECHO LV INTERNAL DIMENSION DIASTOLIC: 3.9 CM (ref 3.9–5.3)
ECHO LV INTERNAL DIMENSION SYSTOLIC INDEX: 1.04 CM/M2
ECHO LV INTERNAL DIMENSION SYSTOLIC: 2.1 CM
ECHO LV IVSD: 0.9 CM (ref 0.6–0.9)
ECHO LV MASS 2D: 122.1 G (ref 67–162)
ECHO LV MASS INDEX 2D: 60.8 G/M2 (ref 43–95)
ECHO LV POSTERIOR WALL DIASTOLIC: 1.1 CM (ref 0.6–0.9)
ECHO LV RELATIVE WALL THICKNESS RATIO: 0.56
ECHO LVOT AREA: 2.5 CM2
ECHO LVOT DIAM: 1.8 CM
ECHO LVOT PEAK GRADIENT: 7 MMHG
ECHO LVOT PEAK VELOCITY: 1.3 M/S
ECHO MV A VELOCITY: 0.63 M/S
ECHO MV AREA INDEX PLAN: 2.8 CM2/M2
ECHO MV AREA PHT: 3.3 CM2
ECHO MV AREA PLAN: 5.6 CM2
ECHO MV E DECELERATION TIME (DT): 115.3 MS
ECHO MV E VELOCITY: 0.65 M/S
ECHO MV E/A RATIO: 1.03
ECHO MV MAX VELOCITY: 1.1 M/S
ECHO MV MEAN GRADIENT: 2 MMHG
ECHO MV MEAN VELOCITY: 0.7 M/S
ECHO MV PEAK GRADIENT: 4 MMHG
ECHO MV PRESSURE HALF TIME (PHT): 65.7 MS
ECHO MV REGURGITANT PEAK GRADIENT: 130 MMHG
ECHO MV REGURGITANT PEAK VELOCITY: 5.7 M/S
ECHO MV VTI: 37.5 CM
ECHO PV MAX VELOCITY: 0.9 M/S
ECHO PV PEAK GRADIENT: 3 MMHG
EOSINOPHIL # BLD: 0.1 K/UL (ref 0–0.4)
EOSINOPHIL NFR BLD: 1 % (ref 0–7)
ERYTHROCYTE [DISTWIDTH] IN BLOOD BY AUTOMATED COUNT: 13.7 % (ref 11.5–14.5)
GLOBULIN SER CALC-MCNC: 3.5 G/DL (ref 2–4)
GLUCOSE SERPL-MCNC: 111 MG/DL (ref 65–100)
HCT VFR BLD AUTO: 34.2 % (ref 35–47)
HGB BLD-MCNC: 11.2 G/DL (ref 11.5–16)
IMM GRANULOCYTES # BLD AUTO: 0.1 K/UL (ref 0–0.04)
IMM GRANULOCYTES NFR BLD AUTO: 1 % (ref 0–0.5)
LIPASE SERPL-CCNC: 118 U/L (ref 73–393)
LYMPHOCYTES # BLD: 3 K/UL (ref 0.8–3.5)
LYMPHOCYTES NFR BLD: 35 % (ref 12–49)
MCH RBC QN AUTO: 30.4 PG (ref 26–34)
MCHC RBC AUTO-ENTMCNC: 32.7 G/DL (ref 30–36.5)
MCV RBC AUTO: 92.7 FL (ref 80–99)
MONOCYTES # BLD: 0.5 K/UL (ref 0–1)
MONOCYTES NFR BLD: 5 % (ref 5–13)
NEUTS SEG # BLD: 4.9 K/UL (ref 1.8–8)
NEUTS SEG NFR BLD: 57 % (ref 32–75)
NRBC # BLD: 0 K/UL (ref 0–0.01)
NRBC BLD-RTO: 0 PER 100 WBC
PLATELET # BLD AUTO: 405 K/UL (ref 150–400)
PMV BLD AUTO: 9.3 FL (ref 8.9–12.9)
POTASSIUM SERPL-SCNC: 4.1 MMOL/L (ref 3.5–5.1)
PROT SERPL-MCNC: 6.2 G/DL (ref 6.4–8.2)
RBC # BLD AUTO: 3.69 M/UL (ref 3.8–5.2)
SODIUM SERPL-SCNC: 140 MMOL/L (ref 136–145)
TROPONIN-HIGH SENSITIVITY: 4 NG/L (ref 0–51)
TROPONIN-HIGH SENSITIVITY: 5 NG/L (ref 0–51)
TROPONIN-HIGH SENSITIVITY: 5 NG/L (ref 0–51)
WBC # BLD AUTO: 8.7 K/UL (ref 3.6–11)

## 2022-06-14 PROCEDURE — 85025 COMPLETE CBC W/AUTO DIFF WBC: CPT

## 2022-06-14 PROCEDURE — 74011250637 HC RX REV CODE- 250/637: Performed by: PSYCHIATRY & NEUROLOGY

## 2022-06-14 PROCEDURE — 74011250637 HC RX REV CODE- 250/637: Performed by: STUDENT IN AN ORGANIZED HEALTH CARE EDUCATION/TRAINING PROGRAM

## 2022-06-14 PROCEDURE — 74011250637 HC RX REV CODE- 250/637

## 2022-06-14 PROCEDURE — 74011250637 HC RX REV CODE- 250/637: Performed by: NURSE PRACTITIONER

## 2022-06-14 PROCEDURE — 74011000250 HC RX REV CODE- 250: Performed by: PSYCHIATRY & NEUROLOGY

## 2022-06-14 PROCEDURE — 74011250637 HC RX REV CODE- 250/637: Performed by: INTERNAL MEDICINE

## 2022-06-14 PROCEDURE — 36415 COLL VENOUS BLD VENIPUNCTURE: CPT

## 2022-06-14 PROCEDURE — 65220000003 HC RM SEMIPRIVATE PSYCH

## 2022-06-14 PROCEDURE — 93306 TTE W/DOPPLER COMPLETE: CPT | Performed by: SPECIALIST

## 2022-06-14 PROCEDURE — 93306 TTE W/DOPPLER COMPLETE: CPT

## 2022-06-14 PROCEDURE — 80053 COMPREHEN METABOLIC PANEL: CPT

## 2022-06-14 PROCEDURE — 84484 ASSAY OF TROPONIN QUANT: CPT

## 2022-06-14 PROCEDURE — 83690 ASSAY OF LIPASE: CPT

## 2022-06-14 PROCEDURE — 85379 FIBRIN DEGRADATION QUANT: CPT

## 2022-06-14 PROCEDURE — 93005 ELECTROCARDIOGRAM TRACING: CPT

## 2022-06-14 PROCEDURE — 83880 ASSAY OF NATRIURETIC PEPTIDE: CPT

## 2022-06-14 RX ORDER — OXYCODONE AND ACETAMINOPHEN 5; 325 MG/1; MG/1
1 TABLET ORAL
Status: DISCONTINUED | OUTPATIENT
Start: 2022-06-14 | End: 2022-06-14

## 2022-06-14 RX ORDER — IBUPROFEN 600 MG/1
600 TABLET ORAL
Status: DISCONTINUED | OUTPATIENT
Start: 2022-06-14 | End: 2022-06-17 | Stop reason: HOSPADM

## 2022-06-14 RX ADMIN — VENLAFAXINE HYDROCHLORIDE 75 MG: 75 CAPSULE, EXTENDED RELEASE ORAL at 09:12

## 2022-06-14 RX ADMIN — BACLOFEN 10 MG: 10 TABLET ORAL at 09:12

## 2022-06-14 RX ADMIN — NALTREXONE HYDROCHLORIDE 50 MG: 50 TABLET, FILM COATED ORAL at 09:12

## 2022-06-14 RX ADMIN — TRAZODONE HYDROCHLORIDE 100 MG: 100 TABLET ORAL at 22:03

## 2022-06-14 RX ADMIN — FLUOXETINE 20 MG: 20 CAPSULE ORAL at 09:12

## 2022-06-14 RX ADMIN — BACLOFEN 10 MG: 10 TABLET ORAL at 17:43

## 2022-06-14 RX ADMIN — IBUPROFEN 600 MG: 600 TABLET, FILM COATED ORAL at 05:05

## 2022-06-14 RX ADMIN — IBUPROFEN 600 MG: 600 TABLET, FILM COATED ORAL at 17:43

## 2022-06-14 RX ADMIN — HYDROXYZINE HYDROCHLORIDE 50 MG: 25 TABLET, FILM COATED ORAL at 05:05

## 2022-06-14 RX ADMIN — LORAZEPAM 2 MG: 1 TABLET ORAL at 17:47

## 2022-06-14 RX ADMIN — Medication 100 MG: at 09:12

## 2022-06-14 RX ADMIN — ARIPIPRAZOLE 10 MG: 5 TABLET ORAL at 09:12

## 2022-06-14 RX ADMIN — THERA TABS 1 TABLET: TAB at 09:12

## 2022-06-14 RX ADMIN — HYDROXYZINE HYDROCHLORIDE 50 MG: 25 TABLET, FILM COATED ORAL at 22:03

## 2022-06-14 RX ADMIN — BACLOFEN 10 MG: 10 TABLET ORAL at 11:10

## 2022-06-14 RX ADMIN — FOLIC ACID 1 MG: 1 TABLET ORAL at 09:12

## 2022-06-14 RX ADMIN — OXYCODONE HYDROCHLORIDE AND ACETAMINOPHEN 1 TABLET: 5; 325 TABLET ORAL at 06:37

## 2022-06-14 NOTE — CONSULTS
Hospitalist Consultation Note    NAME:  Ericka Mane   :   1980   MRN:   [id number]   Room Number: 915/10  @ [facility]     ATTENDING: Tyrell King MD  PCP:  Sayra Madera MD    Date/Time:  2022 10:53 AM    Please note that this dictation was completed with [employer], the computer voice recognition software. Quite often unanticipated grammatical, syntax, homophones, and other interpretive errors are inadvertently transcribed by the computer software. Please disregard these errors. Please excuse any errors that have escaped final proofreading. Recommendations/Plan:       Principal Problem:    Recurrent major depressive disorder ([facility] (6/10/2022)    Active Problems:    Alcohol use disorder, severe, dependence ([facility] (2022)         #Atypical chest pain POA  -EKG without any acute ST elevations  -Troponin trend flat  -D-dimer unremarkable  -Patient stated father has a history of CAD  -Denies any history of diabetes hypertension hyperlipidemia smoker    -Get TTE unlikely ACS at this time  -Pain management per primary team      *          Subjective:   REQUESTING PHYSICIAN:  REASON FOR CONSULT:      Issa Benavidez is a 46 y.o.  female who I was asked to see for follow-up for chest pain. Patient had rapid response overnight for acute chest pain. Patient was assessed by overnight provider with no concern for ACS at that time. Patient work-up has been negative so far. Patient states she gets chest pain in center of her chest and no radiation to left arm metatarsalgia. Patient denied any activity leading to aggravation of her chest pain. Past Medical History:   Diagnosis Date    ADD (attention deficit disorder) 17-19yo    Treated with Adderall since dx. 2013 dosing 20mg AM and 10mg PM.  Trial/change to Vyvanse Nov-Dec 2015--not as effective.  Gynecologic disorder     History of miscarriages.   History of Mirena IUD placed on 4/17/15    HX OTHER MEDICAL      2001-BUFA baby    HX OTHER MEDICAL     HPV positive    Idiopathic vulvodynia 2014    L1 vertebral fracture (HCC) 2017    [facility] imaging/admissoin: Mild acute two column compression fracture of L1 without evidence of retropulsion into the spinal canal.  Managed non-operatively.  Migraines 2013    Neurological disorder     Pelvic pain in female 9/15/2014    2015 gyn laparoscopy/hysteroscopy with endometriosis worse right.  Psychiatric disorder     depression    Secondary dysmenorrhea 2014    With menorraghia    Seizures ([address]     never on meds--had appr 4-5 in a short amt of time and none since      Past Surgical History:   Procedure Laterality Date    ENDOMETRIAL CRYOABLATION      HX GYN  4/17/15    laparoscopy and hysteroscopy and IUD placement    HX HYSTERECTOMY  2016    Complete Hysterectomy     Social History     Tobacco Use    Smoking status: Current Every Day Smoker     Packs/day: 1.00     Years: 18.00     Pack years: 18.00     Types: Cigarettes    Smokeless tobacco: Never Used    Tobacco comment: Vapes   Substance Use Topics    Alcohol use: Yes     Alcohol/week: 0.0 standard drinks     Comment: rarely. Family History   Problem Relation Age of Onset    Cancer Mother     Diabetes Mother     Alcohol abuse Father         and drug    Psychiatric Disorder Father     Cancer Father     Diabetes Maternal Grandmother     Hypertension Maternal Grandmother     Thyroid Disease Maternal Grandmother     Diabetes Maternal Grandfather     Hypertension Maternal Grandfather     Cancer Maternal Grandfather     Heart Disease Maternal Grandfather     Cancer Paternal Grandmother     Diabetes Paternal Grandmother        Allergies   Allergen Reactions    Aspirin Other (comments)     Hot sweats and passed out; tolerated ibuprofen    Penicillins Other (comments)     Childhood. Does not remember reaction.  Is not aware if she has ever taken cephalosporins in the past.    Jan 2019: Pt notes no problems with cephalosporins. Prior to Admission medications    Medication Sig Start Date End Date Taking? Authorizing Provider   traZODone (DESYREL) 50 mg tablet Take 50 mg by mouth nightly as needed for Sleep. Yes Provider, Historical   hydrOXYzine HCL (ATARAX) 50 mg tablet Take 50 mg by mouth every six (6) hours as needed for Itching or Anxiety. Yes Other, MD Coral   FLUoxetine (PROzac) 20 mg capsule Take 20 mg by mouth daily. Yes Provider, Historical   ARIPiprazole (Abilify) 10 mg tablet Take 1 Tablet by mouth daily. 6/8/22  Yes Anamika Mcallister MD   venlafaxine [facility]) 37.5 mg tablet Take 1 Tablet by mouth daily. 6/2/22  Yes Charmayne Burrows, MD   b complex-vitamin c-folic acid 0.8 mg (NEPHRO-LIT) 0.8 mg tab tablet Take 1 Tablet by mouth daily. Yes Provider, Historical       REVIEW OF SYSTEMS:     Total of 12 systems reviewed as follows:   I am not able to complete the review of systems because:    The patient is intubated and sedated    The patient has altered mental status due to his acute medical problems    The patient has baseline aphasia from prior stroke(s)    The patient has baseline dementia and is not reliable historian                 POSITIVE= underlined text  Negative = text not underlined  General:  fever, chills, sweats, generalized weakness, weight loss/gain,      loss of appetite   Eyes:    blurred vision, eye pain, loss of vision, double vision  ENT:    rhinorrhea, pharyngitis   Respiratory:   cough, sputum production, SOB, wheezing, FIGUEROA, pleuritic pain   Cardiology:   chest pain, palpitations, orthopnea, PND, edema, syncope   Gastrointestinal:  abdominal pain , N/V, dysphagia, diarrhea, constipation, bleeding   Genitourinary:  frequency, urgency, dysuria, hematuria, incontinence   Muskuloskeletal :  arthralgia, myalgia   Hematology:  easy bruising, nose or gum bleeding, lymphadenopathy   Dermatological: rash, ulceration, pruritis   Endocrine:   hot flashes or polydipsia   Neurological:  headache, dizziness, confusion, focal weakness, paresthesia,     Speech difficulties, memory loss, gait disturbance  Psychological: Feelings of anxiety, depression, agitation    Objective:   VITALS:    Visit Vitals  BP (!) 91/54   Pulse 84   Temp 97.4 °F (36.3 °C)   Resp 18   Ht 5' 4\" (1.626 m)   Wt 96.6 kg (213 lb)   LMP 2016   SpO2 98%   Breastfeeding No   BMI 36.56 kg/m²     Temp (24hrs), Av.8 °F (36.6 °C), Min:97.4 °F (36.3 °C), Max:98.6 °F (37 °C)      PHYSICAL EXAM:   General:    Alert, cooperative, no distress, appears stated age. HEENT: Atraumatic, anicteric sclerae, pink conjunctivae     No oral ulcers, mucosa moist, throat clear  Neck:  Supple, symmetrical,  thyroid: non tender  Lungs:   Clear to auscultation bilaterally. No Wheezing or Rhonchi. No rales. Chest wall:  No tenderness  No Accessory muscle use. Heart:   Regular  rhythm,  No  murmur   No edema  Abdomen:   Soft, non-tender. Not distended. Bowel sounds normal  Extremities: No cyanosis. No clubbing  Skin:     Not pale. Not Jaundiced  No rashes   Psych:  . Not anxious or agitated. Neurologic: EOMs intact. No facial asymmetry. No aphasia or slurred speech.  Symmetrical strength, Alert and oriented X 4.     _______________________________________________________________________  Care Plan discussed with:    Comments   Patient x    Family      RN x    Care Manager                    Consultant:      ____________________________________________________________________  TOTAL TIME:     15 mins    Comments    x Reviewed previous records   >50% of visit spent in counseling and coordination of care x Discussion with patient and/or family and questions answered       Critical Care Provided     Minutes non procedure based  ________________________________________________________________________  Signed: Josiah Hopper MD      Procedures: see electronic medical records for all procedures/Xrays and details which were not copied into this note but were reviewed prior to creation of Plan. LAB DATA REVIEWED:    Recent Results (from the past 24 hour(s))   CBC WITH AUTOMATED DIFF    Collection Time: 06/14/22  4:54 AM   Result Value Ref Range    WBC 8.7 3.6 - 11.0 K/uL    RBC 3.69 (L) 3.80 - 5.20 M/uL    HGB 11.2 (L) 11.5 - 16.0 g/dL    HCT 34.2 (L) 35.0 - 47.0 %    MCV 92.7 80.0 - 99.0 FL    MCH 30.4 26.0 - 34.0 PG    MCHC 32.7 30.0 - 36.5 g/dL    RDW 13.7 11.5 - 14.5 %    PLATELET 878 (H) 930 - 400 K/uL    MPV 9.3 8.9 - 12.9 FL    NRBC 0.0 0  WBC    ABSOLUTE NRBC 0.00 0.00 - 0.01 K/uL    NEUTROPHILS 57 32 - 75 %    LYMPHOCYTES 35 12 - 49 %    MONOCYTES 5 5 - 13 %    EOSINOPHILS 1 0 - 7 %    BASOPHILS 1 0 - 1 %    IMMATURE GRANULOCYTES 1 (H) 0.0 - 0.5 %    ABS. NEUTROPHILS 4.9 1.8 - 8.0 K/UL    ABS. LYMPHOCYTES 3.0 0.8 - 3.5 K/UL    ABS. MONOCYTES 0.5 0.0 - 1.0 K/UL    ABS. EOSINOPHILS 0.1 0.0 - 0.4 K/UL    ABS. BASOPHILS 0.1 0.0 - 0.1 K/UL    ABS. IMM. GRANS. 0.1 (H) 0.00 - 0.04 K/UL    DF AUTOMATED     METABOLIC PANEL, COMPREHENSIVE    Collection Time: 06/14/22  4:54 AM   Result Value Ref Range    Sodium 140 136 - 145 mmol/L    Potassium 4.1 3.5 - 5.1 mmol/L    Chloride 106 97 - 108 mmol/L    CO2 26 21 - 32 mmol/L    Anion gap 8 5 - 15 mmol/L    Glucose 111 (H) 65 - 100 mg/dL    BUN 22 (H) 6 - 20 MG/DL    Creatinine 0.71 0.55 - 1.02 MG/DL    BUN/Creatinine ratio 31 (H) 12 - 20      GFR est AA >60 >60 ml/min/1.73m2    GFR est non-AA >60 >60 ml/min/1.73m2    Calcium 8.7 8.5 - 10.1 MG/DL    Bilirubin, total 0.2 0.2 - 1.0 MG/DL    ALT (SGPT) 129 (H) 12 - 78 U/L    AST (SGOT) 23 15 - 37 U/L    Alk.  phosphatase 62 45 - 117 U/L    Protein, total 6.2 (L) 6.4 - 8.2 g/dL    Albumin 2.7 (L) 3.5 - 5.0 g/dL    Globulin 3.5 2.0 - 4.0 g/dL    A-G Ratio 0.8 (L) 1.1 - 2.2     NT-PRO BNP    Collection Time: 06/14/22  4:54 AM   Result Value Ref Range    NT pro-BNP 24 0 - 125 PG/ML   TROPONIN-HIGH SENSITIVITY    Collection Time: 06/14/22  4:54 AM   Result Value Ref Range    Troponin-High Sensitivity 4 0 - 51 ng/L   TROPONIN-HIGH SENSITIVITY    Collection Time: 06/14/22  5:35 AM   Result Value Ref Range    Troponin-High Sensitivity 5 0 - 51 ng/L   LIPASE    Collection Time: 06/14/22  5:35 AM   Result Value Ref Range    Lipase 118 73 - 393 U/L   D DIMER    Collection Time: 06/14/22  5:35 AM   Result Value Ref Range    D-dimer <0.19 0.00 - 0.65 mg/L FEU   TROPONIN-HIGH SENSITIVITY    Collection Time: 06/14/22  6:35 AM   Result Value Ref Range    Troponin-High Sensitivity 5 0 - 51 ng/L       _____________________________  Hospitalist: Marisol Mae MD

## 2022-06-14 NOTE — PROGRESS NOTES
Problem: Depressed Mood (Adult/Pediatric)  Goal: *STG: Demonstrates reduction in symptoms and increase in insight into coping skills/future focused  Outcome: Progressing Towards Goal     Problem: Depressed Mood (Adult/Pediatric)  Goal: *STG: Remains safe in hospital  Outcome: Progressing Towards Goal     Problem: Depressed Mood (Adult/Pediatric)  Goal: *STG: Complies with medication therapy  Outcome: Progressing Towards Goal     Problem: Falls - Risk of  Goal: *Absence of Falls  Description: Document Rema Fall Risk and appropriate interventions in the flowsheet. Outcome: Progressing Towards Goal  Note: Fall Risk Interventions:            Medication Interventions: Teach patient to arise slowly     Shift Note:      Report received   from out   going day shift RN. Assumed care of Patient. Patient  Observed in  day room/ hallway. Patient  reports SI. H/I, A/V/H, anxiety 6/10,  depression/10. Patient is cooperative, no usual behavioral problems noted. Patient  received  PRN  Hydroxyzine 50mg and acetaminophen 650 mg P.O. for pain. .  Patient observed in room most of shift during hourly rounds. Patient remains quiet in bed as if sleeping during hourly rounds. 8107: Patient called and reported Chest pain to mid sternal/chest area radiating under right breast. Patient complained that She was awaken by the pain level 8/10. Patient denies any Shortness of breath, nausea or chills. Patient is alert  and  Oriented. Rapid response uninitiated and vital sighs assessed. EKG done labs done, Heparin lock place to Rt lower arm. 0505 am: PRN acetaminophen 650 mg P.O and hydroxyzine 50 mg P.O given. T/C to on call Hospitalist. Call returned by DR. Marcello Barone, New orders received and acknowledged. Blood sample done without difficulty. Patient    Continuous hourly rounding maintained for care and safety during shift. Patient slept about  7 hrs.

## 2022-06-14 NOTE — BH NOTES
Behavioral Health Treatment Team Note        Patient goal(s) for today: continue taking medication as prescribed, engage in unit activities, participate in hygiene/ADLs, follow directions from staff, contact support team, prepare for discharge  Treatment team focus/goals: medication adjustments, dispo planning, update support system  Progress note: Pt is goal oriented and affect is brighter. Pt reports high anxiety the night before which led to little sleep. Family meeting was held with pt, mother, SW and NP. Pt is open to groups/PHP and therapy upon discharge. LOS:  5                        Expected LOS: TBD     Financial concerns/prescription coverage:  Medicaid   Date of last family contact: 6/14                                   Family requesting physician contact today: No  Discharge plan: Home  Guns in the home: Denied                                                        Outpatient provider(s):  To be linked      Participating treatment team members: Ansley Brown [place] and Jeni Rivero NP

## 2022-06-14 NOTE — BH NOTES
No issues noted throughout the shift. Pt is alert nand oriented x 3. Med an meal compliant. Denies SI/HI, AVH, she confirms both anxiety and depression. Monitoring will continue.

## 2022-06-14 NOTE — PROGRESS NOTES
Chief Complaint: \"My back hurts. \"    Length of Stay: 5 Days    Interval History: Pt states that her liver levels are off again and the last time this happened her back hurt then too and ibuprofen is not helping. She states sleep is poor due to pain. Staff reports pt slept 10 hours last night and received Atarax, trazodone, and Zyprexa. She states withdrawals are \"better\" today. She states she is craving nicotine but not alcohol currently. She denies any SI/HI/AVH currently. No issues with medications and no side effects to report at this time. 6/12 - Staff reports pt slept 8.5 hours and had Atarax and Trazodone. She reports feeling a little better this morning. She states she did not sleep great last night. She is eating well. She states vomiting last night. She states withdrawals are improving today. She denies SI/HI/AVH. She states some improvement in back pain. No issues with medication and no side effects to report at this time. 6/13- Pt states her mother wants to be involved in a conversation with the team about what is going on. She states this is part of the problem is that her mother is over  Involved. She states this is where some of her depression comes from. She wants her mom to realize that she is being honest with her and with her providers here. She states back pain/spasms are still an issue. Discussed going up on Baclofen today. She reports sleep as broken at times here. She states she took 100 mg of Trazodone at home and asks if she can try that here. She endorses higher levels of anxiety at the moment compared with depression. Denies SI/HI/AVH currently. 6/14 - Pt had some anxiety symptoms last night that woke her up out of her sleep and a RRT was called for CP. EKG was done and blood work was done and everything checked out well. She states sleep was broken the rest of the night due to being checked on frequently the rest of the night.   She states improvement in lower back pain with Baclofen. Anxiety continues to be high. She states levels of depression have improved. Denies SI/HI/AVH currently. She states naltrexone is helping and she is not having cravings at this time. Past Medical History:  Past Medical History:   Diagnosis Date    ADD (attention deficit disorder) 17-19yo    Treated with Adderall since dx. 2013 dosing 20mg AM and 10mg PM.  Trial/change to Vyvanse Nov-Dec 2015--not as effective.  Gynecologic disorder     History of miscarriages. History of Mirena IUD placed on 4/17/15    HX OTHER MEDICAL      -BUFA baby    HX OTHER MEDICAL     HPV positive    Idiopathic vulvodynia 2014    L1 vertebral fracture (HCC) 2017    [facility] imaging/admissoin: Mild acute two column compression fracture of L1 without evidence of retropulsion into the spinal canal.  Managed non-operatively.  Migraines 2013    Neurological disorder     Pelvic pain in female 9/15/2014    2015 gyn laparoscopy/hysteroscopy with endometriosis worse right.     Psychiatric disorder     depression    Secondary dysmenorrhea 2014    With menorraghia    Seizures ([facility] [address] 2008    never on meds--had appr 4-5 in a short amt of time and none since           Labs:  Lab Results   Component Value Date/Time    WBC 8.7 2022 04:54 AM    Hemoglobin (POC) 14.3 2016 07:26 AM    HGB 11.2 (L) 2022 04:54 AM    Hematocrit (POC) 42 2016 07:26 AM    HCT 34.2 (L) 2022 04:54 AM    PLATELET 079 (H)  04:54 AM    MCV 92.7 2022 04:54 AM    Hgb, External 11.6 2012 12:00 AM    Hct, External 33.4 2012 12:00 AM    Platelet cnt., External 332K 2012 12:00 AM      Lab Results   Component Value Date/Time    Sodium 140 2022 04:54 AM    Potassium 4.1 2022 04:54 AM    Chloride 106 2022 04:54 AM    CO2 26 2022 04:54 AM    Anion gap 8 2022 04:54 AM    Glucose 111 (H) 2022 04:54 AM    BUN 22 (H) 06/14/2022 04:54 AM    Creatinine 0.71 06/14/2022 04:54 AM    BUN/Creatinine ratio 31 (H) 06/14/2022 04:54 AM    GFR est AA >60 06/14/2022 04:54 AM    GFR est non-AA >60 06/14/2022 04:54 AM    Calcium 8.7 06/14/2022 04:54 AM    Bilirubin, total 0.2 06/14/2022 04:54 AM    Alk.  phosphatase 62 06/14/2022 04:54 AM    Protein, total 6.2 (L) 06/14/2022 04:54 AM    Albumin 2.7 (L) 06/14/2022 04:54 AM    Globulin 3.5 06/14/2022 04:54 AM    A-G Ratio 0.8 (L) 06/14/2022 04:54 AM    ALT (SGPT) 129 (H) 06/14/2022 04:54 AM      Vitals:    06/14/22 0452 06/14/22 0503 06/14/22 0509 06/14/22 0639   BP: (!) 103/53 107/64 (!) 101/54 117/76   Pulse: 89 92 84 88   Resp: 16 16 16 18   Temp: 98.3 °F (36.8 °C) 97.5 °F (36.4 °C) 97.5 °F (36.4 °C) 97.6 °F (36.4 °C)   SpO2: 98% 96% 97% 98%   Weight:       Height:       LMP: 03/14/2016         Current Facility-Administered Medications   Medication Dose Route Frequency Provider Last Rate Last Admin    baclofen (LIORESAL) tablet 10 mg  10 mg Oral TID Darrel Kramer NP   10 mg at 06/14/22 0912    traZODone (DESYREL) tablet 100 mg  100 mg Oral QHS PRN Brent MARTINEZ NP   100 mg at 06/13/22 2108    nicotine buccal (POLACRILEX) lozenge 2 mg  2 mg Oral Q2H PRN Vianey Splinter' V, FNP        naltrexone (DEPADE) tablet 50 mg  50 mg Oral DAILY Sarbjit Langford MD   50 mg at 06/14/22 0912    ARIPiprazole (ABILIFY) tablet 10 mg  10 mg Oral DAILY Sarbjit Langford MD   10 mg at 06/14/22 0912    FLUoxetine (PROzac) capsule 20 mg  20 mg Oral DAILY Sarbjit Langford MD   20 mg at 06/14/22 0912    venlafaxine-SR (EFFEXOR-XR) capsule 75 mg  75 mg Oral DAILY WITH BREAKFAST Pati Clarke MD   75 mg at 06/14/22 0912    lidocaine 4 % patch 1 Patch  1 Patch TransDERmal Q24H Pati Clarke MD   1 Patch at 06/10/22 1903    ibuprofen (MOTRIN) tablet 600 mg  600 mg Oral Q6H PRN Sameera Rizzo NP   600 mg at 06/14/22 0505    LORazepam (ATIVAN) tablet 2 mg  2 mg Oral Q4H PRN Sarbjit Langford MD        OLANZapine (ZyPREXA) tablet 5 mg  5 mg Oral Q6H PRN Sahra Leeds, NP   5 mg at 22 2690    haloperidol lactate (HALDOL) injection 5 mg  5 mg IntraMUSCular Q6H PRN Sahra Leeds, NP        benztropine (COGENTIN) tablet 1 mg  1 mg Oral BID PRN Sahra Leeds, AVNI        diphenhydrAMINE (BENADRYL) injection 50 mg  50 mg IntraMUSCular BID PRN Sahra Leeds, NP        hydrOXYzine HCL (ATARAX) tablet 50 mg  50 mg Oral TID PRN Sahra Jeaneth, NP   50 mg at 22 0505    LORazepam (ATIVAN) injection 1 mg  1 mg IntraMUSCular Q4H PRN Sahra Leeds, NP        magnesium hydroxide (MILK OF MAGNESIA) 400 mg/5 mL oral suspension 30 mL  30 mL Oral DAILY PRN Sahra Leeds, NP        thiamine HCL (B-1) tablet 100 mg  100 mg Oral DAILY [facility], NP   100 mg at / 5127    folic acid (FOLVITE) tablet 1 mg  1 mg Oral DAILY [facility], NP   1 mg at 22 5699    therapeutic multivitamin (THERAGRAN) tablet 1 Tablet  1 Tablet Oral DAILY [facility], NP   1 Tablet at 22 0912    nicotine (NICODERM CQ) 21 mg/24 hr patch 1 Patch  1 Patch TransDERmal DAILY [facility], NP   1 Patch at 22 0914         Mental Status Exam:  Eye contact: good  Grooming: fair  Psychomotor activity: intact  Speech is spontaneous  Mood is \"okay \"  Affect: anxious  Perception: No AVH  Suicidal ideation: Denies  Cognition is grossly intact         Physical Exam:  Body habitus: Body mass index is 36.56 kg/m².   Musculoskeletal system: normal gait  Tremor - neg  Cog wheeling - neg      Assessment and Plan:  Rosie Witt meets criteria for a diagnosis of Recurrent major depressive disorder    Plan:   - CONTINUE Naltrexone 50 mg QDAY for EtOH cravings  - CONTINUE Prozac 20 mg QDAY for MDD adjunct,  after 7 days  - CONTINUE and INCREASE Effexor to 75 mg QDAY for MDD  - CONTINUE Ativan taper x3 days for EtOH withdrawal  - [facility] monitoring + Ativan PRN.  - IGM therapy as tolerated  - Expand database / obtain collateral  - Dispo planning (home + outpatient rehab)    A coordinated, multidisplinary treatment team round was conducted with the patient, nurses, pharmcist,  and writer present. Discussions held with , and/or with family members; Complete current electronic health record for patient was reviewed in full including consultant notes, ancillary staff notes, nurses and tech notes, labs and vitals. I certify that this patients inpatient psychiatric hospital services furnished since the previous certification were, and continue to be, required for treatment that could reasonably be expected to improve the patient's condition, or for diagnostic study, and that the patient continues to need, on a daily basis, active treatment furnished directly by or requiring the supervision of inpatient psychiatric facility personnel. In addition, the hospital records show that services furnished were intensive treatment services, admission or related services, or equivalent services.

## 2022-06-14 NOTE — PROGRESS NOTES
Called about patient with chest pain. This is located at the center of the chest, no radiation, no associated shortness of breath or diaphoresis. EKG shows a sinus tachycardia with no significant ST and T waves abnormalities. We will check a cardiac markers, D-dimer, lipase level to evaluate the patient for atypical chest pain. Pain control with Percocet.

## 2022-06-15 PROCEDURE — 65220000003 HC RM SEMIPRIVATE PSYCH

## 2022-06-15 PROCEDURE — 74011250637 HC RX REV CODE- 250/637

## 2022-06-15 PROCEDURE — 74011250637 HC RX REV CODE- 250/637: Performed by: NURSE PRACTITIONER

## 2022-06-15 PROCEDURE — 74011250637 HC RX REV CODE- 250/637: Performed by: PSYCHIATRY & NEUROLOGY

## 2022-06-15 RX ORDER — VENLAFAXINE HYDROCHLORIDE 75 MG/1
150 CAPSULE, EXTENDED RELEASE ORAL
Status: DISCONTINUED | OUTPATIENT
Start: 2022-06-16 | End: 2022-06-17 | Stop reason: HOSPADM

## 2022-06-15 RX ADMIN — HYDROXYZINE HYDROCHLORIDE 50 MG: 25 TABLET, FILM COATED ORAL at 17:10

## 2022-06-15 RX ADMIN — HYDROXYZINE HYDROCHLORIDE 50 MG: 25 TABLET, FILM COATED ORAL at 08:54

## 2022-06-15 RX ADMIN — OLANZAPINE 5 MG: 5 TABLET, FILM COATED ORAL at 21:16

## 2022-06-15 RX ADMIN — HYDROXYZINE HYDROCHLORIDE 50 MG: 25 TABLET, FILM COATED ORAL at 23:12

## 2022-06-15 RX ADMIN — FLUOXETINE 20 MG: 20 CAPSULE ORAL at 08:52

## 2022-06-15 RX ADMIN — ARIPIPRAZOLE 10 MG: 5 TABLET ORAL at 08:52

## 2022-06-15 RX ADMIN — BACLOFEN 10 MG: 10 TABLET ORAL at 17:10

## 2022-06-15 RX ADMIN — TRAZODONE HYDROCHLORIDE 100 MG: 100 TABLET ORAL at 21:17

## 2022-06-15 RX ADMIN — THERA TABS 1 TABLET: TAB at 08:52

## 2022-06-15 RX ADMIN — FOLIC ACID 1 MG: 1 TABLET ORAL at 08:52

## 2022-06-15 RX ADMIN — NALTREXONE HYDROCHLORIDE 50 MG: 50 TABLET, FILM COATED ORAL at 08:52

## 2022-06-15 RX ADMIN — OLANZAPINE 5 MG: 5 TABLET, FILM COATED ORAL at 13:44

## 2022-06-15 RX ADMIN — Medication 100 MG: at 08:52

## 2022-06-15 RX ADMIN — BACLOFEN 10 MG: 10 TABLET ORAL at 08:52

## 2022-06-15 RX ADMIN — BACLOFEN 10 MG: 10 TABLET ORAL at 11:31

## 2022-06-15 RX ADMIN — VENLAFAXINE HYDROCHLORIDE 75 MG: 75 CAPSULE, EXTENDED RELEASE ORAL at 08:52

## 2022-06-15 NOTE — BH NOTES
0700- Patient  report received from  [address] [place] [address]. [id number] has been slightly anxious this shift, she denies   SI/HI at this time and state she need medication around the clock for  Her increased anxiety. See MAR.  1000-1200-Patient is meal and medication compliant at this time, no safety or behavior issue observed   1200-1400-Patient  Still compliant of increased anxiety and was given mediatation, see MAR.  1400-1600-Patient In no acute distress at this time  No safety or behavior issues noted at this time.   1600-1800-Patient has kendall calm and cooperative through the shift, no safety or behavior issues noted has be given medication as needed

## 2022-06-15 NOTE — BH NOTES
Behavioral Health Treatment Team Note         Patient goal(s) for today: continue taking medication as prescribed, engage in unit activities, participate in hygiene/ADLs, follow directions from staff, contact support team, prepare for discharge  Treatment team focus/goals: medication adjustments, dispo planning, update support system  Progress note: Pt is goal oriented and affect is brighter. Pt reports increased anxiety. PHP completed intake assessment and will complete assessment on Monday.  Pt will begin PHP next Thursday.      LOS:  6                        Expected LOS: TBD     Financial concerns/prescription coverage:  Medicaid   Date of last family contact: 6/14                                   Family requesting physician contact today: No  Discharge plan: Home  Guns in the home: Denied                                                        Outpatient provider(s): To be linked      Participating treatment team members: Miguelina Moss LCSW and Amanuel Mcknight NP

## 2022-06-15 NOTE — BH NOTES
Assumed care of the patient. Patient was cooperative with the assessment. She appeared pleasant. Patient described her anxiety as still high and depression as minimal. She denied S.I./H. I. A/V/H. Patient was medication and meal compliant. PRN Atarax for anxiety and Trazodone for sleep administered upon request. Will continue to monitor and provide support as needed. Patient appeared to be sleeping for about 7.5 hours.

## 2022-06-15 NOTE — PROGRESS NOTES
Problem: Depressed Mood (Adult/Pediatric)  Goal: *STG: Participates in treatment plan  Outcome: Progressing Towards Goal  Goal: *STG: Demonstrates reduction in symptoms and increase in insight into coping skills/future focused  Outcome: Progressing Towards Goal

## 2022-06-16 PROCEDURE — 74011250637 HC RX REV CODE- 250/637: Performed by: NURSE PRACTITIONER

## 2022-06-16 PROCEDURE — 74011250637 HC RX REV CODE- 250/637: Performed by: PSYCHIATRY & NEUROLOGY

## 2022-06-16 PROCEDURE — 65220000003 HC RM SEMIPRIVATE PSYCH

## 2022-06-16 PROCEDURE — 74011250637 HC RX REV CODE- 250/637

## 2022-06-16 RX ADMIN — BACLOFEN 10 MG: 10 TABLET ORAL at 08:56

## 2022-06-16 RX ADMIN — Medication 100 MG: at 08:57

## 2022-06-16 RX ADMIN — BACLOFEN 10 MG: 10 TABLET ORAL at 17:00

## 2022-06-16 RX ADMIN — OLANZAPINE 5 MG: 5 TABLET, FILM COATED ORAL at 11:52

## 2022-06-16 RX ADMIN — Medication 2 MG: at 21:57

## 2022-06-16 RX ADMIN — TRAZODONE HYDROCHLORIDE 100 MG: 100 TABLET ORAL at 21:50

## 2022-06-16 RX ADMIN — FLUOXETINE 20 MG: 20 CAPSULE ORAL at 08:56

## 2022-06-16 RX ADMIN — VENLAFAXINE HYDROCHLORIDE 150 MG: 75 CAPSULE, EXTENDED RELEASE ORAL at 08:56

## 2022-06-16 RX ADMIN — FOLIC ACID 1 MG: 1 TABLET ORAL at 08:56

## 2022-06-16 RX ADMIN — HYDROXYZINE HYDROCHLORIDE 50 MG: 25 TABLET, FILM COATED ORAL at 17:10

## 2022-06-16 RX ADMIN — HYDROXYZINE HYDROCHLORIDE 50 MG: 25 TABLET, FILM COATED ORAL at 08:57

## 2022-06-16 RX ADMIN — THERA TABS 1 TABLET: TAB at 08:58

## 2022-06-16 RX ADMIN — BACLOFEN 10 MG: 10 TABLET ORAL at 11:51

## 2022-06-16 RX ADMIN — NALTREXONE HYDROCHLORIDE 50 MG: 50 TABLET, FILM COATED ORAL at 08:57

## 2022-06-16 RX ADMIN — ARIPIPRAZOLE 10 MG: 5 TABLET ORAL at 08:57

## 2022-06-16 NOTE — BH NOTES
Behavioral Health Treatment Team Note         Patient goal(s) for today: continue taking medication as prescribed, engage in unit activities, participate in hygiene/ADLs, follow directions from staff, contact support team, prepare for discharge  Treatment team focus/goals: medication adjustments, dispo planning, update support system  Progress note: Pt is goal oriented and affect is brighter. Pt reports decreased anxiety and is goal oriented.  PHP completed intake assessment and will complete assessment on Monday - patient is excited to begin the program. Pt will begin PHP next Thursday.      LOS:  7                        Expected LOS: 6/17     Financial concerns/prescription coverage:  Medicaid   Date of last family contact: 6/14                                   Family requesting physician contact today: No  Discharge plan: Home  Guns in the home: Denied                                                        Outpatient provider(s): To be linked      Participating treatment team members: Miguelina Ruiz [facility], LCSW and Diamond Maki, NP

## 2022-06-16 NOTE — PROGRESS NOTES
Problem: Depressed Mood (Adult/Pediatric)  Goal: *STG: Participates in treatment plan  Outcome: Progressing Towards Goal  Goal: *STG: Verbalizes anger, guilt, and other feelings in a constructive manor  Outcome: Progressing Towards Goal  Goal: *STG: Attends activities and groups  Outcome: Progressing Towards Goal  Goal: *STG: Complies with medication therapy  Outcome: Progressing Towards Goal     [id number]: Assumed care of patient after receiving shift report from outgoing nurse. Patient was out and visible on unit, interacting appropriately with peers and staff. Affect is blunted, mood is anxious. Patient reports having good day related to discharge plan and outpatient services. Patient stated that the out patient services post discharge is something she has felt she has been missing from previous hospitalizations. Patient noted being hopeful. Pt cooperative with vitals and assessment. A&O x 4. Independent in ADLs. Insight and concentration present. Gait is steady. Appetite patterns WNL. Denies AVH/SI/HI. Patient endorses heightened anxiety. Patient affect blunted but pulse rate may be more indicative of internal anxiety. Deep breathing and other coping mechanisms utilized. Patient received PRN trazodone and zyprexa for sleep and agitation secondary to anxiety. Pt denies pain. Patient medication and diet compliant. Pt encouraged to continue to participate in care. [id number]: Patient received PRN atarax for anxiety at approximately 2312. Patient resting in bed. No further issues noted at this time. 9597-2209: Pt has been observed throughout the night. Total hours slept approximately 8. No s/s of distress noted. Patient has remained safe. Hourly rounding performed throughout shift.

## 2022-06-16 NOTE — BH NOTES
0700- Patient  report received from  Brendan Ervin.   -Patient has been slightly anxious this shift, she denies   SI/HI at this time and state she need medication to help with her increased anxiety ,Patient is meal and medication compliant at this time, no safety or behavior issue observed  through the shift she is in no acute distress at this time

## 2022-06-16 NOTE — PROGRESS NOTES
Chief Complaint: \"I am okay. \"    Length of Stay: 6 Days    Interval History: Pt states that her liver levels are off again and the last time this happened her back hurt then too and ibuprofen is not helping. She states sleep is poor due to pain. Staff reports pt slept 10 hours last night and received Atarax, trazodone, and Zyprexa. She states withdrawals are \"better\" today. She states she is craving nicotine but not alcohol currently. She denies any SI/HI/AVH currently. No issues with medications and no side effects to report at this time. 6/12 - Staff reports pt slept 8.5 hours and had Atarax and Trazodone. She reports feeling a little better this morning. She states she did not sleep great last night. She is eating well. She states vomiting last night. She states withdrawals are improving today. She denies SI/HI/AVH. She states some improvement in back pain. No issues with medication and no side effects to report at this time. 6/13- Pt states her mother wants to be involved in a conversation with the team about what is going on. She states this is part of the problem is that her mother is over  Involved. She states this is where some of her depression comes from. She wants her mom to realize that she is being honest with her and with her providers here. She states back pain/spasms are still an issue. Discussed going up on Baclofen today. She reports sleep as broken at times here. She states she took 100 mg of Trazodone at home and asks if she can try that here. She endorses higher levels of anxiety at the moment compared with depression. Denies SI/HI/AVH currently. 6/14 - Pt had some anxiety symptoms last night that woke her up out of her sleep and a RRT was called for CP. EKG was done and blood work was done and everything checked out well. She states sleep was broken the rest of the night due to being checked on frequently the rest of the night.   She states improvement in lower back pain with Baclofen. Anxiety continues to be high. She states levels of depression have improved. Denies SI/HI/AVH currently. She states naltrexone is helping and she is not having cravings at this time. 6/15 - Pt reports anxiety symptoms are still high. She states back pain is better with Baclofen. She states anxiety is worse than depression currently. She remains medication complaint and has no side effects to report at this time. Discussed increasing Effexor at this time which pt is agreeable to trying  No SI/HI/AVH currently. Past Medical History:  Past Medical History:   Diagnosis Date    ADD (attention deficit disorder) 19-19yo    Treated with Adderall since dx. 2013 dosing 20mg AM and 10mg PM.  Trial/change to Vyvanse Nov-Dec 2015--not as effective.  Gynecologic disorder     History of miscarriages. History of Mirena IUD placed on 4/17/15    HX OTHER MEDICAL      -BUFA baby    HX OTHER MEDICAL     HPV positive    Idiopathic vulvodynia 2014    L1 vertebral fracture (HCC) 2017    [facility] imaging/admissoin: Mild acute two column compression fracture of L1 without evidence of retropulsion into the spinal canal.  Managed non-operatively.  Migraines 2013    Neurological disorder     Pelvic pain in female 9/15/2014    2015 gyn laparoscopy/hysteroscopy with endometriosis worse right.     Psychiatric disorder     depression    Secondary dysmenorrhea 2014    With menorraghia    Seizures ([facility] [address]     never on meds--had appr 4-5 in a short amt of time and none since           Labs:  Lab Results   Component Value Date/Time    WBC 8.7 2022 04:54 AM    Hemoglobin (POC) 14.3 2016 07:26 AM    HGB 11.2 (L) 2022 04:54 AM    Hematocrit (POC) 42 2016 07:26 AM    HCT 34.2 (L) 2022 04:54 AM    PLATELET 724 (H)  04:54 AM    MCV 92.7 2022 04:54 AM    Hgb, External 11.6 2012 12:00 AM    Hct, External 33.4 05/03/2012 12:00 AM    Platelet cnt., External 332K 05/03/2012 12:00 AM      Lab Results   Component Value Date/Time    Sodium 140 06/14/2022 04:54 AM    Potassium 4.1 06/14/2022 04:54 AM    Chloride 106 06/14/2022 04:54 AM    CO2 26 06/14/2022 04:54 AM    Anion gap 8 06/14/2022 04:54 AM    Glucose 111 (H) 06/14/2022 04:54 AM    BUN 22 (H) 06/14/2022 04:54 AM    Creatinine 0.71 06/14/2022 04:54 AM    BUN/Creatinine ratio 31 (H) 06/14/2022 04:54 AM    GFR est AA >60 06/14/2022 04:54 AM    GFR est non-AA >60 06/14/2022 04:54 AM    Calcium 8.7 06/14/2022 04:54 AM    Bilirubin, total 0.2 06/14/2022 04:54 AM    Alk.  phosphatase 62 06/14/2022 04:54 AM    Protein, total 6.2 (L) 06/14/2022 04:54 AM    Albumin 2.7 (L) 06/14/2022 04:54 AM    Globulin 3.5 06/14/2022 04:54 AM    A-G Ratio 0.8 (L) 06/14/2022 04:54 AM    ALT (SGPT) 129 (H) 06/14/2022 04:54 AM      Vitals:    06/15/22 0800 06/15/22 2020 06/15/22 2030 06/16/22 0842   BP: 108/62  115/65 (!) 118/52   Pulse: 96 (!) 113 (!) 107 100   Resp: 16  17 18   Temp: 98 °F (36.7 °C)  97.9 °F (36.6 °C) 97.4 °F (36.3 °C)   SpO2: 100%  100% 99%   Weight:       Height:       LMP: 03/14/2016         Current Facility-Administered Medications   Medication Dose Route Frequency Provider Last Rate Last Admin    venlafaxine-SR (EFFEXOR-XR) capsule 150 mg  150 mg Oral DAILY WITH BREAKFAST Jeanekellie Graft A, NP   150 mg at 06/16/22 0856    ibuprofen (MOTRIN) tablet 600 mg  600 mg Oral Q6H PRN Christina Payer, MD   600 mg at 06/14/22 1743    baclofen (LIORESAL) tablet 10 mg  10 mg Oral TID Mary Lloyd NP   10 mg at 06/16/22 1700    traZODone (DESYREL) tablet 100 mg  100 mg Oral QHS PRN Chelsie MARTINEZ NP   100 mg at 06/15/22 2117    nicotine buccal (POLACRILEX) lozenge 2 mg  2 mg Oral Q2H PRN ABIMAEL Garcia        naltrexone (DEPADE) tablet 50 mg  50 mg Oral DAILY Sarbjit Langford MD   50 mg at 06/16/22 0857    ARIPiprazole (ABILIFY) tablet 10 mg  10 mg Oral DAILY Pawan Sexton MD   10 mg at 06/16/22 0857    FLUoxetine (PROzac) capsule 20 mg  20 mg Oral DAILY Sarbjit Langford MD   20 mg at 06/16/22 0856    lidocaine 4 % patch 1 Patch  1 Patch TransDERmal Q24H Sarbjit Langford MD   1 Patch at 06/14/22 2015    LORazepam (ATIVAN) tablet 2 mg  2 mg Oral Q4H PRN Sarbjit Langford MD   2 mg at 06/14/22 1747    OLANZapine (ZyPREXA) tablet 5 mg  5 mg Oral Q6H PRN Mitra Officer, NP   5 mg at 06/16/22 1152    haloperidol lactate (HALDOL) injection 5 mg  5 mg IntraMUSCular Q6H PRN Mitra Officer, NP        benztropine (COGENTIN) tablet 1 mg  1 mg Oral BID PRN Mitra Officer, NP        diphenhydrAMINE (BENADRYL) injection 50 mg  50 mg IntraMUSCular BID PRN Mitra Officer, NP        hydrOXYzine HCL (ATARAX) tablet 50 mg  50 mg Oral TID PRN Mitra Officer, NP   50 mg at 06/16/22 1710    LORazepam (ATIVAN) injection 1 mg  1 mg IntraMUSCular Q4H PRN Mitra Officer, NP        magnesium hydroxide (MILK OF MAGNESIA) 400 mg/5 mL oral suspension 30 mL  30 mL Oral DAILY PRN Mitra Officer, NP        thiamine HCL (B-1) tablet 100 mg  100 mg Oral DAILY Mitra Officer, NP   100 mg at 52/82/57 0871    folic acid (FOLVITE) tablet 1 mg  1 mg Oral DAILY Mitra Officer, NP   1 mg at 06/16/22 3559    therapeutic multivitamin (THERAGRAN) tablet 1 Tablet  1 Tablet Oral DAILY Mitra Officer, NP   1 Tablet at 06/16/22 0858    nicotine (NICODERM CQ) 21 mg/24 hr patch 1 Patch  1 Patch TransDERmal DAILY Mitra Officer, NP   1 Patch at 06/16/22 0858         Mental Status Exam:  Eye contact: good  Grooming: fair  Psychomotor activity: intact  Speech is spontaneous  Mood is \"okay \"  Affect: anxious  Perception: No AVH  Suicidal ideation: Denies  Cognition is grossly intact         Physical Exam:  Body habitus: Body mass index is 36.56 kg/m².   Musculoskeletal system: normal gait  Tremor - neg  Cog wheeling - neg      Assessment and Plan:  Randy Henning Hellen Hernandez meets criteria for a diagnosis of Recurrent major depressive disorder    Plan:   - CONTINUE Naltrexone 50 mg QDAY for EtOH cravings  - CONTINUE Prozac 20 mg QDAY for MDD adjunct,  after 7 days  - INCREASE Effexor to 150 mg QDAY for MDD  - D/C Ativan taper for EtOH withdrawal  - IGM therapy as tolerated  - Expand database / obtain collateral  - Dispo planning (home + outpatient rehab)    A coordinated, multidisplinary treatment team round was conducted with the patient, nurses, pharmcist,  and writer present. Discussions held with , and/or with family members; Complete current electronic health record for patient was reviewed in full including consultant notes, ancillary staff notes, nurses and tech notes, labs and vitals. I certify that this patients inpatient psychiatric hospital services furnished since the previous certification were, and continue to be, required for treatment that could reasonably be expected to improve the patient's condition, or for diagnostic study, and that the patient continues to need, on a daily basis, active treatment furnished directly by or requiring the supervision of inpatient psychiatric facility personnel. In addition, the hospital records show that services furnished were intensive treatment services, admission or related services, or equivalent services.

## 2022-06-16 NOTE — PROGRESS NOTES
Chief Complaint: \"I am okay. \"    Length of Stay: 6 Days    Interval History: Pt states that her liver levels are off again and the last time this happened her back hurt then too and ibuprofen is not helping. She states sleep is poor due to pain. Staff reports pt slept 10 hours last night and received Atarax, trazodone, and Zyprexa. She states withdrawals are \"better\" today. She states she is craving nicotine but not alcohol currently. She denies any SI/HI/AVH currently. No issues with medications and no side effects to report at this time. 6/12 - Staff reports pt slept 8.5 hours and had Atarax and Trazodone. She reports feeling a little better this morning. She states she did not sleep great last night. She is eating well. She states vomiting last night. She states withdrawals are improving today. She denies SI/HI/AVH. She states some improvement in back pain. No issues with medication and no side effects to report at this time. 6/13- Pt states her mother wants to be involved in a conversation with the team about what is going on. She states this is part of the problem is that her mother is over  Involved. She states this is where some of her depression comes from. She wants her mom to realize that she is being honest with her and with her providers here. She states back pain/spasms are still an issue. Discussed going up on Baclofen today. She reports sleep as broken at times here. She states she took 100 mg of Trazodone at home and asks if she can try that here. She endorses higher levels of anxiety at the moment compared with depression. Denies SI/HI/AVH currently. 6/14 - Pt had some anxiety symptoms last night that woke her up out of her sleep and a RRT was called for CP. EKG was done and blood work was done and everything checked out well. She states sleep was broken the rest of the night due to being checked on frequently the rest of the night.   She states improvement in lower back pain with Baclofen. Anxiety continues to be high. She states levels of depression have improved. Denies SI/HI/AVH currently. She states naltrexone is helping and she is not having cravings at this time. 6/15 - Pt reports anxiety symptoms are still high. She states back pain is better with Baclofen. She states anxiety is worse than depression currently. She remains medication complaint and has no side effects to report at this time. Discussed increasing Effexor at this time which pt is agreeable to trying  No SI/HI/AVH currently. Urvashi Vu reports that she is feeing \"good\" this morning. She states that she is feeling back to her baseline. She states that she is looking forward to going home and following up outpatient. She has been compliant with medications, denies side effects. Denies SI, HI or AVH. She is present on the unit, no behavior concerns. Past Medical History:  Past Medical History:   Diagnosis Date    ADD (attention deficit disorder) 17-19yo    Treated with Adderall since dx. 2013 dosing 20mg AM and 10mg PM.  Trial/change to Vyvanse Nov-Dec 2015--not as effective.  Gynecologic disorder     History of miscarriages. History of Mirena IUD placed on 4/17/15    HX OTHER MEDICAL      -BUFA baby    HX OTHER MEDICAL     HPV positive    Idiopathic vulvodynia 2014    L1 vertebral fracture (HCC) 2017    [facility] imaging/admissoin: Mild acute two column compression fracture of L1 without evidence of retropulsion into the spinal canal.  Managed non-operatively.  Migraines 2013    Neurological disorder     Pelvic pain in female 9/15/2014    2015 gyn laparoscopy/hysteroscopy with endometriosis worse right.     Psychiatric disorder     depression    Secondary dysmenorrhea 2014    With menorraghia    Seizures ([facility] [address]     never on meds--had appr 4-5 in a short amt of time and none since           Labs:  Lab Results   Component Value Date/Time    WBC 8.7 06/14/2022 04:54 AM    Hemoglobin (POC) 14.3 04/04/2016 07:26 AM    HGB 11.2 (L) 06/14/2022 04:54 AM    Hematocrit (POC) 42 04/04/2016 07:26 AM    HCT 34.2 (L) 06/14/2022 04:54 AM    PLATELET 555 (H) 91/66/2470 04:54 AM    MCV 92.7 06/14/2022 04:54 AM    Hgb, External 11.6 05/03/2012 12:00 AM    Hct, External 33.4 05/03/2012 12:00 AM    Platelet cnt., External 332K 05/03/2012 12:00 AM      Lab Results   Component Value Date/Time    Sodium 140 06/14/2022 04:54 AM    Potassium 4.1 06/14/2022 04:54 AM    Chloride 106 06/14/2022 04:54 AM    CO2 26 06/14/2022 04:54 AM    Anion gap 8 06/14/2022 04:54 AM    Glucose 111 (H) 06/14/2022 04:54 AM    BUN 22 (H) 06/14/2022 04:54 AM    Creatinine 0.71 06/14/2022 04:54 AM    BUN/Creatinine ratio 31 (H) 06/14/2022 04:54 AM    GFR est AA >60 06/14/2022 04:54 AM    GFR est non-AA >60 06/14/2022 04:54 AM    Calcium 8.7 06/14/2022 04:54 AM    Bilirubin, total 0.2 06/14/2022 04:54 AM    Alk.  phosphatase 62 06/14/2022 04:54 AM    Protein, total 6.2 (L) 06/14/2022 04:54 AM    Albumin 2.7 (L) 06/14/2022 04:54 AM    Globulin 3.5 06/14/2022 04:54 AM    A-G Ratio 0.8 (L) 06/14/2022 04:54 AM    ALT (SGPT) 129 (H) 06/14/2022 04:54 AM      Vitals:    06/15/22 0800 06/15/22 2020 06/15/22 2030 06/16/22 0842   BP: 108/62  115/65 (!) 118/52   Pulse: 96 (!) 113 (!) 107 100   Resp: 16 17 18   Temp: 98 °F (36.7 °C)  97.9 °F (36.6 °C) 97.4 °F (36.3 °C)   SpO2: 100%  100% 99%   Weight:       Height:       LMP: 03/14/2016         Current Facility-Administered Medications   Medication Dose Route Frequency Provider Last Rate Last Admin    venlafaxine-SR (EFFEXOR-XR) capsule 150 mg  150 mg Oral DAILY WITH BREAKFAST Aleksandra MARTINEZ NP   150 mg at 06/16/22 0856    ibuprofen (MOTRIN) tablet 600 mg  600 mg Oral Q6H PRN Kermit Fenton MD   600 mg at 06/14/22 1743    baclofen (LIORESAL) tablet 10 mg  10 mg Oral TID Curt Hoffman NP   10 mg at 06/16/22 1700    traZODone ([facility]) tablet 100 mg  100 mg Oral QHS PRN Lorin MARTINEZ NP   100 mg at 06/15/22 2117    nicotine buccal (POLACRILEX) lozenge 2 mg  2 mg Oral Q2H PRN ABIMAEL Mckeon        naltrexone (DEPADE) tablet 50 mg  50 mg Oral DAILY Sarbjit Langford MD   50 mg at 06/16/22 0857    ARIPiprazole (ABILIFY) tablet 10 mg  10 mg Oral DAILY Sarbjit Langford MD   10 mg at 06/16/22 0857    FLUoxetine (PROzac) capsule 20 mg  20 mg Oral DAILY Sarbjit Langford MD   20 mg at 06/16/22 0856    lidocaine 4 % patch 1 Patch  1 Patch TransDERmal Q24H Sarbjit Langford MD   1 Patch at 06/14/22 2015    LORazepam (ATIVAN) tablet 2 mg  2 mg Oral Q4H PRN Sarbjit Langford MD   2 mg at 06/14/22 1747    OLANZapine (ZyPREXA) tablet 5 mg  5 mg Oral Q6H PRN Keller Hamman, NP   5 mg at 06/16/22 1152    haloperidol lactate (HALDOL) injection 5 mg  5 mg IntraMUSCular Q6H PRN Keller Hamman, NP        benztropine (COGENTIN) tablet 1 mg  1 mg Oral BID PRN Keller Hamman, NP        diphenhydrAMINE (BENADRYL) injection 50 mg  50 mg IntraMUSCular BID PRN Keller Hamman, NP        hydrOXYzine HCL (ATARAX) tablet 50 mg  50 mg Oral TID PRN Keller Hamman, NP   50 mg at 06/16/22 1710    LORazepam (ATIVAN) injection 1 mg  1 mg IntraMUSCular Q4H PRN Keller Hamman, NP        magnesium hydroxide (MILK OF MAGNESIA) 400 mg/5 mL oral suspension 30 mL  30 mL Oral DAILY PRN Keller Hamman, NP        thiamine HCL (B-1) tablet 100 mg  100 mg Oral DAILY Keller Hamman, NP   100 mg at 34/37/68 4154    folic acid (FOLVITE) tablet 1 mg  1 mg Oral DAILY Keller Hamman, NP   1 mg at 06/16/22 9848    therapeutic multivitamin (THERAGRAN) tablet 1 Tablet  1 Tablet Oral DAILY Keller Hamman, NP   1 Tablet at 06/16/22 0858    nicotine (NICODERM CQ) 21 mg/24 hr patch 1 Patch  1 Patch TransDERmal DAILY Keller Hamman, NP   1 Patch at 06/16/22 0858         Mental Status Exam:  Eye contact: good  Grooming: fair  Psychomotor activity: intact  Speech is spontaneous  Mood is \"okay \"  Affect: anxious  Perception: No AVH  Suicidal ideation: Denies  Cognition is grossly intact         Physical Exam:  Body habitus: Body mass index is 36.56 kg/m². Musculoskeletal system: normal gait  Tremor - neg  Cog wheeling - neg      Assessment and Plan:  Zenia Manzano meets criteria for a diagnosis of Recurrent major depressive disorder    Plan:   - CONTINUE Naltrexone 50 mg QDAY for EtOH cravings  - CONTINUE Prozac 20 mg QDAY for MDD adjunct,  after 7 days  - INCREASE Effexor to 150 mg QDAY for MDD  - D/C Ativan taper for EtOH withdrawal  - IGM therapy as tolerated  - Expand database / obtain collateral  Discharge patient home tomorrow     A coordinated, multidisplinary treatment team round was conducted with the patient, nurses, pharmcist,  and writer present. Discussions held with , and/or with family members; Complete current electronic health record for patient was reviewed in full including consultant notes, ancillary staff notes, nurses and tech notes, labs and vitals. I certify that this patients inpatient psychiatric hospital services furnished since the previous certification were, and continue to be, required for treatment that could reasonably be expected to improve the patient's condition, or for diagnostic study, and that the patient continues to need, on a daily basis, active treatment furnished directly by or requiring the supervision of inpatient psychiatric facility personnel. In addition, the hospital records show that services furnished were intensive treatment services, admission or related services, or equivalent services.

## 2022-06-17 VITALS
RESPIRATION RATE: 18 BRPM | HEIGHT: 64 IN | SYSTOLIC BLOOD PRESSURE: 125 MMHG | TEMPERATURE: 98 F | WEIGHT: 213 LBS | BODY MASS INDEX: 36.37 KG/M2 | OXYGEN SATURATION: 98 % | DIASTOLIC BLOOD PRESSURE: 69 MMHG | HEART RATE: 103 BPM

## 2022-06-17 PROCEDURE — 74011250637 HC RX REV CODE- 250/637: Performed by: PSYCHIATRY & NEUROLOGY

## 2022-06-17 PROCEDURE — 74011250637 HC RX REV CODE- 250/637: Performed by: NURSE PRACTITIONER

## 2022-06-17 PROCEDURE — 74011250637 HC RX REV CODE- 250/637

## 2022-06-17 RX ORDER — BACLOFEN 10 MG/1
10 TABLET ORAL 3 TIMES DAILY
Qty: 90 TABLET | Refills: 0 | Status: ON HOLD | OUTPATIENT
Start: 2022-06-17 | End: 2022-07-07 | Stop reason: SDUPTHER

## 2022-06-17 RX ORDER — IBUPROFEN 400 MG/1
800 TABLET ORAL
Status: DISCONTINUED | OUTPATIENT
Start: 2022-06-17 | End: 2022-06-17

## 2022-06-17 RX ORDER — VENLAFAXINE HYDROCHLORIDE 150 MG/1
150 CAPSULE, EXTENDED RELEASE ORAL
Qty: 30 CAPSULE | Refills: 0 | Status: SHIPPED | OUTPATIENT
Start: 2022-06-18 | End: 2022-07-07

## 2022-06-17 RX ADMIN — Medication 100 MG: at 07:58

## 2022-06-17 RX ADMIN — NALTREXONE HYDROCHLORIDE 50 MG: 50 TABLET, FILM COATED ORAL at 07:57

## 2022-06-17 RX ADMIN — VENLAFAXINE HYDROCHLORIDE 150 MG: 75 CAPSULE, EXTENDED RELEASE ORAL at 07:56

## 2022-06-17 RX ADMIN — FOLIC ACID 1 MG: 1 TABLET ORAL at 07:57

## 2022-06-17 RX ADMIN — THERA TABS 1 TABLET: TAB at 07:57

## 2022-06-17 RX ADMIN — ARIPIPRAZOLE 10 MG: 5 TABLET ORAL at 07:57

## 2022-06-17 RX ADMIN — BACLOFEN 10 MG: 10 TABLET ORAL at 07:58

## 2022-06-17 RX ADMIN — Medication 2 MG: at 08:00

## 2022-06-17 RX ADMIN — FLUOXETINE 20 MG: 20 CAPSULE ORAL at 07:56

## 2022-06-17 NOTE — BH NOTES
Behavioral Health Transition Record to Provider    Patient Name: Vashti Aguiar  YOB: 1980  Medical Record Number: [id number]  Date of Admission: 6/9/2022  Date of Discharge: 6/17/2022    Attending Provider: Brandon Feltcher MD  Discharging Provider: Vega Carmona MD  To contact this individual call [phone]  and ask the  to page. If unavailable, ask to be transferred to [address] [place] [address] Provider on call. [facility] Provider will be available on call 24/7 and during holidays. Primary Care Provider: Karlo Guerin MD    Allergies   Allergen Reactions    Aspirin Other (comments)     Hot sweats and passed out; tolerated ibuprofen    Penicillins Other (comments)     Childhood. Does not remember reaction. Is not aware if she has ever taken cephalosporins in the past.    Jan 2019: Pt notes no problems with cephalosporins. Reason for Admission: SI and deox     Admission Diagnosis: Depression [F32. A]  Alcohol use disorder, severe, dependence ([facility] [F10.20]    * No surgery found *    Results for orders placed or performed during the hospital encounter of 06/09/22   COVID-19 WITH INFLUENZA A/B   Result Value Ref Range    SARS-CoV-2 by PCR Not detected NOTD      Influenza A by PCR Not detected      Influenza B by PCR Not detected     CBC WITH AUTOMATED DIFF   Result Value Ref Range    WBC 8.7 3.6 - 11.0 K/uL    RBC 4.07 3.80 - 5.20 M/uL    HGB 13.2 11.5 - 16.0 g/dL    HCT 36.9 35.0 - 47.0 %    MCV 90.7 80.0 - 99.0 FL    MCH 32.4 26.0 - 34.0 PG    MCHC 35.8 30.0 - 36.5 g/dL    RDW 14.2 11.5 - 14.5 %    PLATELET 425 (H) 337 - 400 K/uL    MPV 9.4 8.9 - 12.9 FL    NRBC 0.0 0  WBC    ABSOLUTE NRBC 0.00 0.00 - 0.01 K/uL    NEUTROPHILS 68 32 - 75 %    LYMPHOCYTES 21 12 - 49 %    MONOCYTES 8 5 - 13 %    EOSINOPHILS 1 0 - 7 %    BASOPHILS 1 0 - 1 %    IMMATURE GRANULOCYTES 1 (H) 0.0 - 0.5 %    ABS. NEUTROPHILS 6.0 1.8 - 8.0 K/UL    ABS.  LYMPHOCYTES 1.8 0.8 - 3.5 K/UL    ABS. MONOCYTES 0.7 0.0 - 1.0 K/UL    ABS. EOSINOPHILS 0.1 0.0 - 0.4 K/UL    ABS. BASOPHILS 0.1 0.0 - 0.1 K/UL    ABS. IMM. GRANS. 0.1 (H) 0.00 - 0.04 K/UL    DF AUTOMATED     METABOLIC PANEL, COMPREHENSIVE   Result Value Ref Range    Sodium 138 136 - 145 mmol/L    Potassium 3.9 3.5 - 5.1 mmol/L    Chloride 103 97 - 108 mmol/L    CO2 24 21 - 32 mmol/L    Anion gap 11 5 - 15 mmol/L    Glucose 150 (H) 65 - 100 mg/dL    BUN 15 6 - 20 MG/DL    Creatinine 0.57 0.55 - 1.02 MG/DL    BUN/Creatinine ratio 26 (H) 12 - 20      GFR est AA >60 >60 ml/min/1.73m2    GFR est non-AA >60 >60 ml/min/1.73m2    Calcium 8.2 (L) 8.5 - 10.1 MG/DL    Bilirubin, total 0.7 0.2 - 1.0 MG/DL    ALT (SGPT) 486 (H) 12 - 78 U/L    AST (SGOT) 364 (H) 15 - 37 U/L    Alk.  phosphatase 80 45 - 117 U/L    Protein, total 7.0 6.4 - 8.2 g/dL    Albumin 3.4 (L) 3.5 - 5.0 g/dL    Globulin 3.6 2.0 - 4.0 g/dL    A-G Ratio 0.9 (L) 1.1 - 2.2     DRUG SCREEN, URINE   Result Value Ref Range    AMPHETAMINES Negative NEG      BARBITURATES Negative NEG      BENZODIAZEPINES Negative NEG      COCAINE Negative NEG      METHADONE Negative NEG      OPIATES Negative NEG      PCP(PHENCYCLIDINE) Negative NEG      THC (TH-CANNABINOL) Negative NEG      Drug screen comment (NOTE)    URINALYSIS W/ REFLEX CULTURE    Specimen: Urine   Result Value Ref Range    Color YELLOW/STRAW      Appearance CLEAR CLEAR      Specific gravity 1.020      pH (UA) 6.0 5.0 - 8.0      Protein Negative NEG mg/dL    Glucose Negative NEG mg/dL    Ketone Negative NEG mg/dL    Bilirubin Negative NEG      Blood Negative NEG      Urobilinogen 1.0 0.2 - 1.0 EU/dL    Nitrites Negative NEG      Leukocyte Esterase Negative NEG      WBC 0-4 0 - 4 /hpf    RBC 0-5 0 - 5 /hpf    Epithelial cells FEW FEW /lpf    Bacteria Negative NEG /hpf    UA:UC IF INDICATED CULTURE NOT INDICATED BY UA RESULT CNI     SAMPLES BEING HELD   Result Value Ref Range    SAMPLES BEING HELD        Add-on orders for these samples will be processed based on acceptable specimen integrity and analyte stability, which may vary by analyte. COMMENT        Add-on orders for these samples will be processed based on acceptable specimen integrity and analyte stability, which may vary by analyte. LIPASE   Result Value Ref Range    Lipase 88 73 - 393 U/L   ETHYL ALCOHOL   Result Value Ref Range    ALCOHOL(ETHYL),SERUM 87 (H) <61 MG/DL   SALICYLATE   Result Value Ref Range    Salicylate level <1.3 (L) 2.8 - 20.0 MG/DL   ACETAMINOPHEN   Result Value Ref Range    Acetaminophen level <2 (L) 10 - 30 ug/mL   CBC WITH AUTOMATED DIFF   Result Value Ref Range    WBC 8.7 3.6 - 11.0 K/uL    RBC 3.69 (L) 3.80 - 5.20 M/uL    HGB 11.2 (L) 11.5 - 16.0 g/dL    HCT 34.2 (L) 35.0 - 47.0 %    MCV 92.7 80.0 - 99.0 FL    MCH 30.4 26.0 - 34.0 PG    MCHC 32.7 30.0 - 36.5 g/dL    RDW 13.7 11.5 - 14.5 %    PLATELET 737 (H) 330 - 400 K/uL    MPV 9.3 8.9 - 12.9 FL    NRBC 0.0 0  WBC    ABSOLUTE NRBC 0.00 0.00 - 0.01 K/uL    NEUTROPHILS 57 32 - 75 %    LYMPHOCYTES 35 12 - 49 %    MONOCYTES 5 5 - 13 %    EOSINOPHILS 1 0 - 7 %    BASOPHILS 1 0 - 1 %    IMMATURE GRANULOCYTES 1 (H) 0.0 - 0.5 %    ABS. NEUTROPHILS 4.9 1.8 - 8.0 K/UL    ABS. LYMPHOCYTES 3.0 0.8 - 3.5 K/UL    ABS. MONOCYTES 0.5 0.0 - 1.0 K/UL    ABS. EOSINOPHILS 0.1 0.0 - 0.4 K/UL    ABS. BASOPHILS 0.1 0.0 - 0.1 K/UL    ABS. IMM.  GRANS. 0.1 (H) 0.00 - 0.04 K/UL    DF AUTOMATED     METABOLIC PANEL, COMPREHENSIVE   Result Value Ref Range    Sodium 140 136 - 145 mmol/L    Potassium 4.1 3.5 - 5.1 mmol/L    Chloride 106 97 - 108 mmol/L    CO2 26 21 - 32 mmol/L    Anion gap 8 5 - 15 mmol/L    Glucose 111 (H) 65 - 100 mg/dL    BUN 22 (H) 6 - 20 MG/DL    Creatinine 0.71 0.55 - 1.02 MG/DL    BUN/Creatinine ratio 31 (H) 12 - 20      GFR est AA >60 >60 ml/min/1.73m2    GFR est non-AA >60 >60 ml/min/1.73m2    Calcium 8.7 8.5 - 10.1 MG/DL    Bilirubin, total 0.2 0.2 - 1.0 MG/DL    ALT (SGPT) 129 (H) 12 - 78 U/L    AST (SGOT) 23 15 - 37 U/L    Alk.  phosphatase 62 45 - 117 U/L    Protein, total 6.2 (L) 6.4 - 8.2 g/dL    Albumin 2.7 (L) 3.5 - 5.0 g/dL    Globulin 3.5 2.0 - 4.0 g/dL    A-G Ratio 0.8 (L) 1.1 - 2.2     NT-PRO BNP   Result Value Ref Range    NT pro-BNP 24 0 - 125 PG/ML   TROPONIN-HIGH SENSITIVITY   Result Value Ref Range    Troponin-High Sensitivity 4 0 - 51 ng/L   TROPONIN-HIGH SENSITIVITY   Result Value Ref Range    Troponin-High Sensitivity 5 0 - 51 ng/L   LIPASE   Result Value Ref Range    Lipase 118 73 - 393 U/L   D DIMER   Result Value Ref Range    D-dimer <0.19 0.00 - 0.65 mg/L FEU   TROPONIN-HIGH SENSITIVITY   Result Value Ref Range    Troponin-High Sensitivity 5 0 - 51 ng/L   HCG URINE, QL. - POC   Result Value Ref Range    Pregnancy test,urine (POC) Negative NEG     EKG, 12 LEAD, INITIAL   Result Value Ref Range    Ventricular Rate 104 BPM    Atrial Rate 104 BPM    P-R Interval 126 ms    QRS Duration 102 ms    Q-T Interval 374 ms    QTC Calculation (Bezet) 491 ms    Calculated P Axis 35 degrees    Calculated R Axis 24 degrees    Calculated T Axis 43 degrees    Diagnosis       Sinus tachycardia  Incomplete right bundle branch block  Borderline ECG  When compared with ECG of 04-AUG-2018 14:56,  Incomplete right bundle branch block is now present  Confirmed by Galdino Chester M.D., Moi Scott ([id number]) on 6/13/2022 7:17:39 AM     ECHO ADULT COMPLETE   Result Value Ref Range    IVSd 0.9 0.6 - 0.9 cm    LVIDd 3.9 3.9 - 5.3 cm    LVIDs 2.1 cm    LVOT Diameter 1.8 cm    LVPWd 1.1 (A) 0.6 - 0.9 cm    EF BP 74 55 - 100 %    LV Ejection Fraction A2C 83 %    LV Ejection Fraction A4C 59 %    LV EDV A2C 89 mL    LV EDV A4C 81 mL    LV EDV BP 89 56 - 104 mL    LV ESV A2C 15 mL    LV ESV A4C 33 mL    LV ESV BP 23 19 - 49 mL    LVOT Peak Gradient 7 mmHg    LVOT Peak Velocity 1.3 m/s    LA Diameter 3.6 cm    LA Volume 4C 47 22 - 52 mL    AV Area by Planimetry 2.4 cm2    MV Area by Planimetry 5.6 cm2    MV A Velocity 0.63 m/s    MV E Wave Deceleration Time 115.3 ms    MV E Velocity 0.65 m/s    MR Peak Gradient 130 mmHg    MR Peak Velocity 5.7 m/s    MV Peak Gradient 4 mmHg    MV Mean Gradient 2 mmHg    MV PHT 65.7 ms    MV Max Velocity 1.1 m/s    MV Mean Velocity 0.7 m/s    MV VTI 37.5 cm    MV Area by PHT 3.3 cm2    PV Peak Gradient 3 mmHg    PV Max Velocity 0.9 m/s    Aortic Root 2.3 cm    Fractional Shortening 2D 46 28 - 44 %    LV ESV Index BP 11 mL/m2    LV EDV Index BP 44 mL/m2    LV ESV Index A4C 16 mL/m2    LV EDV Index A4C 40 mL/m2    LV ESV Index A2C 7 mL/m2    LV EDV Index A2C 44 mL/m2    LVIDd Index 1.94 cm/m2    LVIDs Index 1.04 cm/m2    LV RWT Ratio 0.56     LV Mass 2D 122.1 67 - 162 g    LV Mass 2D Index 60.8 43 - 95 g/m2    MV E/A 1.03     LVOT Area 2.5 cm2    LA Volume Index 4C 23 16 - 34 mL/m2    LA Size Index 1.79 cm/m2    LA/AO Root Ratio 1.57     Ao Root Index 1.14 cm/m2    MV Area Index 2.8 cm2/m2    ARSALAN/BSA 1.19 cm2/m2       Immunizations administered during this encounter:   Immunization History   Administered Date(s) Administered    Influenza Vaccine 09/26/2013    Influenza Vaccine (Quad) PF (>6 Mo Flulaval, Fluarix, and >3 Yrs Afluria, Fluzone 05053) 12/12/2014, 10/13/2015, 10/12/2016    Influenza Vaccine Split 09/27/2012       Screening for Metabolic Disorders for Patients on Antipsychotic Medications  (Data obtained from the EMR)    Estimated Body Mass Index  Estimated body mass index is 36.56 kg/m² as calculated from the following:    Height as of this encounter: 5' 4\" (1.626 m). Weight as of this encounter: 96.6 kg (213 lb).      Vital Signs/Blood Pressure  Visit Vitals  BP (!) 121/58   Pulse 99   Temp 97.2 °F (36.2 °C)   Resp 18   Ht 5' 4\" (1.626 m)   Wt 96.6 kg (213 lb)   LMP 03/14/2016   SpO2 99%   Breastfeeding No   BMI 36.56 kg/m²       Blood Glucose/Hemoglobin A1c  Lab Results   Component Value Date/Time    Glucose 111 (H) 06/14/2022 04:54 AM    Glucose (POC) 94 04/04/2016 07:26 AM       Lab Results Component Value Date/Time    Hemoglobin A1c 5.9 (H) 02/03/2022 12:00 AM        Lipid Panel  Lab Results   Component Value Date/Time    Cholesterol, total 126 04/21/2022 11:13 AM    HDL Cholesterol 58 04/21/2022 11:13 AM    LDL, calculated 31.4 04/21/2022 11:13 AM    Triglyceride 183 (H) 04/21/2022 11:13 AM    CHOL/HDL Ratio 2.2 04/21/2022 11:13 AM        Discharge Diagnosis: Please refer to physician's discharge summary. Discharge Plan:   The patient Jami Campos exhibits the ability to control behavior in a less restrictive environment. Patient's level of functioning is improving. No assaultive/destructive behavior has been observed for the past 24 hours. No suicidal/homicidal threat or behavior has been observed for the past 24 hours. There is no evidence of serious medication side effects. Patient has not been in physical or protective restraints for at least the past 24 hours. Discharge Medication List and Instructions:   Current Discharge Medication List          Unresulted Labs (24h ago, onward)            None        To obtain results of studies pending at discharge, please contact N/A    Follow-up Information     Follow up With Specialties Details Why Contact Info    Partial Hospitalization Program   Go on 6/20/2022 You will complete the second portion of intake on Monday, June 20th at 11:00AM. Please come to ED entrance and check in with registration.  Bring a copy of your insurance card, ID and medication list.  [place] [address] [address]  -C  Phone: [phone]  Fax: [phone]      Partial Hospitalization Program   Go on 6/22/2022 You will begin the program on Wednesday, June 22nd at 8:45 AM The program will be Mondays -Fridays from 8:45 AM -2:45 PM [facility], [facility] [address]  -C  Phone: [phone]  Fax: [phone]          Advanced Directive:   Does the patient have an appointed surrogate decision maker? Unknown   Does the patient have a Medical Advance Directive? Unknown   Does the patient have a Psychiatric Advance Directive? Unknown   If the patient does not have a surrogate or Medical Advance Directive AND Psychiatric Advance Directive, the patient was offered information on these advance directives. Unknown     Patient Instructions: Please continue all medications until otherwise directed by physician. Tobacco Cessation Discharge Plan:   Is the patient a smoker and needs referral for smoking cessation? No  Patient referred to the following for smoking cessation with an appointment? No   Patient was offered medication to assist with smoking cessation at discharge? No  Was education for smoking cessation added to the discharge instructions? No     Alcohol/Substance Abuse Discharge Plan:   Does the patient have a history of substance/alcohol abuse and requires a referral for treatment? Yes  Patient referred to the following for substance/alcohol abuse treatment with an appointment? Yes  Patient was offered medication to assist with alcohol cessation at discharge? No  Was education for substance/alcohol abuse added to discharge instructions? Yes     Patient discharged to Home; provided to the patient/caregiver either in hard copy or electronically. Continuing care paperwork was faxed to community mental health providers.

## 2022-06-17 NOTE — PROGRESS NOTES
Problem: Depressed Mood (Adult/Pediatric)  Goal: *LTG: Returns to previous level of functioning and participates with after care plan  Outcome: Progressing Towards Goal     Problem: Depressed Mood (Adult/Pediatric)  Goal: *STG: Remains safe in hospital  Outcome: Progressing Towards Goal     Problem: Depressed Mood (Adult/Pediatric)  Goal: *STG: Demonstrates reduction in symptoms and increase in insight into coping skills/future focused  Outcome: Progressing Towards Goal   1900 to 2000:  Patient observed quiet in bed during shift rounds. Patient appears sleeping during hourly rounds and q 15 minute checks  Patient denies any S/I, H/I, pain or A/V/H. Patient reports  Depression 4/10.,   anxiety 4/10 and pain. Noted fine motor  tremors of hands. Continous  Rounding maintained for care and safety. Patient slept about 8hrs.

## 2022-06-17 NOTE — PROGRESS NOTES
Bedside shift change [facility] nurse[facility] by Heri Freedman RN (offgoing nurse). Report included the following information SBAR, Kardex, MAR, and Accordion. Assumed care of patient sitting in day room eating breakfast. AO x 4. Rated depression and anxiety 3/10. Did not endorse pain, SI, HI, or AVH. Pleasant and cooperative. Med/meal compliant. RN reviewed discharge paperwork with patient. Time provided for questions- none at this time. Belongings returned to patient. RN walked with patient to ED where she left with her mother. Problem: Depressed Mood (Adult/Pediatric)  Goal: *STG: Participates in treatment plan  Outcome: Progressing Towards Goal  Goal: *STG: Attends activities and groups  Outcome: Progressing Towards Goal  Goal: *STG: Remains safe in hospital  Outcome: Progressing Towards Goal  Goal: *STG: Complies with medication therapy  Outcome: Progressing Towards Goal     Problem: Falls - Risk of  Goal: *Absence of Falls  Description: Document Rema Fall Risk and appropriate interventions in the flowsheet.   Outcome: Progressing Towards Goal  Note: Fall Risk Interventions:            Medication Interventions: Teach patient to arise slowly

## 2022-06-17 NOTE — DISCHARGE SUMMARY
DISCHARGE SUMMARY    Some parts of the discharge summary are from the initial Psychiatric interview that was done on admission by the admitting psychiatrist.     Date of Admission: 6/9/2022    Date of Discharge: 6/17/2022     TYPE OF DISCHARGE:   REGULAR -  YES    AMA - NO  RELEASED BY THE TDO COURT - NO    ADMISSION EVALUATION:    The patient, Gian Meier, is a 46 y.o. WHITE/NON- female with a past psychiatric history significant for MDD and EtOH use disorder, who presents at this time with complaints of (and/or evidence of) the following emotional symptoms: depression and suicidal thoughts/threats. Additional symptomatology include escalation of EtOH abuse. The above symptoms have been present for 2+ weeks. These symptoms are of moderate to high severity. These symptoms are constant in nature. The patient's condition has been precipitated by psychosocial stressors. Patient's condition made worse by alcohol use as well as treatment noncompliance. UDS: negative; BAL=106.      The patient is a fair historian. The patient corroborates the above narrative. The patient contracts for safety on the unit and gives consent for the team to contact collateral. The patient is amenable to initiating treatment while on the unit. She states that she has been having her medications adjusted as her antidepressants have not been working well for her; and she has been having difficulty getting in to see a psychiatrist. The patient acknowledges that her alcohol use has been problematic. She is open to further adjustment of her regimen as well as a detox from EtOH. She is ambivalent about rehab having just finished an abstinence based program through her [facility] but struggling to maintain her sobriety without medication. After a discussion of risk and benefit, the patient is amenable to increasing her antidepressant and starting Naltrexone.       COURSE IN THE HOSPITAL:    Patient was admitted to the inpatient psychiatry unit for acute psychiatric stabilization in regards to symptomatology as described in the HPI above and placed on Q15 minute checks and withdrawal precautions. While on the unit Ericka Mane was involved in individual, group, occupational and milieu therapy. She was started back on her usual medication regimen as well as PRN medications including Effexor, Baclofen, Trazodone, hydroxyzine and abilify. She improved gradually and was able to integrate into the milieu with help from the nursing staff. Patients symptoms improved gradually including    . She was quiet on the unit, appropriate in her interactions, and cooperative with medications and the unit routine. Please see individual progress notes for more specific details regarding patient's hospitalization course. Patient was discharged as per the plan. She had been doing well on the unit as per the report of the nursing staff and my observations. No PRN medication for agitation, seclusion or restraints were required during the last 48 hours of her stay. Ericka Mane had improved progressively to the point of being stable for discharge and outpatient FU. At this time she did not offer any complaints. Patient denied any SI or HI. Denied any AH or VH. She denied any delusions. Was not considered a danger to self or to others and is safe for discharge. Will FU with her appointments and remains motivated to be in treatment. The patient verbalized understanding of her discharge instructions.         DISCHARGE DIAGNOSIS: MDD, recurrent        Current Discharge Medication List           No results found for: Fabi Hoots, VALP, VALPR, DS6, CRBAM, CRBAMP, CARB2, XCRBAM  No results found for: LITHM    Follow-up Information     Follow up With Specialties Details Why Contact Info    Partial Hospitalization Program   Go on 6/20/2022 You will complete the second portion of intake on Monday, June 20th at 11:00AM. Please come to ED entrance and check in with registration. Bring a copy of your insurance card, ID and medication list.  [place] [address] [address]  -[address]  Phone: [phone]  Fax: [phone]      Partial Hospitalization Program   Go on 6/22/2022 You will begin the program on Wednesday, June 22nd at 8:45 AM The program will be Mondays -Fridays from 8:45 AM -2:45 PM [address], [facility] KAROLINA Arshad iN  -C  Phone: [phone]  Fax: [phone]        WOUND CARE: none needed. PROGNOSIS:   Good based on nature of patient's pathology/ies and treatment compliance issues. Prognosis is greatly dependent upon patient's ability to  follow up on psychiatric/psychotherapy appointments as well as to comply with psychiatric medications as prescribed.

## 2022-06-17 NOTE — DISCHARGE INSTRUCTIONS
Patient Education        Preventing Depression From Coming Back: Care Instructions  Overview     Some people have depression symptoms that come back. Depression often comes and goes during a lifetime. But there are many things you can do to keep it from coming back. Follow-up care is a key part of your treatment and safety. Be sure to make and go to all appointments, and call your doctor if you are having problems. It's also a good idea to know your test results and keep a list of the medicines you take. What do you need to know? Know your risk of depression coming back  Many things can make a person more likely to have depression again. These include having depression symptoms that continue after treatment, a previous episode of depression, and a history of childhood abuse or neglect. It is important to know your risk and to recognize warning signs of depression symptoms returning. Once you know these things, you will be better able to keep it from happening to you. Know the warning signs of depression returning  The two most common signs of depression coming back are:  · Feeling sad or hopeless. · Losing interest in your daily activities. You may have other symptoms, such as:  · You eat more or less than usual.  · You sleep too much or not enough. · You feel restless and unable to sit still. · You feel unable to move. · You feel tired all the time. · You feel unworthy or guilty without an obvious reason. · You have problems concentrating, remembering, or making decisions. · You think often about death or suicide. · You feel angry or have panic attacks. How can you care for yourself at home? · Take your medicine as prescribed. Call your doctor if you have any problems with your medicine. · Continue to take your medicine after your symptoms improve. Taking your medicine for at least 6 months after you feel better can help keep you from getting depressed again.  If your depression keeps coming back, your doctor may recommend you take medicine even longer. · Continue counseling. It may help prevent depression from returning, especially if you've had multiple episodes of depression. Talk with your counselor if you are having a hard time attending your sessions or you think the sessions aren't working. Don't just stop going. · Eat healthy foods. Include fruits, vegetables, beans, and whole grains in your diet each day. · Get regular exercise. Go for a walk or jog, ride your bike, or play sports with friends. · See your doctor right away if you have new symptoms or feel that your depression is coming back. · Keep a regular sleep schedule. Try for 8 hours of sleep a night. · Avoid using illegal drugs or marijuana and drinking alcohol. · If you or someone you know talks about suicide, self-harm, or feeling hopeless, get help right away. Call the [address] [place] [address] at 1-197-423-GEJN (5-794.963.9914) or text HOME to 923844 to access the Crisis Text Line. Consider saving these numbers in your phone. When should you call for help? Call 911 anytime you think you may need emergency care. For example, call if:    · You are thinking about suicide or are threatening suicide.     · You feel you cannot stop from hurting yourself or someone else.     · You hear or see things that aren't real.     · You think or speak in a bizarre way that is not like your usual behavior. Call your doctor now or seek immediate medical care if:    · You are drinking a lot of alcohol or using illegal drugs.     · You are talking or writing about death.    Watch closely for changes in your health, and be sure to contact your doctor if:    · You find it hard or it's getting harder to deal with school, a job, family, or friends.     · You think your treatment is not helping or you are not getting better.     · Your symptoms get worse or you get new symptoms.     · You have any problems with your antidepressant medicines, such as side effects, or you are thinking about stopping your medicine.     · You are having manic behavior, such as having very high energy, needing less sleep than normal, or showing risky behavior such as spending money you don't have or abusing others verbally or physically. Where can you learn more? Go to http://www.gray.com/  Enter C630 in the search box to learn more about \"Preventing Depression From Coming Back: Care Instructions. \"  Current as of: June 16, 2021               Content Version: 13.2  © 6007-5145 [employer]. Care instructions adapted under license by [employer] (which disclaims liability or warranty for this information). If you have questions about a medical condition or this instruction, always ask your healthcare professional. [address] any warranty or liability for your use of this information. If I feel I am at risk of hurting myself or others, I will call the crisis office and speak with a crisis worker who will assist me during my crisis.   [address]  [phone]  [facility] [place] [address] [phone] [address] [facility]  [place]  Crisis-  [phone]

## 2022-06-20 ENCOUNTER — HOSPITAL ENCOUNTER (OUTPATIENT)
Dept: BEHAVIORAL/MENTAL HEALTH | Age: 42
Discharge: HOME | End: 2022-06-20
Payer: MEDICAID

## 2022-06-20 LAB
ATRIAL RATE: 91 BPM
CALCULATED P AXIS, ECG09: 48 DEGREES
CALCULATED R AXIS, ECG10: 48 DEGREES
CALCULATED T AXIS, ECG11: 46 DEGREES
DIAGNOSIS, 93000: NORMAL
P-R INTERVAL, ECG05: 140 MS
Q-T INTERVAL, ECG07: 398 MS
QRS DURATION, ECG06: 106 MS
QTC CALCULATION (BEZET), ECG08: 489 MS
VENTRICULAR RATE, ECG03: 91 BPM

## 2022-06-22 ENCOUNTER — HOSPITAL ENCOUNTER (OUTPATIENT)
Dept: BEHAVIORAL/MENTAL HEALTH | Age: 42
Discharge: HOME | End: 2022-06-22
Payer: MEDICAID

## 2022-06-22 PROCEDURE — 90853 GROUP PSYCHOTHERAPY: CPT

## 2022-06-23 ENCOUNTER — HOSPITAL ENCOUNTER (OUTPATIENT)
Dept: BEHAVIORAL/MENTAL HEALTH | Age: 42
Discharge: HOME | End: 2022-06-23
Payer: MEDICAID

## 2022-06-23 PROCEDURE — 90853 GROUP PSYCHOTHERAPY: CPT

## 2022-06-23 NOTE — H&P
[facility] HISTORY AND PHYSICAL    Name:  Edgard Arciniega  MR#:  [id number]  :  1980  ACCOUNT #:  [de-identified]  ADMIT DATE:  2022    PSYCHIATRIC INTAKE NOTE    Transferring to the [facility] program.    CHIEF COMPLAINT:  \"I'm here for the therapy. \"    HISTORY OF PRESENT ILLNESS:  This is a 28-year-old  female, history of depression, alcoholism. Reports having multiple hospitalizations, in and out of hospitals over the years. In last few months, she had escalation of her depression with alcohol abuse. Recently was hospitalized after two weeks of depression and increased alcohol use. The patient stated that she came to the partial program after hospitalization because she wanted to break this cycle of in and out of the hospital with not just getting detoxed and then put back in the same environment just to relapse and start the process all over again. She felt she needed more therapy intensively and this program fits the bill for what she is looking for. She has a positive attitude and hopeful about the process here. She describes a history of trauma but does not want to talk about them. She was not able to address her issues during hospitalizations while detoxing from alcohol. PAST PSYCHIATRIC HISTORY:  Depression, ADHD, alcoholism. Chronic pain, tobacco abuse along with alcohol abuse. PAST MEDICAL HISTORY:  Noncontributory at this time. ALLERGIES:  ASPIRIN, HOT SWEATS, PASSED OUT. PENICILLIN ALLERGY, UNCLEAR RESPONSE. SOCIAL HISTORY:  Single, drinks alcohol, smokes cigarettes/ tobacco.  Limited supports. MENTAL STATUS EXAM:  Adult female. Calm, cooperative. Clear, coherent speech of average rate, volume, tone. Mood is pretty good. Affect, fair range. Thoughts are linear, goal directed. Denies suicidal or homicidal ideations currently. No auditory or visual hallucinations. Aware of her surroundings, location, situation.   Here for therapy and management of her condition. DIAGNOSES:  Major depressive disorder, recurrent, severe without psychotic features; alcohol dependence; attention deficit hyperactivity disorder. PLAN:  Admit for safety and stabilization, group therapy and individual therapy. Medication modification as needed. DISPOSITION:  Planning with Social Work. ESTIMATED LENGTH OF STAY:  1-2 weeks. MANUEL Santacruz MD      [id number]  D:  06/23/2022 0:35  T:  06/23/2022 7:51  JOB #:  [id number]

## 2022-06-24 ENCOUNTER — HOSPITAL ENCOUNTER (OUTPATIENT)
Dept: BEHAVIORAL/MENTAL HEALTH | Age: 42
Discharge: HOME | End: 2022-06-24
Payer: MEDICAID

## 2022-06-24 PROCEDURE — 90853 GROUP PSYCHOTHERAPY: CPT

## 2022-06-24 RX ORDER — DEXTROAMPHETAMINE SACCHARATE, AMPHETAMINE ASPARTATE, DEXTROAMPHETAMINE SULFATE AND AMPHETAMINE SULFATE 3.125; 3.125; 3.125; 3.125 MG/1; MG/1; MG/1; MG/1
12.5 TABLET ORAL 2 TIMES DAILY
Qty: 60 TABLET | Refills: 0 | Status: SHIPPED | OUTPATIENT
Start: 2022-06-24 | End: 2022-07-07

## 2022-06-24 RX ORDER — DEXTROAMPHETAMINE SACCHARATE, AMPHETAMINE ASPARTATE, DEXTROAMPHETAMINE SULFATE AND AMPHETAMINE SULFATE 2.5; 2.5; 2.5; 2.5 MG/1; MG/1; MG/1; MG/1
10 TABLET ORAL 2 TIMES DAILY
Qty: 60 TABLET | Refills: 0 | Status: SHIPPED | OUTPATIENT
Start: 2022-06-24 | End: 2022-07-07

## 2022-06-27 ENCOUNTER — HOSPITAL ENCOUNTER (OUTPATIENT)
Dept: BEHAVIORAL/MENTAL HEALTH | Age: 42
Discharge: HOME | End: 2022-06-27
Payer: MEDICAID

## 2022-06-27 PROCEDURE — 90832 PSYTX W PT 30 MINUTES: CPT

## 2022-06-27 PROCEDURE — 90853 GROUP PSYCHOTHERAPY: CPT

## 2022-06-28 ENCOUNTER — HOSPITAL ENCOUNTER (OUTPATIENT)
Dept: BEHAVIORAL/MENTAL HEALTH | Age: 42
Discharge: HOME | End: 2022-06-28
Payer: MEDICAID

## 2022-06-28 PROCEDURE — 90853 GROUP PSYCHOTHERAPY: CPT

## 2022-06-28 NOTE — PROGRESS NOTES
MEDICATION GROUP THERAPY PROGRESS NOTE      Karis Mancia was present for medication group. TOPIC: Medication adherence    GROUP TIME: 1:10 - 2:00 PM Tuesday    PERSONAL GOAL FOR PARTICIPATION: To be present for group, participate in discussion, and answer patient-directed questions. GOAL ORIENTATION: Personal    THERAPEUTIC INTERVENTIONS REVIEWED AND DISCUSSED: The following topics were presented: common barriers to taking medications including but not limited to cost, transportation, complexity of regimen, lack of knowledge about medication regimen;  beliefs regarding medications; strategies on how to overcome barriers and beliefs regarding medications in order to improve medication adherence. Patients were given time to ask questions regarding their current therapy. IMPRESSION OF PARTICIPATION: Renetta Ulrich actively participated in group discussions, sharing personal anecdotes with the group. Asked questions regarding her medications currently prescribed and appeared interested in learning more about her medications. Helpful to other members in the group, showing them what the [facility] trudy looks like.       WHIT Severino [facility] Specialist, [address] [place] [address]  Desk: [phone] ([id number])  Pharmacy: [phone] ([id number])

## 2022-06-29 ENCOUNTER — HOSPITAL ENCOUNTER (OUTPATIENT)
Dept: BEHAVIORAL/MENTAL HEALTH | Age: 42
Discharge: HOME | End: 2022-06-29
Payer: MEDICAID

## 2022-06-29 PROCEDURE — 90853 GROUP PSYCHOTHERAPY: CPT

## 2022-06-30 ENCOUNTER — HOSPITAL ENCOUNTER (OUTPATIENT)
Dept: BEHAVIORAL/MENTAL HEALTH | Age: 42
Discharge: HOME | End: 2022-06-30
Payer: MEDICAID

## 2022-06-30 PROCEDURE — 90853 GROUP PSYCHOTHERAPY: CPT

## 2022-06-30 NOTE — GROUP NOTE
GRISELDA  GROUP DOCUMENTATION INDIVIDUAL                                                                          Group Therapy Note    Date: 6/30/2022    Group Start Time:  9:00 AM  Group End Time:  9:45 AM  Group Topic: [employer]    [facility] ACUTE BEHAV TH    [place], [facility] GROUP DOC[facility]    Group Therapy Note    Pt's completed a daily check-in questionnaire, a self-esteem worksheet, and played a game that focuses on self-esteem. Attendees: 4         Attendance: Attended    Patient's Goal:  \"Self talk\"    Interventions/techniques: Provide feedback and Supported    Follows Directions: Followed directions    Interactions: Interacted appropriately    Mental Status: Calm    Behavior/appearance: Cooperative    Goals Achieved: Able to reflect/comment on own behavior and Able to receive feedback      Additional Notes:   Pt actively participated and interacted appropriately with staff and peers. Pt reported depression level 6/10, anger 1/10, and anxiety 3/10. Pt currently denies SI/HI. Pt denies using drugs and/or alcohol. Pt reports biggest stressor is, \"Focusing. \"     Neeru Atkins

## 2022-07-01 ENCOUNTER — HOSPITAL ENCOUNTER (OUTPATIENT)
Dept: BEHAVIORAL/MENTAL HEALTH | Age: 42
Discharge: HOME | End: 2022-07-01
Payer: MEDICAID

## 2022-07-01 PROCEDURE — 90853 GROUP PSYCHOTHERAPY: CPT

## 2022-07-03 ENCOUNTER — HOSPITAL ENCOUNTER (INPATIENT)
Age: 42
LOS: 4 days | Discharge: HOME | DRG: 751 | End: 2022-07-07
Attending: EMERGENCY MEDICINE | Admitting: PSYCHIATRY & NEUROLOGY
Payer: MEDICAID

## 2022-07-03 DIAGNOSIS — S00.03XA CONTUSION OF SCALP, INITIAL ENCOUNTER: ICD-10-CM

## 2022-07-03 DIAGNOSIS — R45.851 SUICIDAL IDEATION: Primary | ICD-10-CM

## 2022-07-03 DIAGNOSIS — F33.2 SEVERE EPISODE OF RECURRENT MAJOR DEPRESSIVE DISORDER, WITHOUT PSYCHOTIC FEATURES (HCC): ICD-10-CM
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
## 2022-07-03 DIAGNOSIS — F10.920 ALCOHOLIC INTOXICATION WITHOUT COMPLICATION (HCC): ICD-10-CM

## 2022-07-03 PROBLEM — F32.9 MAJOR DEPRESSIVE DISORDER: Status: ACTIVE | Noted: 2022-07-03

## 2022-07-03 LAB
ALBUMIN SERPL-MCNC: 3.3 G/DL (ref 3.5–5)
ALBUMIN/GLOB SERPL: 1 {RATIO} (ref 1.1–2.2)
ALP SERPL-CCNC: 77 U/L (ref 45–117)
ALT SERPL-CCNC: 540 U/L (ref 12–78)
AMPHET UR QL SCN: NEGATIVE
ANION GAP SERPL CALC-SCNC: 9 MMOL/L (ref 5–15)
APAP SERPL-MCNC: <2 UG/ML (ref 10–30)
APPEARANCE UR: CLEAR
AST SERPL-CCNC: 307 U/L (ref 15–37)
BACTERIA URNS QL MICRO: NEGATIVE /HPF
BARBITURATES UR QL SCN: NEGATIVE
BASOPHILS # BLD: 0.1 K/UL (ref 0–0.1)
BASOPHILS NFR BLD: 1 % (ref 0–1)
BENZODIAZ UR QL: NEGATIVE
BILIRUB SERPL-MCNC: 0.3 MG/DL (ref 0.2–1)
BILIRUB UR QL: NEGATIVE
BUN SERPL-MCNC: 19 MG/DL (ref 6–20)
BUN/CREAT SERPL: 26 (ref 12–20)
CALCIUM SERPL-MCNC: 8.3 MG/DL (ref 8.5–10.1)
CANNABINOIDS UR QL SCN: NEGATIVE
CHLORIDE SERPL-SCNC: 105 MMOL/L (ref 97–108)
CO2 SERPL-SCNC: 25 MMOL/L (ref 21–32)
COCAINE UR QL SCN: NEGATIVE
COLOR UR: NORMAL
CREAT SERPL-MCNC: 0.72 MG/DL (ref 0.55–1.02)
DIFFERENTIAL METHOD BLD: NORMAL
DRUG SCRN COMMENT,DRGCM: NORMAL
EOSINOPHIL # BLD: 0.1 K/UL (ref 0–0.4)
EOSINOPHIL NFR BLD: 1 % (ref 0–7)
EPITH CASTS URNS QL MICRO: NORMAL /LPF
ERYTHROCYTE [DISTWIDTH] IN BLOOD BY AUTOMATED COUNT: 12.8 % (ref 11.5–14.5)
ETHANOL SERPL-MCNC: 224 MG/DL
FLUAV RNA SPEC QL NAA+PROBE: NOT DETECTED
FLUBV RNA SPEC QL NAA+PROBE: NOT DETECTED
GLOBULIN SER CALC-MCNC: 3.2 G/DL (ref 2–4)
GLUCOSE SERPL-MCNC: 119 MG/DL (ref 65–100)
GLUCOSE UR STRIP.AUTO-MCNC: NEGATIVE MG/DL
HCG UR QL: NEGATIVE
HCT VFR BLD AUTO: 35.1 % (ref 35–47)
HGB BLD-MCNC: 11.8 G/DL (ref 11.5–16)
HGB UR QL STRIP: NEGATIVE
IMM GRANULOCYTES # BLD AUTO: 0 K/UL (ref 0–0.04)
IMM GRANULOCYTES NFR BLD AUTO: 0 % (ref 0–0.5)
KETONES UR QL STRIP.AUTO: NEGATIVE MG/DL
LEUKOCYTE ESTERASE UR QL STRIP.AUTO: NEGATIVE
LYMPHOCYTES # BLD: 3 K/UL (ref 0.8–3.5)
LYMPHOCYTES NFR BLD: 39 % (ref 12–49)
MCH RBC QN AUTO: 29.8 PG (ref 26–34)
MCHC RBC AUTO-ENTMCNC: 33.6 G/DL (ref 30–36.5)
MCV RBC AUTO: 88.6 FL (ref 80–99)
METHADONE UR QL: NEGATIVE
MONOCYTES # BLD: 0.5 K/UL (ref 0–1)
MONOCYTES NFR BLD: 6 % (ref 5–13)
NEUTS SEG # BLD: 3.9 K/UL (ref 1.8–8)
NEUTS SEG NFR BLD: 52 % (ref 32–75)
NITRITE UR QL STRIP.AUTO: NEGATIVE
NRBC # BLD: 0 K/UL (ref 0–0.01)
NRBC BLD-RTO: 0 PER 100 WBC
OPIATES UR QL: NEGATIVE
PCP UR QL: NEGATIVE
PH UR STRIP: 6 [PH] (ref 5–8)
PLATELET # BLD AUTO: 308 K/UL (ref 150–400)
PMV BLD AUTO: 9.1 FL (ref 8.9–12.9)
POTASSIUM SERPL-SCNC: 3.5 MMOL/L (ref 3.5–5.1)
PROT SERPL-MCNC: 6.5 G/DL (ref 6.4–8.2)
PROT UR STRIP-MCNC: NEGATIVE MG/DL
RBC # BLD AUTO: 3.96 M/UL (ref 3.8–5.2)
RBC #/AREA URNS HPF: NORMAL /HPF (ref 0–5)
SALICYLATES SERPL-MCNC: <1.7 MG/DL (ref 2.8–20)
SARS-COV-2, COV2: NOT DETECTED
SODIUM SERPL-SCNC: 139 MMOL/L (ref 136–145)
SP GR UR REFRACTOMETRY: 1.01
UA: UC IF INDICATED,UAUC: NORMAL
UROBILINOGEN UR QL STRIP.AUTO: 0.2 EU/DL (ref 0.2–1)
WBC # BLD AUTO: 7.5 K/UL (ref 3.6–11)
WBC URNS QL MICRO: NORMAL /HPF (ref 0–4)

## 2022-07-03 PROCEDURE — 80179 DRUG ASSAY SALICYLATE: CPT

## 2022-07-03 PROCEDURE — 81025 URINE PREGNANCY TEST: CPT

## 2022-07-03 PROCEDURE — 36415 COLL VENOUS BLD VENIPUNCTURE: CPT

## 2022-07-03 PROCEDURE — 80307 DRUG TEST PRSMV CHEM ANLYZR: CPT

## 2022-07-03 PROCEDURE — 74011250637 HC RX REV CODE- 250/637: Performed by: PHYSICIAN ASSISTANT

## 2022-07-03 PROCEDURE — 74011250637 HC RX REV CODE- 250/637: Performed by: NURSE PRACTITIONER

## 2022-07-03 PROCEDURE — 87636 SARSCOV2 & INF A&B AMP PRB: CPT

## 2022-07-03 PROCEDURE — 99285 EMERGENCY DEPT VISIT HI MDM: CPT

## 2022-07-03 PROCEDURE — 65220000003 HC RM SEMIPRIVATE PSYCH

## 2022-07-03 PROCEDURE — 93005 ELECTROCARDIOGRAM TRACING: CPT

## 2022-07-03 PROCEDURE — 81001 URINALYSIS AUTO W/SCOPE: CPT

## 2022-07-03 PROCEDURE — 85025 COMPLETE CBC W/AUTO DIFF WBC: CPT

## 2022-07-03 PROCEDURE — 82077 ASSAY SPEC XCP UR&BREATH IA: CPT

## 2022-07-03 PROCEDURE — 80053 COMPREHEN METABOLIC PANEL: CPT

## 2022-07-03 PROCEDURE — 80143 DRUG ASSAY ACETAMINOPHEN: CPT

## 2022-07-03 RX ORDER — THERA TABS 400 MCG
1 TAB ORAL DAILY
Status: DISCONTINUED | OUTPATIENT
Start: 2022-07-04 | End: 2022-07-07 | Stop reason: HOSPADM

## 2022-07-03 RX ORDER — PHENOBARBITAL 32.4 MG/1
64.8 TABLET ORAL 4 TIMES DAILY
Status: COMPLETED | OUTPATIENT
Start: 2022-07-03 | End: 2022-07-04

## 2022-07-03 RX ORDER — PHENOBARBITAL 32.4 MG/1
32.4 TABLET ORAL 4 TIMES DAILY
Status: COMPLETED | OUTPATIENT
Start: 2022-07-04 | End: 2022-07-05

## 2022-07-03 RX ORDER — PHENOBARBITAL 32.4 MG/1
32.4 TABLET ORAL 2 TIMES DAILY
Status: COMPLETED | OUTPATIENT
Start: 2022-07-06 | End: 2022-07-06

## 2022-07-03 RX ORDER — ACETAMINOPHEN 325 MG/1
650 TABLET ORAL
Status: DISCONTINUED | OUTPATIENT
Start: 2022-07-03 | End: 2022-07-07 | Stop reason: HOSPADM

## 2022-07-03 RX ORDER — LORAZEPAM 1 MG/1
1 TABLET ORAL
Status: COMPLETED | OUTPATIENT
Start: 2022-07-03 | End: 2022-07-03

## 2022-07-03 RX ORDER — HALOPERIDOL 5 MG/ML
5 INJECTION INTRAMUSCULAR
Status: DISCONTINUED | OUTPATIENT
Start: 2022-07-03 | End: 2022-07-07 | Stop reason: HOSPADM

## 2022-07-03 RX ORDER — PHENOBARBITAL 32.4 MG/1
16.2 TABLET ORAL 2 TIMES DAILY
Status: DISCONTINUED | OUTPATIENT
Start: 2022-07-07 | End: 2022-07-07 | Stop reason: HOSPADM

## 2022-07-03 RX ORDER — PHENOBARBITAL 32.4 MG/1
16.2 TABLET ORAL
Status: DISCONTINUED | OUTPATIENT
Start: 2022-07-06 | End: 2022-07-07 | Stop reason: HOSPADM

## 2022-07-03 RX ORDER — OLANZAPINE 5 MG/1
5 TABLET ORAL
Status: DISCONTINUED | OUTPATIENT
Start: 2022-07-03 | End: 2022-07-07 | Stop reason: HOSPADM

## 2022-07-03 RX ORDER — LORAZEPAM 2 MG/ML
1 INJECTION INTRAMUSCULAR
Status: DISCONTINUED | OUTPATIENT
Start: 2022-07-03 | End: 2022-07-07 | Stop reason: HOSPADM

## 2022-07-03 RX ORDER — BENZTROPINE MESYLATE 1 MG/1
1 TABLET ORAL
Status: DISCONTINUED | OUTPATIENT
Start: 2022-07-03 | End: 2022-07-07 | Stop reason: HOSPADM

## 2022-07-03 RX ORDER — LANOLIN ALCOHOL/MO/W.PET/CERES
100 CREAM (GRAM) TOPICAL DAILY
Status: DISCONTINUED | OUTPATIENT
Start: 2022-07-04 | End: 2022-07-07 | Stop reason: HOSPADM

## 2022-07-03 RX ORDER — PHENOBARBITAL 32.4 MG/1
64.8 TABLET ORAL
Status: ACTIVE | OUTPATIENT
Start: 2022-07-03 | End: 2022-07-04

## 2022-07-03 RX ORDER — HYDROXYZINE 25 MG/1
50 TABLET, FILM COATED ORAL
Status: DISCONTINUED | OUTPATIENT
Start: 2022-07-03 | End: 2022-07-07 | Stop reason: HOSPADM

## 2022-07-03 RX ORDER — DIPHENHYDRAMINE HYDROCHLORIDE 50 MG/ML
50 INJECTION, SOLUTION INTRAMUSCULAR; INTRAVENOUS
Status: DISCONTINUED | OUTPATIENT
Start: 2022-07-03 | End: 2022-07-07 | Stop reason: HOSPADM

## 2022-07-03 RX ORDER — ADHESIVE BANDAGE
30 BANDAGE TOPICAL DAILY PRN
Status: DISCONTINUED | OUTPATIENT
Start: 2022-07-03 | End: 2022-07-07 | Stop reason: HOSPADM

## 2022-07-03 RX ORDER — IBUPROFEN 600 MG/1
600 TABLET ORAL
Status: COMPLETED | OUTPATIENT
Start: 2022-07-03 | End: 2022-07-03

## 2022-07-03 RX ORDER — PHENOBARBITAL 32.4 MG/1
32.4 TABLET ORAL
Status: ACTIVE | OUTPATIENT
Start: 2022-07-04 | End: 2022-07-06

## 2022-07-03 RX ORDER — FOLIC ACID 1 MG/1
1 TABLET ORAL DAILY
Status: DISCONTINUED | OUTPATIENT
Start: 2022-07-04 | End: 2022-07-07 | Stop reason: HOSPADM

## 2022-07-03 RX ORDER — TRAZODONE HYDROCHLORIDE 50 MG/1
50 TABLET ORAL
Status: DISCONTINUED | OUTPATIENT
Start: 2022-07-03 | End: 2022-07-05

## 2022-07-03 RX ADMIN — PHENOBARBITAL 64.8 MG: 32.4 TABLET ORAL at 23:16

## 2022-07-03 RX ADMIN — TRAZODONE HYDROCHLORIDE 50 MG: 50 TABLET ORAL at 23:16

## 2022-07-03 RX ADMIN — LORAZEPAM 1 MG: 1 TABLET ORAL at 21:31

## 2022-07-03 RX ADMIN — IBUPROFEN 600 MG: 600 TABLET, FILM COATED ORAL at 20:38
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
## 2022-07-03 NOTE — ED NOTES
Bedside and Verbal shift change report given to  [employer] (oncoming nurse) by Cody Cortes RN (offgoing nurse). Report included the following information SBAR and ED Summary.

## 2022-07-04 PROCEDURE — 74011250637 HC RX REV CODE- 250/637: Performed by: NURSE PRACTITIONER

## 2022-07-04 PROCEDURE — 65220000003 HC RM SEMIPRIVATE PSYCH

## 2022-07-04 PROCEDURE — 74011250637 HC RX REV CODE- 250/637: Performed by: PSYCHIATRY & NEUROLOGY

## 2022-07-04 RX ORDER — VITAMIN B COMPLEX
1 CAPSULE ORAL DAILY
Status: DISCONTINUED | OUTPATIENT
Start: 2022-07-05 | End: 2022-07-07 | Stop reason: HOSPADM

## 2022-07-04 RX ORDER — VENLAFAXINE HYDROCHLORIDE 75 MG/1
150 CAPSULE, EXTENDED RELEASE ORAL
Status: DISCONTINUED | OUTPATIENT
Start: 2022-07-05 | End: 2022-07-05

## 2022-07-04 RX ORDER — ONDANSETRON 4 MG/1
4 TABLET, ORALLY DISINTEGRATING ORAL
Status: DISCONTINUED | OUTPATIENT
Start: 2022-07-04 | End: 2022-07-07 | Stop reason: HOSPADM

## 2022-07-04 RX ORDER — ARIPIPRAZOLE 5 MG/1
10 TABLET ORAL DAILY
Status: DISCONTINUED | OUTPATIENT
Start: 2022-07-05 | End: 2022-07-07 | Stop reason: HOSPADM

## 2022-07-04 RX ORDER — BACLOFEN 10 MG/1
10 TABLET ORAL 3 TIMES DAILY
Status: DISCONTINUED | OUTPATIENT
Start: 2022-07-04 | End: 2022-07-07 | Stop reason: HOSPADM

## 2022-07-04 RX ADMIN — TRAZODONE HYDROCHLORIDE 50 MG: 50 TABLET ORAL at 21:31

## 2022-07-04 RX ADMIN — THERA TABS 1 TABLET: TAB at 07:54

## 2022-07-04 RX ADMIN — HYDROXYZINE HYDROCHLORIDE 50 MG: 25 TABLET, FILM COATED ORAL at 11:20

## 2022-07-04 RX ADMIN — HYDROXYZINE HYDROCHLORIDE 50 MG: 25 TABLET, FILM COATED ORAL at 21:31

## 2022-07-04 RX ADMIN — PHENOBARBITAL 64.8 MG: 32.4 TABLET ORAL at 16:43

## 2022-07-04 RX ADMIN — BACLOFEN 10 MG: 10 TABLET ORAL at 14:21

## 2022-07-04 RX ADMIN — BACLOFEN 10 MG: 10 TABLET ORAL at 16:42

## 2022-07-04 RX ADMIN — Medication 100 MG: at 07:55

## 2022-07-04 RX ADMIN — PHENOBARBITAL 64.8 MG: 32.4 TABLET ORAL at 07:54

## 2022-07-04 RX ADMIN — FOLIC ACID 1 MG: 1 TABLET ORAL at 07:54

## 2022-07-04 RX ADMIN — PHENOBARBITAL 64.8 MG: 32.4 TABLET ORAL at 11:49

## 2022-07-04 RX ADMIN — ONDANSETRON 4 MG: 4 TABLET, ORALLY DISINTEGRATING ORAL at 10:02

## 2022-07-04 RX ADMIN — PHENOBARBITAL 32.4 MG: 32.4 TABLET ORAL at 21:32

## 2022-07-04 NOTE — PROGRESS NOTES
Pt met in bedroom, smiling upon approach. Endorses some anxiety and depression, denies SI/HI/AVH. CIWA 7, compliant with morning medications. Alert and oriented, able to make needs known. Denies any further needs at this time. Encouraged to participate in unit based activities. She verbalized an understanding. Will continue to monitor and round on patient every hour.      Problem: Suicide  Goal: *STG/LTG: Complies with medication therapy  Outcome: Progressing Towards Goal  Goal: *STG/LTG: No longer expresses self destructive or suicidal thoughts  Outcome: Progressing Towards Goal

## 2022-07-04 NOTE — ED NOTES
TRANSFER - OUT REPORT:    Verbal report given to Evelia RN on Tyson Singh  being transferred to  for routine progression of care       Report consisted of patients Situation, Background, Assessment and   Recommendations(SBAR). Information from the following report(s) SBAR was reviewed with the receiving nurse. Lines:       Opportunity for questions and clarification was provided.       Patient transported with:

## 2022-07-04 NOTE — BH NOTES
Patient arrived on the unit from [facility] voluntarily with the chief complains of S. I with a plan to slit her wrist at 2242. Admitting diagnosis is MDD and alcohol dependence. Patient was mostly cooperative with the admission process although she appeared restless and somewhat distracted. Patient denied S.I/HI/A//VH, confirmed anxiety and depression. Patient said,\" I don't Ivan Hector die but I am tired of fighting this jimenez, depression. \" She was alert and oriented X 4 . UDS was negative and BAL was 224 mg/dl. Patient has a history of seizures when detoxing from alcohol. Skin assessment was unremarkable. Patient was oriented to the unit. Valuables and belongings secured. CIWA was 11 at the time of admission, patient is on high dose taper of Phenobarb, she was medicated accordingly. No concerning   behavior noted. Will continue to monitor and provide support as needed.
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
## 2022-07-04 NOTE — PROGRESS NOTES
Problem: Suicide  Goal: *STG: Remains safe in hospital  Outcome: Progressing Towards Goal  Goal: *STG/LTG: No longer expresses self destructive or suicidal thoughts  Outcome: Progressing Towards Goal     Problem: Depressed Mood (Adult/Pediatric)  Goal: *STG: Participates in treatment plan  Outcome: Progressing Towards Goal

## 2022-07-04 NOTE — ED NOTES
Patient has been admitted to inpatient [facility] behavioral health. Sherlie Cockayne, NP accepted admission.

## 2022-07-04 NOTE — PROGRESS NOTES
Behavioral Services  Medicare Certification Upon Admission    I certify that this patient's inpatient psychiatric hospital admission is medically necessary for:    [x] (1) Treatment which could reasonably be expected to improve this patient's condition,       [x] (2) Or for diagnostic study;     AND     [x](2) The inpatient psychiatric services are provided while the individual is under the care of a physician and are included in the individualized plan of care.     Estimated length of stay/service 5 - 7 days    Plan for post-hospital care per social work    Electronically signed by Radha Chavira MD on 7/4/2022 at 9:47 AM

## 2022-07-05 LAB
ATRIAL RATE: 112 BPM
CALCULATED P AXIS, ECG09: 50 DEGREES
CALCULATED R AXIS, ECG10: 37 DEGREES
CALCULATED T AXIS, ECG11: 52 DEGREES
DIAGNOSIS, 93000: NORMAL
P-R INTERVAL, ECG05: 138 MS
Q-T INTERVAL, ECG07: 352 MS
QRS DURATION, ECG06: 110 MS
QTC CALCULATION (BEZET), ECG08: 480 MS
VENTRICULAR RATE, ECG03: 112 BPM

## 2022-07-05 PROCEDURE — 65220000003 HC RM SEMIPRIVATE PSYCH

## 2022-07-05 PROCEDURE — 74011250637 HC RX REV CODE- 250/637: Performed by: NURSE PRACTITIONER

## 2022-07-05 PROCEDURE — 74011250637 HC RX REV CODE- 250/637: Performed by: PSYCHIATRY & NEUROLOGY

## 2022-07-05 RX ORDER — DOXEPIN HYDROCHLORIDE 10 MG/1
10 CAPSULE ORAL
Status: DISCONTINUED | OUTPATIENT
Start: 2022-07-05 | End: 2022-07-07 | Stop reason: HOSPADM

## 2022-07-05 RX ORDER — VENLAFAXINE HYDROCHLORIDE 75 MG/1
225 CAPSULE, EXTENDED RELEASE ORAL
Status: DISCONTINUED | OUTPATIENT
Start: 2022-07-06 | End: 2022-07-07 | Stop reason: HOSPADM

## 2022-07-05 RX ADMIN — BACLOFEN 10 MG: 10 TABLET ORAL at 08:50

## 2022-07-05 RX ADMIN — PHENOBARBITAL 32.4 MG: 32.4 TABLET ORAL at 09:10

## 2022-07-05 RX ADMIN — ARIPIPRAZOLE 10 MG: 5 TABLET ORAL at 08:50

## 2022-07-05 RX ADMIN — OLANZAPINE 5 MG: 5 TABLET, FILM COATED ORAL at 19:48

## 2022-07-05 RX ADMIN — FOLIC ACID 1 MG: 1 TABLET ORAL at 08:50

## 2022-07-05 RX ADMIN — ONDANSETRON 4 MG: 4 TABLET, ORALLY DISINTEGRATING ORAL at 19:48

## 2022-07-05 RX ADMIN — OLANZAPINE 5 MG: 5 TABLET, FILM COATED ORAL at 12:33

## 2022-07-05 RX ADMIN — TRAZODONE HYDROCHLORIDE 50 MG: 50 TABLET ORAL at 21:30

## 2022-07-05 RX ADMIN — HYDROXYZINE HYDROCHLORIDE 50 MG: 25 TABLET, FILM COATED ORAL at 06:22

## 2022-07-05 RX ADMIN — Medication 1 CAPSULE: at 09:10

## 2022-07-05 RX ADMIN — THERA TABS 1 TABLET: TAB at 08:50

## 2022-07-05 RX ADMIN — VENLAFAXINE HYDROCHLORIDE 150 MG: 75 CAPSULE, EXTENDED RELEASE ORAL at 08:50

## 2022-07-05 RX ADMIN — BACLOFEN 10 MG: 10 TABLET ORAL at 18:03

## 2022-07-05 RX ADMIN — PHENOBARBITAL 32.4 MG: 32.4 TABLET ORAL at 12:33

## 2022-07-05 RX ADMIN — ONDANSETRON 4 MG: 4 TABLET, ORALLY DISINTEGRATING ORAL at 06:22

## 2022-07-05 RX ADMIN — PHENOBARBITAL 32.4 MG: 32.4 TABLET ORAL at 18:03

## 2022-07-05 RX ADMIN — BACLOFEN 10 MG: 10 TABLET ORAL at 12:33

## 2022-07-05 RX ADMIN — HYDROXYZINE HYDROCHLORIDE 50 MG: 25 TABLET, FILM COATED ORAL at 21:30

## 2022-07-05 RX ADMIN — Medication 100 MG: at 08:50

## 2022-07-05 NOTE — GROUP NOTE
GRISELDA  GROUP DOCUMENTATION INDIVIDUAL                                                                          Group Therapy Note    Date: 7/5/2022    Group Start Time: 1000  Group End Time: 1100  Group Topic: Topic Group    [facility] 3 ACUTE BEHAV [facility], [address] [facility] GROUP DOCUMENTATION GROUP    Group Therapy Note    Attendees: 12         Attendance: Attended    Patient's Goal:  To participate in relaxation activity    Interventions/techniques: Supported-art    Follows Directions:  Followed directions    Interactions: Interacted appropriately    Mental Status: Calm    Behavior/appearance: Attentive, Cooperative and Needed prompting    Goals Achieved: Able to engage in interactions, Able to listen to others and Discussed coping      Pricilla Puente

## 2022-07-05 NOTE — PROGRESS NOTES
Problem: Suicide  Goal: *STG: Remains safe in hospital  7/5/2022 1359 by Tracy Alvarez RN  Outcome: Resolved/Met  7/5/2022 1359 by Tracy Alvarez RN  Outcome: Progressing Towards Goal  7/5/2022 1358 by Tracy Alvarez RN  Outcome: Progressing Towards Goal  Goal: *STG: Seeks staff when feelings of self harm or harm towards others arise  7/5/2022 1359 by Tracy Alvarez RN  Outcome: Resolved/Met  7/5/2022 1358 by Tracy Alvarez RN  Outcome: Progressing Towards Goal  Goal: *STG: Attends activities and groups  Outcome: Resolved/Met  Goal: *STG:  Verbalizes alternative ways of dealing with maladaptive feelings/behaviors  Outcome: Resolved/Met  Goal: *STG/LTG: Complies with medication therapy  Outcome: Resolved/Met  Goal: *STG/LTG: No longer expresses self destructive or suicidal thoughts  Outcome: Resolved/Met  Goal: *LTG:  Identifies available community resources  Outcome: Resolved/Met  Goal: *LTG:  Develops proactive suicide prevention plan  Outcome: Resolved/Met
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
## 2022-07-05 NOTE — PROGRESS NOTES
Problem: Suicide  Goal: *STG: Remains safe in hospital  Outcome: Progressing Towards Goal  Goal: *STG: Seeks staff when feelings of self harm or harm towards others arise  Outcome: Progressing Towards Goal

## 2022-07-05 NOTE — PROGRESS NOTES
Laboratory monitoring for mood stabilizer and antipsychotics:    Recommended baseline monitoring has been completed based on this patient's current medication regimen. The patient is currently taking the following medication(s):   Current Facility-Administered Medications   Medication Dose Route Frequency    ARIPiprazole (ABILIFY) tablet 10 mg  10 mg Oral DAILY    vitamin B complex capsule 1 Capsule  1 Capsule Oral DAILY    baclofen (LIORESAL) tablet 10 mg  10 mg Oral TID    venlafaxine-SR (EFFEXOR-XR) capsule 150 mg  150 mg Oral DAILY WITH BREAKFAST    PHENobarbitaL (LUMINAL) tablet 32.4 mg  32.4 mg Oral QID    Followed by    Hever Villalobos ON 7/6/2022] PHENobarbitaL (LUMINAL) tablet 32.4 mg  32.4 mg Oral BID    Followed by    Hever Villalobos ON 7/7/2022] PHENobarbitaL (LUMINAL) tablet 16.2 mg  16.2 mg Oral BID    thiamine HCL (B-1) tablet 100 mg  100 mg Oral DAILY    folic acid (FOLVITE) tablet 1 mg  1 mg Oral DAILY    therapeutic multivitamin (THERAGRAN) tablet 1 Tablet  1 Tablet Oral DAILY       Height, Weight, BMI Estimation  Estimated body mass index is 34.33 kg/m² as calculated from the following:    Height as of this encounter: 162.6 cm (64\"). Weight as of this encounter: 90.7 kg (200 lb). Renal Function, Hepatic Function and Chemistry  Estimated Creatinine Clearance: 114.4 mL/min (based on SCr of 0.72 mg/dL). Lab Results   Component Value Date/Time    Sodium 139 07/03/2022 07:19 PM    Potassium 3.5 07/03/2022 07:19 PM    Chloride 105 07/03/2022 07:19 PM    CO2 25 07/03/2022 07:19 PM    Anion gap 9 07/03/2022 07:19 PM    Glucose 119 (H) 07/03/2022 07:19 PM    BUN 19 07/03/2022 07:19 PM    Creatinine 0.72 07/03/2022 07:19 PM    BUN/Creatinine ratio 26 (H) 07/03/2022 07:19 PM    GFR est AA >60 07/03/2022 07:19 PM    GFR est non-AA >60 07/03/2022 07:19 PM    Calcium 8.3 (L) 07/03/2022 07:19 PM    ALT (SGPT) 540 (H) 07/03/2022 07:19 PM    Alk.  phosphatase 77 07/03/2022 07:19 PM    Protein, total 6.5 07/03/2022 07:19 PM    Albumin 3.3 (L) 07/03/2022 07:19 PM    Globulin 3.2 07/03/2022 07:19 PM    A-G Ratio 1.0 (L) 07/03/2022 07:19 PM    Bilirubin, total 0.3 07/03/2022 07:19 PM       Lab Results   Component Value Date/Time    Glucose 119 (H) 07/03/2022 07:19 PM    Glucose (POC) 94 04/04/2016 07:26 AM       Lab Results   Component Value Date/Time    Hemoglobin A1c 5.9 (H) 02/03/2022 12:00 AM       Hematology  Lab Results   Component Value Date/Time    WBC 7.5 07/03/2022 07:19 PM    Hemoglobin (POC) 14.3 04/04/2016 07:26 AM    HGB 11.8 07/03/2022 07:19 PM    Hematocrit (POC) 42 04/04/2016 07:26 AM    HCT 35.1 07/03/2022 07:19 PM    PLATELET 121 81/62/6858 07:19 PM    MCV 88.6 07/03/2022 07:19 PM    Hgb, External 11.6 05/03/2012 12:00 AM    Hct, External 33.4 05/03/2012 12:00 AM    Platelet cnt., External 332K 05/03/2012 12:00 AM       Lipids  Lab Results   Component Value Date/Time    Cholesterol, total 126 04/21/2022 11:13 AM    HDL Cholesterol 58 04/21/2022 11:13 AM    LDL, calculated 31.4 04/21/2022 11:13 AM    Triglyceride 183 (H) 04/21/2022 11:13 AM    CHOL/HDL Ratio 2.2 04/21/2022 11:13 AM       Thyroid Function  Lab Results   Component Value Date/Time    TSH 4.36 (H) 05/28/2022 02:33 AM    T4, Free 1.28 02/03/2022 12:00 AM       Vitals  Visit Vitals  /62   Pulse 90   Temp 97.7 °F (36.5 °C)   Resp 18   Ht 162.6 cm (64\")   Wt 90.7 kg (200 lb)   SpO2 100%   BMI 34.33 kg/m²     Pregnancy Test  Lab Results   Component Value Date/Time    HCG urine, QL NEGATIVE  02/15/2019 04:13 PM    Pregnancy test,urine (POC) Negative 07/03/2022 07:59 PM    HCG, Ql. NEGATIVE  07/17/2010 04:44 PM    HCG urine, Ql. (POC) Negative 10/22/2014 09:27 AM       [facility], [address] [place] [address], [facility]  [phone] (pharmacy)

## 2022-07-05 NOTE — BH NOTES
1900- 1930: Bedside and Verbal shift change report given to [address] , RN (oncoming nurse) by Marianne Purcell RN (offgoing nurse). Report included the following information SBAR, Kardex and MAR. Patient received in the hallway, alert and oriented  X 4 with a steady gait mood is dull with a flat affect. Patient denied SI, HI, AVH, nausea and vomitting and pain. Pt endorsed anxiety at the level of 4/10 and depression at the level of 7/10. CIWA - 4. Patient compliant with scheduled medication. As needed Atarax and Trazodone administered for compliant of anxiety and insomnia.  0600: Pt has been observed throughout the night sleeping in bed with even respirations. Total hours slept approximately  8.5. Hourly nursing rounds maintained. No s/s of distress noted. CIWA - 6.

## 2022-07-05 NOTE — GROUP NOTE
GRISELDA  GROUP DOCUMENTATION INDIVIDUAL                                                                          Group Therapy Note    Date: 7/5/2022    Group Start Time: 1400  Group End Time: 1500  Group Topic: Recreational/Music Therapy    [facility] - [place] [address] ACUTE BEHAV [facility]    [place], [address] [facility] GROUP DOCUMENTATION GROUP    Group Therapy Note    Attendees: 9         Attendance: Attended    Patient's Goal:  To concentrate on selected task    Interventions/techniques: Supported-crafts,games,music    Follows Directions:  Followed directions    Interactions: Interacted appropriately    Mental Status: Calm    Behavior/appearance: Attentive and Cooperative    Goals Achieved: Able to engage in interactions and Able to listen to others-active participant in game    Dominic Gonzalez

## 2022-07-05 NOTE — PROGRESS NOTES
Problem: Suicide  Goal: *STG: Seeks staff when feelings of self harm or harm towards others arise  Outcome: Progressing Towards Goal     Problem: Falls - Risk of  Goal: *Absence of Falls  Description: Document Rema Fall Risk and appropriate interventions in the flowsheet.   Outcome: Progressing Towards Goal  Note: Fall Risk Interventions:       Medication Interventions: Teach patient to arise slowly

## 2022-07-05 NOTE — BH NOTES
Psychiatric Progress Note    Patient: Hyacinth Sanchez MRN: [id number]  SSN: [id number]    YOB: 1980  Age: 46 y.o. Sex: female      Admit Date: 7/3/2022    LOS: 2 days     Subjective:     Hyacinth Sanchez  reports feeling anxious and moods are depressed. CIWA = 4. Denies SI/HI/AH/VH. No aggression or violence. Appropriately interactive and aware. Tolerating medications well. Received atarax, zofran and trazodone. Eating some and sleeping 8hrs broken by nightmares.     Objective:     Vitals:    07/03/22 2242 07/04/22 0726 07/04/22 1943 07/05/22 0752   BP: 123/68 115/70 129/80 113/62   Pulse: (!) 115 (!) 110 100 90   Resp: 14 16 16 18   Temp: 98.4 °F (36.9 °C) 98 °F (36.7 °C) 98.6 °F (37 °C) 97.7 °F (36.5 °C)   SpO2: 97% 99% 96% 100%   Weight:       Height:            Mental Status Exam:   Sensorium  oriented to time, place and person   Relations cooperative   Eye Contact appropriate   Appearance:  age appropriate   Speech:  normal volume and non-pressured   Thought Process: goal directed   Thought Content free of delusions and free of hallucinations   Suicidal ideations no intention   Mood:  depressed   Affect:  constricted   Memory   adequate   Concentration:  adequate   Insight:  fair   Judgment:  limited       MEDICATIONS:  Current Facility-Administered Medications   Medication Dose Route Frequency    ondansetron (ZOFRAN ODT) tablet 4 mg  4 mg Oral Q6H PRN    ARIPiprazole (ABILIFY) tablet 10 mg  10 mg Oral DAILY    vitamin B complex capsule 1 Capsule  1 Capsule Oral DAILY    baclofen (LIORESAL) tablet 10 mg  10 mg Oral TID    venlafaxine-SR (EFFEXOR-XR) capsule 150 mg  150 mg Oral DAILY WITH BREAKFAST    OLANZapine (ZyPREXA) tablet 5 mg  5 mg Oral Q6H PRN    haloperidol lactate (HALDOL) injection 5 mg  5 mg IntraMUSCular Q6H PRN    benztropine (COGENTIN) tablet 1 mg  1 mg Oral BID PRN    diphenhydrAMINE (BENADRYL) injection 50 mg  50 mg IntraMUSCular BID PRN    hydrOXYzine HCL (ATARAX) tablet 50 mg  50 mg Oral TID PRN    LORazepam (ATIVAN) injection 1 mg  1 mg IntraMUSCular Q4H PRN    traZODone (DESYREL) tablet 50 mg  50 mg Oral QHS PRN    acetaminophen (TYLENOL) tablet 650 mg  650 mg Oral Q4H PRN    magnesium hydroxide (MILK OF MAGNESIA) 400 mg/5 mL oral suspension 30 mL  30 mL Oral DAILY PRN    PHENobarbitaL (LUMINAL) tablet 32.4 mg  32.4 mg Oral QID    Followed by   Zahra Bradley ON 7/6/2022] PHENobarbitaL (LUMINAL) tablet 32.4 mg  32.4 mg Oral BID    Followed by   Zahra Bradley ON 7/7/2022] PHENobarbitaL (LUMINAL) tablet 16.2 mg  16.2 mg Oral BID    PHENobarbitaL (LUMINAL) tablet 32.4 mg  32.4 mg Oral Q6H PRN    Followed by   Zahra Bradley ON 7/6/2022] PHENobarbitaL (LUMINAL) tablet 16.2 mg  16.2 mg Oral Q6H PRN    thiamine HCL (B-1) tablet 100 mg  100 mg Oral DAILY    folic acid (FOLVITE) tablet 1 mg  1 mg Oral DAILY    therapeutic multivitamin (THERAGRAN) tablet 1 Tablet  1 Tablet Oral DAILY      DISCUSSION:   the risks and benefits of the proposed medication  patient given opportunity to ask questions    Lab/Data Review: All lab results for the last 24 hours reviewed. No results found for this or any previous visit (from the past 24 hour(s)).       Assessment:     Principal Problem:    Major depressive disorder (7/3/2022)    Active Problems:    ADD (attention deficit disorder) (6/12/2013)      Migraines (6/12/2013)      Alcohol use disorder, severe, dependence ([facility] [address] (6/9/2022)        Plan:     Continue current care  Collateral information  Change trazodone to doxepin   Increase Effexor  mg PO every day  Disposition planning with social work    I certify that this patient's inpatient psychiatric hospital services furnished since the previous certification were, and continue to be, required for treatment that could reasonably be expected to improve the patient's condition, or for diagnostic study, and that the patient continues to need, on a daily basis, active treatment furnished directly by or requiring the supervision of inpatient psychiatric facility personnel. In addition, the hospital records show that services furnished were intensive treatment services, admission or related services, or equivalent services.   Signed By: Dewayne Rosenberg MD     July 5, 2022

## 2022-07-06 ENCOUNTER — APPOINTMENT (OUTPATIENT)
Dept: BEHAVIORAL/MENTAL HEALTH | Age: 42
End: 2022-07-06
Payer: MEDICAID

## 2022-07-06 PROCEDURE — 74011250637 HC RX REV CODE- 250/637: Performed by: NURSE PRACTITIONER

## 2022-07-06 PROCEDURE — 74011250637 HC RX REV CODE- 250/637: Performed by: PSYCHIATRY & NEUROLOGY

## 2022-07-06 PROCEDURE — 65220000003 HC RM SEMIPRIVATE PSYCH

## 2022-07-06 RX ORDER — DM/P-EPHED/ACETAMINOPH/DOXYLAM 30-7.5/3
2 LIQUID (ML) ORAL
Status: DISCONTINUED | OUTPATIENT
Start: 2022-07-06 | End: 2022-07-07 | Stop reason: HOSPADM

## 2022-07-06 RX ADMIN — FOLIC ACID 1 MG: 1 TABLET ORAL at 08:04

## 2022-07-06 RX ADMIN — Medication 2 MG: at 15:24

## 2022-07-06 RX ADMIN — BACLOFEN 10 MG: 10 TABLET ORAL at 08:04

## 2022-07-06 RX ADMIN — OLANZAPINE 5 MG: 5 TABLET, FILM COATED ORAL at 21:20

## 2022-07-06 RX ADMIN — Medication 1 CAPSULE: at 08:03

## 2022-07-06 RX ADMIN — HYDROXYZINE HYDROCHLORIDE 50 MG: 25 TABLET, FILM COATED ORAL at 21:20

## 2022-07-06 RX ADMIN — DOXEPIN HYDROCHLORIDE 10 MG: 10 CAPSULE ORAL at 21:20

## 2022-07-06 RX ADMIN — Medication 2 MG: at 17:35

## 2022-07-06 RX ADMIN — OLANZAPINE 5 MG: 5 TABLET, FILM COATED ORAL at 08:21

## 2022-07-06 RX ADMIN — PHENOBARBITAL 32.4 MG: 32.4 TABLET ORAL at 17:34

## 2022-07-06 RX ADMIN — ARIPIPRAZOLE 10 MG: 5 TABLET ORAL at 08:03

## 2022-07-06 RX ADMIN — THERA TABS 1 TABLET: TAB at 08:04

## 2022-07-06 RX ADMIN — VENLAFAXINE HYDROCHLORIDE 225 MG: 75 CAPSULE, EXTENDED RELEASE ORAL at 08:03

## 2022-07-06 RX ADMIN — Medication 100 MG: at 08:04

## 2022-07-06 RX ADMIN — PHENOBARBITAL 32.4 MG: 32.4 TABLET ORAL at 08:04

## 2022-07-06 RX ADMIN — BACLOFEN 10 MG: 10 TABLET ORAL at 15:24

## 2022-07-06 RX ADMIN — Medication 2 MG: at 21:42

## 2022-07-06 NOTE — PROGRESS NOTES
GROUP THERAPY PROGRESS NOTE      Demond Sawant was not present for medication group.       Will Bell , PharmD Candidate
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
## 2022-07-06 NOTE — GROUP NOTE
GRISELDA  GROUP DOCUMENTATION INDIVIDUAL                                                                          Group Therapy Note    Date: 7/6/2022    Group Start Time: 1500  Group End Time: 1600  Group Topic: Recreational/Music Therapy    [facility] - [place] [address] ACUTE BEHAV [facility]    [place], [address] [facility] GROUP DOCUMENTATION GROUP    Group Therapy Note    Attendees: 6         Attendance: Attended    Patient's Goal:  To concentrate on selected task    Interventions/techniques: Supported-crafts,games,music    Follows Directions:  Followed directions    Interactions: Interacted appropriately    Mental Status: Calm-pleasant    Behavior/appearance: Attentive and Cooperative    Goals Achieved: Able to engage in interactions and Able to listen to others-active participant in game      Ashley Huitron

## 2022-07-06 NOTE — PROGRESS NOTES
Spiritual Care Assessment/Progress Note  [facility]      NAME: Essence Jernigan      MRN: [id number]  AGE: 46 y.o. SEX: female  Episcopal Affiliation: Cristina   Language: English     7/6/2022     Total Time (in minutes): 5     Spiritual Assessment begun in [place] [id number] [address] through conversation with:         []Patient        [] Family    [] Friend(s)        Reason for Consult: Initial/Spiritual assessment, patient floor     Spiritual beliefs: (Please include comment if needed)     [] Identifies with a melony tradition:         [] Supported by a melony community:            [] Claims no spiritual orientation:           [] Seeking spiritual identity:                [] Adheres to an individual form of spirituality:           [x] Not able to assess:                           Identified resources for coping:      [] Prayer                               [] Music                  [] Guided Imagery     [] Family/friends                 [] Pet visits     [] Devotional reading                         [x] Unknown     [] Other:                                           Interventions offered during this visit: (See comments for more details)                Plan of Care:     [] Support spiritual and/or cultural needs    [] Support AMD and/or advance care planning process      [] Support grieving process   [] Coordinate Rites and/or Rituals    [] Coordination with community clergy   [] No spiritual needs identified at this time   [] Detailed Plan of Care below (See Comments)  [] Make referral to Music Therapy  [] Make referral to Pet Therapy     [] Make referral to Addiction services  [] Make referral to [facility]  [] Make referral to Spiritual Care Partner  [] No future visits requested        [x] Contact Spiritual Care for further referrals     Comments:  visit for initial spiritual assessment. Patient out of room at the time of this visit.   Please contact spiritual care for further referral or consult. Rev.  Du Escalante MDiv, [facility], [facility] Hospital[facility] paging service: [phone])

## 2022-07-06 NOTE — PROGRESS NOTES
1900- 1930: Bedside and Verbal shift change report given to [address] , RN (oncoming nurse) by Lawyer Dilan RN (offgoing nurse). Report included the following information SBAR, Kardex and MAR. Patient received in the hallway, alert and oriented  X 4 with a steady gait mood is dull with a flat affect. Patient denied SI, HI, AVH, nausea and vomitting and pain. Pt endorsed, nausea anxiety at the level of 8/10 and depression at the level of 7/10. CIWA - 6, as needed Zofran, and atarax administered. 2230:  As needed trazodone administered at 2130 as requested by patient. New order for doxepin 10 mg acknowledged, patient observed asleep in bed with even respiration and no sign of distress. 0600: Patient observed asleep for approximately 8.25 hours with even respiration. Hourly nursing rounds maintained. No s/s of distress noted. Problem: Falls - Risk of  Goal: *Absence of Falls  Description: Document Arun La Fall Risk and appropriate interventions in the flowsheet.   Outcome: Progressing Towards Goal  Note: Fall Risk Interventions:       Medication Interventions: Teach patient to arise slowly      Problem: Depressed Mood (Adult/Pediatric)  Goal: *STG: Complies with medication therapy  Outcome: Progressing Towards Goal

## 2022-07-06 NOTE — GROUP NOTE
[facility] GROUP DOCUMENTATION INDIVIDUAL                                                                          Group Therapy Note    Date: 7/6/2022    Group Start Time: 0900  Group End Time: 1000  Group Topic: Topic Group    [address] [facility] 3 ACUTE BEHAV [facility], [address] [facility] GROUP DOCUMENTATION GROUP    Group Therapy Note    Attendees: 7         Attendance: Attended    Patient's Goal:  To participate in chair exercise routine    Interventions/techniques: Supported-benefits of exercise    Follows Directions: Followed directions    Interactions: Interacted appropriately    Mental Status: Calm    Behavior/appearance: Attentive and Cooperative    Goals Achieved: Able to engage in interactions, Able to listen to others, Able to self-disclose and Discussed coping-completed routine      Additional Notes:  Christin Arnold participated in card game.     Afshan Valverde

## 2022-07-06 NOTE — BH NOTES
Psychiatric Progress Note    Patient: Josr Ascencio MRN: [id number]  SSN: [id number]    YOB: 1980  Age: 46 y.o. Sex: female      Admit Date: 7/3/2022    LOS: 3 days     Subjective:     Josr Ascencio  reports feeling anxious and moods are depressed. CIWA = 4. Denies SI/HI/AH/VH. No aggression or violence. Appropriately interactive and aware. Tolerating medications well. Received atarax, zofran and trazodone. Eating some and sleeping 8hrs broken by nightmares. 7/6 - Josr Ascencio reports feeling better and moods are good. CIWA was 6 last evening. Anxiety a 6/10. Better today. Denies SI/HI/AH/VH. No aggression or violence. Appropriately interactive and aware. Received Zyprexa and Zofran. Tolerating medications well. Eating and sleeping fairly. Discharge focused.     Objective:     Vitals:    07/04/22 0726 07/04/22 1943 07/05/22 0752 07/05/22 2005   BP: 115/70 129/80 113/62 113/66   Pulse: (!) 110 100 90 86   Resp: 16 16 18 17   Temp: 98 °F (36.7 °C) 98.6 °F (37 °C) 97.7 °F (36.5 °C) 98.1 °F (36.7 °C)   SpO2: 99% 96% 100% 97%   Weight:       Height:            Mental Status Exam:   Sensorium  oriented to time, place and person   Relations cooperative   Eye Contact appropriate   Appearance:  age appropriate   Speech:  normal volume and non-pressured   Thought Process: goal directed   Thought Content free of delusions and free of hallucinations   Suicidal ideations no intention   Mood:  depressed   Affect:  constricted   Memory   adequate   Concentration:  adequate   Insight:  fair   Judgment:  limited       MEDICATIONS:  Current Facility-Administered Medications   Medication Dose Route Frequency    doxepin (SINEquan) capsule 10 mg  10 mg Oral QHS    venlafaxine-SR (EFFEXOR-XR) capsule 225 mg  225 mg Oral DAILY WITH BREAKFAST    ondansetron (ZOFRAN ODT) tablet 4 mg  4 mg Oral Q6H PRN    ARIPiprazole (ABILIFY) tablet 10 mg  10 mg Oral DAILY    vitamin B complex capsule 1 Capsule  1 Capsule Oral DAILY    baclofen (LIORESAL) tablet 10 mg  10 mg Oral TID    OLANZapine (ZyPREXA) tablet 5 mg  5 mg Oral Q6H PRN    haloperidol lactate (HALDOL) injection 5 mg  5 mg IntraMUSCular Q6H PRN    benztropine (COGENTIN) tablet 1 mg  1 mg Oral BID PRN    diphenhydrAMINE (BENADRYL) injection 50 mg  50 mg IntraMUSCular BID PRN    hydrOXYzine HCL (ATARAX) tablet 50 mg  50 mg Oral TID PRN    LORazepam (ATIVAN) injection 1 mg  1 mg IntraMUSCular Q4H PRN    acetaminophen (TYLENOL) tablet 650 mg  650 mg Oral Q4H PRN    magnesium hydroxide (MILK OF MAGNESIA) 400 mg/5 mL oral suspension 30 mL  30 mL Oral DAILY PRN    PHENobarbitaL (LUMINAL) tablet 32.4 mg  32.4 mg Oral BID    Followed by   Marley Singleton ON 7/7/2022] PHENobarbitaL (LUMINAL) tablet 16.2 mg  16.2 mg Oral BID    PHENobarbitaL (LUMINAL) tablet 32.4 mg  32.4 mg Oral Q6H PRN    Followed by   Nick Ruiz PHENobarbitaL (LUMINAL) tablet 16.2 mg  16.2 mg Oral Q6H PRN    thiamine HCL (B-1) tablet 100 mg  100 mg Oral DAILY    folic acid (FOLVITE) tablet 1 mg  1 mg Oral DAILY    therapeutic multivitamin (THERAGRAN) tablet 1 Tablet  1 Tablet Oral DAILY      DISCUSSION:   the risks and benefits of the proposed medication  patient given opportunity to ask questions    Lab/Data Review: All lab results for the last 24 hours reviewed. No results found for this or any previous visit (from the past 24 hour(s)).       Assessment:     Principal Problem:    Major depressive disorder (7/3/2022)    Active Problems:    ADD (attention deficit disorder) (6/12/2013)      Migraines (6/12/2013)      Alcohol use disorder, severe, dependence ([facility] [address] (6/9/2022)        Plan:     Continue current care  Collateral information  Continue doxepin   Continue Effexor  mg PO every day  Disposition planning with social work to [facility] for tomorrow    I certify that this patient's inpatient psychiatric hospital services furnished since the previous certification were, and continue to be, required for treatment that could reasonably be expected to improve the patient's condition, or for diagnostic study, and that the patient continues to need, on a daily basis, active treatment furnished directly by or requiring the supervision of inpatient psychiatric facility personnel. In addition, the hospital records show that services furnished were intensive treatment services, admission or related services, or equivalent services.   Signed By: Parminder Prasad MD     July 6, 2022

## 2022-07-06 NOTE — PROGRESS NOTES
Jennifer Chamorro presented alert and oriented x4, broad affect, mood anxious. She denied SI/HI/AVH. She endorsed her depression level at 4/10 and her anxiety level at 5/10. She was med and meal compliant. She attended groups. She voiced no other concerns. Monitoring for safety and behaviors continues.

## 2022-07-07 ENCOUNTER — APPOINTMENT (OUTPATIENT)
Dept: BEHAVIORAL/MENTAL HEALTH | Age: 42
End: 2022-07-07
Payer: MEDICAID

## 2022-07-07 VITALS
SYSTOLIC BLOOD PRESSURE: 117 MMHG | HEIGHT: 64 IN | BODY MASS INDEX: 34.15 KG/M2 | RESPIRATION RATE: 18 BRPM | OXYGEN SATURATION: 100 % | DIASTOLIC BLOOD PRESSURE: 73 MMHG | TEMPERATURE: 98.3 F | WEIGHT: 200 LBS | HEART RATE: 99 BPM

## 2022-07-07 PROCEDURE — 74011250637 HC RX REV CODE- 250/637: Performed by: PSYCHIATRY & NEUROLOGY

## 2022-07-07 PROCEDURE — 74011250637 HC RX REV CODE- 250/637: Performed by: NURSE PRACTITIONER

## 2022-07-07 RX ORDER — DOXEPIN HYDROCHLORIDE 10 MG/1
10 CAPSULE ORAL
Qty: 30 CAPSULE | Refills: 0 | Status: SHIPPED | OUTPATIENT
Start: 2022-07-07 | End: 2022-09-08

## 2022-07-07 RX ORDER — VENLAFAXINE HYDROCHLORIDE 75 MG/1
225 CAPSULE, EXTENDED RELEASE ORAL
Qty: 90 CAPSULE | Refills: 0 | Status: SHIPPED | OUTPATIENT
Start: 2022-07-08

## 2022-07-07 RX ORDER — BACLOFEN 10 MG/1
10 TABLET ORAL 3 TIMES DAILY
Qty: 90 TABLET | Refills: 0 | Status: SHIPPED | OUTPATIENT
Start: 2022-07-07 | End: 2022-08-06

## 2022-07-07 RX ADMIN — Medication 1 CAPSULE: at 08:23

## 2022-07-07 RX ADMIN — BACLOFEN 10 MG: 10 TABLET ORAL at 08:23

## 2022-07-07 RX ADMIN — VENLAFAXINE HYDROCHLORIDE 225 MG: 75 CAPSULE, EXTENDED RELEASE ORAL at 08:23

## 2022-07-07 RX ADMIN — THERA TABS 1 TABLET: TAB at 08:23

## 2022-07-07 RX ADMIN — FOLIC ACID 1 MG: 1 TABLET ORAL at 08:23

## 2022-07-07 RX ADMIN — BACLOFEN 10 MG: 10 TABLET ORAL at 11:39

## 2022-07-07 RX ADMIN — Medication 2 MG: at 09:57

## 2022-07-07 RX ADMIN — Medication 100 MG: at 08:23

## 2022-07-07 RX ADMIN — ARIPIPRAZOLE 10 MG: 5 TABLET ORAL at 08:23

## 2022-07-07 NOTE — GROUP NOTE
GRISELDA  GROUP DOCUMENTATION INDIVIDUAL                                                                          Group Therapy Note    Date: 7/7/2022    Group Start Time: 0900  Group End Time: 1000  Group Topic: Topic Group    [facility] ACUTE [facility]    [place] [address] [facility] GROUP DOCUMENTATION GROUP    Group Therapy Note    Attendees: 5         Attendance: Attended    Patient's Goal:  To participate in stress management [employer] game    Interventions/techniques: Supported-things pertaining to stress    Follows Directions:  Followed directions    Interactions: Interacted appropriately    Mental Status: Calm    Behavior/appearance: Attentive, Cooperative and Needed prompting    Goals Achieved: Able to engage in interactions, Able to listen to others and Discussed coping-active participant in game    Da Schulz
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
## 2022-07-07 NOTE — BH NOTES
DISCHARGE SUMMARY    Chirag Herrera  : 1980  MRN: [id number]    The patient Matthew Santizo exhibits the ability to control behavior in a less restrictive environment. Patient's level of functioning is improving. No assaultive/destructive behavior has been observed for the past 24 hours. No suicidal/homicidal threat or behavior has been observed for the past 24 hours. There is no evidence of serious medication side effects. Patient has not been in physical or protective restraints for at least the past 24 hours. If weapons involved, how are they secured? No access    Is patient aware of and in agreement with discharge plan? yes    Arrangements for medication:  Prescriptions given to patient, given a 30 day supply. Copy of discharge instructions to provider?:  Yes    Arrangements for transportation home:  [place]n    Keep all follow up appointments as scheduled, continue to take prescribed medications per physician instructions.   Mental health crisis number:  924 or your local mental health crisis line number at [place] [address] Emergency WARM LINE      7-644-746-MHAV ([phone])      M-F: 9am to 9pm      Sat & Sun: 5pm - 9pm  National suicide prevention lines:                             0-678-GRAOCVL ([phone])       8-137-066-TALK ([phone])    Crisis Text Line:  Text HOME to [phone]

## 2022-07-07 NOTE — PROGRESS NOTES
Patient observed sitting in dayroom during transition of care. Patient presented as alert, calm and cooperative. Oriented X4. Patient reported no immediate concerns and displayed no obvious signs of medical or psychiatric distress. Patient reported symptoms of depression 5/10 and anxiety 6/10. Patient denied hallucinations, S/I or H/I. Patient took HS medications along with PRN Atarax and Zyprexa. Patient observed resting in bed with no concerns. Staff will continue to assess and monitor. Patient slept for 8 hours.        Problem: Depressed Mood (Adult/Pediatric)  Goal: *STG: Demonstrates reduction in symptoms and increase in insight into coping skills/future focused  Outcome: Progressing Towards Goal  Goal: *STG: Remains safe in hospital  Outcome: Progressing Towards Goal  Goal: *STG: Complies with medication therapy  Outcome: Progressing Towards Goal

## 2022-07-07 NOTE — DISCHARGE INSTRUCTIONS
Patient Education        Depression Treatment: Care Instructions  Your Care Instructions     Depression is a condition that affects the way you feel, think, and act. It causes symptoms such as low energy, loss of interest in daily activities, and sadness or grouchiness that goes on for a long time. Depression is very common and affects men and women of all ages. Depression is a medical illness caused by changes in the natural chemicals in your brain. It is not a character flaw, and it does not mean that you are a bad or weak person. It does not mean that you are going crazy. It is important to know that depression can be treated. Medicines, counseling, and self-care can all help. Many people do not get help because they are embarrassed or think that they will get over the depression on their own. But some people do not get better without treatment. Follow-up care is a key part of your treatment and safety. Be sure to make and go to all appointments, and call your doctor if you are having problems. It's also a good idea to know your test results and keep a list of the medicines you take. How can you care for yourself at home? Learn about antidepressant medicines  Antidepressant medicines can improve or end the symptoms of depression. You may need to take the medicine for at least 6 months, and often longer. Keep taking your medicine even if you feel better. If you stop taking it too soon, your symptoms may come back or get worse. You may start to feel better within 1 to 3 weeks of taking antidepressant medicine. But it can take as many as 6 to 8 weeks to see more improvement. Talk to your doctor if you have problems with your medicine or if you do not notice any improvement after 3 weeks. Antidepressants can make you feel tired, dizzy, or nervous. Some people have dry mouth, constipation, headaches, sexual problems, an upset stomach, or diarrhea.  Many of these side effects are mild and go away on their own after you take the medicine for a few weeks. Some may last longer. Talk to your doctor if side effects bother you too much. You might be able to try a different medicine. If you are pregnant or breastfeeding, talk to your doctor about what medicines you can take. Learn about counseling  In many cases, counseling can work as well as medicines to treat mild to moderate depression. Counseling is done by licensed mental health providers, such as psychologists, social workers, and some types of nurses. It can be done in one-on-one sessions or in a group setting. Many people find group sessions helpful. Cognitive-behavioral therapy is a type of counseling. In this treatment, you learn how to see and change unhelpful thinking styles that may be adding to your depression. Counseling and medicines often work well when used together. When should you call for help? Call 911 anytime you think you may need emergency care. For example, call if:    · You feel you cannot stop from hurting yourself or someone else. Call your doctor now or seek immediate medical care if:    · You hear voices.     · You feel much more depressed. Watch closely for changes in your health, and be sure to contact your doctor if:    · You are having problems with your depression medicine.     · You are not getting better as expected. Where can you learn more? Go to http://www.gray.com/  Enter G693 in the search box to learn more about \"Depression Treatment: Care Instructions. \"  Current as of: June 16, 2021               Content Version: 13.2  © 7367-3751 [employer]. Care instructions adapted under license by [employer] (which disclaims liability or warranty for this information). If you have questions about a medical condition or this instruction, always ask your healthcare professional. [place] [address] any warranty or liability for your use of this information. DISCHARGE SUMMARY    Cheri Maki  : 1980  MRN: [id number]    The patient Jewel Rowland exhibits the ability to control behavior in a less restrictive environment. Patient's level of functioning is improving. No assaultive/destructive behavior has been observed for the past 24 hours. No suicidal/homicidal threat or behavior has been observed for the past 24 hours. There is no evidence of serious medication side effects. Patient has not been in physical or protective restraints for at least the past 24 hours. If weapons involved, how are they secured? ***    Is patient aware of and in agreement with discharge plan? ***    Arrangements for medication:  Prescriptions given to patient, given a weeks supply or 30 day supply. Copy of discharge instructions to provider?:  ***    Arrangements for transportation home:  ***    Keep all follow up appointments as scheduled, continue to take prescribed medications per physician instructions.   Mental health crisis number:  442 or your local mental health crisis line number at [address] [place] [address] Emergency WARM LINE      1-659-640-MHAV ([phone])      M-F: 9am to 9pm      Sat & Sun: 5pm - 9pm  National suicide prevention lines:                             3-290-CKXJEMZ (0-522-883-673-564-1610)       6-208-071-TALK ([phone])    Crisis Text Line:  Text HOME to 818100

## 2022-07-07 NOTE — PROGRESS NOTES
Problem: Depressed Mood (Adult/Pediatric)  Goal: *STG: Remains safe in hospital  Outcome: Progressing Towards Goal    Patient has been euthymic. Future focused. Describes her mood as \"happy\". Denies SI/HI/AVH. No c/o pain or discomfort. Patient to be discharge today at [phone].

## 2022-07-08 ENCOUNTER — APPOINTMENT (OUTPATIENT)
Dept: BEHAVIORAL/MENTAL HEALTH | Age: 42
End: 2022-07-08
Payer: MEDICAID

## 2022-07-11 ENCOUNTER — APPOINTMENT (OUTPATIENT)
Dept: BEHAVIORAL/MENTAL HEALTH | Age: 42
End: 2022-07-11
Payer: MEDICAID

## 2022-07-11 ENCOUNTER — HOSPITAL ENCOUNTER (OUTPATIENT)
Dept: BEHAVIORAL/MENTAL HEALTH | Age: 42
Discharge: HOME | End: 2022-07-11
Payer: MEDICAID

## 2022-07-12 ENCOUNTER — APPOINTMENT (OUTPATIENT)
Dept: BEHAVIORAL/MENTAL HEALTH | Age: 42
End: 2022-07-12
Payer: MEDICAID

## 2022-07-12 ENCOUNTER — HOSPITAL ENCOUNTER (OUTPATIENT)
Dept: BEHAVIORAL/MENTAL HEALTH | Age: 42
Discharge: HOME | End: 2022-07-12
Payer: MEDICAID

## 2022-07-12 PROCEDURE — 90853 GROUP PSYCHOTHERAPY: CPT

## 2022-07-12 NOTE — GROUP NOTE
GRISELDA  GROUP DOCUMENTATION INDIVIDUAL                                                                          Group Therapy Note    Date: 7/12/2022    Group Start Time:  9:00 AM  Group End Time:  9:45 AM  Group Topic: [employer]    [facility] [place] [address] ACUTE BEHAV HLTH    [place]    Group Therapy Note    Nurse facilitated a daily [employer]. Patient's completed a check-in questionnaire, reflected on previous day/evening and discussed coping skills utilized at home. Pt's also completed an art activity that focused on gratitude. Attendees: 4         Attendance: Attended    Patient's Goal:  \"Healing\"    Interventions/techniques: Provide feedback and Supported    Follows Directions: Followed directions    Interactions: Interacted appropriately    Mental Status: Calm    Behavior/appearance: Cooperative    Goals Achieved: Able to engage in interactions and Able to manage/cope with feelings      Additional Notes:  Pt actively participated. Pt rated depression level 7/10, anger 1/10, and anxiety 5/10. Pt reported \"poor\" sleep and \"good\" appetite. Pt currently denies SI/HI, and thoughts of self harm. Pt denied using drugs and/or alcohol. Pt stated biggest stressor is, \"sleep. \"        Essie Sender

## 2022-07-12 NOTE — PROGRESS NOTES
MEDICATION GROUP THERAPY PROGRESS NOTE      Gina Bhat was present for medication group. TOPIC: Medication adherence    GROUP TIME: 1:10 - 2:00 PM Thursday    PERSONAL GOAL FOR PARTICIPATION: To be present for group, participate in discussion, and answer patient-directed questions. GOAL ORIENTATION: Personal    THERAPEUTIC INTERVENTIONS REVIEWED AND DISCUSSED: The following topics were presented: The following topics were presented: questions to ask your doctor when a new medication is prescribed, who to talk to if you have questions about medications, drug-drug interactions, effect of substances of abuse and alcohol on mental health, the importance of carrying an updated medication list and how to create your own medication list    IMPRESSION OF PARTICIPATION: Candace Cottrell shared that one good thing that has happened in the past week is that she was discharged from the hospital.  She actively participated in group discussions. She shared personal stories about her journey with medications, asked questions regarding her current medications and time to effect, and appeared knowledgeable about medication safety. Candace Cottrell questioned the pharmacist about which of her medications is most likely contributing to her drowsiness. Information provided to Candace Cottrell on incidence of drowsiness with aripiprazole, venlafaxine and baclofen.         WHIT [facility] Specialist, [facility]  Desk: [phone]  Pharmacy: [phone] ([phone])

## 2022-07-12 NOTE — GROUP NOTE
GRISELDA  GROUP DOCUMENTATION INDIVIDUAL                                                                          Group Therapy Note    Date: 7/12/2022    Group Start Time:  2:00 PM  Group End Time:  2:45 PM  Group Topic: Process Group - Outpatient    [facility] [place] 3 ACUTE BEHAV HLTH    [place], [address] [facility] GROUP DOCUMENTATION GROUP    Group Therapy Note    Nurse facilitated a Wrap-Up group. Pt's completed a daily wrap-up questionnaire with the focus of reflecting on the day's group topics and their mental state throughout the day. Pt's also report any difficulties they may have experienced today. The day concluded with an art activity. Attendees: 4         Attendance: Attended    Patient's Goal:  n/a    Interventions/techniques: Supported    Follows Directions: Followed directions    Interactions: Interacted appropriately    Mental Status: Depressed    Behavior/appearance: Cooperative    Goals Achieved: Able to listen to others and Able to self-disclose      Additional Notes:   Pt actively participated. Pt rated depression 5/10, anger 1/10, and anxiety 3/10. Pt currently denies SI,HI, and thoughts of self harm.  Pt reported difficulty with concentrating today.         Neeru Atkins

## 2022-07-13 ENCOUNTER — HOSPITAL ENCOUNTER (OUTPATIENT)
Dept: BEHAVIORAL/MENTAL HEALTH | Age: 42
Discharge: HOME | End: 2022-07-13
Payer: MEDICAID

## 2022-07-13 ENCOUNTER — APPOINTMENT (OUTPATIENT)
Dept: BEHAVIORAL/MENTAL HEALTH | Age: 42
End: 2022-07-13
Payer: MEDICAID

## 2022-07-13 PROCEDURE — 90853 GROUP PSYCHOTHERAPY: CPT

## 2022-07-14 ENCOUNTER — HOSPITAL ENCOUNTER (OUTPATIENT)
Dept: BEHAVIORAL/MENTAL HEALTH | Age: 42
Discharge: HOME | End: 2022-07-14
Payer: MEDICAID

## 2022-07-14 PROCEDURE — 90853 GROUP PSYCHOTHERAPY: CPT

## 2022-07-14 NOTE — GROUP NOTE
GRISELDA  GROUP DOCUMENTATION INDIVIDUAL                                                                          Group Therapy Note    Date: 7/14/2022    Group Start Time:  9:00 AM  Group End Time:  9:45 AM  Group Topic: [employer]    [facility] - [place] [address] ACUTE BEHAV HLTH    [place] [facility]    Group Therapy Note    Nurse facilitated a daily [employer]. Patient's completed a check-in questionnaire, reflected on previous day/evening and discussed coping skills utilized at home. Pt's also completed an art activity. Attendees: 1         Attendance: Attended    Patient's Goal:  \"Stay focused. \"    Interventions/techniques: Provide feedback and Supported    Follows Directions: Followed directions    Interactions: Interacted appropriately    Mental Status: Calm    Behavior/appearance: Cooperative    Goals Achieved: Able to self-disclose      Additional Notes:   Pt actively participated. Pt rated depression level 4/10, anger 1/10, and anxiety 2/10. Pt reported \"poor\" sleep and \"good\" appetite. Pt currently denies SI/HI, and thoughts of self harm. Pt denied using drugs and/or alcohol. Pt stated biggest stressor is, \"sleep. \"    Eileen Crawford

## 2022-07-14 NOTE — GROUP NOTE
GRISELDA  GROUP DOCUMENTATION INDIVIDUAL                                                                          Group Therapy Note    Date: 7/14/2022    Group Start Time: 12:10 PM  Group End Time:  1:10 PM  Group Topic: Education Group - Outpatient    [facility] AC[facility] BEHAV HLTH    [place] [address] [facility]    Group Therapy Note    Facilitated a Symptom ID group. Discussed symptoms, risks, psychotherapy, medication, and treatments for depression and anxiety. Pt's provided with worksheets and education. Attendees: 3             Attendance: Attended    Patient's Goal:  Discuss depression and anxiety symptoms    Interventions/techniques: Informed, Provide feedback and Supported    Follows Directions: Followed directions    Interactions: Interacted appropriately    Mental Status: Calm    Behavior/appearance: Cooperative    Goals Achieved: Able to engage in interactions and Able to self-disclose      Additional Notes:  Pt actively participated. Pt was able to self-disclose coping strategies that are beneficial to her and new skills learned in [facility].     [address]

## 2022-07-15 ENCOUNTER — HOSPITAL ENCOUNTER (OUTPATIENT)
Dept: BEHAVIORAL/MENTAL HEALTH | Age: 42
Discharge: HOME | End: 2022-07-15
Payer: MEDICAID

## 2022-07-15 PROCEDURE — 90853 GROUP PSYCHOTHERAPY: CPT

## 2022-07-15 NOTE — GROUP NOTE
GRISELDA  GROUP DOCUMENTATION INDIVIDUAL                                                                          Group Therapy Note    Date: 7/15/2022    Group Start Time:  2:00 PM  Group End Time:  2:45 PM  Group Topic: Education Group - Outpatient    [address] 3 ACUTE BEHAV HLTH    [place]    Group Therapy Note    Nurse facilitated a Wrap-Up group. Pt's completed a daily wrap-up questionnaire with the focus of reflecting on the day's group topics and their mental state throughout the day. Pt's also report any difficulties they may have experienced today. Safety plans were reviewed, each pt received a copy and was provided with crisis line phone number. The day concluded with a \"fun\" activity. Attendees: 6         Attendance: Attended    Patient's Goal:  Complete questionnaire    Interventions/techniques: Informed and Supported    Follows Directions: Followed directions    Interactions: Interacted appropriately    Mental Status: Calm    Behavior/appearance: Cooperative    Goals Achieved: Able to self-disclose      Additional Notes:   Pt actively participated. Pt rated depression 0/10, anger 5/10, and anxiety 4/10.  Pt currently denies SI,HI, and thoughts of self harm. Pt reported difficulty with \"concentrating\" today.     [address] [facility] [address]

## 2022-07-15 NOTE — GROUP NOTE
GRISELDA  GROUP DOCUMENTATION INDIVIDUAL                                                                          Group Therapy Note    Date: 7/15/2022    Group Start Time:  9:00 AM  Group End Time:  9:45 AM  Group Topic: [employer]    [facility] - [place] [address] ACUTE BEHAV HLTH    [place]    Group Therapy Note    Nurse facilitated a daily [employer]. Patient's completed a check-in questionnaire, reflected on previous day/evening and discussed coping skills utilized at home. Pt's also participated in a self-esteem game. Attendees: 6         Attendance: Attended    Patient's Goal:  \"Open\"    Interventions/techniques: Challenged and Supported    Follows Directions: Followed directions    Interactions: Interacted appropriately    Mental Status: Calm    Behavior/appearance: Cooperative    Goals Achieved: Able to engage in interactions and Able to self-disclose      Additional Notes:  Pt actively participated. Pt rated depression level 4/10, anger 1/10, and anxiety 2/10. Pt reported \"poor\" sleep and \"good\" appetite. Pt currently denies SI/HI, and thoughts of self harm. Pt denied using drugs and/or alcohol. Pt stated biggest stressor is, \"sleep/opening up. \"    Jose Poster Statement Selected

## 2022-07-18 ENCOUNTER — HOSPITAL ENCOUNTER (OUTPATIENT)
Dept: BEHAVIORAL/MENTAL HEALTH | Age: 42
Discharge: HOME | End: 2022-07-18
Payer: MEDICAID

## 2022-07-18 ENCOUNTER — HOSPITAL ENCOUNTER (EMERGENCY)
Age: 42
Discharge: LEFT BEFORE DISCHARGE | End: 2022-07-18
Admitting: EMERGENCY MEDICINE
Payer: MEDICAID

## 2022-07-18 VITALS
WEIGHT: 237 LBS | OXYGEN SATURATION: 97 % | TEMPERATURE: 97.9 F | SYSTOLIC BLOOD PRESSURE: 120 MMHG | RESPIRATION RATE: 18 BRPM | HEART RATE: 123 BPM | BODY MASS INDEX: 39.49 KG/M2 | HEIGHT: 65 IN | DIASTOLIC BLOOD PRESSURE: 72 MMHG

## 2022-07-18 PROCEDURE — 75810000275 HC EMERGENCY DEPT VISIT NO LEVEL OF CARE

## 2022-07-18 PROCEDURE — 90853 GROUP PSYCHOTHERAPY: CPT

## 2022-07-18 PROCEDURE — 93005 ELECTROCARDIOGRAM TRACING: CPT

## 2022-07-18 NOTE — PROGRESS NOTES
Pt met with provider, MORGAN Pulido, on 7/15/22. Increased Doxepin to 25 mg. Called in to [facility] by provider.

## 2022-07-18 NOTE — ED TRIAGE NOTES
Pt in with c/o left sided chest pain that started last night. Reports that she is part of the PHP program upstairs. Reports that pain could be related to her anxiety however reports that she feels fine mentally. Reports mild SOB.

## 2022-07-19 ENCOUNTER — HOSPITAL ENCOUNTER (EMERGENCY)
Age: 42
Discharge: LEFT BEFORE DISCHARGE | End: 2022-07-19
Attending: STUDENT IN AN ORGANIZED HEALTH CARE EDUCATION/TRAINING PROGRAM
Payer: MEDICAID

## 2022-07-19 VITALS
BODY MASS INDEX: 38.09 KG/M2 | HEIGHT: 66 IN | TEMPERATURE: 98 F | OXYGEN SATURATION: 96 % | HEART RATE: 103 BPM | DIASTOLIC BLOOD PRESSURE: 81 MMHG | RESPIRATION RATE: 16 BRPM | SYSTOLIC BLOOD PRESSURE: 121 MMHG | WEIGHT: 237 LBS

## 2022-07-19 DIAGNOSIS — G43.809 OTHER MIGRAINE WITHOUT STATUS MIGRAINOSUS, NOT INTRACTABLE: ICD-10-CM

## 2022-07-19 DIAGNOSIS — F10.920 ALCOHOLIC INTOXICATION WITHOUT COMPLICATION (HCC): ICD-10-CM

## 2022-07-19 DIAGNOSIS — R45.851 SUICIDAL IDEATION: Primary | ICD-10-CM

## 2022-07-19 DIAGNOSIS — R11.2 NAUSEA AND VOMITING, UNSPECIFIED VOMITING TYPE: ICD-10-CM

## 2022-07-19 LAB
ALBUMIN SERPL-MCNC: 3.4 G/DL (ref 3.5–5)
ALBUMIN/GLOB SERPL: 1 {RATIO} (ref 1.1–2.2)
ALP SERPL-CCNC: 71 U/L (ref 45–117)
ALT SERPL-CCNC: 32 U/L (ref 12–78)
AMPHET UR QL SCN: NEGATIVE
ANION GAP SERPL CALC-SCNC: 9 MMOL/L (ref 5–15)
APPEARANCE UR: CLEAR
AST SERPL-CCNC: 17 U/L (ref 15–37)
ATRIAL RATE: 111 BPM
BARBITURATES UR QL SCN: NEGATIVE
BASOPHILS # BLD: 0.1 K/UL (ref 0–0.1)
BASOPHILS NFR BLD: 1 % (ref 0–1)
BENZODIAZ UR QL: NEGATIVE
BILIRUB SERPL-MCNC: 0.3 MG/DL (ref 0.2–1)
BILIRUB UR QL: NEGATIVE
BUN SERPL-MCNC: 17 MG/DL (ref 6–20)
BUN/CREAT SERPL: 27 (ref 12–20)
CALCIUM SERPL-MCNC: 8.2 MG/DL (ref 8.5–10.1)
CALCULATED P AXIS, ECG09: 37 DEGREES
CALCULATED R AXIS, ECG10: 34 DEGREES
CALCULATED T AXIS, ECG11: 49 DEGREES
CANNABINOIDS UR QL SCN: NEGATIVE
CHLORIDE SERPL-SCNC: 105 MMOL/L (ref 97–108)
CO2 SERPL-SCNC: 25 MMOL/L (ref 21–32)
COCAINE UR QL SCN: NEGATIVE
COLOR UR: NORMAL
CREAT SERPL-MCNC: 0.64 MG/DL (ref 0.55–1.02)
DIAGNOSIS, 93000: NORMAL
DIFFERENTIAL METHOD BLD: ABNORMAL
DRUG SCRN COMMENT,DRGCM: NORMAL
EOSINOPHIL # BLD: 0.1 K/UL (ref 0–0.4)
EOSINOPHIL NFR BLD: 1 % (ref 0–7)
ERYTHROCYTE [DISTWIDTH] IN BLOOD BY AUTOMATED COUNT: 12.7 % (ref 11.5–14.5)
ETHANOL SERPL-MCNC: 217 MG/DL
FLUAV RNA SPEC QL NAA+PROBE: NOT DETECTED
FLUBV RNA SPEC QL NAA+PROBE: NOT DETECTED
GLOBULIN SER CALC-MCNC: 3.5 G/DL (ref 2–4)
GLUCOSE SERPL-MCNC: 131 MG/DL (ref 65–100)
GLUCOSE UR STRIP.AUTO-MCNC: NEGATIVE MG/DL
HCG UR QL: NEGATIVE
HCT VFR BLD AUTO: 36.2 % (ref 35–47)
HGB BLD-MCNC: 12.3 G/DL (ref 11.5–16)
HGB UR QL STRIP: NEGATIVE
IMM GRANULOCYTES # BLD AUTO: 0 K/UL (ref 0–0.04)
IMM GRANULOCYTES NFR BLD AUTO: 1 % (ref 0–0.5)
KETONES UR QL STRIP.AUTO: NEGATIVE MG/DL
LEUKOCYTE ESTERASE UR QL STRIP.AUTO: NEGATIVE
LIPASE SERPL-CCNC: 138 U/L (ref 73–393)
LYMPHOCYTES # BLD: 2.7 K/UL (ref 0.8–3.5)
LYMPHOCYTES NFR BLD: 42 % (ref 12–49)
MCH RBC QN AUTO: 30.7 PG (ref 26–34)
MCHC RBC AUTO-ENTMCNC: 34 G/DL (ref 30–36.5)
MCV RBC AUTO: 90.3 FL (ref 80–99)
METHADONE UR QL: NEGATIVE
MONOCYTES # BLD: 0.5 K/UL (ref 0–1)
MONOCYTES NFR BLD: 7 % (ref 5–13)
NEUTS SEG # BLD: 3.2 K/UL (ref 1.8–8)
NEUTS SEG NFR BLD: 48 % (ref 32–75)
NITRITE UR QL STRIP.AUTO: NEGATIVE
NRBC # BLD: 0 K/UL (ref 0–0.01)
NRBC BLD-RTO: 0 PER 100 WBC
OPIATES UR QL: NEGATIVE
P-R INTERVAL, ECG05: 132 MS
PCP UR QL: NEGATIVE
PH UR STRIP: 5 [PH] (ref 5–8)
PLATELET # BLD AUTO: 354 K/UL (ref 150–400)
PMV BLD AUTO: 8.8 FL (ref 8.9–12.9)
POTASSIUM SERPL-SCNC: 3.7 MMOL/L (ref 3.5–5.1)
PROT SERPL-MCNC: 6.9 G/DL (ref 6.4–8.2)
PROT UR STRIP-MCNC: NEGATIVE MG/DL
Q-T INTERVAL, ECG07: 348 MS
QRS DURATION, ECG06: 100 MS
QTC CALCULATION (BEZET), ECG08: 473 MS
RBC # BLD AUTO: 4.01 M/UL (ref 3.8–5.2)
SARS-COV-2, COV2: NOT DETECTED
SODIUM SERPL-SCNC: 139 MMOL/L (ref 136–145)
SP GR UR REFRACTOMETRY: 1.02
UROBILINOGEN UR QL STRIP.AUTO: 0.2 EU/DL (ref 0.2–1)
VENTRICULAR RATE, ECG03: 111 BPM
WBC # BLD AUTO: 6.5 K/UL (ref 3.6–11)

## 2022-07-19 PROCEDURE — 81025 URINE PREGNANCY TEST: CPT

## 2022-07-19 PROCEDURE — 81003 URINALYSIS AUTO W/O SCOPE: CPT

## 2022-07-19 PROCEDURE — 96372 THER/PROPH/DIAG INJ SC/IM: CPT

## 2022-07-19 PROCEDURE — 85025 COMPLETE CBC W/AUTO DIFF WBC: CPT

## 2022-07-19 PROCEDURE — 74011250637 HC RX REV CODE- 250/637: Performed by: STUDENT IN AN ORGANIZED HEALTH CARE EDUCATION/TRAINING PROGRAM

## 2022-07-19 PROCEDURE — 90791 PSYCH DIAGNOSTIC EVALUATION: CPT

## 2022-07-19 PROCEDURE — 99284 EMERGENCY DEPT VISIT MOD MDM: CPT

## 2022-07-19 PROCEDURE — 83690 ASSAY OF LIPASE: CPT

## 2022-07-19 PROCEDURE — 96361 HYDRATE IV INFUSION ADD-ON: CPT

## 2022-07-19 PROCEDURE — 96375 TX/PRO/DX INJ NEW DRUG ADDON: CPT

## 2022-07-19 PROCEDURE — 82077 ASSAY SPEC XCP UR&BREATH IA: CPT

## 2022-07-19 PROCEDURE — 80053 COMPREHEN METABOLIC PANEL: CPT

## 2022-07-19 PROCEDURE — 87636 SARSCOV2 & INF A&B AMP PRB: CPT

## 2022-07-19 PROCEDURE — 80307 DRUG TEST PRSMV CHEM ANLYZR: CPT

## 2022-07-19 PROCEDURE — 96374 THER/PROPH/DIAG INJ IV PUSH: CPT

## 2022-07-19 PROCEDURE — 36415 COLL VENOUS BLD VENIPUNCTURE: CPT

## 2022-07-19 PROCEDURE — 74011250636 HC RX REV CODE- 250/636: Performed by: STUDENT IN AN ORGANIZED HEALTH CARE EDUCATION/TRAINING PROGRAM

## 2022-07-19 PROCEDURE — 96376 TX/PRO/DX INJ SAME DRUG ADON: CPT

## 2022-07-19 RX ORDER — METOCLOPRAMIDE HYDROCHLORIDE 5 MG/ML
10 INJECTION INTRAMUSCULAR; INTRAVENOUS
Status: COMPLETED | OUTPATIENT
Start: 2022-07-19 | End: 2022-07-19

## 2022-07-19 RX ORDER — KETOROLAC TROMETHAMINE 30 MG/ML
15 INJECTION, SOLUTION INTRAMUSCULAR; INTRAVENOUS
Status: COMPLETED | OUTPATIENT
Start: 2022-07-19 | End: 2022-07-19

## 2022-07-19 RX ORDER — PROCHLORPERAZINE EDISYLATE 5 MG/ML
10 INJECTION INTRAMUSCULAR; INTRAVENOUS ONCE
Status: COMPLETED | OUTPATIENT
Start: 2022-07-19 | End: 2022-07-19

## 2022-07-19 RX ORDER — ONDANSETRON 2 MG/ML
4 INJECTION INTRAMUSCULAR; INTRAVENOUS
Status: COMPLETED | OUTPATIENT
Start: 2022-07-19 | End: 2022-07-19

## 2022-07-19 RX ORDER — BUTALBITAL, ACETAMINOPHEN AND CAFFEINE 50; 325; 40 MG/1; MG/1; MG/1
1 TABLET ORAL
Status: COMPLETED | OUTPATIENT
Start: 2022-07-19 | End: 2022-07-19

## 2022-07-19 RX ORDER — DIPHENHYDRAMINE HYDROCHLORIDE 50 MG/ML
25 INJECTION, SOLUTION INTRAMUSCULAR; INTRAVENOUS
Status: COMPLETED | OUTPATIENT
Start: 2022-07-19 | End: 2022-07-19

## 2022-07-19 RX ORDER — DICYCLOMINE HYDROCHLORIDE 10 MG/ML
20 INJECTION INTRAMUSCULAR
Status: COMPLETED | OUTPATIENT
Start: 2022-07-19 | End: 2022-07-19

## 2022-07-19 RX ORDER — ONDANSETRON 2 MG/ML
4 INJECTION INTRAMUSCULAR; INTRAVENOUS
Status: DISCONTINUED | OUTPATIENT
Start: 2022-07-19 | End: 2022-07-19

## 2022-07-19 RX ADMIN — SODIUM CHLORIDE 1000 ML: 9 INJECTION, SOLUTION INTRAVENOUS at 19:07

## 2022-07-19 RX ADMIN — ONDANSETRON 4 MG: 2 INJECTION INTRAMUSCULAR; INTRAVENOUS at 22:10

## 2022-07-19 RX ADMIN — DICYCLOMINE HYDROCHLORIDE 20 MG: 20 INJECTION INTRAMUSCULAR at 22:10

## 2022-07-19 RX ADMIN — ONDANSETRON 4 MG: 2 INJECTION INTRAMUSCULAR; INTRAVENOUS at 19:07

## 2022-07-19 RX ADMIN — BUTALBITAL, ACETAMINOPHEN, AND CAFFEINE 1 TABLET: 50; 325; 40 TABLET ORAL at 19:56

## 2022-07-19 RX ADMIN — DIPHENHYDRAMINE HYDROCHLORIDE 25 MG: 50 INJECTION, SOLUTION INTRAMUSCULAR; INTRAVENOUS at 19:56

## 2022-07-19 RX ADMIN — METOCLOPRAMIDE 10 MG: 5 INJECTION, SOLUTION INTRAMUSCULAR; INTRAVENOUS at 19:07

## 2022-07-19 RX ADMIN — PROCHLORPERAZINE EDISYLATE 10 MG: 5 INJECTION, SOLUTION INTRAMUSCULAR; INTRAVENOUS at 19:56

## 2022-07-19 RX ADMIN — KETOROLAC TROMETHAMINE 15 MG: 30 INJECTION, SOLUTION INTRAMUSCULAR; INTRAVENOUS at 19:07

## 2022-07-19 NOTE — ED NOTES
Verbal shift change report given to Kathryn Corral RN (oncoming nurse) by Johnny Milian (offgoing nurse). Report included the following information SBAR and ED Summary.

## 2022-07-19 NOTE — ED NOTES
Pt states she is SI with no plan and had never self harmed. Pt denies HI/AVH. Pt states she drank earlier today and reports were that she dry heaving in triage. Pt is alert and oriented x 4, RR even and unlabored, skin is warm and dry. Assessment completed and pt updated on plan of care. Call bell in reach. Emergency Department Nursing Plan of Care       The Nursing Plan of Care is developed from the Nursing assessment and Emergency Department Attending provider initial evaluation. The plan of care may be reviewed in the ED Provider note.     The Plan of Care was developed with the following considerations:   Patient / Family readiness to learn indicated by:verbalized understanding  Persons(s) to be included in education: patient  Barriers to Learning/Limitations:No    Signed
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
## 2022-07-19 NOTE — ED TRIAGE NOTES
Pt reporting SI that worsened today. Pt tearful during triage stating she just doesn't want to live anymore. Pt takes Abilify and Effexor and has an increase in her Effexor 2 weeks ago. Pt denies plan at this time. Pt states she has a good support system. Pt reports ETOH on board, pt became nauseous during triage and is dry heaving.

## 2022-07-19 NOTE — ED PROVIDER NOTES
EMERGENCY DEPARTMENT HISTORY AND PHYSICAL EXAM      Date: 7/19/2022  Patient Name: Demond Sawant    History of Presenting Illness     Chief Complaint   Patient presents with    Suicidal    Mental Health Problem         HPI: Demond Sawant, 46 y.o. female presents to the ED with cc of nausea vomiting and suicidal ideation. She reports a history of depression, and over the past day she has had some suicidal thoughts. Denies any plan. Has been compliant with her medications, states that her Effexor was increased about 2 weeks ago but no other recent changes in them. Also reports that she has been drinking some alcohol, reports drinking 4 alcoholic beverages. Now has epigastric abdominal pain and has had 3 episodes of nonbloody emesis. She still feels nauseous. Also feels like she has a migraine, reports gradual onset frontal headache with associated photophobia. States it feels like her prior migraines. No fevers, no focal weakness or numbness in the extremities, no dysuria or hematuria. There are no other complaints, changes, or physical findings at this time. PCP: Vinay Orellana MD    No current facility-administered medications on file prior to encounter. Current Outpatient Medications on File Prior to Encounter   Medication Sig Dispense Refill    venlafaxine-SR (EFFEXOR-XR) 75 mg capsule Take 3 Capsules by mouth daily (with breakfast). Indications: anxiety 90 Capsule 0    hydrOXYzine HCL (ATARAX) 50 mg tablet Take 50 mg by mouth every six (6) hours as needed for Itching or Anxiety.  ARIPiprazole (Abilify) 10 mg tablet Take 1 Tablet by mouth daily. 30 Tablet 2    baclofen (LIORESAL) 10 mg tablet Take 1 Tablet by mouth three (3) times daily for 30 days. Indications: muscle spasms caused by a spinal disease (Patient not taking: Reported on 7/19/2022) 90 Tablet 0    b complex-vitamin c-folic acid 0.8 mg (NEPHRO-LIT) 0.8 mg tab tablet Take 1 Tablet by mouth daily. Indications: vitamin deficiency (Patient not taking: Reported on 2022) 30 Tablet 0    doxepin (SINEquan) 10 mg capsule Take 1 Capsule by mouth nightly. Indications: insomnia (Patient not taking: Reported on 2022) 30 Capsule 0       Past History     Past Medical History:  Past Medical History:   Diagnosis Date    ADD (attention deficit disorder) 19-17yo    Treated with Adderall since dx. 2013 dosing 20mg AM and 10mg PM.  Trial/change to Vyvanse Nov-Dec 2015--not as effective.  Gynecologic disorder     History of miscarriages. History of Mirena IUD placed on 4/17/15    HX OTHER MEDICAL      -BUFA baby    HX OTHER MEDICAL     HPV positive    Idiopathic vulvodynia 2014    L1 vertebral fracture (HCC) 2017    [facility] imaging/admissoin: Mild acute two column compression fracture of L1 without evidence of retropulsion into the spinal canal.  Managed non-operatively.  Migraines 2013    Neurological disorder     Pelvic pain in female 9/15/2014    2015 gyn laparoscopy/hysteroscopy with endometriosis worse right.     Psychiatric disorder     depression    Secondary dysmenorrhea 2014    With menorraghia    Seizures ([facility] [address]     never on meds--had appr 4-5 in a short amt of time and none since       Past Surgical History:  Past Surgical History:   Procedure Laterality Date    ENDOMETRIAL CRYOABLATION      HX GYN  4/17/15    laparoscopy and hysteroscopy and IUD placement    HX HYSTERECTOMY  2016    Complete Hysterectomy       Family History:  Family History   Problem Relation Age of Onset    Cancer Mother     Diabetes Mother     Alcohol abuse Father         and drug    Psychiatric Disorder Father     Cancer Father     Diabetes Maternal Grandmother     Hypertension Maternal Grandmother     Thyroid Disease Maternal Grandmother     Diabetes Maternal Grandfather     Hypertension Maternal Grandfather     Cancer Maternal Grandfather     Heart Disease Maternal Grandfather     Cancer Paternal Grandmother     Diabetes Paternal Grandmother        Social History:  Social History     Tobacco Use    Smoking status: Former Smoker     Packs/day: 1.00     Years: 18.00     Pack years: 18.00     Types: Cigarettes    Smokeless tobacco: Current User    Tobacco comment: Vapes   Vaping Use    Vaping Use: Not on file   Substance Use Topics    Alcohol use: Yes     Alcohol/week: 0.0 standard drinks     Comment: rarely.  Drug use: No       Allergies: Allergies   Allergen Reactions    Aspirin Other (comments)     Hot sweats and passed out; tolerated ibuprofen    Penicillins Other (comments)     Childhood. Does not remember reaction. Is not aware if she has ever taken cephalosporins in the past.    Jan 2019: Pt notes no problems with cephalosporins. Review of Systems   no fever  Reports headache  no shortness of breath  no chest pain  Reports epigastric abdominal pain  no dysuria  no leg pain  No rash  No lymphadenopathy  No weight loss    Physical Exam   Physical Exam  Constitutional:       Appearance: She is not toxic-appearing. Comments: Smells of alcohol   HENT:      Head: Normocephalic and atraumatic. Mouth/Throat:      Mouth: Mucous membranes are moist.   Eyes:      Extraocular Movements: Extraocular movements intact. Pupils: Pupils are equal, round, and reactive to light. Cardiovascular:      Rate and Rhythm: Regular rhythm. Tachycardia present. Pulmonary:      Effort: Pulmonary effort is normal.      Breath sounds: Normal breath sounds. Abdominal:      Palpations: Abdomen is soft. Comments: No reproducible tenderness to palpation on abdominal exam   Musculoskeletal:      Cervical back: Neck supple. No rigidity. Skin:     General: Skin is warm and dry. Neurological:      General: No focal deficit present. Mental Status: She is alert and oriented to person, place, and time. Cranial Nerves: No cranial nerve deficit. Sensory: No sensory deficit. Motor: No weakness. Psychiatric:      Comments: Dysphoric mood         Diagnostic Study Results     Labs -   No results found for this or any previous visit (from the past 24 hour(s)). Radiologic Studies -   No orders to display     CT Results  (Last 48 hours)    None        CXR Results  (Last 48 hours)    None            Medical Decision Making   I am the first provider for this patient. I reviewed the vital signs, available nursing notes, past medical history, past surgical history, family history and social history. Vital Signs-Reviewed the patient's vital signs. Patient Vitals for the past 24 hrs:   Temp Pulse Resp BP SpO2   07/19/22 1826 98.3 °F (36.8 °C) (!) 122 18 122/71 94 %         Provider Notes (Medical Decision Making):   51-year-old female presenting with suicidal ideation, alcohol intoxication and nausea and vomiting. Also reports headache. She has known psychiatric history, reports history of depression, mental health complaints today likely in the setting of this. She also reports a gradual onset headache, likely migraine headache as she reports history of prior migraines, states that this feels similar, consistent with associated photophobia. No thunderclap onset, neuro exam unremarkable, she is afebrile without any neck stiffness, unlikely CNS infection, SAH, or any other acute intracranial emergency. Nausea vomiting and abdominal discomfort likely in the setting of alcohol intoxication, differential includes gastritis or GERD, less likely pancreatitis. She does not have any reproducible abdominal tenderness, unlikely any other acute intra-abdominal infection or obstruction. She will be given IV fluids, antiemetics. ED Course:     Initial assessment performed. The patients presenting problems have been discussed, and they are in agreement with the care plan formulated and outlined with them.   I have encouraged them to ask questions as they arise throughout their visit. CBC negative for leukocytosis or anemia, basic metabolic panel with normal renal function, no worrisome electrolyte abnormalities, UA not suggestive of UTI, UDS negative. On reevaluation, she says she feels improved, still feels a bit nauseous. Ordered additional antiemetics. On reevaluation, her tachycardia has improved. She is resting comfortably. Bsmart recommends mental health admission. Critical Care Time:         Disposition:  Medically cleared for psychiatric admission    PLAN:  1. Current Discharge Medication List        2.    Follow-up Information    None       Return to ED if worse     Diagnosis     Clinical Impression: Acute suicidal ideation, acute alcohol intoxication, acute nausea and vomiting, acute migraine headache

## 2022-07-19 NOTE — BSMART NOTE
BSMART assessment completed, and suicide risk level noted to be [place]. Primary Nurse Antony Gates and Zahra Berger and Physician Latia notified. Concerns not observed. Security/Off- has not been notified.

## 2022-07-20 NOTE — BSMART NOTE
Per charge nurse patient was called to return to ED. Patient stated she will come back to have IV removed. Patient agreed to come back but did not come into ED and refused. This writer informed charge if patient returns she could be reevaluated or continue with placement. Patient refused to come into ED as reported was picked up by a friend.

## 2022-07-20 NOTE — ED NOTES
Pt eloped from ED. Pt was called about her IV. Pt says she took it out. RN says she has to physically see the IV out. She says she will drive by really fast (pt in car with friend). BSMART aware of elopement.

## 2022-07-20 NOTE — ED NOTES
Pt returned so this RN could verify that she had taken out her IV. Pt refused to re-enter hospital. Pt refused to sign AMA form. Charge RN, socorro, aware. MD aware.
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
## 2022-07-21 ENCOUNTER — HOSPITAL ENCOUNTER (EMERGENCY)
Age: 42
Discharge: HOME | End: 2022-07-21
Attending: EMERGENCY MEDICINE
Payer: MEDICAID

## 2022-07-21 ENCOUNTER — APPOINTMENT (OUTPATIENT)
Dept: CT IMAGING | Age: 42
End: 2022-07-21
Attending: EMERGENCY MEDICINE
Payer: MEDICAID

## 2022-07-21 ENCOUNTER — HOSPITAL ENCOUNTER (OUTPATIENT)
Dept: BEHAVIORAL/MENTAL HEALTH | Age: 42
Discharge: HOME | End: 2022-07-21
Payer: MEDICAID

## 2022-07-21 VITALS
HEART RATE: 100 BPM | RESPIRATION RATE: 20 BRPM | DIASTOLIC BLOOD PRESSURE: 66 MMHG | HEIGHT: 65 IN | SYSTOLIC BLOOD PRESSURE: 105 MMHG | BODY MASS INDEX: 39.49 KG/M2 | OXYGEN SATURATION: 92 % | WEIGHT: 237 LBS | TEMPERATURE: 97.8 F

## 2022-07-21 DIAGNOSIS — R10.13 ABDOMINAL PAIN, EPIGASTRIC: ICD-10-CM

## 2022-07-21 DIAGNOSIS — K70.10 ALCOHOLIC HEPATITIS WITHOUT ASCITES: Primary | ICD-10-CM

## 2022-07-21 LAB
ALBUMIN SERPL-MCNC: 3.5 G/DL (ref 3.5–5)
ALBUMIN/GLOB SERPL: 1.1 {RATIO} (ref 1.1–2.2)
ALP SERPL-CCNC: 76 U/L (ref 45–117)
ALT SERPL-CCNC: 842 U/L (ref 12–78)
ANION GAP SERPL CALC-SCNC: 9 MMOL/L (ref 5–15)
AST SERPL-CCNC: 711 U/L (ref 15–37)
BASOPHILS # BLD: 0.1 K/UL (ref 0–0.1)
BASOPHILS NFR BLD: 1 % (ref 0–1)
BILIRUB SERPL-MCNC: 0.8 MG/DL (ref 0.2–1)
BUN SERPL-MCNC: 13 MG/DL (ref 6–20)
BUN/CREAT SERPL: 20 (ref 12–20)
CALCIUM SERPL-MCNC: 8.2 MG/DL (ref 8.5–10.1)
CHLORIDE SERPL-SCNC: 104 MMOL/L (ref 97–108)
CO2 SERPL-SCNC: 25 MMOL/L (ref 21–32)
CREAT SERPL-MCNC: 0.64 MG/DL (ref 0.55–1.02)
DIFFERENTIAL METHOD BLD: NORMAL
EOSINOPHIL # BLD: 0.1 K/UL (ref 0–0.4)
EOSINOPHIL NFR BLD: 1 % (ref 0–7)
ERYTHROCYTE [DISTWIDTH] IN BLOOD BY AUTOMATED COUNT: 13.2 % (ref 11.5–14.5)
GLOBULIN SER CALC-MCNC: 3.1 G/DL (ref 2–4)
GLUCOSE SERPL-MCNC: 127 MG/DL (ref 65–100)
HCT VFR BLD AUTO: 37.2 % (ref 35–47)
HGB BLD-MCNC: 12.7 G/DL (ref 11.5–16)
IMM GRANULOCYTES # BLD AUTO: 0 K/UL (ref 0–0.04)
IMM GRANULOCYTES NFR BLD AUTO: 0 % (ref 0–0.5)
LIPASE SERPL-CCNC: 86 U/L (ref 73–393)
LYMPHOCYTES # BLD: 2.1 K/UL (ref 0.8–3.5)
LYMPHOCYTES NFR BLD: 30 % (ref 12–49)
MCH RBC QN AUTO: 30.6 PG (ref 26–34)
MCHC RBC AUTO-ENTMCNC: 34.1 G/DL (ref 30–36.5)
MCV RBC AUTO: 89.6 FL (ref 80–99)
MONOCYTES # BLD: 0.7 K/UL (ref 0–1)
MONOCYTES NFR BLD: 10 % (ref 5–13)
NEUTS SEG # BLD: 4 K/UL (ref 1.8–8)
NEUTS SEG NFR BLD: 58 % (ref 32–75)
NRBC # BLD: 0 K/UL (ref 0–0.01)
NRBC BLD-RTO: 0 PER 100 WBC
PLATELET # BLD AUTO: 397 K/UL (ref 150–400)
PMV BLD AUTO: 9 FL (ref 8.9–12.9)
POTASSIUM SERPL-SCNC: 3.5 MMOL/L (ref 3.5–5.1)
PROT SERPL-MCNC: 6.6 G/DL (ref 6.4–8.2)
RBC # BLD AUTO: 4.15 M/UL (ref 3.8–5.2)
SODIUM SERPL-SCNC: 138 MMOL/L (ref 136–145)
WBC # BLD AUTO: 6.9 K/UL (ref 3.6–11)

## 2022-07-21 PROCEDURE — 85025 COMPLETE CBC W/AUTO DIFF WBC: CPT

## 2022-07-21 PROCEDURE — 74177 CT ABD & PELVIS W/CONTRAST: CPT

## 2022-07-21 PROCEDURE — 74011000636 HC RX REV CODE- 636: Performed by: EMERGENCY MEDICINE

## 2022-07-21 PROCEDURE — 96361 HYDRATE IV INFUSION ADD-ON: CPT

## 2022-07-21 PROCEDURE — 74011000250 HC RX REV CODE- 250: Performed by: EMERGENCY MEDICINE

## 2022-07-21 PROCEDURE — 74011250636 HC RX REV CODE- 250/636: Performed by: EMERGENCY MEDICINE

## 2022-07-21 PROCEDURE — 96375 TX/PRO/DX INJ NEW DRUG ADDON: CPT

## 2022-07-21 PROCEDURE — 80053 COMPREHEN METABOLIC PANEL: CPT

## 2022-07-21 PROCEDURE — 99285 EMERGENCY DEPT VISIT HI MDM: CPT

## 2022-07-21 PROCEDURE — 96374 THER/PROPH/DIAG INJ IV PUSH: CPT

## 2022-07-21 PROCEDURE — 36415 COLL VENOUS BLD VENIPUNCTURE: CPT

## 2022-07-21 PROCEDURE — 83690 ASSAY OF LIPASE: CPT

## 2022-07-21 PROCEDURE — 74011250637 HC RX REV CODE- 250/637: Performed by: EMERGENCY MEDICINE

## 2022-07-21 PROCEDURE — 90837 PSYTX W PT 60 MINUTES: CPT

## 2022-07-21 RX ORDER — MORPHINE SULFATE 2 MG/ML
2 INJECTION, SOLUTION INTRAMUSCULAR; INTRAVENOUS
Status: COMPLETED | OUTPATIENT
Start: 2022-07-21 | End: 2022-07-21

## 2022-07-21 RX ORDER — PANTOPRAZOLE SODIUM 40 MG/1
40 TABLET, DELAYED RELEASE ORAL DAILY
Qty: 20 TABLET | Refills: 0 | Status: SHIPPED | OUTPATIENT
Start: 2022-07-21 | End: 2022-08-10

## 2022-07-21 RX ORDER — ONDANSETRON 2 MG/ML
4 INJECTION INTRAMUSCULAR; INTRAVENOUS
Status: COMPLETED | OUTPATIENT
Start: 2022-07-21 | End: 2022-07-21

## 2022-07-21 RX ORDER — ONDANSETRON 4 MG/1
4 TABLET, ORALLY DISINTEGRATING ORAL
Qty: 10 TABLET | Refills: 0 | Status: SHIPPED | OUTPATIENT
Start: 2022-07-21

## 2022-07-21 RX ADMIN — IOPAMIDOL 100 ML: 755 INJECTION, SOLUTION INTRAVENOUS at 12:19

## 2022-07-21 RX ADMIN — ALUMINUM HYDROXIDE, MAGNESIUM HYDROXIDE, AND SIMETHICONE 40 ML: 200; 200; 20 SUSPENSION ORAL at 13:18

## 2022-07-21 RX ADMIN — ONDANSETRON 4 MG: 2 INJECTION INTRAMUSCULAR; INTRAVENOUS at 10:36

## 2022-07-21 RX ADMIN — MORPHINE SULFATE 2 MG: 2 INJECTION, SOLUTION INTRAMUSCULAR; INTRAVENOUS at 10:36

## 2022-07-21 RX ADMIN — SODIUM CHLORIDE 1000 ML: 9 INJECTION, SOLUTION INTRAVENOUS at 10:36

## 2022-07-21 NOTE — ED NOTES
Emergency Department Nursing Plan of Care       The Nursing Plan of Care is developed from the Nursing assessment and Emergency Department Attending provider initial evaluation. The plan of care may be reviewed in the ED Provider note.     The Plan of Care was developed with the following considerations:   Patient / Family readiness to learn indicated by:verbalized understanding  Persons(s) to be included in education: patient  Barriers to Learning/Limitations:No    Signed     Lupe Harrell RN    7/21/2022   10:09 AM

## 2022-07-21 NOTE — ED TRIAGE NOTES
Pt c/o abd pain x this morning, pt states \"I drank too much. \" States she drank (5) 12 oz beers yesterday. Hx of alcoholic hepatitis.
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
## 2022-07-26 ENCOUNTER — HOSPITAL ENCOUNTER (OUTPATIENT)
Dept: BEHAVIORAL/MENTAL HEALTH | Age: 42
Discharge: HOME | End: 2022-07-26
Payer: MEDICAID

## 2022-07-26 PROCEDURE — 90853 GROUP PSYCHOTHERAPY: CPT

## 2022-07-26 PROCEDURE — 90834 PSYTX W PT 45 MINUTES: CPT

## 2022-07-26 NOTE — GROUP NOTE
GRISELDA  GROUP DOCUMENTATION INDIVIDUAL                                                                          Group Therapy Note    Date: 7/26/2022    Group Start Time:  9:00 AM  Group End Time:  9:45 AM  Group Topic: [employer]    [facility] [place] [address] ACUTE BEHAV HLTH    [place] GROUP [facility] GROUP    Group Therapy Note    Nurse facilitated a daily [employer]. Patient's completed a check-in questionnaire, reflected on previous day/evening and discussed coping skills utilized at home. Attendees: 4            Attendance: Attended    Patient's Goal:  \"learn something new of peers\"    Interventions/techniques: Informed and Supported    Follows Directions: Followed directions    Interactions: Interacted appropriately    Mental Status: Anxious    Behavior/appearance: Cooperative    Goals Achieved: Able to self-disclose      Additional Notes:  Pt actively participated. Pt rated depression level 6/10, anger 1/10, and anxiety 5/10. Pt currently denies SI/HI, and thoughts of self harm. Pt denied using drugs and/or alcohol. Pt stated biggest stressor is, \"meds-out. \"    Jany Dee

## 2022-07-26 NOTE — GROUP NOTE
GRISELDA  GROUP DOCUMENTATION INDIVIDUAL                                                                          Group Therapy Note    Date: 7/26/2022    Group Start Time:  2:00 PM  Group End Time:  2:45 PM  Group Topic: Process Group - Outpatient    [address] ACUTE BEHAV HLTH    [place] GROUP [facility]    Group Therapy Note    Nurse facilitated a Wrap-Up group. Pt's completed a daily wrap-up questionnaire with the focus of reflecting on the day's group topics and their mental state throughout the day. Pt's also report any difficulties they may have experienced today. Attendees: 4         Attendance: Attended    Patient's Goal:  complete wrap up    Interventions/techniques: Supported    Follows Directions: Followed directions    Interactions: Interacted appropriately    Mental Status: Calm    Behavior/appearance: Cooperative    Goals Achieved: Able to self-disclose      Additional Notes:  Pt actively participated. Pt rated depression 5/10, anger 1/10, and anxiety 5/10. Pt currently denies SI,HI, and thoughts of self harm. Pt reported difficulty with \"concentrating\" today.     Lisa Terrell

## 2022-07-26 NOTE — PROGRESS NOTES
Individual Therapy Session Note  Start time: 1100  End Time: 1653     Treatment  Plan Problems Addressed:Alcohol use; PTSD    Treatment Plan Goals and Objectives Addressed: Patient will be able how their substance use is negatively impacting them on a weekly basis; Engage in positive coping skills when triggered due to trauma; process traumatic events and experiencing impacting functioning    Treatment Plan Interventions Addressed: Staff will provide individual counseling sessions weekly to help pt process how to manage sobriety. Pt met with  for individual counseling session and treatment plan review. Pt processed medical admission last week, relapse on alcohol use, and trauma hx. Pt and SW processed discharge plans. Pt agreeing to discharge from [facility] 7/29/22. Pt processing readiness to leave PHP and improvement in symptoms. Pt declined SW's recommendation for inpatient rehab. Pt opting for IOP dual dx and follow up with [facility] for therapy and medication management. [facility].       MARCELO Lagunas

## 2022-07-26 NOTE — GROUP NOTE
IP  GROUP DOCUMENTATION INDIVIDUAL                                                                          Group Therapy Note    Date: 7/26/2022    Group Start Time:  1:10 PM  Group End Time:  2:00 PM  Group Topic: Education Group - Outpatient    [facility] JAYME Donahueluis angel Oleg    IP [address] [facility] GROUP DOCUMENTATION GROUP    Group Therapy Note    Group discussion focused on importance of healthy self-esteem and factors that contribute to low self-esteem. Patients processed ways to improve self-esteem and personal strengths including accomplishments, positive characteristics, and positive choices. Patient's discussed how their life would be different if they had healthy self-esteem. Patients processed how to cope with past trauma, relationships and decisions that may have impacted self-esteem. Patients completed an affirmation activity. Attendees: 4       Attendance: Attended    Patient's Goal:  Discuss self-esteem and its relevance to mental health     Interventions/techniques: Informed, Validated, Promoted peer support, and Supported    Follows Directions: Followed directions    Interactions: Interacted appropriately    Mental Status: Anxious and Happy    Behavior/appearance: Attentive and Motivated    Goals Achieved: Able to engage in interactions, Able to listen to others, Able to self-disclose, Discussed self-esteem issues, and Identified feelings      Additional Notes:  Patient processed triggers for low self-esteem including being bullied, and being around people who only point out the negative in her. Pt processed relationship with mother and how this impacts her view of self. Pt processed desire to break the cycle and treat her son differently. Pt processed ways to improve self-esteem including positive affirmations and reframing negative self-talk with more positive self-talk.     Phil Nielsen

## 2022-07-26 NOTE — GROUP NOTE
IP  GROUP DOCUMENTATION INDIVIDUAL                                                                          Group Therapy Note    Date: 7/26/2022    Group Start Time: 12:10 PM  Group End Time:  1:10 PM  Group Topic: Process Group - Outpatient    [facility] [place] [facility] PHP    Leonard Vazquez    IP [address] [facility] GROUP DOCUMENTATION GROUP    Group Therapy Note  Patients completed My DBT House activity worksheet to process personal strengths, coping skills, strengthening their support system and values. Patient processed steps of healing and treatment and supports needed to provide protection, support, and stability. Patients processed what they are proud of and parts of themselves they attempt to hide. Patients identified the behaviors they desire to gain control of, the positive emotions they want to experience more fully, the things they are happy about in life, and identified what a life worth living would look like for them. Attendees: 4       Attendance: Attended    Patient's Goal:Recognize strengths, gain coping skills, strengthen their support systems, and understand values. Interventions/techniques: Challenged, Informed, Validated, and Supported    Follows Directions: Followed directions    Interactions: Interacted appropriately    Mental Status: Anxious and Other Distracted     Behavior/appearance: Cooperative    Goals Achieved: Able to engage in interactions, Able to listen to others, Able to give feedback to another, Able to receive feedback, Able to self-disclose, and Displayed empathy      Additional Notes:  Patient completed worksheet appropriately. Pt processed her foundation including her melony, her son and the hope for the future. Pt stated a life worth living is to be able to live alone with her son and be a part of a Jain community. Pt processed accomplishments and being proud of graduating from [employer] and breaking generational cycles. Pt with improved insight. Pt processed desire to return to work one day. Ana Banegas

## 2022-07-26 NOTE — GROUP NOTE
GRISELDA  GROUP DOCUMENTATION INDIVIDUAL                                                                          Group Therapy Note    Date: 7/26/2022    Group Start Time:  9:50 AM  Group End Time: 10:40 AM  Group Topic: Process Group - Outpatient    [place] [address] ACUTE BEHAV [address] GROUP DOCUMENTATION GROUP    Group Therapy Note  This writer facilitated process group with the patients. The patient were encouraged to discuss recent experiences and reasons for program participation for feedback from this writer and peers. The patient were encouraged to participate in self-care activity identification exercise. Attendees: 4       Attendance: Attended    Patient's Goal:  Talk about self    Interventions/techniques: Challenged and Promoted peer support    Follows Directions: Followed directions    Interactions: Interacted appropriately    Mental Status: Congruent and Happy    Behavior/appearance: Attentive, Cooperative, and Neatly groomed    Goals Achieved: Able to engage in interactions, Able to listen to others, Able to self-disclose, and Discussed discharge plans      Additional Notes: The patient presented appropriately in group and participated by discussing recent admission to a hospital with peers. The patient was guarded with discussion of alcohol use and relapse, but did share family details and accepted feedback with peers.      Vasiliy Ulloa

## 2022-07-27 ENCOUNTER — HOSPITAL ENCOUNTER (OUTPATIENT)
Dept: BEHAVIORAL/MENTAL HEALTH | Age: 42
Discharge: HOME | End: 2022-07-27
Payer: MEDICAID

## 2022-07-27 PROCEDURE — 90853 GROUP PSYCHOTHERAPY: CPT

## 2022-07-27 NOTE — GROUP NOTE
IP  GROUP DOCUMENTATION INDIVIDUAL                                                                          Group Therapy Note    Date: 7/27/2022    Group Start Time: 12:10 PM  Group End Time:  1:10 PM  Group Topic: Process Group - Outpatient    [facility]    IP [address] [facility] GROUP DOCUMENTATION GROUP    Group Therapy Note     provided education on the benefit of exposure activities to assist patients with overcoming fears. Patients were asked to identify a fear that is disruptive to their life that they are hoping to overcome, create a list of activities they can do to confront their fears, and then place these fears in various places on a Bingo card. Patients then set a goal to incorporate these activities into their daily life and identified a way to reward themselves for engaging in these activities. Attendees: 5       Attendance: Attended    Patient's Goal:  To gain education and insight on exposure therapy interventions. To engage in exposure therapy interventions. Interventions/techniques: Challenged, Informed, Promoted peer support, and Supported    Follows Directions: Followed directions    Interactions: Interacted appropriately    Mental Status: Anxious and Elevated    Behavior/appearance: Cooperative    Goals Achieved: Able to engage in interactions, Able to listen to others, and Able to manage/cope with feelings      Additional Notes:  Patient processed desire to overcome fear of men and emotional intimacy. Pt processed how this fear disrupts her life including inability to have strong friendships or be in a romantic relationship. Pt stated this often comes from her relationship with her mother. Pt processed ways to overcome fear of emotional intimacy on Bingo card including spending time with people and sharing her feelings with them. Pt also processed ways to overcome the fear of change.      Sunni Darnell

## 2022-07-27 NOTE — GROUP NOTE
IP  GROUP DOCUMENTATION INDIVIDUAL                                                                          Group Therapy Note    Date: 7/27/2022    Group Start Time:  1:10 PM  Group End Time:  2:00 PM  Group Topic: Process Group - Outpatient    [facility] [place] [facility] ABUNDIO Burden    IP [address] [facility] GROUP DOCUMENTATION GROUP    Group Therapy Note    Patients completed an activity worksheet titled Three Magic Doors to process ideal opportunities/goals for future, desired changes to me made in the future, and ideal helps/social support to meet these ideal goals and changes. Patients were asked to visualize three magic doors, a door of opportunity, a door of change, and a door with a helper. Patients were invited to draw or write about what was behind, around and on these doors. Patients discussed and processed these opportunities, changes, and helpers with other group members. Attendees: 5    Attendance: Attended    Patient's Goal:  To process opportunities in the future, desired changes, and support systems. Interventions/techniques: Art integration, Reinforced, and Supporte    Follows Directions: Did not follow directions    Interactions: Interacted appropriately    Mental Status: Anxious and Flat    Behavior/appearance: Argumentative and Resistive/oppositional    Goals Achieved: Able to engage in interactions and Able to listen to others      Additional Notes:  After SW presented the group intervention, pt stated \"this is too hard, I don't want to do this. \" SW provided reasoning for intervention. Pt stated \"This is still not something I want to do. \" SW provided patient with encouragement, provided modified intervention for patient. Pt observed on her phone and staring at the floor. For the last 5 minutes of group, pt observed writing thoughts down. At the end of group pt stated, \"I just want you to know I tried to do it. \"    Usha Joe

## 2022-07-27 NOTE — GROUP NOTE
GRISELDA  GROUP DOCUMENTATION INDIVIDUAL                                                                          Group Therapy Note    Date: 7/27/2022    Group Start Time:  2:00 PM  Group End Time:  2:45 PM  Group Topic: Process Group - Outpatient    [facility] ABUNDIO Oakes    IP [address] [facility] GROUP DOCUMENTATION GROUP    Group Therapy Note     facilitated a wrap-up group to provide patient's time to process day of treatment. Patients were asked to complete a wrap-up questionnaire. The patients discussed their responses to the wrap-up questionnaire. The patients were then invited to engage in mindfulness question game. Attendees: 5     Attendance: Attended    Patient's Goal:  To process day of treatment and self-disclose thoughts and feelings     Interventions/techniques: Validated, Promoted peer support, and Supported    Follows Directions: Followed directions    Interactions: Interacted appropriately    Mental Status: Anxious and Blunted    Behavior/appearance: Attentive and Poor eye contact    Goals Achieved: Able to listen to others and Able to give feedback to another      Additional Notes:  Patient completed wrap up questionnaire. Pt denied SI/HI. Pt processed frustration with medication regimen after meeting with psych NP. Pt engaged in mindfulness question game, asked pts follow up questions and interacted appropriately.      Latisha Aburto

## 2022-07-27 NOTE — GROUP NOTE
GRISELDA  GROUP DOCUMENTATION INDIVIDUAL                                                                          Group Therapy Note    Date: 7/27/2022    Group Start Time:  9:00 AM  Group End Time:  9:45 AM  Group Topic: [employer]    [facility] [place] [address] ACUTE BEHAV HLTH    [place] GROUP [facility]    Group Therapy Note    Nurse facilitated a daily [employer]. Patient's completed a check-in questionnaire, reflected on previous day/evening and discussed coping skills utilized at home. Pt's also completed a mindfulness exercise focusing on something bad from this week, something good from this week, and something you are looking forward to. Attendees: 5       Attendance: Attended    Patient's Goal:  \"Stay focused\"    Interventions/techniques: Informed and Supported    Follows Directions: Followed directions    Interactions: Interacted appropriately    Mental Status: Calm    Behavior/appearance: Cooperative    Goals Achieved: Able to listen to others and Able to give feedback to another      Additional Notes:   Pt actively participated. Pt rated depression level 7/10, anger 1/10, and anxiety 4/10. Pt currently denies SI/HI, and thoughts of self harm. Pt denied using drugs and/or alcohol. Pt stated biggest stressor is, \"meds. \"    Tello Avileser

## 2022-07-27 NOTE — PROGRESS NOTES
Pt met with provider, MORGAN Powell. Pt requested refills for medication. Provider denied refilling pt's Adderall d/t recent alcohol relapse. Pt provided w/refill for Effexor  mg PO daily.

## 2022-07-27 NOTE — GROUP NOTE
GRISELDA  GROUP DOCUMENTATION INDIVIDUAL                                                                          Group Therapy Note    Date: 7/27/2022    Group Start Time: 10:50 AM  Group End Time: 11:40 AM  Group Topic: Process Group - Outpatient    [facility] [place] 3 ACUTE BEHAV HLTH    Hannah Garcia     [facility] GROUP    Group Therapy Note  The patient were encouraged to continue processing relationships. This writer started conversation by listing the healthy and not healthy characteristics of relationships and encouraged the group to discuss personal experiences in relationships and provide feedback , support, and advise to groups. The peers were encouraged to participate in coping strategy as the request of a peer. Attendees: 6       Attendance: Attended    Patient's Goal:  To shared information with peers. Interventions/techniques: Promoted peer support, Provide feedback, and Supported    Follows Directions: Followed directions    Interactions: Interacted appropriately    Mental Status: Calm and Congruent    Behavior/appearance: Attentive, Motivated, and Neatly groomed    Goals Achieved: Able to engage in interactions, Able to give feedback to another, and Able to self-disclose      Additional Notes: The patient participated in conversation on healthy relationships. The patient shared information about her past relationships, co parenting, and boundaries she has set for herself regarding her health and her son before getting into another relationship. The patient provided support and feedback to peers. The patient suggested the end of group coping strategy and participated. The patient will continue to attend [facility] as scheduled.      Sam Cr

## 2022-07-27 NOTE — GROUP NOTE
GRISELDA  GROUP DOCUMENTATION INDIVIDUAL                                                                          Group Therapy Note    Date: 7/27/2022    Group Start Time:  9:50 AM  Group End Time: 10:40 AM  Group Topic: Process Group - Outpatient    [facility] - [place] [address] ACUTE BEHAV [address] GROUP DOCUMENTATION GROUP    Group Therapy Note  This writer facilitated the process group. This writer initiated group by discussing goals for each patient and encouraging the patient to self-disclose, support and provide feedback to peers regarding goals and shared personal experiences. This writer encouraged the patient to reflect on the group. Attendees: 6       Attendance: Attended    Patient's Goal:  The patient shared that her goal is to be positive. Interventions/techniques: Challenged, Promoted peer support, Provide feedback, and Supported    Follows Directions: Followed directions    Interactions: Interacted appropriately    Mental Status: Calm, Congruent, and Happy    Behavior/appearance: Cooperative, Motivated, and Neatly groomed    Goals Achieved: Able to engage in interactions, Displayed empathy, and Identified feelings      Additional Notes: The patient was appropriate in interaction with peers. The patient provided support to peers and discussed her difficulty focusing especially now that she is out of her medications. The patient was open about her abstinence from opiates for the last 6 months, but shared that she has used alcohol.       Oralia Moore

## 2022-07-29 ENCOUNTER — HOSPITAL ENCOUNTER (OUTPATIENT)
Dept: BEHAVIORAL/MENTAL HEALTH | Age: 42
Discharge: HOME | End: 2022-07-29
Payer: MEDICAID

## 2022-07-29 PROCEDURE — 90832 PSYTX W PT 30 MINUTES: CPT

## 2022-07-29 PROCEDURE — 90853 GROUP PSYCHOTHERAPY: CPT

## 2022-07-29 NOTE — PROGRESS NOTES
On 7/29/22 from 1000 to 56 writer met with patient to discuss discharge planning and complete individual counseling session. Pt processed improvement since admission. Pt processed areas of continued growth and development. Pt processed motivation to engage in IOP and outpatient apts. Pts discharge date is scheduled for 7/29/22. Pt declined desire to have family counseling session prior to discharge. Pt declined for any family to be notified of discharge date or discharge plans. Pt and writer reviewed treatment plan goals. SW closed out treatment plan in session. Pt completed and reviewed safety plan with SW. DE reviewed outpatient apts with pt. Copy of discharge instructions with appointment information and safety plan has been provided to pt. Pt is in agreement with discharge plan. CIWA completed, pt scored a 4. Pt declined alcohol use for 7 days. Pt declined opiate use and declined need to complete COWS. Pt has discharge appointments scheduled for:    [facility]  [address] [facility] P[place], [address]  Phone: [phone]    Monday August 8th at 12:15 PM  Intake for IOP     2. [facility]  [address] [facility], [address]  Phone: [phone] [email or web address]     Tuesday August 19th at 12 pm  Counseling appointment with Alton Rider     3. [facility]  [address] [facility], [facility] [address] [facility] [address]  Phone: [phone]  "[email or web address]     Monday August 15th at 4 pm  In person medication management appointment with Walker Garcia NP     Pt declined any other questions, concerns issues or needs. Pt left after session due to son having an \"emotional day. \"     MARCELO Haney

## 2022-07-29 NOTE — GROUP NOTE
GRISELDA  GROUP DOCUMENTATION INDIVIDUAL                                                                          Group Therapy Note    Date: 7/29/2022    Group Start Time:  9:00 AM  Group End Time:  9:45 AM  Group Topic: Process Group - Outpatient    [address] [facility] 3 ACUTE BEHAV HLTH    Recardo     IP 1150 [facility] GROUP DOCUMENTATION GROUP    Group Therapy Note  This writer encouraged the patient's to complete the daily check in exercise. This writer discussed the patient plans for this weeekend. This wirter reviewed daily goals with patients and encouraged discussion on goal setting between peers. Attendees: 5       Attendance: Attended    Patient's Goal:  To discharge    Interventions/techniques: Informed, Validated, and Supported    Follows Directions: Followed directions    Interactions: Interacted appropriately    Mental Status: Calm and Happy    Behavior/appearance: Attentive, Motivated, and Neatly groomed    Goals Achieved: Able to listen to others and Able to self-disclose      Additional Notes: The patient completed the daily check in worksheet and shared plans for the weekend with her peers. The patient was prompted to share one thing her she hopes her peers can take from this experience. The patient discussed goals with peers. The patient will attend groups until discharge this morning.      Ileana Rush

## 2022-09-08 ENCOUNTER — OFFICE VISIT (OUTPATIENT)
Dept: INTERNAL MEDICINE CLINIC | Age: 42
End: 2022-09-08
Payer: MEDICAID

## 2022-09-08 VITALS
BODY MASS INDEX: 42.82 KG/M2 | HEART RATE: 96 BPM | OXYGEN SATURATION: 97 % | WEIGHT: 257 LBS | RESPIRATION RATE: 18 BRPM | DIASTOLIC BLOOD PRESSURE: 71 MMHG | SYSTOLIC BLOOD PRESSURE: 107 MMHG | TEMPERATURE: 98.1 F | HEIGHT: 65 IN

## 2022-09-08 DIAGNOSIS — G89.29 CHRONIC BILATERAL LOW BACK PAIN WITHOUT SCIATICA: ICD-10-CM

## 2022-09-08 DIAGNOSIS — R76.8 ANA POSITIVE: ICD-10-CM

## 2022-09-08 DIAGNOSIS — M54.50 CHRONIC BILATERAL LOW BACK PAIN WITHOUT SCIATICA: ICD-10-CM

## 2022-09-08 DIAGNOSIS — F98.8 ATTENTION DEFICIT DISORDER, UNSPECIFIED HYPERACTIVITY PRESENCE: ICD-10-CM

## 2022-09-08 DIAGNOSIS — K75.9 HEPATITIS: Primary | ICD-10-CM

## 2022-09-08 PROCEDURE — 99214 OFFICE O/P EST MOD 30 MIN: CPT | Performed by: INTERNAL MEDICINE

## 2022-09-08 RX ORDER — BACLOFEN 10 MG/1
10 TABLET ORAL
Qty: 90 TABLET | Refills: 2 | Status: SHIPPED | OUTPATIENT
Start: 2022-09-08

## 2022-09-08 RX ORDER — BACLOFEN 10 MG/1
TABLET ORAL 3 TIMES DAILY
COMMUNITY
End: 2022-09-08 | Stop reason: SDUPTHER

## 2022-09-08 NOTE — PROGRESS NOTES
Chief Complaint   Patient presents with    Labs     Requesting Lupus test     Patient states she is not here for medication refills. Patient states she needs a Lupus test. Patient was seen at [facility] 2 months ago for back pain. Patient states her liver enzymes were elevated and JENNY test was slightly up. Vitals:    09/08/22 1034   BP: 107/71   Pulse: 96   Resp: 18   Temp: 98.1 °F (36.7 °C)   TempSrc: Temporal   SpO2: 97%   Weight: 257 lb (116.6 kg)   Height: 5' 5\" (1.651 m)   PainSc:   0 - No pain   LMP: 03/14/2016       Health Maintenance Due   Topic    COVID-19 Vaccine (1)    Pneumococcal 0-64 years (1 - PCV)    DTaP/Tdap/Td series (1 - Tdap)    Flu Vaccine (1)       1. \"Have you been to the ER, urgent care clinic since your last visit? Hospitalized since your last visit? \" Yes [facility] 2 months ago per patient    2. \"Have you seen or consulted any other health care providers outside of the [address] [place] [address] since your last visit? \" No     3. For patients aged 39-70: Has the patient had a colonoscopy / FIT/ Cologuard? No      If the patient is female:    4. For patients aged 41-77: Has the patient had a mammogram within the past 2 years? No      5. For patients aged 21-65: Has the patient had a pap smear?  No

## 2022-09-08 NOTE — PROGRESS NOTES
Charisse Colin (: 1980) is a 46 y.o. female, established patient, here for evaluation of the following chief complaint(s):  Chief Complaint   Patient presents with    Labs     Requesting Lupus test       Assessment and Plan:       ICD-10-CM ICD-9-CM    1. Hepatitis  K75.9 573.3 REFERRAL TO GASTROENTEROLOGY      RENAL FUNCTION PANEL      HEPATIC FUNCTION PANEL      ANTINUCLEAR ANTIBODIES, IFA      PROTHROMBIN TIME + INR      PROTHROMBIN TIME + INR      ANTINUCLEAR ANTIBODIES, IFA      HEPATIC FUNCTION PANEL      RENAL FUNCTION PANEL      2. JENNY positive  R76.8 795.79 ANTINUCLEAR ANTIBODIES, IFA      ANTINUCLEAR ANTIBODIES, IFA      3. Chronic bilateral low back pain without sciatica  M54.50 724.2 baclofen (LIORESAL) 10 mg tablet    G89.29 338.29 REFERRAL TO ORTHOPEDICS      4. Attention deficit disorder, unspecified hyperactivity presence  F98.8 314.00 10-DRUG SCREEN W/CONF          1-2:  Lab monitoring reviewed. Referral(s) and referral coordination reviewed with patient at visit. 3.  Referral(s) and referral coordination reviewed with patient at visit. Medication(s), management and follow-up based on response reviewed at visit. 4.  Testing reviewed. Follow-up and Dispositions    Return in about 3 months (around 2022), or if symptoms worsen or fail to improve, for referral follow-up.       lab results and schedule of future lab studies reviewed with patient  reviewed medications and side effects in detail    For additional documentation of information and/or recommendations discussed this visit, please see notes in instructions. Plan and evaluation (above) reviewed with pt at visit  Patient voiced understanding of plan and provided with time to ask/review questions. After Visit Summary (AVS) provided to pt after visit with additional instructions as needed/reviewed. --Updated future visits after patient check-out.       History of Present Illness:     Notes (nursing/rooming note copied below in italics):  [id number]    Reviewed [facility] records:      Has ADHD and Effexor throgh psych now. Requested and reviewed baclofen refill for back--helping until can see referral to evaluate. Nursing screenings reviewed by provider at visit. Prior to Admission medications    Medication Sig Start Date End Date Taking? Authorizing Provider   baclofen (LIORESAL) 10 mg tablet Take  by mouth three (3) times daily. Yes Provider, Historical   ondansetron (ZOFRAN ODT) 4 mg disintegrating tablet Take 1 Tablet by mouth every eight (8) hours as needed for Nausea or Vomiting. 7/21/22  Yes Jonathan Barney MD   venlafaxine-SR [facility]) 75 mg capsule Take 3 Capsules by mouth daily (with breakfast). Indications: anxiety 7/8/22  Yes Carmen Draper MD   b complex-vitamin c-folic acid 0.8 mg (NEPHRO-LIT) 0.8 mg tab tablet Take 1 Tablet by mouth daily. Indications: vitamin deficiency 7/7/22  Yes Carmen Draper MD   doxepin (SINEquan) 10 mg capsule Take 1 Capsule by mouth nightly. Indications: insomnia  Patient not taking: Reported on 9/8/2022 7/7/22   Carmen Draper MD   hydrOXYzine HCL (ATARAX) 50 mg tablet Take 50 mg by mouth every six (6) hours as needed for Itching or Anxiety. Patient not taking: Reported on 9/8/2022    Coral Moulton MD   ARIPiprazole (Abilify) 10 mg tablet Take 1 Tablet by mouth daily. Patient not taking: Reported on 9/8/2022 6/8/22   Christie Mendenhall MD        ROS    Vitals:    09/08/22 1034   BP: 107/71   Pulse: 96   Resp: 18   Temp: 98.1 °F (36.7 °C)   TempSrc: Temporal   SpO2: 97%   Weight: 257 lb (116.6 kg)   Height: 5' 5\" (1.651 m)   PainSc:   0 - No pain   LMP: 03/14/2016      Body mass index is 42.77 kg/m². Physical Exam:     Physical Exam  Vitals and nursing note reviewed. Constitutional:       General: She is not in acute distress. Appearance: Normal appearance. She is well-developed. She is not diaphoretic.    HENT:      Head: Normocephalic and atraumatic. Eyes:      General: No scleral icterus. Right eye: No discharge. Left eye: No discharge. Conjunctiva/sclera: Conjunctivae normal.   Cardiovascular:      Rate and Rhythm: Normal rate and regular rhythm. Pulses: Normal pulses. Heart sounds: Normal heart sounds. No murmur heard. No friction rub. No gallop. Pulmonary:      Effort: Pulmonary effort is normal. No respiratory distress. Breath sounds: Normal breath sounds. No stridor. No wheezing, rhonchi or rales. Abdominal:      General: Bowel sounds are normal. There is no distension. Palpations: Abdomen is soft. Tenderness: There is no abdominal tenderness. There is no guarding. Musculoskeletal:         General: No swelling, tenderness, deformity or signs of injury. Right lower leg: No edema. Left lower leg: No edema. Comments: Localizes pain to lower back. Skin:     General: Skin is warm. Coloration: Skin is not jaundiced or pale. Findings: No bruising, erythema or rash. Neurological:      General: No focal deficit present. Mental Status: She is alert. Motor: No abnormal muscle tone. Coordination: Coordination normal.      Gait: Gait normal.   Psychiatric:         Mood and Affect: Mood normal.         Behavior: Behavior normal.         Thought Content: Thought content normal.         Judgment: Judgment normal.       An electronic signature was used to authenticate this note.   -- Elizabet Oneil MD

## 2022-09-12 ENCOUNTER — OFFICE VISIT (OUTPATIENT)
Dept: URGENT CARE | Age: 42
End: 2022-09-12
Payer: MEDICAID

## 2022-09-12 VITALS
TEMPERATURE: 98.2 F | OXYGEN SATURATION: 96 % | HEART RATE: 108 BPM | RESPIRATION RATE: 16 BRPM | DIASTOLIC BLOOD PRESSURE: 69 MMHG | HEIGHT: 65 IN | SYSTOLIC BLOOD PRESSURE: 102 MMHG | BODY MASS INDEX: 42.82 KG/M2 | WEIGHT: 257 LBS

## 2022-09-12 DIAGNOSIS — J06.9 VIRAL URI WITH COUGH: Primary | ICD-10-CM

## 2022-09-12 PROCEDURE — 99203 OFFICE O/P NEW LOW 30 MIN: CPT | Performed by: NURSE PRACTITIONER

## 2022-09-12 RX ORDER — BENZONATATE 200 MG/1
200 CAPSULE ORAL
Qty: 30 CAPSULE | Refills: 0 | Status: SHIPPED | OUTPATIENT
Start: 2022-09-12

## 2022-09-12 NOTE — PROGRESS NOTES
Pt presents with complaints of cough, fever, headache, sore throat, and congestion for one day. Her son with similar and Covid negative. Pt took home covid test this morning which was negative. Tried delsym without relief. Past Medical History:   Diagnosis Date    ADD (attention deficit disorder) [age]yo    Treated with Adderall since dx. 2013 dosing 20mg AM and 10mg PM.  Trial/change to Vyvanse Nov-Dec 2015--not as effective. Gynecologic disorder     History of miscarriages. History of Mirena IUD placed on 4/17/15    HX OTHER MEDICAL      -BUFA baby    HX OTHER MEDICAL     HPV positive    Idiopathic vulvodynia 2014    L1 vertebral fracture (HCC) 2017    [facility] imaging/admissoin: Mild acute two column compression fracture of L1 without evidence of retropulsion into the spinal canal.  Managed non-operatively. Migraines 2013    Neurological disorder     Pelvic pain in female 9/15/2014    2015 gyn laparoscopy/hysteroscopy with endometriosis worse right.     Psychiatric disorder     depression    Secondary dysmenorrhea 2014    With menorraghia    Seizures ([facility] [address]     never on meds--had appr 4-5 in a short amt of time and none since        Past Surgical History:   Procedure Laterality Date    ENDOMETRIAL CRYOABLATION      HX GYN  4/17/15    laparoscopy and hysteroscopy and IUD placement    HX HYSTERECTOMY  2016    Complete Hysterectomy         Family History   Problem Relation Age of Onset    Cancer Mother     Diabetes Mother     Alcohol abuse Father         and drug    Psychiatric Disorder Father     Cancer Father     Diabetes Maternal Grandmother     Hypertension Maternal Grandmother     Thyroid Disease Maternal Grandmother     Diabetes Maternal Grandfather     Hypertension Maternal Grandfather     Cancer Maternal Grandfather     Heart Disease Maternal Grandfather     Cancer Paternal Grandmother     Diabetes Paternal Grandmother         Social History Socioeconomic History    Marital status: SINGLE     Spouse name: Not on file    Number of children: Not on file    Years of education: Not on file    Highest education level: Not on file   Occupational History    Not on file   Tobacco Use    Smoking status: Former     Packs/day: 1.00     Years: 18.00     Pack years: 18.00     Types: Cigarettes    Smokeless tobacco: Current    Tobacco comments:     Vapes   Vaping Use    Vaping Use: Never used   Substance and Sexual Activity    Alcohol use: Yes     Comment: 5 beers yesterday 7/20/22    Drug use: No    Sexual activity: Not Currently     Partners: Male     Birth control/protection: Surgical   Other Topics Concern     Service Not Asked    Blood Transfusions Not Asked    Caffeine Concern Not Asked    Occupational Exposure Not Asked    Hobby Hazards Not Asked    Sleep Concern Not Asked    Stress Concern Not Asked    Weight Concern Not Asked    Special Diet Not Asked    Back Care Not Asked    Exercise Not Asked    Bike Helmet Not Asked    Seat Belt Not Asked    Self-Exams Not Asked   Social History Narrative    ** Merged History Encounter **          Social Determinants of Health     Financial Resource Strain: Not on file   Food Insecurity: Not on file   Transportation Needs: Not on file   Physical Activity: Not on file   Stress: Not on file   Social Connections: Not on file   Intimate Partner Violence: Not on file   Housing Stability: Not on file                ALLERGIES: Aspirin and Penicillins    Review of Systems   Constitutional:  Positive for fatigue and fever. Negative for chills. HENT:  Positive for congestion and sore throat. Negative for ear pain. Respiratory:  Positive for cough. Negative for shortness of breath and wheezing. Cardiovascular:  Negative for chest pain. Gastrointestinal:  Negative for abdominal pain, diarrhea, nausea and vomiting.      Vitals:    09/12/22 1051   BP: 102/69   Pulse: (!) 108   Resp: 16   Temp: 98.2 °F (36.8 °C) SpO2: 96%   Weight: 257 lb (116.6 kg)   Height: 5' 5\" (1.651 m)       Physical Exam  Constitutional:       General: She is not in acute distress. Appearance: She is well-developed. HENT:      Head: Normocephalic and atraumatic. Right Ear: Tympanic membrane and ear canal normal.      Left Ear: Ear canal normal.      Nose: Nose normal.      Mouth/Throat:      Mouth: Mucous membranes are moist.      Pharynx: Oropharynx is clear. Eyes:      Extraocular Movements: Extraocular movements intact. Conjunctiva/sclera: Conjunctivae normal.      Pupils: Pupils are equal, round, and reactive to light. Cardiovascular:      Rate and Rhythm: Normal rate and regular rhythm. Heart sounds: Normal heart sounds. Pulmonary:      Effort: Pulmonary effort is normal.      Breath sounds: Normal breath sounds. Musculoskeletal:      Cervical back: Normal range of motion and neck supple. Lymphadenopathy:      Cervical: No cervical adenopathy. Skin:     General: Skin is warm and dry. Neurological:      Mental Status: She is alert and oriented to person, place, and time. ICD-10-CM ICD-9-CM   1. Viral URI with cough  J06.9 465.9       Orders Placed This Encounter    benzonatate (TESSALON) 200 mg capsule     Sig: Take 1 Capsule by mouth three (3) times daily as needed for Cough. Dispense:  30 Capsule     Refill:  0      No covid tests are available in this office so unable to test today. The patient is to follow up with PCP. If signs and symptoms become worse the pt is to go to the ER.      Yanique Martines NP       MDM    Procedures

## 2022-09-17 LAB
ALBUMIN SERPL-MCNC: 4.2 G/DL (ref 3.8–4.8)
ALP SERPL-CCNC: 88 IU/L (ref 44–121)
ALT SERPL-CCNC: 27 IU/L (ref 0–32)
AMPHETAMINES UR QL SCN: NEGATIVE NG/ML
ANA SER QL IF: NEGATIVE
AST SERPL-CCNC: 25 IU/L (ref 0–40)
BARBITURATES UR QL SCN: NEGATIVE NG/ML
BENZODIAZ UR QL SCN: NEGATIVE NG/ML
BILIRUB DIRECT SERPL-MCNC: 0.16 MG/DL (ref 0–0.4)
BILIRUB SERPL-MCNC: 0.6 MG/DL (ref 0–1.2)
BUN SERPL-MCNC: 16 MG/DL (ref 6–20)
BUN/CREAT SERPL: 29 (ref 9–23)
BZE UR QL SCN: NEGATIVE NG/ML
CALCIUM SERPL-MCNC: 9.2 MG/DL (ref 8.7–10.2)
CANNABINOIDS UR QL SCN: NEGATIVE NG/ML
CHLORIDE SERPL-SCNC: 101 MMOL/L (ref 96–106)
CO2 SERPL-SCNC: 23 MMOL/L (ref 20–29)
CREAT SERPL-MCNC: 0.56 MG/DL (ref 0.57–1)
CREAT UR-MCNC: 182.5 MG/DL (ref 20–300)
GLUCOSE SERPL-MCNC: 126 MG/DL (ref 65–99)
INR PPP: 0.9 (ref 0.9–1.2)
METHADONE UR QL SCN: NEGATIVE NG/ML
OPIATES UR QL SCN: NEGATIVE NG/ML
OXYCODONE+OXYMORPHONE UR QL SCN: NEGATIVE NG/ML
PCP UR QL: NEGATIVE NG/ML
PH UR: 6 [PH] (ref 4.5–8.9)
PHOSPHATE SERPL-MCNC: 3.7 MG/DL (ref 3–4.3)
PLEASE NOTE:, 733157: NORMAL
POTASSIUM SERPL-SCNC: 4.1 MMOL/L (ref 3.5–5.2)
PROPOXYPH UR QL SCN: NEGATIVE NG/ML
PROT SERPL-MCNC: 6.6 G/DL (ref 6–8.5)
PROTHROMBIN TIME: 10 SEC (ref 9.1–12)
SODIUM SERPL-SCNC: 140 MMOL/L (ref 134–144)

## 2022-10-24 DIAGNOSIS — M54.50 CHRONIC BILATERAL LOW BACK PAIN WITHOUT SCIATICA: ICD-10-CM

## 2022-10-24 DIAGNOSIS — G89.29 CHRONIC BILATERAL LOW BACK PAIN WITHOUT SCIATICA: ICD-10-CM

## 2022-10-24 RX ORDER — BACLOFEN 10 MG/1
10 TABLET ORAL
Qty: 90 TABLET | Refills: 0 | Status: CANCELLED | OUTPATIENT
Start: 2022-10-24

## 2022-10-24 NOTE — TELEPHONE ENCOUNTER
Last visit 09/08/2022 MD Connie Roland   Next appointment Nothing scheduled   Previous refill encounter(s)   09/08/2022 [facility] #90 with 2 refills. For Pharmacy Admin Tracking Only    Intervention Detail: New Rx: 1, reason: Patient Preference  Time Spent (min): 5      Requested Prescriptions     Pending Prescriptions Disp Refills    baclofen (LIORESAL) 10 mg tablet 90 Tablet 0     Sig: Take 1 Tablet by mouth three (3) times daily as needed for Muscle Spasm(s) or Pain.

## 2022-11-18 ENCOUNTER — OFFICE VISIT (OUTPATIENT)
Dept: ORTHOPEDIC SURGERY | Age: 42
End: 2022-11-18
Payer: MEDICAID

## 2022-11-18 VITALS — BODY MASS INDEX: 41.65 KG/M2 | WEIGHT: 250 LBS | HEIGHT: 65 IN

## 2022-11-18 DIAGNOSIS — G89.29 CHRONIC BILATERAL LOW BACK PAIN WITHOUT SCIATICA: Primary | ICD-10-CM

## 2022-11-18 DIAGNOSIS — M54.50 CHRONIC BILATERAL LOW BACK PAIN WITHOUT SCIATICA: Primary | ICD-10-CM

## 2022-11-18 PROCEDURE — 99204 OFFICE O/P NEW MOD 45 MIN: CPT | Performed by: PHYSICIAN ASSISTANT

## 2022-11-18 RX ORDER — VENLAFAXINE HYDROCHLORIDE 150 MG/1
CAPSULE, EXTENDED RELEASE ORAL
COMMUNITY
Start: 2022-11-07

## 2022-11-18 RX ORDER — BACLOFEN 10 MG/1
10 TABLET ORAL 3 TIMES DAILY
Qty: 60 TABLET | Refills: 1 | Status: SHIPPED | OUTPATIENT
Start: 2022-11-18

## 2022-11-18 RX ORDER — DICLOFENAC SODIUM 75 MG/1
75 TABLET, DELAYED RELEASE ORAL 2 TIMES DAILY WITH MEALS
Qty: 60 TABLET | Refills: 1 | Status: SHIPPED | OUTPATIENT
Start: 2022-11-18

## 2022-11-18 NOTE — PROGRESS NOTES
Berenice Chairez (: 1980) is a 38 y.o. female patient here for evaluation of the following chief complaint(s):  Back Pain         ASSESSMENT/PLAN:  Below is the assessment and plan developed based on review of pertinent history, physical exam, labs, studies, and medications. 1. Chronic bilateral low back pain without sciatica  -     XR SPINE LUMB MIN 4 V; Future  -     REFERRAL TO PHYSICAL THERAPY; Future      61-year-old female, with a history of L1 compression fracture in 2017, with acute on chronic flare of pain. Her x-rays were reviewed with her in detail today and her questions were answered to her satisfaction. We will treat her symptoms conservatively for now. I given her prescription for formal physical therapy to work on stretching and strengthening. I have also refilled her baclofen for her as well as prescribed diclofenac to help with her acute symptoms. She will follow-up with us in 6 to 8 weeks for reevaluation. Return in about 6 weeks (around 2022) for following PT.      SUBJECTIVE/OBJECTIVE:  Berenice Chairez (: 1980) is a 38 y.o. female who presents today for the following:  Chief Complaint   Patient presents with    Back Pain        HPI   61-year-old female, with a history of a L1 compression fracture in 2017, presenting today complaining of bilateral lower back pain that does not radiate. She states she used to go to walk 2 to 3 miles a day but the back pain has progressively gotten worse to the point where she is no longer able to stand or walk for prolonged periods. She denies any numbness or tingling in her legs or any weakness. She denies any changes in bowel or bladder control. She was seen by her primary care physician who prescribed baclofen for her. This does help control her symptoms.     IMAGING:  XR Results (most recent):  Results from Appointment encounter on 22    XR SPINE LUMB MIN 4 V    Narrative  AP, lateral, flexion, and extension films of the lumbar spine reveal no evidence of acute fracture or lytic lesion. There is well-maintained lumbar lordosis. Disc height preserved throughout the lumbar spine. There is evidence of a chronic, stable L1 compression fracture. No change in vertebral height from prior x-rays performed 5 years ago. MRI Results (most recent): Allergies   Allergen Reactions    Aspirin Other (comments)     Hot sweats and passed out; tolerated ibuprofen    Penicillins Other (comments)     Childhood. Does not remember reaction. Is not aware if she has ever taken cephalosporins in the past.    Jan 2019: Pt notes no problems with cephalosporins. Current Outpatient Medications   Medication Sig    venlafaxine-SR (EFFEXOR-XR) 150 mg capsule TAKE 1 CAPSULE ORALLY DAILY TAKE WITH 75MG VENLAFAXINE ER TABLET TO EQUAL A DAILY DOSE OF 225MG    baclofen (LIORESAL) 10 mg tablet Take 1 Tablet by mouth three (3) times daily. diclofenac EC (VOLTAREN) 75 mg EC tablet Take 1 Tablet by mouth two (2) times daily (with meals). baclofen (LIORESAL) 10 mg tablet Take 1 Tablet by mouth three (3) times daily as needed for Muscle Spasm(s) or Pain. benzonatate (TESSALON) 200 mg capsule Take 1 Capsule by mouth three (3) times daily as needed for Cough. ondansetron (ZOFRAN ODT) 4 mg disintegrating tablet Take 1 Tablet by mouth every eight (8) hours as needed for Nausea or Vomiting. venlafaxine-SR (EFFEXOR-XR) 75 mg capsule Take 3 Capsules by mouth daily (with breakfast). Indications: anxiety    b complex-vitamin c-folic acid 0.8 mg (NEPHRO-LIT) 0.8 mg tab tablet Take 1 Tablet by mouth daily. Indications: vitamin deficiency (Patient not taking: Reported on 11/18/2022)     No current facility-administered medications for this visit. Past Medical History:   Diagnosis Date    ADD (attention deficit disorder) [age]yo    Treated with Adderall since dx.   Nov 2013 dosing 20mg AM and 10mg PM.  Trial/change to Vyvanse Nov-Dec --not as effective. Gynecologic disorder     History of miscarriages. History of Mirena IUD placed on 4/17/15    HX OTHER MEDICAL      -BUFA baby    HX OTHER MEDICAL     HPV positive    Idiopathic vulvodynia 2014    L1 vertebral fracture (HCC) 2017    [facility] imaging/admissoin: Mild acute two column compression fracture of L1 without evidence of retropulsion into the spinal canal.  Managed non-operatively. Migraines 2013    Neurological disorder     Pelvic pain in female 9/15/2014    2015 gyn laparoscopy/hysteroscopy with endometriosis worse right. Psychiatric disorder     depression    Secondary dysmenorrhea 2014    With menorraghia    Seizures ([facility] [address]     never on meds--had appr 4-5 in a short amt of time and none since        Past Surgical History:   Procedure Laterality Date    ENDOMETRIAL CRYOABLATION      HX GYN  4/17/15    laparoscopy and hysteroscopy and IUD placement    HX HYSTERECTOMY  2016    Complete Hysterectomy       Family History   Problem Relation Age of Onset    Cancer Mother     Diabetes Mother     Alcohol abuse Father         and drug    Psychiatric Disorder Father     Cancer Father     Diabetes Maternal Grandmother     Hypertension Maternal Grandmother     Thyroid Disease Maternal Grandmother     Diabetes Maternal Grandfather     Hypertension Maternal Grandfather     Cancer Maternal Grandfather     Heart Disease Maternal Grandfather     Cancer Paternal Grandmother     Diabetes Paternal Grandmother         Social History     Tobacco Use    Smoking status: Former     Packs/day: 1.00     Years: 18.00     Pack years: 18.00     Types: Cigarettes    Smokeless tobacco: Current    Tobacco comments:     Vapes   Substance Use Topics    Alcohol use: Yes     Comment: 5 beers yesterday 22        Review of Systems     No flowsheet data found.         Vitals:  Ht 5' 5\" (1.651 m)   Wt 250 lb (113.4 kg)   LMP 2016   BMI 41.60 kg/m²    Body mass index is 41.6 kg/m². Physical Exam    No assistive devices today. Cervical spine:  Examination of the cervical spine demonstrates no tenderness on palpation, no pain, no swelling or edema, with normal lumbar range of motion. Thoracic spine: Examination of the thoracic spine demonstrates no tenderness on palpation, no pain, no swelling or edema. Normal sensation and range of motion. Lumbar spine:     Examination of the lumbar spine demonstrates on inspection: No erythema or ecchymosis. No masses noted. No pelvic obliquity. On palpation: No tenderness on palpation. Range of motion: Limited secondary to pain     Motor examination:  Right iliopsoas 5/5,  left iliopsoas 5/5, right quad 5/5, left quad 5/5, right anterior tibialis 5/5, left anterior tibialis 5/5, right EHL 5/5, left EHL 5/5, right gastrocnemius 5/5 , left gastrocnemius 5/5     Sensory examination: Normal sensation through all dermatomes. Reflexes: Left knee 2/2, right knee 2/2, right ankle 2/2, left ankle 2/2     Functional testing: Straight leg test negative at 30 degrees bilaterally; bowstring sign negative bilaterally. Babinsksi and Clonus negative bilaterally. Dr. Quoc Juares was available for immediate consult during this encounter. An electronic signature was used to authenticate this note.   -- Mara Gates PA-C

## 2022-11-18 NOTE — PROGRESS NOTES
1. Have you been to the ER, urgent care clinic since your last visit? Hospitalized since your last visit? No    2. Have you seen or consulted any other health care providers outside of the [address] [place] [address] since your last visit? Include any pap smears or colon screening.  No    Chief Complaint   Patient presents with    Back Pain

## 2022-11-30 ENCOUNTER — OFFICE VISIT (OUTPATIENT)
Dept: ORTHOPEDIC SURGERY | Age: 42
End: 2022-11-30
Payer: MEDICAID

## 2022-11-30 DIAGNOSIS — R19.8 ABDOMINAL WEAKNESS: ICD-10-CM

## 2022-11-30 DIAGNOSIS — G89.29 CHRONIC BILATERAL LOW BACK PAIN WITHOUT SCIATICA: ICD-10-CM

## 2022-11-30 DIAGNOSIS — M54.50 CHRONIC BILATERAL LOW BACK PAIN WITHOUT SCIATICA: ICD-10-CM

## 2022-11-30 DIAGNOSIS — M25.69 BACK STIFFNESS: Primary | ICD-10-CM

## 2022-11-30 NOTE — PROGRESS NOTES
atient Name: Duane Flowers  Date:2022  : 1980  [x]  Patient  Verified  Payor: [id number] / Plan: [id number] [facility] / Product Type: Managed Care Medicaid /    Total Treatment Time (min): 40 min  1:1 Treatment Time ( [address] only):    JULIENNE Olvera*  1. Back stiffness  2. Chronic bilateral low back pain without sciatica  -     REFERRAL TO PHYSICAL THERAPY  3. Abdominal weakness      Subjective:    Patient is a 38 y.o. female referred to physical therapy by Christin Ogden for low back pain. Patient states she had a compression fracture in 2017 at L1. Patient states over the past year her back is gotten progressively worse with pain. Patient states she was walking 2 to 3 miles a day and now can barely walk across the street. Patient rates her pain as a 1 out of 10 at least an 8 out of 10 at worst.  Patient states she has difficulty with sitting and lying down as well. Patient has difficulty with doing dishes and laundry at home. Past medical history and medication list can otherwise be reviewed per the EHR. Objective:  AROM  Lumbar  flexion: Fingertips to knees  Extension: 10 degrees  Bilateral sidebending: Fingertips to above knees  Right hamstrin degrees of hip flexion  Hamstrin degrees of hip flexion  TTP over left Q well, bilateral lumbar paraspinals, bilateral gluteus medius    Ex: 20 min  Treatment today to include instruction in a home exercise program as well as providing patient with written and visual handouts. Man:     NMR:        All questions were addressed. Assessment:    Patient presents with increased pain and decreased ROM, strength, and mobility consistent with low back pain without sciatica. Patient will benefit from skilled PT to improve above deficits. Long Term Goals.  8 weeks  Patient will report centralized LBP to be consisitently less than or equal to 1/10 with all home/work/community/recreational ADL activity. Patient will report zero radicular pain with home/work/community/recreational ADL activity. Pt. Will return to premorbid activities such as doing laundry and dishes. Short Term Goals. 2 visits. Patient will demonstrate independence with HEP. Pt. Will sleep through the night without waking 2nd pain    Plan:  Plan of care: Physical therapy consisted of frequency of 2/week for the next 8 weeks. Physical therapy will consist of therapeutic exercise, modalities, patient education, neuromuscular reeducation, manual therapy, therapeutic activity, dry needling, and instruction in home exercise program as appropriate. Eval  Ex: 20 min  Man:   NMR:       The referring physician has reviewed and approved this evaluation and plan of care as noted by the electronic signature attached to note.     Krystal Marsh, PRABHJOTT, DPT

## 2022-12-07 PROCEDURE — 99284 EMERGENCY DEPT VISIT MOD MDM: CPT

## 2022-12-08 ENCOUNTER — HOSPITAL ENCOUNTER (EMERGENCY)
Age: 42
Discharge: HOME | End: 2022-12-08
Attending: EMERGENCY MEDICINE
Payer: MEDICAID

## 2022-12-08 ENCOUNTER — APPOINTMENT (OUTPATIENT)
Dept: CT IMAGING | Age: 42
End: 2022-12-08
Attending: EMERGENCY MEDICINE
Payer: MEDICAID

## 2022-12-08 ENCOUNTER — OFFICE VISIT (OUTPATIENT)
Dept: ORTHOPEDIC SURGERY | Age: 42
End: 2022-12-08
Payer: MEDICAID

## 2022-12-08 VITALS
HEIGHT: 64 IN | SYSTOLIC BLOOD PRESSURE: 126 MMHG | WEIGHT: 267.64 LBS | BODY MASS INDEX: 45.69 KG/M2 | HEART RATE: 103 BPM | DIASTOLIC BLOOD PRESSURE: 67 MMHG | OXYGEN SATURATION: 94 % | TEMPERATURE: 98.9 F | RESPIRATION RATE: 22 BRPM

## 2022-12-08 DIAGNOSIS — S39.012A STRAIN OF LUMBAR REGION, INITIAL ENCOUNTER: Primary | ICD-10-CM

## 2022-12-08 DIAGNOSIS — G89.29 CHRONIC BILATERAL LOW BACK PAIN WITHOUT SCIATICA: Primary | ICD-10-CM

## 2022-12-08 DIAGNOSIS — M25.69 BACK STIFFNESS: ICD-10-CM

## 2022-12-08 DIAGNOSIS — M54.50 CHRONIC BILATERAL LOW BACK PAIN WITHOUT SCIATICA: Primary | ICD-10-CM

## 2022-12-08 DIAGNOSIS — R19.8 ABDOMINAL WEAKNESS: ICD-10-CM

## 2022-12-08 DIAGNOSIS — S32.010A COMPRESSION FRACTURE OF L1 VERTEBRA, INITIAL ENCOUNTER (HCC): ICD-10-CM

## 2022-12-08 PROCEDURE — 72131 CT LUMBAR SPINE W/O DYE: CPT

## 2022-12-08 PROCEDURE — 74011250637 HC RX REV CODE- 250/637: Performed by: EMERGENCY MEDICINE

## 2022-12-08 RX ORDER — OXYCODONE HYDROCHLORIDE 5 MG/1
10 TABLET ORAL
Status: COMPLETED | OUTPATIENT
Start: 2022-12-08 | End: 2022-12-08

## 2022-12-08 RX ORDER — CYCLOBENZAPRINE HCL 10 MG
10 TABLET ORAL
Status: COMPLETED | OUTPATIENT
Start: 2022-12-08 | End: 2022-12-08

## 2022-12-08 RX ORDER — LIDOCAINE 4 G/100G
1 PATCH TOPICAL EVERY 12 HOURS
Qty: 10 PATCH | Refills: 0 | Status: SHIPPED | OUTPATIENT
Start: 2022-12-08 | End: 2022-12-13

## 2022-12-08 RX ORDER — CYCLOBENZAPRINE HCL 10 MG
10 TABLET ORAL
Qty: 15 TABLET | Refills: 0 | Status: SHIPPED | OUTPATIENT
Start: 2022-12-08 | End: 2022-12-15

## 2022-12-08 RX ADMIN — OXYCODONE 10 MG: 5 TABLET ORAL at 01:59

## 2022-12-08 RX ADMIN — CYCLOBENZAPRINE 10 MG: 10 TABLET, FILM COATED ORAL at 01:59

## 2022-12-08 NOTE — PROGRESS NOTES
Patient Name: Mell Clayton  Date:2022  : 1980  [x]  Patient  Verified  Payor: Jeff Hahn / Plan: [id number] [place] [address] / Product Type: Managed Care Medicaid /    Total Treatment Time (min): 35 min  1:1 Treatment Time ([address] only):   Referring provider: JULIENNE Guardado*  1. Chronic bilateral low back pain without sciatica  2. Back stiffness  3. Abdominal weakness      SUBJECTIVE  Feeling sore. I fell in the shower last night. I went to ER today to get it checked out. They said that I'm ok, just to let you know. OBJECTIVE  AROM:   PROM:   TTP over left lumbar paraspinals  Modality:   []  E-Stim: type _ x _ min     []att   []unatt   []w/US   []w/ice   []w/heat  []  Ultrasound: []cont   []pulse    _ W/cm2 x _  min   []1MHz   []3MHz  []  Ice pack _  Post       [] Hot pack _  Pre       []  Other:    Man: 10 min MFR to left lumbar paraspinals and glut medius, left lumbar mobilizations        NMR:  min  Neuromuscular reeducation/proprioceptive training listed in exercise below. Ex: 25 min  Therapeutic exercise/strength/endurance completed here in clinic today per the exercise log. PT Exercise Log         EXERCISE 2022   LTR 20x   Supine hamstring stretch 5x30\"   Seated SB 3x20\"   bridging 20x   openbooks 3x3 breaths   Sidestepping 2 laps   Nu step 5'                                                          ASSESSMENT  [x]  See Plan of Care  [x]  Patient will continue to benefit from skilled therapy to address remaining functional deficits:   Patient c/o increased soreness after fall in shower. Patient tolerated exercise w/o increase in symptoms. Patient had good response to MFR. Slowly progressing. PLAN  Continue with current plan of care and progress as appropriate towards functional goals.   [x]  Upgrade activities as tolerated     [x]  Continue plan of care  []  Discharge due to:_  [] Other:_       Nazia Yan, PT  2022    3:01 PM

## 2022-12-08 NOTE — ED PROVIDER NOTES
EMERGENCY DEPARTMENT HISTORY AND PHYSICAL EXAM       Please note that this dictation was completed with [employer], the computer voice recognition software. Quite often unanticipated grammatical, syntax, homophones, and other interpretive errors are inadvertently transcribed by the computer software. Please disregard these errors. Please excuse any errors that have escaped final proofreading. Date: 12/8/2022  Patient Name: Lilliana Vargas  Patient Age and Sex: 38 y.o. female    History of Presenting Illness     Chief Complaint   Patient presents with    Back Pain     Patient arrives with complaint of back pain after falling in the shower. Patient reports that she has a compression fracture currently. Patient reports that she took ibuprofen after the fall a couple of hours ago. History Provided By: Patient   Chief Complaint: low back pain      HPI: Lilliana Vargas, is a 38 y.o. female with history of lumbar compression fx several years ago, with chronic low back pain for which she takes tylenol prn and is starting physical therapy, presents to the ED with acute worsening of low back pain after a mechanical glf in her bathtub this evening. No head injury or loc. She reports diffuse, severe, constant, non-radiating pain in her lower back that is sharp. Has been able to ambulate since the fall. Pain worsened with movement. Has not taken anything for the pain yet. No weakness in her legs, no numbness/tingling or sensory loss in legs. No urinary symptoms. No other injuries or complaints. Pt denies any other alleviating or exacerbating factors. No other associated signs or symptoms. There are no other complaints, changes or physical findings at this time.      PCP: Richard Taylor MD    Past History   All documented elements of the [facility] reviewed and verified by me. -Libby Cole MD    Past Medical History:  Past Medical History:   Diagnosis Date    ADD (attention deficit disorder) 17-19yo Treated with Adderall since dx. 2013 dosing 20mg AM and 10mg PM.  Trial/change to Vyvanse Nov-Dec 2015--not as effective. Gynecologic disorder     History of miscarriages. History of Mirena IUD placed on 4/17/15    HX OTHER MEDICAL      -BUFA baby    HX OTHER MEDICAL     HPV positive    Idiopathic vulvodynia 2014    L1 vertebral fracture (HCC) 2017    [facility] imaging/admissoin: Mild acute two column compression fracture of L1 without evidence of retropulsion into the spinal canal.  Managed non-operatively. Migraines 2013    Neurological disorder     Pelvic pain in female 9/15/2014    2015 gyn laparoscopy/hysteroscopy with endometriosis worse right. Psychiatric disorder     depression    Secondary dysmenorrhea 2014    With menorraghia    Seizures ([facility] [address]     never on meds--had appr 4-5 in a short amt of time and none since       Past Surgical History:  Past Surgical History:   Procedure Laterality Date    ENDOMETRIAL CRYOABLATION      HX GYN  4/17/15    laparoscopy and hysteroscopy and IUD placement    HX HYSTERECTOMY  2016    Complete Hysterectomy       Family History:   Family History   Problem Relation Age of Onset    Cancer Mother     Diabetes Mother     Alcohol abuse Father         and drug    Psychiatric Disorder Father     Cancer Father     Diabetes Maternal Grandmother     Hypertension Maternal Grandmother     Thyroid Disease Maternal Grandmother     Diabetes Maternal Grandfather     Hypertension Maternal Grandfather     Cancer Maternal Grandfather     Heart Disease Maternal Grandfather     Cancer Paternal Grandmother     Diabetes Paternal Grandmother        Social History:  Social History     Tobacco Use    Smoking status: Former     Packs/day: 1.00     Years: 18.00     Pack years: 18.00     Types: Cigarettes    Smokeless tobacco: Current    Tobacco comments:     Vapes   Vaping Use    Vaping Use: Never used   Substance Use Topics    Alcohol use:  Yes Comment: Yimi scruggs yesterday 7/20/22    Drug use: No       Current Medications:  No current facility-administered medications on file prior to encounter. Current Outpatient Medications on File Prior to Encounter   Medication Sig Dispense Refill    venlafaxine-SR (EFFEXOR-XR) 150 mg capsule TAKE 1 CAPSULE ORALLY DAILY TAKE WITH 75MG VENLAFAXINE ER TABLET TO EQUAL A DAILY DOSE OF 225MG      baclofen (LIORESAL) 10 mg tablet Take 1 Tablet by mouth three (3) times daily. 60 Tablet 1    diclofenac EC (VOLTAREN) 75 mg EC tablet Take 1 Tablet by mouth two (2) times daily (with meals). 60 Tablet 1    benzonatate (TESSALON) 200 mg capsule Take 1 Capsule by mouth three (3) times daily as needed for Cough. 30 Capsule 0    baclofen (LIORESAL) 10 mg tablet Take 1 Tablet by mouth three (3) times daily as needed for Muscle Spasm(s) or Pain. 90 Tablet 2    ondansetron (ZOFRAN ODT) 4 mg disintegrating tablet Take 1 Tablet by mouth every eight (8) hours as needed for Nausea or Vomiting. 10 Tablet 0    venlafaxine-SR (EFFEXOR-XR) 75 mg capsule Take 3 Capsules by mouth daily (with breakfast). Indications: anxiety 90 Capsule 0    b complex-vitamin c-folic acid 0.8 mg (NEPHRO-LIT) 0.8 mg tab tablet Take 1 Tablet by mouth daily. Indications: vitamin deficiency (Patient not taking: Reported on 11/18/2022) 30 Tablet 0       Allergies: Allergies   Allergen Reactions    Aspirin Other (comments)     Hot sweats and passed out; tolerated ibuprofen    Penicillins Other (comments)     Childhood. Does not remember reaction. Is not aware if she has ever taken cephalosporins in the past.    Jan 2019: Pt notes no problems with cephalosporins. Review of Systems   All other systems reviewed and negative    Review of Systems   Constitutional:  Negative for fever. HENT: Negative. Eyes: Negative. Respiratory:  Negative for cough and shortness of breath. Cardiovascular:  Negative for chest pain and palpitations.    Gastrointestinal: Negative for abdominal pain, diarrhea and vomiting. Endocrine: Negative. Genitourinary:  Negative for dysuria and hematuria. Musculoskeletal:  Positive for back pain. Negative for gait problem, joint swelling and neck pain. Skin:  Negative for rash. Neurological:  Negative for weakness and numbness. Psychiatric/Behavioral: Negative. Negative for confusion. All other systems reviewed and are negative. Physical Exam   Reviewed patients vital signs and nursing note    Physical Exam  Vitals and nursing note reviewed. Constitutional:       Appearance: She is obese. She is not diaphoretic. HENT:      Head: Atraumatic. Nose: Nose normal.      Mouth/Throat:      Mouth: Mucous membranes are moist.   Eyes:      Extraocular Movements: Extraocular movements intact. Conjunctiva/sclera: Conjunctivae normal.      Pupils: Pupils are equal, round, and reactive to light. Cardiovascular:      Rate and Rhythm: Normal rate and regular rhythm. Pulses: Normal pulses. Heart sounds: Normal heart sounds. Pulmonary:      Effort: Pulmonary effort is normal.      Breath sounds: Normal breath sounds. Abdominal:      General: Bowel sounds are normal.      Palpations: Abdomen is soft. Tenderness: There is no abdominal tenderness. There is no right CVA tenderness or left CVA tenderness. Musculoskeletal:         General: No deformity. Normal range of motion. Cervical back: Normal and normal range of motion. No rigidity. Thoracic back: Normal.      Lumbar back: Spasms and tenderness present. Normal range of motion. Negative right straight leg raise test and negative left straight leg raise test.        Back:       Right lower leg: No edema. Left lower leg: No edema. Skin:     General: Skin is warm and dry. Capillary Refill: Capillary refill takes less than 2 seconds. Findings: No bruising or erythema. Neurological:      General: No focal deficit present.       Mental Status: She is alert and oriented to person, place, and time. Sensory: Sensation is intact. Motor: Motor function is intact. Gait: Gait is intact. Deep Tendon Reflexes: Babinski sign absent on the right side. Babinski sign absent on the left side. Reflex Scores:       Patellar reflexes are 2+ on the right side and 2+ on the left side. Achilles reflexes are 2+ on the right side and 2+ on the left side. Psychiatric:         Mood and Affect: Mood normal.         Behavior: Behavior normal.       Diagnostic Study Results     Labs - I have personally reviewed and interpreted all laboratory results. Interpretation of available and pertinent results detailed below in MDM. Melinda Heath MD, MSc  No results found for this or any previous visit (from the past 24 hour(s)). Radiologic Studies - I have personally reviewed and interpreted (see MDM for brief interpreation of available results) all imaging studies and agree with radiology interpretation and report. Martha Euceda MD, MSc  CT SPINE LUMB WO CONT   Final Result   Chronic mild compression fracture L1. Multilevel lumbar degenerative disc   disease without acute fracture. Medical Decision Making   I am the first provider for this patient. Records Reviewed:   I reviewed our electronic medical record system for any past medical records that were available that may contribute to the patient's current condition, including their PMH, surgical history, social and family history. This includes most recent ED visits, any available discharge summaries and prior ECGs, which I have reviewed and interpreted personally. I have summarized most salient findings in my HPI and MDM. I also reviewed the nursing notes and vital signs from today's visit. Nursing notes will be reviewed as they become available in realtime while the pt has been in the ED. Melinda Heath MD, MSc    Vital Signs-Reviewed the patient's vital signs.   Patient Vitals for the past 24 hrs:   Temp Pulse Resp BP SpO2   12/08/22 0025 98.9 °F (37.2 °C) (!) 103 22 126/67 94 %         Provider Notes and Medical Decision Making:     Patient has no significant red flags for low back pain including no history of cancer, corticosteroid use, abnormal neurologic physical findings (including new ataxia and/or difficulty walking), and anticoagulant use. On exam: Straight leg raise negative. Patient ambulating appropriately with a normal gait. No sudden bladder or bowel retention or incontinence. No lower extremity weakness. No saddle numbness, hypoesthesia, or anesthesia. Patellar and Achilles reflexes equal and normal. All of this is reassuring and c/w lumbar musculoskeletal pain/spasm. Unlikely spinal cord compression syndrome, cauda equina, or acute disc herniation with significant cord compression. Also low suspicion for AAA or renal stone. Unlikely vertebral malignancy/metastasis, fracture, or infection. Unlikely epidural abscess or osteomyelitis. Given history of prior compression fx will get ct lumbar spine to rule out any new injuries but clinical suspicion overall low. In meantime focus on pain mgmt and reassess. ED Course: The patients presenting problems have been discussed, and they are in agreement with the care plan formulated and outlined with them. I have encouraged them to ask questions as they arise throughout their visit.          ED Medications Administered  Medications   oxyCODONE IR (ROXICODONE) tablet 10 mg (10 mg Oral Given 12/8/22 0159)   cyclobenzaprine (FLEXERIL) tablet 10 mg (10 mg Oral Given 12/8/22 0159)       ED Orders Placed:  Orders Placed This Encounter    CT SPINE LUMB WO CONT    oxyCODONE IR (ROXICODONE) tablet 10 mg    cyclobenzaprine (FLEXERIL) tablet 10 mg    cyclobenzaprine (FLEXERIL) 10 mg tablet    lidocaine 4 % patch     Progress note:  Patient has been reassessed and reports feeling considerably better, has normal vital signs and feels comfortable going home. I think this is reasonable as no findings today suggest a life-threatening condition. DISPOSITION: DISCHARGE  The patient's results have been reviewed with patient and available family and/or caregiver. They verbally convey their understanding and agreement of the patient's signs, symptoms, diagnosis, treatment and prognosis and additionally agree to follow up as recommended in the discharge instructions or to return to the Emergency Department should the patient's condition change prior to their follow-up appointment. The patient and available family and/or caregiver verbally agree with the care plan and all of their questions have been answered. The discharge instructions have also been provided to the them with educational information regarding the patient's diagnosis as well a list of reasons why the patient would want to return to the ER prior to their follow-up appointment should any concerns arise, the patient's condition change or symptoms worsen. Jerl Schlatter, MD, Msc    PLAN:  Discharge Medication List as of 12/8/2022  3:20 AM        START taking these medications    Details   cyclobenzaprine (FLEXERIL) 10 mg tablet Take 1 Tablet by mouth three (3) times daily as needed for Muscle Spasm(s) for up to 7 days. , Normal, Disp-15 Tablet, R-0      lidocaine 4 % patch 1 Patch by TransDERmal route every twelve (12) hours for 5 days. , Normal, Disp-10 Patch, R-0           CONTINUE these medications which have NOT CHANGED    Details   !! venlafaxine-SR (EFFEXOR-XR) 150 mg capsule TAKE 1 CAPSULE ORALLY DAILY TAKE WITH 75MG VENLAFAXINE ER TABLET TO EQUAL A DAILY DOSE OF 225MG, Historical Med      !! baclofen (LIORESAL) 10 mg tablet Take 1 Tablet by mouth three (3) times daily. , Normal, Disp-60 Tablet, R-1      diclofenac EC (VOLTAREN) 75 mg EC tablet Take 1 Tablet by mouth two (2) times daily (with meals). , Normal, Disp-60 Tablet, R-1      benzonatate (TESSALON) 200 mg capsule Take 1 Capsule by mouth three (3) times daily as needed for Cough., Normal, Disp-30 Capsule, R-0      !! baclofen (LIORESAL) 10 mg tablet Take 1 Tablet by mouth three (3) times daily as needed for Muscle Spasm(s) or Pain., Normal, Disp-90 Tablet, R-2      ondansetron (ZOFRAN ODT) 4 mg disintegrating tablet Take 1 Tablet by mouth every eight (8) hours as needed for Nausea or Vomiting., Normal, Disp-10 Tablet, R-0      !! venlafaxine-SR (EFFEXOR-XR) 75 mg capsule Take 3 Capsules by mouth daily (with breakfast). Indications: anxiety, Normal, Disp-90 Capsule, R-0      b complex-vitamin c-folic acid 0.8 mg (NEPHRO-LIT) 0.8 mg tab tablet Take 1 Tablet by mouth daily. Indications: vitamin deficiency, Normal, Disp-30 Tablet, R-0       !! - Potential duplicate medications found. Please discuss with provider. 2.     Follow-up Information       Follow up With Specialties Details Why Contact Info    Geovani Calzada MD Infectious Disease Physician Schedule an appointment as soon as possible for a visit in 2 days  [address] [place] [address]  [phone]      [facility] EMERGENCY DEPT Emergency Medicine Go to  As needed, If symptoms worsen [address]  [address] [facility]  [phone]          3. Return to ED if worse       I, Jaylyn Cedeño MD, am the attending of record for this patient encounter. Diagnosis     Final Clinical Impression:   1. Strain of lumbar region, initial encounter    2. Compression fracture of L1 vertebra, initial encounter [facility])        Attestation:  I personally performed the services described in this documentation on this date 12/8/2022 for patient Shahid Webb.   Melinda Heath MD

## 2022-12-08 NOTE — DISCHARGE INSTRUCTIONS
It was a pleasure taking care of you in our Emergency Department today. We know that when you come to [facility] you are entrusting us with your health, comfort, and safety. Our physicians and nurses honor that trust, and truly appreciate the opportunity to care for you and your loved ones. We also value your feedback. If you receive a survey about your Emergency Department experience today, please fill it out. We care about our patients' feedback, and we listen to what you have to say.   Thank you!       --- Dr. Elda Galloway MD

## 2022-12-12 ENCOUNTER — OFFICE VISIT (OUTPATIENT)
Dept: ORTHOPEDIC SURGERY | Age: 42
End: 2022-12-12
Payer: MEDICAID

## 2022-12-12 DIAGNOSIS — R19.8 ABDOMINAL WEAKNESS: ICD-10-CM

## 2022-12-12 DIAGNOSIS — G89.29 CHRONIC BILATERAL LOW BACK PAIN WITHOUT SCIATICA: Primary | ICD-10-CM

## 2022-12-12 DIAGNOSIS — M25.69 BACK STIFFNESS: ICD-10-CM

## 2022-12-12 DIAGNOSIS — M54.50 CHRONIC BILATERAL LOW BACK PAIN WITHOUT SCIATICA: Primary | ICD-10-CM

## 2022-12-12 PROCEDURE — 97140 MANUAL THERAPY 1/> REGIONS: CPT | Performed by: PHYSICAL THERAPIST

## 2022-12-12 PROCEDURE — 97110 THERAPEUTIC EXERCISES: CPT | Performed by: PHYSICAL THERAPIST

## 2022-12-12 NOTE — PROGRESS NOTES
Patient Name: Gavino Strauss  Date:2022  : 1980  [x]  Patient  Verified  Payor: Kera Velazquez / Plan: [facility] [employer] / Product Type: Managed Care Medicaid /    Total Treatment Time (min): 40 min  1:1 Treatment Time ([address] only):   Referring provider: JULIENNE Henson*  1. Chronic bilateral low back pain without sciatica  2. Back stiffness  3. Abdominal weakness      SUBJECTIVE  Doing ok. Just walking from the parking lot, my back got sore. OBJECTIVE  AROM:   PROM:   TTP over left lumbar paraspinals  Modality:   []  E-Stim: type _ x _ min     []att   []unatt   []w/US   []w/ice   []w/heat  []  Ultrasound: []cont   []pulse    _ W/cm2 x _  min   []1MHz   []3MHz  []  Ice pack _  Post       [] Hot pack _  Pre       []  Other:    Man: 10 min MFR to left lumbar paraspinals and glut medius, left lumbar mobilizations        NMR:  min  Neuromuscular reeducation/proprioceptive training listed in exercise below. Ex: 30 min  Therapeutic exercise/strength/endurance completed here in clinic today per the exercise log. PT Exercise Log         EXERCISE 2022   LTR 20x   Supine hamstring stretch 5x30\"   Seated SB 3x20\"   bridging 20x   openbooks 3x3 breaths   Sidestepping 2 laps   Nu step 5'   Pall off press 20x gr   T band march 20x gr                                                  ASSESSMENT  [x]  See Plan of Care  [x]  Patient will continue to benefit from skilled therapy to address remaining functional deficits:   Patient has continued intermittent c/o back pain. Has continued core weakness. Tolerating exercise. Fatigues easily. PLAN  Continue with current plan of care and progress as appropriate towards functional goals.   [x]  Upgrade activities as tolerated     [x]  Continue plan of care  []  Discharge due to:_  [] Other:_       Prateek Jarvis, PT  2022    3:01 PM

## 2022-12-15 ENCOUNTER — OFFICE VISIT (OUTPATIENT)
Dept: ORTHOPEDIC SURGERY | Age: 42
End: 2022-12-15
Payer: MEDICAID

## 2022-12-15 DIAGNOSIS — M54.50 CHRONIC BILATERAL LOW BACK PAIN WITHOUT SCIATICA: Primary | ICD-10-CM

## 2022-12-15 DIAGNOSIS — G89.29 CHRONIC BILATERAL LOW BACK PAIN WITHOUT SCIATICA: Primary | ICD-10-CM

## 2022-12-15 DIAGNOSIS — R19.8 ABDOMINAL WEAKNESS: ICD-10-CM

## 2022-12-15 DIAGNOSIS — M25.69 BACK STIFFNESS: ICD-10-CM

## 2022-12-15 NOTE — PROGRESS NOTES
Patient Name: Juan Gomez  Date:12/15/2022  : 1980  [x]  Patient  Verified  Payor: Shell Feliciano / Plan: [id number] [place] [address] / Product Type: Managed Care Medicaid /    Total Treatment Time (min): 50 min  1:1 Treatment Time ([address] only):   Referring provider: JULIENNE Aponte*  1. Chronic bilateral low back pain without sciatica  2. Back stiffness  3. Abdominal weakness      SUBJECTIVE  Feeling sore. I get sharp, shooting pains that take my breath. Thinking about a brace. OBJECTIVE  AROM:   PROM:   TTP over left thoracic, lumbar paraspinals and ribs on left  Modality:   []  E-Stim: type _ x _ min     []att   []unatt   []w/US   []w/ice   []w/heat  []  Ultrasound: []cont   []pulse    _ W/cm2 x _  min   []1MHz   []3MHz  [x]  Ice pack _  Post       [] Hot pack _  Pre       []  Other:    Man: 15 min MFR to left lumbar and thoracic paraspinals, as well as intercostals on left    NMR:  min  Neuromuscular reeducation/proprioceptive training listed in exercise below. Ex: 35 min  Therapeutic exercise/strength/endurance completed here in clinic today per the exercise log. PT Exercise Log         EXERCISE 12/15/2022   LTR 20x   Supine hamstring stretch 5x30\"   Seated SB 3x20\"   bridging 20x   openbooks 3x3 breaths   Sidestepping 2 laps   Nu step 5'   Pall off press 20x gr   T band march 20x gr   Seated prayer stre w/ball 3x10\"                                              ASSESSMENT  [x]  See Plan of Care  [x]  Patient will continue to benefit from skilled therapy to address remaining functional deficits:   Patient having difficulty breathing 2nd back pain. TTP over thoracic and lumbar paraspinals that improve w/MFR. Patient continues to fatigue easily w/exercise. PLAN  Continue with current plan of care and progress as appropriate towards functional goals.   [x]  Upgrade activities as tolerated     [x]  Continue plan of care  []  Discharge due to:_  [] Other:_ Amina Cornea, PT  12/15/2022    3:01 PM

## 2022-12-19 ENCOUNTER — OFFICE VISIT (OUTPATIENT)
Dept: ORTHOPEDIC SURGERY | Age: 42
End: 2022-12-19
Payer: MEDICAID

## 2022-12-19 DIAGNOSIS — R19.8 ABDOMINAL WEAKNESS: ICD-10-CM

## 2022-12-19 DIAGNOSIS — G89.29 CHRONIC BILATERAL LOW BACK PAIN WITHOUT SCIATICA: Primary | ICD-10-CM

## 2022-12-19 DIAGNOSIS — M54.50 CHRONIC BILATERAL LOW BACK PAIN WITHOUT SCIATICA: Primary | ICD-10-CM

## 2022-12-19 DIAGNOSIS — M25.69 BACK STIFFNESS: ICD-10-CM

## 2022-12-19 PROCEDURE — 97140 MANUAL THERAPY 1/> REGIONS: CPT | Performed by: PHYSICAL THERAPIST

## 2022-12-19 PROCEDURE — 97110 THERAPEUTIC EXERCISES: CPT | Performed by: PHYSICAL THERAPIST

## 2022-12-19 NOTE — PROGRESS NOTES
Patient Name: Jenny Jones  Date:2022  : 1980  [x]  Patient  Verified  Payor: Malina Bowen / Plan: [address] [place] [address] / Product Type: Managed Care Medicaid /    Total Treatment Time (min): 50 min  1:1 Treatment Time ([address] only):   Referring provider: JULIENNE Ocampo*  1. Chronic bilateral low back pain without sciatica  2. Back stiffness  3. Abdominal weakness      SUBJECTIVE  Feeling better, not as sore. OBJECTIVE  AROM:   PROM:   TTP over left thoracic, lumbar paraspinals and ribs on left  Modality:   []  E-Stim: type _ x _ min     []att   []unatt   []w/US   []w/ice   []w/heat  []  Ultrasound: []cont   []pulse    _ W/cm2 x _  min   []1MHz   []3MHz  [x]  Ice pack _  Post       [] Hot pack _  Pre       []  Other:    Man: 15 min MFR to left lumbar and thoracic paraspinals, as well as intercostals on left    NMR:  min  Neuromuscular reeducation/proprioceptive training listed in exercise below. Ex: 35 min  Therapeutic exercise/strength/endurance completed here in clinic today per the exercise log. PT Exercise Log         EXERCISE 2022   LTR 20x   Supine hamstring stretch 5x30\"   Seated SB 3x20\"   bridging 20x   openbooks 3x3 breaths   Sidestepping 2 laps   Nu step 5'   Pall off press 20x gr   T band march 20x gr   Seated prayer stre w/ball 3x10\"                                              ASSESSMENT  [x]  See Plan of Care  [x]  Patient will continue to benefit from skilled therapy to address remaining functional deficits:   Patient tolerating exercise. C/o continued intermittent pain w/vacuuming and doing laundry. Has increased lumbar AROM. Continued core weakness. PLAN  Continue with current plan of care and progress as appropriate towards functional goals.   [x]  Upgrade activities as tolerated     [x]  Continue plan of care  []  Discharge due to:_  [] Other:_       Ellis Santana, PT  2022    3:01 PM

## 2022-12-24 ENCOUNTER — HOSPITAL ENCOUNTER (EMERGENCY)
Age: 42
Discharge: HOME | End: 2022-12-24
Attending: EMERGENCY MEDICINE
Payer: MEDICAID

## 2022-12-24 ENCOUNTER — APPOINTMENT (OUTPATIENT)
Dept: CT IMAGING | Age: 42
End: 2022-12-24
Attending: EMERGENCY MEDICINE
Payer: MEDICAID

## 2022-12-24 VITALS
BODY MASS INDEX: 45.69 KG/M2 | HEIGHT: 64 IN | RESPIRATION RATE: 18 BRPM | WEIGHT: 267.64 LBS | SYSTOLIC BLOOD PRESSURE: 114 MMHG | DIASTOLIC BLOOD PRESSURE: 77 MMHG | OXYGEN SATURATION: 94 % | TEMPERATURE: 98.4 F | HEART RATE: 111 BPM
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
## 2022-12-24 DIAGNOSIS — S20.222A CONTUSION OF LEFT SIDE OF BACK, INITIAL ENCOUNTER: ICD-10-CM

## 2022-12-24 PROCEDURE — 74011000250 HC RX REV CODE- 250: Performed by: EMERGENCY MEDICINE

## 2022-12-24 PROCEDURE — 74176 CT ABD & PELVIS W/O CONTRAST: CPT

## 2022-12-24 PROCEDURE — 96372 THER/PROPH/DIAG INJ SC/IM: CPT

## 2022-12-24 PROCEDURE — 99284 EMERGENCY DEPT VISIT MOD MDM: CPT

## 2022-12-24 PROCEDURE — 74011250637 HC RX REV CODE- 250/637: Performed by: EMERGENCY MEDICINE

## 2022-12-24 PROCEDURE — 74011250636 HC RX REV CODE- 250/636: Performed by: EMERGENCY MEDICINE

## 2022-12-24 RX ORDER — LIDOCAINE 4 G/100G
1 PATCH TOPICAL ONCE
Status: DISCONTINUED | OUTPATIENT
Start: 2022-12-24 | End: 2022-12-24 | Stop reason: HOSPADM

## 2022-12-24 RX ORDER — ONDANSETRON 4 MG/1
4 TABLET, ORALLY DISINTEGRATING ORAL
Status: COMPLETED | OUTPATIENT
Start: 2022-12-24 | End: 2022-12-24

## 2022-12-24 RX ORDER — KETOROLAC TROMETHAMINE 30 MG/ML
15 INJECTION, SOLUTION INTRAMUSCULAR; INTRAVENOUS
Status: COMPLETED | OUTPATIENT
Start: 2022-12-24 | End: 2022-12-24

## 2022-12-24 RX ORDER — OXYCODONE AND ACETAMINOPHEN 5; 325 MG/1; MG/1
1 TABLET ORAL
Status: COMPLETED | OUTPATIENT
Start: 2022-12-24 | End: 2022-12-24

## 2022-12-24 RX ORDER — HYDROCODONE BITARTRATE AND ACETAMINOPHEN 5; 325 MG/1; MG/1
1 TABLET ORAL
Qty: 5 TABLET | Refills: 0 | Status: SHIPPED | OUTPATIENT
Start: 2022-12-24 | End: 2022-12-27

## 2022-12-24 RX ADMIN — KETOROLAC TROMETHAMINE 15 MG: 30 INJECTION, SOLUTION INTRAMUSCULAR; INTRAVENOUS at 02:32

## 2022-12-24 RX ADMIN — OXYCODONE AND ACETAMINOPHEN 1 TABLET: 5; 325 TABLET ORAL at 02:32

## 2022-12-24 RX ADMIN — ONDANSETRON 4 MG: 4 TABLET, ORALLY DISINTEGRATING ORAL at 02:32

## 2022-12-24 NOTE — ED PROVIDER NOTES
EMERGENCY DEPARTMENT HISTORY AND PHYSICAL EXAM      Date: 12/24/2022  Patient Name: Jenny Jones    History of Presenting Illness     Chief Complaint   Patient presents with    Fall     Fall on back in shower. Has a chronic back pain with compression fracture after a few drinks. History Provided By: Patient    HPI: Jenny Jones, 38 y.o. female presents to the ED with cc of lower back pain. 81-year-old female with the below past medical history presents emerged department lower back pain. Patient has a history of chronic L1 compression fracture. Sees orthopedic surgery for this and is in physical therapy. Patient reportedly had \"too many drinks\" today and a fall in the shower. She reports she landed on her buttock. Reports immediate onset of severe pain in her lower back. Pain is worse with movement and does not radiate. It does not improve with ibuprofen. She denies any bowel or bladder incontinence, saddle anesthesia. Denies any leg weakness or numbness. Patient did not strike her head. She is not on blood thinners. She denies any other injuries. There are no other complaints, changes, or physical findings at this time. PCP: Alfred Aguilar MD    No current facility-administered medications on file prior to encounter. Current Outpatient Medications on File Prior to Encounter   Medication Sig Dispense Refill    venlafaxine-SR (EFFEXOR-XR) 150 mg capsule TAKE 1 CAPSULE ORALLY DAILY TAKE WITH 75MG VENLAFAXINE ER TABLET TO EQUAL A DAILY DOSE OF 225MG      baclofen (LIORESAL) 10 mg tablet Take 1 Tablet by mouth three (3) times daily. 60 Tablet 1    diclofenac EC (VOLTAREN) 75 mg EC tablet Take 1 Tablet by mouth two (2) times daily (with meals). 60 Tablet 1    benzonatate (TESSALON) 200 mg capsule Take 1 Capsule by mouth three (3) times daily as needed for Cough.  30 Capsule 0    baclofen (LIORESAL) 10 mg tablet Take 1 Tablet by mouth three (3) times daily as needed for Muscle Spasm(s) or Pain. 90 Tablet 2    ondansetron (ZOFRAN ODT) 4 mg disintegrating tablet Take 1 Tablet by mouth every eight (8) hours as needed for Nausea or Vomiting. 10 Tablet 0    venlafaxine-SR (EFFEXOR-XR) 75 mg capsule Take 3 Capsules by mouth daily (with breakfast). Indications: anxiety 90 Capsule 0    b complex-vitamin c-folic acid 0.8 mg (NEPHRO-LIT) 0.8 mg tab tablet Take 1 Tablet by mouth daily. Indications: vitamin deficiency (Patient not taking: Reported on 2022) 30 Tablet 0       Past History     Past Medical History:  Past Medical History:   Diagnosis Date    ADD (attention deficit disorder) [age]yo    Treated with Adderall since dx. 2013 dosing 20mg AM and 10mg PM.  Trial/change to Vyvanse Nov-Dec 2015--not as effective. Gynecologic disorder     History of miscarriages. History of Mirena IUD placed on 4/17/15    HX OTHER MEDICAL      -BUFA baby    HX OTHER MEDICAL     HPV positive    Idiopathic vulvodynia 2014    L1 vertebral fracture (HCC) 2017    [facility] imaging/admissoin: Mild acute two column compression fracture of L1 without evidence of retropulsion into the spinal canal.  Managed non-operatively. Migraines 2013    Neurological disorder     Pelvic pain in female 9/15/2014    2015 gyn laparoscopy/hysteroscopy with endometriosis worse right.     Psychiatric disorder     depression    Secondary dysmenorrhea 2014    With menorraghia    Seizures ([facility] [address]     never on meds--had appr 4-5 in a short amt of time and none since       Past Surgical History:  Past Surgical History:   Procedure Laterality Date    ENDOMETRIAL CRYOABLATION      HX GYN  4/17/15    laparoscopy and hysteroscopy and IUD placement    HX HYSTERECTOMY  2016    Complete Hysterectomy       Family History:  Family History   Problem Relation Age of Onset    Cancer Mother     Diabetes Mother     Alcohol abuse Father         and drug    Psychiatric Disorder Father     Cancer Father Diabetes Maternal Grandmother     Hypertension Maternal Grandmother     Thyroid Disease Maternal Grandmother     Diabetes Maternal Grandfather     Hypertension Maternal Grandfather     Cancer Maternal Grandfather     Heart Disease Maternal Grandfather     Cancer Paternal Grandmother     Diabetes Paternal Grandmother        Social History:  Social History     Tobacco Use    Smoking status: Former     Packs/day: 1.00     Years: 18.00     Pack years: 18.00     Types: Cigarettes    Smokeless tobacco: Current    Tobacco comments:     Vapes   Vaping Use    Vaping Use: Never used   Substance Use Topics    Alcohol use: Yes     Comment: 5 beers yesterday 7/20/22    Drug use: No       Allergies: Allergies   Allergen Reactions    Aspirin Other (comments)     Hot sweats and passed out; tolerated ibuprofen    Penicillins Other (comments)     Childhood. Does not remember reaction. Is not aware if she has ever taken cephalosporins in the past.    Jan 2019: Pt notes no problems with cephalosporins. Review of Systems   Review of Systems   Constitutional:  Negative for activity change, chills and fever. HENT:  Negative for facial swelling and voice change. Eyes:  Negative for redness. Respiratory:  Negative for cough, shortness of breath and wheezing. Cardiovascular:  Negative for chest pain and leg swelling. Gastrointestinal:  Negative for abdominal pain, diarrhea, nausea and vomiting. Genitourinary:  Negative for decreased urine volume. Musculoskeletal:  Positive for arthralgias and back pain. Negative for gait problem. Skin:  Negative for pallor and rash. Neurological:  Negative for tremors, facial asymmetry, weakness and numbness. Psychiatric/Behavioral:  Negative for agitation. All other systems reviewed and are negative. Physical Exam   Physical Exam  Vitals and nursing note reviewed.    Constitutional:       Comments: 68-year-old female, sitting in wheelchair, appears uncomfortable HENT:      Head: Normocephalic and atraumatic. Cardiovascular:      Rate and Rhythm: Normal rate and regular rhythm. Heart sounds: No murmur heard. No friction rub. No gallop. Pulmonary:      Effort: Pulmonary effort is normal.      Breath sounds: Normal breath sounds. Abdominal:      Palpations: Abdomen is soft. Tenderness: There is no abdominal tenderness. Musculoskeletal:         General: Tenderness present. Normal range of motion. Cervical back: Normal range of motion. Comments: Tenderness to palpation along L4-L5. No step-offs or crepitus. There is also bilateral tenderness over the SI joints. Skin:     General: Skin is warm. Capillary Refill: Capillary refill takes less than 2 seconds. Neurological:      General: No focal deficit present. Mental Status: She is alert. Sensory: No sensory deficit. Motor: No weakness. Comments: Equal sensation bilateral lower extremities. No appreciable weakness. Psychiatric:         Mood and Affect: Mood normal.       Diagnostic Study Results     Labs -   No results found for this or any previous visit (from the past 12 hour(s)). Radiologic Studies -   CT ABD PELV WO CONT   Final Result   No acute intraperitoneal process is identified. There is no evidence of acute fracture or dislocation. Incidental and/or nonemergent findings are as described in detail above. CT Results  (Last 48 hours)                 12/24/22 0313  CT ABD PELV WO CONT Final result    Impression:  No acute intraperitoneal process is identified. There is no evidence of acute fracture or dislocation. Incidental and/or nonemergent findings are as described in detail above. Narrative:  CLINICAL HISTORY: fall in bathtub, lower back pain L4-L5 h/o previous   compression fx    INDICATION: fall in bathtub, lower back pain L4-L5 h/o previous compression fx   COMPARISON: 7/21/2022   CONTRAST:  None. TECHNIQUE:    Thin axial images were obtained through the abdomen and pelvis. Coronal and   sagittal reformats were generated. Oral contrast was not administered. CT dose   reduction was achieved through use of a standardized protocol tailored for this   examination and automatic exposure control for dose modulation. The absence of intravenous contrast material reduces the sensitivity for   evaluation of visceral organs and vasculature including presence of small mass   lesions, hemodynamically significant stenoses, dissections, mucosal   abnormalities etc.       FINDINGS:    LOWER THORAX: No significant abnormality in the incidentally imaged lower chest.   LIVER/GALLBLADDER: Hepatic steatosis . Gallbladder is within normal limits. CBD   is not dilated. SPLEEN/PANCREAS:  within normal limits. ADRENALS/KIDNEYS: Unremarkable. No calculus or hydronephrosis. STOMACH: Unremarkable. SMALL BOWEL/COLON: No dilatation or wall thickening. Colonic diverticulosis. APPENDIX: Unremarkable. PERITONEUM: No ascites or pneumoperitoneum. RETROPERITONEUM: No lymphadenopathy or aortic aneurysm. REPRODUCTIVE ORGANS:   URINARY BLADDER: No mass or calculus. BONES: Chronic Schmorl's node along the superior endplate of L1 and V91. ADDITIONAL COMMENTS: N/A                 CXR Results  (Last 48 hours)      None            Medical Decision Making   I am the first provider for this patient. I reviewed the vital signs, available nursing notes, past medical history, past surgical history, family history and social history. Vital Signs-Reviewed the patient's vital signs. Patient Vitals for the past 12 hrs:   Temp Pulse Resp BP SpO2   12/24/22 0208 98.4 °F (36.9 °C) (!) 111 18 114/77 94 %     Records Reviewed: Nursing Notes and Old Medical Records    Provider Notes (Medical Decision Making):     46-year-old female with a history of chronic back pain presents emergency department with acute pain after a fall. Vitals are unremarkable. Neurologically intact. Given fall will check CT to exclude acute fracture or dislocation. At this time, holding on labs. Will observe in ED and medicate for pain. Reassess. ED Course:   Initial assessment performed. The patients presenting problems have been discussed, and they are in agreement with the care plan formulated and outlined with them. I have encouraged them to ask questions as they arise throughout their visit. ED Course as of 12/24/22 0434   Sat Dec 24, 2022   0329 CT abd/pelvis without acute fx. [MB]   0193 To nursing, patient reports pain is a 10 out of 10, however on my reassessment she appears much more comfortable, now reclined in chair and sleeping. Discussed with patient, CT negative. She will need to follow-up with Ortho continue physical therapy. Reviewed , will provide short course of Norco given acute pain and injury. Discussed return precautions. [MB]      ED Course User Index  [MB] MD Kendall Casas MD      Disposition:    Discharged    Diagnosis     Clinical Impression:   1. Fall, initial encounter    2. Contusion of left side of back, initial encounter        Attestations:    Kendall Lerma MD        Please note that this dictation was completed with [employer], the computer voice recognition software. Quite often unanticipated grammatical, syntax, homophones, and other interpretive errors are inadvertently transcribed by the computer software. Please disregard these errors. Please excuse any errors that have escaped final proofreading. Thank you.

## 2022-12-24 NOTE — DISCHARGE INSTRUCTIONS
You were seen in the ER for your symptoms. Please follow-up with your primary care doctor. Please continue to take motrin for pain and Norco for severe pain. Return for new or worsening symptoms at any time.

## 2022-12-24 NOTE — ED NOTES
Pt no longer in ED05, green bag with pajama bottoms, discharge paperwork, excuse letter, and discharge signature page (unsigned) all remain at bedside. Pt not located in nearby ED bathrooms or lobby. All items placed on nurse desk in case Pt returns. Staff did not witness Pt ambulate out of ED.

## 2022-12-24 NOTE — ED NOTES
DC info reviewed with Patient, all questions answered. Patient reports improved pain and is well-appearing at time of discharge review, vital signs stable, no acute distress. Pt is calling her transportation at this time and says she will let me know when she is ready to be wheeled to lobby.

## 2022-12-26 RX ORDER — BACLOFEN 10 MG/1
TABLET ORAL
Qty: 60 TABLET | Refills: 1 | Status: SHIPPED | OUTPATIENT
Start: 2022-12-26

## 2023-01-03 ENCOUNTER — OFFICE VISIT (OUTPATIENT)
Dept: ORTHOPEDIC SURGERY | Age: 43
End: 2023-01-03
Payer: MEDICAID

## 2023-01-03 VITALS — HEIGHT: 64 IN | WEIGHT: 267 LBS | BODY MASS INDEX: 45.58 KG/M2

## 2023-01-03 DIAGNOSIS — M25.572 ACUTE LEFT ANKLE PAIN: ICD-10-CM

## 2023-01-03 DIAGNOSIS — S82.852A CLOSED DISPLACED TRIMALLEOLAR FRACTURE OF LEFT ANKLE, INITIAL ENCOUNTER: Primary | ICD-10-CM

## 2023-01-03 PROCEDURE — 99203 OFFICE O/P NEW LOW 30 MIN: CPT | Performed by: ORTHOPAEDIC SURGERY

## 2023-01-03 RX ORDER — IBUPROFEN 800 MG/1
800 TABLET ORAL
Qty: 15 TABLET | Refills: 0 | Status: SHIPPED | OUTPATIENT
Start: 2023-01-03

## 2023-01-03 RX ORDER — LURASIDONE HYDROCHLORIDE 20 MG/1
TABLET, FILM COATED ORAL
COMMUNITY
Start: 2023-01-01

## 2023-01-03 RX ORDER — OXYCODONE HYDROCHLORIDE 5 MG/1
5 TABLET ORAL
Qty: 30 TABLET | Refills: 0 | Status: SHIPPED | OUTPATIENT
Start: 2023-01-03 | End: 2023-01-05 | Stop reason: ALTCHOICE

## 2023-01-03 NOTE — PROGRESS NOTES
Carlo Villanueva (: 1980) is a 38 y.o. female, patient,here for evaluation of the following   Chief Complaint   Patient presents with    Foot Injury        ASSESSMENT/PLAN:  Below is the assessment and plan developed based on review of pertinent history, physical exam, labs, studies, and medications. 1. Closed displaced trimalleolar fracture of left ankle, initial encounter  -     oxyCODONE IR (ROXICODONE) 5 mg immediate release tablet; Take 1 Tablet by mouth every four (4) hours as needed for Pain for up to 3 days. Max Daily Amount: 30 mg. Indications: pain, Normal, Disp-30 Tablet, R-0  -     ibuprofen (MOTRIN) 800 mg tablet; Take 1 Tablet by mouth every eight (8) hours as needed for Pain. Indications: pain, Normal, Disp-15 Tablet, R-0  -     REFERRAL TO ORTHOPEDIC SURGERY  2. Acute left ankle pain  -     oxyCODONE IR (ROXICODONE) 5 mg immediate release tablet; Take 1 Tablet by mouth every four (4) hours as needed for Pain for up to 3 days. Max Daily Amount: 30 mg. Indications: pain, Normal, Disp-30 Tablet, R-0  -     ibuprofen (MOTRIN) 800 mg tablet; Take 1 Tablet by mouth every eight (8) hours as needed for Pain. Indications: pain, Normal, Disp-15 Tablet, R-0  -     REFERRAL TO ORTHOPEDIC SURGERY      The patient is informed of findings on exam and x-rays reviewed, she has a fracture that will require surgical treatment for best outcome. We discussed the surgical plan, risk, potential complications, expected outcomes, postop limitation time needed for recovery. All questions were answered to the best of my ability and to the patient's satisfaction, no guarantees are made. Patient is informed to stop vaping nicotine if at all possible as it does delay healing, resulting in a poor outcome due to delayed healing or infections at the skin. The patient verbalized understanding of exam findings and treatment plan.   We will engage in the shared decision-making process and treatment options were discussed at length with the patient. Surgical and conservative management discussed today along with risk and benefits. Return for post surgical follow up. Allergies   Allergen Reactions    Aspirin Other (comments)     Hot sweats and passed out; tolerated ibuprofen    Penicillins Other (comments)     Childhood. Does not remember reaction. Is not aware if she has ever taken cephalosporins in the past.    2019: Pt notes no problems with cephalosporins. Current Outpatient Medications   Medication Sig    oxyCODONE IR (ROXICODONE) 5 mg immediate release tablet Take 1 Tablet by mouth every four (4) hours as needed for Pain for up to 3 days. Max Daily Amount: 30 mg. Indications: pain    ibuprofen (MOTRIN) 800 mg tablet Take 1 Tablet by mouth every eight (8) hours as needed for Pain. Indications: pain    Latuda 20 mg tab tablet     venlafaxine-SR (EFFEXOR-XR) 150 mg capsule TAKE 1 CAPSULE ORALLY DAILY TAKE WITH 75MG VENLAFAXINE ER TABLET TO EQUAL A DAILY DOSE OF 225MG     No current facility-administered medications for this visit. Past Medical History:   Diagnosis Date    ADD (attention deficit disorder) [age]yo    Treated with Adderall since dx. 2013 dosing 20mg AM and 10mg PM.  Trial/change to Vyvanse Nov-Dec 2015--not as effective. Gynecologic disorder     History of miscarriages. History of Mirena IUD placed on 4/17/15    HX OTHER MEDICAL      -BU baby    HX OTHER MEDICAL     HPV positive    Idiopathic vulvodynia 2014    L1 vertebral fracture (HCC) 2017    [facility] imaging/admissoin: Mild acute two column compression fracture of L1 without evidence of retropulsion into the spinal canal.  Managed non-operatively. Migraines 2013    Neurological disorder     Pelvic pain in female 9/15/2014    2015 gyn laparoscopy/hysteroscopy with endometriosis worse right.     Psychiatric disorder     depression    Secondary dysmenorrhea 2014    With menorraghia Seizures ([facility] [address] 2008    never on meds--had appr 4-5 in a short amt of time and none since       Past Surgical History:   Procedure Laterality Date    ENDOMETRIAL CRYOABLATION      HX GYN  4/17/15    laparoscopy and hysteroscopy and IUD placement    HX HYSTERECTOMY  04/04/2016    Complete Hysterectomy       Family History   Problem Relation Age of Onset    Cancer Mother     Diabetes Mother     Alcohol abuse Father         and drug    Psychiatric Disorder Father     Cancer Father     Diabetes Maternal Grandmother     Hypertension Maternal Grandmother     Thyroid Disease Maternal Grandmother     Diabetes Maternal Grandfather     Hypertension Maternal Grandfather     Cancer Maternal Grandfather     Heart Disease Maternal Grandfather     Cancer Paternal Grandmother     Diabetes Paternal Grandmother        Social History     Socioeconomic History    Marital status: SINGLE     Spouse name: Not on file    Number of children: Not on file    Years of education: Not on file    Highest education level: Not on file   Occupational History    Not on file   Tobacco Use    Smoking status: Former     Packs/day: 1.00     Years: 18.00     Pack years: 18.00     Types: Cigarettes    Smokeless tobacco: Current    Tobacco comments:     Vapes   Vaping Use    Vaping Use: Never used   Substance and Sexual Activity    Alcohol use: Yes     Comment: 5 beers yesterday 7/20/22    Drug use: No    Sexual activity: Not Currently     Partners: Male     Birth control/protection: Surgical   Other Topics Concern     Service Not Asked    Blood Transfusions Not Asked    Caffeine Concern Not Asked    Occupational Exposure Not Asked    Hobby Hazards Not Asked    Sleep Concern Not Asked    Stress Concern Not Asked    Weight Concern Not Asked    Special Diet Not Asked    Back Care Not Asked    Exercise Not Asked    Bike Helmet Not Asked    Seat Belt Not Asked    Self-Exams Not Asked   Social History Narrative    ** Merged History Encounter ** Social Determinants of Health     Financial Resource Strain: Not on file   Food Insecurity: Not on file   Transportation Needs: Not on file   Physical Activity: Not on file   Stress: Not on file   Social Connections: Not on file   Intimate Partner Violence: Not on file   Housing Stability: Not on file           Vitals:  Ht 5' 4\" (1.626 m)   Wt 267 lb (121.1 kg)   LMP 2016   BMI 45.83 kg/m²    Body mass index is 45.83 kg/m². SUBJECTIVE:  Lindsay Barnes (: 1980)   New patient presents today with complaint of left ankle pain related to an injury sustained on 2022 so she is about 1 week out since date of injury. She describes a fall when she slipped and had immediate pain and could not bear weight. She went to [facility] emergency room where she was evaluated, splinted and referred to orthopedics. She presents today a week out since date of injury. She describes having severe, sharp, dull, stabbing and throbbing pain. The pain is described as constant and interferes with sleep. There is swelling, numbness, tingling and weakness sensation. Standing, bending, lifting, squatting, stairs/steps, kneeling, running and walking make it worse. Patient was provided oxycodone and ibuprofen. She has a splint and crutches provided. She states not diabetic, she is vaping nicotine. OBJECTIVE EXAM:     Visit Vitals  Ht 5' 4\" (1.626 m)   Wt 267 lb (121.1 kg)   LMP 2016   BMI 45.83 kg/m²       Appearance: Alert, well appearing and pleasant patient who is in no distress, oriented to person, place/time, and who follows commands. This patient is accompanied in the       office by her  self. Psychiatric: Affect and mood are appropriate. No dementia noted on examination  Musculoskeletal:  LOCATION: Diffuse tenderness with swelling ankle - left      Integumentary: No rashes, skin patches, wounds, or abrasions to the right or left legs       Warm and normal color.  No regions of expressible drainage. Gait: Normal      Tenderness: No tenderness        Motor/Strength/Tone Exam: Normal       Sensory Exam:   Intact Normal Sensation to ankle/foot      Stability Testing: No anterolateral or varus instability of the Ankle or Subtalar Joints               No peroneal tendon instability noted      ROM: Normal ROM noted to ankle/foot      Contractures: No Achilles or Gastrocnemius Contractures      Calf tenderness: Absent for calf or gastrocnemius muscle regions       Soft, supple, non tender, non taut lower extremity compartment  Alignment:      NEUTRAL Hindfoot,         none Metatarsus Adductus Metatarsus   Wounds/Abrasions:    None present  Extremities:   No embolic phenomena to the toes          No significant edema to the foot and or toes. Lower extremities are warm and appear well perfused    DVT: No evidence of DVT seen on examination at this time     No calf swelling, no tenderness to calf muscles  Lymphatic:  No Evidence of Lymphedema  Vascular: Medial Border of Tibia Region: Edema is not present         Pulses: Dorsalis Pedis &  Posterior Tibial Pulses : Palpable yes        Varicosities Lower Limbs :  None  noted  Neuro: Negative bilateral Straight leg raise (seated position)    See Musculoskeletal section for pertinent individual extremity examination    No abnormal hand/wrist, foot/ankle, or facial/neck tremors. Lower Extremity/Ankle/Foot:  Nonweightbearing gait, limited weightbearing stance. Left lower extremity/ankle: There is no malalignment or deformity status post reduction, overall alignment looks good but surrounding swelling and tenderness present related to injury and resolving ecchymosis, no fracture blisters are seen. .    Left foot: No malalignment or deformity, mostly nontender, no swelling, ligaments grossly stable. Contralateral lower extremity/ankle /foot exam:  Nontender, no swelling ligaments grossly stable.   Normal weightbearing stance. Neurovascular exam grossly intact light touch sensation, cap refill, EHL/FHL 5/5 although somewhat limited due to pain. Imaging:    X-rays brought by patient from outside facility of the left ankle nonweightbearing, shows trimalleolar ankle fracture, pre and postreduction x-rays AP, lateral and oblique shows a trimalleolar that is reduced well in good position, the medial malleolus is a shear type injury, the lateral malleolus is somewhat comminuted. The posterior fragment is a very tiny avulsion type injury. These x-rays from [facility] emergency room dated December 24, 2022. An electronic signature was used to authenticate this note.   -- Jg Box MD

## 2023-01-04 DIAGNOSIS — G89.18 ACUTE POST-OPERATIVE PAIN: Primary | ICD-10-CM

## 2023-01-04 RX ORDER — CEPHALEXIN 500 MG/1
500 CAPSULE ORAL 4 TIMES DAILY
Qty: 8 CAPSULE | Refills: 0 | Status: SHIPPED | OUTPATIENT
Start: 2023-01-04

## 2023-01-04 RX ORDER — ENOXAPARIN SODIUM 100 MG/ML
40 INJECTION SUBCUTANEOUS DAILY
Qty: 14 EACH | Refills: 1 | Status: SHIPPED | OUTPATIENT
Start: 2023-01-04 | End: 2023-01-18

## 2023-01-04 NOTE — PROGRESS NOTES
Postoperative medications were sent today, we sent the oxycodone yesterday, a total of 30 tabs were provided. We will not be sending oxycodone today, we will be sending Keflex 500 mg 1 tab p.o. 4 times daily x2 days, and Lovenox 40 mg subcu daily. Medications are sent to the pharmacy requested on record. She is status post procedure today January 4, 2023.

## 2023-01-05 ENCOUNTER — TELEPHONE (OUTPATIENT)
Dept: ORTHOPEDIC SURGERY | Age: 43
End: 2023-01-05

## 2023-01-05 DIAGNOSIS — G89.18 ACUTE POST-OPERATIVE PAIN: Primary | ICD-10-CM

## 2023-01-05 DIAGNOSIS — S82.852A CLOSED DISPLACED TRIMALLEOLAR FRACTURE OF LEFT ANKLE, INITIAL ENCOUNTER: ICD-10-CM

## 2023-01-05 DIAGNOSIS — M25.572 ACUTE LEFT ANKLE PAIN: ICD-10-CM

## 2023-01-05 DIAGNOSIS — S82.852A LEFT TRIMALLEOLAR FRACTURE, CLOSED, INITIAL ENCOUNTER: ICD-10-CM

## 2023-01-05 RX ORDER — OXYCODONE AND ACETAMINOPHEN 7.5; 325 MG/1; MG/1
1 TABLET ORAL
Qty: 20 TABLET | Refills: 0 | Status: SHIPPED | OUTPATIENT
Start: 2023-01-05 | End: 2023-01-08

## 2023-01-11 DIAGNOSIS — M25.572 ACUTE LEFT ANKLE PAIN: ICD-10-CM

## 2023-01-11 DIAGNOSIS — S82.852A CLOSED DISPLACED TRIMALLEOLAR FRACTURE OF LEFT ANKLE, INITIAL ENCOUNTER: ICD-10-CM

## 2023-01-11 RX ORDER — OXYCODONE AND ACETAMINOPHEN 5; 325 MG/1; MG/1
1 TABLET ORAL
Qty: 20 TABLET | Refills: 0 | Status: CANCELLED | OUTPATIENT
Start: 2023-01-11 | End: 2023-01-18

## 2023-01-11 RX ORDER — IBUPROFEN 800 MG/1
800 TABLET ORAL
Qty: 15 TABLET | Refills: 0 | Status: SHIPPED | OUTPATIENT
Start: 2023-01-11

## 2023-01-13 ENCOUNTER — TELEPHONE (OUTPATIENT)
Dept: ORTHOPEDIC SURGERY | Age: 43
End: 2023-01-13

## 2023-01-13 NOTE — TELEPHONE ENCOUNTER
PO call from patient. PAIN. Rx refill requested.   Confirmed # [id number] oxycodone filled and received w/ pharmacy  Advised tylenol, ibuprofen, ice and elevation ( attending here was unable to refill narcortic rx )

## 2023-01-18 ENCOUNTER — OFFICE VISIT (OUTPATIENT)
Dept: ORTHOPEDIC SURGERY | Age: 43
End: 2023-01-18
Payer: MEDICAID

## 2023-01-18 VITALS — BODY MASS INDEX: 45.58 KG/M2 | WEIGHT: 267 LBS | HEIGHT: 64 IN

## 2023-01-18 DIAGNOSIS — S82.852D CLOSED DISPLACED TRIMALLEOLAR FRACTURE OF LEFT ANKLE WITH ROUTINE HEALING, SUBSEQUENT ENCOUNTER: Primary | ICD-10-CM

## 2023-01-18 DIAGNOSIS — Z09 SURGICAL FOLLOW-UP CARE: ICD-10-CM

## 2023-01-18 NOTE — PROGRESS NOTES
Tai Hale (: 1980) is a 38 y.o. female, patient,here for evaluation of the following   Chief Complaint   Patient presents with    Foot Pain     left        ASSESSMENT/PLAN:  Below is the assessment and plan developed based on review of pertinent history, physical exam, labs, studies, and medications. 1. Closed displaced trimalleolar fracture of left ankle with routine healing, subsequent encounter  -     XR ANKLE LT MIN 3 V; Future  -     REFERRAL TO DME  -     PNEUMATIC FULL LEG SPLINT  2. Surgical follow-up care  -     REFERRAL TO DME  -     PNEUMATIC FULL LEG SPLINT    Patient verbalized understanding of exam findings and treatment plan. We engaged in the shared decision-making process and treatment options were discussed at length with the patient. Surgical and conservative management discussed today along with risk and benefits. Patient is informed of findings on exam and x-rays reviewed today. She is about over 2 weeks since date of surgery on 2023. She is doing well she has no complaints today. The sutures at the incision site are removed, she is transition from a splint to a tall boot. A tall boot was ordered and fitted today by DME staff, she is not to start weightbearing on this yet. Next time she returns we will get new x-ray of the left ankle 3 views nonweightbearing. She will continue to elevate but do range of motion knees and ankle, not be in bed all the time to avoid DVT but should continue 1 aspirin per day if she is not very mobile. She agrees with this plan. Next time she returns we will get new x-rays of left ankle 3 views nonweightbearing. Return in about 4 weeks (around 2/15/2023) for repeat xrays. Allergies   Allergen Reactions    Aspirin Other (comments)     Hot sweats and passed out; tolerated ibuprofen    Penicillins Other (comments)     Childhood. Does not remember reaction.  Is not aware if she has ever taken cephalosporins in the past. 2019: Pt notes no problems with cephalosporins. Current Outpatient Medications   Medication Sig    ibuprofen (MOTRIN) 800 mg tablet Take 1 Tablet by mouth every eight (8) hours as needed for Pain. Indications: pain    cephALEXin (KEFLEX) 500 mg capsule Take 1 Capsule by mouth four (4) times daily. Indications: post op    Latuda 20 mg tab tablet     venlafaxine-SR (EFFEXOR-XR) 150 mg capsule TAKE 1 CAPSULE ORALLY DAILY TAKE WITH 75MG VENLAFAXINE ER TABLET TO EQUAL A DAILY DOSE OF 225MG     No current facility-administered medications for this visit. Past Medical History:   Diagnosis Date    ADD (attention deficit disorder) [age]yo    Treated with Adderall since dx. 2013 dosing 20mg AM and 10mg PM.  Trial/change to Vyvanse Nov-Dec 2015--not as effective. Gynecologic disorder     History of miscarriages. History of Mirena IUD placed on 4/17/15    HX OTHER MEDICAL      - baby    HX OTHER MEDICAL     HPV positive    Idiopathic vulvodynia 2014    L1 vertebral fracture (HCC) 2017    [facility] imaging/admissoin: Mild acute two column compression fracture of L1 without evidence of retropulsion into the spinal canal.  Managed non-operatively. Migraines 2013    Neurological disorder     Pelvic pain in female 9/15/2014    2015 gyn laparoscopy/hysteroscopy with endometriosis worse right.     Psychiatric disorder     depression    Secondary dysmenorrhea 2014    With menorraghia    Seizures ([address]     never on meds--had appr 4-5 in a short amt of time and none since       Past Surgical History:   Procedure Laterality Date    ENDOMETRIAL CRYOABLATION      HX GYN  4/17/15    laparoscopy and hysteroscopy and IUD placement    HX HYSTERECTOMY  2016    Complete Hysterectomy       Family History   Problem Relation Age of Onset    Cancer Mother     Diabetes Mother     Alcohol abuse Father         and drug    Psychiatric Disorder Father     Cancer Father     Diabetes Maternal Grandmother     Hypertension Maternal Grandmother     Thyroid Disease Maternal Grandmother     Diabetes Maternal Grandfather     Hypertension Maternal Grandfather     Cancer Maternal Grandfather     Heart Disease Maternal Grandfather     Cancer Paternal Grandmother     Diabetes Paternal Grandmother        Social History     Socioeconomic History    Marital status: SINGLE     Spouse name: Not on file    Number of children: Not on file    Years of education: Not on file    Highest education level: Not on file   Occupational History    Not on file   Tobacco Use    Smoking status: Former     Packs/day: 1.00     Years: 18.00     Pack years: 18.00     Types: Cigarettes    Smokeless tobacco: Current    Tobacco comments:     Vapes   Vaping Use    Vaping Use: Never used   Substance and Sexual Activity    Alcohol use: Yes     Comment: 5 beers yesterday 22    Drug use: No    Sexual activity: Not Currently     Partners: Male     Birth control/protection: Surgical   Other Topics Concern     Service Not Asked    Blood Transfusions Not Asked    Caffeine Concern Not Asked    Occupational Exposure Not Asked    Hobby Hazards Not Asked    Sleep Concern Not Asked    Stress Concern Not Asked    Weight Concern Not Asked    Special Diet Not Asked    Back Care Not Asked    Exercise Not Asked    Bike Helmet Not Asked    Seat Belt Not Asked    Self-Exams Not Asked   Social History Narrative    ** Merged History Encounter **          Social Determinants of Health     Financial Resource Strain: Not on file   Food Insecurity: Not on file   Transportation Needs: Not on file   Physical Activity: Not on file   Stress: Not on file   Social Connections: Not on file   Intimate Partner Violence: Not on file   Housing Stability: Not on file           Vitals:  Ht 5' 4\" (1.626 m)   Wt 267 lb (121.1 kg)   LMP 2016   BMI 45.83 kg/m²    Body mass index is 45.83 kg/m².             SUBJECTIVE:  Jermain Alvarengach (: 1980) Patient is back today for reevaluation and approxi-2 weeks since date of surgery on January 4, 2023 at left lower extremity, she had a trimalleolar ankle fracture. She is doing well today she is not complaining of pain today. She had extreme pain at least 1 to 2 days postop and we had increased her medications. She is not asking for other medications. She is doing much better. Denies chest pain, shortness of breath, calf pain or swelling. No fevers or chills. OBJECTIVE EXAM:     Visit Vitals  Ht 5' 4\" (1.626 m)   Wt 267 lb (121.1 kg)   LMP 03/14/2016   BMI 45.83 kg/m²       Appearance: Alert, well appearing and pleasant patient who is in no distress, oriented to person, place/time, and who follows commands. This patient is accompanied in the       office by her  self and mother. Psychiatric: Affect and mood are appropriate. No dementia noted on examination  Musculoskeletal:  LOCATION: Mild tenderness and swelling ankle - left      Integumentary: No rashes, skin patches, wounds, or abrasions to the right or left legs       Warm and normal color. No regions of expressible drainage. Gait: Normal      Tenderness: No tenderness        Motor/Strength/Tone Exam: Normal       Sensory Exam:   Intact Normal Sensation to ankle/foot      Stability Testing: No anterolateral or varus instability of the Ankle or Subtalar Joints               No peroneal tendon instability noted      ROM: Normal ROM noted to ankle/foot      Contractures: No Achilles or Gastrocnemius Contractures      Calf tenderness: Absent for calf or gastrocnemius muscle regions       Soft, supple, non tender, non taut lower extremity compartment  Alignment:      NEUTRAL Hindfoot,         none Metatarsus Adductus Metatarsus   Wounds/Abrasions:    None present  Extremities:   No embolic phenomena to the toes          No significant edema to the foot and or toes.         Lower extremities are warm and appear well perfused    DVT: No evidence of DVT seen on examination at this time     No calf swelling, no tenderness to calf muscles  Lymphatic:  No Evidence of Lymphedema  Vascular: Medial Border of Tibia Region: Edema is not present         Pulses: Dorsalis Pedis &  Posterior Tibial Pulses : Palpable yes        Varicosities Lower Limbs :  None  noted  Neuro: Negative bilateral Straight leg raise (seated position)    See Musculoskeletal section for pertinent individual extremity examination    No abnormal hand/wrist, foot/ankle, or facial/neck tremors. Lower Extremity/Ankle/Foot:  Nonweightbearing gait, limited weightbearing stance. Left lower extremity/ankle: Calf soft nontender, incisions are healing properly without signs of infection sutures ready for removal.  There is mild tenderness to palpation mild swelling postsurgical as expected, gentle range of motion intact, Achilles tendon intact. Left foot: Nontender, no swelling, ligament stable. Contralateral lower extremity/ankle /foot exam:  Nontender, no swelling ligaments grossly stable. Normal weightbearing stance. Neurovascular exam grossly intact. Imaging:    XR Results (most recent):  Results from Appointment encounter on 01/18/23    XR ANKLE LT MIN 3 V    Narrative  Left ankle AP, lateral and oblique x-rays show satisfactory alignment status post surgical procedure with retained implants at the medial malleolus and lateral malleolus, all the screws and plates are intact. There is a normal ankle mortise with normal joint space. Fractures healing not complete. Postsurgical changes noted. An electronic signature was used to authenticate this note.   -- Shikha Anthony MD

## 2023-01-18 NOTE — LETTER
1/20/2023    Patient: Pravin Frye   YOB: 1980   Date of Visit: 1/18/2023     Sukhjinder Hardwick MD  [address] [place] [id number]  Via In Basket    Dear Sukhjinder Hardwick MD,      Thank you for referring Ms. Amelia Schafer to [facility] for evaluation. My notes for this consultation are attached. If you have questions, please do not hesitate to call me. I look forward to following your patient along with you.       Sincerely,    Kasandra Cramer MD

## 2023-02-15 ENCOUNTER — OFFICE VISIT (OUTPATIENT)
Dept: ORTHOPEDIC SURGERY | Age: 43
End: 2023-02-15
Payer: MEDICAID

## 2023-02-15 VITALS — HEIGHT: 64 IN | BODY MASS INDEX: 45.58 KG/M2 | WEIGHT: 267 LBS

## 2023-02-15 DIAGNOSIS — Z09 SURGICAL FOLLOW-UP CARE: Primary | ICD-10-CM

## 2023-02-15 DIAGNOSIS — S82.852D CLOSED DISPLACED TRIMALLEOLAR FRACTURE OF LEFT ANKLE WITH ROUTINE HEALING, SUBSEQUENT ENCOUNTER: ICD-10-CM

## 2023-02-15 PROCEDURE — 99024 POSTOP FOLLOW-UP VISIT: CPT | Performed by: ORTHOPAEDIC SURGERY

## 2023-02-15 NOTE — LETTER
2/21/2023    Patient: Shelbie Leigh   YOB: 1980   Date of Visit: 2/15/2023     Mesha Ayon MD  [address] [place] [id number]  Via In Basket    Dear Mesha Ayon MD,      Thank you for referring Ms. Tad Savage to [facility] for evaluation. My notes for this consultation are attached. If you have questions, please do not hesitate to call me. I look forward to following your patient along with you.       Sincerely,    Lennox Suarez MD

## 2023-02-15 NOTE — PROGRESS NOTES
Dawit Hill (: 1980) is a 38 y.o. female, patient,here for evaluation of the following   Chief Complaint   Patient presents with    Surgical Follow-up        ASSESSMENT/PLAN:  Below is the assessment and plan developed based on review of pertinent history, physical exam, labs, studies, and medications. 1. Surgical follow-up care  -     REFERRAL TO PHYSICAL THERAPY  2. Closed displaced trimalleolar fracture of left ankle with routine healing, subsequent encounter  -     XR ANKLE LT MIN 3 V; Future  -     REFERRAL TO PHYSICAL THERAPY    Patient is doing well, she is informed the fracture is healing but is not completely healed, she does vape nicotine, and informed again that nicotine does delay healing. She is still at risk for complications such as loss of fixation, related to nonunion, so I recommend no weightbearing on it for an additional 2 to 3 weeks and will start physical therapy but mostly for range of motion and light strength exercise program.  She is in a boot and she will start some weightbearing maybe at 3 to 4 weeks from now. At which time she will return again in 4 weeks and we will get new x-rays of the left ankle 3 views nonweightbearing. Return in about 4 weeks (around 3/15/2023) for repeat xrays. Allergies   Allergen Reactions    Aspirin Other (comments)     Hot sweats and passed out; tolerated ibuprofen    Penicillins Other (comments)     Childhood. Does not remember reaction. Is not aware if she has ever taken cephalosporins in the past.    2019: Pt notes no problems with cephalosporins. Current Outpatient Medications   Medication Sig    ibuprofen (MOTRIN) 800 mg tablet Take 1 Tablet by mouth every eight (8) hours as needed for Pain. Indications: pain    cephALEXin (KEFLEX) 500 mg capsule Take 1 Capsule by mouth four (4) times daily.  Indications: post op    Latuda 20 mg tab tablet     venlafaxine-SR (EFFEXOR-XR) 150 mg capsule TAKE 1 CAPSULE ORALLY DAILY TAKE WITH 75MG VENLAFAXINE ER TABLET TO EQUAL A DAILY DOSE OF 225MG     No current facility-administered medications for this visit. Past Medical History:   Diagnosis Date    ADD (attention deficit disorder) [age]yo    Treated with Adderall since dx. 2013 dosing 20mg AM and 10mg PM.  Trial/change to Vyvanse Nov-Dec 2015--not as effective. Gynecologic disorder     History of miscarriages. History of Mirena IUD placed on 4/17/15    HX OTHER MEDICAL      -BU baby    HX OTHER MEDICAL     HPV positive    Idiopathic vulvodynia 2014    L1 vertebral fracture (HCC) 2017    [facility] imaging/admissoin: Mild acute two column compression fracture of L1 without evidence of retropulsion into the spinal canal.  Managed non-operatively. Migraines 2013    Neurological disorder     Pelvic pain in female 9/15/2014    2015 gyn laparoscopy/hysteroscopy with endometriosis worse right.     Psychiatric disorder     depression    Secondary dysmenorrhea 2014    With menorraghia    Seizures ([address]     never on meds--had appr 4-5 in a short amt of time and none since       Past Surgical History:   Procedure Laterality Date    ENDOMETRIAL CRYOABLATION      HX GYN  4/17/15    laparoscopy and hysteroscopy and IUD placement    HX HYSTERECTOMY  2016    Complete Hysterectomy       Family History   Problem Relation Age of Onset    Cancer Mother     Diabetes Mother     Alcohol abuse Father         and drug    Psychiatric Disorder Father     Cancer Father     Diabetes Maternal Grandmother     Hypertension Maternal Grandmother     Thyroid Disease Maternal Grandmother     Diabetes Maternal Grandfather     Hypertension Maternal Grandfather     Cancer Maternal Grandfather     Heart Disease Maternal Grandfather     Cancer Paternal Grandmother     Diabetes Paternal Grandmother        Social History     Socioeconomic History    Marital status: SINGLE     Spouse name: Not on file    Number of children: Not on file    Years of education: Not on file    Highest education level: Not on file   Occupational History    Not on file   Tobacco Use    Smoking status: Former     Packs/day: 1.00     Years: 18.00     Pack years: 18.00     Types: Cigarettes    Smokeless tobacco: Current    Tobacco comments:     Vapes   Vaping Use    Vaping Use: Never used   Substance and Sexual Activity    Alcohol use: Yes     Comment: 5 beers yesterday 22    Drug use: No    Sexual activity: Not Currently     Partners: Male     Birth control/protection: Surgical   Other Topics Concern     Service Not Asked    Blood Transfusions Not Asked    Caffeine Concern Not Asked    Occupational Exposure Not Asked    Hobby Hazards Not Asked    Sleep Concern Not Asked    Stress Concern Not Asked    Weight Concern Not Asked    Special Diet Not Asked    Back Care Not Asked    Exercise Not Asked    Bike Helmet Not Asked    Seat Belt Not Asked    Self-Exams Not Asked   Social History Narrative    ** Merged History Encounter **          Social Determinants of Health     Financial Resource Strain: Not on file   Food Insecurity: Not on file   Transportation Needs: Not on file   Physical Activity: Not on file   Stress: Not on file   Social Connections: Not on file   Intimate Partner Violence: Not on file   Housing Stability: Not on file           Vitals:  Ht 5' 4\" (1.626 m)   Wt 267 lb (121.1 kg)   LMP 2016   BMI 45.83 kg/m²    Body mass index is 45.83 kg/m². SUBJECTIVE:  Du Bradley (: 1980)   Patient returns today for reevaluation, she is status procedure of open reduction internal fixation of trimalleolar ankle fracture on 2023 for an injury sustained 2022. She is about 6 weeks out since date of surgery. She has been mostly nonweightbearing as recommended using a tall boot. He has no complaints today, overall she feels much better.         OBJECTIVE EXAM:     Visit Vitals  Ht 5' 4\" (1.626 m)   Wt 267 lb (121.1 kg)   LMP 03/14/2016   BMI 45.83 kg/m²       Appearance: Alert, well appearing and pleasant patient who is in no distress, oriented to person, place/time, and who follows commands. This patient is accompanied in the       office by her  self and mother. Psychiatric: Affect and mood are appropriate. No dementia noted on examination  Musculoskeletal:  LOCATION: Mild tenderness and swelling ankle - left      Integumentary: No rashes, skin patches, wounds, or abrasions to the right or left legs       Warm and normal color. No regions of expressible drainage. Gait: Normal      Tenderness: No tenderness        Motor/Strength/Tone Exam: Normal       Sensory Exam:   Intact Normal Sensation to ankle/foot      Stability Testing: No anterolateral or varus instability of the Ankle or Subtalar Joints               No peroneal tendon instability noted      ROM: Normal ROM noted to ankle/foot      Contractures: No Achilles or Gastrocnemius Contractures      Calf tenderness: Absent for calf or gastrocnemius muscle regions       Soft, supple, non tender, non taut lower extremity compartment  Alignment:      NEUTRAL Hindfoot,         none Metatarsus Adductus Metatarsus   Wounds/Abrasions:    None present  Extremities:   No embolic phenomena to the toes          No significant edema to the foot and or toes. Lower extremities are warm and appear well perfused    DVT: No evidence of DVT seen on examination at this time     No calf swelling, no tenderness to calf muscles  Lymphatic:  No Evidence of Lymphedema  Vascular: Medial Border of Tibia Region: Edema is not present         Pulses: Dorsalis Pedis &  Posterior Tibial Pulses : Palpable yes        Varicosities Lower Limbs :  None  noted  Neuro: Negative bilateral Straight leg raise (seated position)    See Musculoskeletal section for pertinent individual extremity examination    No abnormal hand/wrist, foot/ankle, or facial/neck tremors.     Lower Extremity/Ankle/Foot:  Nonweightbearing gait, limited weightbearing stance. Left lower extremity/ankle: Calves are soft nontender, ligaments are grossly stable, incisions are well-healed, she has mild tenderness and mild swelling surrounding the ankle status post surgery, no gross instabilities. Left foot: Nontender, no swelling, ligament stable. Normal exam.    Contralateral lower extremity: Skin intact without erythema or wounds. 2+ dorsalis pedis pulse. Toes are warm, and well-perfused. Sensation is intact to light touch in the DP, SP, sural, saphenous, and tibial nerve distributions. 5/5 strength in ankle dorsiflexion, plantarflexion, inversion, and eversion. Ankle range of motion is 10 degrees of dorsiflexion to 40 degrees of plantarflexion. Smooth and painless hindfoot and midfoot range of motion. No severe hallux, lesser toe malalignment or deformities, no pain with passive motion of lesser MTP joints, hallux MTP joint, no first TMT instability. Neurovascular exam is similar to contralateral lower extremity. Imaging:    XR Results (most recent):  Results from Appointment encounter on 02/15/23    XR ANKLE LT MIN 3 V    Narrative  Left ankle AP, lateral and oblique nonweightbearing x-rays show overall good alignment status post procedure, postsurgical changes noted, implants intact, fractures appear to be well aligned but no visible calluses are seen. No loosening of implants. Slight decreased bone density. An electronic signature was used to authenticate this note.   -- Godfrey Weiner MD

## 2023-02-22 ENCOUNTER — OFFICE VISIT (OUTPATIENT)
Dept: URGENT CARE | Age: 43
End: 2023-02-22
Payer: MEDICAID

## 2023-02-22 VITALS
DIASTOLIC BLOOD PRESSURE: 53 MMHG | TEMPERATURE: 97.6 F | RESPIRATION RATE: 16 BRPM | BODY MASS INDEX: 41.2 KG/M2 | WEIGHT: 240 LBS | OXYGEN SATURATION: 97 % | SYSTOLIC BLOOD PRESSURE: 109 MMHG | HEART RATE: 139 BPM

## 2023-02-22 DIAGNOSIS — F41.9 ACUTE ANXIETY: ICD-10-CM

## 2023-02-22 DIAGNOSIS — F41.0 PANIC ATTACK: Primary | ICD-10-CM

## 2023-02-22 PROCEDURE — 96372 THER/PROPH/DIAG INJ SC/IM: CPT | Performed by: FAMILY MEDICINE

## 2023-02-22 PROCEDURE — 99214 OFFICE O/P EST MOD 30 MIN: CPT | Performed by: FAMILY MEDICINE

## 2023-02-22 RX ORDER — DIPHENHYDRAMINE HYDROCHLORIDE 50 MG/ML
50 INJECTION, SOLUTION INTRAMUSCULAR; INTRAVENOUS ONCE
Status: COMPLETED | OUTPATIENT
Start: 2023-02-22 | End: 2023-02-22

## 2023-02-22 RX ORDER — HYDROXYZINE 25 MG/1
25 TABLET, FILM COATED ORAL
Qty: 15 TABLET | Refills: 0 | Status: ON HOLD | OUTPATIENT
Start: 2023-02-22

## 2023-02-22 RX ORDER — HYDROXYZINE 25 MG/1
25 TABLET, FILM COATED ORAL
Qty: 15 TABLET | Refills: 0 | Status: SHIPPED | OUTPATIENT
Start: 2023-02-22 | End: 2023-02-22 | Stop reason: SDUPTHER

## 2023-02-22 RX ORDER — PROPRANOLOL HYDROCHLORIDE 40 MG/1
40 TABLET ORAL 3 TIMES DAILY
Qty: 15 TABLET | Refills: 0 | Status: ON HOLD | OUTPATIENT
Start: 2023-02-22

## 2023-02-22 RX ORDER — PROPRANOLOL HYDROCHLORIDE 40 MG/1
40 TABLET ORAL 3 TIMES DAILY
Qty: 15 TABLET | Refills: 0 | Status: SHIPPED | OUTPATIENT
Start: 2023-02-22 | End: 2023-02-22 | Stop reason: SDUPTHER

## 2023-02-22 RX ADMIN — DIPHENHYDRAMINE HYDROCHLORIDE 50 MG: 50 INJECTION, SOLUTION INTRAMUSCULAR; INTRAVENOUS at 19:54

## 2023-02-23 NOTE — PROGRESS NOTES
Panic Attack  This is a new problem. The current episode started less than 1 hour ago. The problem occurs constantly. The problem has not changed since onset. Associated symptoms include chest pain. Pertinent negatives include no headaches and no shortness of breath. Associated symptoms comments: Palpitation - feels veery anxious and shaky  Her dog has  past Friday - she was thinking about and  into panic attack . Nothing relieves the symptoms. She has tried nothing for the symptoms. Past Medical History:   Diagnosis Date    ADD (attention deficit disorder) 19-17yo    Treated with Adderall since dx. 2013 dosing 20mg AM and 10mg PM.  Trial/change to Vyvanse Nov-Dec 2015--not as effective. Gynecologic disorder     History of miscarriages. History of Mirena IUD placed on 4/17/15    HX OTHER MEDICAL      -BUFA baby    HX OTHER MEDICAL     HPV positive    Idiopathic vulvodynia 2014    L1 vertebral fracture (HCC) 2017    [facility] imaging/admissoin: Mild acute two column compression fracture of L1 without evidence of retropulsion into the spinal canal.  Managed non-operatively. Migraines 2013    Neurological disorder     Pelvic pain in female 9/15/2014    2015 gyn laparoscopy/hysteroscopy with endometriosis worse right.     Psychiatric disorder     depression    Secondary dysmenorrhea 2014    With menorraghia    Seizures ([facility] [address]     never on meds--had appr 4-5 in a short amt of time and none since        Past Surgical History:   Procedure Laterality Date    ENDOMETRIAL CRYOABLATION      HX GYN  4/17/15    laparoscopy and hysteroscopy and IUD placement    HX HYSTERECTOMY  2016    Complete Hysterectomy         Family History   Problem Relation Age of Onset    Cancer Mother     Diabetes Mother     Alcohol abuse Father         and drug    Psychiatric Disorder Father     Cancer Father     Diabetes Maternal Grandmother     Hypertension Maternal Grandmother Thyroid Disease Maternal Grandmother     Diabetes Maternal Grandfather     Hypertension Maternal Grandfather     Cancer Maternal Grandfather     Heart Disease Maternal Grandfather     Cancer Paternal Grandmother     Diabetes Paternal Grandmother         Social History     Socioeconomic History    Marital status: SINGLE     Spouse name: Not on file    Number of children: Not on file    Years of education: Not on file    Highest education level: Not on file   Occupational History    Not on file   Tobacco Use    Smoking status: Former     Packs/day: 1.00     Years: 18.00     Pack years: 18.00     Types: Cigarettes    Smokeless tobacco: Current    Tobacco comments:     Vapes   Vaping Use    Vaping Use: Never used   Substance and Sexual Activity    Alcohol use: Yes     Comment: 5 beers yesterday 7/20/22    Drug use: No    Sexual activity: Not Currently     Partners: Male     Birth control/protection: Surgical   Other Topics Concern     Service Not Asked    Blood Transfusions Not Asked    Caffeine Concern Not Asked    Occupational Exposure Not Asked    Hobby Hazards Not Asked    Sleep Concern Not Asked    Stress Concern Not Asked    Weight Concern Not Asked    Special Diet Not Asked    Back Care Not Asked    Exercise Not Asked    Bike Helmet Not Asked    Seat Belt Not Asked    Self-Exams Not Asked   Social History Narrative    ** Merged History Encounter **          Social Determinants of Health     Financial Resource Strain: Not on file   Food Insecurity: Not on file   Transportation Needs: Not on file   Physical Activity: Not on file   Stress: Not on file   Social Connections: Not on file   Intimate Partner Violence: Not on file   Housing Stability: Not on file                ALLERGIES: Aspirin and Penicillins    Review of Systems   Constitutional:  Positive for chills. Negative for fever. Respiratory:  Negative for chest tightness and shortness of breath.     Cardiovascular:  Positive for chest pain and palpitations. Neurological:  Positive for tremors, light-headedness and numbness. Negative for headaches. Psychiatric/Behavioral:  Positive for sleep disturbance. Negative for self-injury and suicidal ideas. The patient is nervous/anxious. All other systems reviewed and are negative. Vitals:    02/22/23 1910   BP: (!) 109/53   Pulse: (!) 139   Resp: 16   Temp: 97.6 °F (36.4 °C)   SpO2: 97%   Weight: 240 lb (108.9 kg)       Physical Exam  Vitals and nursing note reviewed. Constitutional:       General: She is not in acute distress. Appearance: Normal appearance. She is not ill-appearing. HENT:      Head: Normocephalic. Nose: No congestion or rhinorrhea. Mouth/Throat:      Pharynx: No oropharyngeal exudate or posterior oropharyngeal erythema. Eyes:      Extraocular Movements: Extraocular movements intact. Conjunctiva/sclera: Conjunctivae normal.      Pupils: Pupils are equal, round, and reactive to light. Cardiovascular:      Rate and Rhythm: Regular rhythm. Tachycardia present. Pulses: Normal pulses. Heart sounds: Normal heart sounds. Pulmonary:      Effort: Pulmonary effort is normal.      Breath sounds: Normal breath sounds. No rhonchi or rales. Abdominal:      General: Abdomen is flat. Bowel sounds are normal.      Palpations: Abdomen is soft. Tenderness: There is no abdominal tenderness. Musculoskeletal:      Cervical back: Normal range of motion and neck supple. Comments: Las walking boot in left leg for fracture    Skin:     Findings: No rash. Neurological:      General: No focal deficit present. Mental Status: She is alert and oriented to person, place, and time. Motor: No weakness. Psychiatric:         Attention and Perception: Attention and perception normal.         Mood and Affect: Mood is anxious. Affect is tearful.          Speech: Speech normal.         Behavior: Behavior normal.         Cognition and Memory: Cognition normal. MDM    Procedures      ICD-10-CM ICD-9-CM    1. Panic attack  F41.0 300.01 EKG, 12 LEAD, INITIAL      diphenhydrAMINE (BENADRYL) injection 50 mg      2. Acute anxiety  F41.9 300.00         Breath in paper bag to prevent hyperventilation     Take Propranolol tonight   Start Hydroxyzine 25 mg  from tomorrow     If sxs worsen go to ED     Medications Ordered Today   Medications    diphenhydrAMINE (BENADRYL) injection 50 mg    DISCONTD: propranoloL (INDERAL) 40 mg tablet     Sig: Take 1 Tablet by mouth three (3) times daily. Dispense:  15 Tablet     Refill:  0    DISCONTD: hydrOXYzine HCL (ATARAX) 25 mg tablet     Sig: Take 1 Tablet by mouth every eight (8) hours as needed for Anxiety or Sleep. Dispense:  15 Tablet     Refill:  0    hydrOXYzine HCL (ATARAX) 25 mg tablet     Sig: Take 1 Tablet by mouth every eight (8) hours as needed for Anxiety or Sleep. Dispense:  15 Tablet     Refill:  0    propranoloL (INDERAL) 40 mg tablet     Sig: Take 1 Tablet by mouth three (3) times daily. Dispense:  15 Tablet     Refill:  0     No results found for any visits on 02/22/23. The patients condition was discussed with the patient and they understand. The patient is to follow up with primary care doctor. If signs and symptoms become worse the pt is to go to the ER. The patient is to take medications as prescribed.

## 2023-02-23 NOTE — PATIENT INSTRUCTIONS
Breath in paper bag to prevent hyperventilation     Take Propranolol tonight     Start Hydroxyzine 25 mg  from tomorrow

## 2023-02-24 ENCOUNTER — HOSPITAL ENCOUNTER (INPATIENT)
Age: 43
LOS: 16 days | Discharge: PSYCHIATRIC OR SUBSTANCE USE FACILITY | DRG: 773 | End: 2023-03-13
Attending: EMERGENCY MEDICINE | Admitting: STUDENT IN AN ORGANIZED HEALTH CARE EDUCATION/TRAINING PROGRAM
Payer: MEDICAID

## 2023-02-24 DIAGNOSIS — J96.01 ACUTE RESPIRATORY FAILURE WITH HYPOXIA (HCC): Primary | ICD-10-CM

## 2023-02-24 DIAGNOSIS — R00.0 TACHYCARDIA: ICD-10-CM

## 2023-02-24 DIAGNOSIS — R45.851 SUICIDAL IDEATION: ICD-10-CM

## 2023-02-24 DIAGNOSIS — F10.939 ALCOHOL WITHDRAWAL SYNDROME WITH COMPLICATION (HCC): ICD-10-CM

## 2023-02-24 DIAGNOSIS — F11.93 OPIOID WITHDRAWAL (HCC): ICD-10-CM

## 2023-02-24 PROCEDURE — 74011636637 HC RX REV CODE- 636/637: Performed by: EMERGENCY MEDICINE

## 2023-02-24 PROCEDURE — 99285 EMERGENCY DEPT VISIT HI MDM: CPT

## 2023-02-24 RX ORDER — BUPRENORPHINE AND NALOXONE 8; 2 MG/1; MG/1
1 FILM, SOLUBLE BUCCAL; SUBLINGUAL
Status: COMPLETED | OUTPATIENT
Start: 2023-02-24 | End: 2023-02-24

## 2023-02-24 RX ORDER — ONDANSETRON 2 MG/ML
4 INJECTION INTRAMUSCULAR; INTRAVENOUS ONCE
Status: COMPLETED | OUTPATIENT
Start: 2023-02-24 | End: 2023-02-25

## 2023-02-24 RX ORDER — KETOROLAC TROMETHAMINE 30 MG/ML
15 INJECTION, SOLUTION INTRAMUSCULAR; INTRAVENOUS
Status: COMPLETED | OUTPATIENT
Start: 2023-02-24 | End: 2023-02-25

## 2023-02-24 RX ADMIN — BUPRENORPHINE AND NALOXONE 1 FILM: 8; 2 FILM BUCCAL; SUBLINGUAL at 23:52

## 2023-02-24 NOTE — Clinical Note
Status[de-identified] INPATIENT [101]   Type of Bed: Stepdown [17]   Cardiac Monitoring Required?: Yes   Inpatient Hospitalization Certified Necessary for the Following Reasons: 9. Other (further clarification in H&P documentation)   Admitting Diagnosis: Alcohol withdrawal ([facility] [address] [291.81. ICD-9-CM]   Admitting Diagnosis: Opioid withdrawal [facility]) [[id number]]   Admitting Diagnosis: Tachycardia [621065]   Admitting Physician: Traci Ramirez [[id number]]   Attending Physician: Pantera Albright   Estimated Length of Stay: 2 Midnights   Discharge Plan[de-identified] Home with Office Follow-up

## 2023-02-25 ENCOUNTER — APPOINTMENT (OUTPATIENT)
Dept: GENERAL RADIOLOGY | Age: 43
DRG: 773 | End: 2023-02-25
Attending: EMERGENCY MEDICINE
Payer: MEDICAID

## 2023-02-25 ENCOUNTER — APPOINTMENT (OUTPATIENT)
Dept: CT IMAGING | Age: 43
DRG: 773 | End: 2023-02-25
Attending: EMERGENCY MEDICINE
Payer: MEDICAID

## 2023-02-25 PROBLEM — R00.0 TACHYCARDIA: Status: ACTIVE | Noted: 2023-02-25

## 2023-02-25 PROBLEM — F10.939 ALCOHOL WITHDRAWAL (HCC): Status: ACTIVE | Noted: 2023-02-25

## 2023-02-25 PROBLEM — F11.93 OPIOID WITHDRAWAL (HCC): Status: ACTIVE | Noted: 2023-02-25

## 2023-02-25 LAB
AMMONIA PLAS-SCNC: 22 UMOL/L
AMPHET UR QL SCN: NEGATIVE
ANION GAP SERPL CALC-SCNC: 3 MMOL/L (ref 5–15)
APPEARANCE UR: ABNORMAL
ARTERIAL PATENCY WRIST A: POSITIVE
BACTERIA URNS QL MICRO: ABNORMAL /HPF
BARBITURATES UR QL SCN: NEGATIVE
BASE DEFICIT BLD-SCNC: 2.4 MMOL/L
BASOPHILS # BLD: 0 K/UL (ref 0–0.1)
BASOPHILS NFR BLD: 0 % (ref 0–1)
BDY SITE: ABNORMAL
BENZODIAZ UR QL: NEGATIVE
BILIRUB UR QL: NEGATIVE
BNP SERPL-MCNC: 18 PG/ML
BUN SERPL-MCNC: 12 MG/DL (ref 6–20)
BUN/CREAT SERPL: 17 (ref 12–20)
CALCIUM SERPL-MCNC: 8.2 MG/DL (ref 8.5–10.1)
CANNABINOIDS UR QL SCN: NEGATIVE
CHLORIDE SERPL-SCNC: 110 MMOL/L (ref 97–108)
CO2 SERPL-SCNC: 27 MMOL/L (ref 21–32)
COCAINE UR QL SCN: NEGATIVE
COLOR UR: ABNORMAL
CREAT SERPL-MCNC: 0.69 MG/DL (ref 0.55–1.02)
DIFFERENTIAL METHOD BLD: ABNORMAL
DRUG SCRN COMMENT,DRGCM: NORMAL
EOSINOPHIL # BLD: 0 K/UL (ref 0–0.4)
EOSINOPHIL NFR BLD: 0 % (ref 0–7)
EPITH CASTS URNS QL MICRO: ABNORMAL /LPF
ERYTHROCYTE [DISTWIDTH] IN BLOOD BY AUTOMATED COUNT: 13.6 % (ref 11.5–14.5)
ETHANOL SERPL-MCNC: 127 MG/DL
FLUAV RNA SPEC QL NAA+PROBE: NOT DETECTED
FLUBV RNA SPEC QL NAA+PROBE: NOT DETECTED
GAS FLOW.O2 O2 DELIVERY SYS: ABNORMAL
GLUCOSE SERPL-MCNC: 120 MG/DL (ref 65–100)
GLUCOSE UR STRIP.AUTO-MCNC: NEGATIVE MG/DL
HCO3 BLD-SCNC: 25 MMOL/L (ref 22–26)
HCT VFR BLD AUTO: 38.6 % (ref 35–47)
HGB BLD-MCNC: 12.5 G/DL (ref 11.5–16)
HGB UR QL STRIP: NEGATIVE
IMM GRANULOCYTES # BLD AUTO: 0 K/UL (ref 0–0.04)
IMM GRANULOCYTES NFR BLD AUTO: 0 % (ref 0–0.5)
KETONES UR QL STRIP.AUTO: NEGATIVE MG/DL
LEUKOCYTE ESTERASE UR QL STRIP.AUTO: NEGATIVE
LYMPHOCYTES # BLD: 3.8 K/UL (ref 0.8–3.5)
LYMPHOCYTES NFR BLD: 33 % (ref 12–49)
MCH RBC QN AUTO: 29.4 PG (ref 26–34)
MCHC RBC AUTO-ENTMCNC: 32.4 G/DL (ref 30–36.5)
MCV RBC AUTO: 90.8 FL (ref 80–99)
METHADONE UR QL: NEGATIVE
MONOCYTES # BLD: 0.8 K/UL (ref 0–1)
MONOCYTES NFR BLD: 7 % (ref 5–13)
NEUTS BAND NFR BLD MANUAL: 2 %
NEUTS SEG # BLD: 7 K/UL (ref 1.8–8)
NEUTS SEG NFR BLD: 58 % (ref 32–75)
NITRITE UR QL STRIP.AUTO: NEGATIVE
NRBC # BLD: 0.02 K/UL (ref 0–0.01)
NRBC BLD-RTO: 0.2 PER 100 WBC
OPIATES UR QL: NEGATIVE
PCO2 BLD: 53.6 MMHG (ref 35–45)
PCP UR QL: NEGATIVE
PH BLD: 7.28 (ref 7.35–7.45)
PH UR STRIP: 5.5 (ref 5–8)
PLATELET # BLD AUTO: 356 K/UL (ref 150–400)
PMV BLD AUTO: 9.7 FL (ref 8.9–12.9)
PO2 BLD: 70 MMHG (ref 80–100)
POTASSIUM SERPL-SCNC: 3.7 MMOL/L (ref 3.5–5.1)
PROT UR STRIP-MCNC: NEGATIVE MG/DL
RBC # BLD AUTO: 4.25 M/UL (ref 3.8–5.2)
RBC #/AREA URNS HPF: ABNORMAL /HPF (ref 0–5)
RBC MORPH BLD: ABNORMAL
SAO2 % BLD: 91 % (ref 92–97)
SARS-COV-2 RDRP RESP QL NAA+PROBE: NOT DETECTED
SARS-COV-2 RNA RESP QL NAA+PROBE: NOT DETECTED
SODIUM SERPL-SCNC: 140 MMOL/L (ref 136–145)
SOURCE, COVRS: NORMAL
SP GR UR REFRACTOMETRY: 1.01
SPECIMEN TYPE: ABNORMAL
TROPONIN I SERPL HS-MCNC: <4 NG/L (ref 0–51)
UA: UC IF INDICATED,UAUC: ABNORMAL
UROBILINOGEN UR QL STRIP.AUTO: 0.2 EU/DL (ref 0.2–1)
WBC # BLD AUTO: 11.6 K/UL (ref 3.6–11)
WBC URNS QL MICRO: ABNORMAL /HPF (ref 0–4)

## 2023-02-25 PROCEDURE — 36415 COLL VENOUS BLD VENIPUNCTURE: CPT

## 2023-02-25 PROCEDURE — 96361 HYDRATE IV INFUSION ADD-ON: CPT

## 2023-02-25 PROCEDURE — 36600 WITHDRAWAL OF ARTERIAL BLOOD: CPT

## 2023-02-25 PROCEDURE — 93005 ELECTROCARDIOGRAM TRACING: CPT

## 2023-02-25 PROCEDURE — 74011250636 HC RX REV CODE- 250/636: Performed by: STUDENT IN AN ORGANIZED HEALTH CARE EDUCATION/TRAINING PROGRAM

## 2023-02-25 PROCEDURE — 74011000636 HC RX REV CODE- 636: Performed by: EMERGENCY MEDICINE

## 2023-02-25 PROCEDURE — 74011250636 HC RX REV CODE- 250/636: Performed by: EMERGENCY MEDICINE

## 2023-02-25 PROCEDURE — 71275 CT ANGIOGRAPHY CHEST: CPT

## 2023-02-25 PROCEDURE — 85025 COMPLETE CBC W/AUTO DIFF WBC: CPT

## 2023-02-25 PROCEDURE — 74011000250 HC RX REV CODE- 250: Performed by: EMERGENCY MEDICINE

## 2023-02-25 PROCEDURE — 71045 X-RAY EXAM CHEST 1 VIEW: CPT

## 2023-02-25 PROCEDURE — 80307 DRUG TEST PRSMV CHEM ANLYZR: CPT

## 2023-02-25 PROCEDURE — 82140 ASSAY OF AMMONIA: CPT

## 2023-02-25 PROCEDURE — 84484 ASSAY OF TROPONIN QUANT: CPT

## 2023-02-25 PROCEDURE — 74011250636 HC RX REV CODE- 250/636: Performed by: NURSE PRACTITIONER

## 2023-02-25 PROCEDURE — 74011000250 HC RX REV CODE- 250: Performed by: NURSE PRACTITIONER

## 2023-02-25 PROCEDURE — 87635 SARS-COV-2 COVID-19 AMP PRB: CPT

## 2023-02-25 PROCEDURE — 81001 URINALYSIS AUTO W/SCOPE: CPT

## 2023-02-25 PROCEDURE — 96374 THER/PROPH/DIAG INJ IV PUSH: CPT

## 2023-02-25 PROCEDURE — 96375 TX/PRO/DX INJ NEW DRUG ADDON: CPT

## 2023-02-25 PROCEDURE — 65660000001 HC RM ICU INTERMED STEPDOWN

## 2023-02-25 PROCEDURE — 74011250637 HC RX REV CODE- 250/637: Performed by: NURSE PRACTITIONER

## 2023-02-25 PROCEDURE — 83880 ASSAY OF NATRIURETIC PEPTIDE: CPT

## 2023-02-25 PROCEDURE — 82077 ASSAY SPEC XCP UR&BREATH IA: CPT

## 2023-02-25 PROCEDURE — 87636 SARSCOV2 & INF A&B AMP PRB: CPT

## 2023-02-25 PROCEDURE — 80048 BASIC METABOLIC PNL TOTAL CA: CPT

## 2023-02-25 PROCEDURE — 82803 BLOOD GASES ANY COMBINATION: CPT

## 2023-02-25 RX ORDER — HYDRALAZINE HYDROCHLORIDE 20 MG/ML
10 INJECTION INTRAMUSCULAR; INTRAVENOUS
Status: DISCONTINUED | OUTPATIENT
Start: 2023-02-25 | End: 2023-03-13 | Stop reason: HOSPADM

## 2023-02-25 RX ORDER — ENOXAPARIN SODIUM 100 MG/ML
40 INJECTION SUBCUTANEOUS EVERY 24 HOURS
Status: DISCONTINUED | OUTPATIENT
Start: 2023-02-25 | End: 2023-02-25

## 2023-02-25 RX ORDER — ENOXAPARIN SODIUM 100 MG/ML
30 INJECTION SUBCUTANEOUS EVERY 12 HOURS
Status: DISCONTINUED | OUTPATIENT
Start: 2023-02-25 | End: 2023-03-13 | Stop reason: HOSPADM

## 2023-02-25 RX ORDER — IPRATROPIUM BROMIDE AND ALBUTEROL SULFATE 2.5; .5 MG/3ML; MG/3ML
3 SOLUTION RESPIRATORY (INHALATION)
Status: COMPLETED | OUTPATIENT
Start: 2023-02-25 | End: 2023-02-25

## 2023-02-25 RX ORDER — METOPROLOL TARTRATE 5 MG/5ML
2.5 INJECTION INTRAVENOUS ONCE
Status: COMPLETED | OUTPATIENT
Start: 2023-02-25 | End: 2023-02-25

## 2023-02-25 RX ORDER — PROPRANOLOL HYDROCHLORIDE 10 MG/1
40 TABLET ORAL 3 TIMES DAILY
Status: DISCONTINUED | OUTPATIENT
Start: 2023-02-25 | End: 2023-03-13 | Stop reason: HOSPADM

## 2023-02-25 RX ORDER — VENLAFAXINE HYDROCHLORIDE 150 MG/1
150 CAPSULE, EXTENDED RELEASE ORAL
Status: DISCONTINUED | OUTPATIENT
Start: 2023-02-25 | End: 2023-02-27

## 2023-02-25 RX ORDER — LORAZEPAM 2 MG/ML
1 INJECTION INTRAMUSCULAR ONCE
Status: COMPLETED | OUTPATIENT
Start: 2023-02-25 | End: 2023-02-25

## 2023-02-25 RX ORDER — THERA TABS 400 MCG
1 TAB ORAL DAILY
Status: DISCONTINUED | OUTPATIENT
Start: 2023-02-25 | End: 2023-03-13 | Stop reason: HOSPADM

## 2023-02-25 RX ORDER — LORAZEPAM 2 MG/ML
2 INJECTION INTRAMUSCULAR
Status: DISCONTINUED | OUTPATIENT
Start: 2023-02-25 | End: 2023-02-25

## 2023-02-25 RX ORDER — IPRATROPIUM BROMIDE AND ALBUTEROL SULFATE 2.5; .5 MG/3ML; MG/3ML
3 SOLUTION RESPIRATORY (INHALATION)
Status: DISCONTINUED | OUTPATIENT
Start: 2023-02-25 | End: 2023-03-13 | Stop reason: HOSPADM

## 2023-02-25 RX ORDER — ACETAMINOPHEN 325 MG/1
650 TABLET ORAL
Status: DISCONTINUED | OUTPATIENT
Start: 2023-02-25 | End: 2023-03-13 | Stop reason: HOSPADM

## 2023-02-25 RX ORDER — ACETAMINOPHEN 650 MG/1
650 SUPPOSITORY RECTAL
Status: DISCONTINUED | OUTPATIENT
Start: 2023-02-25 | End: 2023-03-13 | Stop reason: HOSPADM

## 2023-02-25 RX ORDER — METOPROLOL TARTRATE 5 MG/5ML
2.5 INJECTION INTRAVENOUS
Status: DISCONTINUED | OUTPATIENT
Start: 2023-02-25 | End: 2023-03-13 | Stop reason: HOSPADM

## 2023-02-25 RX ORDER — TRAMADOL HYDROCHLORIDE 50 MG/1
50 TABLET ORAL
Status: DISCONTINUED | OUTPATIENT
Start: 2023-02-25 | End: 2023-03-04

## 2023-02-25 RX ORDER — HYDROXYZINE 25 MG/1
25 TABLET, FILM COATED ORAL
Status: DISCONTINUED | OUTPATIENT
Start: 2023-02-25 | End: 2023-02-26

## 2023-02-25 RX ORDER — LANOLIN ALCOHOL/MO/W.PET/CERES
100 CREAM (GRAM) TOPICAL DAILY
Status: DISCONTINUED | OUTPATIENT
Start: 2023-02-25 | End: 2023-02-25 | Stop reason: CLARIF

## 2023-02-25 RX ORDER — LORAZEPAM 2 MG/ML
4 INJECTION INTRAMUSCULAR
Status: DISCONTINUED | OUTPATIENT
Start: 2023-02-25 | End: 2023-02-25

## 2023-02-25 RX ORDER — ONDANSETRON 2 MG/ML
4 INJECTION INTRAMUSCULAR; INTRAVENOUS
Status: DISCONTINUED | OUTPATIENT
Start: 2023-02-25 | End: 2023-03-13 | Stop reason: HOSPADM

## 2023-02-25 RX ORDER — ONDANSETRON 4 MG/1
4 TABLET, ORALLY DISINTEGRATING ORAL
Status: DISCONTINUED | OUTPATIENT
Start: 2023-02-25 | End: 2023-03-13 | Stop reason: HOSPADM

## 2023-02-25 RX ORDER — NALOXONE HYDROCHLORIDE 0.4 MG/ML
INJECTION, SOLUTION INTRAMUSCULAR; INTRAVENOUS; SUBCUTANEOUS
Status: DISCONTINUED
Start: 2023-02-25 | End: 2023-02-25 | Stop reason: WASHOUT

## 2023-02-25 RX ORDER — VENLAFAXINE HYDROCHLORIDE 37.5 MG/1
75 CAPSULE, EXTENDED RELEASE ORAL
Status: DISCONTINUED | OUTPATIENT
Start: 2023-02-25 | End: 2023-02-27

## 2023-02-25 RX ORDER — SODIUM CHLORIDE 9 MG/ML
125 INJECTION, SOLUTION INTRAVENOUS CONTINUOUS
Status: DISCONTINUED | OUTPATIENT
Start: 2023-02-25 | End: 2023-03-06

## 2023-02-25 RX ORDER — POLYETHYLENE GLYCOL 3350 17 G/17G
17 POWDER, FOR SOLUTION ORAL DAILY PRN
Status: DISCONTINUED | OUTPATIENT
Start: 2023-02-25 | End: 2023-03-13 | Stop reason: HOSPADM

## 2023-02-25 RX ORDER — ASPIRIN 325 MG/1
100 TABLET, FILM COATED ORAL DAILY
Status: DISCONTINUED | OUTPATIENT
Start: 2023-02-25 | End: 2023-03-13 | Stop reason: HOSPADM

## 2023-02-25 RX ORDER — FLUMAZENIL 0.1 MG/ML
0.3 INJECTION INTRAVENOUS
Status: COMPLETED | OUTPATIENT
Start: 2023-02-25 | End: 2023-02-25

## 2023-02-25 RX ORDER — SCOLOPAMINE TRANSDERMAL SYSTEM 1 MG/1
1 PATCH, EXTENDED RELEASE TRANSDERMAL
Status: DISCONTINUED | OUTPATIENT
Start: 2023-02-25 | End: 2023-03-08

## 2023-02-25 RX ORDER — NALOXONE HYDROCHLORIDE 0.4 MG/ML
0.4 INJECTION, SOLUTION INTRAMUSCULAR; INTRAVENOUS; SUBCUTANEOUS ONCE
Status: DISCONTINUED | OUTPATIENT
Start: 2023-02-25 | End: 2023-02-25

## 2023-02-25 RX ORDER — AMOXICILLIN 250 MG
1 CAPSULE ORAL
Status: DISCONTINUED | OUTPATIENT
Start: 2023-02-25 | End: 2023-03-13 | Stop reason: HOSPADM

## 2023-02-25 RX ADMIN — METOPROLOL TARTRATE 2.5 MG: 5 INJECTION, SOLUTION INTRAVENOUS at 09:26

## 2023-02-25 RX ADMIN — SODIUM CHLORIDE 100 ML/HR: 9 INJECTION, SOLUTION INTRAVENOUS at 20:17

## 2023-02-25 RX ADMIN — ENOXAPARIN SODIUM 30 MG: 100 INJECTION SUBCUTANEOUS at 22:37

## 2023-02-25 RX ADMIN — ENOXAPARIN SODIUM 30 MG: 100 INJECTION SUBCUTANEOUS at 10:04

## 2023-02-25 RX ADMIN — HYDROXYZINE HYDROCHLORIDE 25 MG: 25 TABLET, FILM COATED ORAL at 20:17

## 2023-02-25 RX ADMIN — ONDANSETRON 4 MG: 2 INJECTION INTRAMUSCULAR; INTRAVENOUS at 00:03

## 2023-02-25 RX ADMIN — LORAZEPAM 4 MG: 2 INJECTION INTRAMUSCULAR; INTRAVENOUS at 08:33

## 2023-02-25 RX ADMIN — ONDANSETRON 4 MG: 2 INJECTION INTRAMUSCULAR; INTRAVENOUS at 08:34

## 2023-02-25 RX ADMIN — IPRATROPIUM BROMIDE AND ALBUTEROL SULFATE 3 ML: 2.5; .5 SOLUTION RESPIRATORY (INHALATION) at 08:41

## 2023-02-25 RX ADMIN — SODIUM CHLORIDE 1000 ML: 9 INJECTION, SOLUTION INTRAVENOUS at 05:15

## 2023-02-25 RX ADMIN — ONDANSETRON 4 MG: 2 INJECTION INTRAMUSCULAR; INTRAVENOUS at 22:52

## 2023-02-25 RX ADMIN — ACETAMINOPHEN 325MG 650 MG: 325 TABLET ORAL at 20:17

## 2023-02-25 RX ADMIN — ONDANSETRON 4 MG: 2 INJECTION INTRAMUSCULAR; INTRAVENOUS at 14:13

## 2023-02-25 RX ADMIN — LORAZEPAM 1 MG: 2 INJECTION INTRAMUSCULAR; INTRAVENOUS at 04:47

## 2023-02-25 RX ADMIN — KETOROLAC TROMETHAMINE 15 MG: 30 INJECTION, SOLUTION INTRAMUSCULAR; INTRAVENOUS at 00:02

## 2023-02-25 RX ADMIN — IOPAMIDOL 100 ML: 755 INJECTION, SOLUTION INTRAVENOUS at 06:52

## 2023-02-25 RX ADMIN — SODIUM CHLORIDE 1000 ML: 9 INJECTION, SOLUTION INTRAVENOUS at 09:28

## 2023-02-25 RX ADMIN — SODIUM CHLORIDE 100 ML/HR: 9 INJECTION, SOLUTION INTRAVENOUS at 08:39

## 2023-02-25 RX ADMIN — SODIUM CHLORIDE 1000 ML: 9 INJECTION, SOLUTION INTRAVENOUS at 00:16

## 2023-02-25 RX ADMIN — FLUMAZENIL 0.3 MG: 0.1 INJECTION INTRAVENOUS at 09:20

## 2023-02-25 NOTE — PROGRESS NOTES
End of Shift Note    Bedside shift change report given to RN (oncoming nurse) by Brittney Rojas RN (offgoing nurse). Report included the following information SBAR, Kardex, ED Summary, Procedure Summary, Intake/Output, MAR, Recent Results, Med Rec Status, and Cardiac Rhythm Sinus Tachy    Shift worked: [id number]     Shift summary and any significant changes:     Pt came up from ED, got orders to make them a true 1:1 until psych can come evaluate. Pt is lethargic and hard to wake up continues to be NPO and unable to complete suicide screening and admission database at this time. Ammonia drawn     Concerns for physician to address:       Zone phone for oncoming shift:          Activity:  Activity Level: Bed Rest  Number times ambulated in hallways past shift: 0  Number of times OOB to chair past shift: 0    Cardiac:   Cardiac Monitoring: Yes      Cardiac Rhythm: Sinus Rhythm    Access:  Current line(s): PIV     Genitourinary:   Urinary status: voiding, incontinent, and external catheter    Respiratory:   O2 Device: Nasal cannula  Chronic home O2 use?: NO  Incentive spirometer at bedside: NO       GI:  Last Bowel Movement Date:  (PTA)  Current diet:  DIET NPO  Passing flatus: YES  Tolerating current diet: YES       Pain Management:   Patient states pain is manageable on current regimen: YES    Skin:  Elijah Score: 18  Interventions: turn team, float heels, increase time out of bed, and limit briefs    Patient Safety:  Fall Score:  Total Score: 4  Interventions: bed/chair alarm, assistive device (walker, cane, etc), gripper socks, pt to call before getting OOB, and stay with me (per policy)  High Fall Risk: Yes    Length of Stay:  Expected LOS: - - -  Actual LOS: 0      Brittney Rojas RN

## 2023-02-25 NOTE — ED NOTES
Intensivist at bedside. ICU Intensivist states pt is stable enough to go to stepdown. Per NP Julian Pesa will be discontinued. Per Intensivist and NP Vesta Bowels will be continued to monitor, and if CIWA continues to increase, will contact NP Cora Elise. At this time pt is considered a [place] drinker. \" Pt is slowly becoming less lethargic. Pt is answering questions intermittently. Pt received PIV Flumazenil, and Metoprolol per NP Cora Elise verbal requests. Pt is still having stridor/wheezing to upper airway. Intensivist states this is due to pt having JAIME and due to patients alcohol withdrawal. RT performed ABG at bedside. Per Intensivist place pt on 4L NC. Will keep pt NPO at this time until pt becomes more alert. Will continue to perform CIWA every 2 hrs until stable, then every 4 hrs per protocol.

## 2023-02-25 NOTE — PROGRESS NOTES
Patient observed resting on stretcher with eyes closed. Slowly arouses when name is called oriented to person, place and time. Patient quickly goes back to resting with eyes closed. No acute distress noted. Sinus tachycardia and blood pressure improved with IV hydration and medication therapy. No additional lorazepam, continue to monitor CIWA. Psych has been consulted.

## 2023-02-25 NOTE — ED NOTES
TRANSFER - IN REPORT:    Verbal report received from [place], RN and Miley Hernandez RN (name) on Hien Bob. Report consisted of patients Situation, Background, Assessment and   Recommendations(SBAR). Information from the following report(s) SBAR and ED Summary was reviewed with the receiving nurse. Opportunity for questions and clarification was provided. Pt in [address] in [facility], remains on NC 3 l/min. Pt reporting last drink of alcohol around 2200 2/24/2022, last dose prescribed oxycodone \"a few days ago\". Hospitalist Teagan Served for orders     0815 NP at bedside    0840 Pt medicated per orders,  some upper airway wheezing/stridor noted but lungs clear     0900 Pt more lethargic and drowsy while answering questions    0910 Report given to [facility].     4601 Respiratory called for stat abg

## 2023-02-25 NOTE — ED PROVIDER NOTES
[facility] EMERGENCY DEPT  EMERGENCY DEPARTMENT ENCOUNTER       Pt Name: Jay Chamberlain  MRN: [id number]  [place] 1980  Date of evaluation: 2/24/2023  Provider: Andrea Dyson MD   PCP: Inez Farnsworth MD  Note Started: 10:53 PM 2/24/23     CHIEF COMPLAINT       Chief Complaint   Patient presents with    Suicidal     Pt arrive via EMS with suicidal ideations. Pt states she  is having suicidal thoughts and called psych doctor to tell him that her psych medications were not working and was advised to come here. Pt does not have plan and verbalizes she does not plan on acting on thoughts. pt states she has been drinking Vodka and pt states she is addicted to pain pills. HISTORY OF PRESENT ILLNESS: 1 or more elements      History From: Patient, History limited by: No limitations     Jay Chamberlain is a 38 y.o. female who presents with chief complaint of depression, anxiety, suicidal ideation. Symptoms have been worsening over the past few weeks. She states that her dog recently passed away and this worsened her long history of depression and anxiety. She states that she is now starting to have thoughts of harming herself but she denies an active plan. She has not had suicidal attempts in the past but states that she always seeks help when her depression gets to this point so that she does not start to act on her thoughts. She has had recent psychiatric inpatient admissions most recently 8 months ago, endorses compliance with her medications including Effexor and Latuda. Additionally patient endorses history of opioid abuse after orthopedic injury. She recently had right foot injury and was prescribed opiates and endorses relapse into her opioid addiction. She ran out of opioid medication about 2 days ago and endorses withdrawal symptoms including body aches, diaphoresis, nausea and is asking for help with this.   Because she ran out of her opioid medication she has started drinking Kvng" over the past 2 days, last drink was 4 hours ago. Denies any other drug use. Nursing Notes were all reviewed and agreed with or any disagreements were addressed in the HPI. REVIEW OF SYSTEMS        Positives and Pertinent negatives as per HPI. PAST HISTORY     Past Medical History:  Past Medical History:   Diagnosis Date    ADD (attention deficit disorder) [age]yo    Treated with Adderall since dx. 2013 dosing 20mg AM and 10mg PM.  Trial/change to Vyvanse Nov-Dec 2015--not as effective. Gynecologic disorder     History of miscarriages. History of Mirena IUD placed on 4/17/15    HX OTHER MEDICAL      -BUFA baby    HX OTHER MEDICAL     HPV positive    Idiopathic vulvodynia 2014    L1 vertebral fracture (HCC) 2017    [facility] imaging/admissoin: Mild acute two column compression fracture of L1 without evidence of retropulsion into the spinal canal.  Managed non-operatively. Migraines 2013    Neurological disorder     Pelvic pain in female 9/15/2014    2015 gyn laparoscopy/hysteroscopy with endometriosis worse right.     Psychiatric disorder     depression    Secondary dysmenorrhea 2014    With menorraghia    Seizures ([facility] [address]     never on meds--had appr 4-5 in a short amt of time and none since       Past Surgical History:  Past Surgical History:   Procedure Laterality Date    ENDOMETRIAL CRYOABLATION      HX GYN  4/17/15    laparoscopy and hysteroscopy and IUD placement    HX HYSTERECTOMY  2016    Complete Hysterectomy       Family History:  Family History   Problem Relation Age of Onset    Cancer Mother     Diabetes Mother     Alcohol abuse Father         and drug    Psychiatric Disorder Father     Cancer Father     Diabetes Maternal Grandmother     Hypertension Maternal Grandmother     Thyroid Disease Maternal Grandmother     Diabetes Maternal Grandfather     Hypertension Maternal Grandfather     Cancer Maternal Grandfather     Heart Disease Maternal Grandfather     Cancer Paternal Grandmother     Diabetes Paternal Grandmother        Social History:  Social History     Tobacco Use    Smoking status: Former     Packs/day: 1.00     Years: 18.00     Pack years: 18.00     Types: Cigarettes    Smokeless tobacco: Current    Tobacco comments:     Vapes   Vaping Use    Vaping Use: Never used   Substance Use Topics    Alcohol use: Yes     Comment: 5 beers yesterday 7/20/22    Drug use: No       Allergies: Allergies   Allergen Reactions    Aspirin Other (comments)     Hot sweats and passed out; tolerated ibuprofen    Penicillins Other (comments)     Childhood. Does not remember reaction. Is not aware if she has ever taken cephalosporins in the past.    Jan 2019: Pt notes no problems with cephalosporins. CURRENT MEDICATIONS      Previous Medications    CEPHALEXIN (KEFLEX) 500 MG CAPSULE    Take 1 Capsule by mouth four (4) times daily. Indications: post op    HYDROXYZINE HCL (ATARAX) 25 MG TABLET    Take 1 Tablet by mouth every eight (8) hours as needed for Anxiety or Sleep. IBUPROFEN (MOTRIN) 800 MG TABLET    Take 1 Tablet by mouth every eight (8) hours as needed for Pain. Indications: pain    LATUDA 20 MG TAB TABLET        PROPRANOLOL (INDERAL) 40 MG TABLET    Take 1 Tablet by mouth three (3) times daily. VENLAFAXINE-SR (EFFEXOR-XR) 150 MG CAPSULE    TAKE 1 CAPSULE ORALLY DAILY TAKE WITH 75MG VENLAFAXINE ER TABLET TO EQUAL A DAILY DOSE OF 225MG       SCREENINGS               No data recorded         PHYSICAL EXAM      ED Triage Vitals [02/24/23 2151]   ED Encounter Vitals Group      /76      Pulse (Heart Rate) (!) 123      Resp Rate 18      Temp 98.2 °F (36.8 °C)      Temp src       O2 Sat (%) 96 %      Weight       Height         Physical Exam  Vitals and nursing note reviewed. Constitutional:       General: She is not in acute distress. Appearance: Normal appearance. She is not ill-appearing.    Cardiovascular:      Rate and Rhythm: Regular rhythm. Tachycardia present. Heart sounds: No murmur heard. Pulmonary:      Effort: Pulmonary effort is normal. No respiratory distress. Breath sounds: Normal breath sounds. Abdominal:      General: Abdomen is flat. There is no distension. Tenderness: There is no abdominal tenderness. Neurological:      General: No focal deficit present. Mental Status: She is alert and oriented to person, place, and time. Psychiatric:      Comments: Tearful        DIAGNOSTIC RESULTS   LABS:     Recent Results (from the past 12 hour(s))   ETHYL ALCOHOL    Collection Time: 02/25/23  1:22 AM   Result Value Ref Range    ALCOHOL(ETHYL),SERUM 127 (H) <10 MG/DL   CBC WITH AUTOMATED DIFF    Collection Time: 02/25/23  1:23 AM   Result Value Ref Range    WBC 11.6 (H) 3.6 - 11.0 K/uL    RBC 4.25 3.80 - 5.20 M/uL    HGB 12.5 11.5 - 16.0 g/dL    HCT 38.6 35.0 - 47.0 %    MCV 90.8 80.0 - 99.0 FL    MCH 29.4 26.0 - 34.0 PG    MCHC 32.4 30.0 - 36.5 g/dL    RDW 13.6 11.5 - 14.5 %    PLATELET 851 960 - 033 K/uL    MPV 9.7 8.9 - 12.9 FL    NRBC 0.2 (H) 0  WBC    ABSOLUTE NRBC 0.02 (H) 0.00 - 0.01 K/uL    NEUTROPHILS 58 32 - 75 %    BAND NEUTROPHILS 2 %    LYMPHOCYTES 33 12 - 49 %    MONOCYTES 7 5 - 13 %    EOSINOPHILS 0 0 - 7 %    BASOPHILS 0 0 - 1 %    IMMATURE GRANULOCYTES 0 0.0 - 0.5 %    ABS. NEUTROPHILS 7.0 1.8 - 8.0 K/UL    ABS. LYMPHOCYTES 3.8 (H) 0.8 - 3.5 K/UL    ABS. MONOCYTES 0.8 0.0 - 1.0 K/UL    ABS. EOSINOPHILS 0.0 0.0 - 0.4 K/UL    ABS. BASOPHILS 0.0 0.0 - 0.1 K/UL    ABS. IMM.  GRANS. 0.0 0.00 - 0.04 K/UL    DF MANUAL      RBC COMMENTS NORMOCYTIC, NORMOCHROMIC     DRUG SCREEN, URINE    Collection Time: 02/25/23  1:45 AM   Result Value Ref Range    AMPHETAMINES Negative NEG      BARBITURATES Negative NEG      BENZODIAZEPINES Negative NEG      COCAINE Negative NEG      METHADONE Negative NEG      OPIATES Negative NEG      PCP(PHENCYCLIDINE) Negative NEG      THC (TH-CANNABINOL) Negative NEG      Drug screen comment (NOTE)    COVID-19 RAPID TEST    Collection Time: 02/25/23  1:45 AM   Result Value Ref Range    Specimen source Nasopharyngeal      COVID-19 rapid test Not detected NOTD     METABOLIC PANEL, BASIC    Collection Time: 02/25/23  3:15 AM   Result Value Ref Range    Sodium 140 136 - 145 mmol/L    Potassium 3.7 3.5 - 5.1 mmol/L    Chloride 110 (H) 97 - 108 mmol/L    CO2 27 21 - 32 mmol/L    Anion gap 3 (L) 5 - 15 mmol/L    Glucose 120 (H) 65 - 100 mg/dL    BUN 12 6 - 20 MG/DL    Creatinine 0.69 0.55 - 1.02 MG/DL    BUN/Creatinine ratio 17 12 - 20      eGFR >60 >60 ml/min/1.73m2    Calcium 8.2 (L) 8.5 - 10.1 MG/DL   COVID-19 WITH INFLUENZA A/B    Collection Time: 02/25/23  4:58 AM   Result Value Ref Range    SARS-CoV-2 by PCR Not detected NOTD      Influenza A by PCR Not detected NOTD      Influenza B by PCR Not detected NOTD     TROPONIN-HIGH SENSITIVITY    Collection Time: 02/25/23  6:26 AM   Result Value Ref Range    Troponin-High Sensitivity <4 0 - 51 ng/L   NT-PRO BNP    Collection Time: 02/25/23  6:26 AM   Result Value Ref Range    NT pro-BNP 18 <125 PG/ML        EKG: If performed, independent interpretation documented below in the MDM section     RADIOLOGY:  Non-plain film images such as CT, Ultrasound and MRI are read by the radiologist. Plain radiographic images are visualized and preliminarily interpreted by the ED Provider with the findings documented in the MDM section. Interpretation per the Radiologist below, if available at the time of this note:     CTA CHEST W OR W WO CONT    Result Date: 2/25/2023  INDICATION:  sob, hypoxia - r/o PE EXAM:  CTA Chest with contrast for Pulmonary Embolus COMPARISON:  None TECHNIQUE:  Following the uneventful intravenous administration of IV contrast thin helical axial images were obtained through the chest. 3D image postprocessing was performed.   CT dose reduction was achieved through use of a standardized protocol tailored for this examination and automatic exposure control for dose modulation. FINDINGS: THYROID: Unremarkable. MEDIASTINUM/JEREMY: No mass or lymphadenopathy. HEART/PERICARDIUM: Unremarkable. Coronary artery calcification:  1 absent. AORTA:  No aneurysm. PULMONARY ARTERIES:No pulmonary embolism. LUNGS/PLEURA: No pulmonary edema, pleural effusion or focal airspace disease. Bibasilar atelectasis. INCIDENTALLY IMAGED UPPER ABDOMEN: Hepatic steatosis. BONES: No destructive bone lesion. ADDITIONAL COMMENTS:  N/A     No acute process. XR CHEST PORT    Result Date: 2/25/2023  INDICATION: hypoxia EXAM:  AP CHEST RADIOGRAPH COMPARISON: June 8, 2022 FINDINGS: AP portable view of the chest demonstrates a normal cardiomediastinal silhouette. There is no edema, effusion, consolidation, or pneumothorax. The osseous structures are unremarkable. No acute process. PROCEDURES   Unless otherwise noted below, none  Procedures     CRITICAL CARE TIME   I have spent 40 minutes of critical care time in evaluating and treating this patient. This includes time spent at bedside, time with family and decision makers, documentation, review of labs and imaging, and/or consultation with specialists. It does not include time spent on separately billed procedures. This patient presents with a critical illness or injury that acutely impairs one or more vital organ systems such that there is a high probability of imminent or life threatening deterioration in the patient's condition. This case involved decision making of high complexity to assess, manipulate, and support vital organ system failure and/or to prevent further life threatening deterioration of the patient's condition. Failure to initiate these interventions on an urgent basis would likely result in sudden, clinically significant or life threatening deterioration in the patient's condition. This case required my direct attention, intervention, and personal management for the time documented above. This time does not include separately billed interventions/procedures which are separately documented. Abnormal findings supporting critical care: Acute hypoxic respiratory failure, persistent tachycardia with concern for acute alcohol withdrawal syndrome  Interventions to support critical care: Administration of IV lorazepam, initiation and titration of nasal cannula O2, frequent reassessment, administration of 2 L of fluid bolus for ongoing tachycardia. Failure to intervene may result in: Worsening hypoxia, respiratory failure, loss of airway, worsening alcohol withdrawal, seizures. EMERGENCY DEPARTMENT COURSE and DIFFERENTIAL DIAGNOSIS/MDM   Vitals:    Vitals:    02/25/23 0009 02/25/23 0413 02/25/23 0453 02/25/23 0724   BP:  122/70  127/81   Pulse: 93 (!) 116  (!) 125   Resp:  20  20   Temp:  97.9 °F (36.6 °C)  97.9 °F (36.6 °C)   SpO2:  (!) 88% 93% 95%        Patient was given the following medications:  Medications   albuterol-ipratropium (DUO-NEB) 2.5 MG-0.5 MG/3 ML (has no administration in time range)   sodium chloride 0.9 % bolus infusion 1,000 mL (0 mL IntraVENous IV Completed 2/25/23 0328)   ketorolac (TORADOL) injection 15 mg (15 mg IntraVENous Given 2/25/23 0002)   buprenorphine-naloxone (SUBOXONE) 8mg-2mg SL film (1 Film SubLINGual Given 2/24/23 0962)   ondansetron (ZOFRAN) injection 4 mg (4 mg IntraVENous Given 2/25/23 0003)   LORazepam (ATIVAN) injection 1 mg (1 mg IntraVENous Given 2/25/23 0447)   sodium chloride 0.9 % bolus infusion 1,000 mL (0 mL IntraVENous IV Completed 2/25/23 0615)   iopamidoL (ISOVUE-370) 370 mg iodine /mL (76 %) injection 100 mL (100 mL IntraVENous Given 2/25/23 9623)       Medical Decision Making  Amount and/or Complexity of Data Reviewed  External Data Reviewed: notes. Details: Urgent Care records from 2/22/23  Labs: ordered. Decision-making details documented in ED Course. Radiology: ordered and independent interpretation performed.  Decision-making details documented in ED Course. Discussion of management or test interpretation with external provider(s): Dr David Alonso, Hospitalist  [facility] [place]    Risk  Prescription drug management. Parenteral controlled substances. Decision regarding hospitalization. Diagnosis or treatment significantly limited by social determinants of health. Risk Details: Ongoing drug abuse, alcohol abuse    Critical Care  Total time providing critical care: 30-74 minutes    46-year-old female presenting to the emergency department with complaints of opioid withdrawal, alcohol abuse, and worsening depression, anxiety, and suicidal ideation over the past week. Endorses heavy alcohol use over the past 2 days. States that she does not have a plan but wanted to seek help before she developed a plan and started to act on it. She is afebrile and vital signs stable, noted to be tachycardic upon arrival, may be related to dehydration versus alcohol intoxication versus electrolyte abnormality. I will check basic blood work and reassess vital signs. Will consult BSMART. In addition I will treat opioid withdrawal with IV Toradol, IV fluids, IV Zofran, and sublingual Suboxone. Patient initially medically cleared for inpatient psychiatric admission, however on reassessment noted to be persistently tachycardic despite administration of 2 L IV fluid bolus, she becomes tachycardic to 150 bpm with minimal exertion. Unclear etiology, consider dehydration but less likely after fluid bolus administration. Consider alcohol withdrawal syndrome. Patient also noted to be hypoxic with unclear etiology, lungs are clear to auscultation. Given recent orthopedic injury consider PE which would also explain tachycardia, I will obtain CTPA chest.  Patient will require medical admission for further evaluation as still requiring 3 L nasal cannula O2, can be admitted to psych unit after medical clearance if that is still the plan.     Chest x-ray per my independent interpretation no acute process such as acute infiltrate or pneumothorax, confirmed by radiologist.      ED Course as of 02/25/23 0737   Fri Feb 24, 2023   2220 I personally reviewed urgent care records from 2/22/2023 during which time patient presented with anxiety, panic attack, likely caused by dog passing away this past week. [AK]   Sat Feb 25, 2023   0227 [facility] has evaluated patient, plans to admit voluntarily. [AK]      ED Course User Index  [AK] Janneth Shaikh MD       FINAL IMPRESSION     1. Acute respiratory failure with hypoxia (HCC)    2. Opioid withdrawal ([address]    3. Suicidal ideation    4. Tachycardia    5. Alcohol withdrawal syndrome with complication [facility])          DISPOSITION/PLAN     Admission Note:  Patient is being admitted to the hospital by Dr. Kamla Freedman, Service: Hospitalist.  The results of their tests and reasons for their admission have been discussed with them and available family. They convey agreement and understanding for the need to be admitted and for their admission diagnosis. CLINICAL IMPRESSION    1. Acute respiratory failure with hypoxia (HCC)    2. Opioid withdrawal ([address]    3. Suicidal ideation    4. Tachycardia    5. Alcohol withdrawal syndrome with complication ([address]         DISPOSITION  Admit        I am the Primary Clinician of Record. Sonya Pugh MD (electronically signed)    (Please note that parts of this dictation were completed with voice recognition software. Quite often unanticipated grammatical, syntax, homophones, and other interpretive errors are inadvertently transcribed by the computer software. Please disregards these errors.  Please excuse any errors that have escaped final proofreading.)

## 2023-02-25 NOTE — PROGRESS NOTES
Pharmacy - Enoxaparin (Lovenox®) Monitoring      Indication: DVT proph     Current Dose: Enoxaparin 30 mg subcutaneously every 12 hours    Creatinine Clearance (mL/min): 129    Current Weight: 108.9 Kg    Labs:  Recent Labs     02/25/23  0315 02/25/23  0123   CREA 0.69  --    HGB  --  12.5   PLT  --  356     Wt Readings from Last 1 Encounters:   02/22/23 108.9 kg (240 lb)     Ht Readings from Last 1 Encounters:   02/15/23 162.6 cm (64\")       COVID-19 related labs (anticoagulation):   Recent Labs     06/14/22  0535   DDIMSQ <0.19       Impression/Plan:   Change dose from 40 mg q24h to 30 mg q12h for body weight of 108.9 kg        Thanks, Danielle Ahumada, [facility]      [email or web address]. [email or web address]

## 2023-02-25 NOTE — PROGRESS NOTES
Primary nurse in ED informed that patient was lethargic, slow to respond with tachypnea. Arrived and noted patient resting on stretcher with eyes closed. Slowly opens eyes when name is called. Slow to respond to verbal command. Lethargic. Vital signs 165/110, , R 30 patient noted to be pale in color. Given Romazicon to reverse Lorazepam. ABG reviewed which reveals respiratory acidosis no NIPPV required. 9:25AM Dr. Michell Beltran at bedside to evaluate. Patient remains lethargic however is oriented to person and place. Respirations have improved. No distress noted. Patient is stable for stepdown at this time per intensivist. Mother Guzman Mitchell updated regarding daughter's condition.

## 2023-02-25 NOTE — PROGRESS NOTES
Patient observed resting in bed with eyes closed. Arouses when name is called. Complains of headache and nausea. Denies chest pain or shortness of breath. No acute distress noted. Additional lab work and medications ordered.

## 2023-02-25 NOTE — H&P
History and Physical    Patient: Shaista Narayan MRN: [id number]  SSN: [id number]    YOB: 1980  Age: 38 y.o. Sex: female      Subjective:      Shaista Narayan is a 38 y.o. female who presented to the emergency room with suicidal ideations. Reported to emergency room physician that she was having suicidal thoughts and called the psychiatric physician to inform them that her medications were not working and was instructed to report to the emergency room. Patient observed resting on stretcher with eyes closed. Arouses when name is called. Expresses no SI or HI at present and no plan. Denies chest pain, shortness of breath, palpitations, dizziness or distress. Patient is diaphoretic otherwise no distress noted. Recently lost pet and started drinking vodka since yesterday. Nursing reports that she has been drinking a quart of vodka for the last 4-5 days. Also states that she recently fractured her left ankle and became addicted to her pain medications. Past medical history of ADD, migraines, depression, and seizures. Reports experiencing approximately 4-5 seizures in the past and has not experienced any since. Past Medical History:   Diagnosis Date    ADD (attention deficit disorder) 19-19yo    Treated with Adderall since dx. 2013 dosing 20mg AM and 10mg PM.  Trial/change to Vyvanse Nov-Dec 2015--not as effective. Gynecologic disorder     History of miscarriages. History of Mirena IUD placed on 4/17/15    HX OTHER MEDICAL      -BUFA baby    HX OTHER MEDICAL     HPV positive    Idiopathic vulvodynia 2014    L1 vertebral fracture (HCC) 2017    [facility] imaging/admissoin: Mild acute two column compression fracture of L1 without evidence of retropulsion into the spinal canal.  Managed non-operatively. Migraines 2013    Neurological disorder     Pelvic pain in female 9/15/2014    2015 gyn laparoscopy/hysteroscopy with endometriosis worse right. Psychiatric disorder     depression    Secondary dysmenorrhea 8/12/2014    With menorraghia    Seizures ([facility] [address] 2008    never on meds--had appr 4-5 in a short amt of time and none since     Past Surgical History:   Procedure Laterality Date    ENDOMETRIAL CRYOABLATION      HX GYN  4/17/15    laparoscopy and hysteroscopy and IUD placement    HX HYSTERECTOMY  04/04/2016    Complete Hysterectomy      Family History   Problem Relation Age of Onset    Cancer Mother     Diabetes Mother     Alcohol abuse Father         and drug    Psychiatric Disorder Father     Cancer Father     Diabetes Maternal Grandmother     Hypertension Maternal Grandmother     Thyroid Disease Maternal Grandmother     Diabetes Maternal Grandfather     Hypertension Maternal Grandfather     Cancer Maternal Grandfather     Heart Disease Maternal Grandfather     Cancer Paternal Grandmother     Diabetes Paternal Grandmother      Social History     Tobacco Use    Smoking status: Former     Packs/day: 1.00     Years: 18.00     Pack years: 18.00     Types: Cigarettes    Smokeless tobacco: Current    Tobacco comments:     Vapes   Substance Use Topics    Alcohol use: Yes     Comment: 5 beers yesterday 7/20/22      Prior to Admission medications    Medication Sig Start Date End Date Taking? Authorizing Provider   hydrOXYzine HCL (ATARAX) 25 mg tablet Take 1 Tablet by mouth every eight (8) hours as needed for Anxiety or Sleep. 2/22/23   Jeanette Wu MD   propranoloL (INDERAL) 40 mg tablet Take 1 Tablet by mouth three (3) times daily. 2/22/23   Jeanette Wu MD   ibuprofen (MOTRIN) 800 mg tablet Take 1 Tablet by mouth every eight (8) hours as needed for Pain. Indications: pain 1/11/23   Nael Rodriguez MD   cephALEXin [facility]) 500 mg capsule Take 1 Capsule by mouth four (4) times daily.  Indications: post op  Patient not taking: Reported on 2/22/2023 1/4/23   Nael Rodriguez MD   Latuda 20 mg tab tablet  1/1/23   Provider, Historical   venlafaxine-SR (EFFEXOR-XR) 150 mg capsule TAKE 1 CAPSULE ORALLY DAILY TAKE WITH 75MG VENLAFAXINE ER TABLET TO EQUAL A DAILY DOSE OF 225MG 11/7/22   Provider, Historical        Allergies   Allergen Reactions    Aspirin Other (comments)     Hot sweats and passed out; tolerated ibuprofen    Penicillins Other (comments)     Childhood. Does not remember reaction. Is not aware if she has ever taken cephalosporins in the past.    Jan 2019: Pt notes no problems with cephalosporins. Review of Systems:  Review of Systems   Constitutional:  Positive for diaphoresis and malaise/fatigue. HENT: Negative. Eyes: Negative. Respiratory: Negative. Cardiovascular: Negative. Gastrointestinal: Negative. Genitourinary: Negative. Musculoskeletal: Negative. Skin: Negative. Neurological: Negative. Endo/Heme/Allergies: Negative. Psychiatric/Behavioral: Negative. Objective:     Vitals:    02/25/23 0009 02/25/23 0413 02/25/23 0453 02/25/23 0724   BP:  122/70  127/81   Pulse: 93 (!) 116  (!) 125   Resp:  20  20   Temp:  97.9 °F (36.6 °C)  97.9 °F (36.6 °C)   SpO2:  (!) 88% 93% 95%        Physical Exam:  Physical Exam  Vitals reviewed. HENT:      Head: Normocephalic and atraumatic. Right Ear: External ear normal.      Left Ear: External ear normal.      Nose: Nose normal.      Mouth/Throat:      Pharynx: Oropharynx is clear. Eyes:      Conjunctiva/sclera: Conjunctivae normal.   Cardiovascular:      Rate and Rhythm: Tachycardia present. Pulses: Normal pulses. Heart sounds: Normal heart sounds. Comments: Telemetry: sinus tachycardia. Pulmonary:      Effort: Pulmonary effort is normal.      Breath sounds: Normal breath sounds. Abdominal:      General: Bowel sounds are normal.   Musculoskeletal:         General: Normal range of motion. Cervical back: Normal range of motion. Comments: Limited ROM to left foot and ankle secondary to fracture. Boot on.    Skin:     General: Skin is warm and dry. Capillary Refill: Capillary refill takes 2 to 3 seconds. Neurological:      Mental Status: She is alert and oriented to person, place, and time. Psychiatric:      Comments: depressed        Recent Results (from the past 24 hour(s))   ETHYL ALCOHOL    Collection Time: 02/25/23  1:22 AM   Result Value Ref Range    ALCOHOL(ETHYL),SERUM 127 (H) <10 MG/DL   CBC WITH AUTOMATED DIFF    Collection Time: 02/25/23  1:23 AM   Result Value Ref Range    WBC 11.6 (H) 3.6 - 11.0 K/uL    RBC 4.25 3.80 - 5.20 M/uL    HGB 12.5 11.5 - 16.0 g/dL    HCT 38.6 35.0 - 47.0 %    MCV 90.8 80.0 - 99.0 FL    MCH 29.4 26.0 - 34.0 PG    MCHC 32.4 30.0 - 36.5 g/dL    RDW 13.6 11.5 - 14.5 %    PLATELET 143 425 - 619 K/uL    MPV 9.7 8.9 - 12.9 FL    NRBC 0.2 (H) 0  WBC    ABSOLUTE NRBC 0.02 (H) 0.00 - 0.01 K/uL    NEUTROPHILS 58 32 - 75 %    BAND NEUTROPHILS 2 %    LYMPHOCYTES 33 12 - 49 %    MONOCYTES 7 5 - 13 %    EOSINOPHILS 0 0 - 7 %    BASOPHILS 0 0 - 1 %    IMMATURE GRANULOCYTES 0 0.0 - 0.5 %    ABS. NEUTROPHILS 7.0 1.8 - 8.0 K/UL    ABS. LYMPHOCYTES 3.8 (H) 0.8 - 3.5 K/UL    ABS. MONOCYTES 0.8 0.0 - 1.0 K/UL    ABS. EOSINOPHILS 0.0 0.0 - 0.4 K/UL    ABS. BASOPHILS 0.0 0.0 - 0.1 K/UL    ABS. IMM.  GRANS. 0.0 0.00 - 0.04 K/UL    DF MANUAL      RBC COMMENTS NORMOCYTIC, NORMOCHROMIC     DRUG SCREEN, URINE    Collection Time: 02/25/23  1:45 AM   Result Value Ref Range    AMPHETAMINES Negative NEG      BARBITURATES Negative NEG      BENZODIAZEPINES Negative NEG      COCAINE Negative NEG      METHADONE Negative NEG      OPIATES Negative NEG      PCP(PHENCYCLIDINE) Negative NEG      THC (TH-CANNABINOL) Negative NEG      Drug screen comment (NOTE)    COVID-19 RAPID TEST    Collection Time: 02/25/23  1:45 AM   Result Value Ref Range    Specimen source Nasopharyngeal      COVID-19 rapid test Not detected NOTD     METABOLIC PANEL, BASIC    Collection Time: 02/25/23  3:15 AM   Result Value Ref Range    Sodium 140 136 - 145 mmol/L    Potassium 3.7 3.5 - 5.1 mmol/L    Chloride 110 (H) 97 - 108 mmol/L    CO2 27 21 - 32 mmol/L    Anion gap 3 (L) 5 - 15 mmol/L    Glucose 120 (H) 65 - 100 mg/dL    BUN 12 6 - 20 MG/DL    Creatinine 0.69 0.55 - 1.02 MG/DL    BUN/Creatinine ratio 17 12 - 20      eGFR >60 >60 ml/min/1.73m2    Calcium 8.2 (L) 8.5 - 10.1 MG/DL   COVID-19 WITH INFLUENZA A/B    Collection Time: 02/25/23  4:58 AM   Result Value Ref Range    SARS-CoV-2 by PCR Not detected NOTD      Influenza A by PCR Not detected NOTD      Influenza B by PCR Not detected NOTD     TROPONIN-HIGH SENSITIVITY    Collection Time: 02/25/23  6:26 AM   Result Value Ref Range    Troponin-High Sensitivity <4 0 - 51 ng/L   NT-PRO BNP    Collection Time: 02/25/23  6:26 AM   Result Value Ref Range    NT pro-BNP 18 <125 PG/ML   EKG, 12 LEAD, INITIAL    Collection Time: 02/25/23  8:09 AM   Result Value Ref Range    Ventricular Rate 119 BPM    Atrial Rate 119 BPM    P-R Interval 120 ms    QRS Duration 82 ms    Q-T Interval 332 ms    QTC Calculation (Bezet) 467 ms    Calculated P Axis 32 degrees    Calculated R Axis 24 degrees    Calculated T Axis 31 degrees    Diagnosis       Sinus tachycardia  Low voltage QRS  When compared with ECG of 18-JUL-2022 11:07,  No significant change was found         XR Results (maximum last 3): Results from [place] encounter on 02/24/23    XR CHEST PORT    Narrative  INDICATION: hypoxia    EXAM:  AP CHEST RADIOGRAPH    COMPARISON: June 8, 2022    FINDINGS:    AP portable view of the chest demonstrates a normal cardiomediastinal  silhouette. There is no edema, effusion, consolidation, or pneumothorax. The  osseous structures are unremarkable. Impression  No acute process.       Results from Appointment encounter on 02/15/23    XR ANKLE LT MIN 3 V    Narrative  Left ankle AP, lateral and oblique nonweightbearing x-rays show overall good alignment status post procedure, postsurgical changes noted, implants intact, fractures appear to be well aligned but no visible calluses are seen. No loosening of implants. Slight decreased bone density. Results from Appointment encounter on 01/18/23    XR ANKLE LT MIN 3 V    Narrative  Left ankle AP, lateral and oblique x-rays show satisfactory alignment status post surgical procedure with retained implants at the medial malleolus and lateral malleolus, all the screws and plates are intact. There is a normal ankle mortise with normal joint space. Fractures healing not complete. Postsurgical changes noted. CT Results (maximum last 3): Results from Hospital Encounter encounter on 02/24/23    CTA CHEST W OR W WO CONT    Narrative  INDICATION:  sob, hypoxia - r/o PE    EXAM:  CTA Chest with contrast for Pulmonary Embolus    COMPARISON:  None    TECHNIQUE:  Following the uneventful intravenous administration of IV contrast  thin helical axial images were obtained through the chest. 3D image  postprocessing was performed. CT dose reduction was achieved through use of a  standardized protocol tailored for this examination and automatic exposure  control for dose modulation. FINDINGS:    THYROID: Unremarkable. MEDIASTINUM/JEREMY: No mass or lymphadenopathy. HEART/PERICARDIUM: Unremarkable. Coronary artery calcification:  1 absent. AORTA:  No aneurysm. PULMONARY ARTERIES:No pulmonary embolism. LUNGS/PLEURA: No pulmonary edema, pleural effusion or focal airspace disease. Bibasilar atelectasis. INCIDENTALLY IMAGED UPPER ABDOMEN: Hepatic steatosis. BONES: No destructive bone lesion. ADDITIONAL COMMENTS:  N/A    Impression  No acute process. Results from [place] encounter on 12/24/22    CT ABD PELV WO CONT    Narrative  CLINICAL HISTORY: fall in bathtub, lower back pain L4-L5 h/o previous  compression fx  INDICATION: fall in bathtub, lower back pain L4-L5 h/o previous compression fx  COMPARISON: 7/21/2022  CONTRAST:  None.     TECHNIQUE:  Thin axial images were obtained through the abdomen and pelvis. Coronal and  sagittal reformats were generated. Oral contrast was not administered. CT dose  reduction was achieved through use of a standardized protocol tailored for this  examination and automatic exposure control for dose modulation. The absence of intravenous contrast material reduces the sensitivity for  evaluation of visceral organs and vasculature including presence of small mass  lesions, hemodynamically significant stenoses, dissections, mucosal  abnormalities etc.    FINDINGS:  LOWER THORAX: No significant abnormality in the incidentally imaged lower chest.  LIVER/GALLBLADDER: Hepatic steatosis . Gallbladder is within normal limits. CBD  is not dilated. SPLEEN/PANCREAS:  within normal limits. ADRENALS/KIDNEYS: Unremarkable. No calculus or hydronephrosis. STOMACH: Unremarkable. SMALL BOWEL/COLON: No dilatation or wall thickening. Colonic diverticulosis. APPENDIX: Unremarkable. PERITONEUM: No ascites or pneumoperitoneum. RETROPERITONEUM: No lymphadenopathy or aortic aneurysm. REPRODUCTIVE ORGANS:  URINARY BLADDER: No mass or calculus. BONES: Chronic Schmorl's node along the superior endplate of L1 and N58. ADDITIONAL COMMENTS: N/A    Impression  No acute intraperitoneal process is identified. There is no evidence of acute fracture or dislocation. Incidental and/or nonemergent findings are as described in detail above. Results from [place] encounter on 12/08/22    CT SPINE LUMB WO CONT    Narrative  EXAM:  CT LUMBAR SPINE WITHOUT  CONTRAST    INDICATION: has known lumbar compression fx. fell in shower today now with acute  pain in low back. COMPARISON: None. CONTRAST:  None. TECHNIQUE: Multislice helical CT of the lumbar spine was performed without  intravenous contrast administration. Coronal and sagittal reformats were  generated.   CT dose reduction was achieved through use of a standardized  protocol tailored for this examination and automatic exposure control for dose  modulation. FINDINGS:    The alignment is within normal limits. There is no acute fracture or compression  deformity. Chronic mild compression fracture of L1 is noted. Schmorl's node is  evident at T12. The paravertebral soft tissues are within normal limits. Lower thoracic spine: No herniation or stenosis. L1-L2: There is no spinal canal or neural foraminal stenosis. L2-L3: Degenerative disc disease is noted at this level with central  degenerative bulging disc without substantial spinal stenosis. L3-L4: Degenerative disc disease is noted at this level with central  degenerative bulging disc but no substantial spinal stenosis. L4-L5: There is no spinal canal or neural foraminal stenosis. L5-S1: There is no spinal canal or neural foraminal stenosis. Impression  Chronic mild compression fracture L1. Multilevel lumbar degenerative disc  disease without acute fracture. MRI Results (maximum last 3): Results from [place] encounter on 01/12/10    MRI BRAIN W AND W/O CONTRAST    Narrative  Final Report      ICD Codes / Adm. Diagnosis:    /   EPILEPSY  Examination:  BRAIN W AND WO CON  - [id number] - Jan 12 2010  9:44AM  Accession No:  [id number]  Reason:  EPILEPSY      REPORT:    ADDITIONAL HISTORY: Seizures. .    COMPARISON:  None  . CONTRAST: 14 mL Magnevist.    PULSE SEQUENCES: Sagittal T1-weighted images, axial T1-weighted images pre-  and postgadolinium, coronal T1-weighted images postgadolinium, axial FLAIR  and T2 star weighted images, axial diffusion weighted echo planar images,  axial ADC mapping. FINDINGS: The brain parenchyma and ventricular system are unremarkable in  appearance for age. No parenchymal mass or hemorrhage and no shift of  midline structures. No extra-axial fluid or blood collection. No signal or  contrast abnormality. The craniocervical junction is normal for age, as are  other midline structures.  Large  vessels appear patent on spin-echo imaging. There is no acute or subacute ischemia on diffusion weighting  . There is no  obvious migrational abnormality. IMPRESSION:  1. Unremarkable contrast-enhanced brain MRI for age  . Interpreting/Reading Doctor: Tonya Hines ([id number])  Transcribed: n/a on 01/12/2010  Approved: Tonya Hines ([id number])  01/12/2010        Distribution:  Attending Doctor: Vasquez Rondon  Alternate Doctor: Vasquez Rondon      Nuclear Medicine Results (maximum last 3): No results found for this or any previous visit. US Results (maximum last 3): Results from [place] encounter on 05/27/22    US ABD LTD    Narrative  ULTRASOUND OF THE RIGHT UPPER QUADRANT    INDICATION: elevated lft. alcoholic    COMPARISON: None. TECHNIQUE:  Sonography of the right upper quadrant was performed. FINDINGS:    GALLBLADDER: Distended but No gallstones, wall thickening, or pericholecystic  fluid. COMMON DUCT: 0.4 cm in diameter. The duct is normal caliber. LIVER: Increased echogenicity, suggesting steatosis. No focal hepatic mass or  intrahepatic biliary dilatation is shown. PANCREAS: Not well visualized. RIGHT KIDNEY: 10.0 cm in length. No hydronephrosis, shadowing calculus, or  contour-deforming renal mass. Impression  1. Gallbladder distention but no cholelithiasis or ductal dilatation. 2. Hepatic steatosis. Results from [place] encounter on 02/15/19    US ABD LTD    Narrative  EXAM:  ABDOMEN, LTD - US*  INDICATION: RUQ abdominal pain. COMPARISON: None. TECHNIQUE:  Limited real-time sonography of the right upper quadrant of the abdomen was  performed with multiple static images of the liver, gallbladder, pancreatic head  and right kidney obtained. FINDINGS:  GALLBLADDER: The gallbladder is normal. There is no wall thickening or fluid  around the gallbladder.   COMMON BILE DUCT: There is no biliary duct dilatation and the common duct  measures 2 mm in diameter. LIVER: The liver is normal in echotexture with no mass or other focal  abnormality. LIVER VASCULATURE: The portal vein flow is towards the liver. PANCREAS: The pancreatic head is normal.  RIGHT KIDNEY: The right kidney demonstrates normal echogenicity with no mass,  stone or hydronephrosis. The right kidney measures 9.7 cm in length. The body and tail of the pancreas, left kidney, spleen and retroperitoneum were  not evaluated on this right upper quadrant examination. Impression  IMPRESSION: Normal ultrasound examination of the right upper quadrant. Results from [place] encounter on 11/09/16    US PELV NON OBS    Narrative  Clinical indication: Left lower quadrant pain. Transabdominal sonography of the pelvis. Patient had prior hysterectomy and  bilateral oophorectomy. No masses or fluid collection. Impression  impression: Post surgical pelvis with hysterectomy and oophorectomy. No other  significant findings. Active Problems:    Alcohol withdrawal ([address] (2/25/2023)      Opioid withdrawal ([address] (2/25/2023)      Tachycardia (2/25/2023)        Assessment/Plan:   ETOH and Opioid Withdrawal        Suicidal Ideations/Depression        Tachycardia  - no current plan or SI/HI expressed this am  - etiology of tachycardia related to ETOH and Opioid Withdrawal  - CIWA  - IVF and multivitamin supplementation  - will check folate and B12, ammonia  - will order echocardiogram to evaluate for cardiomyopathy secondary to ETOH abuse, tachycardia  - continue propranolol and effexor  - seizure and fall precautions  - suicide precautions  - psych to follow      2. Mild leukocytosis    - negative influenza and covid    - chest xray shows no acute process    - CTA shows bibasilar atelectasis    - afebrile    - urine drug screen negative for barbiturates and opiates, negative for methadone    - urinalysis positive for bacteria, negative for nitrites and leukocytes, patient asymptomatic.     - will monitor lab work. 3. Acute respiratory failure-POA    - CTA shows no pulmonary embolism. Bibasilar atelectasis noted. - chest xray shows no acute process    - keep 02 sats greater than 92%    - turn, cough and reposition    - incentive spirometry every 2 hours while awake    - prn nebulizer treatments    4. Hepatic steatosis    - etiology related to ETOH use    - seen on CTA of chest     - avoid hepatoxic medications     - will monitor lab work     5. Fracture of Left Ankle- POA    - etiology related to prior fall    - left boot intact    - elevate left lower extremity while at rest    - fall precautions      Discharge disposition: pending hospital course. Psych following. CM to follow for discharge planning.       Discussed plan of care with: patient, nursing    DVT Prophylaxis : Lovenox  Code Status: Full  POA: April Kiki, mother [phone]  Total Time: 40 minutes      Signed By: Mally Pinon NP     February 25, 2023
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
## 2023-02-25 NOTE — PROGRESS NOTES
Problem: Pressure Injury - Risk of  Goal: *Prevention of pressure injury  Description: Document Elijah Scale and appropriate interventions in the flowsheet. Outcome: Progressing Towards Goal  Note: Pressure Injury Interventions:  Sensory Interventions: Assess changes in LOC, Assess need for specialty bed, Chair cushion, Check visual cues for pain, Discuss PT/OT consult with provider, Float heels, Keep linens dry and wrinkle-free, Monitor skin under medical devices, Pad between skin to skin, Turn and reposition approx. every two hours (pillows and wedges if needed)    Moisture Interventions: Absorbent underpads, Apply protective barrier, creams and emollients, Assess need for specialty bed, Internal/External urinary devices, Limit adult briefs, Maintain skin hydration (lotion/cream), Moisture barrier, Minimize layers, Offer toileting Q_hr, Check for incontinence Q2 hours and as needed    Activity Interventions: Assess need for specialty bed, Pressure redistribution bed/mattress(bed type), Increase time out of bed    Mobility Interventions: Assess need for specialty bed, Float heels, HOB 30 degrees or less    Nutrition Interventions: Document food/fluid/supplement intake, Discuss nutritional consult with provider    Friction and Shear Interventions: Apply protective barrier, creams and emollients, HOB 30 degrees or less, Lift sheet, Minimize layers                Problem: Patient Education: Go to Patient Education Activity  Goal: Patient/Family Education  Outcome: Progressing Towards Goal     Problem: Falls - Risk of  Goal: *Absence of Falls  Description: Document Rema Fall Risk and appropriate interventions in the flowsheet.   Outcome: Progressing Towards Goal  Note: Fall Risk Interventions:  Mobility Interventions: Assess mobility with egress test    Mentation Interventions: Adequate sleep, hydration, pain control    Medication Interventions: Assess postural VS orthostatic hypotension    Elimination Interventions: Call light in reach              Problem: Patient Education: Go to Patient Education Activity  Goal: Patient/Family Education  Outcome: Progressing Towards Goal     Problem: Alcohol Withdrawal  Goal: *STG: Participates in treatment plan  Outcome: Progressing Towards Goal  Goal: *STG: Remains safe in hospital  Outcome: Progressing Towards Goal  Goal: *STG: Seeks staff when symptoms of withdrawal increase  Outcome: Progressing Towards Goal  Goal: *STG: Complies with medication therapy  Outcome: Progressing Towards Goal  Goal: *STG: Attends activities and groups  Outcome: Progressing Towards Goal  Goal: *STG: Will identify negative impact of chemical dependency including the use of tobacco, alcohol, and other substances  Outcome: Progressing Towards Goal  Goal: *STG: Verbalizes abstinence as an achievable goal  Outcome: Progressing Towards Goal  Goal: *STG: Agrees to participate in outpatient after care program to support ongoing mental health  Outcome: Progressing Towards Goal  Goal: *STG: Able to indentify relapse triggers including interpersonal/social and familial factors  Outcome: Progressing Towards Goal  Goal: *STG: Identify lifestyle changes to support long term sobriety such as vocation, employment, education, and legal issues  Outcome: Progressing Towards Goal  Goal: *STG: Maintains appropriate nutrition and hydration  Outcome: Progressing Towards Goal  Goal: *STG: Vital signs within defined limits  Outcome: Progressing Towards Goal  Goal: *STG/LTG: Relapse prevention plan in place to include housing/aftercare, leisure activities, and spirituality  Outcome: Progressing Towards Goal  Goal: Interventions  Outcome: Progressing Towards Goal     Problem: Patient Education: Go to Patient Education Activity  Goal: Patient/Family Education  Outcome: Progressing Towards Goal     Problem: Risk for Opioid Over-Sedation  Goal: Recognition of Risk Factors for Over-sedation  Outcome: Progressing Towards Goal  Goal: Prevention of Over-sedation  Outcome: Progressing Towards Goal  Goal: Prevention of Respiratory Depression  Outcome: Progressing Towards Goal     Problem: Patient Education: Go to Patient Education Activity  Goal: Patient/Family Education  Outcome: Progressing Towards Goal     Problem: Suicide  Goal: *STG: Remains safe in hospital  Outcome: Progressing Towards Goal  Goal: *STG: Seeks staff when feelings of self harm or harm towards others arise  Outcome: Progressing Towards Goal  Goal: *STG: Attends activities and groups  Outcome: Progressing Towards Goal  Goal: *STG:  Verbalizes alternative ways of dealing with maladaptive feelings/behaviors  Outcome: Progressing Towards Goal  Goal: *STG/LTG: Complies with medication therapy  Outcome: Progressing Towards Goal  Goal: *STG/LTG: No longer expresses self destructive or suicidal thoughts  Outcome: Progressing Towards Goal  Goal: *LTG:  Identifies available community resources  Outcome: Progressing Towards Goal  Goal: *LTG:  Develops proactive suicide prevention plan  Outcome: Progressing Towards Goal  Goal: Interventions  Outcome: Progressing Towards Goal     Problem: Patient Education: Go to Patient Education Activity  Goal: Patient/Family Education  Outcome: Progressing Towards Goal     Problem: Pressure Injury - Risk of  Goal: *Prevention of pressure injury  Description: Document Elijah Scale and appropriate interventions in the flowsheet. Outcome: Progressing Towards Goal  Note: Pressure Injury Interventions:  Sensory Interventions: Assess changes in LOC, Assess need for specialty bed, Chair cushion, Check visual cues for pain, Discuss PT/OT consult with provider, Float heels, Keep linens dry and wrinkle-free, Monitor skin under medical devices, Pad between skin to skin, Turn and reposition approx.  every two hours (pillows and wedges if needed)    Moisture Interventions: Absorbent underpads, Apply protective barrier, creams and emollients, Assess need for specialty bed, Internal/External urinary devices, Limit adult briefs, Maintain skin hydration (lotion/cream), Moisture barrier, Minimize layers, Offer toileting Q_hr, Check for incontinence Q2 hours and as needed    Activity Interventions: Assess need for specialty bed, Pressure redistribution bed/mattress(bed type), Increase time out of bed    Mobility Interventions: Assess need for specialty bed, Float heels, HOB 30 degrees or less    Nutrition Interventions: Document food/fluid/supplement intake, Discuss nutritional consult with provider    Friction and Shear Interventions: Apply protective barrier, creams and emollients, HOB 30 degrees or less, Lift sheet, Minimize layers                Problem: Patient Education: Go to Patient Education Activity  Goal: Patient/Family Education  Outcome: Progressing Towards Goal     Problem: Falls - Risk of  Goal: *Absence of Falls  Description: Document Rema Fall Risk and appropriate interventions in the flowsheet.   Outcome: Progressing Towards Goal  Note: Fall Risk Interventions:  Mobility Interventions: Assess mobility with egress test    Mentation Interventions: Adequate sleep, hydration, pain control    Medication Interventions: Assess postural VS orthostatic hypotension    Elimination Interventions: Call light in reach              Problem: Patient Education: Go to Patient Education Activity  Goal: Patient/Family Education  Outcome: Progressing Towards Goal     Problem: Alcohol Withdrawal  Goal: *STG: Participates in treatment plan  Outcome: Progressing Towards Goal  Goal: *STG: Remains safe in hospital  Outcome: Progressing Towards Goal  Goal: *STG: Seeks staff when symptoms of withdrawal increase  Outcome: Progressing Towards Goal  Goal: *STG: Complies with medication therapy  Outcome: Progressing Towards Goal  Goal: *STG: Attends activities and groups  Outcome: Progressing Towards Goal  Goal: *STG: Will identify negative impact of chemical dependency including the use of tobacco, alcohol, and other substances  Outcome: Progressing Towards Goal  Goal: *STG: Verbalizes abstinence as an achievable goal  Outcome: Progressing Towards Goal  Goal: *STG: Agrees to participate in outpatient after care program to support ongoing mental health  Outcome: Progressing Towards Goal  Goal: *STG: Able to indentify relapse triggers including interpersonal/social and familial factors  Outcome: Progressing Towards Goal  Goal: *STG: Identify lifestyle changes to support long term sobriety such as vocation, employment, education, and legal issues  Outcome: Progressing Towards Goal  Goal: *STG: Maintains appropriate nutrition and hydration  Outcome: Progressing Towards Goal  Goal: *STG: Vital signs within defined limits  Outcome: Progressing Towards Goal  Goal: *STG/LTG: Relapse prevention plan in place to include housing/aftercare, leisure activities, and spirituality  Outcome: Progressing Towards Goal  Goal: Interventions  Outcome: Progressing Towards Goal     Problem: Patient Education: Go to Patient Education Activity  Goal: Patient/Family Education  Outcome: Progressing Towards Goal     Problem: Risk for Opioid Over-Sedation  Goal: Recognition of Risk Factors for Over-sedation  Outcome: Progressing Towards Goal  Goal: Prevention of Over-sedation  Outcome: Progressing Towards Goal  Goal: Prevention of Respiratory Depression  Outcome: Progressing Towards Goal     Problem: Patient Education: Go to Patient Education Activity  Goal: Patient/Family Education  Outcome: Progressing Towards Goal     Problem: Suicide  Goal: *STG: Remains safe in hospital  Outcome: Progressing Towards Goal  Goal: *STG: Seeks staff when feelings of self harm or harm towards others arise  Outcome: Progressing Towards Goal  Goal: *STG: Attends activities and groups  Outcome: Progressing Towards Goal  Goal: *STG:  Verbalizes alternative ways of dealing with maladaptive feelings/behaviors  Outcome: Progressing Towards Goal  Goal: *STG/LTG: Complies with medication therapy  Outcome: Progressing Towards Goal  Goal: *STG/LTG: No longer expresses self destructive or suicidal thoughts  Outcome: Progressing Towards Goal  Goal: *LTG:  Identifies available community resources  Outcome: Progressing Towards Goal  Goal: *LTG:  Develops proactive suicide prevention plan  Outcome: Progressing Towards Goal  Goal: Interventions  Outcome: Progressing Towards Goal     Problem: Patient Education: Go to Patient Education Activity  Goal: Patient/Family Education  Outcome: Progressing Towards Goal

## 2023-02-25 NOTE — ED NOTES
AVNI Hassan at bedside. Will give IV Flumazenil  0.3 mg at this time per AVNI Hassan. Placed pt on NRB 15 L at this time. Pt is lethargic , having stridor in upper airway at this time. Pt is diaphoretic. Pt is tachy 130 bmp sinus tach. Pt CIWA is 15.

## 2023-02-25 NOTE — ED NOTES
ADMISSION SBAR NOTE    IP UNIT CALLED NOTE IS READY: Yes Spoke to ADAMS Tillman    IF there are questions Call transferring nurse (your name) Dion Freire RN at phone # [phone]    SITUATION/BACKGROUND:    Patient is being transferred to [address], Room# 2152    Patient's Chief Complaint on arrival to ED was Suicidal Ideations and is admitted for Acute Respiratory Failure, and Alcohol withdrawl. CODE STATUS: Full Code    VITAL SIGNS (MUST BE WITHIN 1 HOUR OF TRANSFER TO IP UNIT:    TEMP: 98.2  PULSE: 98  RESP: 16  BP: 142/92  PAIN SCORE: 0/10  MEWS: 2     ISOLATION/PRECAUTIONS: No       Called outstanding consults: No      Are there still sign and held orders that need to be released? No   All STAT orders are complete: Yes    STAT labs collected: Yes  REPEAT LACTIC ACID DUE? No      Are there any titrating drips? No     The following personal items will be sent with the patient during transfer to the floor: All valuables:   ITEM:    ITEM:    ITEM:           ASSESSMENT:    CIWA Assessment: Yes Last Score: 4    NEURO:   NIH SCORE:        CAIT SCREENING:          NEURO ASSESSMENT:   Neuro  Neurologic State: Eyes open to voice (02/25/23 0725)  Orientation Level: Oriented to situation, Oriented to place, Oriented to person, Disoriented to time (02/25/23 0725)  Cognition: Follows commands (02/25/23 0725)  Speech: Delayed responses (02/25/23 0725)    Is patient impulsive? No   Is patient oriented? Yes   Do they follow commands? Yes  Is the patient ambulatory? Yes Device need x2 assist    FALL RISK? Yes   Is the Kermit 1 Assessment completed? Yes  INTERVENTIONS: call bell, nurse    INTEGUMENTARY:   IS THE PATIENT UNDRESSED? Yes  ARE THERE WOUNDS PRESENT? No  On admit to ED No   ARE THE WOUNDS DOCUMENTED? No     RESPIRATORY:   Is patient on Oxygen? Yes   OXYGEN: Oxygen Therapy  O2 Device: Nasal cannula (02/25/23 1400)  O2 Flow Rate (L/min): 4 l/min (02/25/23 1400)    CARDIAC:   Is cardiac monitoring ordered?  Yes Last Rhythm: NSR  Patient to transfer with tele box on? Yes  Is patient using a LIFE VEST? No     LINE ACCESS:   Peripheral IV 02/25/23 Left Antecubital (Active)        /GI:   CONTINENT BOWEL/BLADDER? Yes  URINARY OUTPUT: voiding and external catheter Written Order for Blunt Cath? No   WAS UA WITH REFLEX SENT TO LAB? Yes IF NO,  COLLECT AND SEND PRIOR TO TRANSPORT TO INPATIENT AREA    RESTRAINTS IN USE: No          Pt CIWA is currently is 4. Pt is alert to voice and painful stimuli. Pt is still drowsy and falls asleep after answering questions. Pt is on purewick. Informed receiving RN that the pt did not meet criteria for CCU.

## 2023-02-25 NOTE — BSMART NOTE
1500:  Pt has been admitted to medical floor. CIWA 4. Current withdrawal symptoms: chills, sweats, nausea with dry heaving. Pt is alert to voice and painful stimuli but remains drowsy. 9:30: Liaison spoke with pt's RN Roxana Hernandez, who reports pt is currently unable to engage due to decompensation requiring medical intervention. Roxana Hernandez confirms pt will be admitted medically and psych consult will be ordered.

## 2023-02-25 NOTE — CONSULTS
[facility] Daily Progress Note. Name: Sophia Mcnamara   : 1980   MRN: [id number]   Date: 2023        Chief Complaint   Patient presents with    Suicidal     Pt arrive via EMS with suicidal ideations. Pt states she  is having suicidal thoughts and called psych doctor to tell him that her psych medications were not working and was advised to come here. Pt does not have plan and verbalizes she does not plan on acting on thoughts. pt states she has been drinking Vodka and pt states she is addicted to pain pills. HPI:   39 y/o female who presented to the ED with suicidal ideation. She was advised by Psychiatrist after a phone call to go to the ED. She reports having had after dog die a few days ago and she was binge drinking vodka. In the ED she was on CIWA and being treated for possible DT's; UDS was negative for other substance. She did receive flumazenil before my arrival.    On my exam she was sleep but arouseable and able to answer questions appropriately. When sleeping she would snore loudly and had upper airway sounds. Most likely has undiagnosed JAIME. She was on NRB when I arrved and I took it off in preparation for abg which was reviewed with staff. Off NRB she would desaturate to 89/90% while asleeep and if woken would saturate 92-94%. NRB removed ans she was put on NC O2. Active Problems Being Managed:   - suicidal ideation  - depression  - ETOH abuse  - Respiratory acidosis  - obesity, BMI 41      Assessment/Plan:   Pt examined, labs and VS reviewed. No indication for ICU at this time. Ok to managed on step down. 1) Acute ETOH abuse:   -can hold on ativan and CIWA. -Binge drinking and not chronic ETOH abuse. -Recommend hydrating and repleting electrolytes. .   -Discussed with NP at bedside to call if tachycardia worsens. and will re-evaluate.  -She is more at risk for holiday heart syndrome then DT's at this time.    2) Depression/suicidal ideation: defer to psychiatry  3) Possible JAIME: Can be seen as outpatient for the work up of JAIME    DVT prophylaxis: ok to start on dvt prophylaxis  SUP: NA  Code status: Full    Next of kin: Eric Topete (Mother)   [phone]    I personally spent 35 minutes of critical care time. This is time spent at this critically ill patient's bedside actively involved in patient care as well as the coordination of care and discussions with the patient's family. This does not include any procedural time which has been billed separately. Review of systems:     ROS       Objective:     Vital Signs:  Visit Vitals  BP (!) 146/78   Pulse (!) 109   Temp 97.9 °F (36.6 °C)   Resp 17   LMP 2016   SpO2 94%    O2 Flow Rate (L/min): 4 l/min O2 Device: Nasal cannula Temp (24hrs), Av °F (36.7 °C), Min:97.9 °F (36.6 °C), Max:98.2 °F (36.8 °C)           Intake/Output:     Intake/Output Summary (Last 24 hours) at 2023 1238  Last data filed at 2023 5391  Gross per 24 hour   Intake 2000 ml   Output --   Net 2000 ml       Physical Exam:  Physical Exam    Past Medical History:        has a past medical history of ADD (attention deficit disorder) (17-17yo), Gynecologic disorder, OTHER MEDICAL, OTHER MEDICAL, Idiopathic vulvodynia (2014), L1 vertebral fracture ([address] (2017), Migraines (2013), Neurological disorder, Pelvic pain in female (9/15/2014), Psychiatric disorder, Secondary dysmenorrhea (2014), and Seizures ([address] ().     She has no past medical history of Abnormal Pap smear, Anemia NEC, Genital herpes, unspecified, Heart abnormalities, Herpes simplex without mention of complication, Human immunodeficiency virus (HIV) disease ([address] Infertility, Kidney disease, Rhesus isoimmunization unspecified as to episode of care in pregnancy, Sickle-cell disease, unspecified, Trauma, Unspecified breast disorder, Unspecified diseases of blood and blood-forming organs, or Unspecified epilepsy without mention of intractable epilepsy. Past Surgical History:      has a past surgical history that includes endometrial cryoablation; hx gyn (4/17/15); and hx hysterectomy (04/04/2016). Home Medications:     Prior to Admission medications    Medication Sig Start Date End Date Taking? Authorizing Provider   hydrOXYzine HCL (ATARAX) 25 mg tablet Take 1 Tablet by mouth every eight (8) hours as needed for Anxiety or Sleep. 2/22/23   Lavern Mcdonnell MD   propranoloL (INDERAL) 40 mg tablet Take 1 Tablet by mouth three (3) times daily. 2/22/23   Lavern Mcdonnell MD   ibuprofen (MOTRIN) 800 mg tablet Take 1 Tablet by mouth every eight (8) hours as needed for Pain. Indications: pain 1/11/23   Kaitlynn Anne MD   cephALEXin [facility]) 500 mg capsule Take 1 Capsule by mouth four (4) times daily. Indications: post op  Patient not taking: Reported on 2/22/2023 1/4/23   Kaitlynn Anne MD   Latuda 20 mg tab tablet  1/1/23   Provider, Historical   venlafaxine-SR (EFFEXOR-XR) 150 mg capsule TAKE 1 CAPSULE ORALLY DAILY TAKE WITH 75MG VENLAFAXINE ER TABLET TO EQUAL A DAILY DOSE OF 225MG 11/7/22   Provider, Historical         Allergies/Social/Family History: Allergies   Allergen Reactions    Aspirin Other (comments)     Hot sweats and passed out; tolerated ibuprofen    Penicillins Other (comments)     Childhood. Does not remember reaction. Is not aware if she has ever taken cephalosporins in the past.    Jan 2019: Pt notes no problems with cephalosporins.       Social History     Tobacco Use    Smoking status: Former     Packs/day: 1.00     Years: 18.00     Pack years: 18.00     Types: Cigarettes    Smokeless tobacco: Current    Tobacco comments:     Vapes   Substance Use Topics    Alcohol use: Yes     Comment: 5 beers yesterday 7/20/22      Family History   Problem Relation Age of Onset    Cancer Mother     Diabetes Mother     Alcohol abuse Father         and drug    Psychiatric Disorder Father     Cancer Father     Diabetes Maternal Grandmother     Hypertension Maternal Grandmother     Thyroid Disease Maternal Grandmother     Diabetes Maternal Grandfather     Hypertension Maternal Grandfather     Cancer Maternal Grandfather     Heart Disease Maternal Grandfather     Cancer Paternal Grandmother     Diabetes Paternal Grandmother          LABS AND  DATA:   Reviewed      Peak airway pressure:      Minute ventilation:        CRITICAL CARE CONSULTANT NOTE  I had a face to face encounter with the patient, reviewed and interpreted patient data including clinical events, labs, images, vital signs, I/O's, and examined patient. I have discussed the case and the plan and management of the patient's care with the consulting services, the bedside nurses and the respiratory therapist.      NOTE OF PERSONAL INVOLVEMENT IN CARE   This patient has a high probability of imminent, clinically significant deterioration, which requires the highest level of preparedness to intervene urgently. I participated in the decision-making and personally managed or directed the management of the following life and organ supporting interventions that required my frequent assessment to treat or prevent imminent deterioration.         Victor M Haile, [address] [facility]  [phone]  2/25/2023

## 2023-02-25 NOTE — ED NOTES
Pt complaining of nausea at this time. Dry heaving, diaphoretic and states she feels \"hot and cold. \" Given 4 mg of Zofran.  Vital signs stable

## 2023-02-25 NOTE — ED NOTES
Paging MD at this time. Assumed care of patient at this time. At this time pt is assigned PCU/stepdown bed, however, pt seems to be meeting criteria for CCU. Will page MD and wait for call back.

## 2023-02-26 LAB
ALBUMIN SERPL-MCNC: 2.8 G/DL (ref 3.5–5)
AMMONIA PLAS-SCNC: 38 UMOL/L
ANION GAP SERPL CALC-SCNC: 6 MMOL/L (ref 5–15)
ATRIAL RATE: 119 BPM
BASOPHILS # BLD: 0.1 K/UL (ref 0–0.1)
BASOPHILS NFR BLD: 0 % (ref 0–1)
BUN SERPL-MCNC: 11 MG/DL (ref 6–20)
BUN/CREAT SERPL: 22 (ref 12–20)
CALCIUM SERPL-MCNC: 9 MG/DL (ref 8.5–10.1)
CALCULATED P AXIS, ECG09: 32 DEGREES
CALCULATED R AXIS, ECG10: 24 DEGREES
CALCULATED T AXIS, ECG11: 31 DEGREES
CHLORIDE SERPL-SCNC: 109 MMOL/L (ref 97–108)
CO2 SERPL-SCNC: 24 MMOL/L (ref 21–32)
CREAT SERPL-MCNC: 0.5 MG/DL (ref 0.55–1.02)
DIAGNOSIS, 93000: NORMAL
DIFFERENTIAL METHOD BLD: ABNORMAL
EOSINOPHIL # BLD: 0 K/UL (ref 0–0.4)
EOSINOPHIL NFR BLD: 0 % (ref 0–7)
ERYTHROCYTE [DISTWIDTH] IN BLOOD BY AUTOMATED COUNT: 13.7 % (ref 11.5–14.5)
FOLATE SERPL-MCNC: 10.9 NG/ML (ref 5–21)
GLUCOSE SERPL-MCNC: 136 MG/DL (ref 65–100)
HCT VFR BLD AUTO: 32.7 % (ref 35–47)
HGB BLD-MCNC: 10.6 G/DL (ref 11.5–16)
IMM GRANULOCYTES # BLD AUTO: 0.1 K/UL (ref 0–0.04)
IMM GRANULOCYTES NFR BLD AUTO: 1 % (ref 0–0.5)
LYMPHOCYTES # BLD: 1.3 K/UL (ref 0.8–3.5)
LYMPHOCYTES NFR BLD: 11 % (ref 12–49)
MCH RBC QN AUTO: 29.8 PG (ref 26–34)
MCHC RBC AUTO-ENTMCNC: 32.4 G/DL (ref 30–36.5)
MCV RBC AUTO: 91.9 FL (ref 80–99)
MONOCYTES # BLD: 0.7 K/UL (ref 0–1)
MONOCYTES NFR BLD: 6 % (ref 5–13)
NEUTS SEG # BLD: 9.9 K/UL (ref 1.8–8)
NEUTS SEG NFR BLD: 82 % (ref 32–75)
NRBC # BLD: 0 K/UL (ref 0–0.01)
NRBC BLD-RTO: 0 PER 100 WBC
P-R INTERVAL, ECG05: 120 MS
PHOSPHATE SERPL-MCNC: 2.8 MG/DL (ref 2.6–4.7)
PLATELET # BLD AUTO: 260 K/UL (ref 150–400)
PMV BLD AUTO: 9.5 FL (ref 8.9–12.9)
POTASSIUM SERPL-SCNC: 3.9 MMOL/L (ref 3.5–5.1)
Q-T INTERVAL, ECG07: 332 MS
QRS DURATION, ECG06: 82 MS
QTC CALCULATION (BEZET), ECG08: 467 MS
RBC # BLD AUTO: 3.56 M/UL (ref 3.8–5.2)
SODIUM SERPL-SCNC: 139 MMOL/L (ref 136–145)
VENTRICULAR RATE, ECG03: 119 BPM
VIT B12 SERPL-MCNC: 379 PG/ML (ref 193–986)
WBC # BLD AUTO: 12 K/UL (ref 3.6–11)

## 2023-02-26 PROCEDURE — 74011250636 HC RX REV CODE- 250/636: Performed by: INTERNAL MEDICINE

## 2023-02-26 PROCEDURE — 74011250636 HC RX REV CODE- 250/636: Performed by: NURSE PRACTITIONER

## 2023-02-26 PROCEDURE — 77010033678 HC OXYGEN DAILY

## 2023-02-26 PROCEDURE — 74011250637 HC RX REV CODE- 250/637: Performed by: INTERNAL MEDICINE

## 2023-02-26 PROCEDURE — 80069 RENAL FUNCTION PANEL: CPT

## 2023-02-26 PROCEDURE — 74011250637 HC RX REV CODE- 250/637: Performed by: NURSE PRACTITIONER

## 2023-02-26 PROCEDURE — 82140 ASSAY OF AMMONIA: CPT

## 2023-02-26 PROCEDURE — 36415 COLL VENOUS BLD VENIPUNCTURE: CPT

## 2023-02-26 PROCEDURE — 74011250636 HC RX REV CODE- 250/636: Performed by: STUDENT IN AN ORGANIZED HEALTH CARE EDUCATION/TRAINING PROGRAM

## 2023-02-26 PROCEDURE — 85025 COMPLETE CBC W/AUTO DIFF WBC: CPT

## 2023-02-26 PROCEDURE — 65660000001 HC RM ICU INTERMED STEPDOWN

## 2023-02-26 PROCEDURE — 82746 ASSAY OF FOLIC ACID SERUM: CPT

## 2023-02-26 PROCEDURE — 82607 VITAMIN B-12: CPT

## 2023-02-26 RX ORDER — IBUPROFEN 200 MG
1 TABLET ORAL DAILY
Status: DISCONTINUED | OUTPATIENT
Start: 2023-02-26 | End: 2023-02-28

## 2023-02-26 RX ORDER — HYDROXYZINE 25 MG/1
25 TABLET, FILM COATED ORAL
Status: DISCONTINUED | OUTPATIENT
Start: 2023-02-26 | End: 2023-03-08

## 2023-02-26 RX ORDER — IBUPROFEN 200 MG
1 TABLET ORAL DAILY
Status: DISCONTINUED | OUTPATIENT
Start: 2023-02-27 | End: 2023-02-26

## 2023-02-26 RX ADMIN — PROPRANOLOL HYDROCHLORIDE 40 MG: 10 TABLET ORAL at 22:34

## 2023-02-26 RX ADMIN — TRAMADOL HYDROCHLORIDE 50 MG: 50 TABLET ORAL at 06:47

## 2023-02-26 RX ADMIN — ACETAMINOPHEN 325MG 650 MG: 325 TABLET ORAL at 05:59

## 2023-02-26 RX ADMIN — ENOXAPARIN SODIUM 30 MG: 100 INJECTION SUBCUTANEOUS at 22:35

## 2023-02-26 RX ADMIN — ACETAMINOPHEN 325MG 650 MG: 325 TABLET ORAL at 01:54

## 2023-02-26 RX ADMIN — ACETAMINOPHEN 325MG 650 MG: 325 TABLET ORAL at 19:50

## 2023-02-26 RX ADMIN — ENOXAPARIN SODIUM 30 MG: 100 INJECTION SUBCUTANEOUS at 13:58

## 2023-02-26 RX ADMIN — ACETAMINOPHEN 325MG 650 MG: 325 TABLET ORAL at 09:26

## 2023-02-26 RX ADMIN — HYDROXYZINE HYDROCHLORIDE 25 MG: 25 TABLET ORAL at 13:54

## 2023-02-26 RX ADMIN — SODIUM CHLORIDE 100 ML/HR: 9 INJECTION, SOLUTION INTRAVENOUS at 04:52

## 2023-02-26 RX ADMIN — TRAMADOL HYDROCHLORIDE 50 MG: 50 TABLET ORAL at 18:26

## 2023-02-26 RX ADMIN — HYDROXYZINE HYDROCHLORIDE 25 MG: 25 TABLET ORAL at 19:51

## 2023-02-26 RX ADMIN — TRAMADOL HYDROCHLORIDE 50 MG: 50 TABLET ORAL at 12:42

## 2023-02-26 RX ADMIN — SODIUM CHLORIDE 125 ML/HR: 9 INJECTION, SOLUTION INTRAVENOUS at 22:35

## 2023-02-26 RX ADMIN — TRAMADOL HYDROCHLORIDE 50 MG: 50 TABLET ORAL at 01:54

## 2023-02-26 RX ADMIN — PROPRANOLOL HYDROCHLORIDE 40 MG: 10 TABLET ORAL at 18:26

## 2023-02-26 RX ADMIN — ONDANSETRON 4 MG: 2 INJECTION INTRAMUSCULAR; INTRAVENOUS at 05:59

## 2023-02-26 RX ADMIN — HYDROXYZINE HYDROCHLORIDE 25 MG: 25 TABLET, FILM COATED ORAL at 06:47

## 2023-02-26 RX ADMIN — ONDANSETRON 4 MG: 2 INJECTION INTRAMUSCULAR; INTRAVENOUS at 15:41

## 2023-02-26 NOTE — PROGRESS NOTES
2017 Patient has been complaining headache, 10/10,  gave tylenol PO and atarax PO (see MAR),anxious,    22

## 2023-02-26 NOTE — BH NOTES
Psychiatry consult acknowledged. Attempted to call unit with no answer. Will retry.     Scott Guy, PMHNP-BC

## 2023-02-26 NOTE — PROGRESS NOTES
Transition of Care Plan:    RUR:20%  Disposition:home with family  If SNF or IPR: n/a  Date FOC offered:n/a  Date FOC received:n/a  Date authorization started with reference number:n/a  Date authorization received and expires:n/a  Accepting facility:n/a  Follow up appointments:to be scheduled   DME needed:none  Transportation at [facility] or means to access home: yes        IM Medicare Letter:n/a  Is patient a [place] and connected with the [facility]?    no            If yes, was [employer] transfer form completed and VA notified? n/a  Caregiver Contact:grandmother Shiva Graf [phone]  Discharge Caregiver contacted prior to discharge? yes  Care Conference needed?:  no  Previous HH/SNF/IPR: none                 Reason for Admission:   alcohol and opiate withdrawal                  RUR Score:     20%      PCP: First and Last name:  Phillip Ruiz MD     Name of Practice:   Are you a current patient: Yes/No:   Approximate date of last visit:    Can you do a virtual visit with your PCP:              Resources/supports as identified by patient/family:   grandparents                 Top Challenges facing patient (as identified by patient/family and CM):  substance use disorder                      Finances/Medication cost?                    Transportation? Support system or lack thereof? Living arrangements? Self-care/ADLs/Cognition?             Current Advanced Directive/Advance Care Plan:  Full Code      Healthcare Decision Maker:   Click here to complete HealthCare Decision Makers including selection of the Healthcare Decision Maker Relationship (ie \"Primary\")        Payor Source Payor: BLUE CROSS MEDICAID / Plan: [address] [place] [address] / Product Type: Managed Care Medicaid /                             Plan for utilizing home health:  not recommended                    Transition of Care Plan:                  Patient lives with her grandparents and her son. Patient is independent in her ADLs/IADLs. Patient does ambulate with crutches due to an ortho injury. Patient uses any pharmacy with a drive threw. Care Management Interventions  PCP Verified by CM: Yes  Palliative Care Criteria Met (RRAT>21 & CHF Dx)?: No  Mode of Transport at Discharge:  Other (see comment) (family)  Transition of Care Consult (CM Consult): Discharge Planning  MyChart Signup: No  Discharge Durable Medical Equipment: No  Health Maintenance Reviewed: Yes  Physical Therapy Consult: No  Occupational Therapy Consult: No  Speech Therapy Consult: No  Support Systems: Parent(s)  Confirm Follow Up Transport: Family  [employer] Information Provided?: No  Discharge Location  Patient Expects to be Discharged to[de-identified] Home with family assistance    3:30 PM  Marino Fernandez, [address] [place] [address] Avenue

## 2023-02-26 NOTE — PROGRESS NOTES
Bedside shift report received from [facility]. Report included the following information SBAR, Kardex, MAR, and Recent Results. This RN verbalized understanding of plan of care with opportunity for clarification and questions. 1255: Psych eval via zoom at this time. 1311: This RN asked MD for nicotine per patient request.    Bedside shift report given to [facility]. Report included the following information SBAR, Kardex, MAR, and Recent Results. Oncoming RN verbalized understanding of plan of care with opportunity for clarification and questions.

## 2023-02-26 NOTE — PROGRESS NOTES
Bedside and Verbal shift change report given to [id number]  (oncoming nurse) by Itz Terrell RN (offgoing nurse). Report included the following information SBAR, Kardex, Intake/Output, MAR, Recent Results, Med Rec Status, Cardiac Rhythm SR, and Alarm Parameters .

## 2023-02-26 NOTE — PROGRESS NOTES
Hospitalist Progress Note    NAME: Manjinder Mabry   :  1980   MRN:  [id number]       Assessment / Plan:  ETOH and Opioid Withdrawal        Suicidal Ideations/Depression        Tachycardia  Respiratory acidosis secondary to Ativan use  - no current plan or SI/HI expressed this am  - etiology of tachycardia related to ETOH and Opioid Withdrawal  - CIWA  - IVF and multivitamin supplementation  Ammonia level mildly elevated at 38, W32 folic acid are within normal limits  -Follow-up 2D echo due to tachycardia- continue propranolol and effexor  - seizure and fall precautions  - suicide precautions  - psych recommended inpatient admission once medically stable, continue with one-to-one sitter  , Yesterday patient had a sudden onset of respiratory failure acute respiratory acidosis in the setting of Ativan use, patient was evaluated by intensivist.  Patient did not require ICU level of care  Ativan currently on hold, had received flumazenil to reverse Ativan effect  Currently on as needed Atarax        2. Mild leukocytosis    - negative influenza and covid    - chest xray shows no acute process    - CTA shows bibasilar atelectasis    - afebrile    - urine drug screen negative for barbiturates and opiates, negative for methadone    - urinalysis positive for bacteria, negative for nitrites and leukocytes, patient asymptomatic.    - will monitor lab work. 3. Acute respiratory failure-POA    - CTA shows no pulmonary embolism. Bibasilar atelectasis noted. - chest xray shows no acute process    - keep 02 sats greater than 92%    - turn, cough and reposition    - incentive spirometry every 2 hours while awake    - prn nebulizer treatments     4. Hepatic steatosis    - etiology related to ETOH use    - seen on CTA of chest     - avoid hepatoxic medications     - will monitor lab work     5.  Fracture of Left Ankle- POA    - etiology related to prior fall    - left boot intact    - elevate left lower extremity while at rest    - fall precautions  Tobacco abuse  Started on nicotine patch    40 or above Morbid obesity / Body mass index is 41.2 kg/m². Estimated discharge date: February 28  Barriers: Inpatient psych, still in withdrawal  Code status: Full  Prophylaxis: Lovenox  Recommended Disposition: Home w/Family     Subjective:     Chief Complaint / Reason for Physician Visit  Suicidal thoughts, alcohol withdrawal discussed with RN events overnight. Complaining of headaches, more awake  Review of Systems:  Symptom Y/N Comments  Symptom Y/N Comments   Fever/Chills n   Chest Pain n    Poor Appetite    Edema     Cough n   Abdominal Pain n    Sputum    Joint Pain     SOB/FIGUEROA n   Pruritis/Rash     Nausea/vomit y   Tolerating PT/OT     Diarrhea    Tolerating Diet y    Constipation    Other       Could NOT obtain due to:      Objective:     VITALS:   Last 24hrs VS reviewed since prior progress note. Most recent are:  Patient Vitals for the past 24 hrs:   Temp Pulse Resp BP SpO2   02/26/23 1119 97.5 °F (36.4 °C) 63 16 107/68 97 %   02/26/23 0921 97.7 °F (36.5 °C) -- -- -- --   02/26/23 0745 97.7 °F (36.5 °C) 75 18 119/67 94 %   02/26/23 0430 98.3 °F (36.8 °C) 72 15 129/78 97 %   02/26/23 0002 (P) 98 °F (36.7 °C) 76 19 117/88 96 %   02/25/23 2238 -- 63 19 127/78 93 %   02/25/23 1938 98.4 °F (36.9 °C) 74 18 (!) 124/58 92 %   02/25/23 1600 -- 90 -- -- --   02/25/23 1508 98.5 °F (36.9 °C) (!) 105 18 133/82 94 %   02/25/23 1415 98.2 °F (36.8 °C) 98 16 (!) 142/92 94 %   02/25/23 1400 98.5 °F (36.9 °C) 99 17 (!) 146/88 91 %   02/25/23 1335 -- 97 18 (!) 148/94 93 %       Intake/Output Summary (Last 24 hours) at 2/26/2023 1323  Last data filed at 2/26/2023 3964  Gross per 24 hour   Intake --   Output 600 ml   Net -600 ml        I had a face to face encounter and independently examined this patient on 2/26/2023, as outlined below:  PHYSICAL EXAM:  General: WD, WN. Alert, cooperative, no acute distress    EENT:  EOMI.  Anicteric sclerae. MMM  Resp:  CTA bilaterally, no wheezing or rales. No accessory muscle use  CV:  Regular  rhythm,  No edema  GI:  Soft, Non distended, Non tender. +Bowel sounds  Neurologic:  Alert and oriented X 3, normal speech,   Psych:   Good insight. Not anxious nor agitated  Skin:  No rashes. No jaundice    Reviewed most current lab test results and cultures  YES  Reviewed most current radiology test results   YES  Review and summation of old records today    NO  Reviewed patient's current orders and MAR    YES  PMH/SH reviewed - no change compared to H&P  ________________________________________________________________________  Care Plan discussed with:    Comments   Patient     Family      RN     Care Manager     Consultant                        Multidiciplinary team rounds were held today with , nursing, pharmacist and clinical coordinator. Patient's plan of care was discussed; medications were reviewed and discharge planning was addressed. ________________________________________________________________________  Total NON critical care TIME:  40   Minutes    Total CRITICAL CARE TIME Spent:   Minutes non procedure based      Comments   >50% of visit spent in counseling and coordination of care     ________________________________________________________________________  Job Hammonds MD     Procedures: see electronic medical records for all procedures/Xrays and details which were not copied into this note but were reviewed prior to creation of Plan. LABS:  I reviewed today's most current labs and imaging studies.   Pertinent labs include:  Recent Labs     02/26/23  0426 02/25/23  0123   WBC 12.0* 11.6*   HGB 10.6* 12.5   HCT 32.7* 38.6    356     Recent Labs     02/26/23  0426 02/25/23  0315    140   K 3.9 3.7   * 110*   CO2 24 27   * 120*   BUN 11 12   CREA 0.50* 0.69   CA 9.0 8.2*   PHOS 2.8  --    ALB 2.8*  --        Signed: Job Hammonds MD

## 2023-02-26 NOTE — CONSULTS
PSYCHIATRY CONSULT NOTE    REASON FOR CONSULT: SI      HISTORY OF PRESENTING COMPLAINT:  Kevyn Zapata is a 38 y.o. WHITE/NON- female who is currently admitted to the medical floor at [facility]. She initially came to the hospital due to having a panic attack on Wednesday that she did not feel had resolved. She spoke with her outpatient provider who instructed her to increase her dose of Latuda. This afternoon she reports that her primary concern is her anxiety with she feels is \"through the roof. \" She started drinking at home when she ran out of pain medications and is now feeling quite poorly due to alcohol withdrawal. She is feeling shakey, nausea, can't sleep, and anxious. She denies current SI/HI/AH/VH. She lost her dog last Friday which really triggered her to feel much worse. Yair Kaiser would like to be admitted to the [facility] for treatment of anxiety and depression. Further assessment not possible at this time due to patient feeling poorly from alcohol withdrawal.      PAST PSYCHIATRIC HISTORY and SUBSTANCE ABUSE HISTORY:  Depression, anxiety  Alcohol use  Previous admissions in       PAST MEDICAL HISTORY:    Please see H&P for details. Past Medical History:   Diagnosis Date    ADD (attention deficit disorder) 19-19yo    Treated with Adderall since . 2013 dosing 20mg AM and 10mg PM.  Trial/change to Vyvanse Nov-Dec 2015--not as effective. Gynecologic disorder     History of miscarriages. History of Mirena IUD placed on 4/17/15    HX OTHER MEDICAL      -BUFA baby    HX OTHER MEDICAL     HPV positive    Idiopathic vulvodynia 2014    L1 vertebral fracture (HCC) 2017    [facility] imaging/admissoin: Mild acute two column compression fracture of L1 without evidence of retropulsion into the spinal canal.  Managed non-operatively.     Migraines 2013    Neurological disorder     Pelvic pain in female 9/15/2014    2015 gyn laparoscopy/hysteroscopy with endometriosis worse right. Psychiatric disorder     depression    Secondary dysmenorrhea 8/12/2014    With menorraghia    Seizures ([facility] [address] 2008    never on meds--had appr 4-5 in a short amt of time and none since     Prior to Admission medications    Medication Sig Start Date End Date Taking? Authorizing Provider   hydrOXYzine HCL (ATARAX) 25 mg tablet Take 1 Tablet by mouth every eight (8) hours as needed for Anxiety or Sleep. 2/22/23   Cameron Ray MD   propranoloL (INDERAL) 40 mg tablet Take 1 Tablet by mouth three (3) times daily. 2/22/23   Cameron Ray MD   ibuprofen (MOTRIN) 800 mg tablet Take 1 Tablet by mouth every eight (8) hours as needed for Pain. Indications: pain 1/11/23   Rigo White MD   [facility]) 500 mg capsule Take 1 Capsule by mouth four (4) times daily.  Indications: post op  Patient not taking: Reported on 2/22/2023 1/4/23   Rigo White MD   Latuda 20 mg tab tablet  1/1/23   Provider, Historical   venlafaxine-SR (EFFEXOR-XR) 150 mg capsule TAKE 1 CAPSULE ORALLY DAILY TAKE WITH 75MG VENLAFAXINE ER TABLET TO EQUAL A DAILY DOSE OF 225MG 11/7/22   Provider, Historical     Vitals:    02/26/23 0430 02/26/23 0745 02/26/23 0921 02/26/23 1119   BP: 129/78 119/67  107/68   Pulse: 72 75  63   Resp: 15 18  16   Temp: 98.3 °F (36.8 °C) 97.7 °F (36.5 °C) 97.7 °F (36.5 °C) 97.5 °F (36.4 °C)   SpO2: 97% 94%  97%   Weight:       Height:       LMP: 03/14/2016     Lab Results   Component Value Date/Time    WBC 12.0 (H) 02/26/2023 04:26 AM    Hemoglobin (POC) 14.3 04/04/2016 07:26 AM    HGB 10.6 (L) 02/26/2023 04:26 AM    Hematocrit (POC) 42 04/04/2016 07:26 AM    HCT 32.7 (L) 02/26/2023 04:26 AM    PLATELET 082 61/90/8223 04:26 AM    MCV 91.9 02/26/2023 04:26 AM    Hgb, External 11.6 05/03/2012 12:00 AM    Hct, External 33.4 05/03/2012 12:00 AM    Platelet cnt., External 332K 05/03/2012 12:00 AM     Lab Results   Component Value Date/Time    Sodium 139 02/26/2023 04:26 AM    Potassium 3.9 02/26/2023 04:26 AM    Chloride 109 (H) 02/26/2023 04:26 AM    CO2 24 02/26/2023 04:26 AM    Anion gap 6 02/26/2023 04:26 AM    Glucose 136 (H) 02/26/2023 04:26 AM    BUN 11 02/26/2023 04:26 AM    Creatinine 0.50 (L) 02/26/2023 04:26 AM    BUN/Creatinine ratio 22 (H) 02/26/2023 04:26 AM    GFR est AA >60 07/21/2022 10:38 AM    GFR est non-AA >60 07/21/2022 10:38 AM    Calcium 9.0 02/26/2023 04:26 AM    Bilirubin, total 0.6 09/08/2022 12:00 AM    Alk. phosphatase 88 09/08/2022 12:00 AM    Protein, total 6.6 09/08/2022 12:00 AM    Albumin 2.8 (L) 02/26/2023 04:26 AM    Globulin 3.1 07/21/2022 10:38 AM    A-G Ratio 1.1 07/21/2022 10:38 AM    ALT (SGPT) 27 09/08/2022 12:00 AM    AST (SGOT) 25 09/08/2022 12:00 AM     No results found for: VALF2, VALAC, VALP, VALPR, DS6, CRBAM, CRBAMP, CARB2, XCRBAM  No results found for: LITHM  RADIOLOGY REPORTS:(reviewed/updated 2/26/2023)  XR ANKLE LT MIN 3 V    Result Date: 2/15/2023  Left ankle AP, lateral and oblique nonweightbearing x-rays show overall good alignment status post procedure, postsurgical changes noted, implants intact, fractures appear to be well aligned but no visible calluses are seen. No loosening of implants. Slight decreased bone density. XR ANKLE LT MIN 3 V    Result Date: 1/20/2023  Left ankle AP, lateral and oblique x-rays show satisfactory alignment status post surgical procedure with retained implants at the medial malleolus and lateral malleolus, all the screws and plates are intact. There is a normal ankle mortise with normal joint space. Fractures healing not complete. Postsurgical changes noted. CTA CHEST W OR W WO CONT    Result Date: 2/25/2023  INDICATION:  sob, hypoxia - r/o PE EXAM:  CTA Chest with contrast for Pulmonary Embolus COMPARISON:  None TECHNIQUE:  Following the uneventful intravenous administration of IV contrast thin helical axial images were obtained through the chest. 3D image postprocessing was performed.   CT dose reduction was achieved through use of a standardized protocol tailored for this examination and automatic exposure control for dose modulation. FINDINGS: THYROID: Unremarkable. MEDIASTINUM/JEREMY: No mass or lymphadenopathy. HEART/PERICARDIUM: Unremarkable. Coronary artery calcification:  1 absent. AORTA:  No aneurysm. PULMONARY ARTERIES:No pulmonary embolism. LUNGS/PLEURA: No pulmonary edema, pleural effusion or focal airspace disease. Bibasilar atelectasis. INCIDENTALLY IMAGED UPPER ABDOMEN: Hepatic steatosis. BONES: No destructive bone lesion. ADDITIONAL COMMENTS:  N/A     No acute process. CT SPINE LUMB WO CONT    Result Date: 12/8/2022  EXAM:  CT LUMBAR SPINE WITHOUT  CONTRAST INDICATION: has known lumbar compression fx. fell in shower today now with acute pain in low back. COMPARISON: None. CONTRAST:  None. TECHNIQUE: Multislice helical CT of the lumbar spine was performed without intravenous contrast administration. Coronal and sagittal reformats were generated. CT dose reduction was achieved through use of a standardized protocol tailored for this examination and automatic exposure control for dose modulation. FINDINGS: The alignment is within normal limits. There is no acute fracture or compression deformity. Chronic mild compression fracture of L1 is noted. Schmorl's node is evident at T12. The paravertebral soft tissues are within normal limits. Lower thoracic spine: No herniation or stenosis. L1-L2: There is no spinal canal or neural foraminal stenosis. L2-L3: Degenerative disc disease is noted at this level with central degenerative bulging disc without substantial spinal stenosis. L3-L4: Degenerative disc disease is noted at this level with central degenerative bulging disc but no substantial spinal stenosis. L4-L5: There is no spinal canal or neural foraminal stenosis. L5-S1: There is no spinal canal or neural foraminal stenosis. Chronic mild compression fracture L1.  Multilevel lumbar degenerative disc disease without acute fracture. CT ABD PELV WO CONT    Result Date: 12/24/2022  CLINICAL HISTORY: fall in bathtub, lower back pain L4-L5 h/o previous compression fx INDICATION: fall in bathtub, lower back pain L4-L5 h/o previous compression fx COMPARISON: 7/21/2022 CONTRAST:  None. TECHNIQUE: Thin axial images were obtained through the abdomen and pelvis. Coronal and sagittal reformats were generated. Oral contrast was not administered. CT dose reduction was achieved through use of a standardized protocol tailored for this examination and automatic exposure control for dose modulation. The absence of intravenous contrast material reduces the sensitivity for evaluation of visceral organs and vasculature including presence of small mass lesions, hemodynamically significant stenoses, dissections, mucosal abnormalities etc. FINDINGS: LOWER THORAX: No significant abnormality in the incidentally imaged lower chest. LIVER/GALLBLADDER: Hepatic steatosis . Gallbladder is within normal limits. CBD is not dilated. SPLEEN/PANCREAS:  within normal limits. ADRENALS/KIDNEYS: Unremarkable. No calculus or hydronephrosis. STOMACH: Unremarkable. SMALL BOWEL/COLON: No dilatation or wall thickening. Colonic diverticulosis. APPENDIX: Unremarkable. PERITONEUM: No ascites or pneumoperitoneum. RETROPERITONEUM: No lymphadenopathy or aortic aneurysm. REPRODUCTIVE ORGANS: URINARY BLADDER: No mass or calculus. BONES: Chronic Schmorl's node along the superior endplate of L1 and V50. ADDITIONAL COMMENTS: N/A     No acute intraperitoneal process is identified. There is no evidence of acute fracture or dislocation. Incidental and/or nonemergent findings are as described in detail above. XR CHEST PORT    Result Date: 2/25/2023  INDICATION: hypoxia EXAM:  AP CHEST RADIOGRAPH COMPARISON: June 8, 2022 FINDINGS: AP portable view of the chest demonstrates a normal cardiomediastinal silhouette.  There is no edema, effusion, consolidation, or pneumothorax. The osseous structures are unremarkable. No acute process. Lab Results   Component Value Date/Time    HCG urine, QL NEGATIVE 02/15/2019 04:13 PM    Pregnancy test,urine (POC) Negative 07/19/2022 07:06 PM    HCG, Ql. NEGATIVE 07/17/2010 04:44 PM    HCG urine, Ql. (POC) Negative 10/22/2014 09:27 AM       PSYCHOSOCIAL HISTORY:  L[facility] with her mom [facility] [address] [facility] [address]    General appearance:  lying in hospital bed, in distress and fatigued   Eye contact: fair  Speech: Spontaneous, soft, decreased output. Affect : Depressed, decreased range  Mood: \" been better\"  Thought Process: Logical, goal directed  Perception: Denies AH or VH. Thought Content: Denies SI or Plan  Insight: Partial  Judgement: Fair  Cognition: Intact grossly. ASSESSMENT AND PLAN:  Hien Bob meets criteria for a diagnosis of major depressive disorder, recurrent, severe without psychotic features and alcohol use disorder. Continue medical treatment of alcohol withdrawal  Admit to [facility] once medically cleared        Thank you for this consult. Please feel free to consult us again as needed.

## 2023-02-27 ENCOUNTER — APPOINTMENT (OUTPATIENT)
Dept: GENERAL RADIOLOGY | Age: 43
DRG: 773 | End: 2023-02-27
Attending: INTERNAL MEDICINE
Payer: MEDICAID

## 2023-02-27 LAB
ALBUMIN SERPL-MCNC: 2.7 G/DL (ref 3.5–5)
AMMONIA PLAS-SCNC: 58 UMOL/L
ANION GAP SERPL CALC-SCNC: 5 MMOL/L (ref 5–15)
BASOPHILS # BLD: 0 K/UL (ref 0–0.1)
BASOPHILS NFR BLD: 0 % (ref 0–1)
BUN SERPL-MCNC: 8 MG/DL (ref 6–20)
BUN/CREAT SERPL: 15 (ref 12–20)
CALCIUM SERPL-MCNC: 9 MG/DL (ref 8.5–10.1)
CHLORIDE SERPL-SCNC: 105 MMOL/L (ref 97–108)
CO2 SERPL-SCNC: 26 MMOL/L (ref 21–32)
CREAT SERPL-MCNC: 0.52 MG/DL (ref 0.55–1.02)
DIFFERENTIAL METHOD BLD: ABNORMAL
EOSINOPHIL # BLD: 0 K/UL (ref 0–0.4)
EOSINOPHIL NFR BLD: 0 % (ref 0–7)
ERYTHROCYTE [DISTWIDTH] IN BLOOD BY AUTOMATED COUNT: 13.5 % (ref 11.5–14.5)
GLUCOSE SERPL-MCNC: 128 MG/DL (ref 65–100)
HCT VFR BLD AUTO: 31.9 % (ref 35–47)
HGB BLD-MCNC: 10.5 G/DL (ref 11.5–16)
IMM GRANULOCYTES # BLD AUTO: 0.1 K/UL (ref 0–0.04)
IMM GRANULOCYTES NFR BLD AUTO: 1 % (ref 0–0.5)
LYMPHOCYTES # BLD: 1.6 K/UL (ref 0.8–3.5)
LYMPHOCYTES NFR BLD: 15 % (ref 12–49)
MCH RBC QN AUTO: 29.8 PG (ref 26–34)
MCHC RBC AUTO-ENTMCNC: 32.9 G/DL (ref 30–36.5)
MCV RBC AUTO: 90.6 FL (ref 80–99)
MONOCYTES # BLD: 0.8 K/UL (ref 0–1)
MONOCYTES NFR BLD: 7 % (ref 5–13)
NEUTS SEG # BLD: 8.3 K/UL (ref 1.8–8)
NEUTS SEG NFR BLD: 77 % (ref 32–75)
NRBC # BLD: 0 K/UL (ref 0–0.01)
NRBC BLD-RTO: 0 PER 100 WBC
PHOSPHATE SERPL-MCNC: 2.6 MG/DL (ref 2.6–4.7)
PLATELET # BLD AUTO: 235 K/UL (ref 150–400)
PMV BLD AUTO: 10.1 FL (ref 8.9–12.9)
POTASSIUM SERPL-SCNC: 3.9 MMOL/L (ref 3.5–5.1)
RBC # BLD AUTO: 3.52 M/UL (ref 3.8–5.2)
SODIUM SERPL-SCNC: 136 MMOL/L (ref 136–145)
WBC # BLD AUTO: 10.8 K/UL (ref 3.6–11)

## 2023-02-27 PROCEDURE — 74011250636 HC RX REV CODE- 250/636: Performed by: INTERNAL MEDICINE

## 2023-02-27 PROCEDURE — 77010033678 HC OXYGEN DAILY

## 2023-02-27 PROCEDURE — 82140 ASSAY OF AMMONIA: CPT

## 2023-02-27 PROCEDURE — 80069 RENAL FUNCTION PANEL: CPT

## 2023-02-27 PROCEDURE — 74011250637 HC RX REV CODE- 250/637: Performed by: NURSE PRACTITIONER

## 2023-02-27 PROCEDURE — 74011636637 HC RX REV CODE- 636/637: Performed by: INTERNAL MEDICINE

## 2023-02-27 PROCEDURE — 36415 COLL VENOUS BLD VENIPUNCTURE: CPT

## 2023-02-27 PROCEDURE — 74011250637 HC RX REV CODE- 250/637: Performed by: STUDENT IN AN ORGANIZED HEALTH CARE EDUCATION/TRAINING PROGRAM

## 2023-02-27 PROCEDURE — 74011250636 HC RX REV CODE- 250/636: Performed by: NURSE PRACTITIONER

## 2023-02-27 PROCEDURE — 85025 COMPLETE CBC W/AUTO DIFF WBC: CPT

## 2023-02-27 PROCEDURE — 65660000001 HC RM ICU INTERMED STEPDOWN

## 2023-02-27 PROCEDURE — 94640 AIRWAY INHALATION TREATMENT: CPT

## 2023-02-27 PROCEDURE — 71045 X-RAY EXAM CHEST 1 VIEW: CPT

## 2023-02-27 PROCEDURE — 74011250636 HC RX REV CODE- 250/636: Performed by: STUDENT IN AN ORGANIZED HEALTH CARE EDUCATION/TRAINING PROGRAM

## 2023-02-27 PROCEDURE — 74011000250 HC RX REV CODE- 250: Performed by: NURSE PRACTITIONER

## 2023-02-27 PROCEDURE — 74011250637 HC RX REV CODE- 250/637: Performed by: INTERNAL MEDICINE

## 2023-02-27 RX ORDER — VENLAFAXINE HYDROCHLORIDE 37.5 MG/1
CAPSULE, EXTENDED RELEASE ORAL
Status: CANCELLED | OUTPATIENT
Start: 2023-02-27

## 2023-02-27 RX ORDER — PREDNISONE 20 MG/1
40 TABLET ORAL
Status: COMPLETED | OUTPATIENT
Start: 2023-02-27 | End: 2023-03-03

## 2023-02-27 RX ADMIN — PROPRANOLOL HYDROCHLORIDE 40 MG: 10 TABLET ORAL at 15:18

## 2023-02-27 RX ADMIN — HYDROXYZINE HYDROCHLORIDE 25 MG: 25 TABLET ORAL at 15:18

## 2023-02-27 RX ADMIN — HYDROXYZINE HYDROCHLORIDE 25 MG: 25 TABLET ORAL at 07:32

## 2023-02-27 RX ADMIN — TRAMADOL HYDROCHLORIDE 50 MG: 50 TABLET ORAL at 16:16

## 2023-02-27 RX ADMIN — ENOXAPARIN SODIUM 30 MG: 100 INJECTION SUBCUTANEOUS at 09:09

## 2023-02-27 RX ADMIN — ONDANSETRON 4 MG: 2 INJECTION INTRAMUSCULAR; INTRAVENOUS at 09:00

## 2023-02-27 RX ADMIN — ENOXAPARIN SODIUM 30 MG: 100 INJECTION SUBCUTANEOUS at 22:04

## 2023-02-27 RX ADMIN — PROPRANOLOL HYDROCHLORIDE 40 MG: 10 TABLET ORAL at 22:03

## 2023-02-27 RX ADMIN — TRAMADOL HYDROCHLORIDE 50 MG: 50 TABLET ORAL at 22:03

## 2023-02-27 RX ADMIN — PROPRANOLOL HYDROCHLORIDE 40 MG: 10 TABLET ORAL at 09:00

## 2023-02-27 RX ADMIN — HYDROXYZINE HYDROCHLORIDE 25 MG: 25 TABLET ORAL at 22:04

## 2023-02-27 RX ADMIN — TRAMADOL HYDROCHLORIDE 50 MG: 50 TABLET ORAL at 01:24

## 2023-02-27 RX ADMIN — VENLAFAXINE HYDROCHLORIDE 150 MG: 150 CAPSULE, EXTENDED RELEASE ORAL at 09:00

## 2023-02-27 RX ADMIN — ONDANSETRON 4 MG: 2 INJECTION INTRAMUSCULAR; INTRAVENOUS at 03:50

## 2023-02-27 RX ADMIN — IPRATROPIUM BROMIDE AND ALBUTEROL SULFATE 3 ML: .5; 3 SOLUTION RESPIRATORY (INHALATION) at 04:13

## 2023-02-27 RX ADMIN — VENLAFAXINE HYDROCHLORIDE 75 MG: 37.5 CAPSULE, EXTENDED RELEASE ORAL at 10:13

## 2023-02-27 RX ADMIN — ACETAMINOPHEN 325MG 650 MG: 325 TABLET ORAL at 15:17

## 2023-02-27 RX ADMIN — HYDROXYZINE HYDROCHLORIDE 25 MG: 25 TABLET ORAL at 01:24

## 2023-02-27 RX ADMIN — THIAMINE HCL TAB 100 MG 100 MG: 100 TAB at 09:00

## 2023-02-27 RX ADMIN — PREDNISONE 40 MG: 20 TABLET ORAL at 10:13

## 2023-02-27 RX ADMIN — SODIUM CHLORIDE 125 ML/HR: 9 INJECTION, SOLUTION INTRAVENOUS at 07:33

## 2023-02-27 NOTE — PROGRESS NOTES
0700: Bedside and Verbal shift change report given to Zak Slater RN (oncoming nurse) by Zak Slater RN (offgoing nurse). Report included the following information SBAR, Kardex, Intake/Output, MAR, and Recent Results. 0732: PRN Atarax given for increased anxiety. 0900: PRN zofran given for nausea. 1145: Chest x-ray at bedside. 1520: PRN Atarax and tylenol given for increased anxiety and headache.    1615: PRN tramadol given for increase headache pain. End of Shift Note    Bedside shift change report given to Jodi Aleman RN (oncoming nurse) by Connie Tubbs RN (offgoing nurse). Report included the following information SBAR, Kardex, Intake/Output, MAR, and Recent Results    Shift worked:  [id number]     Shift summary and any significant changes:     See above     Concerns for physician to address:  none     Zone phone for oncoming shift:          Activity:  Activity Level: Up with Assistance  Number times ambulated in hallways past shift: 0  Number of times OOB to chair past shift: 0    Cardiac:   Cardiac Monitoring: Yes      Cardiac Rhythm: Sinus Rhythm / Sinus Dani     Access:  Current line(s): PIV     Genitourinary:   Urinary status: voiding    Respiratory:   O2 Device: Nasal cannula  Chronic home O2 use?: NO  Incentive spirometer at bedside: YES       GI:  Last Bowel Movement Date:  (PTA)  Current diet:  ADULT DIET Regular; Safety Tray; Safety Tray (Disposables)  Passing flatus: YES  Tolerating current diet: YES       Pain Management:   Patient states pain is manageable on current regimen: YES    Skin:  Elijah Score: 17  Interventions: increase time out of bed    Patient Safety:  Fall Score:  Total Score: 4  Interventions: bed/chair alarm, gripper socks, pt to call before getting OOB, stay with me (per policy), and sitter at bedside   High Fall Risk: Yes    Length of Stay:  Expected LOS: - - -  Actual LOS: 2      Connie Tubbs RN

## 2023-02-27 NOTE — PROGRESS NOTES
Problem: Pressure Injury - Risk of  Goal: *Prevention of pressure injury  Description: Document Elijah Scale and appropriate interventions in the flowsheet.   Outcome: Progressing Towards Goal  Note: Pressure Injury Interventions:  Sensory Interventions: Assess changes in LOC, Discuss PT/OT consult with provider, Float heels, Keep linens dry and wrinkle-free    Moisture Interventions: Absorbent underpads, Apply protective barrier, creams and emollients    Activity Interventions: Assess need for specialty bed, Increase time out of bed, PT/OT evaluation    Mobility Interventions: Float heels, HOB 30 degrees or less, Pressure redistribution bed/mattress (bed type)    Nutrition Interventions: Document food/fluid/supplement intake    Friction and Shear Interventions: Apply protective barrier, creams and emollients, Feet elevated on foot rest, HOB 30 degrees or less                Problem: Patient Education: Go to Patient Education Activity  Goal: Patient/Family Education  Outcome: Progressing Towards Goal     Problem: Alcohol Withdrawal  Goal: *STG: Participates in treatment plan  Outcome: Progressing Towards Goal     Problem: Suicide  Goal: *STG: Remains safe in hospital  Outcome: Progressing Towards Goal

## 2023-02-27 NOTE — PROGRESS NOTES
Hospitalist Progress Note    NAME: Tom Lofton   :  1980   MRN:  [id number]       Assessment / Plan:  ETOH and Opioid Withdrawal        Suicidal Ideations/Depression        Tachycardia  Respiratory acidosis secondary to Ativan use  - no current plan or SI/HI expressed this am  - etiology of tachycardia related to ETOH and Opioid Withdrawal  - CIWA  - IVF and multivitamin supplementation  Ammonia level mildly elevated at 38, S54 folic acid are within normal limits  -Follow-up 2D echo due to tachycardia- continue propranolol and effexor  - seizure and fall precautions  - suicide precautions  - psych recommended inpatient admission once medically stable, continue with one-to-one sitter  patient had a sudden onset of respiratory failure acute respiratory acidosis in the setting of Ativan use, patient was evaluated by intensivist.  Patient did not require ICU level of care  Ativan currently on hold, had received flumazenil to reverse Ativan effect  Currently on as needed Atarax  Patient is currently on oxygen, feels short of breath, repeat chest x-ray     2. Mild leukocytosis    - negative influenza and covid    - chest xray shows no acute process    - CTA shows bibasilar atelectasis    - afebrile    - urine drug screen negative for barbiturates and opiates, negative for methadone    - urinalysis positive for bacteria, negative for nitrites and leukocytes, patient asymptomatic.    - will monitor lab work. 3. Acute respiratory failure-POA    - CTA shows no pulmonary embolism. Bibasilar atelectasis noted. - chest xray shows no acute process    - keep 02 sats greater than 92%    - turn, cough and reposition    - incentive spirometry every 2 hours while awake    - prn nebulizer treatments     4. Hepatic steatosis    - etiology related to ETOH use    - seen on CTA of chest     - avoid hepatoxic medications     - will monitor lab work     5.  Fracture of Left Ankle- POA    - etiology related to prior fall    - left boot intact    - elevate left lower extremity while at rest    - fall precautions  Tobacco abuse  Started on nicotine patch    40 or above Morbid obesity / Body mass index is 41.2 kg/m². Estimated discharge date: February 28  Barriers: Inpatient psych, still in withdrawal  Code status: Full  Prophylaxis: Lovenox  Recommended Disposition: Home w/Family     Subjective:     Chief Complaint / Reason for Physician Visit  Suicidal thoughts, alcohol withdrawal discussed with RN events overnight. Patient feels depressed by the death of her grandmother  Review of Systems:  Symptom Y/N Comments  Symptom Y/N Comments   Fever/Chills n   Chest Pain n    Poor Appetite    Edema     Cough n   Abdominal Pain n    Sputum    Joint Pain     SOB/FIGUEROA n   Pruritis/Rash     Nausea/vomit y   Tolerating PT/OT     Diarrhea    Tolerating Diet y    Constipation    Other       Could NOT obtain due to:      Objective:     VITALS:   Last 24hrs VS reviewed since prior progress note. Most recent are:  Patient Vitals for the past 24 hrs:   Temp Pulse Resp BP SpO2   02/27/23 1518 -- 80 -- 129/83 --   02/27/23 1449 98.6 °F (37 °C) 66 19 (!) 111/59 93 %   02/27/23 1045 97.3 °F (36.3 °C) (!) 57 14 (!) 106/59 94 %   02/27/23 0859 -- 60 -- 111/66 --   02/27/23 0726 98.2 °F (36.8 °C) (!) 58 12 96/76 91 %   02/27/23 0413 -- -- -- -- 98 %   02/27/23 0303 98.4 °F (36.9 °C) (!) 54 13 103/74 94 %   02/26/23 2234 98.2 °F (36.8 °C) 64 12 126/74 94 %   02/26/23 1856 98.4 °F (36.9 °C) 60 18 114/67 100 %   02/26/23 1646 98.2 °F (36.8 °C) 70 14 (!) 107/49 100 %         Intake/Output Summary (Last 24 hours) at 2/27/2023 1520  Last data filed at 2/27/2023 1246  Gross per 24 hour   Intake 270 ml   Output --   Net 270 ml          I had a face to face encounter and independently examined this patient on 2/27/2023, as outlined below:  PHYSICAL EXAM:  General: WD, WN. Alert, cooperative, no acute distress    EENT:  EOMI. Anicteric sclerae.  MMM  Resp:  CTA bilaterally, no wheezing or rales. No accessory muscle use  CV:  Regular  rhythm,  No edema  GI:  Soft, Non distended, Non tender. +Bowel sounds  Neurologic:  Alert and oriented X 3, normal speech,   Psych:   Fair  insight. Not anxious nor agitated  Skin:  No rashes. No jaundice    Reviewed most current lab test results and cultures  YES  Reviewed most current radiology test results   YES  Review and summation of old records today    NO  Reviewed patient's current orders and MAR    YES  PMH/SH reviewed - no change compared to H&P  ________________________________________________________________________  Care Plan discussed with:    Comments   Patient     Family      RN     Care Manager     Consultant                        Multidiciplinary team rounds were held today with , nursing, pharmacist and clinical coordinator. Patient's plan of care was discussed; medications were reviewed and discharge planning was addressed. ________________________________________________________________________  Total NON critical care TIME:  40   Minutes    Total CRITICAL CARE TIME Spent:   Minutes non procedure based      Comments   >50% of visit spent in counseling and coordination of care     ________________________________________________________________________  Mortimer Morrison, MD     Procedures: see electronic medical records for all procedures/Xrays and details which were not copied into this note but were reviewed prior to creation of Plan. LABS:  I reviewed today's most current labs and imaging studies.   Pertinent labs include:  Recent Labs     02/27/23  0119 02/26/23  0426 02/25/23  0123   WBC 10.8 12.0* 11.6*   HGB 10.5* 10.6* 12.5   HCT 31.9* 32.7* 38.6    260 356       Recent Labs     02/27/23  0119 02/26/23  0426 02/25/23  0315    139 140   K 3.9 3.9 3.7    109* 110*   CO2 26 24 27   * 136* 120*   BUN 8 11 12   CREA 0.52* 0.50* 0.69   CA 9.0 9.0 8.2*   PHOS 2.6 2.8  -- ALB 2.7* 2.8*  --          Signed: Kika Arriaga MD

## 2023-02-27 NOTE — BSMART NOTE
BSMART Liaison Team Note     LOS:  2 days     Patient goal(s) for today: take medication as prescribed, communicate needs to staff in an appropriate manner, practice mindfulness   BSMART Liaison team focus/goals: assess needs, provide education and support, practice mindfulness      Progress note: Patient is a 38year old female that was seen on the medical floor face to face. Pt was alert and oriented x4. Presented with depressed mood and sad affect. Pt was tearful during conversation. Speech was soft and mumbled. Pt stated she has \"been better. \" Pt reports she lost her dog on Friday and stated \"I can't be happy again. \" Pt shared she has been unable to calm down, eat, sleep or shower. Pt shared her depressed was doing well since the summer, up until 12/7/22 since dog got sick. Pt denied SI/HI or [facility]. Pt stated \"I can't live like this. \" Discussed with patient importance of practicing mindfulness to help ease anxiety and racing thoughts. Pt was agreeable. Discussed meaning and ways to practice, such as breathing techniques and focusing on each task hour by hour. Pt was agreeable. She shared she was not as interested in therapy as she just conducted an IOP program. Pt states she is agreeable to inpatient behavioral health unit. Pt notified once she is medically cleared, she will be admitted to [facility], . Barriers to Discharge: medical clearance     Outpatient provider(s):  [facility] Merida  Insurance info/prescription coverage:  [employer]/[place] [employer]    Diagnosis:  major depressive disorder, recurrent, severe without psychotic features and alcohol use disorder. Plan:  please defer to psychiatric NP note for disposition and recommendation.    Follow up Psych Consult placed? no.   Psychiatrist updated? no       Participating treatment team members: Sherry Tyler MSW, Supervisee in Social Work

## 2023-02-28 ENCOUNTER — APPOINTMENT (OUTPATIENT)
Dept: NON INVASIVE DIAGNOSTICS | Age: 43
DRG: 773 | End: 2023-02-28
Attending: NURSE PRACTITIONER
Payer: MEDICAID

## 2023-02-28 LAB
ANION GAP SERPL CALC-SCNC: 3 MMOL/L (ref 5–15)
B PERT DNA SPEC QL NAA+PROBE: NOT DETECTED
BASOPHILS # BLD: 0 K/UL (ref 0–0.1)
BASOPHILS NFR BLD: 1 % (ref 0–1)
BORDETELLA PARAPERTUSSIS PCR, BORPAR: NOT DETECTED
BUN SERPL-MCNC: 11 MG/DL (ref 6–20)
BUN/CREAT SERPL: 20 (ref 12–20)
C PNEUM DNA SPEC QL NAA+PROBE: NOT DETECTED
CALCIUM SERPL-MCNC: 8.7 MG/DL (ref 8.5–10.1)
CHLORIDE SERPL-SCNC: 105 MMOL/L (ref 97–108)
CO2 SERPL-SCNC: 31 MMOL/L (ref 21–32)
CREAT SERPL-MCNC: 0.54 MG/DL (ref 0.55–1.02)
DIFFERENTIAL METHOD BLD: ABNORMAL
EOSINOPHIL # BLD: 0.1 K/UL (ref 0–0.4)
EOSINOPHIL NFR BLD: 1 % (ref 0–7)
ERYTHROCYTE [DISTWIDTH] IN BLOOD BY AUTOMATED COUNT: 13.3 % (ref 11.5–14.5)
FLUAV SUBTYP SPEC NAA+PROBE: NOT DETECTED
FLUBV RNA SPEC QL NAA+PROBE: NOT DETECTED
GLUCOSE SERPL-MCNC: 108 MG/DL (ref 65–100)
HADV DNA SPEC QL NAA+PROBE: NOT DETECTED
HCOV 229E RNA SPEC QL NAA+PROBE: NOT DETECTED
HCOV HKU1 RNA SPEC QL NAA+PROBE: NOT DETECTED
HCOV NL63 RNA SPEC QL NAA+PROBE: NOT DETECTED
HCOV OC43 RNA SPEC QL NAA+PROBE: NOT DETECTED
HCT VFR BLD AUTO: 30 % (ref 35–47)
HGB BLD-MCNC: 9.8 G/DL (ref 11.5–16)
HMPV RNA SPEC QL NAA+PROBE: NOT DETECTED
HPIV1 RNA SPEC QL NAA+PROBE: NOT DETECTED
HPIV2 RNA SPEC QL NAA+PROBE: NOT DETECTED
HPIV3 RNA SPEC QL NAA+PROBE: NOT DETECTED
HPIV4 RNA SPEC QL NAA+PROBE: NOT DETECTED
IMM GRANULOCYTES # BLD AUTO: 0.1 K/UL (ref 0–0.04)
IMM GRANULOCYTES NFR BLD AUTO: 1 % (ref 0–0.5)
LYMPHOCYTES # BLD: 2.1 K/UL (ref 0.8–3.5)
LYMPHOCYTES NFR BLD: 24 % (ref 12–49)
M PNEUMO DNA SPEC QL NAA+PROBE: NOT DETECTED
MCH RBC QN AUTO: 29.6 PG (ref 26–34)
MCHC RBC AUTO-ENTMCNC: 32.7 G/DL (ref 30–36.5)
MCV RBC AUTO: 90.6 FL (ref 80–99)
MONOCYTES # BLD: 0.8 K/UL (ref 0–1)
MONOCYTES NFR BLD: 9 % (ref 5–13)
NEUTS SEG # BLD: 5.7 K/UL (ref 1.8–8)
NEUTS SEG NFR BLD: 64 % (ref 32–75)
NRBC # BLD: 0.02 K/UL (ref 0–0.01)
NRBC BLD-RTO: 0.2 PER 100 WBC
PHOSPHATE SERPL-MCNC: 2.3 MG/DL (ref 2.6–4.7)
PLATELET # BLD AUTO: 235 K/UL (ref 150–400)
PMV BLD AUTO: 10.5 FL (ref 8.9–12.9)
POTASSIUM SERPL-SCNC: 3.2 MMOL/L (ref 3.5–5.1)
RBC # BLD AUTO: 3.31 M/UL (ref 3.8–5.2)
RSV RNA SPEC QL NAA+PROBE: NOT DETECTED
RV+EV RNA SPEC QL NAA+PROBE: NOT DETECTED
SARS-COV-2 RNA RESP QL NAA+PROBE: NOT DETECTED
SODIUM SERPL-SCNC: 139 MMOL/L (ref 136–145)
WBC # BLD AUTO: 8.8 K/UL (ref 3.6–11)

## 2023-02-28 PROCEDURE — 74011250637 HC RX REV CODE- 250/637: Performed by: NURSE PRACTITIONER

## 2023-02-28 PROCEDURE — 74011250636 HC RX REV CODE- 250/636: Performed by: STUDENT IN AN ORGANIZED HEALTH CARE EDUCATION/TRAINING PROGRAM

## 2023-02-28 PROCEDURE — 80048 BASIC METABOLIC PNL TOTAL CA: CPT

## 2023-02-28 PROCEDURE — 93306 TTE W/DOPPLER COMPLETE: CPT

## 2023-02-28 PROCEDURE — 36415 COLL VENOUS BLD VENIPUNCTURE: CPT

## 2023-02-28 PROCEDURE — 65660000001 HC RM ICU INTERMED STEPDOWN

## 2023-02-28 PROCEDURE — 84100 ASSAY OF PHOSPHORUS: CPT

## 2023-02-28 PROCEDURE — 74011250637 HC RX REV CODE- 250/637: Performed by: INTERNAL MEDICINE

## 2023-02-28 PROCEDURE — 74011636637 HC RX REV CODE- 636/637: Performed by: INTERNAL MEDICINE

## 2023-02-28 PROCEDURE — 77010033678 HC OXYGEN DAILY

## 2023-02-28 PROCEDURE — 74011250637 HC RX REV CODE- 250/637: Performed by: STUDENT IN AN ORGANIZED HEALTH CARE EDUCATION/TRAINING PROGRAM

## 2023-02-28 PROCEDURE — 74011250636 HC RX REV CODE- 250/636: Performed by: NURSE PRACTITIONER

## 2023-02-28 PROCEDURE — 0202U NFCT DS 22 TRGT SARS-COV-2: CPT

## 2023-02-28 PROCEDURE — 74011250637 HC RX REV CODE- 250/637: Performed by: PHYSICIAN ASSISTANT

## 2023-02-28 PROCEDURE — 85025 COMPLETE CBC W/AUTO DIFF WBC: CPT

## 2023-02-28 RX ORDER — IBUPROFEN 200 MG
1 TABLET ORAL DAILY
Status: DISCONTINUED | OUTPATIENT
Start: 2023-02-28 | End: 2023-03-13 | Stop reason: HOSPADM

## 2023-02-28 RX ORDER — ASPIRIN 325 MG/1
100 TABLET, FILM COATED ORAL DAILY
Qty: 30 TABLET | Refills: 0 | Status: SHIPPED | OUTPATIENT
Start: 2023-03-01 | End: 2023-03-13 | Stop reason: SDUPTHER

## 2023-02-28 RX ORDER — FUROSEMIDE 10 MG/ML
40 INJECTION INTRAMUSCULAR; INTRAVENOUS DAILY
Status: DISCONTINUED | OUTPATIENT
Start: 2023-03-01 | End: 2023-03-02

## 2023-02-28 RX ORDER — FUROSEMIDE 10 MG/ML
40 INJECTION INTRAMUSCULAR; INTRAVENOUS ONCE
Status: COMPLETED | OUTPATIENT
Start: 2023-02-28 | End: 2023-02-28

## 2023-02-28 RX ADMIN — HYDROXYZINE HYDROCHLORIDE 25 MG: 25 TABLET ORAL at 04:13

## 2023-02-28 RX ADMIN — ACETAMINOPHEN 325MG 650 MG: 325 TABLET ORAL at 14:13

## 2023-02-28 RX ADMIN — ENOXAPARIN SODIUM 30 MG: 100 INJECTION SUBCUTANEOUS at 10:17

## 2023-02-28 RX ADMIN — HYDROXYZINE HYDROCHLORIDE 25 MG: 25 TABLET ORAL at 15:10

## 2023-02-28 RX ADMIN — PROPRANOLOL HYDROCHLORIDE 40 MG: 10 TABLET ORAL at 16:39

## 2023-02-28 RX ADMIN — VENLAFAXINE HYDROCHLORIDE 225 MG: 150 CAPSULE, EXTENDED RELEASE ORAL at 08:48

## 2023-02-28 RX ADMIN — HYDROXYZINE HYDROCHLORIDE 25 MG: 25 TABLET ORAL at 23:23

## 2023-02-28 RX ADMIN — ONDANSETRON 4 MG: 2 INJECTION INTRAMUSCULAR; INTRAVENOUS at 19:38

## 2023-02-28 RX ADMIN — TRAMADOL HYDROCHLORIDE 50 MG: 50 TABLET ORAL at 04:13

## 2023-02-28 RX ADMIN — ENOXAPARIN SODIUM 30 MG: 100 INJECTION SUBCUTANEOUS at 22:14

## 2023-02-28 RX ADMIN — PREDNISONE 40 MG: 20 TABLET ORAL at 08:52

## 2023-02-28 RX ADMIN — PROPRANOLOL HYDROCHLORIDE 40 MG: 10 TABLET ORAL at 22:14

## 2023-02-28 RX ADMIN — THIAMINE HCL TAB 100 MG 100 MG: 100 TAB at 08:52

## 2023-02-28 RX ADMIN — ACETAMINOPHEN 325MG 650 MG: 325 TABLET ORAL at 06:17

## 2023-02-28 RX ADMIN — FUROSEMIDE 40 MG: 10 INJECTION, SOLUTION INTRAMUSCULAR; INTRAVENOUS at 14:04

## 2023-02-28 RX ADMIN — TRAMADOL HYDROCHLORIDE 50 MG: 50 TABLET ORAL at 19:38

## 2023-02-28 RX ADMIN — TRAMADOL HYDROCHLORIDE 50 MG: 50 TABLET ORAL at 10:17

## 2023-02-28 RX ADMIN — PROPRANOLOL HYDROCHLORIDE 40 MG: 10 TABLET ORAL at 08:51

## 2023-02-28 NOTE — DISCHARGE SUMMARY
Hospitalist Discharge Summary     Patient ID:  Kevyn Zapata  [id number]  65 y.o.  1980 2/24/2023    PCP on record: Deirdre Cheek MD    Admit date: 2/24/2023  Discharge date and time: 2/28/2023    DISCHARGE DIAGNOSIS:    ETOH and Opioid Withdrawal  Suicidal Ideations/Depression  Tachycardia  Respiratory acidosis secondary to Ativan use  Mild leukocytosis  Acute respiratory failure-POA  Hepatic steatosis  Fracture of Left Ankle- POA  Tobacco abuse    CONSULTATIONS:  IP CONSULT TO INTENSIVIST  IP CONSULT TO PSYCHIATRY    Excerpted HPI from H&P of Quinn Truong DO:  Kevyn Zapata is a 38 y.o. female who presented to the emergency room with suicidal ideations. Reported to emergency room physician that she was having suicidal thoughts and called the psychiatric physician to inform them that her medications were not working and was instructed to report to the emergency room. Patient observed resting on stretcher with eyes closed. Arouses when name is called. Expresses no SI or HI at present and no plan. Denies chest pain, shortness of breath, palpitations, dizziness or distress. Patient is diaphoretic otherwise no distress noted. Recently lost pet and started drinking vodka since yesterday. Nursing reports that she has been drinking a quart of vodka for the last 4-5 days. Also states that she recently fractured her left ankle and became addicted to her pain medications. ______________________________________________________________________  DISCHARGE SUMMARY/HOSPITAL COURSE:  for full details see H&P, daily progress notes, labs, consult notes.      ETOH and Opioid Withdrawal  Suicidal Ideations/Depression  Tachycardia  Respiratory acidosis secondary to Ativan use  - no current plan or SI/HI expressed this am  - etiology of tachycardia related to ETOH and Opioid Withdrawal  - CIWA  - IVF and multivitamin supplementation  Ammonia level mildly elevated at 38, Q99 folic acid are within normal limits  -Echo with LVEF 55-60% with normal wall function. Technically difficult study. - psych recommended inpatient admission once medically stable, continue with one-to-one sitter  patient had a sudden onset of respiratory failure acute respiratory acidosis in the setting of Ativan use, patient was evaluated by intensivist.  Patient did not require ICU level of care  Ativan currently on hold, had received flumazenil to reverse Ativan effect  Currently on as needed Atarax  Patient is currently on oxygen, however off of her nose, satting at 95%, denies SOB. XR with some new interval pulmonary edema. Lasix trailed prior to DC with trail of O2 weaning. Mild leukocytosis    - negative influenza and covid    - chest xray shows no acute process    - CTA shows bibasilar atelectasis    - afebrile    - urine drug screen negative for barbiturates and opiates, negative for methadone    - urinalysis positive for bacteria, negative for nitrites and leukocytes, patient asymptomatic. Acute respiratory failure-POA    - CTA shows no pulmonary embolism. Bibasilar atelectasis noted. - chest xray shows no acute process    - keep 02 sats greater than 92%    - turn, cough and reposition    - incentive spirometry every 2 hours while awake    - prn nebulizer treatments     Hepatic steatosis    - etiology related to ETOH use    - seen on CTA of chest     - avoid hepatoxic medications     - will monitor lab work     Fracture of Left Ankle- POA    - etiology related to prior fall    - left boot intact    - elevate left lower extremity while at rest    - fall precautions    Tobacco abuse  Continue nicotine patch  ______________________________________________________  Patient seen and examined by me on discharge day. Pertinent Findings:  Gen:    Not in distress  Chest: Clear lungs  CVS:   Regular rhythm.   No edema  Abd:  Soft, not distended, not tender  Neuro: Alert  _______________________________________________________________________  DISCHARGE MEDICATIONS:   Current Discharge Medication List        START taking these medications    Details   thiamine mononitrate (B-1) 100 mg tablet Take 1 Tablet by mouth daily for 30 days. Qty: 30 Tablet, Refills: 0  Start date: 3/1/2023, End date: 3/31/2023           CONTINUE these medications which have NOT CHANGED    Details   hydrOXYzine HCL (ATARAX) 25 mg tablet Take 1 Tablet by mouth every eight (8) hours as needed for Anxiety or Sleep. Qty: 15 Tablet, Refills: 0      propranoloL (INDERAL) 40 mg tablet Take 1 Tablet by mouth three (3) times daily. Qty: 15 Tablet, Refills: 0      Latuda 20 mg tab tablet       venlafaxine-SR (EFFEXOR-XR) 150 mg capsule TAKE 1 CAPSULE ORALLY DAILY TAKE WITH 75MG VENLAFAXINE ER TABLET TO EQUAL A DAILY DOSE OF 225MG           STOP taking these medications       ibuprofen (MOTRIN) 800 mg tablet Comments:   Reason for Stopping:         cephALEXin (KEFLEX) 500 mg capsule Comments:   Reason for Stopping:                 Patient Follow Up Instructions: Activity: PT/OT Eval and Treat  Diet: Regular Diet  Wound Care: None needed    Follow-up with PCP in 1-2 weeks.   Follow-up tests/labs   Follow-up Information       Follow up With Specialties Details Why Contact Mahogany Orozco MD Infectious Disease Physician   [address] [place] [address]  [phone]            ________________________________________________________________    Risk of deterioration: Low    Condition at Discharge:  Stable  __________________________________________________________________    Disposition  IP Psych    ____________________________________________________________________    Code Status: Full Code  ___________________________________________________________________      Total time in minutes spent coordinating this discharge (includes going over instructions, follow-up, prescriptions, and preparing report for sign off to her PCP) :  >30 minutes    Signed:  Ade Rob MD

## 2023-02-28 NOTE — PROGRESS NOTES
Transition of Care Plan:     RUR:20%  Disposition: IP Psych  If SNF or IPR: n/a  Date FOC offered:n/a  Date FOC received:n/a  Date authorization started with reference number:n/a  Date authorization received and expires:n/a  Accepting facility:n/a  Follow up appointments:to be scheduled   DME needed:none  Transportation at [facility] or means to access home: yes        IM Medicare Letter:n/a  Is patient a [place] and connected with the [place]?    no            If yes, was [employer] transfer form completed and VA notified? n/a  Caregiver Contact:grandmother Roark Fleischer [phone]  Discharge Caregiver contacted prior to discharge? yes  Care Conference needed?:  no  Previous HH/SNF/IPR: none                  Reason for Admission:   alcohol and opiate withdrawal                   RUR Score:     20%        PCP:    First and Last name:  Jesu Ramos MD                 Name of Practice:              Are you a current patient: Yes/No:              Approximate date of last visit:               Can you do a virtual visit with your PCP:              Resources/supports as identified by patient/family:   grandparents                 Top Challenges facing patient (as identified by patient/family and CM):  substance use disorder                      Finances/Medication cost?                    Transportation? Support system or lack thereof? Living arrangements? Self-care/ADLs/Cognition?             Current Advanced Directive/Advance Care Plan:  Full Code        Healthcare Decision Maker:   Click here to complete HealthCare Decision Makers including selection of the Healthcare Decision Maker Relationship (ie \"Primary\")           Payor Source Payor: BLUE CROSS MEDICAID / Plan: [address] [place] [address] / Product Type: Managed Care Medicaid /                              Plan for utilizing home health:  not recommended                    Transition of Care Plan: Patient lives with her grandparents and her son. Patient is independent in her ADLs/IADLs. Patient does ambulate with crutches due to an ortho injury. Patient uses any pharmacy with a drive threw. Psychiatry assessed and recommended IP Psych. MD states that pt is medically stable for [facility] to review. CM spoke to [facility] w/ [address] - they are reviewing and speaking w/ RN for clinical info.   Will need PCR - Perf Serve to Dr. Mell Prasad to request.        Kranthi Desir, MSW

## 2023-02-28 NOTE — BSMART NOTE
BSMART Liaison Team Note     LOS:  3 Days     Patient goal(s) for today: Take medication as prescribed, communicate needs to staff in an appropriate manner, practice mindfulness, utilize coping skills as needed  [facility] Liaison team focus/goals: Assess needs, provide education and support, engage in brief therapy session when appropriate    Progress note: Liaison met w/ pt face to face. Pt was received laying in bed watching television w/ 1:1 sitter present at bedside. When asked how pt is feeling today she responds, \"A lot of anxiety, fear, panic\". Pt denies SI/HI/VH/AH; no evidence of psychosis or delusional thoughts. Pt denies any issues w/ appetite. Pt reports difficulty falling asleep and staying asleep throughout the night, explaining she has been \"tossing and turning\". Pt reports getting about 5 hours per night and does not feel that she is getting restful sleep. Pt reports she is currently being given Atarax to help manage the anxiety, but does not feel that this is helping. Pt identifies watching television and reading as her primary coping skills. Pt spoke about working towards being more vocal in regards to her needs and mental health. Liaison provided supportive listening and encouragement in regards to pt advocating for herself. Pt continues to be voluntary for psychiatric inpatient treatment pending medical clearance. Pt thanked liaison for the visit and denies having any questions or concerns at this time. Barriers to Discharge: Medical Clearance & BHU Placement    Outpatient provider(s):  [facility] Merida  Insurance info/prescription coverage:  BLUE CROSS MEDICAID/[place] BLUE CROSS [employer]US    Diagnosis: Major Depressive Disorder, recurrent, severe without psychotic features; Alcohol Use Disorder    Plan:  Please defer to psychiatric consult note for recommendations. Follow up Psych Consult placed? Yes  Psychiatrist updated?  No      Participating treatment team members: Louann Drake Gil Libman, LMSW

## 2023-02-28 NOTE — PROGRESS NOTES
0130: IV was pulled out. This RN attempted, unsuccessful. Another RN attempted, unsuccessful. Pt asked us to stop. Got very anxious. Noticed her nicotine patch was gone and asked for another. Orders received from Bryson Ross.    0330: RRT RN recommended PICC team to put in endurance catheter.

## 2023-02-28 NOTE — PROGRESS NOTES
End of Shift Note    Bedside shift change report given to Kaylee Monroy RN (oncoming nurse) by Kenneth Lopez RN (offgoing nurse). Report included the following information SBAR    Shift worked:  [phone]   Shift summary and any significant changes:    0953: 22 gauge PIV placed right AC  1000:  Blood drawn for labwork - lavendar and pistachio tubes  1130: Patient off the floor for echo  1300: Patient returned on floor  1326: Called respiratory for nebulizer treatment   1333: Received order for Covid rule-out  1725: Called respiratory again to check on nebulizer treatment and they said they need to wait for Covid rule-out to result before administering nebulizer treatment   Concerns for physician to address:       Zone phone for oncoming shift:       Activity:  Activity Level: Bed Rest  Number times ambulated in hallways past shift: 0  Number of times OOB to chair past shift: 0    Cardiac:   Cardiac Monitoring: Yes      Cardiac Rhythm: Sinus Rhythm    Access:  Current line(s): PIV     Genitourinary:   Urinary status: voiding    Respiratory:   O2 Device: Nasal cannula  Chronic home O2 use?: NO  Incentive spirometer at bedside: NO       GI:  Last Bowel Movement Date: 02/27/23  Current diet:  ADULT DIET Regular; Safety Tray; Safety Tray (Disposables)  Passing flatus: YES  Tolerating current diet: YES       Pain Management:   Patient states pain is manageable on current regimen: YES    Skin:  Elijah Score: 17  Interventions: increase time out of bed    Patient Safety:  Fall Score:  Total Score: 4  Interventions: gripper socks  High Fall Risk: Yes    Length of Stay:  Expected LOS: - - -  Actual LOS: Elvin Arias RN

## 2023-03-01 LAB
ALBUMIN SERPL-MCNC: 2.6 G/DL (ref 3.5–5)
ANION GAP SERPL CALC-SCNC: 3 MMOL/L (ref 5–15)
BUN SERPL-MCNC: 16 MG/DL (ref 6–20)
BUN/CREAT SERPL: 23 (ref 12–20)
CALCIUM SERPL-MCNC: 8.8 MG/DL (ref 8.5–10.1)
CHLORIDE SERPL-SCNC: 102 MMOL/L (ref 97–108)
CO2 SERPL-SCNC: 35 MMOL/L (ref 21–32)
CREAT SERPL-MCNC: 0.71 MG/DL (ref 0.55–1.02)
GLUCOSE SERPL-MCNC: 126 MG/DL (ref 65–100)
PHOSPHATE SERPL-MCNC: 2.4 MG/DL (ref 2.6–4.7)
POTASSIUM SERPL-SCNC: 3.1 MMOL/L (ref 3.5–5.1)
SODIUM SERPL-SCNC: 140 MMOL/L (ref 136–145)

## 2023-03-01 PROCEDURE — 77010033678 HC OXYGEN DAILY

## 2023-03-01 PROCEDURE — 74011250637 HC RX REV CODE- 250/637: Performed by: NURSE PRACTITIONER

## 2023-03-01 PROCEDURE — 80069 RENAL FUNCTION PANEL: CPT

## 2023-03-01 PROCEDURE — 36415 COLL VENOUS BLD VENIPUNCTURE: CPT

## 2023-03-01 PROCEDURE — 74011250636 HC RX REV CODE- 250/636: Performed by: STUDENT IN AN ORGANIZED HEALTH CARE EDUCATION/TRAINING PROGRAM

## 2023-03-01 PROCEDURE — 74011250637 HC RX REV CODE- 250/637: Performed by: INTERNAL MEDICINE

## 2023-03-01 PROCEDURE — 74011250637 HC RX REV CODE- 250/637: Performed by: STUDENT IN AN ORGANIZED HEALTH CARE EDUCATION/TRAINING PROGRAM

## 2023-03-01 PROCEDURE — 74011250637 HC RX REV CODE- 250/637: Performed by: PHYSICIAN ASSISTANT

## 2023-03-01 PROCEDURE — 65660000001 HC RM ICU INTERMED STEPDOWN

## 2023-03-01 PROCEDURE — 74011636637 HC RX REV CODE- 636/637: Performed by: INTERNAL MEDICINE

## 2023-03-01 RX ORDER — POTASSIUM CHLORIDE 20 MEQ/1
40 TABLET, EXTENDED RELEASE ORAL 2 TIMES DAILY
Status: COMPLETED | OUTPATIENT
Start: 2023-03-01 | End: 2023-03-01

## 2023-03-01 RX ORDER — POTASSIUM CHLORIDE 20 MEQ/1
40 TABLET, EXTENDED RELEASE ORAL 2 TIMES DAILY
Status: DISCONTINUED | OUTPATIENT
Start: 2023-03-02 | End: 2023-03-07

## 2023-03-01 RX ORDER — BUTALBITAL, ACETAMINOPHEN AND CAFFEINE 50; 325; 40 MG/1; MG/1; MG/1
1 TABLET ORAL
Status: DISCONTINUED | OUTPATIENT
Start: 2023-03-01 | End: 2023-03-09

## 2023-03-01 RX ADMIN — PREDNISONE 40 MG: 20 TABLET ORAL at 08:32

## 2023-03-01 RX ADMIN — TRAMADOL HYDROCHLORIDE 50 MG: 50 TABLET ORAL at 08:31

## 2023-03-01 RX ADMIN — BUTALBITAL, ACETAMINOPHEN, AND CAFFEINE 1 TABLET: 50; 325; 40 TABLET ORAL at 20:20

## 2023-03-01 RX ADMIN — DIBASIC SODIUM PHOSPHATE, MONOBASIC POTASSIUM PHOSPHATE AND MONOBASIC SODIUM PHOSPHATE 1 TABLET: 852; 155; 130 TABLET ORAL at 08:32

## 2023-03-01 RX ADMIN — POTASSIUM CHLORIDE 40 MEQ: 1500 TABLET, EXTENDED RELEASE ORAL at 08:32

## 2023-03-01 RX ADMIN — HYDROXYZINE HYDROCHLORIDE 25 MG: 25 TABLET ORAL at 16:33

## 2023-03-01 RX ADMIN — DIBASIC SODIUM PHOSPHATE, MONOBASIC POTASSIUM PHOSPHATE AND MONOBASIC SODIUM PHOSPHATE 1 TABLET: 852; 155; 130 TABLET ORAL at 18:29

## 2023-03-01 RX ADMIN — BUTALBITAL, ACETAMINOPHEN, AND CAFFEINE 1 TABLET: 50; 325; 40 TABLET ORAL at 10:51

## 2023-03-01 RX ADMIN — THIAMINE HCL TAB 100 MG 100 MG: 100 TAB at 08:32

## 2023-03-01 RX ADMIN — POTASSIUM CHLORIDE 40 MEQ: 1500 TABLET, EXTENDED RELEASE ORAL at 18:29

## 2023-03-01 RX ADMIN — ENOXAPARIN SODIUM 30 MG: 100 INJECTION SUBCUTANEOUS at 20:22

## 2023-03-01 RX ADMIN — VENLAFAXINE HYDROCHLORIDE 225 MG: 150 CAPSULE, EXTENDED RELEASE ORAL at 08:32

## 2023-03-01 RX ADMIN — FUROSEMIDE 40 MG: 10 INJECTION, SOLUTION INTRAMUSCULAR; INTRAVENOUS at 08:32

## 2023-03-01 RX ADMIN — PROPRANOLOL HYDROCHLORIDE 40 MG: 10 TABLET ORAL at 16:28

## 2023-03-01 RX ADMIN — PROPRANOLOL HYDROCHLORIDE 40 MG: 10 TABLET ORAL at 20:20

## 2023-03-01 RX ADMIN — ENOXAPARIN SODIUM 30 MG: 100 INJECTION SUBCUTANEOUS at 10:51

## 2023-03-01 RX ADMIN — PROPRANOLOL HYDROCHLORIDE 40 MG: 10 TABLET ORAL at 08:32

## 2023-03-01 NOTE — PROGRESS NOTES
Problem: Pressure Injury - Risk of  Goal: *Prevention of pressure injury  Description: Document Elijah Scale and appropriate interventions in the flowsheet. Outcome: Progressing Towards Goal  Note: Pressure Injury Interventions:  Sensory Interventions: Float heels    Moisture Interventions: Check for incontinence Q2 hours and as needed, Minimize layers    Activity Interventions: Chair cushion, Increase time out of bed, Pressure redistribution bed/mattress(bed type)    Mobility Interventions: Float heels, HOB 30 degrees or less    Nutrition Interventions: Document food/fluid/supplement intake    Friction and Shear Interventions: Apply protective barrier, creams and emollients, Feet elevated on foot rest, HOB 30 degrees or less                Problem: Patient Education: Go to Patient Education Activity  Goal: Patient/Family Education  Outcome: Progressing Towards Goal     Problem: Falls - Risk of  Goal: *Absence of Falls  Description: Document Rema Fall Risk and appropriate interventions in the flowsheet.   Outcome: Progressing Towards Goal  Note: Fall Risk Interventions:  Mobility Interventions: Assess mobility with egress test    Mentation Interventions: Adequate sleep, hydration, pain control    Medication Interventions: Assess postural VS orthostatic hypotension    Elimination Interventions: Call light in reach              Problem: Patient Education: Go to Patient Education Activity  Goal: Patient/Family Education  Outcome: Progressing Towards Goal     Problem: Alcohol Withdrawal  Goal: *STG: Participates in treatment plan  Outcome: Progressing Towards Goal  Goal: *STG: Remains safe in hospital  Outcome: Progressing Towards Goal  Goal: *STG: Seeks staff when symptoms of withdrawal increase  Outcome: Progressing Towards Goal  Goal: *STG: Complies with medication therapy  Outcome: Progressing Towards Goal  Goal: *STG: Attends activities and groups  Outcome: Progressing Towards Goal  Goal: *STG: Will identify Thoracentesis Procedure Note    Pre-operative Diagnosis: Pleural Effusion, bilateral     Post-operative Diagnosis: Same    Indications: Undiagnosed pleural effusions    Procedure Details:  Informed consent was obtained. The risks of the procedure were discussed including: infection, bleeding, pain, pneumothorax and failure to remove fluid at all. Under sterile conditions the patient was properly positioned. A standard kit was used with Chorhexidine solution and sterile drapes. The entry site had been previously identified by ultrasound and marked. 2% Lidocaine was used to anesthetize the rib space at the entry site. A 19 gauge CogniTenseh catheter  was inserted into the right pleural space. Fluid was removed through the catheter connected to a vacutainer bottle without  difficulty. After the fluid was drained to completion, the catheter was removed and pressure held over the entry site. A Band-aid was applied to the wound and the procedure completed. Findings: 450 ml of straw colored pleural fluid was removed from the right  chest.     Complications:  None; patient tolerated the procedure well. Condition: Stable    Plan: Fluid will be sent to the lab for the following analysis:   LDH, Protein, Glucose, Cell count, Differential, Cytology, as well as for infection analysis (Bacterial culture, AFB stain,). Serum for LDH and Total Protein will be ordered for Light's Criteria calculations. A follow up chest x-ray was ordered. Tylenol 650 mg q4 hours prn was ordered for pain.       Electronically signed by Jesus Jha DO on 6/13/2020 at 12:25 PM negative impact of chemical dependency including the use of tobacco, alcohol, and other substances  Outcome: Progressing Towards Goal  Goal: *STG: Verbalizes abstinence as an achievable goal  Outcome: Progressing Towards Goal  Goal: *STG: Agrees to participate in outpatient after care program to support ongoing mental health  Outcome: Progressing Towards Goal  Goal: *STG: Able to indentify relapse triggers including interpersonal/social and familial factors  Outcome: Progressing Towards Goal  Goal: *STG: Identify lifestyle changes to support long term sobriety such as vocation, employment, education, and legal issues  Outcome: Progressing Towards Goal  Goal: *STG: Maintains appropriate nutrition and hydration  Outcome: Progressing Towards Goal  Goal: *STG: Vital signs within defined limits  Outcome: Progressing Towards Goal  Goal: *STG/LTG: Relapse prevention plan in place to include housing/aftercare, leisure activities, and spirituality  Outcome: Progressing Towards Goal  Goal: Interventions  Outcome: Progressing Towards Goal     Problem: Risk for Opioid Over-Sedation  Goal: Recognition of Risk Factors for Over-sedation  Outcome: Progressing Towards Goal  Goal: Prevention of Over-sedation  Outcome: Progressing Towards Goal  Goal: Prevention of Respiratory Depression  Outcome: Progressing Towards Goal     Problem: Patient Education: Go to Patient Education Activity  Goal: Patient/Family Education  Outcome: Progressing Towards Goal     Problem: Suicide  Goal: *STG: Remains safe in hospital  Outcome: Progressing Towards Goal  Goal: *STG: Seeks staff when feelings of self harm or harm towards others arise  Outcome: Progressing Towards Goal  Goal: *STG: Attends activities and groups  Outcome: Progressing Towards Goal  Goal: *STG:  Verbalizes alternative ways of dealing with maladaptive feelings/behaviors  Outcome: Progressing Towards Goal  Goal: *STG/LTG: Complies with medication therapy  Outcome: Progressing Towards Goal  Goal: *STG/LTG: No longer expresses self destructive or suicidal thoughts  Outcome: Progressing Towards Goal  Goal: *LTG:  Identifies available community resources  Outcome: Progressing Towards Goal  Goal: *LTG:  Develops proactive suicide prevention plan  Outcome: Progressing Towards Goal  Goal: Interventions  Outcome: Progressing Towards Goal     Problem: Patient Education: Go to Patient Education Activity  Goal: Patient/Family Education  Outcome: Progressing Towards Goal

## 2023-03-01 NOTE — PROGRESS NOTES
Home Oxygen Test  Date of test: 03/01/2023  Time of test: 12:15    Sa02 89 % on room air AT REST. Sa02 86 % on room air DURING AMBULATION. Sa02 95 % on 2 Liters DURING AMBULATION. Sa02 98 % on 2 Liters AT REST/AFTER AMBULATION.

## 2023-03-01 NOTE — PROGRESS NOTES
Hospitalist Progress Note    NAME: Lionel Corona   :  1980   MRN:  [id number]       Assessment / Plan:  ETOH and Opioid Withdrawal        Suicidal Ideations/Depression        Tachycardia  Respiratory acidosis secondary to Ativan use  - no current plan or SI/HI expressed this am  - etiology of tachycardia related to ETOH and Opioid Withdrawal  - CIWA  - IVF and multivitamin supplementation  Ammonia level mildly elevated at 38, K56 folic acid are within normal limits  -Follow-up 2D echo due to tachycardia- continue propranolol and effexor  - seizure and fall precautions  - suicide precautions  - psych recommended inpatient admission once medically stable, continue with one-to-one sitter  patient had a sudden onset of respiratory failure acute respiratory acidosis in the setting of Ativan use, patient was evaluated by intensivist.  Patient did not require ICU level of care  Ativan currently on hold, had received flumazenil to reverse Ativan effect  Currently on as needed Atarax  Patient is currently on oxygen,  weaning with assistance of lasix. Psych without beds to monitor pt requiring O2NC. Will continue to trial weaning with lasix, may trial with albumin tomorrow morning with encouraged OOB to chair. 2. Mild leukocytosis    - negative influenza and covid    - chest xray shows no acute process    - CTA shows bibasilar atelectasis    - afebrile    - urine drug screen negative for barbiturates and opiates, negative for methadone    - urinalysis positive for bacteria, negative for nitrites and leukocytes, patient asymptomatic.    - will monitor lab work. 3. Acute respiratory failure-POA    - CTA shows no pulmonary embolism. Bibasilar atelectasis noted. - chest xray shows no acute process    - keep 02 sats greater than 92%    - turn, cough and reposition    - incentive spirometry every 2 hours while awake    - prn nebulizer treatments     4.   Hepatic steatosis    - etiology related to ETOH use    - seen on CTA of chest     - avoid hepatoxic medications     - will monitor lab work     - Low albumin     5. Fracture of Left Ankle- POA    - etiology related to prior fall    - left boot intact    - elevate left lower extremity while at rest    - fall precautions    Tobacco abuse  Started on nicotine patch    40 or above Morbid obesity / Body mass index is 41.2 kg/m². Estimated discharge date: February 28  Barriers: Inpatient psych, still in withdrawal  Code status: Full  Prophylaxis: Lovenox  Recommended Disposition: Home w/Family     Subjective:     Chief Complaint / Reason for Physician Visit  Suicidal thoughts, alcohol withdrawal discussed with RN events overnight. Patient denies SI this morning. Patient removed her own O2 NC as it was making her feel uncomfortable. Will trial 6 minute walk today. Review of Systems:  Symptom Y/N Comments  Symptom Y/N Comments   Fever/Chills n   Chest Pain n    Poor Appetite    Edema     Cough n   Abdominal Pain n    Sputum    Joint Pain     SOB/FIGUEROA n   Pruritis/Rash     Nausea/vomit y   Tolerating PT/OT     Diarrhea    Tolerating Diet y    Constipation    Other       Could NOT obtain due to:      Objective:     VITALS:   Last 24hrs VS reviewed since prior progress note.  Most recent are:  Patient Vitals for the past 24 hrs:   Temp Pulse Resp BP SpO2   03/01/23 1628 -- 65 -- 111/62 --   03/01/23 1550 97.3 °F (36.3 °C) (!) 56 18 93/64 95 %   03/01/23 1042 97.8 °F (36.6 °C) (!) 56 -- 98/65 97 %   03/01/23 0831 -- 90 -- -- --   03/01/23 0740 97.2 °F (36.2 °C) 63 16 135/85 (!) 83 %   03/01/23 0339 98.4 °F (36.9 °C) 67 14 (!) 97/53 94 %   02/28/23 2312 98.5 °F (36.9 °C) 71 25 131/74 94 %   02/28/23 1917 98.4 °F (36.9 °C) 64 14 (!) 102/57 93 %         Intake/Output Summary (Last 24 hours) at 3/1/2023 1850  Last data filed at 3/1/2023 1600  Gross per 24 hour   Intake 700 ml   Output 400 ml   Net 300 ml          I had a face to face encounter and independently examined this patient on 3/1/2023, as outlined below:  PHYSICAL EXAM:  General: WD, WN. Alert, cooperative, no acute distress    EENT:  EOMI. Anicteric sclerae. MMM  Resp:  CTA bilaterally, no wheezing or rales. No accessory muscle use  CV:  Regular  rhythm,  No edema  GI:  Soft, Non distended, Non tender. +Bowel sounds  Neurologic:  Alert and oriented X 3, normal speech,   Psych:   Fair  insight. Not anxious nor agitated  Skin:  No rashes. No jaundice    Reviewed most current lab test results and cultures  YES  Reviewed most current radiology test results   YES  Review and summation of old records today    NO  Reviewed patient's current orders and MAR    YES  PMH/SH reviewed - no change compared to H&P  ________________________________________________________________________  Care Plan discussed with:    Comments   Patient x    Family      RN x    Care Manager x    Consultant  x                      Multidiciplinary team rounds were held today with , nursing, pharmacist and clinical coordinator. Patient's plan of care was discussed; medications were reviewed and discharge planning was addressed. ________________________________________________________________________  Total NON critical care TIME:  55   Minutes    Total CRITICAL CARE TIME Spent:   Minutes non procedure based      Comments   >50% of visit spent in counseling and coordination of care x    ________________________________________________________________________  Zbigniew Wu MD     Procedures: see electronic medical records for all procedures/Xrays and details which were not copied into this note but were reviewed prior to creation of Plan. LABS:  I reviewed today's most current labs and imaging studies.   Pertinent labs include:  Recent Labs     02/28/23  1000 02/27/23  0119   WBC 8.8 10.8   HGB 9.8* 10.5*   HCT 30.0* 31.9*    235       Recent Labs     03/01/23  0042 02/28/23  1000 02/27/23  0119    139 136   K 3.1* 3. 2* 3.9    105 105   CO2 35* 31 26   * 108* 128*   BUN 16 11 8   CREA 0.71 0.54* 0.52*   CA 8.8 8.7 9.0   PHOS 2.4* 2.3* 2.6   ALB 2.6*  --  2.7*         Signed: Anila Carmona MD

## 2023-03-01 NOTE — PROGRESS NOTES
Home oxygen ordered for patient for discharge. Patient is currently a high fall risk and on crutches to assist with walking. Physical therapy is needed to assist patient with high fall risk for 6 minute walk. Called and notified ADAMS Avendaño of this. RN aware at this time. RN will call PT or OT to help patient with 6 minute walk for home oxygen evaluation.

## 2023-03-01 NOTE — PROGRESS NOTES
Transition of Care Plan:     RUR:20%  Disposition: IP Psych when medically stable- pt is voluntary  If SNF or IPR: n/a  Date FOC offered:n/a  Date FOC received:n/a  Date authorization started with reference number:n/a  Date authorization received and expires:n/a  Accepting facility:n/a  Follow up appointments:to be scheduled   DME needed:none  Transportation at Discharge:BLS/EMTALA  [place] or means to access home: yes        IM Medicare Letter:n/a  Is patient a  and connected with the [place]?    no            If yes, was [employer] transfer form completed and [facility] notified? n/a  Caregiver Contact:grandmother Grace Marrero [phone]  Discharge Caregiver contacted prior to discharge? yes  Care Conference needed?:  no  Previous HH/SNF/IPR: none                  Reason for Admission:   alcohol and opiate withdrawal ; suicide ideation                  RUR Score:     20%        PCP:    First and Last name:  Marcus Rahman MD                 Name of Practice:              Are you a current patient: Yes/No:              Approximate date of last visit:               Can you do a virtual visit with your PCP:              Resources/supports as identified by patient/family:   grandparents                 Top Challenges facing patient (as identified by patient/family and CM):  substance use disorder                      Finances/Medication cost?                    Transportation? Support system or lack thereof? Living arrangements? Self-care/ADLs/Cognition?             Current Advanced Directive/Advance Care Plan:  Full Code        Healthcare Decision Maker:   Click here to complete HealthCare Decision Makers including selection of the Healthcare Decision Maker Relationship (ie \"Primary\")           Payor Source Payor: [employer]UE [employer] MEDICAID / Plan: [address] [place] [address] / Product Type: Managed Care Medicaid /                              Plan for utilizing home health:  not recommended                    Transition of Care Plan:                   Patient lives with her grandparents and her son. Patient is independent in her ADLs/IADLs. Patient does ambulate with crutches due to an ortho injury. Patient uses any pharmacy with a drive threw. Psychiatry assessed and recommended IP Psych. MD felt that pt was medically stable for [facility] to review yesterday. [facility] Bed Control following for medical stability. Pt requiring 02 - desats with activity. They will contact CM when pt is deemed medically stable. Covid was negative.     Joselin Moss, MSW

## 2023-03-02 LAB
ALBUMIN SERPL-MCNC: 2.7 G/DL (ref 3.5–5)
ANION GAP SERPL CALC-SCNC: 4 MMOL/L (ref 5–15)
BUN SERPL-MCNC: 20 MG/DL (ref 6–20)
BUN/CREAT SERPL: 25 (ref 12–20)
CALCIUM SERPL-MCNC: 8.9 MG/DL (ref 8.5–10.1)
CHLORIDE SERPL-SCNC: 105 MMOL/L (ref 97–108)
CO2 SERPL-SCNC: 31 MMOL/L (ref 21–32)
COMMENT, HOLDF: NORMAL
CREAT SERPL-MCNC: 0.79 MG/DL (ref 0.55–1.02)
GLUCOSE SERPL-MCNC: 107 MG/DL (ref 65–100)
PHOSPHATE SERPL-MCNC: 2.7 MG/DL (ref 2.6–4.7)
POTASSIUM SERPL-SCNC: 3.6 MMOL/L (ref 3.5–5.1)
SAMPLES BEING HELD,HOLD: NORMAL
SODIUM SERPL-SCNC: 140 MMOL/L (ref 136–145)

## 2023-03-02 PROCEDURE — 65660000001 HC RM ICU INTERMED STEPDOWN

## 2023-03-02 PROCEDURE — 74011250637 HC RX REV CODE- 250/637: Performed by: INTERNAL MEDICINE

## 2023-03-02 PROCEDURE — 36415 COLL VENOUS BLD VENIPUNCTURE: CPT

## 2023-03-02 PROCEDURE — 97116 GAIT TRAINING THERAPY: CPT

## 2023-03-02 PROCEDURE — 77010033678 HC OXYGEN DAILY

## 2023-03-02 PROCEDURE — 80069 RENAL FUNCTION PANEL: CPT

## 2023-03-02 PROCEDURE — 74011636637 HC RX REV CODE- 636/637: Performed by: INTERNAL MEDICINE

## 2023-03-02 PROCEDURE — 74011250636 HC RX REV CODE- 250/636: Performed by: STUDENT IN AN ORGANIZED HEALTH CARE EDUCATION/TRAINING PROGRAM

## 2023-03-02 PROCEDURE — 74011250637 HC RX REV CODE- 250/637: Performed by: STUDENT IN AN ORGANIZED HEALTH CARE EDUCATION/TRAINING PROGRAM

## 2023-03-02 PROCEDURE — 97162 PT EVAL MOD COMPLEX 30 MIN: CPT

## 2023-03-02 PROCEDURE — 74011250637 HC RX REV CODE- 250/637: Performed by: PHYSICIAN ASSISTANT

## 2023-03-02 PROCEDURE — P9047 ALBUMIN (HUMAN), 25%, 50ML: HCPCS | Performed by: STUDENT IN AN ORGANIZED HEALTH CARE EDUCATION/TRAINING PROGRAM

## 2023-03-02 PROCEDURE — 74011250637 HC RX REV CODE- 250/637: Performed by: NURSE PRACTITIONER

## 2023-03-02 RX ORDER — ALBUMIN HUMAN 250 G/1000ML
50 SOLUTION INTRAVENOUS ONCE
Status: COMPLETED | OUTPATIENT
Start: 2023-03-02 | End: 2023-03-02

## 2023-03-02 RX ORDER — FUROSEMIDE 10 MG/ML
60 INJECTION INTRAMUSCULAR; INTRAVENOUS DAILY
Status: DISCONTINUED | OUTPATIENT
Start: 2023-03-02 | End: 2023-03-03

## 2023-03-02 RX ADMIN — THIAMINE HCL TAB 100 MG 100 MG: 100 TAB at 08:36

## 2023-03-02 RX ADMIN — PROPRANOLOL HYDROCHLORIDE 40 MG: 10 TABLET ORAL at 21:39

## 2023-03-02 RX ADMIN — TRAMADOL HYDROCHLORIDE 50 MG: 50 TABLET ORAL at 15:29

## 2023-03-02 RX ADMIN — ALBUMIN (HUMAN) 50 G: 0.25 INJECTION, SOLUTION INTRAVENOUS at 08:49

## 2023-03-02 RX ADMIN — TRAMADOL HYDROCHLORIDE 50 MG: 50 TABLET ORAL at 21:44

## 2023-03-02 RX ADMIN — POTASSIUM CHLORIDE 40 MEQ: 20 TABLET, EXTENDED RELEASE ORAL at 17:44

## 2023-03-02 RX ADMIN — FUROSEMIDE 60 MG: 10 INJECTION, SOLUTION INTRAMUSCULAR; INTRAVENOUS at 08:36

## 2023-03-02 RX ADMIN — DIBASIC SODIUM PHOSPHATE, MONOBASIC POTASSIUM PHOSPHATE AND MONOBASIC SODIUM PHOSPHATE 1 TABLET: 852; 155; 130 TABLET ORAL at 17:44

## 2023-03-02 RX ADMIN — DIBASIC SODIUM PHOSPHATE, MONOBASIC POTASSIUM PHOSPHATE AND MONOBASIC SODIUM PHOSPHATE 1 TABLET: 852; 155; 130 TABLET ORAL at 08:36

## 2023-03-02 RX ADMIN — PROPRANOLOL HYDROCHLORIDE 40 MG: 10 TABLET ORAL at 15:30

## 2023-03-02 RX ADMIN — ENOXAPARIN SODIUM 30 MG: 100 INJECTION SUBCUTANEOUS at 11:10

## 2023-03-02 RX ADMIN — VENLAFAXINE HYDROCHLORIDE 225 MG: 150 CAPSULE, EXTENDED RELEASE ORAL at 08:36

## 2023-03-02 RX ADMIN — POTASSIUM CHLORIDE 40 MEQ: 20 TABLET, EXTENDED RELEASE ORAL at 08:37

## 2023-03-02 RX ADMIN — BUTALBITAL, ACETAMINOPHEN, AND CAFFEINE 1 TABLET: 50; 325; 40 TABLET ORAL at 11:09

## 2023-03-02 RX ADMIN — PREDNISONE 40 MG: 20 TABLET ORAL at 08:37

## 2023-03-02 RX ADMIN — ENOXAPARIN SODIUM 30 MG: 100 INJECTION SUBCUTANEOUS at 21:39

## 2023-03-02 RX ADMIN — Medication 1 LOZENGE: at 02:10

## 2023-03-02 RX ADMIN — PROPRANOLOL HYDROCHLORIDE 40 MG: 10 TABLET ORAL at 08:36

## 2023-03-02 NOTE — PROGRESS NOTES
Problem: Mobility Impaired (Adult and Pediatric)  Goal: *Acute Goals and Plan of Care (Insert Text)  Description: FUNCTIONAL STATUS PRIOR TO ADMISSION: Patient was independent and active without use of DME until she sustained a GLF on 12/24/22 with resulting L ankle fracture and s/p ORIF. She had ortho follow-up appointment on 2/15 which states she will remain NWB on LLE for 4 additional weeks. HOME SUPPORT PRIOR TO ADMISSION: The patient lived with her parents or grandparents but did not require assist. Both houses are 1-story, 4 or 7 steps with 1 or 2 rails to enter from outside. Physical Therapy Goals  Initiated 3/2/2023  1. Patient will transfer from bed to chair and chair to bed with modified independence using the least restrictive device within 7 day(s). 2.  Patient will perform sit to stand with modified independence within 7 day(s). 3.  Patient will ambulate with modified independence for 150 feet with the least restrictive device within 7 day(s). 4.  Patient will ascend/descend 7 stairs with 1 handrail(s) with minimal assistance/contact guard assist within 7 day(s). Outcome: Not Progressing Towards Goal    PHYSICAL THERAPY EVALUATION  Patient: Du Bradley (89 y.o. female)  Date: 3/2/2023  Primary Diagnosis: Alcohol withdrawal ([facility] [address] [F10.939]  Opioid withdrawal ([address] [F11.93]  Tachycardia [R00.0]       Precautions: NWB LLE,  Fall    ASSESSMENT  Based on the objective data described below, the patient presents with decreased activity tolerance, decreased strength in LLE, and impaired standing balance. Patient sustained a GLF on 12/24/22 with resulting L ankle fracture and underwent ORIF for repair. Last orthopedic visit states patient will remain NWB until 3/15/23. She came to the hospital this time due to experiencing suicidal thoughts, having withdrawal symptoms from the opioid medications she received from her ankle surgery, and heavily drinking alcohol.  Patient cleared to be seen by nursing. Upon entering the room patient was sitting up on the edge of the bed, agreeable to therapy. She states she was given crutches after her surgery but she doesn't feel safe using them. PT recommended using knee scooter for ambulation, patient agreeable to try. She was able to come to stand with supervision assist and maintain NWB while transitioning on to knee scooter. She ambulated 48' using knee scooter with contact guard assist, able to maneuver in crowded hallway and perform 360 turn safely. She ambulated on room air, O2 sats were 94% at rest prior to walking and 93% after walking, however patient was visibly SOB. PT provided educated on pursed lip breathing to help improve her activity tolerance. O2 sats increased to 96% quickly upon seated rest break and performing PLB. Patient left sitting EOB, sitter present, no complaints. Recommend knee scooter upon discharge. Current Level of Function Impacting Discharge (mobility/balance): contact guard assist ambulating with knee scooter    Functional Outcome Measure: The patient scored 20/24 on the [facility] outcome measure     Other factors to consider for discharge: planning to go to inpatient psych     Patient will benefit from skilled therapy intervention to address the above noted impairments. PLAN :  Recommendations and Planned Interventions: transfer training, gait training, therapeutic exercises, neuromuscular re-education, patient and family training/education, and therapeutic activities      Frequency/Duration: Patient will be followed by physical therapy:  2 times a week to address goals.     Recommendation for discharge: (in order for the patient to meet his/her long term goals)  Patient discharging to inpatient psych    This discharge recommendation:  Has been made in collaboration with the attending provider and/or case management    IF patient discharges home will need the following DME: knee scooter         SUBJECTIVE:   Patient stated I feel like I'm going to fall with the crutches.     OBJECTIVE DATA SUMMARY:   HISTORY:    Past Medical History:   Diagnosis Date    ADD (attention deficit disorder) 19-19yo    Treated with Adderall since dx. 2013 dosing 20mg AM and 10mg PM.  Trial/change to Vyvanse Nov-Dec 2015--not as effective. Gynecologic disorder     History of miscarriages. History of Mirena IUD placed on 4/17/15    HX OTHER MEDICAL      -BUFA baby    HX OTHER MEDICAL     HPV positive    Idiopathic vulvodynia 2014    L1 vertebral fracture (HCC) 2017    [facility] imaging/admissoin: Mild acute two column compression fracture of L1 without evidence of retropulsion into the spinal canal.  Managed non-operatively. Migraines 2013    Neurological disorder     Pelvic pain in female 9/15/2014    2015 gyn laparoscopy/hysteroscopy with endometriosis worse right.     Psychiatric disorder     depression    Secondary dysmenorrhea 2014    With menorraghia    Seizures ([address]     never on meds--had appr 4-5 in a short amt of time and none since     Past Surgical History:   Procedure Laterality Date    ENDOMETRIAL CRYOABLATION      HX GYN  4/17/15    laparoscopy and hysteroscopy and IUD placement    HX HYSTERECTOMY  2016    Complete Hysterectomy       Personal factors and/or comorbidities impacting plan of care: depression, anxiety    Home Situation  Home Environment: Private residence  # Steps to Enter: 7  Rails to Enter: Yes  [employer] : Right  One/Two Story Residence: One story  Living Alone: No  Support Systems: Parent(s), Other Family Member(s)  Patient Expects to be Discharged to[de-identified] Other:  Current DME Used/Available at Home: Crutches    EXAMINATION/PRESENTATION/DECISION MAKING:   Critical Behavior:  Neurologic State: Alert, Appropriate for age  Orientation Level: Oriented X4  Cognition: Follows commands    Range Of Motion:  AROM: Generally decreased, functional (L ankle)  PROM: Generally decreased, functional (L ankle)    Strength:    Strength: Generally decreased, functional (L ankle)    Tone & Sensation:   Tone: Normal  Sensation: Intact    Coordination:  Coordination: Within functional limits  Functional Mobility:       Transfers:  Sit to Stand: Supervision  Stand to Sit: Supervision       Balance:   Sitting: Intact  Standing: Impaired  Standing - Static: Good  Standing - Dynamic : Good;Constant support  Ambulation/Gait Training:  Distance (ft): 50 Feet (ft)  Assistive Device: Gait belt; Other (comment) (knee scooter)  Ambulation - Level of Assistance: Contact guard assistance  Left Side Weight Bearing: Non-weight bearing  Speed/Jessica: Pace decreased (<100 feet/min)    Therapeutic Exercises:   none    Functional Measure:  Ant Le AM-PAC®      Basic Mobility Inpatient Short Form (6-Clicks) Version 2  How much HELP from another person do you currently need. .. (If the patient hasn't done an activity recently, how much help from another person do you think they would need if they tried?) Total A Lot A Little None   1. Turning from your back to your side while in a flat bed without using bedrails? []  1 []  2 []  3  [x]  4   2. Moving from lying on your back to sitting on the side of a flat bed without using bedrails? []  1 []  2 []  3  [x]  4   3. Moving to and from a bed to a chair (including a wheelchair)? []  1 []  2 [x]  3  []  4   4. Standing up from a chair using your arms (e.g. wheelchair or bedside chair)? []  1 []  2 []  3  [x]  4   5. Walking in hospital room? []  1 []  2 [x]  3  []  4   6. Climbing 3-5 steps with a railing? []  1 [x]  2 []  3  []  4     Raw Score: 20/24                            Cutoff score ?171,2,3 had higher odds of discharging home with home health or need of SNF/IPR. [address]ueva Joya Simon Darcey Neth [place].   Validity of the AM-PAC 6-Clicks Inpatient Daily Activity and Basic Mobility Short Forms. Physical Therapy Mar 2014, [phone]; DOI: 10.2522/ptj.15292619  2. Latonya Alcantar. Association of AM-PAC \"6-Clicks\" Basic Mobility and Daily Activity Scores With Discharge Destination. Phys Ther. 2021 Apr 4;101(4):mklu469. doi: 10.1093/ptj/hqxi419. PMID: 82890635. V Rimma Durant, Partha D, Sarbjit Pulido, Bridget K, Azar S. Activity Measure for Post-Acute Care \"6-Clicks\" Basic Mobility Scores Predict Discharge Destination After Acute Care Hospitalization in Select Patient Groups: A Retrospective, Observational Study. Arch Rehabil Res Clin Transl. 2022 Jul 16;4(3):943828. doi: 10.1016/j.arrct. 6100.189648. PMID: 88533762; PMCID: [id number]. 4. Zach Medina, Nichole ROBLERO, Samira DAVENPORT. AM-PAC Short Forms Manual 4.0. Revised 2/2020. Physical Therapy Evaluation Charge Determination   History Examination Presentation Decision-Making   MEDIUM  Complexity : 1-2 comorbidities / personal factors will impact the outcome/ POC  LOW Complexity : 1-2 Standardized tests and measures addressing body structure, function, activity limitation and / or participation in recreation  LOW Complexity : Stable, uncomplicated  Other outcome measures [facility] 20/24  LOW       Based on the above components, the patient evaluation is determined to be of the following complexity level: LOW     Pain Rating:  none    Activity Tolerance:   Fair    After treatment patient left in no apparent distress:   Sitting EOB, sitter present    COMMUNICATION/EDUCATION:   The patients plan of care was discussed with: Registered nurse. Fall prevention education was provided and the patient/caregiver indicated understanding., Patient/family have participated as able in goal setting and plan of care. , and Patient/family agree to work toward stated goals and plan of care.     Thank you for this referral.  Eric Huston, PT   Time Calculation: 27 mins

## 2023-03-02 NOTE — PROGRESS NOTES
When coming in to assess pt and give meds pts O2 was off but no pulse ox on. Pulse ox placed and O2 sat at rest was 96%. Informed pt I was giving more Lasix and then some albumin and we would do another walk around mid day to see if she would need 02 on exertion. Pt agreeable.

## 2023-03-02 NOTE — BSMART NOTE
BSMART Liaison Team Note     LOS:  5 days         Patient goal(s) for today: Take medication as prescribed, communicate needs to staff in an appropriate manner, practice mindfulness, utilize coping skills, gratitude journal   [facility] Liaison team focus/goals: Assess needs, provide education and support, engage in brief therapy session when appropriate- discuss gratitude and positive self-talk     Progress note: This writer met with patient face to face. Pt received laying in bed with sitter 1:1 at bedside and MSW intern present. She was alert and oriented x4. Pt presented with brighter affect but reports feeling depressed. Pt continues to state that her depression is situational and identifies that medication management needs to be addressed. Pt denied current SI/HI/AHVH. Pt reports she was started on Latuda and believes she is having similar side effects as when she was taking Wellbutrin. Encouraged patient to continue self-reflection while being on the medical floor, in addition to practicing mindfulness and coping skills. Pt was receptive. Describes her depression being like a seesaw and at this time, she is in the middle and is unsure if her depression is going to increase or decrease. Pt believes when it increases, she needs to be in a safe environment to get her medications adjusted. Per pt, this is where she stands at this time. Pt denies any intention to act on her SI because she has a son that she wants to be there for. Encouraged pt to also want to stay healthy and alive for herself. Pt agreed. Attempted to discuss positive self-talk with patient and gratitude. Pt states she is melony based and does this every morning. Pt was encouraged to include one positive quality about herself with other things she is thankful for and continue self-reflection. Pt was agreeable. Asked pt if she is interested in pastoral care for the time being, and she agreed.      Spoke with RN/Miguelina and notified her to put in a consult for spiritual care. Discussed with RN and MD that patient might be medically cleared later today and if she is, CM to find bed placement at [facility]. Liaison to continue to follow. Barriers to Discharge: medical clearance    Outpatient provider(s):  RAI Merida  Insurance info/prescription coverage:  [place] [place] MEDICA[employer]/[place] [employer]     Diagnosis: Major Depressive Disorder, recurrent, severe without psychotic features; Alcohol Use Disorder     Plan:  Please defer to psychiatric consult note for recommendations. Follow up Psych Consult placed? No  Psychiatrist updated?  No    Participating treatment team members: Letty Aden MSW, Supervisee in Social Work ; MARCELO Intern/Javed

## 2023-03-02 NOTE — PROGRESS NOTES
Problem: Pressure Injury - Risk of  Goal: *Prevention of pressure injury  Description: Document Elijah Scale and appropriate interventions in the flowsheet. Outcome: Progressing Towards Goal  Note: Pressure Injury Interventions:  Sensory Interventions: Assess changes in LOC, Assess need for specialty bed, Avoid rigorous massage over bony prominences, Discuss PT/OT consult with provider, Minimize linen layers    Moisture Interventions: Absorbent underpads, Internal/External urinary devices, Limit adult briefs, Maintain skin hydration (lotion/cream), Minimize layers    Activity Interventions: Assess need for specialty bed, Chair cushion, Increase time out of bed, Pressure redistribution bed/mattress(bed type), PT/OT evaluation    Mobility Interventions: Assess need for specialty bed, Chair cushion, Float heels, HOB 30 degrees or less, PT/OT evaluation    Nutrition Interventions: Document food/fluid/supplement intake    Friction and Shear Interventions: Apply protective barrier, creams and emollients, Feet elevated on foot rest, Foam dressings/transparent film/skin sealants, HOB 30 degrees or less, Lift sheet                Problem: Patient Education: Go to Patient Education Activity  Goal: Patient/Family Education  Outcome: Progressing Towards Goal     Problem: Falls - Risk of  Goal: *Absence of Falls  Description: Document Rema Fall Risk and appropriate interventions in the flowsheet.   Outcome: Progressing Towards Goal  Note: Fall Risk Interventions:  Mobility Interventions: Assess mobility with egress test, Bed/chair exit alarm, Utilize walker, cane, or other assistive device, Utilize gait belt for transfers/ambulation    Mentation Interventions: Adequate sleep, hydration, pain control, Bed/chair exit alarm, Update white board, Increase mobility, Family/sitter at bedside    Medication Interventions: Bed/chair exit alarm, Utilize gait belt for transfers/ambulation    Elimination Interventions: Bed/chair exit alarm, Call light in reach, Stay With Me (per policy)              Problem: Patient Education: Go to Patient Education Activity  Goal: Patient/Family Education  Outcome: Progressing Towards Goal     Problem: Alcohol Withdrawal  Goal: *STG: Participates in treatment plan  Outcome: Progressing Towards Goal  Goal: *STG: Remains safe in hospital  Outcome: Progressing Towards Goal  Goal: *STG: Seeks staff when symptoms of withdrawal increase  Outcome: Progressing Towards Goal  Goal: *STG: Complies with medication therapy  Outcome: Progressing Towards Goal  Goal: *STG: Attends activities and groups  Outcome: Progressing Towards Goal  Goal: *STG: Will identify negative impact of chemical dependency including the use of tobacco, alcohol, and other substances  Outcome: Progressing Towards Goal  Goal: *STG: Verbalizes abstinence as an achievable goal  Outcome: Progressing Towards Goal  Goal: *STG: Agrees to participate in outpatient after care program to support ongoing mental health  Outcome: Progressing Towards Goal  Goal: *STG: Able to indentify relapse triggers including interpersonal/social and familial factors  Outcome: Progressing Towards Goal  Goal: *STG: Identify lifestyle changes to support long term sobriety such as vocation, employment, education, and legal issues  Outcome: Progressing Towards Goal  Goal: *STG: Maintains appropriate nutrition and hydration  Outcome: Progressing Towards Goal  Goal: *STG: Vital signs within defined limits  Outcome: Progressing Towards Goal  Goal: *STG/LTG: Relapse prevention plan in place to include housing/aftercare, leisure activities, and spirituality  Outcome: Progressing Towards Goal  Goal: Interventions  Outcome: Progressing Towards Goal     Problem: Patient Education: Go to Patient Education Activity  Goal: Patient/Family Education  Outcome: Progressing Towards Goal     Problem: Suicide  Goal: *STG: Remains safe in hospital  Outcome: Progressing Towards Goal  Goal: *STG: Seeks staff when feelings of self harm or harm towards others arise  Outcome: Progressing Towards Goal  Goal: *STG: Attends activities and groups  Outcome: Progressing Towards Goal  Goal: *STG:  Verbalizes alternative ways of dealing with maladaptive feelings/behaviors  Outcome: Progressing Towards Goal  Goal: *STG/LTG: Complies with medication therapy  Outcome: Progressing Towards Goal  Goal: *STG/LTG: No longer expresses self destructive or suicidal thoughts  Outcome: Progressing Towards Goal  Goal: *LTG:  Identifies available community resources  Outcome: Progressing Towards Goal  Goal: *LTG:  Develops proactive suicide prevention plan  Outcome: Progressing Towards Goal  Goal: Interventions  Outcome: Progressing Towards Goal     Problem: Patient Education: Go to Patient Education Activity  Goal: Patient/Family Education  Outcome: Progressing Towards Goal

## 2023-03-02 NOTE — PROGRESS NOTES
Transition of Care Plan:     RUR:21%  Disposition: IP Psych when medically stable- pt is voluntary  If SNF or IPR: n/a  Date FOC offered:n/a  Date FOC received:n/a  Date authorization started with reference number:n/a  Date authorization received and expires:n/a  Accepting facility:n/a  Follow up appointments:to be scheduled   DME needed:none  Transportation at Discharge:BLS/EMTALA  [place] or means to access home: yes        IM Medicare Letter:n/a  Is patient a  and connected with the [place]?    no            If yes, was [employer] transfer form completed and [facility] notified? n/a  Caregiver Contact:grandmother Mohan Rodrigues [phone]  Discharge Caregiver contacted prior to discharge? yes  Care Conference needed?:  no  Previous HH/SNF/IPR: none        CM called inpatient psych at [phone] and spoke with [facility]. [facility] will check into bed and call this CM back. At d/c, pt will need a knee scooter or crutches as pt is non-weight bearing. CM will verify if she can have the knee scooter as that is what pt prefers.     Guy Moore LMSW  Supervisee in [address] [facility] [place], [address] [facility]

## 2023-03-02 NOTE — PROGRESS NOTES
Spiritual Care Assessment/Progress Note  [facility]      NAME: Kalin Watkins      MRN: [id number]  AGE: 38 y.o.  SEX: female  Mormon Affiliation: Kenzie Casillas   Language: English     3/2/2023     Total Time (in minutes): 22     Spiritual Assessment begun in [facility] through conversation with:         [x]Patient        [] Family    [] Friend(s)        Reason for Consult: Initial/Spiritual assessment, patient floor     Spiritual beliefs: (Please include comment if needed)     [x] Identifies with a melony tradition:         [x] Supported by a melony community:            [] Claims no spiritual orientation:           [] Seeking spiritual identity:                [] Adheres to an individual form of spirituality:           [] Not able to assess:                           Identified resources for coping:      [x] Prayer                               [x] Music                  [] Guided Imagery     [x] Family/friends                 [] Pet visits     [] Devotional reading                         [] Unknown     [] Other:                                                Interventions offered during this visit: (See comments for more details)    Patient Interventions: Affirmation of emotions/emotional suffering, Affirmation of melony, Catharsis/review of pertinent events in supportive environment, Coping skills reviewed/reinforced, Iconic (affirming the presence of God/Higher Power), Initial/Spiritual assessment, patient floor, Normalization of emotional/spiritual concerns, Prayer (assurance of), Mormon beliefs/image of God discussed           Plan of Care:     [x] Support spiritual and/or cultural needs    [] Support AMD and/or advance care planning process      [] Support grieving process   [] Coordinate Rites and/or Rituals    [] Coordination with community clergy   [] No spiritual needs identified at this time   [] Detailed Plan of Care below (See Comments)  [] Make referral to Music Therapy  [] Make referral to Pet Therapy     [] Make referral to Addiction services  [] Make referral to [facility]  [] Make referral to Spiritual Care Partner  [] No future visits requested        [x] Contact Spiritual Care for further referrals     Comments:  Reviewed chart prior to visit on [facility] unit for spiritual assessment. No family/friends present. Hospital sitter was in room. Provided supportive listening presence as patient spoke about events that led to hospitalization. She spoke about recognizing negative means of coping with grief from the recent loss of her beloved dog. She shared she recognizes the triggers and red flags and knew it was time to seek help. She had previous success after being in a melony based program.  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 She has a [de-identified] year old son who is currently with his father. Ms Eve Mack shared that she and her son's father are in a better place in co-parenting their son. Affirmed love for son and commitment to giving him stability by remaining on good terms with father of son. She voiced no spiritual needs or concerns at this time, but was receptive to assurance of prayer. Advised her of ongoing availability of pastoral support.      MARY Fernando, [facility], Staff [address]    [address] Paging Service  [phone])

## 2023-03-02 NOTE — PROGRESS NOTES
Problem: Pressure Injury - Risk of  Goal: *Prevention of pressure injury  Description: Document Elijah Scale and appropriate interventions in the flowsheet. Outcome: Progressing Towards Goal  Note: Pressure Injury Interventions:  Sensory Interventions: Keep linens dry and wrinkle-free, Maintain/enhance activity level, Minimize linen layers    Moisture Interventions: Absorbent underpads, Apply protective barrier, creams and emollients    Activity Interventions: Chair cushion, Increase time out of bed, PT/OT evaluation    Mobility Interventions: Chair cushion, PT/OT evaluation    Nutrition Interventions: Document food/fluid/supplement intake    Friction and Shear Interventions: Apply protective barrier, creams and emollients, Feet elevated on foot rest, Minimize layers                Problem: Falls - Risk of  Goal: *Absence of Falls  Description: Document Rema Fall Risk and appropriate interventions in the flowsheet.   Outcome: Progressing Towards Goal  Note: Fall Risk Interventions:  Mobility Interventions: Assess mobility with egress test, Bed/chair exit alarm, Utilize walker, cane, or other assistive device, Utilize gait belt for transfers/ambulation    Mentation Interventions: Adequate sleep, hydration, pain control, Bed/chair exit alarm, Update white board, Increase mobility, Family/sitter at bedside    Medication Interventions: Bed/chair exit alarm, Utilize gait belt for transfers/ambulation    Elimination Interventions: Bed/chair exit alarm, Call light in reach, Stay With Me (per policy)              Problem: Alcohol Withdrawal  Goal: *STG: Participates in treatment plan  Outcome: Progressing Towards Goal  Goal: *STG: Remains safe in hospital  Outcome: Progressing Towards Goal  Goal: *STG: Seeks staff when symptoms of withdrawal increase  Outcome: Progressing Towards Goal  Goal: *STG: Complies with medication therapy  Outcome: Progressing Towards Goal  Goal: *STG: Attends activities and groups  Outcome: Progressing Towards Goal  Goal: *STG: Will identify negative impact of chemical dependency including the use of tobacco, alcohol, and other substances  Outcome: Progressing Towards Goal  Goal: *STG: Verbalizes abstinence as an achievable goal  Outcome: Progressing Towards Goal  Goal: *STG: Agrees to participate in outpatient after care program to support ongoing mental health  Outcome: Progressing Towards Goal  Goal: *STG: Able to indentify relapse triggers including interpersonal/social and familial factors  Outcome: Progressing Towards Goal  Goal: *STG: Identify lifestyle changes to support long term sobriety such as vocation, employment, education, and legal issues  Outcome: Progressing Towards Goal  Goal: *STG: Maintains appropriate nutrition and hydration  Outcome: Progressing Towards Goal  Goal: *STG: Vital signs within defined limits  Outcome: Progressing Towards Goal  Goal: *STG/LTG: Relapse prevention plan in place to include housing/aftercare, leisure activities, and spirituality  Outcome: Progressing Towards Goal  Goal: Interventions  Outcome: Progressing Towards Goal

## 2023-03-02 NOTE — PROGRESS NOTES
0700 End of Shift Note    Bedside shift change report given to oncoming nurse by Eh See RN (offgoing nurse). Report included the following information SBAR, Kardex, ED Summary, Intake/Output, MAR, Accordion, Recent Results, Med Rec Status, Cardiac Rhythm NSR, and Alarm Parameters     Shift worked:  Night     Shift summary and any significant changes:     C/O sore throat, lozenges ordered/given. Concerns for physician to address:       Zone phone for oncoming shift:          Activity:  Activity Level: Up with Assistance  Number times ambulated in hallways past shift: 0  Number of times OOB to chair past shift: 0    Cardiac:   Cardiac Monitoring: Yes      Cardiac Rhythm: Sinus Rhythm    Access:  Current line(s): PIV     Genitourinary:   Urinary status: voiding    Respiratory:   O2 Device: Nasal cannula  Chronic home O2 use?: NO  Incentive spirometer at bedside: NO       GI:  Last Bowel Movement Date: 02/27/23  Current diet:  ADULT DIET Regular; Safety Tray; Safety Tray (Disposables)  Passing flatus: YES  Tolerating current diet: YES       Pain Management:   Patient states pain is manageable on current regimen: YES    Skin:  Elijah Score: 18  Interventions: increase time out of bed, limit briefs, internal/external urinary devices, and nutritional support     Patient Safety:  Fall Score:  Total Score: 4  Interventions: bed/chair alarm, assistive device (walker, cane, etc), gripper socks, pt to call before getting OOB, and sitter at bedside   High Fall Risk: Yes    Length of Stay:  Expected LOS: - - -  Actual LOS: [address], RN

## 2023-03-03 LAB
ALBUMIN SERPL-MCNC: 3.5 G/DL (ref 3.5–5)
ANION GAP SERPL CALC-SCNC: 5 MMOL/L (ref 5–15)
BUN SERPL-MCNC: 23 MG/DL (ref 6–20)
BUN/CREAT SERPL: 34 (ref 12–20)
CALCIUM SERPL-MCNC: 9.2 MG/DL (ref 8.5–10.1)
CHLORIDE SERPL-SCNC: 102 MMOL/L (ref 97–108)
CO2 SERPL-SCNC: 29 MMOL/L (ref 21–32)
CREAT SERPL-MCNC: 0.67 MG/DL (ref 0.55–1.02)
GLUCOSE SERPL-MCNC: 101 MG/DL (ref 65–100)
PHOSPHATE SERPL-MCNC: 3 MG/DL (ref 2.6–4.7)
POTASSIUM SERPL-SCNC: 3.7 MMOL/L (ref 3.5–5.1)
SODIUM SERPL-SCNC: 136 MMOL/L (ref 136–145)

## 2023-03-03 PROCEDURE — 74011250637 HC RX REV CODE- 250/637: Performed by: STUDENT IN AN ORGANIZED HEALTH CARE EDUCATION/TRAINING PROGRAM

## 2023-03-03 PROCEDURE — 74011250637 HC RX REV CODE- 250/637: Performed by: INTERNAL MEDICINE

## 2023-03-03 PROCEDURE — 65660000001 HC RM ICU INTERMED STEPDOWN

## 2023-03-03 PROCEDURE — 74011250637 HC RX REV CODE- 250/637: Performed by: PHYSICIAN ASSISTANT

## 2023-03-03 PROCEDURE — 74011250637 HC RX REV CODE- 250/637: Performed by: NURSE PRACTITIONER

## 2023-03-03 PROCEDURE — 80069 RENAL FUNCTION PANEL: CPT

## 2023-03-03 PROCEDURE — 36415 COLL VENOUS BLD VENIPUNCTURE: CPT

## 2023-03-03 PROCEDURE — 74011250636 HC RX REV CODE- 250/636: Performed by: STUDENT IN AN ORGANIZED HEALTH CARE EDUCATION/TRAINING PROGRAM

## 2023-03-03 PROCEDURE — 74011636637 HC RX REV CODE- 636/637: Performed by: STUDENT IN AN ORGANIZED HEALTH CARE EDUCATION/TRAINING PROGRAM

## 2023-03-03 RX ADMIN — VENLAFAXINE HYDROCHLORIDE 225 MG: 150 CAPSULE, EXTENDED RELEASE ORAL at 08:48

## 2023-03-03 RX ADMIN — BUTALBITAL, ACETAMINOPHEN, AND CAFFEINE 1 TABLET: 50; 325; 40 TABLET ORAL at 21:48

## 2023-03-03 RX ADMIN — PROPRANOLOL HYDROCHLORIDE 40 MG: 10 TABLET ORAL at 16:24

## 2023-03-03 RX ADMIN — DIBASIC SODIUM PHOSPHATE, MONOBASIC POTASSIUM PHOSPHATE AND MONOBASIC SODIUM PHOSPHATE 1 TABLET: 852; 155; 130 TABLET ORAL at 08:49

## 2023-03-03 RX ADMIN — BUTALBITAL, ACETAMINOPHEN, AND CAFFEINE 1 TABLET: 50; 325; 40 TABLET ORAL at 02:48

## 2023-03-03 RX ADMIN — THIAMINE HCL TAB 100 MG 100 MG: 100 TAB at 08:49

## 2023-03-03 RX ADMIN — ENOXAPARIN SODIUM 30 MG: 100 INJECTION SUBCUTANEOUS at 09:15

## 2023-03-03 RX ADMIN — POTASSIUM CHLORIDE 40 MEQ: 20 TABLET, EXTENDED RELEASE ORAL at 17:38

## 2023-03-03 RX ADMIN — PROPRANOLOL HYDROCHLORIDE 40 MG: 10 TABLET ORAL at 08:49

## 2023-03-03 RX ADMIN — POTASSIUM CHLORIDE 40 MEQ: 20 TABLET, EXTENDED RELEASE ORAL at 08:48

## 2023-03-03 RX ADMIN — ENOXAPARIN SODIUM 30 MG: 100 INJECTION SUBCUTANEOUS at 21:48

## 2023-03-03 RX ADMIN — DIBASIC SODIUM PHOSPHATE, MONOBASIC POTASSIUM PHOSPHATE AND MONOBASIC SODIUM PHOSPHATE 1 TABLET: 852; 155; 130 TABLET ORAL at 17:37

## 2023-03-03 RX ADMIN — BUTALBITAL, ACETAMINOPHEN, AND CAFFEINE 1 TABLET: 50; 325; 40 TABLET ORAL at 08:49

## 2023-03-03 RX ADMIN — PREDNISONE 40 MG: 20 TABLET ORAL at 08:49

## 2023-03-03 RX ADMIN — PROPRANOLOL HYDROCHLORIDE 40 MG: 10 TABLET ORAL at 21:47

## 2023-03-03 RX ADMIN — TRAMADOL HYDROCHLORIDE 50 MG: 50 TABLET ORAL at 12:28

## 2023-03-03 NOTE — PROGRESS NOTES
Hospitalist Progress Note    NAME: Ashley Lux   :  1980   MRN:  [id number]       Assessment / Plan:  ETOH and Opioid Withdrawal        Suicidal Ideations/Depression        Tachycardia  Respiratory acidosis secondary to Ativan use  - no current plan or SI/HI expressed this am  - etiology of tachycardia related to ETOH and Opioid Withdrawal  - CIWA  - IVF and multivitamin supplementation  Ammonia level mildly elevated at 38, S06 folic acid are within normal limits  -Follow-up 2D echo due to tachycardia- continue propranolol and effexor  - seizure and fall precautions  - suicide precautions  - psych recommended inpatient admission once medically stable, continue with one-to-one sitter  patient had a sudden onset of respiratory failure acute respiratory acidosis in the setting of Ativan use, patient was evaluated by intensivist.  Patient did not require ICU level of care  Ativan currently on hold, had received flumazenil to reverse Ativan effect  Currently on as needed Atarax  Patient cleared today, no need for O2 NC. PT working with ortho to get patient a scooter instead of crutches. Likely psych placement tomorrow     2. Mild leukocytosis    - negative influenza and covid    - chest xray shows no acute process    - CTA shows bibasilar atelectasis    - afebrile    - urine drug screen negative for barbiturates and opiates, negative for methadone    - urinalysis positive for bacteria, negative for nitrites and leukocytes, patient asymptomatic.    - will monitor lab work. 3. Acute respiratory failure-POA    - CTA shows no pulmonary embolism. Bibasilar atelectasis noted. - chest xray shows no acute process    - keep 02 sats greater than 92%    - turn, cough and reposition    - incentive spirometry every 2 hours while awake    - prn nebulizer treatments     4.   Hepatic steatosis    - etiology related to ETOH use    - seen on CTA of chest     - avoid hepatoxic medications     - will monitor lab work     - Low albumin     5. Fracture of Left Ankle- POA    - etiology related to prior fall    - left boot intact    - elevate left lower extremity while at rest    - fall precautions    Tobacco abuse  Started on nicotine patch    40 or above Morbid obesity / Body mass index is 41.2 kg/m². Estimated discharge date: February 28  Barriers: Inpatient psych, still in withdrawal  Code status: Full  Prophylaxis: Lovenox  Recommended Disposition: Home w/Family     Subjective:     Chief Complaint / Reason for Physician Visit  Suicidal thoughts, alcohol withdrawal discussed with RN events overnight. Patient denies SI. Feeling fine off of O2 NC. Okay with OOB to chair. Review of Systems:  Symptom Y/N Comments  Symptom Y/N Comments   Fever/Chills n   Chest Pain n    Poor Appetite    Edema     Cough n   Abdominal Pain n    Sputum    Joint Pain     SOB/FIGUEROA n   Pruritis/Rash     Nausea/vomit y   Tolerating PT/OT     Diarrhea    Tolerating Diet y    Constipation    Other       Could NOT obtain due to:      Objective:     VITALS:   Last 24hrs VS reviewed since prior progress note. Most recent are:  Patient Vitals for the past 24 hrs:   Temp Pulse Resp BP SpO2   03/02/23 1918 97.6 °F (36.4 °C) 71 18 (!) 105/58 96 %   03/02/23 1530 98 °F (36.7 °C) 81 16 (!) 129/94 99 %   03/02/23 0836 -- 72 -- -- --   03/02/23 0730 98.8 °F (37.1 °C) (!) 52 18 109/79 97 %   03/02/23 0622 98.6 °F (37 °C) 60 18 109/79 100 %   03/02/23 0240 98.3 °F (36.8 °C) 74 16 (!) 158/74 97 %   03/01/23 2301 98 °F (36.7 °C) 67 16 114/77 97 %         Intake/Output Summary (Last 24 hours) at 3/2/2023 2107  Last data filed at 3/2/2023 0730  Gross per 24 hour   Intake 200 ml   Output 250 ml   Net -50 ml          I had a face to face encounter and independently examined this patient on 3/2/2023, as outlined below:  PHYSICAL EXAM:  General: WD, WN. Alert, cooperative, no acute distress    EENT:  EOMI. Anicteric sclerae.  MMM  Resp:  CTA bilaterally, no wheezing or rales. No accessory muscle use  CV:  Regular  rhythm,  No edema  GI:  Soft, Non distended, Non tender. +Bowel sounds  Neurologic:  Alert and oriented X 3, normal speech,   Psych:   Fair  insight. Not anxious nor agitated  Skin:  No rashes. No jaundice    Reviewed most current lab test results and cultures  YES  Reviewed most current radiology test results   YES  Review and summation of old records today    NO  Reviewed patient's current orders and MAR    YES  PMH/SH reviewed - no change compared to H&P  ________________________________________________________________________  Care Plan discussed with:    Comments   Patient x    Family      RN x    Care Manager x    Consultant  x                      Multidiciplinary team rounds were held today with , nursing, pharmacist and clinical coordinator. Patient's plan of care was discussed; medications were reviewed and discharge planning was addressed. ________________________________________________________________________  Total NON critical care TIME:  55   Minutes    Total CRITICAL CARE TIME Spent:   Minutes non procedure based      Comments   >50% of visit spent in counseling and coordination of care x    ________________________________________________________________________  Rashida Sanchez MD     Procedures: see electronic medical records for all procedures/Xrays and details which were not copied into this note but were reviewed prior to creation of Plan. LABS:  I reviewed today's most current labs and imaging studies.   Pertinent labs include:  Recent Labs     02/28/23  1000   WBC 8.8   HGB 9.8*   HCT 30.0*          Recent Labs     03/02/23  0054 03/01/23  0042 02/28/23  1000    140 139   K 3.6 3.1* 3.2*    102 105   CO2 31 35* 31   * 126* 108*   BUN 20 16 11   CREA 0.79 0.71 0.54*   CA 8.9 8.8 8.7   PHOS 2.7 2.4* 2.3*   ALB 2.7* 2.6*  --          Signed: Rashida Sanchez MD

## 2023-03-03 NOTE — BSMART NOTE
BSMART Liaison Team Note     LOS:  6     Patient goal(s) for today: Take medication as prescribed, communicate needs to staff in an appropriate manner, practice mindfulness, utilize coping skills, gratitude journal   BSMART Liaison team focus/goals: Assess MH needs, provide education and support, engage in brief therapy session when appropriate    Progress note: Pt seen face to face by Liaison with 1:1 sitter present. Pt was alert, oriented and calm. She denied SI/intent/plan, HI/intent/plan and AVH. She reported struggling with depression and described her mental health like a \"seesaw\" and that she is \"white knuckling\" it right now. She demonstrated good insight, talking about her warning signs that her MH is declining (poor hygiene, less motivation, poor concentration, negative self talk), her coping skills (melony, TV, music, time with son, time with animals/dog) and the correlation between her mental and physical health. She stated that tried to seek help with OP psychiatrist and therapist prior to coming to hospital. Her Antonella Gold was increased but she has been noticing side effects \"like when [she] was on Wellbutrin\" and described hearing \"static,\" \"ringing,\" and \"electronic noises\" at times. She frequently comments that she feels her depression worsening day by day and feels she needs a medication adjustment. Liaison educated psych consult would be placed and Liaison will update psych NP about her c/o side effects. Pt updated that she will need [facility] psych bed and CM is conducting bed search, per chart review. Liaison spoke with RN who agreed to order psych consult. Liaison spoke with Psych NP who will see Pt and discuss medication concerns. Barriers to Discharge: medical & psychiatric  Guns in the home: no     Outpatient provider(s):  RAI Merida @ [facility] Jeff Cano and Kim at [facility].    Insurance info/prescription coverage:  BLUE CROSS MEDICAID/[place] [employer]OSS [employer]US Diagnosis: Major Depressive Disorder, recurrent, severe without psychotic features; Alcohol Use Disorder    Plan:  Recommended for psychiatric admission once medically stable on 2/26/23. Pt agreeable for voluntary admission. Refer to most resent psychiatric consult note for further assessment and recommendation. .   Follow up Psych Consult placed? No - Pt last seen by Psych NP 2/26/23 (last consult placed 2/25/23). Liaison discussed with Piyush Shaw RN need for daily psych consults while Pt is recommended for psych admission. RN agreed to order consult. Psychiatrist updated? yes - Liaison notified Janet Peterson NP Pt reporting side effects from [place].       Participating treatment team members: Jaqueline Mcdaniel, Hyman Nageotte, MSW

## 2023-03-03 NOTE — PROGRESS NOTES
Transition of Care Plan:     RUR:21%  Disposition: IP Psych when medically stable- pt is voluntary  If SNF or IPR: n/a  Date FOC offered:n/a  Date FOC received:n/a  Date authorization started with reference number:n/a  Date authorization received and expires:n/a  Accepting facility:n/a  Follow up appointments:to be scheduled   DME needed:none  Transportation at Discharge:BLS/EMTALA  [place] or means to access home: yes        IM Medicare Letter:n/a  Is patient a  and connected with the [place]?    no            If yes, was [employer] transfer form completed and VA notified? n/a  Caregiver Contact:grandmother Paola Paula [phone]  Discharge Caregiver contacted prior to discharge? yes  Care Conference needed?:  no  Previous HH/SNF/IPR: none         CM called inpatient psych at [phone] and spoke with Maxine Youngblood. Maxine Youngblood informed this CM they do not have a female bed today. CM informed Maxine Youngblood pt will need a knee scooter or crutches as pt is non-weight bearing and they can accommodate this; however, pt will need a geriatric bed due to equipment and there is not one available today. CM will continue to follow.     Felix Montejo LMSW  Supervisee in [address], [address] [facility] [facility]

## 2023-03-03 NOTE — PROGRESS NOTES
Hospitalist Progress Note    NAME: Evangelista Flower   :  1980   MRN:  [id number]       Assessment / Plan:  ETOH and Opioid Withdrawal        Suicidal Ideations/Depression        Tachycardia  Respiratory acidosis secondary to Ativan use  - no current plan or SI/HI expressed this am  - etiology of tachycardia related to ETOH and Opioid Withdrawal  - CIWA  - IVF and multivitamin supplementation  Ammonia level mildly elevated at 38, L81 folic acid are within normal limits  -Follow-up 2D echo due to tachycardia- continue propranolol and effexor  - seizure and fall precautions  - suicide precautions  - psych recommended inpatient admission once medically stable, continue with one-to-one sitter  patient had a sudden onset of respiratory failure acute respiratory acidosis in the setting of Ativan use, patient was evaluated by intensivist.  Patient did not require ICU level of care  Ativan currently on hold, had received flumazenil to reverse Ativan effect  Currently on as needed Atarax  Patient cleared today, no need for O2 NC. PT working with ortho to get patient a scooter instead of crutches. Likely psych placement tomorrow     2. Mild leukocytosis    - negative influenza and covid    - chest xray shows no acute process    - CTA shows bibasilar atelectasis    - afebrile    - urine drug screen negative for barbiturates and opiates, negative for methadone    - urinalysis positive for bacteria, negative for nitrites and leukocytes, patient asymptomatic.    - will monitor lab work. 3. Acute respiratory failure-POA    - CTA shows no pulmonary embolism. Bibasilar atelectasis noted. - chest xray shows no acute process    - keep 02 sats greater than 92%    - turn, cough and reposition    - incentive spirometry every 2 hours while awake    - prn nebulizer treatments     4.   Hepatic steatosis    - etiology related to ETOH use    - seen on CTA of chest     - avoid hepatoxic medications     - will monitor lab work     - Low albumin     5. Fracture of Left Ankle- POA    - etiology related to prior fall    - left boot intact    - elevate left lower extremity while at rest    - fall precautions    - Working on getting geriatric bed for scooter    Tobacco abuse  Started on nicotine patch    40 or above Morbid obesity / Body mass index is 41.2 kg/m². Estimated discharge date: 3/4-3/5?? Barriers: Inpatient psych  Code status: Full  Prophylaxis: Lovenox  Recommended Disposition: Home w/Family     Subjective:     Chief Complaint / Reason for Physician Visit  Suicidal thoughts, alcohol withdrawal discussed with RN events overnight. Patient denies SI. Feeling better today. Encouraged OOB to chair. Review of Systems:  Symptom Y/N Comments  Symptom Y/N Comments   Fever/Chills n   Chest Pain n    Poor Appetite    Edema     Cough n   Abdominal Pain n    Sputum    Joint Pain     SOB/FIGUEROA n   Pruritis/Rash     Nausea/vomit y   Tolerating PT/OT     Diarrhea    Tolerating Diet y    Constipation    Other       Could NOT obtain due to:      Objective:     VITALS:   Last 24hrs VS reviewed since prior progress note. Most recent are:  Patient Vitals for the past 24 hrs:   Temp Pulse Resp BP SpO2   03/03/23 1520 97.5 °F (36.4 °C) 65 16 (!) 100/52 96 %   03/03/23 1101 97.6 °F (36.4 °C) -- -- -- --   03/03/23 1100 97.6 °F (36.4 °C) (!) 58 18 (!) 111/53 97 %   03/03/23 0849 -- 60 -- 112/74 --   03/03/23 0704 97.5 °F (36.4 °C) 66 18 129/61 96 %   03/03/23 0318 97.5 °F (36.4 °C) 60 18 119/68 99 %   03/02/23 2249 97.5 °F (36.4 °C) 85 24 (!) 120/58 95 %   03/02/23 2139 -- 67 -- (!) 117/98 --   03/02/23 1918 97.6 °F (36.4 °C) 71 18 (!) 105/58 96 %       No intake or output data in the 24 hours ending 03/03/23 1622       I had a face to face encounter and independently examined this patient on 3/3/2023, as outlined below:  PHYSICAL EXAM:  General: WD, WN. Alert, cooperative, no acute distress    EENT:  EOMI. Anicteric sclerae.  MMM  Resp:  CTA bilaterally, no wheezing or rales. No accessory muscle use  CV:  Regular  rhythm,  No edema  GI:  Soft, Non distended, Non tender. +Bowel sounds  Neurologic:  Alert and oriented X 3, normal speech,   Psych:   Fair  insight. Not anxious nor agitated  Skin:  No rashes. No jaundice    Reviewed most current lab test results and cultures  YES  Reviewed most current radiology test results   YES  Review and summation of old records today    NO  Reviewed patient's current orders and MAR    YES  PMH/SH reviewed - no change compared to H&P  ________________________________________________________________________  Care Plan discussed with:    Comments   Patient x    Family      RN x    Care Manager x    Consultant  x                      Multidiciplinary team rounds were held today with , nursing, pharmacist and clinical coordinator. Patient's plan of care was discussed; medications were reviewed and discharge planning was addressed. ________________________________________________________________________  Total NON critical care TIME:  55   Minutes    Total CRITICAL CARE TIME Spent:   Minutes non procedure based      Comments   >50% of visit spent in counseling and coordination of care x    ________________________________________________________________________  Rosendo Carrizales MD     Procedures: see electronic medical records for all procedures/Xrays and details which were not copied into this note but were reviewed prior to creation of Plan. LABS:  I reviewed today's most current labs and imaging studies. Pertinent labs include:  No results for input(s): WBC, HGB, HCT, PLT, HGBEXT, HCTEXT, PLTEXT, HGBEXT, HCTEXT, PLTEXT in the last 72 hours.     Recent Labs     03/03/23  0312 03/02/23  0054 03/01/23  0042    140 140   K 3.7 3.6 3.1*    105 102   CO2 29 31 35*   * 107* 126*   BUN 23* 20 16   CREA 0.67 0.79 0.71   CA 9.2 8.9 8.8   PHOS 3.0 2.7 2.4*   ALB 3.5 2.7* 2.6* Signed: Ricardo Valle MD

## 2023-03-03 NOTE — PROGRESS NOTES
End of Shift Note    Bedside shift change report given to  (oncoming nurse) by Pamela Jerry RN (offgoing nurse). Report included the following information SBAR    Shift worked:  7a -7p     Shift summary and any significant changes:    0940: Mental Health consult per sitter  1630: Psych at bedside via TeleHealth with Dr. Jorgito Valentine    Patient not anxious throughout shift  Headaches medicated with Fioricet and tramadol prn  No SI during shift       Concerns for physician to address:       Zone phone for oncoming shift:          Activity:  Activity Level: Up with Assistance  Number times ambulated in hallways past shift: 0  Number of times OOB to bathroom past shift: 3    Cardiac:   Cardiac Monitoring: Yes      Cardiac Rhythm: Sinus Rhythm    Access:  Current line(s): PIV     Genitourinary:   Urinary status: voiding    Respiratory:   O2 Device: None (Room air)  Chronic home O2 use?: NO  Incentive spirometer at bedside: NO       GI:  Last BM: 3/3/23  Current diet:  ADULT DIET Regular; Safety Tray; Safety Tray (Disposables)  Passing flatus: YES  Tolerating current diet: YES       Pain Management:   Patient states pain is manageable on current regimen: YES    Skin:  Elijah Score: 21  Interventions: float heels    Patient Safety:  Fall Score:  Total Score: 4  Interventions: bed/chair alarm,   High Fall Risk: Yes    Length of Stay:  Expected LOS: - - -  Actual LOS: Pito Hopkins RN

## 2023-03-03 NOTE — PROGRESS NOTES
Comprehensive Nutrition Assessment    Type and Reason for Visit: Initial, RD nutrition re-screen/LOS    Nutrition Recommendations/Plan:   Continue current diet     Malnutrition Assessment:  Malnutrition Status:  No malnutrition (03/03/23 1049)        Nutrition Assessment:     Chart reviewed for LOS. Pt medically noted for ETOH and opioid withdrawal, suicidal ideations/depression, tachycardia, respiratory acidosis, seizure, hepatic steatosis, left ankle fracture. Pt reports good PO intake. She declined current nutrition related questions or concerns, hoping to d/c to IP psych soon. Short interview as pt seemed very uninterested in speaking with RD. Unable to ask about recent weight hx. No source of current weight, so unsure of accuracy. Patient Vitals for the past 168 hrs:   % Diet Eaten   03/02/23 0730 51 - 75%   03/01/23 1600 51 - 75%   03/01/23 0740 51 - 75%   03/01/23 0339 76 - 100%   02/28/23 2312 76 - 100%   02/28/23 0803 76 - 100%   02/27/23 2316 76 - 100%   02/27/23 1246 1 - 25%   02/27/23 0908 1 - 25%     Wt Readings from Last 5 Encounters:   02/28/23 108.9 kg (240 lb)   02/22/23 108.9 kg (240 lb)   02/15/23 121.1 kg (267 lb)   01/18/23 121.1 kg (267 lb)   01/03/23 121.1 kg (267 lb)   ]    Nutrition Related Findings:    Labs: reviewed. kphos, klorcon, thiamine. Edema: 1+LLE. BM 2/27. Wound Type: None    Current Nutrition Intake & Therapies:  Average Meal Intake: 51-75%  Average Supplement Intake: None ordered  ADULT DIET Regular; Safety Tray; Safety Tray (Disposables)    Anthropometric Measures:  Height: 5' 4\" (162.6 cm)  Ideal Body Weight (IBW): 120 lbs (55 kg)     Current Body Wt:  108.9 kg (240 lb), 200 % IBW.  Not specified  Current BMI (kg/m2): 41.2        Weight Adjustment: No adjustment                 BMI Category: Obese class 3 (BMI 40.0 or greater)    Estimated Daily Nutrient Needs:  Energy Requirements Based On: Formula  Weight Used for Energy Requirements: Current  Energy (kcal/day): 8603 kcals (BMR x 1. 3AF - 500 for wt loss)  Weight Used for Protein Requirements: Current  Protein (g/day): 87g (0.8g/kg)  Method Used for Fluid Requirements: 1 ml/kcal  Fluid (ml/day): 1800mL    Nutrition Diagnosis:   No nutrition diagnosis at this time    Nutrition Interventions:   Food and/or Nutrient Delivery: Continue current diet  Nutrition Education/Counseling: No recommendations at this time  Coordination of Nutrition Care: Continue to monitor while inpatient       Goals:     Goals: PO intake 75% or greater, by next RD assessment       Nutrition Monitoring and Evaluation:   Behavioral-Environmental Outcomes: None identified  Food/Nutrient Intake Outcomes: Food and nutrient intake  Physical Signs/Symptoms Outcomes: Biochemical data, GI status, Weight, Fluid status or edema    Discharge Planning:    Continue current diet    Rafaela Davis, [address]  Contact: [phone]

## 2023-03-03 NOTE — PROGRESS NOTES
Problem: Pressure Injury - Risk of  Goal: *Prevention of pressure injury  Description: Document Elijah Scale and appropriate interventions in the flowsheet. Outcome: Progressing Towards Goal  Note: Pressure Injury Interventions:  Sensory Interventions: Assess changes in LOC, Assess need for specialty bed, Avoid rigorous massage over bony prominences, Discuss PT/OT consult with provider, Minimize linen layers    Moisture Interventions: Absorbent underpads, Internal/External urinary devices, Limit adult briefs, Maintain skin hydration (lotion/cream), Minimize layers    Activity Interventions: Assess need for specialty bed, Chair cushion, Increase time out of bed, Pressure redistribution bed/mattress(bed type), PT/OT evaluation    Mobility Interventions: Assess need for specialty bed, Chair cushion, Float heels, HOB 30 degrees or less, PT/OT evaluation    Nutrition Interventions: Document food/fluid/supplement intake    Friction and Shear Interventions: Apply protective barrier, creams and emollients, Feet elevated on foot rest, Foam dressings/transparent film/skin sealants, HOB 30 degrees or less, Lift sheet                Problem: Falls - Risk of  Goal: *Absence of Falls  Description: Document Rema Fall Risk and appropriate interventions in the flowsheet.   Outcome: Progressing Towards Goal  Note: Fall Risk Interventions:  Mobility Interventions: Assess mobility with egress test, Bed/chair exit alarm, Utilize walker, cane, or other assistive device, Utilize gait belt for transfers/ambulation    Mentation Interventions: Adequate sleep, hydration, pain control, Bed/chair exit alarm, Update white board, Increase mobility, Family/sitter at bedside    Medication Interventions: Bed/chair exit alarm, Utilize gait belt for transfers/ambulation    Elimination Interventions: Bed/chair exit alarm, Call light in reach, Stay With Me (per policy)              Problem: Alcohol Withdrawal  Goal: *STG: Participates in treatment plan  Outcome: Progressing Towards Goal  Goal: *STG: Remains safe in hospital  Outcome: Progressing Towards Goal  Goal: *STG: Seeks staff when symptoms of withdrawal increase  Outcome: Progressing Towards Goal  Goal: *STG: Complies with medication therapy  Outcome: Progressing Towards Goal  Goal: *STG: Attends activities and groups  Outcome: Progressing Towards Goal  Goal: *STG: Will identify negative impact of chemical dependency including the use of tobacco, alcohol, and other substances  Outcome: Progressing Towards Goal  Goal: *STG: Verbalizes abstinence as an achievable goal  Outcome: Progressing Towards Goal  Goal: *STG: Agrees to participate in outpatient after care program to support ongoing mental health  Outcome: Progressing Towards Goal  Goal: *STG: Able to indentify relapse triggers including interpersonal/social and familial factors  Outcome: Progressing Towards Goal  Goal: *STG: Identify lifestyle changes to support long term sobriety such as vocation, employment, education, and legal issues  Outcome: Progressing Towards Goal  Goal: *STG: Maintains appropriate nutrition and hydration  Outcome: Progressing Towards Goal  Goal: *STG: Vital signs within defined limits  Outcome: Progressing Towards Goal  Goal: *STG/LTG: Relapse prevention plan in place to include housing/aftercare, leisure activities, and spirituality  Outcome: Progressing Towards Goal  Goal: Interventions  Outcome: Progressing Towards Goal     Problem: Risk for Opioid Over-Sedation  Goal: Recognition of Risk Factors for Over-sedation  Outcome: Progressing Towards Goal  Goal: Prevention of Over-sedation  Outcome: Progressing Towards Goal  Goal: Prevention of Respiratory Depression  Outcome: Progressing Towards Goal     Problem: Suicide  Goal: *STG: Remains safe in hospital  Outcome: Progressing Towards Goal  Goal: *STG: Seeks staff when feelings of self harm or harm towards others arise  Outcome: Progressing Towards Goal  Goal: *STG: Attends activities and groups  Outcome: Progressing Towards Goal  Goal: *STG:  Verbalizes alternative ways of dealing with maladaptive feelings/behaviors  Outcome: Progressing Towards Goal  Goal: *STG/LTG: Complies with medication therapy  Outcome: Progressing Towards Goal  Goal: *STG/LTG: No longer expresses self destructive or suicidal thoughts  Outcome: Progressing Towards Goal  Goal: *LTG:  Identifies available community resources  Outcome: Progressing Towards Goal  Goal: *LTG:  Develops proactive suicide prevention plan  Outcome: Progressing Towards Goal  Goal: Interventions  Outcome: Progressing Towards Goal

## 2023-03-03 NOTE — PROGRESS NOTES
End of Shift Note    Bedside shift change report given to Hardy Love RN (oncoming nurse) by Mary Kendall RN (offgoing nurse).   Report included the following information SBAR, Kardex, ED Summary, Intake/Output, MAR, and Recent Results    Shift worked:  night     Shift summary and any significant changes:     Pt rested comfortably, 1:1 sitter remains at bedside, remains on room air, expected to discharge today pending psych bed placement     Concerns for physician to address:  none     Zone phone for oncoming shift:   [address] [facility], RN

## 2023-03-04 LAB
ALBUMIN SERPL-MCNC: 3.2 G/DL (ref 3.5–5)
ANION GAP SERPL CALC-SCNC: 3 MMOL/L (ref 5–15)
BUN SERPL-MCNC: 28 MG/DL (ref 6–20)
BUN/CREAT SERPL: 39 (ref 12–20)
CALCIUM SERPL-MCNC: 8.9 MG/DL (ref 8.5–10.1)
CHLORIDE SERPL-SCNC: 107 MMOL/L (ref 97–108)
CO2 SERPL-SCNC: 28 MMOL/L (ref 21–32)
CREAT SERPL-MCNC: 0.71 MG/DL (ref 0.55–1.02)
GLUCOSE SERPL-MCNC: 103 MG/DL (ref 65–100)
PHOSPHATE SERPL-MCNC: 3.5 MG/DL (ref 2.6–4.7)
POTASSIUM SERPL-SCNC: 4.3 MMOL/L (ref 3.5–5.1)
SODIUM SERPL-SCNC: 138 MMOL/L (ref 136–145)

## 2023-03-04 PROCEDURE — 74011250637 HC RX REV CODE- 250/637: Performed by: STUDENT IN AN ORGANIZED HEALTH CARE EDUCATION/TRAINING PROGRAM

## 2023-03-04 PROCEDURE — 74011250636 HC RX REV CODE- 250/636: Performed by: STUDENT IN AN ORGANIZED HEALTH CARE EDUCATION/TRAINING PROGRAM

## 2023-03-04 PROCEDURE — 36415 COLL VENOUS BLD VENIPUNCTURE: CPT

## 2023-03-04 PROCEDURE — 80069 RENAL FUNCTION PANEL: CPT

## 2023-03-04 PROCEDURE — 74011250637 HC RX REV CODE- 250/637: Performed by: INTERNAL MEDICINE

## 2023-03-04 PROCEDURE — 65660000001 HC RM ICU INTERMED STEPDOWN

## 2023-03-04 PROCEDURE — 74011250637 HC RX REV CODE- 250/637: Performed by: PHYSICIAN ASSISTANT

## 2023-03-04 PROCEDURE — 74011250637 HC RX REV CODE- 250/637: Performed by: NURSE PRACTITIONER

## 2023-03-04 RX ORDER — MIRTAZAPINE 15 MG/1
7.5 TABLET, FILM COATED ORAL
Status: DISCONTINUED | OUTPATIENT
Start: 2023-03-04 | End: 2023-03-13 | Stop reason: HOSPADM

## 2023-03-04 RX ADMIN — GABAPENTIN 400 MG: 300 CAPSULE ORAL at 10:36

## 2023-03-04 RX ADMIN — BUTALBITAL, ACETAMINOPHEN, AND CAFFEINE 1 TABLET: 50; 325; 40 TABLET ORAL at 18:17

## 2023-03-04 RX ADMIN — ENOXAPARIN SODIUM 30 MG: 100 INJECTION SUBCUTANEOUS at 10:36

## 2023-03-04 RX ADMIN — PROPRANOLOL HYDROCHLORIDE 40 MG: 10 TABLET ORAL at 16:17

## 2023-03-04 RX ADMIN — DIBASIC SODIUM PHOSPHATE, MONOBASIC POTASSIUM PHOSPHATE AND MONOBASIC SODIUM PHOSPHATE 1 TABLET: 852; 155; 130 TABLET ORAL at 08:36

## 2023-03-04 RX ADMIN — POTASSIUM CHLORIDE 40 MEQ: 20 TABLET, EXTENDED RELEASE ORAL at 17:48

## 2023-03-04 RX ADMIN — DIBASIC SODIUM PHOSPHATE, MONOBASIC POTASSIUM PHOSPHATE AND MONOBASIC SODIUM PHOSPHATE 1 TABLET: 852; 155; 130 TABLET ORAL at 17:48

## 2023-03-04 RX ADMIN — POTASSIUM CHLORIDE 40 MEQ: 20 TABLET, EXTENDED RELEASE ORAL at 08:35

## 2023-03-04 RX ADMIN — BUTALBITAL, ACETAMINOPHEN, AND CAFFEINE 1 TABLET: 50; 325; 40 TABLET ORAL at 05:47

## 2023-03-04 RX ADMIN — GABAPENTIN 400 MG: 300 CAPSULE ORAL at 21:30

## 2023-03-04 RX ADMIN — PROPRANOLOL HYDROCHLORIDE 40 MG: 10 TABLET ORAL at 21:30

## 2023-03-04 RX ADMIN — VENLAFAXINE HYDROCHLORIDE 225 MG: 150 CAPSULE, EXTENDED RELEASE ORAL at 08:35

## 2023-03-04 RX ADMIN — MIRTAZAPINE 7.5 MG: 15 TABLET, FILM COATED ORAL at 21:31

## 2023-03-04 RX ADMIN — THIAMINE HCL TAB 100 MG 100 MG: 100 TAB at 08:35

## 2023-03-04 RX ADMIN — GABAPENTIN 400 MG: 300 CAPSULE ORAL at 16:17

## 2023-03-04 RX ADMIN — ENOXAPARIN SODIUM 30 MG: 100 INJECTION SUBCUTANEOUS at 21:33

## 2023-03-04 RX ADMIN — PROPRANOLOL HYDROCHLORIDE 40 MG: 10 TABLET ORAL at 08:36

## 2023-03-04 RX ADMIN — ACETAMINOPHEN 325MG 650 MG: 325 TABLET ORAL at 10:43

## 2023-03-04 NOTE — PROGRESS NOTES
Transition of Care Plan:     RUR:21%  Disposition: IP Psych when medically stable- pt is voluntary  If SNF or IPR: n/a  Date FOC offered:n/a  Date FOC received:n/a  Date authorization started with reference number:n/a  Date authorization received and expires:n/a  Accepting facility:n/a  Follow up appointments:to be scheduled   DME needed:none  Transportation at Discharge:BLS/EMTALA  [place] or means to access home: yes        IM Medicare Letter:n/a  Is patient a  and connected with the [facility]?    no            If yes, was [employer] transfer form completed and VA notified? n/a  Caregiver Contact:grandmother Violeta Villeda [phone]  Discharge Caregiver contacted prior to discharge? yes  Care Conference needed?:  no  Previous HH/SNF/IPR: none        CM called inpt psych at [phone] to check on a bed for pt. CM received voice mail. CM left a message for them to return the call.     Jaclyn Johns LMSW  Supervisee in [address] [facility], [address]

## 2023-03-04 NOTE — CONSULTS
PSYCHIATRY CONSULT NOTE:    REASON FOR CONSULT:  depression  depression    HISTORY OF PRESENTING COMPLAINT:  Jaqueline Mcdaniel is a 38 y.o. female admitted to this unit for management of alcohol and opiate withdrawal. She additionally voiced complaints of suicidal ideation secondary to reported worsening depression. She states her depression has been worsening since she feels that she \"needs a medication adjustment. \" She expressed interest in inpatient admission as she feels it would be the most effective way to achieve the quickest possible change in medications and subsequently, improved mood. In terms of symptoms, she endorses poor sleep, depressed mood, low energy, restlessness, persistent and generalized worry. She denies any SI/HI/AVH at time of assessment. She does not voice any delusions and denies any symptoms concerning for polo or hypomania. She is eager for medication changes to better target her reported symptoms. PAST PSYCHIATRIC HISTORY:  Depression, anxiety    SUBSTANCE ABUSE HISTORY:  Social History     Substance and Sexual Activity   Drug Use No     Social History     Substance and Sexual Activity   Alcohol Use Yes    Comment: 5 beers yesterday 22     Social History     Tobacco Use   Smoking Status Former    Packs/day: 1.00    Years: 18.00    Pack years: 18.00    Types: Cigarettes   Smokeless Tobacco Current   Tobacco Comments    Vapes       PAST MEDICAL HISTORY:  Past Medical History:   Diagnosis Date    ADD (attention deficit disorder) 19-19yo    Treated with Adderall since . 2013 dosing 20mg AM and 10mg PM.  Trial/change to Vyvanse Nov-Dec 2015--not as effective. Gynecologic disorder     History of miscarriages.   History of Mirena IUD placed on 4/17/15    HX OTHER MEDICAL      -BUFA baby    HX OTHER MEDICAL     HPV positive    Idiopathic vulvodynia 2014    L1 vertebral fracture (HCC) 2017    [facility] imaging/admissoin: Mild acute two column compression fracture of L1 without evidence of retropulsion into the spinal canal.  Managed non-operatively. Migraines 6/12/2013    Neurological disorder     Pelvic pain in female 9/15/2014    April 2015 gyn laparoscopy/hysteroscopy with endometriosis worse right.     Psychiatric disorder     depression    Secondary dysmenorrhea 8/12/2014    With menorraghia    Seizures ([facility] [address] 2008    never on meds--had appr 4-5 in a short amt of time and none since           VITALS:  Visit Vitals  BP (!) 107/57   Pulse 71   Temp 97.5 °F (36.4 °C)   Resp 18   Ht 5' 4\" (1.626 m)   Wt 108.9 kg (240 lb)   SpO2 97%   BMI 41.20 kg/m²       MEDICATIONS:    Current Facility-Administered Medications:     benzocaine-menthoL (CHLORASEPTIC MAX) lozenge 1 Lozenge, 1 Lozenge, Oral, Q2H PRN, Lonnie Gonzales PA-C, 1 Lozenge at 03/02/23 0210    phosphorus (K PHOS NEUTRAL) 250 mg tablet 1 Tablet, 1 Tablet, Oral, BID, Rehan Kennedy MD, 1 Tablet at 03/03/23 1737    butalbital-acetaminophen-caffeine (FIORICET, ESGIC) -40 mg per tablet 1 Tablet, 1 Tablet, Oral, Q6H PRN, Rehan Kennedy MD, 1 Tablet at 03/03/23 0849    potassium chloride (K-DUR, KLOR-CON M20) SR tablet 40 mEq, 40 mEq, Oral, BID, Rehan Kennedy MD, 40 mEq at 03/03/23 1738    nicotine (NICODERM CQ) 14 mg/24 hr patch 1 Patch, 1 Patch, TransDERmal, DAILY, Lonnie Gonzales PA-C, 1 Patch at 03/03/23 0843    venlafaxine-SR (EFFEXOR-XR) capsule 225 mg, 225 mg, Oral, DAILY WITH BREAKFAST, Naomie Villasenor MD, 225 mg at 03/03/23 0848    hydrOXYzine HCL (ATARAX) tablet 25 mg, 25 mg, Oral, Q6H PRN, Naomie Villasenor MD, 25 mg at 03/01/23 1633    propranoloL (INDERAL) tablet 40 mg, 40 mg, Oral, TID, Claudia Meza, AVNI, 40 mg at 03/03/23 1624    [Held by provider] 0.9% sodium chloride infusion, 125 mL/hr, IntraVENous, CONTINUOUS, Naomie Villasenor MD, Last Rate: 125 mL/hr at 02/27/23 0733, 125 mL/hr at 02/27/23 0733    ondansetron (ZOFRAN ODT) tablet 4 mg, 4 mg, Oral, Q8H PRN, Hussein Angulo, Tye Smith NP    [facility]ron [facility]) injection 4 mg, 4 mg, IntraVENous, Q6H PRN, Shanice Carrillo NP, 4 mg at 02/28/23 2205    acetaminophen (TYLENOL) suppository 650 mg, 650 mg, Rectal, Q4H PRN, Shanice Carrillo NP    acetaminophen (TYLENOL) tablet 650 mg, 650 mg, Oral, Q4H PRN, Shanice Carrillo NP, 650 mg at 02/28/23 1413    [Held by provider] therapeutic multivitamin (THERAGRAN) tablet 1 Tablet, 1 Tablet, Oral, DAILY, Shanice Carrillo NP    traMADoL (ULTRAM) tablet 50 mg, 50 mg, Oral, Q6H PRN, Shanice Carrillo NP, 50 mg at 03/03/23 1228    albuterol-ipratropium (DUO-NEB) 2.5 MG-0.5 MG/3 ML, 3 mL, Nebulization, Q4H PRN, Shanice Carrillo NP, 3 mL at 02/27/23 0413    aluminum-magnesium hydroxide (MAALOX) oral suspension 15 mL, 15 mL, Oral, QID PRN, Shanice Carrillo NP    senna-docusate (PERICOLACE) 8.6-50 mg per tablet 1 Tablet, 1 Tablet, Oral, BID PRN, Shanice Carrillo NP    polyethylene glycol (MIRALAX) packet 17 g, 17 g, Oral, DAILY PRN, Shanice Carrillo NP    thiamine mononitrate (B-1) tablet 100 mg, 100 mg, Oral, DAILY, Marcelino, Devon, DO, 100 mg at 03/03/23 0849    enoxaparin (LOVENOX) injection 30 mg, 30 mg, SubCUTAneous, Q12H, Marcelino, Devon, DO, 30 mg at 03/03/23 0915    scopolamine (TRANSDERM-SCOP) 1 mg over 3 days 1 Patch, 1 Patch, TransDERmal, Q72H, Shanice Carrillo NP, 1 Patch at 03/03/23 1629    hydrALAZINE (APRESOLINE) 20 mg/mL injection 10 mg, 10 mg, IntraVENous, Q6H PRN, Shanice Carrillo NP    metoprolol (LOPRESSOR) injection 2.5 mg, 2.5 mg, IntraVENous, Q4H PRN, Shanice Carrillo NP      Mental Status Exam:   Sensorium  oriented to time, place and person   Relations cooperative   Eye Contact appropriate   Appearance:  age appropriate and overweight   Speech:  normal pitch, normal volume, and non-pressured   Thought Process: logical   Thought Content free of delusions and free of hallucinations   Suicidal ideations none   Mood:  anxious and depressed   Affect:  mood-congruent Memory   adequate   Concentration:  adequate   Insight:  fair   Judgment:  fair       ASSESSMENT AND PLAN:  Martha Erickson meets criteria for an unspecifed depressive disorder and an unspecified anxiety disorder    RECOMMENDATIONS:  Not a candidate for inpatient psychiatry at this time    Change PRN tramadol to an alternative pain medication as tramadol is noted to have both serotonin and norepinephrine reuptake inhibition and has a risk for serotonin syndrome as well as worsening anxiety. Initiate gabapentin 400 mg PO TID for anxiety, can uptitrate by 100 mg TID/day up to 600 mg PO TID. Initiate mirtazapine 7.5 mg PO for sleep (while a tetracyclic antidepressant, at lower doses, mirtazapine has far greater selectivity for histamine receptors and good potential for sedation purposes to aid sleep).         Thank you for this consultation    Aron Quintero NP  3/3/2023

## 2023-03-04 NOTE — PROGRESS NOTES
End of Shift Note    Bedside shift change report given to ADAMS Driver (oncoming nurse) by Norma Charles RN (offgoing nurse).   Report included the following information SBAR, Kardex, ED Summary, Intake/Output, MAR, and Recent Results    Shift worked:  night     Shift summary and any significant changes:     Pt rested comfortably, no significant changes     Concerns for physician to address:  none     Zone phone for oncoming shift:   xxx         Norma Charles, RN

## 2023-03-04 NOTE — PROGRESS NOTES
Problem: Pressure Injury - Risk of  Goal: *Prevention of pressure injury  Description: Document Elijah Scale and appropriate interventions in the flowsheet. Outcome: Progressing Towards Goal  Note: Pressure Injury Interventions:  Sensory Interventions: Assess changes in LOC, Assess need for specialty bed, Avoid rigorous massage over bony prominences, Discuss PT/OT consult with provider, Minimize linen layers    Moisture Interventions: Absorbent underpads, Internal/External urinary devices, Limit adult briefs, Maintain skin hydration (lotion/cream), Minimize layers    Activity Interventions: Increase time out of bed, Pressure redistribution bed/mattress(bed type)    Mobility Interventions: Pressure redistribution bed/mattress (bed type)    Nutrition Interventions: Document food/fluid/supplement intake    Friction and Shear Interventions: Apply protective barrier, creams and emollients, Feet elevated on foot rest, Foam dressings/transparent film/skin sealants, HOB 30 degrees or less, Lift sheet                Problem: Falls - Risk of  Goal: *Absence of Falls  Description: Document Rema Fall Risk and appropriate interventions in the flowsheet.   Outcome: Progressing Towards Goal  Note: Fall Risk Interventions:  Mobility Interventions: Assess mobility with egress test, Bed/chair exit alarm, Utilize walker, cane, or other assistive device, Utilize gait belt for transfers/ambulation    Mentation Interventions: Adequate sleep, hydration, pain control, Bed/chair exit alarm, Update white board, Increase mobility, Family/sitter at bedside    Medication Interventions: Bed/chair exit alarm, Utilize gait belt for transfers/ambulation    Elimination Interventions: Bed/chair exit alarm, Call light in reach, Stay With Me (per policy)              Problem: Alcohol Withdrawal  Goal: *STG: Participates in treatment plan  Outcome: Progressing Towards Goal  Goal: *STG: Remains safe in hospital  Outcome: Progressing Towards Goal  Goal: *STG: Seeks staff when symptoms of withdrawal increase  Outcome: Progressing Towards Goal  Goal: *STG: Complies with medication therapy  Outcome: Progressing Towards Goal  Goal: *STG: Attends activities and groups  Outcome: Progressing Towards Goal  Goal: *STG: Will identify negative impact of chemical dependency including the use of tobacco, alcohol, and other substances  Outcome: Progressing Towards Goal  Goal: *STG: Verbalizes abstinence as an achievable goal  Outcome: Progressing Towards Goal  Goal: *STG: Agrees to participate in outpatient after care program to support ongoing mental health  Outcome: Progressing Towards Goal  Goal: *STG: Able to indentify relapse triggers including interpersonal/social and familial factors  Outcome: Progressing Towards Goal  Goal: *STG: Identify lifestyle changes to support long term sobriety such as vocation, employment, education, and legal issues  Outcome: Progressing Towards Goal  Goal: *STG: Maintains appropriate nutrition and hydration  Outcome: Progressing Towards Goal  Goal: *STG: Vital signs within defined limits  Outcome: Progressing Towards Goal  Goal: *STG/LTG: Relapse prevention plan in place to include housing/aftercare, leisure activities, and spirituality  Outcome: Progressing Towards Goal  Goal: Interventions  Outcome: Progressing Towards Goal     Problem: Suicide  Goal: *STG: Remains safe in hospital  Outcome: Progressing Towards Goal  Goal: *STG: Seeks staff when feelings of self harm or harm towards others arise  Outcome: Progressing Towards Goal  Goal: *STG: Attends activities and groups  Outcome: Progressing Towards Goal  Goal: *STG:  Verbalizes alternative ways of dealing with maladaptive feelings/behaviors  Outcome: Progressing Towards Goal  Goal: *STG/LTG: Complies with medication therapy  Outcome: Progressing Towards Goal  Goal: *STG/LTG: No longer expresses self destructive or suicidal thoughts  Outcome: Progressing Towards Goal  Goal: *LTG:  Identifies available community resources  Outcome: Progressing Towards Goal  Goal: *LTG:  Develops proactive suicide prevention plan  Outcome: Progressing Towards Goal  Goal: Interventions  Outcome: Progressing Towards Goal

## 2023-03-04 NOTE — PROGRESS NOTES
0710: Bedside shift change report given to ADAMS Driver (oncoming nurse) by Riaz Cedeño RN (offgoing nurse). Report included the following information SBAR, Kardex, Intake/Output, MAR, and Recent Results. 1900:   End of Shift Note    Bedside shift change report given to Riaz Cedeño RN (oncoming nurse) by Armando Verma RN (offgoing nurse). Report included the following information SBAR, Kardex, Intake/Output, MAR, and Recent Results    Shift worked:  [id number]     Shift summary and any significant changes:     No significant changes. Patient received one dose of Tylenol and one dose of Fioricet for headaches throughout shift. Waiting for placement at inpatient [facility] facility. Concerns for physician to address:       Zone phone for oncoming shift:          Activity:  Activity Level: Up with Assistance  Number times ambulated in hallways past shift: 0  Number of times OOB to chair past shift: 3    Cardiac:   Cardiac Monitoring: Yes      Cardiac Rhythm: Sinus Dani    Access:  Current line(s): PIV     Genitourinary:   Urinary status: voiding    Respiratory:   O2 Device: None (Room air)  Chronic home O2 use?: NO  Incentive spirometer at bedside: YES       GI:  Last Bowel Movement Date: 03/03/23  Current diet:  ADULT DIET Regular; Safety Tray; Safety Tray (Disposables)  Passing flatus: YES  Tolerating current diet: YES       Pain Management:   Patient states pain is manageable on current regimen: YES    Skin:  Elijah Score: 21  Interventions: increase time out of bed, PT/OT consult, and nutritional support     Patient Safety:  Fall Score:  Total Score: 4  Interventions: bed/chair alarm, assistive device (walker, cane, etc), gripper socks, and pt to call before getting OOB  High Fall Risk: Yes    Length of Stay:  Expected LOS: - - -  Actual LOS: 7      Armando Verma RN

## 2023-03-04 NOTE — PROGRESS NOTES
Bedside and Verbal shift change report given to Barbara Baca RN (oncoming nurse) by Jacklyn Umanzor RN (offgoing nurse). Report included the following information SBAR, Kardex, MAR and Recent Results. SITUATION:  Code Status: Full Code  Reason for Admission: Hyperosmolar (nonketotic) coma ([facility]  Acidosis  Respiratory failure ([facility]  Shock ([facility]  Hospital day: 5  Problem List:       Hospital Problems  Date Reviewed: 2/7/2018          Codes Class Noted POA    Acidosis ICD-10-CM: E87.2  ICD-9-CM: 276.2  2/7/2018 Unknown        * (Principal)Respiratory failure ([facility] ICD-10-CM: J96.90  ICD-9-CM: 518.81  2/7/2018 Unknown        Shock ([facility] ICD-10-CM: R57.9  ICD-9-CM: 785.50  2/7/2018 Unknown        Hyperosmolar (nonketotic) coma ([facility] ICD-10-CM: E11.01  ICD-9-CM: 250.20  2/7/2018 Unknown        Acute UTI ICD-10-CM: N39.0  ICD-9-CM: 599.0  2/7/2018 Unknown        Macrocytic anemia ICD-10-CM: D53.9  ICD-9-CM: 281.9  2/7/2018 Unknown              BACKGROUND:   Past Medical History:   Past Medical History:   Diagnosis Date    Arthritis     Asthma     Chronic pain     BACK PAIN    Diabetes ([facility]     Diabetic neuropathy ([facility]     Emphysema     Hepatitis C     Hypertension     Nervousness     Osteoarthritis of both knees       Patient taking anticoagulants yes    Patient has a defibrillator: no    History of shots YES for example, flu, pneumonia, tetanus   Isolation History NO for example, MRSA, CDiff    ASSESSMENT:  Changes in Assessment Throughout Shift: NONE  Significant Changes in 24 hours (for example, RR/code, fall)  Patient has Central Line: yes Reasons if yes: Hemodynamically stable, requiring monitoring lines  Patient has Pro Cath: yes Reasons if yes: Medically unstable.    Mobility Issues  PT  IV Patency  OR Checklist  Pending Tests    Last Vitals:  Vitals w/ MEWS Score (last day)     Date/Time MEWS Score Pulse Resp Temp BP Level of Consciousness SpO2    02/12/18 0357 1 66 20 99.5 °F (37.5 °C) 136/75 Alert 93 Hospitalist Progress Note    NAME: Lj Jernigan   :  1980   MRN:  [id number]       Assessment / Plan:  ETOH and Opioid Withdrawal        Suicidal Ideations/Depression        Tachycardia  Respiratory acidosis secondary to Ativan use  - no current plan or SI/HI expressed this am  - etiology of tachycardia related to ETOH and Opioid Withdrawal  - CIWA  - IVF and multivitamin supplementation  Ammonia level mildly elevated at 38, V90 folic acid are within normal limits  -Follow-up 2D echo due to tachycardia- continue propranolol and effexor  - seizure and fall precautions  - suicide precautions  - psych recommended inpatient admission once medically stable, continue with one-to-one sitter  patient had a sudden onset of respiratory failure acute respiratory acidosis in the setting of Ativan use, patient was evaluated by intensivist.  Patient did not require ICU level of care  Ativan currently on hold, had received flumazenil to reverse Ativan effect  Currently on as needed Atarax  Sinus bradycardia to upper 50s noted last night. Runs in 62s usually. Patient cleared today, no need for O2 NC. PT working with ortho to get patient a scooter instead of crutches. Likely psych placement any day now     2. Mild leukocytosis    - negative influenza and covid    - chest xray shows no acute process    - CTA shows bibasilar atelectasis    - afebrile    - urine drug screen negative for barbiturates and opiates, negative for methadone    - urinalysis positive for bacteria, negative for nitrites and leukocytes, patient asymptomatic.    - will monitor lab work. 3. Acute respiratory failure-POA    - CTA shows no pulmonary embolism. Bibasilar atelectasis noted. - chest xray shows no acute process    - keep 02 sats greater than 92%    - turn, cough and reposition    - incentive spirometry every 2 hours while awake    - prn nebulizer treatments     4.   Hepatic steatosis    - etiology related to ETOH use    - seen on %    02/11/18 2347 1 62 20 99.9 °F (37.7 °C) 140/79 Alert 95 %    02/11/18 2006 1 73 20 (!)  100.7 °F (38.2 °C) 129/66 Alert 97 %    02/11/18 1600 -- -- -- 100 °F (37.8 °C) -- -- --    02/11/18 1300 -- 82 28 -- 90/73 Alert 100 %    02/11/18 1200 3 77 (!)  36 99.9 °F (37.7 °C) 137/62 Alert 98 %    02/11/18 1100 -- 75 (!)  31 -- (!)  126/102 Alert 97 %    02/11/18 1000 -- 71 26 -- 139/60 Alert 98 %    02/11/18 0900 -- 79 26 -- 133/64 Alert 93 %    02/11/18 0815 -- -- -- -- -- -- 100 %    02/11/18 0800 1 74 18 98.6 °F (37 °C) 153/59 Alert 96 %    02/11/18 0600 -- 97 26 -- 144/59 Alert 97 %    02/11/18 0500 -- 86 -- -- 154/69 Alert 94 %    02/11/18 0400 2 90 27 99.8 °F (37.7 °C) 154/75 Alert 98 %    02/11/18 0307 -- -- -- -- -- -- 94 %    02/11/18 0300 -- 91 19 -- 160/79 Alert 98 %    02/11/18 0200 -- 70 15 -- 144/66 -- 97 %    02/11/18 0100 -- 81 26 -- 161/80 Alert 99 %    02/11/18 0000 2 71 29 98.7 °F (37.1 °C) 157/74 Alert --            PAIN    Pain Assessment    Pain Intensity 1: 0 (02/12/18 0408)              Patient Stated Pain Goal: 0  Intervention effective: yes  Time of last intervention: Denied pain Reassessment Completed: yes   Other actions taken for pain: Distraction    Last 3 Weights:  Last 3 Recorded Weights in this Encounter    02/09/18 0509 02/10/18 0400 02/11/18 0414   Weight: 78.6 kg (173 lb 4.5 oz) 77.2 kg (170 lb 3.1 oz) 71.6 kg (157 lb 13.6 oz)   Weight change:     INTAKE/OUPUT    Current Shift: 02/11 1901 - 02/12 0700  In: 360 [P.O.:360]  Out: -     Last three shifts: 02/10 0701 - 02/11 1900  In: 1355.6 [I.V.:1355.6]  Out: 6300 [Urine:6300]    RECOMMENDATIONS AND DISCHARGE PLANNING  Patient needs and requests: Nourishment    Pending tests/procedures: labs     Discharge plan for patient: Home    Discharge planning Needs or Barriers: none    Estimated Discharge Date: TBD Posted on Whiteboard in Patients Room: yes       \"HEALS\" SAFETY CHECK  A safety check occurred in the patient's room between off CTA of chest     - avoid hepatoxic medications     - will monitor lab work     - Low albumin     5. Fracture of Left Ankle- POA    - etiology related to prior fall    - left boot intact    - elevate left lower extremity while at rest    - fall precautions    - Working on getting geriatric bed for scooter    Tobacco abuse  Started on nicotine patch    40 or above Morbid obesity / Body mass index is 41.2 kg/m². Estimated discharge date: 3/4-3/5?? Barriers: Inpatient psych  Code status: Full  Prophylaxis: Lovenox  Recommended Disposition: Home w/Family     Subjective:     Chief Complaint / Reason for Physician Visit  Suicidal thoughts, alcohol withdrawal discussed with RN events overnight. Pt feeling fine today. Denies SI. Sleeping deeply on presentation. Requesting gabapentin from psych. Otherwise prepped to go to psych facility. Review of Systems:  Symptom Y/N Comments  Symptom Y/N Comments   Fever/Chills n   Chest Pain n    Poor Appetite    Edema     Cough n   Abdominal Pain n    Sputum    Joint Pain     SOB/FIGUEROA n   Pruritis/Rash     Nausea/vomit y   Tolerating PT/OT     Diarrhea    Tolerating Diet y    Constipation    Other       Could NOT obtain due to:      Objective:     VITALS:   Last 24hrs VS reviewed since prior progress note. Most recent are:  Patient Vitals for the past 24 hrs:   Temp Pulse Resp BP SpO2   03/04/23 0712 98.4 °F (36.9 °C) (!) 54 16 (!) 106/57 96 %   03/04/23 0349 98.2 °F (36.8 °C) (!) 57 18 -- 100 %   03/03/23 2302 99 °F (37.2 °C) (!) 59 16 (!) 96/41 94 %   03/03/23 2147 -- 75 -- -- --   03/03/23 1926 98.4 °F (36.9 °C) 71 18 (!) 107/57 97 %   03/03/23 1624 -- 69 -- 121/78 --   03/03/23 1520 97.5 °F (36.4 °C) 65 16 (!) 100/52 96 %       No intake or output data in the 24 hours ending 03/04/23 1207       I had a face to face encounter and independently examined this patient on 3/4/2023, as outlined below:  PHYSICAL EXAM:  General: WD, WN.  Alert, cooperative, no acute distress EENT:  EOMI. Anicteric sclerae. MMM  Resp:  CTA bilaterally, no wheezing or rales. No accessory muscle use  CV:  Regular  rhythm,  No edema  GI:  Soft, Non distended, Non tender. +Bowel sounds  Neurologic:  Alert and oriented X 3, normal speech,   Psych:   Fair  insight. Not anxious nor agitated  Skin:  No rashes. No jaundice    Reviewed most current lab test results and cultures  YES  Reviewed most current radiology test results   YES  Review and summation of old records today    NO  Reviewed patient's current orders and MAR    YES  PMH/SH reviewed - no change compared to H&P  ________________________________________________________________________  Care Plan discussed with:    Comments   Patient x    Family      RN x    Care Manager x    Consultant  x                      Multidiciplinary team rounds were held today with , nursing, pharmacist and clinical coordinator. Patient's plan of care was discussed; medications were reviewed and discharge planning was addressed. ________________________________________________________________________  Total NON critical care TIME:  45   Minutes    Total CRITICAL CARE TIME Spent:   Minutes non procedure based      Comments   >50% of visit spent in counseling and coordination of care x    ________________________________________________________________________  Gil Milian MD     Procedures: see electronic medical records for all procedures/Xrays and details which were not copied into this note but were reviewed prior to creation of Plan. LABS:  I reviewed today's most current labs and imaging studies. Pertinent labs include:  No results for input(s): WBC, HGB, HCT, PLT, HGBEXT, HCTEXT, PLTEXT, HGBEXT, HCTEXT, PLTEXT in the last 72 hours.     Recent Labs     03/04/23  0542 03/03/23  0312 03/02/23  0054    136 140   K 4.3 3.7 3.6    102 105   CO2 28 29 31   * 101* 107*   BUN 28* 23* 20   CREA 0.71 0.67 0.79   CA 8.9 9.2 8.9 going nurse and oncoming nurse listed above. The safety check included the below items:    H  High Alert Medications Verify all high alert medication drips (heparin, PCA, etc.)  E  Equipment Suction is set up for ALL patients (with gilda)  Red plugs utilized for all equipment (IV pumps, etc.)  WOWs wiped down at end of shift. Room stocked with oxygen, suction, and other unit-specific supplies  A  Alarms Bed alarm is set for fall risk patients  Ensure chair alarm is in place and activated if patient is up in a chair  L  Lines Check IV for any infiltration  Pro bag is empty if patient has a Pro   Tubing and IV bags are labeled  S  Safety  Room is clean, patient is clean, and equipment is clean. Hallways are clear from equipment besides carts. Fall bracelet on for fall risk patients  Ensure room is clear and free of clutter  Suction is set up for ALL patients (with gilda)  Hallways are clear from equipment besides carts.    Isolation precautions followed, supplies available outside room, sign posted    Ivy Henriquez RN PHOS 3.5 3.0 2.7   ALB 3.2* 3.5 2.7*         Signed: Juan Manuel Tse MD

## 2023-03-04 NOTE — PROGRESS NOTES
Problem: Pressure Injury - Risk of  Goal: *Prevention of pressure injury  Description: Document Elijah Scale and appropriate interventions in the flowsheet. Outcome: Progressing Towards Goal  Note: Pressure Injury Interventions:  Sensory Interventions: Assess changes in LOC, Assess need for specialty bed, Avoid rigorous massage over bony prominences, Float heels, Keep linens dry and wrinkle-free, Maintain/enhance activity level, Minimize linen layers    Moisture Interventions: Absorbent underpads, Apply protective barrier, creams and emollients, Assess need for specialty bed    Activity Interventions: Assess need for specialty bed, Increase time out of bed, Pressure redistribution bed/mattress(bed type), PT/OT evaluation    Mobility Interventions: Assess need for specialty bed, Float heels, HOB 30 degrees or less, Pressure redistribution bed/mattress (bed type), PT/OT evaluation, Turn and reposition approx. every two hours(pillow and wedges)    Nutrition Interventions: Document food/fluid/supplement intake, Discuss nutritional consult with provider, Offer support with meals,snacks and hydration    Friction and Shear Interventions: Apply protective barrier, creams and emollients, Feet elevated on foot rest, Foam dressings/transparent film/skin sealants, HOB 30 degrees or less                Problem: Patient Education: Go to Patient Education Activity  Goal: Patient/Family Education  Outcome: Progressing Towards Goal     Problem: Falls - Risk of  Goal: *Absence of Falls  Description: Document Rema Fall Risk and appropriate interventions in the flowsheet.   Outcome: Progressing Towards Goal  Note: Fall Risk Interventions:  Mobility Interventions: Assess mobility with egress test, Bed/chair exit alarm, Utilize walker, cane, or other assistive device, Utilize gait belt for transfers/ambulation    Mentation Interventions: Adequate sleep, hydration, pain control, Bed/chair exit alarm, Update white board, Increase mobility, Family/sitter at bedside    Medication Interventions: Bed/chair exit alarm, Utilize gait belt for transfers/ambulation    Elimination Interventions: Bed/chair exit alarm, Call light in reach, Stay With Me (per policy)              Problem: Patient Education: Go to Patient Education Activity  Goal: Patient/Family Education  Outcome: Progressing Towards Goal     Problem: Alcohol Withdrawal  Goal: *STG: Participates in treatment plan  Outcome: Progressing Towards Goal  Goal: *STG: Remains safe in hospital  Outcome: Progressing Towards Goal  Goal: *STG: Seeks staff when symptoms of withdrawal increase  Outcome: Progressing Towards Goal  Goal: *STG: Complies with medication therapy  Outcome: Progressing Towards Goal  Goal: *STG: Attends activities and groups  Outcome: Progressing Towards Goal  Goal: *STG: Will identify negative impact of chemical dependency including the use of tobacco, alcohol, and other substances  Outcome: Progressing Towards Goal  Goal: *STG: Verbalizes abstinence as an achievable goal  Outcome: Progressing Towards Goal  Goal: *STG: Agrees to participate in outpatient after care program to support ongoing mental health  Outcome: Progressing Towards Goal  Goal: *STG: Able to indentify relapse triggers including interpersonal/social and familial factors  Outcome: Progressing Towards Goal  Goal: *STG: Identify lifestyle changes to support long term sobriety such as vocation, employment, education, and legal issues  Outcome: Progressing Towards Goal  Goal: *STG: Maintains appropriate nutrition and hydration  Outcome: Progressing Towards Goal  Goal: *STG: Vital signs within defined limits  Outcome: Progressing Towards Goal  Goal: *STG/LTG: Relapse prevention plan in place to include housing/aftercare, leisure activities, and spirituality  Outcome: Progressing Towards Goal  Goal: Interventions  Outcome: Progressing Towards Goal     Problem: Patient Education: Go to Patient Education Activity  Goal: Patient/Family Education  Outcome: Progressing Towards Goal     Problem: Risk for Opioid Over-Sedation  Goal: Recognition of Risk Factors for Over-sedation  Outcome: Progressing Towards Goal  Goal: Prevention of Over-sedation  Outcome: Progressing Towards Goal  Goal: Prevention of Respiratory Depression  Outcome: Progressing Towards Goal     Problem: Patient Education: Go to Patient Education Activity  Goal: Patient/Family Education  Outcome: Progressing Towards Goal     Problem: Suicide  Goal: *STG: Remains safe in hospital  Outcome: Progressing Towards Goal  Goal: *STG: Seeks staff when feelings of self harm or harm towards others arise  Outcome: Progressing Towards Goal  Goal: *STG: Attends activities and groups  Outcome: Progressing Towards Goal  Goal: *STG:  Verbalizes alternative ways of dealing with maladaptive feelings/behaviors  Outcome: Progressing Towards Goal  Goal: *STG/LTG: Complies with medication therapy  Outcome: Progressing Towards Goal  Goal: *STG/LTG: No longer expresses self destructive or suicidal thoughts  Outcome: Progressing Towards Goal  Goal: *LTG:  Identifies available community resources  Outcome: Progressing Towards Goal  Goal: *LTG:  Develops proactive suicide prevention plan  Outcome: Progressing Towards Goal  Goal: Interventions  Outcome: Progressing Towards Goal     Problem: Patient Education: Go to Patient Education Activity  Goal: Patient/Family Education  Outcome: Progressing Towards Goal     Problem: Patient Education: Go to Patient Education Activity  Goal: Patient/Family Education  Outcome: Progressing Towards Goal

## 2023-03-05 LAB
ALBUMIN SERPL-MCNC: 3.2 G/DL (ref 3.5–5)
ANION GAP SERPL CALC-SCNC: 5 MMOL/L (ref 5–15)
BUN SERPL-MCNC: 22 MG/DL (ref 6–20)
BUN/CREAT SERPL: 31 (ref 12–20)
CALCIUM SERPL-MCNC: 9.1 MG/DL (ref 8.5–10.1)
CHLORIDE SERPL-SCNC: 107 MMOL/L (ref 97–108)
CO2 SERPL-SCNC: 26 MMOL/L (ref 21–32)
CREAT SERPL-MCNC: 0.72 MG/DL (ref 0.55–1.02)
GLUCOSE SERPL-MCNC: 102 MG/DL (ref 65–100)
PHOSPHATE SERPL-MCNC: 4.7 MG/DL (ref 2.6–4.7)
POTASSIUM SERPL-SCNC: 4.7 MMOL/L (ref 3.5–5.1)
SODIUM SERPL-SCNC: 138 MMOL/L (ref 136–145)

## 2023-03-05 PROCEDURE — 74011250637 HC RX REV CODE- 250/637: Performed by: INTERNAL MEDICINE

## 2023-03-05 PROCEDURE — 74011250637 HC RX REV CODE- 250/637: Performed by: PHYSICIAN ASSISTANT

## 2023-03-05 PROCEDURE — 65660000001 HC RM ICU INTERMED STEPDOWN

## 2023-03-05 PROCEDURE — 74011250637 HC RX REV CODE- 250/637: Performed by: STUDENT IN AN ORGANIZED HEALTH CARE EDUCATION/TRAINING PROGRAM

## 2023-03-05 PROCEDURE — 36415 COLL VENOUS BLD VENIPUNCTURE: CPT

## 2023-03-05 PROCEDURE — 74011250637 HC RX REV CODE- 250/637: Performed by: NURSE PRACTITIONER

## 2023-03-05 PROCEDURE — 74011250636 HC RX REV CODE- 250/636: Performed by: STUDENT IN AN ORGANIZED HEALTH CARE EDUCATION/TRAINING PROGRAM

## 2023-03-05 PROCEDURE — 80069 RENAL FUNCTION PANEL: CPT

## 2023-03-05 RX ADMIN — BUTALBITAL, ACETAMINOPHEN, AND CAFFEINE 1 TABLET: 50; 325; 40 TABLET ORAL at 18:37

## 2023-03-05 RX ADMIN — THIAMINE HCL TAB 100 MG 100 MG: 100 TAB at 08:34

## 2023-03-05 RX ADMIN — GABAPENTIN 400 MG: 300 CAPSULE ORAL at 21:35

## 2023-03-05 RX ADMIN — GABAPENTIN 400 MG: 300 CAPSULE ORAL at 08:35

## 2023-03-05 RX ADMIN — BUTALBITAL, ACETAMINOPHEN, AND CAFFEINE 1 TABLET: 50; 325; 40 TABLET ORAL at 05:30

## 2023-03-05 RX ADMIN — DIBASIC SODIUM PHOSPHATE, MONOBASIC POTASSIUM PHOSPHATE AND MONOBASIC SODIUM PHOSPHATE 1 TABLET: 852; 155; 130 TABLET ORAL at 08:34

## 2023-03-05 RX ADMIN — POTASSIUM CHLORIDE 40 MEQ: 20 TABLET, EXTENDED RELEASE ORAL at 08:34

## 2023-03-05 RX ADMIN — ENOXAPARIN SODIUM 30 MG: 100 INJECTION SUBCUTANEOUS at 21:36

## 2023-03-05 RX ADMIN — PROPRANOLOL HYDROCHLORIDE 40 MG: 10 TABLET ORAL at 16:37

## 2023-03-05 RX ADMIN — VENLAFAXINE HYDROCHLORIDE 225 MG: 150 CAPSULE, EXTENDED RELEASE ORAL at 08:34

## 2023-03-05 RX ADMIN — ENOXAPARIN SODIUM 30 MG: 100 INJECTION SUBCUTANEOUS at 10:17

## 2023-03-05 RX ADMIN — MIRTAZAPINE 7.5 MG: 15 TABLET, FILM COATED ORAL at 21:35

## 2023-03-05 RX ADMIN — PROPRANOLOL HYDROCHLORIDE 40 MG: 10 TABLET ORAL at 08:35

## 2023-03-05 RX ADMIN — PROPRANOLOL HYDROCHLORIDE 40 MG: 10 TABLET ORAL at 21:35

## 2023-03-05 RX ADMIN — BUTALBITAL, ACETAMINOPHEN, AND CAFFEINE 1 TABLET: 50; 325; 40 TABLET ORAL at 11:15

## 2023-03-05 RX ADMIN — DIBASIC SODIUM PHOSPHATE, MONOBASIC POTASSIUM PHOSPHATE AND MONOBASIC SODIUM PHOSPHATE 1 TABLET: 852; 155; 130 TABLET ORAL at 18:35

## 2023-03-05 RX ADMIN — GABAPENTIN 400 MG: 300 CAPSULE ORAL at 16:37

## 2023-03-05 RX ADMIN — POTASSIUM CHLORIDE 40 MEQ: 20 TABLET, EXTENDED RELEASE ORAL at 18:35

## 2023-03-05 NOTE — PROGRESS NOTES
End of Shift Note    Bedside shift change report given to ADAMS Driver (oncoming nurse) by Soha Ramirez RN (offgoing nurse).   Report included the following information SBAR, Kardex, ED Summary, Intake/Output, MAR, and Recent Results    Shift worked:  night     Shift summary and any significant changes:     Pt rested comfortably, tolerated added remeron well, remains on RA     1:1 sitter remains at bedside, awaiting available psych bed     Concerns for physician to address:  none     Zone phone for oncoming shift:   xxx       Soha Ramirez RN

## 2023-03-05 NOTE — PROGRESS NOTES
Hospitalist Progress Note    NAME: Shelbie Leigh   :  1980   MRN:  [id number]       Assessment / Plan:  ETOH and Opioid Withdrawal        Suicidal Ideations/Depression        Tachycardia  Respiratory acidosis secondary to Ativan use  - no current plan or SI/HI expressed this am  - etiology of tachycardia related to ETOH and Opioid Withdrawal  - CIWA  - IVF and multivitamin supplementation  Ammonia level mildly elevated at 38, P71 folic acid are within normal limits  -Follow-up 2D echo due to tachycardia- continue propranolol and effexor  - seizure and fall precautions  - suicide precautions  - psych recommended inpatient admission once medically stable, continue with one-to-one sitter  patient had a sudden onset of respiratory failure acute respiratory acidosis in the setting of Ativan use, patient was evaluated by intensivist.  Patient did not require ICU level of care  Ativan currently on hold, had received flumazenil to reverse Ativan effect  Currently on as needed Atarax  Patient with no need for O2 NC. With scooter and crutches patient needs geriatric psych bed, awaiting this, likely Monday??     2. Mild leukocytosis    - negative influenza and covid    - chest xray shows no acute process    - CTA shows bibasilar atelectasis    - afebrile    - urine drug screen negative for barbiturates and opiates, negative for methadone    - urinalysis positive for bacteria, negative for nitrites and leukocytes, patient asymptomatic.    - will monitor lab work. 3. Acute respiratory failure-POA    - CTA shows no pulmonary embolism. Bibasilar atelectasis noted. - chest xray shows no acute process    - keep 02 sats greater than 92%    - turn, cough and reposition    - incentive spirometry every 2 hours while awake    - prn nebulizer treatments     4.   Hepatic steatosis    - etiology related to ETOH use    - seen on CTA of chest     - avoid hepatoxic medications     - will monitor lab work     - Low albumin     5. Fracture of Left Ankle- POA    - etiology related to prior fall    - left boot intact    - elevate left lower extremity while at rest    - fall precautions    - Working on getting geriatric bed for scooter    Tobacco abuse  Started on nicotine patch    40 or above Morbid obesity / Body mass index is 41.2 kg/m². Estimated discharge date: 3/6-3/7? Barriers: Inpatient psych-geriatric bed  Code status: Full  Prophylaxis: Lovenox  Recommended Disposition: Home w/Family     Subjective:     Chief Complaint / Reason for Physician Visit  Suicidal thoughts, alcohol withdrawal discussed with RN events overnight. Patient reports sleeping better last night. Didn't go to bed until 3AM. Otherwise feels just fine. Denies SI. Review of Systems:  Symptom Y/N Comments  Symptom Y/N Comments   Fever/Chills n   Chest Pain n    Poor Appetite    Edema     Cough n   Abdominal Pain n    Sputum    Joint Pain     SOB/FIGUEROA n   Pruritis/Rash     Nausea/vomit y   Tolerating PT/OT     Diarrhea    Tolerating Diet y    Constipation    Other       Could NOT obtain due to:      Objective:     VITALS:   Last 24hrs VS reviewed since prior progress note. Most recent are:  Patient Vitals for the past 24 hrs:   Temp Pulse Resp BP SpO2   03/05/23 1057 97.3 °F (36.3 °C) 89 18 98/75 95 %   03/05/23 0748 98.7 °F (37.1 °C) 79 18 100/62 96 %   03/05/23 0337 98 °F (36.7 °C) 62 20 101/69 --   03/05/23 0327 -- 62 18 (!) 86/76 --   03/04/23 2353 97.9 °F (36.6 °C) 60 16 (!) 96/54 --   03/04/23 2009 98.7 °F (37.1 °C) 81 16 103/61 --   03/04/23 1530 -- 96 16 127/71 92 %       No intake or output data in the 24 hours ending 03/05/23 1250       I had a face to face encounter and independently examined this patient on 3/5/2023, as outlined below:  PHYSICAL EXAM:  General: WD, WN. Alert, cooperative, no acute distress    EENT:  EOMI. Anicteric sclerae. MMM  Resp:  CTA bilaterally, no wheezing or rales.   No accessory muscle use  CV:  Regular rhythm,  No edema  GI:  Soft, Non distended, Non tender. +Bowel sounds  Neurologic:  Alert and oriented X 3, normal speech,   Psych:   Fair  insight. Not anxious nor agitated  Skin:  No rashes. No jaundice    Reviewed most current lab test results and cultures  YES  Reviewed most current radiology test results   YES  Review and summation of old records today    NO  Reviewed patient's current orders and MAR    YES  PMH/SH reviewed - no change compared to H&P  ________________________________________________________________________  Care Plan discussed with:    Comments   Patient x    Family      RN x    Care Manager x    Consultant  x                      Multidiciplinary team rounds were held today with , nursing, pharmacist and clinical coordinator. Patient's plan of care was discussed; medications were reviewed and discharge planning was addressed. ________________________________________________________________________  Total NON critical care TIME:  45   Minutes    Total CRITICAL CARE TIME Spent:   Minutes non procedure based      Comments   >50% of visit spent in counseling and coordination of care x    ________________________________________________________________________  Nette Jain MD     Procedures: see electronic medical records for all procedures/Xrays and details which were not copied into this note but were reviewed prior to creation of Plan. LABS:  I reviewed today's most current labs and imaging studies. Pertinent labs include:  No results for input(s): WBC, HGB, HCT, PLT, HGBEXT, HCTEXT, PLTEXT, HGBEXT, HCTEXT, PLTEXT in the last 72 hours.     Recent Labs     03/05/23 0318 03/04/23 0542 03/03/23 0312    138 136   K 4.7 4.3 3.7    107 102   CO2 26 28 29   * 103* 101*   BUN 22* 28* 23*   CREA 0.72 0.71 0.67   CA 9.1 8.9 9.2   PHOS 4.7 3.5 3.0   ALB 3.2* 3.2* 3.5         Signed: Nette Jain MD

## 2023-03-05 NOTE — PROGRESS NOTES
Problem: Pressure Injury - Risk of  Goal: *Prevention of pressure injury  Description: Document Elijah Scale and appropriate interventions in the flowsheet. Outcome: Progressing Towards Goal  Note: Pressure Injury Interventions:  Sensory Interventions: Assess changes in LOC, Assess need for specialty bed, Avoid rigorous massage over bony prominences, Float heels, Keep linens dry and wrinkle-free, Maintain/enhance activity level, Minimize linen layers    Moisture Interventions: Absorbent underpads, Apply protective barrier, creams and emollients, Assess need for specialty bed    Activity Interventions: Increase time out of bed    Mobility Interventions: HOB 30 degrees or less, PT/OT evaluation    Nutrition Interventions: Document food/fluid/supplement intake, Discuss nutritional consult with provider, Offer support with meals,snacks and hydration    Friction and Shear Interventions: Apply protective barrier, creams and emollients, Feet elevated on foot rest, Foam dressings/transparent film/skin sealants, HOB 30 degrees or less                Problem: Patient Education: Go to Patient Education Activity  Goal: Patient/Family Education  Outcome: Progressing Towards Goal     Problem: Falls - Risk of  Goal: *Absence of Falls  Description: Document Rema Fall Risk and appropriate interventions in the flowsheet.   Outcome: Progressing Towards Goal  Note: Fall Risk Interventions:  Mobility Interventions: Assess mobility with egress test, Bed/chair exit alarm, Utilize walker, cane, or other assistive device, Utilize gait belt for transfers/ambulation    Mentation Interventions: Adequate sleep, hydration, pain control, Bed/chair exit alarm, Update white board, Increase mobility, Family/sitter at bedside    Medication Interventions: Bed/chair exit alarm, Utilize gait belt for transfers/ambulation    Elimination Interventions: Bed/chair exit alarm, Call light in reach, Stay With Me (per policy)              Problem: Patient Education: Go to Patient Education Activity  Goal: Patient/Family Education  Outcome: Progressing Towards Goal     Problem: Alcohol Withdrawal  Goal: *STG: Participates in treatment plan  Outcome: Progressing Towards Goal  Goal: *STG: Remains safe in hospital  Outcome: Progressing Towards Goal  Goal: *STG: Seeks staff when symptoms of withdrawal increase  Outcome: Progressing Towards Goal  Goal: *STG: Complies with medication therapy  Outcome: Progressing Towards Goal  Goal: *STG: Attends activities and groups  Outcome: Progressing Towards Goal  Goal: *STG: Will identify negative impact of chemical dependency including the use of tobacco, alcohol, and other substances  Outcome: Progressing Towards Goal  Goal: *STG: Verbalizes abstinence as an achievable goal  Outcome: Progressing Towards Goal  Goal: *STG: Agrees to participate in outpatient after care program to support ongoing mental health  Outcome: Progressing Towards Goal  Goal: *STG: Able to indentify relapse triggers including interpersonal/social and familial factors  Outcome: Progressing Towards Goal  Goal: *STG: Identify lifestyle changes to support long term sobriety such as vocation, employment, education, and legal issues  Outcome: Progressing Towards Goal  Goal: *STG: Maintains appropriate nutrition and hydration  Outcome: Progressing Towards Goal  Goal: *STG: Vital signs within defined limits  Outcome: Progressing Towards Goal  Goal: *STG/LTG: Relapse prevention plan in place to include housing/aftercare, leisure activities, and spirituality  Outcome: Progressing Towards Goal  Goal: Interventions  Outcome: Progressing Towards Goal     Problem: Patient Education: Go to Patient Education Activity  Goal: Patient/Family Education  Outcome: Progressing Towards Goal     Problem: Risk for Opioid Over-Sedation  Goal: Recognition of Risk Factors for Over-sedation  Outcome: Progressing Towards Goal  Goal: Prevention of Over-sedation  Outcome: Progressing Towards Goal  Goal: Prevention of Respiratory Depression  Outcome: Progressing Towards Goal     Problem: Patient Education: Go to Patient Education Activity  Goal: Patient/Family Education  Outcome: Progressing Towards Goal     Problem: Suicide  Goal: *STG: Remains safe in hospital  Outcome: Progressing Towards Goal  Goal: *STG: Seeks staff when feelings of self harm or harm towards others arise  Outcome: Progressing Towards Goal  Goal: *STG: Attends activities and groups  Outcome: Progressing Towards Goal  Goal: *STG:  Verbalizes alternative ways of dealing with maladaptive feelings/behaviors  Outcome: Progressing Towards Goal  Goal: *STG/LTG: Complies with medication therapy  Outcome: Progressing Towards Goal  Goal: *STG/LTG: No longer expresses self destructive or suicidal thoughts  Outcome: Progressing Towards Goal  Goal: *LTG:  Identifies available community resources  Outcome: Progressing Towards Goal  Goal: *LTG:  Develops proactive suicide prevention plan  Outcome: Progressing Towards Goal  Goal: Interventions  Outcome: Progressing Towards Goal     Problem: Patient Education: Go to Patient Education Activity  Goal: Patient/Family Education  Outcome: Progressing Towards Goal     Problem: Patient Education: Go to Patient Education Activity  Goal: Patient/Family Education  Outcome: Progressing Towards Goal

## 2023-03-05 NOTE — PROGRESS NOTES
Problem: Pressure Injury - Risk of  Goal: *Prevention of pressure injury  Description: Document Elijah Scale and appropriate interventions in the flowsheet. Outcome: Progressing Towards Goal  Note: Pressure Injury Interventions:  Sensory Interventions: Assess changes in LOC, Assess need for specialty bed, Avoid rigorous massage over bony prominences, Float heels, Keep linens dry and wrinkle-free, Maintain/enhance activity level, Minimize linen layers    Moisture Interventions: Absorbent underpads, Apply protective barrier, creams and emollients, Assess need for specialty bed    Activity Interventions: Assess need for specialty bed, Increase time out of bed, Pressure redistribution bed/mattress(bed type), PT/OT evaluation    Mobility Interventions: Assess need for specialty bed, Float heels, HOB 30 degrees or less, Pressure redistribution bed/mattress (bed type), PT/OT evaluation, Turn and reposition approx. every two hours(pillow and wedges)    Nutrition Interventions: Document food/fluid/supplement intake, Discuss nutritional consult with provider, Offer support with meals,snacks and hydration    Friction and Shear Interventions: Apply protective barrier, creams and emollients, Feet elevated on foot rest, Foam dressings/transparent film/skin sealants, HOB 30 degrees or less                Problem: Falls - Risk of  Goal: *Absence of Falls  Description: Document Rema Fall Risk and appropriate interventions in the flowsheet.   Outcome: Progressing Towards Goal  Note: Fall Risk Interventions:  Mobility Interventions: Assess mobility with egress test, Bed/chair exit alarm, Utilize walker, cane, or other assistive device, Utilize gait belt for transfers/ambulation    Mentation Interventions: Adequate sleep, hydration, pain control, Bed/chair exit alarm, Update white board, Increase mobility, Family/sitter at bedside    Medication Interventions: Bed/chair exit alarm, Utilize gait belt for transfers/ambulation    Elimination Interventions: Bed/chair exit alarm, Call light in reach, Stay With Me (per policy)              Problem: Alcohol Withdrawal  Goal: *STG: Participates in treatment plan  Outcome: Progressing Towards Goal  Goal: *STG: Remains safe in hospital  Outcome: Progressing Towards Goal  Goal: *STG: Seeks staff when symptoms of withdrawal increase  Outcome: Progressing Towards Goal  Goal: *STG: Complies with medication therapy  Outcome: Progressing Towards Goal  Goal: *STG: Attends activities and groups  Outcome: Progressing Towards Goal  Goal: *STG: Will identify negative impact of chemical dependency including the use of tobacco, alcohol, and other substances  Outcome: Progressing Towards Goal  Goal: *STG: Verbalizes abstinence as an achievable goal  Outcome: Progressing Towards Goal  Goal: *STG: Agrees to participate in outpatient after care program to support ongoing mental health  Outcome: Progressing Towards Goal  Goal: *STG: Able to indentify relapse triggers including interpersonal/social and familial factors  Outcome: Progressing Towards Goal  Goal: *STG: Identify lifestyle changes to support long term sobriety such as vocation, employment, education, and legal issues  Outcome: Progressing Towards Goal  Goal: *STG: Maintains appropriate nutrition and hydration  Outcome: Progressing Towards Goal  Goal: *STG: Vital signs within defined limits  Outcome: Progressing Towards Goal  Goal: *STG/LTG: Relapse prevention plan in place to include housing/aftercare, leisure activities, and spirituality  Outcome: Progressing Towards Goal  Goal: Interventions  Outcome: Progressing Towards Goal     Problem: Risk for Opioid Over-Sedation  Goal: Recognition of Risk Factors for Over-sedation  Outcome: Progressing Towards Goal  Goal: Prevention of Over-sedation  Outcome: Progressing Towards Goal  Goal: Prevention of Respiratory Depression  Outcome: Progressing Towards Goal     Problem: Suicide  Goal: *STG: Remains safe in hospital  Outcome: Progressing Towards Goal  Goal: *STG: Seeks staff when feelings of self harm or harm towards others arise  Outcome: Progressing Towards Goal  Goal: *STG: Attends activities and groups  Outcome: Progressing Towards Goal  Goal: *STG:  Verbalizes alternative ways of dealing with maladaptive feelings/behaviors  Outcome: Progressing Towards Goal  Goal: *STG/LTG: Complies with medication therapy  Outcome: Progressing Towards Goal  Goal: *STG/LTG: No longer expresses self destructive or suicidal thoughts  Outcome: Progressing Towards Goal  Goal: *LTG:  Identifies available community resources  Outcome: Progressing Towards Goal  Goal: *LTG:  Develops proactive suicide prevention plan  Outcome: Progressing Towards Goal  Goal: Interventions  Outcome: Progressing Towards Goal

## 2023-03-05 NOTE — PROGRESS NOTES
[id number]: Bedside shift change report given to ADAMS Driver (oncoming nurse) by Reza Ta RN (offgoing nurse). Report included the following information SBAR, Kardex, Intake/Output, MAR, and Recent Results. 1900:   End of Shift Note    Bedside shift change report given to Reza Ta RN (oncoming nurse) by Harini Rene RN (offgoing nurse). Report included the following information SBAR, Kardex, Intake/Output, MAR, and Recent Results    Shift worked:  [id number]     Shift summary and any significant changes:     No significant changes. Still waiting for a psych bed. Received permission for patient to shower as long as staff member is present. Concerns for physician to address:       Zone phone for oncoming shift:          Activity:  Activity Level: Up with Assistance  Number times ambulated in hallways past shift: 0  Number of times OOB to chair past shift: 2    Cardiac:   Cardiac Monitoring: Yes      Cardiac Rhythm: Sinus Rhythm    Access:  Current line(s): PIV     Genitourinary:   Urinary status: voiding    Respiratory:   O2 Device: None (Room air)  Chronic home O2 use?: NO  Incentive spirometer at bedside: YES       GI:  Last Bowel Movement Date: 03/03/23  Current diet:  ADULT DIET Regular; Safety Tray; Safety Tray (Disposables)  Passing flatus: YES  Tolerating current diet: YES       Pain Management:   Patient states pain is manageable on current regimen: YES    Skin:  Elijah Score: 21  Interventions: increase time out of bed and PT/OT consult    Patient Safety:  Fall Score:  Total Score: 4  Interventions: bed/chair alarm, assistive device (walker, cane, etc), gripper socks, and pt to call before getting OOB  High Fall Risk: Yes    Length of Stay:  Expected LOS: - - -  Actual LOS: [address], RN

## 2023-03-06 PROCEDURE — 74011250637 HC RX REV CODE- 250/637: Performed by: NURSE PRACTITIONER

## 2023-03-06 PROCEDURE — 74011250637 HC RX REV CODE- 250/637: Performed by: PHYSICIAN ASSISTANT

## 2023-03-06 PROCEDURE — 74011250637 HC RX REV CODE- 250/637: Performed by: STUDENT IN AN ORGANIZED HEALTH CARE EDUCATION/TRAINING PROGRAM

## 2023-03-06 PROCEDURE — 74011250637 HC RX REV CODE- 250/637: Performed by: INTERNAL MEDICINE

## 2023-03-06 PROCEDURE — 65270000029 HC RM PRIVATE

## 2023-03-06 PROCEDURE — 97116 GAIT TRAINING THERAPY: CPT

## 2023-03-06 PROCEDURE — 74011250636 HC RX REV CODE- 250/636: Performed by: STUDENT IN AN ORGANIZED HEALTH CARE EDUCATION/TRAINING PROGRAM

## 2023-03-06 RX ADMIN — HYDROXYZINE HYDROCHLORIDE 25 MG: 25 TABLET ORAL at 15:39

## 2023-03-06 RX ADMIN — POTASSIUM CHLORIDE 40 MEQ: 20 TABLET, EXTENDED RELEASE ORAL at 08:29

## 2023-03-06 RX ADMIN — MIRTAZAPINE 7.5 MG: 15 TABLET, FILM COATED ORAL at 22:25

## 2023-03-06 RX ADMIN — GABAPENTIN 400 MG: 300 CAPSULE ORAL at 15:39

## 2023-03-06 RX ADMIN — THIAMINE HCL TAB 100 MG 100 MG: 100 TAB at 08:29

## 2023-03-06 RX ADMIN — BUTALBITAL, ACETAMINOPHEN, AND CAFFEINE 1 TABLET: 50; 325; 40 TABLET ORAL at 20:02

## 2023-03-06 RX ADMIN — PROPRANOLOL HYDROCHLORIDE 40 MG: 10 TABLET ORAL at 15:39

## 2023-03-06 RX ADMIN — GABAPENTIN 400 MG: 300 CAPSULE ORAL at 08:28

## 2023-03-06 RX ADMIN — BUTALBITAL, ACETAMINOPHEN, AND CAFFEINE 1 TABLET: 50; 325; 40 TABLET ORAL at 07:07

## 2023-03-06 RX ADMIN — GABAPENTIN 400 MG: 300 CAPSULE ORAL at 22:25

## 2023-03-06 RX ADMIN — PROPRANOLOL HYDROCHLORIDE 40 MG: 10 TABLET ORAL at 22:24

## 2023-03-06 RX ADMIN — VENLAFAXINE HYDROCHLORIDE 225 MG: 150 CAPSULE, EXTENDED RELEASE ORAL at 08:29

## 2023-03-06 RX ADMIN — ENOXAPARIN SODIUM 30 MG: 100 INJECTION SUBCUTANEOUS at 22:25

## 2023-03-06 RX ADMIN — PROPRANOLOL HYDROCHLORIDE 40 MG: 10 TABLET ORAL at 08:29

## 2023-03-06 RX ADMIN — ENOXAPARIN SODIUM 30 MG: 100 INJECTION SUBCUTANEOUS at 10:15

## 2023-03-06 RX ADMIN — DIBASIC SODIUM PHOSPHATE, MONOBASIC POTASSIUM PHOSPHATE AND MONOBASIC SODIUM PHOSPHATE 1 TABLET: 852; 155; 130 TABLET ORAL at 17:00

## 2023-03-06 RX ADMIN — DIBASIC SODIUM PHOSPHATE, MONOBASIC POTASSIUM PHOSPHATE AND MONOBASIC SODIUM PHOSPHATE 1 TABLET: 852; 155; 130 TABLET ORAL at 08:29

## 2023-03-06 RX ADMIN — BUTALBITAL, ACETAMINOPHEN, AND CAFFEINE 1 TABLET: 50; 325; 40 TABLET ORAL at 13:14

## 2023-03-06 RX ADMIN — POTASSIUM CHLORIDE 40 MEQ: 20 TABLET, EXTENDED RELEASE ORAL at 17:00

## 2023-03-06 NOTE — PROGRESS NOTES
End of Shift Note    Bedside shift change report given to Chong Willett RN (oncoming nurse) by Christ Castro RN (offgoing nurse).   Report included the following information SBAR, Kardex, ED Summary, Intake/Output, MAR, and Recent Results    Shift worked:  night     Shift summary and any significant changes:     No changes, awaiting psych bed placement     Concerns for physician to address:  none     Zone phone for oncoming shift:   xxx         Christ Castro RN

## 2023-03-06 NOTE — PROGRESS NOTES
Problem: Mobility Impaired (Adult and Pediatric)  Goal: *Acute Goals and Plan of Care (Insert Text)  Description: FUNCTIONAL STATUS PRIOR TO ADMISSION: Patient was independent and active without use of DME until she sustained a GLF on 12/24/22 with resulting L ankle fracture and s/p ORIF. She had ortho follow-up appointment on 2/15 which states she will remain NWB on LLE for 4 additional weeks. HOME SUPPORT PRIOR TO ADMISSION: The patient lived with her parents or grandparents but did not require assist. Both houses are 1-story, 4 or 7 steps with 1 or 2 rails to enter from outside. Physical Therapy Goals  Initiated 3/2/2023  1. Patient will transfer from bed to chair and chair to bed with modified independence using the least restrictive device within 7 day(s). 2.  Patient will perform sit to stand with modified independence within 7 day(s). 3.  Patient will ambulate with modified independence for 150 feet with the least restrictive device within 7 day(s). 4.  Patient will ascend/descend 7 stairs with 1 handrail(s) with minimal assistance/contact guard assist within 7 day(s). Outcome: Progressing Towards Goal     PHYSICAL THERAPY TREATMENT  Patient: Antonella Justin (28 y.o. female)  Date: 3/6/2023  Diagnosis: Alcohol withdrawal ([facility] [address] [F10.939]  Opioid withdrawal ([facility] [address] [F11.93]  Tachycardia [R00.0] <principal problem not specified>      Precautions: Fall, NWB  Chart, physical therapy assessment, plan of care and goals were reviewed. ASSESSMENT  Patient continues with skilled PT services and is progressing towards goals. Patient was supine in bed, reported her headache was better, agreeable to walk. She states she didn't walk much this weekend. She required supervision assist for sit<>stand transfer due to verbal cues to lock brakes on scooter before standing or sitting back down, patient has tendency to \"blop\" herself back down on the bed unsafely.  She ambulated 150' using knee scooter, however reported leg muscles were getting sore. PT also noted she was going up on her R heel and hiking her R hip in order to advance the knee scooter. PT instructed patient to sit down on bed in order to lower knee scooter. Once it was lowered she walked an additional 50', no hip hiking or heel raising noted, and patient reported it did not work her muscles as much. No SOB reported after either walk. Patient will require knee scooter upon discharge. Current Level of Function Impacting Discharge (mobility/balance): standby assist for ambulation, supervision assist for transfers    Other factors to consider for discharge: will need knee scooter         PLAN :  Patient continues to benefit from skilled intervention to address the above impairments. Continue treatment per established plan of care. to address goals. Recommendation for discharge: (in order for the patient to meet his/her long term goals)  Patient currently planning to go to  psych    This discharge recommendation:  Has been made in collaboration with the attending provider and/or case management    IF patient discharges home will need the following DME: knee scooter       SUBJECTIVE:   Patient stated My head is better.     OBJECTIVE DATA SUMMARY:   Critical Behavior:  Neurologic State: Alert  Orientation Level: Oriented X4  Cognition: Follows commands     Functional Mobility Training:  Bed Mobility:  Rolling: Independent  Supine to Sit: Independent  Sit to Supine: Independent  Transfers:  Sit to Stand: Supervision  Stand to Sit: Supervision     Balance:  Sitting: Intact  Standing: Impaired  Standing - Static: Good;Constant support  Standing - Dynamic : Good;Constant support  Ambulation/Gait Training:  Distance (ft): 200 Feet (ft)  Assistive Device: Gait belt; Other (comment) (knee scooter)  Ambulation - Level of Assistance: Stand-by assistance  Left Side Weight Bearing: Non-weight bearing      Therapeutic Exercises:   none  Pain Rating:  none    Activity Tolerance:   Good    After treatment patient left in no apparent distress:   Supine in bed, Call bell within reach, and sitter present    COMMUNICATION/COLLABORATION:   The patients plan of care was discussed with: Registered nurse.      Richmond Watt, PT   Time Calculation: 16 mins

## 2023-03-06 NOTE — PROGRESS NOTES
End of Shift Note    Bedside shift change report given to Zelalem See RN (oncoming nurse) by Antione Galan (offgoing nurse). Report included the following information SBAR and Cardiac Rhythm NSR    Shift worked:  [id number]     Shift summary and any significant changes:          Concerns for physician to address:       Zone phone for oncoming shift:          Activity:  Activity Level: Up ad gerda  Number times ambulated in hallways past shift: 2  Number of times OOB to chair past shift: 0    Cardiac:   Cardiac Monitoring: Yes      Cardiac Rhythm: Sinus Rhythm    Access:  Current line(s): PIV     Genitourinary:   Urinary status: voiding    Respiratory:   O2 Device: None (Room air)  Chronic home O2 use?: N/A  Incentive spirometer at bedside: N/A       GI:  Last Bowel Movement Date: 03/05/23  Current diet:  ADULT DIET Regular; Safety Tray; Safety Tray (Disposables)  Passing flatus: YES  Tolerating current diet: YES       Pain Management:   Patient states pain is manageable on current regimen: YES    Skin:  Elijah Score: 21  Interventions: increase time out of bed    Patient Safety:  Fall Score:  Total Score: 4  Interventions: gripper socks and sitter at bedside   High Fall Risk: Yes    Length of Stay:  Expected LOS: - - -  Actual LOS: [address]

## 2023-03-06 NOTE — PROGRESS NOTES
Transition of Care Plan:     RUR:21%  Disposition: IP Psych when medically stable- pt is voluntary- needs medication adjustment    Psyc consult ordered/pending    Will need EMTALA    If SNF or IPR: n/a  Date FOC offered:n/a  Date FOC received:n/a  Date authorization started with reference number:n/a  Date authorization received and expires:n/a  Accepting facility:n/a  Follow up appointments:to be scheduled   DME needed:none  Transportation at Discharge:BLS/EMTALA  [place] or means to access home: yes        IM Medicare Letter:n/a  Is patient a [place] and connected with the [place]?    no            If yes, was [employer] transfer form completed and VA notified? n/a  Caregiver Contact:grandmother Mohan Rodrigues [phone]  Discharge Caregiver contacted prior to discharge? yes  Care Conference needed?:  no  Previous HH/SNF/IPR: none       12:43 pm- Spoke with [facility] bed In[facility] Psych -and they will still accept patient but still no bed available at this time. MARCELO Noe       CM spoke with Ji Gay with [facility] this am at [phone] - no beds currently at this time and patient needs a [facility]jendra- psych bed- Ji Gay will call this CM back if a bed becomes available.  MARCELO Noe

## 2023-03-06 NOTE — PROGRESS NOTES
Problem: Pressure Injury - Risk of  Goal: *Prevention of pressure injury  Description: Document Elijah Scale and appropriate interventions in the flowsheet. Outcome: Progressing Towards Goal  Note: Pressure Injury Interventions:  Sensory Interventions: Assess changes in LOC, Assess need for specialty bed, Avoid rigorous massage over bony prominences, Float heels, Keep linens dry and wrinkle-free, Maintain/enhance activity level, Minimize linen layers    Moisture Interventions: Absorbent underpads, Apply protective barrier, creams and emollients, Assess need for specialty bed    Activity Interventions: Increase time out of bed    Mobility Interventions: HOB 30 degrees or less, PT/OT evaluation    Nutrition Interventions: Document food/fluid/supplement intake, Discuss nutritional consult with provider, Offer support with meals,snacks and hydration    Friction and Shear Interventions: Apply protective barrier, creams and emollients, Feet elevated on foot rest, Foam dressings/transparent film/skin sealants, HOB 30 degrees or less                Problem: Falls - Risk of  Goal: *Absence of Falls  Description: Document Rema Fall Risk and appropriate interventions in the flowsheet.   Outcome: Progressing Towards Goal  Note: Fall Risk Interventions:  Mobility Interventions: Assess mobility with egress test, Bed/chair exit alarm, Utilize walker, cane, or other assistive device, Utilize gait belt for transfers/ambulation    Mentation Interventions: Adequate sleep, hydration, pain control, Bed/chair exit alarm, Update white board, Increase mobility, Family/sitter at bedside    Medication Interventions: Bed/chair exit alarm, Utilize gait belt for transfers/ambulation    Elimination Interventions: Bed/chair exit alarm, Call light in reach, Stay With Me (per policy)              Problem: Alcohol Withdrawal  Goal: *STG: Participates in treatment plan  Outcome: Progressing Towards Goal  Goal: *STG: Remains safe in hospital  Outcome: Progressing Towards Goal  Goal: *STG: Seeks staff when symptoms of withdrawal increase  Outcome: Progressing Towards Goal  Goal: *STG: Complies with medication therapy  Outcome: Progressing Towards Goal  Goal: *STG: Attends activities and groups  Outcome: Progressing Towards Goal  Goal: *STG: Will identify negative impact of chemical dependency including the use of tobacco, alcohol, and other substances  Outcome: Progressing Towards Goal  Goal: *STG: Verbalizes abstinence as an achievable goal  Outcome: Progressing Towards Goal  Goal: *STG: Agrees to participate in outpatient after care program to support ongoing mental health  Outcome: Progressing Towards Goal  Goal: *STG: Able to indentify relapse triggers including interpersonal/social and familial factors  Outcome: Progressing Towards Goal  Goal: *STG: Identify lifestyle changes to support long term sobriety such as vocation, employment, education, and legal issues  Outcome: Progressing Towards Goal  Goal: *STG: Maintains appropriate nutrition and hydration  Outcome: Progressing Towards Goal  Goal: *STG: Vital signs within defined limits  Outcome: Progressing Towards Goal  Goal: *STG/LTG: Relapse prevention plan in place to include housing/aftercare, leisure activities, and spirituality  Outcome: Progressing Towards Goal  Goal: Interventions  Outcome: Progressing Towards Goal     Problem: Risk for Opioid Over-Sedation  Goal: Recognition of Risk Factors for Over-sedation  Outcome: Progressing Towards Goal  Goal: Prevention of Over-sedation  Outcome: Progressing Towards Goal  Goal: Prevention of Respiratory Depression  Outcome: Progressing Towards Goal     Problem: Suicide  Goal: *STG: Remains safe in hospital  Outcome: Progressing Towards Goal  Goal: *STG: Seeks staff when feelings of self harm or harm towards others arise  Outcome: Progressing Towards Goal  Goal: *STG: Attends activities and groups  Outcome: Progressing Towards Goal  Goal: *STG:  Verbalizes alternative ways of dealing with maladaptive feelings/behaviors  Outcome: Progressing Towards Goal  Goal: *STG/LTG: Complies with medication therapy  Outcome: Progressing Towards Goal  Goal: *STG/LTG: No longer expresses self destructive or suicidal thoughts  Outcome: Progressing Towards Goal  Goal: *LTG:  Identifies available community resources  Outcome: Progressing Towards Goal  Goal: *LTG:  Develops proactive suicide prevention plan  Outcome: Progressing Towards Goal  Goal: Interventions  Outcome: Progressing Towards Goal

## 2023-03-06 NOTE — PROGRESS NOTES
Physical Therapy    Pt cleared to be seen by nursing. Upon entering room patient was laying on her side, stated she had a terrible headache, asked therapist to come back later. Will defer and try to see her again later today.     Brady Sam, PT, DPT

## 2023-03-06 NOTE — PROGRESS NOTES
Problem: Risk for Opioid Over-Sedation  Goal: Recognition of Risk Factors for Over-sedation  Outcome: Progressing Towards Goal  Goal: Prevention of Over-sedation  Outcome: Progressing Towards Goal  Goal: Prevention of Respiratory Depression  Outcome: Progressing Towards Goal     Problem: Suicide  Goal: *STG: Remains safe in hospital  Outcome: Progressing Towards Goal  Goal: *STG: Seeks staff when feelings of self harm or harm towards others arise  Outcome: Progressing Towards Goal  Goal: *STG: Attends activities and groups  Outcome: Progressing Towards Goal  Goal: *STG:  Verbalizes alternative ways of dealing with maladaptive feelings/behaviors  Outcome: Progressing Towards Goal  Goal: *STG/LTG: Complies with medication therapy  Outcome: Progressing Towards Goal  Goal: *STG/LTG: No longer expresses self destructive or suicidal thoughts  Outcome: Progressing Towards Goal  Goal: *LTG:  Identifies available community resources  Outcome: Progressing Towards Goal  Goal: *LTG:  Develops proactive suicide prevention plan  Outcome: Progressing Towards Goal  Goal: Interventions  Outcome: Progressing Towards Goal

## 2023-03-06 NOTE — CONSULTS
PSYCHIATRY CONSULT NOTE:    REASON FOR CONSULT:  depression and suicidal thoughts/threats  suicidal ideation    Interval history:  03/06/23:  Jonathon Alfaro presents today with depressed mood and mood congruent affect. She states that while anxiety and sleep are a \"bit better. \" She feels that if discharged home that she would be unable to remain safe (endorsing SI). She denies HI/denies AVH. No delusions voiced. Per chart review, medication compliant. Cooperative with care and eager for voluntary psychiatric admission. HISTORY OF PRESENTING COMPLAINT:  Bhupendra Bang is a 38 y.o. female admitted to this unit for management of alcohol and opiate withdrawal. She additionally voiced complaints of suicidal ideation secondary to reported worsening depression. She states her depression has been worsening since she feels that she \"needs a medication adjustment. \" She expressed interest in inpatient admission as she feels it would be the most effective way to achieve the quickest possible change in medications and subsequently, improved mood. In terms of symptoms, she endorses poor sleep, depressed mood, low energy, restlessness, persistent and generalized worry. She denies any SI/HI/AVH at time of assessment. She does not voice any delusions and denies any symptoms concerning for polo or hypomania. She is eager for medication changes to better target her reported symptoms.        PAST PSYCHIATRIC HISTORY:  Depression, anxiety    SUBSTANCE ABUSE HISTORY:  Social History     Substance and Sexual Activity   Drug Use No     Social History     Substance and Sexual Activity   Alcohol Use Yes    Comment: 5 beers yesterday 7/20/22     Social History     Tobacco Use   Smoking Status Former    Packs/day: 1.00    Years: 18.00    Pack years: 18.00    Types: Cigarettes   Smokeless Tobacco Current   Tobacco Comments    Vapes       PAST MEDICAL HISTORY:  Past Medical History:   Diagnosis Date    ADD (attention deficit disorder) 19-19yo    Treated with Adderall since dx. 2013 dosing 20mg AM and 10mg PM.  Trial/change to Vyvanse Nov-Dec 2015--not as effective. Gynecologic disorder     History of miscarriages. History of Mirena IUD placed on 4/17/15    HX OTHER MEDICAL      -BUFA baby    HX OTHER MEDICAL     HPV positive    Idiopathic vulvodynia 2014    L1 vertebral fracture (HCC) 2017    UVA imaging/admissoin: Mild acute two column compression fracture of L1 without evidence of retropulsion into the spinal canal.  Managed non-operatively. Migraines 2013    Neurological disorder     Pelvic pain in female 9/15/2014    2015 gyn laparoscopy/hysteroscopy with endometriosis worse right.     Psychiatric disorder     depression    Secondary dysmenorrhea 2014    With menorraghia    Seizures ([facility] [address]     never on meds--had appr 4-5 in a short amt of time and none since       VITALS:  Visit Vitals  BP (!) 112/58   Pulse 88   Temp 97.9 °F (36.6 °C)   Resp 18   Ht 5' 4\" (1.626 m)   Wt 108.9 kg (240 lb)   SpO2 98%   BMI 41.20 kg/m²       MEDICATIONS:    Current Facility-Administered Medications:     gabapentin (NEURONTIN) capsule 400 mg, 400 mg, Oral, TID, Ramses Simms MD, 400 mg at 23 1539    mirtazapine (REMERON) tablet 7.5 mg, 7.5 mg, Oral, QHS, Ramses Simms MD, 7.5 mg at 23 2135    benzocaine-menthoL (CHLORASEPTIC MAX) lozenge 1 Lozenge, 1 Lozenge, Oral, Q2H PRN, Brittney Gonzales PA-C, 1 Lozenge at 23 0210    phosphorus (K PHOS NEUTRAL) 250 mg tablet 1 Tablet, 1 Tablet, Oral, BID, Ramses Simms MD, 1 Tablet at 23 0829    butalbital-acetaminophen-caffeine (FIORICET, ESGIC) -40 mg per tablet 1 Tablet, 1 Tablet, Oral, Q6H PRN, Ramses Simms MD, 1 Tablet at 23 1314    potassium chloride (K-DUR, KLOR-CON M20) SR tablet 40 mEq, 40 mEq, Oral, BID, Ramses Simms MD, 40 mEq at 23 0829    nicotine (NICODERM CQ) 14 mg/24 hr patch 1 Patch, 1 Patch, TransDERmal, DAILY, Reasoner, Amie Homans, PA-C, 1 Patch at 03/06/23 1559    venlafaxine-SR (EFFEXOR-XR) capsule 225 mg, 225 mg, Oral, DAILY WITH BREAKFAST, Penny MD Ashley, 225 mg at 03/06/23 0829    hydrOXYzine HCL (ATARAX) tablet 25 mg, 25 mg, Oral, Q6H PRN, Penny MD Ashley, 25 mg at 03/06/23 1539    propranoloL (INDERAL) tablet 40 mg, 40 mg, Oral, TID, Elizabeth Hickey, NP, 40 mg at 03/06/23 1539    ondansetron (ZOFRAN ODT) tablet 4 mg, 4 mg, Oral, Q8H PRN, Elizabeth Hickey, NP    ondansetron [facility]) injection 4 mg, 4 mg, IntraVENous, Q6H PRN, Elizabeth Ruperto, NP, 4 mg at 02/28/23 9190    acetaminophen (TYLENOL) suppository 650 mg, 650 mg, Rectal, Q4H PRN, Elizabeth Correae, NP    acetaminophen (TYLENOL) tablet 650 mg, 650 mg, Oral, Q4H PRN, Elizabeth Ruperto, NP, 650 mg at 03/04/23 1043    therapeutic multivitamin (THERAGRAN) tablet 1 Tablet, 1 Tablet, Oral, DAILY, Elizabeth Hickey, NP    albuterol-ipratropium (DUO-NEB) 2.5 MG-0.5 MG/3 ML, 3 mL, Nebulization, Q4H PRN, Elizabeth Hickey NP, 3 mL at 02/27/23 0413    aluminum-magnesium hydroxide (MAALOX) oral suspension 15 mL, 15 mL, Oral, QID PRN, Elizabeth Hickey, NP    senna-docusate (PERICOLACE) 8.6-50 mg per tablet 1 Tablet, 1 Tablet, Oral, BID PRN, Elizabeth Hickey, NP    polyethylene glycol (MIRALAX) packet 17 g, 17 g, Oral, DAILY PRN, Elizabeth Correae, NP    thiamine mononitrate (B-1) tablet 100 mg, 100 mg, Oral, DAILY, Devon Benson, , 100 mg at 03/06/23 0829    enoxaparin (LOVENOX) injection 30 mg, 30 mg, SubCUTAneous, Q12H, Marcelino, Devon, DO, 30 mg at 03/06/23 1015    scopolamine (TRANSDERM-SCOP) 1 mg over 3 days 1 Patch, 1 Patch, TransDERmal, Q72H, Elizabeth Correae, NP, 1 Patch at 03/03/23 1629    hydrALAZINE (APRESOLINE) 20 mg/mL injection 10 mg, 10 mg, IntraVENous, Q6H PRN, Elizabeth Ruperto, NP    metoprolol (LOPRESSOR) injection 2.5 mg, 2.5 mg, IntraVENous, Q4H PRN, Elizabeth Ruperto, NP    MENTAL STATUS EXAM:  Sensorium oriented to time, place and person   Relations cooperative   Eye Contact appropriate   Appearance:  age appropriate and overweight   Speech:  normal pitch, normal volume, and non-pressured   Thought Process: logical   Thought Content free of delusions and free of hallucinations   Suicidal ideations Endorses suicidal ideation if discharged home   Mood:  anxious and depressed   Affect:  mood-congruent   Memory   adequate   Concentration:  adequate   Insight:  fair   Judgment:  fair        ASSESSMENT AND PLAN:  Yair Kaiser meets criteria for an unspecifed depressive disorder and an unspecified anxiety disorder    RECOMMENDATIONS:  Recommend voluntary admission to [facility], patient will require a rocky-psych bed due to having a boot (for ankle fracture) and utilizing a kneeling scooter for ambulation    Obtain TSH level to determine if patient has hypothyroidism, as hypothyroidism can affect mood and worsen preexisting depressive symptoms        Medication recommendations:  Increase PRN hydroxyzine to 50 mg PO q6h for anxiety  Increase gabapentin to 600 mg PO TID for anxiety          Thank you for this consultation    Ivette Martinez NP  3/6/2023

## 2023-03-06 NOTE — PROGRESS NOTES
Hospitalist Progress Note    NAME: Ian Brock   :  1980   MRN:  [id number]       Assessment / Plan:  ETOH and Opioid Withdrawal        Suicidal Ideations/Depression        Tachycardia  Respiratory acidosis secondary to Ativan use  - no current plan or SI/HI expressed this am  - etiology of tachycardia related to ETOH and Opioid Withdrawal  - CIWA, discontinued, out of range of withdrawal for ETOH and doing well on prn atarax for anxiety  - IVF and multivitamin supplementation  Ammonia level mildly elevated at 38, B12 folic acid are within normal limits  -Follow-up 2D echo due to tachycardia- continue propranolol and effexor  - seizure and fall precautions  - suicide precautions  - psych recommended inpatient admission once medically stable, continue with one-to-one sitter  patient had a sudden onset of respiratory failure acute respiratory acidosis in the setting of Ativan use, patient was evaluated by intensivist.  Patient did not require ICU level of care  -Ativan currently on hold, had received flumazenil to reverse Ativan effect  -Currently on as needed Atarax  3/6 Patient was offered to go home with medications and outpatient follow up. Pt reported \"I don't want to go home and do something stupid\", repeat psych consult placed, will continue to wait for [facility] bed for psych due to scooter and crutches.   -Discussed case at length with psychiatry Pipe Mccloud. 2. Mild leukocytosis    - negative influenza and covid    - chest xray shows no acute process    - CTA shows bibasilar atelectasis    - afebrile    - urine drug screen negative for barbiturates and opiates, negative for methadone    - urinalysis positive for bacteria, negative for nitrites and leukocytes, patient asymptomatic.    - will monitor lab work. 3. Acute respiratory failure-POA    - CTA shows no pulmonary embolism. Bibasilar atelectasis noted.     - chest xray shows no acute process    - keep 02 sats greater than 92%    - turn, cough and reposition    - incentive spirometry every 2 hours while awake    - prn nebulizer treatments     4. Hepatic steatosis    - etiology related to ETOH use    - seen on CTA of chest     - avoid hepatoxic medications     - will monitor lab work     - Low albumin     5. Fracture of Left Ankle- POA    - etiology related to prior fall    - left boot intact    - elevate left lower extremity while at rest    - fall precautions    - Working on getting geriatric bed for scooter    Tobacco abuse  Started on nicotine patch    40 or above Morbid obesity / Body mass index is 41.2 kg/m². Estimated discharge date: 3/7? Barriers: Inpatient psych-geriatric bed  Code status: Full  Prophylaxis: Lovenox  Recommended Disposition: Home w/Family     Subjective:     Chief Complaint / Reason for Physician Visit  Suicidal thoughts, alcohol withdrawal discussed with RN events overnight. Patient reports sleep interrupted by headaches. Otherwise reports that she would like to be inpatient psych, \"I'm scared I'll do something stupid if I go home. \"    Review of Systems:  Symptom Y/N Comments  Symptom Y/N Comments   Fever/Chills n   Chest Pain n    Poor Appetite    Edema     Cough n   Abdominal Pain n    Sputum    Joint Pain     SOB/FIGUEROA n   Pruritis/Rash     Nausea/vomit y   Tolerating PT/OT     Diarrhea    Tolerating Diet y    Constipation    Other       Could NOT obtain due to:      Objective:     VITALS:   Last 24hrs VS reviewed since prior progress note.  Most recent are:  Patient Vitals for the past 24 hrs:   Temp Pulse Resp BP SpO2   03/06/23 1053 98.1 °F (36.7 °C) 73 18 (!) 100/52 98 %   03/06/23 0758 97.2 °F (36.2 °C) 100 18 (!) 106/44 --   03/06/23 0721 -- 86 18 (!) 86/55 99 %   03/06/23 0353 98.6 °F (37 °C) 75 16 (!) 96/58 100 %   03/06/23 0345 -- 70 18 (!) 96/58 93 %   03/05/23 2300 98.2 °F (36.8 °C) 80 18 97/61 97 %   03/05/23 2135 -- 92 -- 99/63 --   03/05/23 1915 98 °F (36.7 °C) 87 18 (!) 111/54 100 %   03/05/23 1510 98.1 °F (36.7 °C) 81 16 112/71 94 %       No intake or output data in the 24 hours ending 03/06/23 1436       I had a face to face encounter and independently examined this patient on 3/6/2023, as outlined below:  PHYSICAL EXAM:  General: WD, WN. Alert, cooperative, no acute distress    EENT:  EOMI. Anicteric sclerae. MMM  Resp:  CTA bilaterally, no wheezing or rales. No accessory muscle use  CV:  Regular  rhythm,  No edema  GI:  Soft, Non distended, Non tender. +Bowel sounds  Neurologic:  Alert and oriented X 3, normal speech,   Psych:   Fair  insight. Not anxious nor agitated  Skin:  No rashes. No jaundice    Reviewed most current lab test results and cultures  YES  Reviewed most current radiology test results   YES  Review and summation of old records today    NO  Reviewed patient's current orders and MAR    YES  PMH/ reviewed - no change compared to H&P  ________________________________________________________________________  Care Plan discussed with:    Comments   Patient x    Family      RN x    Care Manager x    Consultant  x                      Multidiciplinary team rounds were held today with , nursing, pharmacist and clinical coordinator. Patient's plan of care was discussed; medications were reviewed and discharge planning was addressed. ________________________________________________________________________  Total NON critical care TIME:  45   Minutes    Total CRITICAL CARE TIME Spent:   Minutes non procedure based      Comments   >50% of visit spent in counseling and coordination of care x    ________________________________________________________________________  Ilda Marcus MD     Procedures: see electronic medical records for all procedures/Xrays and details which were not copied into this note but were reviewed prior to creation of Plan. LABS:  I reviewed today's most current labs and imaging studies.   Pertinent labs include:  No results for input(s): WBC, HGB, HCT, PLT, HGBEXT, HCTEXT, PLTEXT, HGBEXT, HCTEXT, PLTEXT in the last 72 hours.     Recent Labs     03/05/23  0318 03/04/23  0542    138   K 4.7 4.3    107   CO2 26 28   * 103*   BUN 22* 28*   CREA 0.72 0.71   CA 9.1 8.9   PHOS 4.7 3.5   ALB 3.2* 3.2*         Signed: Renetta Borden MD

## 2023-03-07 PROCEDURE — 74011250637 HC RX REV CODE- 250/637: Performed by: INTERNAL MEDICINE

## 2023-03-07 PROCEDURE — 74011250637 HC RX REV CODE- 250/637: Performed by: NURSE PRACTITIONER

## 2023-03-07 PROCEDURE — 74011250636 HC RX REV CODE- 250/636: Performed by: STUDENT IN AN ORGANIZED HEALTH CARE EDUCATION/TRAINING PROGRAM

## 2023-03-07 PROCEDURE — 74011250637 HC RX REV CODE- 250/637: Performed by: STUDENT IN AN ORGANIZED HEALTH CARE EDUCATION/TRAINING PROGRAM

## 2023-03-07 PROCEDURE — 65270000015 HC RM PRIVATE ONCOLOGY

## 2023-03-07 PROCEDURE — 97530 THERAPEUTIC ACTIVITIES: CPT

## 2023-03-07 PROCEDURE — 74011250637 HC RX REV CODE- 250/637: Performed by: PHYSICIAN ASSISTANT

## 2023-03-07 RX ORDER — CETIRIZINE HYDROCHLORIDE 10 MG/1
10 TABLET ORAL DAILY
Status: DISCONTINUED | OUTPATIENT
Start: 2023-03-07 | End: 2023-03-13 | Stop reason: HOSPADM

## 2023-03-07 RX ADMIN — DIBASIC SODIUM PHOSPHATE, MONOBASIC POTASSIUM PHOSPHATE AND MONOBASIC SODIUM PHOSPHATE 1 TABLET: 852; 155; 130 TABLET ORAL at 08:36

## 2023-03-07 RX ADMIN — PROPRANOLOL HYDROCHLORIDE 40 MG: 10 TABLET ORAL at 15:40

## 2023-03-07 RX ADMIN — THIAMINE HCL TAB 100 MG 100 MG: 100 TAB at 08:36

## 2023-03-07 RX ADMIN — GABAPENTIN 400 MG: 300 CAPSULE ORAL at 08:36

## 2023-03-07 RX ADMIN — BUTALBITAL, ACETAMINOPHEN, AND CAFFEINE 1 TABLET: 50; 325; 40 TABLET ORAL at 04:46

## 2023-03-07 RX ADMIN — BUTALBITAL, ACETAMINOPHEN, AND CAFFEINE 1 TABLET: 50; 325; 40 TABLET ORAL at 12:18

## 2023-03-07 RX ADMIN — THERA TABS 1 TABLET: TAB at 08:36

## 2023-03-07 RX ADMIN — PROPRANOLOL HYDROCHLORIDE 40 MG: 10 TABLET ORAL at 08:36

## 2023-03-07 RX ADMIN — BUTALBITAL, ACETAMINOPHEN, AND CAFFEINE 1 TABLET: 50; 325; 40 TABLET ORAL at 18:32

## 2023-03-07 RX ADMIN — CETIRIZINE HYDROCHLORIDE 10 MG: 10 TABLET, FILM COATED ORAL at 10:18

## 2023-03-07 RX ADMIN — POTASSIUM CHLORIDE 40 MEQ: 20 TABLET, EXTENDED RELEASE ORAL at 08:36

## 2023-03-07 RX ADMIN — HYDROXYZINE HYDROCHLORIDE 25 MG: 25 TABLET ORAL at 12:29

## 2023-03-07 RX ADMIN — ENOXAPARIN SODIUM 30 MG: 100 INJECTION SUBCUTANEOUS at 21:44

## 2023-03-07 RX ADMIN — GABAPENTIN 400 MG: 300 CAPSULE ORAL at 21:43

## 2023-03-07 RX ADMIN — VENLAFAXINE HYDROCHLORIDE 225 MG: 150 CAPSULE, EXTENDED RELEASE ORAL at 08:36

## 2023-03-07 RX ADMIN — GABAPENTIN 400 MG: 300 CAPSULE ORAL at 15:40

## 2023-03-07 RX ADMIN — MIRTAZAPINE 7.5 MG: 15 TABLET, FILM COATED ORAL at 21:44

## 2023-03-07 RX ADMIN — ENOXAPARIN SODIUM 30 MG: 100 INJECTION SUBCUTANEOUS at 10:18

## 2023-03-07 NOTE — PROGRESS NOTES
Transition of Care Plan:     RUR:21%  Disposition: IP Psych when medically stable- pt is voluntary- needs medication adjustment     Psyc consult ordered/pending     Will need EMTALA     If SNF or IPR: n/a  Date FOC offered:n/a  Date FOC received:n/a  Date authorization started with reference number:n/a  Date authorization received and expires:n/a  Accepting facility:n/a  Follow up appointments:to be scheduled   DME needed:none  Transportation at Discharge:BLS/EMTALA  [place] or means to access home: yes        IM Medicare Letter:n/a  Is patient a Union and connected with the [facility]?    no            If yes, was [employer] transfer form completed and VA notified? n/a  Caregiver Contact:grandmother Luke Teague [phone]  Discharge Caregiver contacted prior to discharge? yes  Care Conference needed?:  no  Previous HH/SNF/IPR: none          9:08 am - Left  for [address] bed placement at [phone]. Awaiting call back at this time. MARCELO Cunningham    Need an updated PT note with just use of a w/c to see - they will not allow patient to have crutches and there is not a knee scooter available to patient at this time. PT aware and will see patient this afternoon.  MARCELO Cunningham

## 2023-03-07 NOTE — PROGRESS NOTES
Problem: Pressure Injury - Risk of  Goal: *Prevention of pressure injury  Description: Document Elijah Scale and appropriate interventions in the flowsheet. Outcome: Progressing Towards Goal  Note: Pressure Injury Interventions:  Sensory Interventions: Assess changes in LOC, Assess need for specialty bed, Avoid rigorous massage over bony prominences, Float heels, Keep linens dry and wrinkle-free, Maintain/enhance activity level, Minimize linen layers    Moisture Interventions: Absorbent underpads, Apply protective barrier, creams and emollients, Assess need for specialty bed    Activity Interventions: Increase time out of bed    Mobility Interventions: PT/OT evaluation    Nutrition Interventions: Document food/fluid/supplement intake, Discuss nutritional consult with provider, Offer support with meals,snacks and hydration    Friction and Shear Interventions: Apply protective barrier, creams and emollients, Feet elevated on foot rest, Foam dressings/transparent film/skin sealants, HOB 30 degrees or less                Problem: Falls - Risk of  Goal: *Absence of Falls  Description: Document Rema Fall Risk and appropriate interventions in the flowsheet.   Outcome: Progressing Towards Goal  Note: Fall Risk Interventions:  Mobility Interventions: Assess mobility with egress test, Bed/chair exit alarm, Utilize walker, cane, or other assistive device, Utilize gait belt for transfers/ambulation    Mentation Interventions: Adequate sleep, hydration, pain control, Bed/chair exit alarm, Update white board, Increase mobility, Family/sitter at bedside    Medication Interventions: Bed/chair exit alarm, Utilize gait belt for transfers/ambulation    Elimination Interventions: Bed/chair exit alarm, Call light in reach, Stay With Me (per policy)              Problem: Alcohol Withdrawal  Goal: *STG: Participates in treatment plan  Outcome: Progressing Towards Goal     Problem: Suicide  Goal: *STG: Remains safe in hospital  Outcome: Progressing Towards Goal     Problem: Patient Education: Go to Patient Education Activity  Goal: Patient/Family Education  Outcome: Progressing Towards Goal

## 2023-03-07 NOTE — BSMART NOTE
BSMART Liaison Team Note     LOS:  10 Days    Patient goal(s) for today: Take medication as prescribed, communicate needs to staff in an appropriate manner, practice mindfulness, utilize coping skills, gratitude journal   BSMART Liaison team focus/goals: Assess MH needs, provide education and support, engage in brief therapy session when appropriate    Progress note: Liaison met w/ pt face to face w/ eleanor, Brian Encarnacion present. Pt was received laying in bed w/ 1:1 sitter present at bedside. When asked how pt is feeling today she reports her depression is worse commenting, \"Each day my depression gets worse and worse\". Pt denies SI/HI/VH/AH; no evidence of psychosis or delusional thoughts. Pt explains that she would still benefit from psychiatric inpatient treatment d/t her worsening depression and states, \"I just don't trust myself. .. I don't want it to get to that point\". Pt denies any concerns regarding sleep or appetite and states the prescribed Remeron has been helpful in regards to sleep. Pt shares that she was recently able to maneuver using a wheelchair and in a few days will be able to put weight on her foot again. Pt reports she feels motivated to get better for her 7 yo son and wants to be excited to attend his basketball games in the future. Liaison provided encouragement and supportive listening. Pt thanked liaison for the visit. Liaison will continue to follow. Barriers to Discharge: Medical Clearance & BHU Placement  Guns in the home: No    Outpatient provider(s):  RAI Merida @ [facility] Jeff Cano and Dev Vance at [facility] info/prescription coverage:  [employer] MEDICA[employer]/[place] [employer]US    Diagnosis: Major Depressive Disorder, recurrent, severe without psychotic features; Alcohol Use Disorder    Plan:  Recommended for psychiatric admission once medically stable on 3/6/23. Please defer to the most recent psychiatric consult note for recommendations. Follow up Psych Consult placed? No  Psychiatrist updated?  No      Participating treatment team members: Shukri Chambers LMSW

## 2023-03-07 NOTE — PROGRESS NOTES
Problem: Pressure Injury - Risk of  Goal: *Prevention of pressure injury  Description: Document Elijah Scale and appropriate interventions in the flowsheet.   Outcome: Progressing Towards Goal  Note: Pressure Injury Interventions:  Sensory Interventions: Assess changes in LOC, Assess need for specialty bed, Discuss PT/OT consult with provider    Moisture Interventions: Absorbent underpads, Apply protective barrier, creams and emollients    Activity Interventions: Pressure redistribution bed/mattress(bed type), Increase time out of bed    Mobility Interventions: Assess need for specialty bed, Float heels, HOB 30 degrees or less    Nutrition Interventions: Document food/fluid/supplement intake    Friction and Shear Interventions: Apply protective barrier, creams and emollients, Feet elevated on foot rest, Foam dressings/transparent film/skin sealants, HOB 30 degrees or less                Problem: Alcohol Withdrawal  Goal: *STG: Participates in treatment plan  Outcome: Progressing Towards Goal     Problem: Alcohol Withdrawal  Goal: *STG: Seeks staff when symptoms of withdrawal increase  Outcome: Progressing Towards Goal     Problem: Alcohol Withdrawal  Goal: *STG: Able to indentify relapse triggers including interpersonal/social and familial factors  Outcome: Progressing Towards Goal     Problem: Suicide  Goal: *STG: Remains safe in hospital  Outcome: Progressing Towards Goal     Problem: Suicide  Goal: *STG: Seeks staff when feelings of self harm or harm towards others arise  Outcome: Progressing Towards Goal

## 2023-03-07 NOTE — PROGRESS NOTES
Hospitalist Progress Note    NAME: Ty Montejo   :  1980   MRN:  [id number]       Assessment / Plan:  ETOH and Opioid Withdrawal        Suicidal Ideations/Depression        Tachycardia  Respiratory acidosis secondary to Ativan use  - no current plan or SI/HI expressed this am  - etiology of tachycardia related to ETOH and Opioid Withdrawal  - CIWA, discontinued, out of range of withdrawal for ETOH and doing well on prn atarax for anxiety  - IVF and multivitamin supplementation  Ammonia level mildly elevated at 38, Z51 folic acid are within normal limits  -Follow-up 2D echo due to tachycardia- continue propranolol and effexor  - seizure and fall precautions  - suicide precautions  - psych recommended inpatient admission once medically stable, continue with one-to-one sitter  patient had a sudden onset of respiratory failure acute respiratory acidosis in the setting of Ativan use, patient was evaluated by intensivist.  Patient did not require ICU level of care  -Ativan currently on hold, had received flumazenil to reverse Ativan effect  -Currently on as needed Atarax  3/6 Patient was offered to go home with medications and outpatient follow up. Pt reported \"I don't want to go home and do something stupid\", repeat psych consult placed, will continue to wait for [facility] bed for psych due to scooter and crutches.   -Discussed case at length with psychiatry Deja Delgadillo. 3/7 Patient feeling okay today, still desires inpatient psych. Discussed anxiety mitigation strategies. 2. Mild leukocytosis    - negative influenza and covid    - chest xray shows no acute process    - CTA shows bibasilar atelectasis    - afebrile    - urine drug screen negative for barbiturates and opiates, negative for methadone    - urinalysis positive for bacteria, negative for nitrites and leukocytes, patient asymptomatic.    - will monitor lab work. 3. Acute respiratory failure-POA    - CTA shows no pulmonary embolism. Bibasilar atelectasis noted. - chest xray shows no acute process    - keep 02 sats greater than 92%    - turn, cough and reposition    - incentive spirometry every 2 hours while awake    - prn nebulizer treatments     4. Hepatic steatosis    - etiology related to ETOH use    - seen on CTA of chest     - avoid hepatoxic medications     - will monitor lab work     - Low albumin     5. Fracture of Left Ankle- POA    - etiology related to prior fall    - left boot intact    - elevate left lower extremity while at rest    - fall precautions    - Working on getting geriatric bed for scooter    Tobacco abuse  Nicotine patch    Allergic rhinitis  Zyrtec    40 or above Morbid obesity / Body mass index is 41.2 kg/m². Estimated discharge date: 3/8? Barriers: Inpatient psych-geriatric bed  Code status: Full  Prophylaxis: Lovenox  Recommended Disposition: Home w/Family     Subjective:     Chief Complaint / Reason for Physician Visit  Suicidal thoughts, alcohol withdrawal discussed with RN events overnight. Patient reports sleep interrupted by headaches, desires allergy medications to try to improve sleep and allergy symptoms. Review of Systems:  Symptom Y/N Comments  Symptom Y/N Comments   Fever/Chills n   Chest Pain n    Poor Appetite    Edema     Cough n   Abdominal Pain n    Sputum    Joint Pain     SOB/FIGUEROA n   Pruritis/Rash     Nausea/vomit n   Tolerating PT/OT     Diarrhea    Tolerating Diet y    Constipation    Other       Could NOT obtain due to:      Objective:     VITALS:   Last 24hrs VS reviewed since prior progress note.  Most recent are:  Patient Vitals for the past 24 hrs:   Temp Pulse Resp BP SpO2   03/07/23 1114 98.1 °F (36.7 °C) 88 16 (!) 119/44 95 %   03/07/23 1021 97.6 °F (36.4 °C) 70 20 114/67 98 %   03/07/23 0836 -- 75 -- 95/60 --   03/07/23 0724 97.9 °F (36.6 °C) 77 18 107/71 97 %   03/07/23 0446 98 °F (36.7 °C) 78 18 108/66 98 %   03/06/23 2224 97.6 °F (36.4 °C) 80 20 115/60 --   03/06/23 1920 98 °F (36.7 °C) 81 18 95/61 99 %   03/06/23 1507 97.9 °F (36.6 °C) 88 18 (!) 112/58 98 %       No intake or output data in the 24 hours ending 03/07/23 1127       I had a face to face encounter and independently examined this patient on 3/7/2023, as outlined below:  PHYSICAL EXAM:  General: WD, WN. Alert, cooperative, no acute distress    EENT:  EOMI. Anicteric sclerae. MMM  Resp:  CTA bilaterally, no wheezing or rales. No accessory muscle use  CV:  Regular  rhythm,  No edema  GI:  Soft, Non distended, Non tender. +Bowel sounds  Neurologic:  Alert and oriented X 3, normal speech,   Psych:   Fair  insight. Not anxious nor agitated  Skin:  No rashes. No jaundice    Reviewed most current lab test results and cultures  YES  Reviewed most current radiology test results   YES  Review and summation of old records today    NO  Reviewed patient's current orders and MAR    YES  PMH/ reviewed - no change compared to H&P  ________________________________________________________________________  Care Plan discussed with:    Comments   Patient x    Family      RN x    Care Manager x    Consultant  x                      Multidiciplinary team rounds were held today with , nursing, pharmacist and clinical coordinator. Patient's plan of care was discussed; medications were reviewed and discharge planning was addressed. ________________________________________________________________________  Total NON critical care TIME:  45   Minutes    Total CRITICAL CARE TIME Spent:   Minutes non procedure based      Comments   >50% of visit spent in counseling and coordination of care x    ________________________________________________________________________  Ilda Marcus MD     Procedures: see electronic medical records for all procedures/Xrays and details which were not copied into this note but were reviewed prior to creation of Plan.       LABS:  I reviewed today's most current labs and imaging studies. Pertinent labs include:  No results for input(s): WBC, HGB, HCT, PLT, HGBEXT, HCTEXT, PLTEXT, HGBEXT, HCTEXT, PLTEXT in the last 72 hours.     Recent Labs     03/05/23  0318      K 4.7      CO2 26   *   BUN 22*   CREA 0.72   CA 9.1   PHOS 4.7   ALB 3.2*         Signed: Rashida Sanchez MD

## 2023-03-07 NOTE — PROGRESS NOTES
Problem: Mobility Impaired (Adult and Pediatric)  Goal: *Acute Goals and Plan of Care (Insert Text)  Description: FUNCTIONAL STATUS PRIOR TO ADMISSION: Patient was independent and active without use of DME until she sustained a GLF on 12/24/22 with resulting L ankle fracture and s/p ORIF. She had ortho follow-up appointment on 2/15 which states she will remain NWB on LLE for 4 additional weeks. HOME SUPPORT PRIOR TO ADMISSION: The patient lived with her parents or grandparents but did not require assist. Both houses are 1-story, 4 or 7 steps with 1 or 2 rails to enter from outside. Physical Therapy Goals  Initiated 3/2/2023  1. Patient will transfer from bed to chair and chair to bed with modified independence using the least restrictive device within 7 day(s). 2.  Patient will perform sit to stand with modified independence within 7 day(s). 3.  Patient will ambulate with modified independence for 150 feet with the least restrictive device within 7 day(s). 4.  Patient will ascend/descend 7 stairs with 1 handrail(s) with minimal assistance/contact guard assist within 7 day(s). Outcome: Progressing Towards Goal     PHYSICAL THERAPY TREATMENT  Patient: Du Bradley (53 y.o. female)  Date: 3/7/2023  Diagnosis: Alcohol withdrawal ([facility] [F10.939]  Opioid withdrawal ([facility] [address] [F11.93]  Tachycardia [R00.0] <principal problem not specified>      Precautions: Fall, NWB  Chart, physical therapy assessment, plan of care and goals were reviewed. ASSESSMENT  Patient continues with skilled PT services and is progressing towards goals. Patient was supine in bed, agreeable to work with therapy. Performed wheelchair training to assess patient is able to use safely and independently. She required standby assist for stand<>pivot transfer to/from bed and wheelchair due to verbal cues for locking wheelchair prior to standing and for proper hand placement.  Patient was able to self-propel wheelchair using BUE only for 200' in the hallway and performed 360 turn with supervision assist. Provided education on how to lock/unlock brake and how to get leg rests on/off, patient was able to demonstrate back with proper technique. Patient was left supine in bed, call bell within reach, no complaints. Current Level of Function Impacting Discharge (mobility/balance): standby assist for stand<>pivot transfer    Other factors to consider for discharge: NWB on LLE until 3/15 when she is supposed to go for ortho f/u         PLAN :  Patient continues to benefit from skilled intervention to address the above impairments. Continue treatment per established plan of care. to address goals. Recommendation for discharge: (in order for the patient to meet his/her long term goals)  Patient plans to discharge to inpatient [facility]    This discharge recommendation:  Has been made in collaboration with the attending provider and/or case management    IF patient discharges home will need the following DME: wheelchair       SUBJECTIVE:   Patient stated I don't like wheelchairs but I will try.     OBJECTIVE DATA SUMMARY:   Critical Behavior:  Neurologic State: Alert  Orientation Level: Oriented X4  Cognition: Follows commands     Functional Mobility Training:  Bed Mobility:   Supine to Sit: Independent    Transfers:  Sit to Stand: Supervision  Stand to Sit: Modified independent    Balance:  Sitting: Intact  Standing: Impaired  Standing - Static: Good;Constant support  Standing - Dynamic : Fair;Good;Constant support    Therapeutic Exercises:   None    Pain Rating:  none    Activity Tolerance:   Fair    After treatment patient left in no apparent distress:   Supine in bed, Call bell within reach, and sitter present    COMMUNICATION/COLLABORATION:   The patients plan of care was discussed with: Registered nurse.      Eric Huston, PT   Time Calculation: 12 mins

## 2023-03-07 NOTE — PROGRESS NOTES
1900 Bedside and Verbal shift change report given to cornelio lamas  (oncoming nurse) by Jose Martin Robledo  (offgoing nurse). Report included the following information SBAR, Kardex, Procedure Summary, MAR, Accordion, and Cardiac Rhythm nsr . End of Shift Note    Bedside shift change report given to EMILIANO LAMAS (oncoming nurse) by Denis Kowalski RN (offgoing nurse).   Report included the following information SBAR, Kardex, Intake/Output, MAR, and Recent Results    Shift worked:  NIGHT     Shift summary and any significant changes:    1:1 SITTER   UNEVENTFUL SHIFT     Concerns for physician to address:  NONE     Zone phone for oncoming shift:  XXXX             Denis Kowalski, RN

## 2023-03-07 NOTE — PROGRESS NOTES
0700: Bedside and Verbal shift change report given to Wander Luu RN (oncoming nurse) by Tyesha Arce RN (offgoing nurse). Report included the following information SBAR, Kardex, Intake/Output, MAR, and Recent Results. 1:1 Sitter at bedside. 1733: 10 mg Zyrtec oral daily ordered placed by Dr. Rody Ontiveros verbal with readback for headaches. 0930: IV removed from patient and no new IV placed per Dr. Kumar Client verbal order. 1045: Verbal shift change report given to Oncology RN (oncoming nurse) by Wander Luu RN (offgoing nurse). Report included the following information SBAR, Kardex, Intake/Output, MAR, and Recent Results.

## 2023-03-08 LAB
ANION GAP SERPL CALC-SCNC: 6 MMOL/L (ref 5–15)
BUN SERPL-MCNC: 17 MG/DL (ref 6–20)
BUN/CREAT SERPL: 24 (ref 12–20)
CALCIUM SERPL-MCNC: 9 MG/DL (ref 8.5–10.1)
CHLORIDE SERPL-SCNC: 109 MMOL/L (ref 97–108)
CO2 SERPL-SCNC: 25 MMOL/L (ref 21–32)
CREAT SERPL-MCNC: 0.71 MG/DL (ref 0.55–1.02)
ERYTHROCYTE [DISTWIDTH] IN BLOOD BY AUTOMATED COUNT: 13.8 % (ref 11.5–14.5)
GLUCOSE SERPL-MCNC: 146 MG/DL (ref 65–100)
HCT VFR BLD AUTO: 37.8 % (ref 35–47)
HGB BLD-MCNC: 12.1 G/DL (ref 11.5–16)
MCH RBC QN AUTO: 29.4 PG (ref 26–34)
MCHC RBC AUTO-ENTMCNC: 32 G/DL (ref 30–36.5)
MCV RBC AUTO: 92 FL (ref 80–99)
NRBC # BLD: 0 K/UL (ref 0–0.01)
NRBC BLD-RTO: 0 PER 100 WBC
PLATELET # BLD AUTO: 336 K/UL (ref 150–400)
PMV BLD AUTO: 9.9 FL (ref 8.9–12.9)
POTASSIUM SERPL-SCNC: 3.8 MMOL/L (ref 3.5–5.1)
RBC # BLD AUTO: 4.11 M/UL (ref 3.8–5.2)
SODIUM SERPL-SCNC: 140 MMOL/L (ref 136–145)
WBC # BLD AUTO: 7.7 K/UL (ref 3.6–11)

## 2023-03-08 PROCEDURE — 80048 BASIC METABOLIC PNL TOTAL CA: CPT

## 2023-03-08 PROCEDURE — 74011250636 HC RX REV CODE- 250/636: Performed by: STUDENT IN AN ORGANIZED HEALTH CARE EDUCATION/TRAINING PROGRAM

## 2023-03-08 PROCEDURE — 65270000015 HC RM PRIVATE ONCOLOGY

## 2023-03-08 PROCEDURE — 74011250637 HC RX REV CODE- 250/637: Performed by: STUDENT IN AN ORGANIZED HEALTH CARE EDUCATION/TRAINING PROGRAM

## 2023-03-08 PROCEDURE — 74011250637 HC RX REV CODE- 250/637: Performed by: NURSE PRACTITIONER

## 2023-03-08 PROCEDURE — 74011250637 HC RX REV CODE- 250/637: Performed by: PHYSICIAN ASSISTANT

## 2023-03-08 PROCEDURE — 74011250637 HC RX REV CODE- 250/637: Performed by: INTERNAL MEDICINE

## 2023-03-08 PROCEDURE — 85027 COMPLETE CBC AUTOMATED: CPT

## 2023-03-08 PROCEDURE — 36415 COLL VENOUS BLD VENIPUNCTURE: CPT

## 2023-03-08 RX ORDER — HYDROXYZINE 25 MG/1
50 TABLET, FILM COATED ORAL
Status: DISCONTINUED | OUTPATIENT
Start: 2023-03-08 | End: 2023-03-13 | Stop reason: HOSPADM

## 2023-03-08 RX ORDER — GABAPENTIN 300 MG/1
600 CAPSULE ORAL 3 TIMES DAILY
Status: DISCONTINUED | OUTPATIENT
Start: 2023-03-08 | End: 2023-03-12

## 2023-03-08 RX ADMIN — THIAMINE HCL TAB 100 MG 100 MG: 100 TAB at 08:28

## 2023-03-08 RX ADMIN — BUTALBITAL, ACETAMINOPHEN, AND CAFFEINE 1 TABLET: 50; 325; 40 TABLET ORAL at 04:10

## 2023-03-08 RX ADMIN — ENOXAPARIN SODIUM 30 MG: 100 INJECTION SUBCUTANEOUS at 10:15

## 2023-03-08 RX ADMIN — GABAPENTIN 400 MG: 300 CAPSULE ORAL at 08:27

## 2023-03-08 RX ADMIN — HYDROXYZINE HYDROCHLORIDE 25 MG: 25 TABLET ORAL at 08:28

## 2023-03-08 RX ADMIN — BUTALBITAL, ACETAMINOPHEN, AND CAFFEINE 1 TABLET: 50; 325; 40 TABLET ORAL at 16:24

## 2023-03-08 RX ADMIN — VENLAFAXINE HYDROCHLORIDE 225 MG: 150 CAPSULE, EXTENDED RELEASE ORAL at 08:28

## 2023-03-08 RX ADMIN — CETIRIZINE HYDROCHLORIDE 10 MG: 10 TABLET, FILM COATED ORAL at 08:28

## 2023-03-08 RX ADMIN — GABAPENTIN 600 MG: 300 CAPSULE ORAL at 22:20

## 2023-03-08 RX ADMIN — THERA TABS 1 TABLET: TAB at 08:28

## 2023-03-08 RX ADMIN — MIRTAZAPINE 7.5 MG: 15 TABLET, FILM COATED ORAL at 22:18

## 2023-03-08 RX ADMIN — GABAPENTIN 600 MG: 300 CAPSULE ORAL at 16:24

## 2023-03-08 RX ADMIN — PROPRANOLOL HYDROCHLORIDE 40 MG: 10 TABLET ORAL at 22:19

## 2023-03-08 RX ADMIN — BUTALBITAL, ACETAMINOPHEN, AND CAFFEINE 1 TABLET: 50; 325; 40 TABLET ORAL at 10:19

## 2023-03-08 RX ADMIN — ENOXAPARIN SODIUM 30 MG: 100 INJECTION SUBCUTANEOUS at 22:20

## 2023-03-08 NOTE — PROGRESS NOTES
End of Shift Note     Bedside shift change report given to Marleen Berg (oncoming nurse) by Roslyn Witt RN (offgoing nurse).   Report included the following information SBAR, Kardex, Intake/Output, MAR, and Recent Results     Shift worked:  7a -7p   Shift summary and any significant changes:     1:1 SITTER   UNEVENTFUL SHIFT      Concerns for physician to address:  NONE      Zone phone for oncoming shift:                    Raul Huffman RN

## 2023-03-08 NOTE — PROGRESS NOTES
Hospitalist Progress Note    Subjective:   Daily Progress Note: 3/8/2023 10:19 AM    Hospital Course: Tesha Brewer is a 38 y.o. female who presented to the emergency room with suicidal ideations. Reported to emergency room physician that she was having suicidal thoughts and called the psychiatric physician to inform them that her medications were not working and was instructed to report to the emergency room. Patient observed resting on stretcher with eyes closed. Arouses when name is called. Expresses no SI or HI at present and no plan. Denies chest pain, shortness of breath, palpitations, dizziness or distress. Patient is diaphoretic otherwise no distress noted. Recently lost pet and started drinking vodka. Also states that she recently fractured her left ankle and became addicted to her pain medications. Subjective: Patient observed resting in bed with eyes closed. Arouses when name is called. Complains of a headache. Denies other complaints. No acute distress observed.     Current Facility-Administered Medications   Medication Dose Route Frequency    cetirizine (ZYRTEC) tablet 10 mg  10 mg Oral DAILY    gabapentin (NEURONTIN) capsule 400 mg  400 mg Oral TID    mirtazapine (REMERON) tablet 7.5 mg  7.5 mg Oral QHS    benzocaine-menthoL (CHLORASEPTIC MAX) lozenge 1 Lozenge  1 Lozenge Oral Q2H PRN    butalbital-acetaminophen-caffeine (FIORICET, ESGIC) -40 mg per tablet 1 Tablet  1 Tablet Oral Q6H PRN    nicotine (NICODERM CQ) 14 mg/24 hr patch 1 Patch  1 Patch TransDERmal DAILY    venlafaxine-SR (EFFEXOR-XR) capsule 225 mg  225 mg Oral DAILY WITH BREAKFAST    hydrOXYzine HCL (ATARAX) tablet 25 mg  25 mg Oral Q6H PRN    propranoloL (INDERAL) tablet 40 mg  40 mg Oral TID    ondansetron (ZOFRAN ODT) tablet 4 mg  4 mg Oral Q8H PRN    ondansetron (ZOFRAN) injection 4 mg  4 mg IntraVENous Q6H PRN    acetaminophen (TYLENOL) suppository 650 mg  650 mg Rectal Q4H PRN    acetaminophen (TYLENOL) tablet 650 mg  650 mg Oral Q4H PRN    therapeutic multivitamin (THERAGRAN) tablet 1 Tablet  1 Tablet Oral DAILY    albuterol-ipratropium (DUO-NEB) 2.5 MG-0.5 MG/3 ML  3 mL Nebulization Q4H PRN    aluminum-magnesium hydroxide (MAALOX) oral suspension 15 mL  15 mL Oral QID PRN    senna-docusate (PERICOLACE) 8.6-50 mg per tablet 1 Tablet  1 Tablet Oral BID PRN    polyethylene glycol (MIRALAX) packet 17 g  17 g Oral DAILY PRN    thiamine mononitrate (B-1) tablet 100 mg  100 mg Oral DAILY    enoxaparin (LOVENOX) injection 30 mg  30 mg SubCUTAneous Q12H    scopolamine (TRANSDERM-SCOP) 1 mg over 3 days 1 Patch  1 Patch TransDERmal Q72H    hydrALAZINE (APRESOLINE) 20 mg/mL injection 10 mg  10 mg IntraVENous Q6H PRN    metoprolol (LOPRESSOR) injection 2.5 mg  2.5 mg IntraVENous Q4H PRN        Review of Systems:    Review of Systems   Constitutional:  Positive for malaise/fatigue. HENT: Negative. Eyes: Negative. Respiratory: Negative. Cardiovascular: Negative. Gastrointestinal: Negative. Genitourinary: Negative. Musculoskeletal: Negative. Skin: Negative. Neurological:  Positive for headaches. Endo/Heme/Allergies: Negative. Psychiatric/Behavioral: Negative. Objective:     Visit Vitals  BP (!) 100/50 (BP 1 Location: Right upper arm)   Pulse 68   Temp 97.9 °F (36.6 °C)   Resp 16   Ht 5' 4\" (1.626 m)   Wt 108.9 kg (240 lb)   LMP 2016   SpO2 97%   BMI 41.20 kg/m²    O2 Flow Rate (L/min): 3 l/min O2 Device: None (Room air)    Temp (24hrs), Av.1 °F (36.7 °C), Min:97.7 °F (36.5 °C), Max:98.4 °F (36.9 °C)      No intake/output data recorded. No intake/output data recorded. PHYSICAL EXAM:    Physical Exam  Vitals reviewed. HENT:      Head: Normocephalic and atraumatic. Right Ear: External ear normal.      Left Ear: External ear normal.      Nose: Nose normal.      Mouth/Throat:      Pharynx: Oropharynx is clear.    Eyes:      Conjunctiva/sclera: Conjunctivae normal.   Cardiovascular: Rate and Rhythm: Normal rate and regular rhythm. Pulses: Normal pulses. Pulmonary:      Effort: Pulmonary effort is normal.      Breath sounds: Normal breath sounds. Abdominal:      General: Bowel sounds are normal.      Comments: Last reported bowel movement on 3/7/23. Musculoskeletal:         General: Normal range of motion. Cervical back: Normal range of motion. Skin:     General: Skin is warm and dry. Capillary Refill: Capillary refill takes 2 to 3 seconds. Neurological:      Mental Status: She is alert and oriented to person, place, and time. Psychiatric:      Comments: Depressed mood. Data Review    Recent Results (from the past 24 hour(s))   CBC W/O DIFF    Collection Time: 03/08/23  4:00 AM   Result Value Ref Range    WBC 7.7 3.6 - 11.0 K/uL    RBC 4.11 3.80 - 5.20 M/uL    HGB 12.1 11.5 - 16.0 g/dL    HCT 37.8 35.0 - 47.0 %    MCV 92.0 80.0 - 99.0 FL    MCH 29.4 26.0 - 34.0 PG    MCHC 32.0 30.0 - 36.5 g/dL    RDW 13.8 11.5 - 14.5 %    PLATELET 570 939 - 273 K/uL    MPV 9.9 8.9 - 12.9 FL    NRBC 0.0 0  WBC    ABSOLUTE NRBC 0.00 0.00 - 2.83 K/uL   METABOLIC PANEL, BASIC    Collection Time: 03/08/23  4:00 AM   Result Value Ref Range    Sodium 140 136 - 145 mmol/L    Potassium 3.8 3.5 - 5.1 mmol/L    Chloride 109 (H) 97 - 108 mmol/L    CO2 25 21 - 32 mmol/L    Anion gap 6 5 - 15 mmol/L    Glucose 146 (H) 65 - 100 mg/dL    BUN 17 6 - 20 MG/DL    Creatinine 0.71 0.55 - 1.02 MG/DL    BUN/Creatinine ratio 24 (H) 12 - 20      eGFR >60 >60 ml/min/1.73m2    Calcium 9.0 8.5 - 10.1 MG/DL       XR CHEST PORT   Final Result   1. Interval development of pulmonary vascular congestion and mild pulmonary   edema. CTA CHEST W OR W WO CONT   Final Result   No acute process. XR CHEST PORT   Final Result   No acute process.           Active Problems:    Alcohol withdrawal ([address] (2/25/2023)      Op[facility] withdrawal ([facility] (2/25/2023)      Tachycardia (2/25/2023)        Assessment/Plan:   ETOH and Opioid Withdrawal        Suicidal Ideations/Depression        Tachycardia- resolved          Respiratory acidosis secondary to Ativan use - resolved  - no current plan or SI/HI expressed this am; expresses depression.  - etiology of tachycardia related to ETOH and Opioid Withdrawal  - CIWA, discontinued, out of range of withdrawal for ETOH  - continue prn atarax for anxiety. - multivitamin supplementation  -   Ammonia level mildly elevated at 38, R34 folic acid are within normal limits  -echocardiogram on 2/28/23 shows an Ef of 55%-60% with normal wall motion. No significant valvular disease or pericardial effusion.  - continue propranolol and effexor  - seizure and fall precautions  - suicide precautions  - psych recommended inpatient admission once medically stable, continue with one-to-one sitter  - patient had a sudden onset of respiratory failure acute respiratory acidosis in the setting of Ativan use for CIWA protocol, patient was evaluated by intensivist.  Patient did not require ICU level of care  -Ativan currently on hold, had received flumazenil to reverse Ativan effect  3/6 Patient was offered to go home with medications and outpatient follow up. Pt reported \"I don't want to go home and do something stupid\", repeat psych consult placed, will continue to wait for [facility] bed for psych due to scooter and crutches. - patient expresses no SI/HI on today, states that depression is worsening  - hydroxyzine increased to 50mg every 6 hours prn and gabapentin increased to 600mg tid per psych recommendations on 3/6/23.  - psych to follow daily     2.  Mild leukocytosis- resolved    - negative influenza and covid    - chest xray shows no acute process    - CTA shows bibasilar atelectasis    - afebrile    - urine drug screen negative for barbiturates and opiates, negative for methadone    - urinalysis 2/25/23 positive for bacteria, negative for nitrites and leukocytes, patient asymptomatic. 3. Acute respiratory failure- resolved    - CTA shows no pulmonary embolism. Bibasilar atelectasis noted. - chest xray shows no acute process    - keep 02 sats greater than 92%    - turn, cough and reposition    - incentive spirometry every 2 hours while awake    - prn nebulizer treatments     4. Hepatic steatosis- POA    - etiology related to ETOH use    - seen on CTA of chest     - avoid hepatoxic medications     - will monitor lab work     5. Fracture of Left Ankle- POA    - etiology related to prior fall    - well-approximated surgical scar noted to ankle. - elevate left lower extremity while at rest    - fall precautions    - Working on getting geriatric bed for scooter     6. Tobacco abuse  - Nicotine patch     7. Allergic rhinitis  - Zyrtec     40 or above Morbid obesity / Body mass index is 41.2 kg/m². Discharge disposition: Awaiting inpatient psychiatric-geriatric bed. CM following for discharge planning.     Code status: Full  Prophylaxis: Lovenox    Plan of care discussed with: patient, nursing, CM    _______________________________________________________________    Jerald Rojas NP

## 2023-03-08 NOTE — PROGRESS NOTES
End of Shift Note    Bedside shift change report given to Joseph (oncoming nurse) by Vashti Paniagua RN (offgoing nurse). Report included the following information SBAR, Kardex, Intake/Output, and MAR    Shift worked:  7p-7a       Shift summary and any significant changes:     Patient rested well with no complaints     Concerns for physician to address:  Psych note states he wouldincrease meds  hydroxyzine 50mg q 6 hours and gabapentin 600 mg tid     Zone phone for oncoming shift:            Activity:  Activity Level: Up ad gerda  Number times ambulated in hallways past shift: 0  Number of times OOB to chair past shift: 2    Cardiac:   Cardiac Monitoring: No      Cardiac Rhythm: Sinus Rhythm    Access:  Current line(s): no access     Genitourinary:   Urinary status: voiding    Respiratory:   O2 Device: None (Room air)  Chronic home O2 use?: NO  Incentive spirometer at bedside: NO       GI:  Last Bowel Movement Date: 03/05/23  Current diet:  ADULT DIET Regular; Safety Tray; Safety Tray (Disposables)  Passing flatus: YES  Tolerating current diet: YES       Pain Management:   Patient states pain is manageable on current regimen: YES    Skin:  Elijah Score: 19  Interventions: increase time out of bed and PT/OT consult    Patient Safety:  Fall Score:  Total Score: 4  Interventions: bed/chair alarm, gripper socks, and sitter at bedside   High Fall Risk: Yes    Length of Stay:  Expected LOS: - - -  Actual LOS: [address], RN

## 2023-03-08 NOTE — BSMART NOTE
BSMART Liaison Team Note     LOS:  11     Patient goal(s) for today: Take medication as prescribed, communicate needs to staff in an appropriate manner, practice mindfulness, utilize coping skills, gratitude journal   BSMART Liaison team focus/goals: Assess MH needs, provide education and support, engage in brief therapy session when appropriate    Progress note: This writer and [address] [place] [address] met with face to face with pt in her room. Pt laying in bed comfortably with 1:1 sitter at bedside. Pt's mother leaving upon arrival. Pt reports good visit with her mother. Pt continues to repot that depression is \"starting to take over. \" Pt continues to express need for inpatient treatment. She continues to report that \"she doesn't want it to get to that point,\" especially if she relapses, and so she wants to continue pursuit of inpatient. She reiterates when asked if she has had SI as \"not yet. \"     In discussion with liasharon Verduzco, pt gives herself in severity from 0-10, with 10 being the highest, a rating of 7. She tells liaison that she had a rating of 6 last week. Pt gives herself a rating of 8 on same scale with anxiety. Pt denies any sleep or appetite disturbance. Remeron has helped with sleep. She denies any SI at this time. No HI, AH, AH reported. Pt feels like she \"needs a different medication altogether. \"    Pt reports her depression generally as \"the same. \" She distracts herself by trying to [place] busy. \" She shows this writer and [employer] and word search booklets provided by her mother. She and the sitter interact in discussions of personalities on tv \"reality shows. \" Pt reports being told by orthopedist that she can now put weight for briefs periods on her ankle. Supposed to be cleared to ambulate independently in 5 days. Liaison will continue to follow. Barriers to Discharge: Medical Clearance & U Placement  Guns in the home: No     Outpatient provider(s):  RAI Merida @ [facility] Jeff Poe Kim at [facility] info/prescription coverage:  [facility]     Diagnosis: Major Depressive Disorder, recurrent, severe without psychotic features; Alcohol Use Disorder     Plan:  Recommended for psychiatric admission once medically stable on 3/6/23. Please defer to the most recent psychiatric consult note for recommendations. Follow up Psych Consult placed? No  Psychiatrist updated? No  Guns in the home: [address] in the home: No     Outpatient provider(s):  RAI Merida @ [facility] Jeff Cano and Lindsey Salazar at [facility] info/prescription coverage:  BLUE CROSS MEDICAID/[facility] HEALTHKEEPERS PLUS     Diagnosis: Major Depressive Disorder, recurrent, severe without psychotic features; Alcohol Use Disorder     Plan:  Recommended for psychiatric admission once medically stable on 3/6/23. Please defer to the most recent psychiatric consult note for recommendations. Follow up Psych Consult placed? No  Psychiatrist updated?  No       Participating treatment team members: Rylie Fortune LCPC, and 1:1 sola

## 2023-03-08 NOTE — CONSULTS
PSYCHIATRY CONSULT NOTE:    REASON FOR CONSULT:  depression and suicidal thoughts/threats  suicidal ideation    Interval history:  3/8/23: Patient is resting in bed. She reports still feeling depressed and anxious. She is unable to identify any triggers or stressors to her depression and anxiety. She denies current suicidal/homicidal thoughts and AV hallucinations. She reports her sleep is improved after she was started on Remeron. She denies appetite concerns. 03/06/23:  Jonathon Alfaro presents today with depressed mood and mood congruent affect. She states that while anxiety and sleep are a \"bit better. \" She feels that if discharged home that she would be unable to remain safe (endorsing SI). She denies HI/denies AVH. No delusions voiced. Per chart review, medication compliant. Cooperative with care and eager for voluntary psychiatric admission. HISTORY OF PRESENTING COMPLAINT:  Bhupendra Bang is a 38 y.o. female admitted to this unit for management of alcohol and opiate withdrawal. She additionally voiced complaints of suicidal ideation secondary to reported worsening depression. She states her depression has been worsening since she feels that she \"needs a medication adjustment. \" She expressed interest in inpatient admission as she feels it would be the most effective way to achieve the quickest possible change in medications and subsequently, improved mood. In terms of symptoms, she endorses poor sleep, depressed mood, low energy, restlessness, persistent and generalized worry. She denies any SI/HI/AVH at time of assessment. She does not voice any delusions and denies any symptoms concerning for polo or hypomania. She is eager for medication changes to better target her reported symptoms.        PAST PSYCHIATRIC HISTORY:  Depression, anxiety    SUBSTANCE ABUSE HISTORY:  Social History     Substance and Sexual Activity   Drug Use No     Social History     Substance and Sexual Activity   Alcohol Use Yes Comment: Yimi scruggs yesterday 22     Social History     Tobacco Use   Smoking Status Former    Packs/day: 1.00    Years: 18.00    Pack years: 18.00    Types: Cigarettes   Smokeless Tobacco Current   Tobacco Comments    Vapes       PAST MEDICAL HISTORY:  Past Medical History:   Diagnosis Date    ADD (attention deficit disorder) 19-19yo    Treated with Adderall since dx. 2013 dosing 20mg AM and 10mg PM.  Trial/change to Vyvanse Nov-Dec 2015--not as effective. Gynecologic disorder     History of miscarriages. History of Mirena IUD placed on 4/17/15    HX OTHER MEDICAL      -BUFA baby    HX OTHER MEDICAL     HPV positive    Idiopathic vulvodynia 2014    L1 vertebral fracture (HCC) 2017    UVA imaging/admissoin: Mild acute two column compression fracture of L1 without evidence of retropulsion into the spinal canal.  Managed non-operatively. Migraines 2013    Neurological disorder     Pelvic pain in female 9/15/2014    2015 gyn laparoscopy/hysteroscopy with endometriosis worse right.     Psychiatric disorder     depression    Secondary dysmenorrhea 2014    With menorraghia    Seizures ([facility] [address]     never on meds--had appr 4-5 in a short amt of time and none since       VITALS:  Visit Vitals  /69 (BP 1 Location: Left upper arm)   Pulse 100   Temp 98.4 °F (36.9 °C)   Resp 16   Ht 5' 4\" (1.626 m)   Wt 108.9 kg (240 lb)   SpO2 95%   BMI 41.20 kg/m²       MEDICATIONS:    Current Facility-Administered Medications:     hydrOXYzine HCL (ATARAX) tablet 50 mg, 50 mg, Oral, Q6H PRN, Mona Nelson NP    gabapentin (NEURONTIN) capsule 600 mg, 600 mg, Oral, TID, Mona Nelson NP    cetirizine (ZYRTEC) tablet 10 mg, 10 mg, Oral, DAILY, Grupo Crawford MD, 10 mg at 23 2138    mirtazapine (REMERON) tablet 7.5 mg, 7.5 mg, Oral, QHS, Grupo Crawford MD, 7.5 mg at 23 7648    benzocaine-menthoL (CHLORASEPTIC MAX) lozenge 1 Lozenge, 1 Lozenge, Oral, Q2H PRN, Kristopher Darby PA-C, 1 Lozenge at 03/02/23 0210    butalbital-acetaminophen-caffeine (FIORICET, ESGIC) -40 mg per tablet 1 Tablet, 1 Tablet, Oral, Q6H PRN, Violeta Torres MD, 1 Tablet at 03/08/23 1019    nicotine (NICODERM CQ) 14 mg/24 hr patch 1 Patch, 1 Patch, TransDERmal, DAILY, Reasoner, Prakash Martines PA-C, 1 Patch at 03/08/23 6295    venlafaxine-SR (EFFEXOR-XR) capsule 225 mg, 225 mg, Oral, DAILY WITH BREAKFAST, Emanuel Marlow MD, 225 mg at 03/08/23 4687    propranoloL (INDERAL) tablet 40 mg, 40 mg, Oral, TID, Manjula Chamorro NP, 40 mg at 03/07/23 1540    ondansetron (ZOFRAN ODT) tablet 4 mg, 4 mg, Oral, Q8H PRN, Manjula Chamorro NP    ondansetron [facility]) injection 4 mg, 4 mg, IntraVENous, Q6H PRN, Manjula Chamorro NP, 4 mg at 02/28/23 0046    acetaminophen (TYLENOL) suppository 650 mg, 650 mg, Rectal, Q4H PRN, Manjula Chamorro NP    acetaminophen (TYLENOL) tablet 650 mg, 650 mg, Oral, Q4H PRN, Manjula Chamorro NP, 650 mg at 03/04/23 1043    therapeutic multivitamin (THERAGRAN) tablet 1 Tablet, 1 Tablet, Oral, DAILY, Manjula Chamorro NP, 1 Tablet at 03/08/23 7383    albuterol-ipratropium (DUO-NEB) 2.5 MG-0.5 MG/3 ML, 3 mL, Nebulization, Q4H PRN, Manjula Chamorro NP, 3 mL at 02/27/23 0413    aluminum-magnesium hydroxide (MAALOX) oral suspension 15 mL, 15 mL, Oral, QID PRN, Manjula Chamorro NP    senna-docusate (PERICOLACE) 8.6-50 mg per tablet 1 Tablet, 1 Tablet, Oral, BID PRN, Manjula Chamorro NP    polyethylene glycol (MIRALAX) packet 17 g, 17 g, Oral, DAILY PRN, Manjula Chamorro, AVNI    thiamine mononitrate (B-1) tablet 100 mg, 100 mg, Oral, DAILY, Marcelino, Devon, DO, 100 mg at 03/08/23 0828    enoxaparin (LOVENOX) injection 30 mg, 30 mg, SubCUTAneous, Q12H, Marcelino, Devon, DO, 30 mg at 03/08/23 1015    scopolamine (TRANSDERM-SCOP) 1 mg over 3 days 1 Patch, 1 Patch, TransDERmal, Q72H, Manjula Chamorro NP, 1 Patch at 03/03/23 1629    hydrALAZINE (APRESOLINE) 20 mg/mL injection 10 mg, 10 mg, IntraVENous, Q6H PRN, Karthik Irons, NP    metoprolol (LOPRESSOR) injection 2.5 mg, 2.5 mg, IntraVENous, Q4H PRN, Karthik Irons, NP    MENTAL STATUS EXAM:  Sensorium  oriented to time, place and person   Relations cooperative   Eye Contact appropriate   Appearance:  age appropriate and overweight   Speech:  normal pitch, normal volume, and non-pressured   Thought Process: logical   Thought Content free of delusions and free of hallucinations   Suicidal ideations Endorses suicidal ideation if discharged home   Mood:  anxious and depressed   Affect:  mood-congruent   Memory   adequate   Concentration:  adequate   Insight:  fair   Judgment:  fair        ASSESSMENT AND PLAN:  Epi Serna meets criteria for an unspecifed depressive disorder and an unspecified anxiety disorder    RECOMMENDATIONS:  Recommend voluntary admission to [facility], patient will require a rocky-psych bed due to having a boot (for ankle fracture) and utilizing a kneeling scooter for ambulation    Obtain TSH level to determine if patient has hypothyroidism, as hypothyroidism can affect mood and worsen preexisting depressive symptoms        Medication recommendations:  Increase PRN hydroxyzine to 50 mg PO q6h for anxiety  Increase gabapentin to 600 mg PO TID for anxiety    3/8/23: Continue with current care and previous recommendations.       Thank you for this consultation    Mau Gloria NP  3/8/2023

## 2023-03-09 LAB
ANION GAP SERPL CALC-SCNC: 3 MMOL/L (ref 5–15)
BASOPHILS # BLD: 0.1 K/UL (ref 0–0.1)
BASOPHILS NFR BLD: 1 % (ref 0–1)
BUN SERPL-MCNC: 15 MG/DL (ref 6–20)
BUN/CREAT SERPL: 23 (ref 12–20)
CALCIUM SERPL-MCNC: 8.8 MG/DL (ref 8.5–10.1)
CHLORIDE SERPL-SCNC: 109 MMOL/L (ref 97–108)
CO2 SERPL-SCNC: 24 MMOL/L (ref 21–32)
CREAT SERPL-MCNC: 0.66 MG/DL (ref 0.55–1.02)
DIFFERENTIAL METHOD BLD: ABNORMAL
EOSINOPHIL # BLD: 0.1 K/UL (ref 0–0.4)
EOSINOPHIL NFR BLD: 1 % (ref 0–7)
ERYTHROCYTE [DISTWIDTH] IN BLOOD BY AUTOMATED COUNT: 13.8 % (ref 11.5–14.5)
GLUCOSE SERPL-MCNC: 146 MG/DL (ref 65–100)
HCT VFR BLD AUTO: 38.6 % (ref 35–47)
HGB BLD-MCNC: 12.6 G/DL (ref 11.5–16)
IMM GRANULOCYTES # BLD AUTO: 0.1 K/UL (ref 0–0.04)
IMM GRANULOCYTES NFR BLD AUTO: 1 % (ref 0–0.5)
LYMPHOCYTES # BLD: 2.3 K/UL (ref 0.8–3.5)
LYMPHOCYTES NFR BLD: 30 % (ref 12–49)
MCH RBC QN AUTO: 29.8 PG (ref 26–34)
MCHC RBC AUTO-ENTMCNC: 32.6 G/DL (ref 30–36.5)
MCV RBC AUTO: 91.3 FL (ref 80–99)
MONOCYTES # BLD: 0.6 K/UL (ref 0–1)
MONOCYTES NFR BLD: 8 % (ref 5–13)
NEUTS SEG # BLD: 4.6 K/UL (ref 1.8–8)
NEUTS SEG NFR BLD: 59 % (ref 32–75)
NRBC # BLD: 0 K/UL (ref 0–0.01)
NRBC BLD-RTO: 0 PER 100 WBC
PLATELET # BLD AUTO: 340 K/UL (ref 150–400)
PMV BLD AUTO: 9.7 FL (ref 8.9–12.9)
POTASSIUM SERPL-SCNC: 4 MMOL/L (ref 3.5–5.1)
RBC # BLD AUTO: 4.23 M/UL (ref 3.8–5.2)
SODIUM SERPL-SCNC: 136 MMOL/L (ref 136–145)
WBC # BLD AUTO: 7.7 K/UL (ref 3.6–11)

## 2023-03-09 PROCEDURE — 85025 COMPLETE CBC W/AUTO DIFF WBC: CPT

## 2023-03-09 PROCEDURE — 80048 BASIC METABOLIC PNL TOTAL CA: CPT

## 2023-03-09 PROCEDURE — 74011250637 HC RX REV CODE- 250/637: Performed by: INTERNAL MEDICINE

## 2023-03-09 PROCEDURE — 74011250637 HC RX REV CODE- 250/637: Performed by: NURSE PRACTITIONER

## 2023-03-09 PROCEDURE — 65270000015 HC RM PRIVATE ONCOLOGY

## 2023-03-09 PROCEDURE — 74011250637 HC RX REV CODE- 250/637: Performed by: PHYSICIAN ASSISTANT

## 2023-03-09 PROCEDURE — 74011250637 HC RX REV CODE- 250/637: Performed by: STUDENT IN AN ORGANIZED HEALTH CARE EDUCATION/TRAINING PROGRAM

## 2023-03-09 PROCEDURE — 74011250636 HC RX REV CODE- 250/636: Performed by: STUDENT IN AN ORGANIZED HEALTH CARE EDUCATION/TRAINING PROGRAM

## 2023-03-09 PROCEDURE — 36415 COLL VENOUS BLD VENIPUNCTURE: CPT

## 2023-03-09 RX ORDER — BUTALBITAL, ACETAMINOPHEN AND CAFFEINE 50; 325; 40 MG/1; MG/1; MG/1
1 TABLET ORAL
Status: DISCONTINUED | OUTPATIENT
Start: 2023-03-09 | End: 2023-03-13 | Stop reason: HOSPADM

## 2023-03-09 RX ADMIN — THERA TABS 1 TABLET: TAB at 09:28

## 2023-03-09 RX ADMIN — CETIRIZINE HYDROCHLORIDE 10 MG: 10 TABLET, FILM COATED ORAL at 09:28

## 2023-03-09 RX ADMIN — GABAPENTIN 600 MG: 300 CAPSULE ORAL at 17:10

## 2023-03-09 RX ADMIN — ENOXAPARIN SODIUM 30 MG: 100 INJECTION SUBCUTANEOUS at 21:31

## 2023-03-09 RX ADMIN — BUTALBITAL, ACETAMINOPHEN, AND CAFFEINE 1 TABLET: 50; 325; 40 TABLET ORAL at 12:42

## 2023-03-09 RX ADMIN — VENLAFAXINE HYDROCHLORIDE 225 MG: 150 CAPSULE, EXTENDED RELEASE ORAL at 09:27

## 2023-03-09 RX ADMIN — BUTALBITAL, ACETAMINOPHEN, AND CAFFEINE 1 TABLET: 50; 325; 40 TABLET ORAL at 17:10

## 2023-03-09 RX ADMIN — BUTALBITAL, ACETAMINOPHEN, AND CAFFEINE 1 TABLET: 50; 325; 40 TABLET ORAL at 05:49

## 2023-03-09 RX ADMIN — THIAMINE HCL TAB 100 MG 100 MG: 100 TAB at 09:28

## 2023-03-09 RX ADMIN — GABAPENTIN 600 MG: 300 CAPSULE ORAL at 21:31

## 2023-03-09 RX ADMIN — PROPRANOLOL HYDROCHLORIDE 40 MG: 10 TABLET ORAL at 17:10

## 2023-03-09 RX ADMIN — BUTALBITAL, ACETAMINOPHEN, AND CAFFEINE 1 TABLET: 50; 325; 40 TABLET ORAL at 21:31

## 2023-03-09 RX ADMIN — ENOXAPARIN SODIUM 30 MG: 100 INJECTION SUBCUTANEOUS at 10:47

## 2023-03-09 RX ADMIN — MIRTAZAPINE 7.5 MG: 15 TABLET, FILM COATED ORAL at 21:32

## 2023-03-09 RX ADMIN — GABAPENTIN 600 MG: 300 CAPSULE ORAL at 09:27

## 2023-03-09 NOTE — PROGRESS NOTES
End of Shift Note    Bedside shift change report given to Deangelo Burden RN (oncoming nurse) by Che Barrett RN (offgoing nurse). Report included the following information SBAR, Kardex, Intake/Output, and MAR    Shift worked:  [id number]     Shift summary and any significant changes:     Patient complained of a headache during shift, provided 2 doses of PRN pain meds. All scheduled meds, and frequent rounding provided. Sitter at bedside.       Concerns for physician to address:       Zone phone for oncoming shift:              Che Barrett RN

## 2023-03-09 NOTE — PROGRESS NOTES
Hospitalist Progress Note    Subjective:   Daily Progress Note: 3/9/2023 8:06 AM    Hospital Course: Mr. Lj Jernigan is a 38 y.o. female who presented to the emergency room with suicidal ideations. Reported to emergency room physician that she was having suicidal thoughts and called the psychiatric physician to inform them that her medications were not working and was instructed to report to the emergency room. Patient observed resting on stretcher with eyes closed. Arouses when name is called. Expresses no SI or HI at present and no plan. Denies chest pain, shortness of breath, palpitations, dizziness or distress. Patient is diaphoretic otherwise no distress noted. Recently lost pet and started drinking vodka. Also states that she recently fractured her left ankle and became addicted to her pain medications. Subjective: Patient observed resting in bed with eyes closed. Arouses when name is called. A&Ox3. Denies complaints. No distress present. Denies SI/HI this am. States depression is somewhat improving.     Current Facility-Administered Medications   Medication Dose Route Frequency    hydrOXYzine HCL (ATARAX) tablet 50 mg  50 mg Oral Q6H PRN    gabapentin (NEURONTIN) capsule 600 mg  600 mg Oral TID    cetirizine (ZYRTEC) tablet 10 mg  10 mg Oral DAILY    mirtazapine (REMERON) tablet 7.5 mg  7.5 mg Oral QHS    benzocaine-menthoL (CHLORASEPTIC MAX) lozenge 1 Lozenge  1 Lozenge Oral Q2H PRN    butalbital-acetaminophen-caffeine (FIORICET, ESGIC) -40 mg per tablet 1 Tablet  1 Tablet Oral Q6H PRN    nicotine (NICODERM CQ) 14 mg/24 hr patch 1 Patch  1 Patch TransDERmal DAILY    venlafaxine-SR (EFFEXOR-XR) capsule 225 mg  225 mg Oral DAILY WITH BREAKFAST    propranoloL (INDERAL) tablet 40 mg  40 mg Oral TID    ondansetron (ZOFRAN ODT) tablet 4 mg  4 mg Oral Q8H PRN    ondansetron (ZOFRAN) injection 4 mg  4 mg IntraVENous Q6H PRN    acetaminophen (TYLENOL) suppository 650 mg  650 mg Rectal Q4H PRN acetaminophen (TYLENOL) tablet 650 mg  650 mg Oral Q4H PRN    therapeutic multivitamin (THERAGRAN) tablet 1 Tablet  1 Tablet Oral DAILY    albuterol-ipratropium (DUO-NEB) 2.5 MG-0.5 MG/3 ML  3 mL Nebulization Q4H PRN    aluminum-magnesium hydroxide (MAALOX) oral suspension 15 mL  15 mL Oral QID PRN    senna-docusate (PERICOLACE) 8.6-50 mg per tablet 1 Tablet  1 Tablet Oral BID PRN    polyethylene glycol (MIRALAX) packet 17 g  17 g Oral DAILY PRN    thiamine mononitrate (B-1) tablet 100 mg  100 mg Oral DAILY    enoxaparin (LOVENOX) injection 30 mg  30 mg SubCUTAneous Q12H    hydrALAZINE (APRESOLINE) 20 mg/mL injection 10 mg  10 mg IntraVENous Q6H PRN    metoprolol (LOPRESSOR) injection 2.5 mg  2.5 mg IntraVENous Q4H PRN        Review of Systems:  Review of Systems   Constitutional:  Positive for malaise/fatigue. HENT: Negative. Eyes: Negative. Respiratory: Negative. Cardiovascular: Negative. Gastrointestinal: Negative. Genitourinary: Negative. Musculoskeletal: Negative. Skin: Negative. Neurological:  Negative. Endo/Heme/Allergies: Negative. Psychiatric/Behavioral: Negative. Objective:     Visit Vitals  /75 (BP 1 Location: Right upper arm, BP Patient Position: At rest)   Pulse 88   Temp 98.2 °F (36.8 °C)   Resp 20   Ht 5' 4\" (1.626 m)   Wt 108.9 kg (240 lb)   LMP 2016   SpO2 95%   BMI 41.20 kg/m²    O2 Flow Rate (L/min): 3 l/min O2 Device: None (Room air)    Temp (24hrs), Av.5 °F (36.9 °C), Min:98.1 °F (36.7 °C), Max:99.1 °F (37.3 °C)      No intake/output data recorded. No intake/output data recorded. PHYSICAL EXAM:  Physical Exam  Vitals reviewed. HENT:      Head: Normocephalic and atraumatic. Right Ear: External ear normal.      Left Ear: External ear normal.      Nose: Nose normal.      Mouth/Throat:      Pharynx: Oropharynx is clear.    Eyes:      Conjunctiva/sclera: Conjunctivae normal.   Cardiovascular:      Rate and Rhythm: Normal rate and regular rhythm. Pulses: Normal pulses. Pulmonary:      Effort: Pulmonary effort is normal.      Breath sounds: Normal breath sounds. Abdominal:      General: Bowel sounds are normal.      Comments: Last reported bowel movement on 3/7/23. Musculoskeletal:         General: Normal range of motion. Cervical back: Normal range of motion. Skin:     General: Skin is warm and dry. Capillary Refill: Capillary refill takes 2 to 3 seconds. Neurological:      Mental Status: She is alert and oriented to person, place, and time. Psychiatric:      Comments: Depressed mood   Data Review    Recent Results (from the past 24 hour(s))   CBC WITH AUTOMATED DIFF    Collection Time: 03/09/23  6:00 AM   Result Value Ref Range    WBC 7.7 3.6 - 11.0 K/uL    RBC 4.23 3.80 - 5.20 M/uL    HGB 12.6 11.5 - 16.0 g/dL    HCT 38.6 35.0 - 47.0 %    MCV 91.3 80.0 - 99.0 FL    MCH 29.8 26.0 - 34.0 PG    MCHC 32.6 30.0 - 36.5 g/dL    RDW 13.8 11.5 - 14.5 %    PLATELET 581 443 - 177 K/uL    MPV 9.7 8.9 - 12.9 FL    NRBC 0.0 0  WBC    ABSOLUTE NRBC 0.00 0.00 - 0.01 K/uL    NEUTROPHILS 59 32 - 75 %    LYMPHOCYTES 30 12 - 49 %    MONOCYTES 8 5 - 13 %    EOSINOPHILS 1 0 - 7 %    BASOPHILS 1 0 - 1 %    IMMATURE GRANULOCYTES 1 (H) 0.0 - 0.5 %    ABS. NEUTROPHILS 4.6 1.8 - 8.0 K/UL    ABS. LYMPHOCYTES 2.3 0.8 - 3.5 K/UL    ABS. MONOCYTES 0.6 0.0 - 1.0 K/UL    ABS. EOSINOPHILS 0.1 0.0 - 0.4 K/UL    ABS. BASOPHILS 0.1 0.0 - 0.1 K/UL    ABS. IMM.  GRANS. 0.1 (H) 0.00 - 0.04 K/UL    DF AUTOMATED     METABOLIC PANEL, BASIC    Collection Time: 03/09/23  6:00 AM   Result Value Ref Range    Sodium 136 136 - 145 mmol/L    Potassium 4.0 3.5 - 5.1 mmol/L    Chloride 109 (H) 97 - 108 mmol/L    CO2 24 21 - 32 mmol/L    Anion gap 3 (L) 5 - 15 mmol/L    Glucose 146 (H) 65 - 100 mg/dL    BUN 15 6 - 20 MG/DL    Creatinine 0.66 0.55 - 1.02 MG/DL    BUN/Creatinine ratio 23 (H) 12 - 20      eGFR >60 >60 ml/min/1.73m2    Calcium 8.8 8.5 - 10.1 MG/DL XR CHEST PORT   Final Result   1. Interval development of pulmonary vascular congestion and mild pulmonary   edema. CTA CHEST W OR W WO CONT   Final Result   No acute process. XR CHEST PORT   Final Result   No acute process. Active Problems:    Alcohol withdrawal ([address] (2/25/2023)      Opioid withdrawal ([address] (2/25/2023)      Tachycardia (2/25/2023)        Assessment/Plan:        Suicidal Ideations/Depression        Tachycardia- resolved          Respiratory acidosis secondary to Ativan use - resolved  - no current plan or SI/HI expressed this am; expresses depression.  - etiology of tachycardia related to ETOH and Opioid Withdrawal  - CIWA, discontinued, out of range of withdrawal for ETOH  - continue prn atarax for anxiety. - multivitamin supplementation  -   Ammonia level mildly elevated at 38, H81 folic acid are within normal limits  -echocardiogram on 2/28/23 shows an Ef of 55%-60% with normal wall motion. No significant valvular disease or pericardial effusion.  - continue propranolol and effexor  - seizure and fall precautions  - suicide precautions  - psych recommended inpatient admission once medically stable, continue with one-to-one sitter  - patient had a sudden onset of respiratory failure acute respiratory acidosis in the setting of Ativan use for CIWA protocol, patient was evaluated by intensivist.  Patient did not require ICU level of care  -Ativan currently on hold, had received flumazenil to reverse Ativan effect  3/6 Patient was offered to go home with medications and outpatient follow up. Pt reported \"I don't want to go home and do something stupid\", repeat psych consult placed, will continue to wait for rajendra bed for psych due to scooter and crutches. - patient expresses no SI/HI on today. - continue hydroxyzine and gabapentin at current doses per psych recommendations. - psych to follow daily     2.  Mild leukocytosis- resolved    - negative influenza and covid    - chest xray shows no acute process    - CTA shows bibasilar atelectasis    - afebrile    - urine drug screen negative for barbiturates and opiates, negative for methadone    - urinalysis 2/25/23 positive for bacteria, negative for nitrites and leukocytes, patient asymptomatic.    - will monitor lab work        3. Acute respiratory failure- resolved    - CTA shows no pulmonary embolism. Bibasilar atelectasis noted. - chest xray shows no acute process    - keep 02 sats greater than 92%    - turn, cough and reposition    - incentive spirometry every 2 hours while awake    - prn nebulizer treatments     4. Hepatic steatosis- POA    - etiology related to ETOH use    - seen on CTA of chest     - avoid hepatoxic medications     - will monitor lab work     5. Fracture of Left Ankle- POA    - etiology related to prior fall    - well-approximated surgical scar noted to ankle. - elevate left lower extremity while at rest    - fall precautions    - Working on getting geriatric bed for scooter     6. Tobacco abuse  - Nicotine patch     7. Allergic rhinitis  - Zyrtec        Discharge disposition: Patient is medically stable for discharge. Awaiting inpatient psychiatric-geriatric bed. CM following for discharge planning.      Code status: Full  Prophylaxis: Lovenox     Plan of care discussed with: patient, nursing, CM  _______________________________________________________________    Mally Pinon NP

## 2023-03-09 NOTE — BSMART NOTE
BSMART Liaison Team Note     LOS:  12 days      Patient goal(s) for today: take medication as prescribed, communicate needs to staff in an appropriate manner, process reason for [place] admission and family support  [facility] Liaison team focus/goals: assess needs, provide education and support, engage in brief therapy session      Progress note: This writer met with patient face to face. Pt received laying in bed receiving her morning medication with sitter at bedside. Pt was alert and oriented x4. Affect appears brighter today but pt continues to report depression. Pt states she is \"not well anymore. \" Pt denied current SI/HI or [facility]. She reports noticing red flags recently about her depression. Pt shared that she has been having thoughts that lead to SI. Pt described them as passive SI. Pt does not trust being alone or safe with self. She states \"I do not trust my depression. \" This writer processed depression with patient. Described the various types with patient. Pt shared that she does not believe her stay on the medical floor as increased her depression. Pt shared if it had, she would have not stayed her to make it worse. Pt and this writer processed what an inpatient admission will do to help pt's current mental health status. Pt believes her medications need to be adjusted and would benefit from admission for safety. She reports sleeping well with new medication. Denies concerns with appetite. This writer asked pt if she feels supported by family. Pt shared how supportive they have been and processed conversation she had with her son. Pt reports telling her son that her Jovanna Hopes is sick. \" This writer and pt discussed various ways to approach her son and have these conversations with. Discussed importance of the right amount of information to share, while attempting to not stigmatize mental health. Pt was agreeable and receptive. She remains pleasant and cooperative.  Pt has a virtual therapy session with therapist today from [facility] she is looking forward to. At this time, pt denies additional concerns. She reports being independent in all her ADLs. Pt is not wearing a boot at this time while laying down but reports having PT to assist. Liaison to continue to follow. Spoke with Reji from [facility]. Notified her pt Is independent in [facility]. In order to admit to non-geriatric bed pt will need re consult with PT to determine use of wheelchair or scooter. Spoke with RN. She is aware and will consult PT. Barriers to Discharge: U placement     Outpatient provider(s):  RAI Merida @ [facility] Jeff Cano and Quinten Kanner at [facility] info/prescription coverage:  BLUE CROSS [place] HEALTH[place]ERS [place]     Diagnosis: Major Depressive Disorder, recurrent, severe without psychotic features; Alcohol Use Disorder     Plan: Please defer to the most recent psychiatric consult note for recommendations. Follow up Psych Consult placed? No  Psychiatrist updated?  No      Participating treatment team members: Gabbie Randle, MARCELO Plasencia, Supervisee in Social Work

## 2023-03-09 NOTE — PROGRESS NOTES
CM spoke with Bed Placement coordinator ([phone]), via telephone regarding psych placement updates. CM informed that psych physician would like for pt to work with therapist to determine if pt can use wheelchair vs knee scooter/crutches. CM informed that crutches and scooter could be used as weapon at facility vs wheelchair. CM will work with therapist to determine if she could use wheelchair at the time of d/c. CM will continue to follow.     MARCELO Joel, [address] [place] [address]

## 2023-03-10 LAB
ANION GAP SERPL CALC-SCNC: 5 MMOL/L (ref 5–15)
BASOPHILS # BLD: 0.1 K/UL (ref 0–0.1)
BASOPHILS NFR BLD: 1 % (ref 0–1)
BUN SERPL-MCNC: 15 MG/DL (ref 6–20)
BUN/CREAT SERPL: 19 (ref 12–20)
CALCIUM SERPL-MCNC: 8.8 MG/DL (ref 8.5–10.1)
CHLORIDE SERPL-SCNC: 105 MMOL/L (ref 97–108)
CO2 SERPL-SCNC: 25 MMOL/L (ref 21–32)
CREAT SERPL-MCNC: 0.77 MG/DL (ref 0.55–1.02)
DIFFERENTIAL METHOD BLD: ABNORMAL
EOSINOPHIL # BLD: 0.1 K/UL (ref 0–0.4)
EOSINOPHIL NFR BLD: 1 % (ref 0–7)
ERYTHROCYTE [DISTWIDTH] IN BLOOD BY AUTOMATED COUNT: 13.5 % (ref 11.5–14.5)
GLUCOSE SERPL-MCNC: 286 MG/DL (ref 65–100)
HCT VFR BLD AUTO: 37.9 % (ref 35–47)
HGB BLD-MCNC: 12.4 G/DL (ref 11.5–16)
IMM GRANULOCYTES # BLD AUTO: 0.1 K/UL (ref 0–0.04)
IMM GRANULOCYTES NFR BLD AUTO: 1 % (ref 0–0.5)
LYMPHOCYTES # BLD: 2.3 K/UL (ref 0.8–3.5)
LYMPHOCYTES NFR BLD: 27 % (ref 12–49)
MCH RBC QN AUTO: 29.7 PG (ref 26–34)
MCHC RBC AUTO-ENTMCNC: 32.7 G/DL (ref 30–36.5)
MCV RBC AUTO: 90.7 FL (ref 80–99)
MONOCYTES # BLD: 0.8 K/UL (ref 0–1)
MONOCYTES NFR BLD: 9 % (ref 5–13)
NEUTS SEG # BLD: 5.2 K/UL (ref 1.8–8)
NEUTS SEG NFR BLD: 61 % (ref 32–75)
NRBC # BLD: 0 K/UL (ref 0–0.01)
NRBC BLD-RTO: 0 PER 100 WBC
PLATELET # BLD AUTO: 386 K/UL (ref 150–400)
PMV BLD AUTO: 9.7 FL (ref 8.9–12.9)
POTASSIUM SERPL-SCNC: 4.1 MMOL/L (ref 3.5–5.1)
RBC # BLD AUTO: 4.18 M/UL (ref 3.8–5.2)
SODIUM SERPL-SCNC: 135 MMOL/L (ref 136–145)
WBC # BLD AUTO: 8.4 K/UL (ref 3.6–11)

## 2023-03-10 PROCEDURE — 74011250637 HC RX REV CODE- 250/637: Performed by: NURSE PRACTITIONER

## 2023-03-10 PROCEDURE — 74011250637 HC RX REV CODE- 250/637: Performed by: STUDENT IN AN ORGANIZED HEALTH CARE EDUCATION/TRAINING PROGRAM

## 2023-03-10 PROCEDURE — 80048 BASIC METABOLIC PNL TOTAL CA: CPT

## 2023-03-10 PROCEDURE — 85025 COMPLETE CBC W/AUTO DIFF WBC: CPT

## 2023-03-10 PROCEDURE — 74011250637 HC RX REV CODE- 250/637: Performed by: PHYSICIAN ASSISTANT

## 2023-03-10 PROCEDURE — 74011250636 HC RX REV CODE- 250/636: Performed by: STUDENT IN AN ORGANIZED HEALTH CARE EDUCATION/TRAINING PROGRAM

## 2023-03-10 PROCEDURE — 36415 COLL VENOUS BLD VENIPUNCTURE: CPT

## 2023-03-10 PROCEDURE — 74011250637 HC RX REV CODE- 250/637: Performed by: INTERNAL MEDICINE

## 2023-03-10 PROCEDURE — 65270000015 HC RM PRIVATE ONCOLOGY

## 2023-03-10 RX ORDER — FLUTICASONE PROPIONATE 50 MCG
2 SPRAY, SUSPENSION (ML) NASAL DAILY
Status: DISCONTINUED | OUTPATIENT
Start: 2023-03-11 | End: 2023-03-13 | Stop reason: HOSPADM

## 2023-03-10 RX ADMIN — PROPRANOLOL HYDROCHLORIDE 40 MG: 10 TABLET ORAL at 21:58

## 2023-03-10 RX ADMIN — ENOXAPARIN SODIUM 30 MG: 100 INJECTION SUBCUTANEOUS at 21:59

## 2023-03-10 RX ADMIN — THERA TABS 1 TABLET: TAB at 10:14

## 2023-03-10 RX ADMIN — GABAPENTIN 600 MG: 300 CAPSULE ORAL at 10:14

## 2023-03-10 RX ADMIN — GABAPENTIN 600 MG: 300 CAPSULE ORAL at 18:03

## 2023-03-10 RX ADMIN — CETIRIZINE HYDROCHLORIDE 10 MG: 10 TABLET, FILM COATED ORAL at 10:14

## 2023-03-10 RX ADMIN — PROPRANOLOL HYDROCHLORIDE 40 MG: 10 TABLET ORAL at 18:03

## 2023-03-10 RX ADMIN — THIAMINE HCL TAB 100 MG 100 MG: 100 TAB at 10:14

## 2023-03-10 RX ADMIN — GABAPENTIN 600 MG: 300 CAPSULE ORAL at 21:59

## 2023-03-10 RX ADMIN — BUTALBITAL, ACETAMINOPHEN, AND CAFFEINE 1 TABLET: 50; 325; 40 TABLET ORAL at 03:01

## 2023-03-10 RX ADMIN — ENOXAPARIN SODIUM 30 MG: 100 INJECTION SUBCUTANEOUS at 10:13

## 2023-03-10 RX ADMIN — MIRTAZAPINE 7.5 MG: 15 TABLET, FILM COATED ORAL at 21:58

## 2023-03-10 RX ADMIN — BUTALBITAL, ACETAMINOPHEN, AND CAFFEINE 1 TABLET: 50; 325; 40 TABLET ORAL at 08:13

## 2023-03-10 RX ADMIN — VENLAFAXINE HYDROCHLORIDE 225 MG: 150 CAPSULE, EXTENDED RELEASE ORAL at 10:14

## 2023-03-10 RX ADMIN — BUTALBITAL, ACETAMINOPHEN, AND CAFFEINE 1 TABLET: 50; 325; 40 TABLET ORAL at 13:45

## 2023-03-10 RX ADMIN — BUTALBITAL, ACETAMINOPHEN, AND CAFFEINE 1 TABLET: 50; 325; 40 TABLET ORAL at 18:03

## 2023-03-10 RX ADMIN — BUTALBITAL, ACETAMINOPHEN, AND CAFFEINE 1 TABLET: 50; 325; 40 TABLET ORAL at 21:58

## 2023-03-10 RX ADMIN — HYDROXYZINE HYDROCHLORIDE 50 MG: 25 TABLET ORAL at 13:45

## 2023-03-10 RX ADMIN — HYDROXYZINE HYDROCHLORIDE 50 MG: 25 TABLET ORAL at 21:58

## 2023-03-10 NOTE — BSMART NOTE
BSMART Liaison Team Note     LOS:  13     Patient goal(s) for today: Take medications as prescribed. Communicate needs to staff an appropriate manner. Utilize and review effective coping skills. BSMART Liaison team focus/goals: Assess needs. Provide support and education. Coordinate care. Progress note: This writer and liaison SIMON met face to face. Pt received asleep in bed upon arrival. She readily awakens to verbal prompting. She presents with generally euthymic mood and affect, but she reports ongoing depression and anxiety that she feels is still unmanaged. Pt reports last SI 2 days ago as \"wishing she would not wake up. \" She denies any SI/HI/AVH today or at present. She reports awakening at 3 AM despite the remeron to headaches, and tells this writer that she had headache today. Believes headaches my be related to sinuses. Pt continues to not trust herself if discharged home. She is concerned b/c while she may have a good support system, they can not be with her all the time. She will be fine 1 day and then have SI the next. She expresses concern about very low drops in her mood during moments of SI, like she is \"numb. \" She expresses concern that she has to also fight urges to drink, to use pills, especially pain pills. She was able to obtain a year of sobriety over the past year due to a year of treatment at a [facility] rehabilitation [place] up until last year. She received a DWI for driving under the influence of morphine, and she doesn't have a vehicle at this time due to her license being suspended. She spends a lot of time home alone. She is able to francois places, and her grandfather takes her to [employer] activities. Pt awaiting PT consult with regard to her healing left ankle. Pt continues to want to pursue inpatient psychiatric admission, and she is open to a bed search out of the area as long as she can receive transportation home upon d/c.  Pt reports excitement that she was able to get to the bathroom yesterday w/o crutches, and she was able to put some wt on the ankle. Pt reports that she will start meeting with her therapist at [facility] weekly now. Pt describes using coping skills to avoid depressive and anxiety related symptoms to escalate. Coping skills include: drawing and coloring, looking at photographs of her son and her achievements, praying, reading the bible, and socializing. She does express issues with racing, scattered thinking, loose associations, and her thoughts being \"all over the place. \" She feels like her depressive sx's today are an 8 or 9 out of 10, w/ 10 being the highest today. Anxiety rated at a 7 or 8, also w/ 10 being the highest.     Pt contends that her symptoms are not related to coping skills, but to her medications. She feels like her Effexor is at it's maximum dosage, but it's no longer effective. Did work for a time. She does report hx of being prescribed latuda, abilify, wellbutrin, and prozac. Hx of ADHD as a child. Family hx of bipolar disorder. Barriers to Discharge: [facility] placement. Guns in the home: no     Outpatient providers: RAI Merida @ [facility] Jeff Cano and Linda Smith at [facility] info/prescription coverage:  [place] [place] [employer]ICA[employer]/[place] [employer] [place]     Diagnosis: Major Depressive Disorder, recurrent, severe without psychotic features; Alcohol Use Disorder     Plan: Please defer to the most recent psychiatric consult note for recommendations. Follow up Psych Consult placed? No  Psychiatrist updated? No          Participating treatment team members: Shiva Tee, liaisons Jensen Walter and Danna Mejias.

## 2023-03-10 NOTE — PROGRESS NOTES
Comprehensive Nutrition Assessment    Type and Reason for Visit: Reassess    Nutrition Recommendations/Plan:   Continue regular diet with safety tray. Please document % meals and supplements consumed in flowsheet I/O's under intake. Malnutrition Assessment:  Malnutrition Status:  No malnutrition (03/03/23 1049)      Nutrition Assessment:    3/10: Chart reviewed; med noted for alcohol withdrawal on admission with suicide ideation. RD visited with pt at bedside, reports overall good appetite and selecting meals from menu. No new nutrition needs identified at this time. BG elevated as of today - 286 mg/dl. Patient Vitals for the past 168 hrs:   % Diet Eaten   03/06/23 0758 76 - 100%     Last Weight Metric  Weight Loss Metrics 2/28/2023 2/22/2023 2/15/2023 1/18/2023 1/3/2023 12/24/2022 12/8/2022   Today's Wt 240 lb 240 lb 267 lb 267 lb 267 lb 267 lb 10.2 oz 267 lb 10.2 oz   BMI 41.2 kg/m2 41.2 kg/m2 45.83 kg/m2 45.83 kg/m2 45.83 kg/m2 45.94 kg/m2 45.94 kg/m2   Some encounter information is confidential and restricted. Go to Review Flowsheets activity to see all data. Nutrition Related Findings:    BM: 3/5; Labs: ; Meds: Lovenox, Remeron, MVI, Thiamine Wound Type: None    Current Nutrition Intake & Therapies:  Average Meal Intake: 51-75%  Average Supplement Intake: None ordered  ADULT DIET Regular; Safety Tray; Safety Tray (Disposables)    Anthropometric Measures:  Height: 5' 4\" (162.6 cm)  Ideal Body Weight (IBW): 120 lbs (55 kg)     Current Body Wt:  108.9 kg (240 lb), 200 % IBW. Not specified  Current BMI (kg/m2): 41.2        Weight Adjustment: No adjustment                 BMI Category: Obese class 3 (BMI 40.0 or greater)    Estimated Daily Nutrient Needs:  Energy Requirements Based On: Formula  Weight Used for Energy Requirements: Current  Energy (kcal/day): 1770 kcals (BMR x 1. 3AF - 500 for wt loss)  Weight Used for Protein Requirements: Current  Protein (g/day): 87g (0.8g/kg)  Method Used for Fluid Requirements: 1 ml/kcal  Fluid (ml/day): 1800mL    Nutrition Diagnosis:   No nutrition diagnosis at this time     Nutrition Interventions:   Food and/or Nutrient Delivery: Continue current diet  Nutrition Education/Counseling: No recommendations at this time  Coordination of Nutrition Care: Continue to monitor while inpatient       Goals:     Goals: PO intake 75% or greater, by next RD assessment       Nutrition Monitoring and Evaluation:   Behavioral-Environmental Outcomes: None identified  Food/Nutrient Intake Outcomes: Food and nutrient intake  Physical Signs/Symptoms Outcomes: Biochemical data, GI status, Weight, Fluid status or edema    Discharge Planning:    Continue current diet    Bhargavi Almanzar RD  Contact:

## 2023-03-10 NOTE — PROGRESS NOTES
End of Shift Note    Bedside shift change report given to Yonas Marquez RN (oncoming nurse) by Megan Jeffrey RN (offgoing nurse). Report included the following information SBAR, Kardex, Intake/Output, and MAR    Shift worked:  [id number]     Shift summary and any significant changes:     Patient complained of headache through shift, provided 2 doses of PRN pain meds. All scheduled meds, pt education and frequent rounding provided. Sitter at bedside. Pt ambulated with crutches and 1 assist to the bathroom.     Concerns for physician to address:       Zone phone for oncoming shift:              Megan Jeffrey RN

## 2023-03-10 NOTE — PROGRESS NOTES
Hospitalist Progress Note    Subjective:   Daily Progress Note: 3/10/2023 3:16 PM    Hospital Course:Mr. Bhupendra Bang is a 38 y.o. female who presented to the emergency room with suicidal ideations. Reported to emergency room physician that she was having suicidal thoughts and called the psychiatric physician to inform them that her medications were not working and was instructed to report to the emergency room. Patient observed resting on stretcher with eyes closed. Arouses when name is called. Expresses no SI or HI at present and no plan. Denies chest pain, shortness of breath, palpitations, dizziness or distress. Patient is diaphoretic otherwise no distress noted. Recently lost pet and started drinking vodka. Also states that she recently fractured her left ankle and became addicted to her pain medications. Subjective: Patient observed sitting up in bed with eyes opened. Complains of headache. Denies additional complaints. No acute distress noted.     Current Facility-Administered Medications   Medication Dose Route Frequency    butalbital-acetaminophen-caffeine (FIORICET, ESGIC) -40 mg per tablet 1 Tablet  1 Tablet Oral Q4H PRN    hydrOXYzine HCL (ATARAX) tablet 50 mg  50 mg Oral Q6H PRN    gabapentin (NEURONTIN) capsule 600 mg  600 mg Oral TID    cetirizine (ZYRTEC) tablet 10 mg  10 mg Oral DAILY    mirtazapine (REMERON) tablet 7.5 mg  7.5 mg Oral QHS    benzocaine-menthoL (CHLORASEPTIC MAX) lozenge 1 Lozenge  1 Lozenge Oral Q2H PRN    nicotine (NICODERM CQ) 14 mg/24 hr patch 1 Patch  1 Patch TransDERmal DAILY    venlafaxine-SR (EFFEXOR-XR) capsule 225 mg  225 mg Oral DAILY WITH BREAKFAST    propranoloL (INDERAL) tablet 40 mg  40 mg Oral TID    ondansetron (ZOFRAN ODT) tablet 4 mg  4 mg Oral Q8H PRN    ondansetron (ZOFRAN) injection 4 mg  4 mg IntraVENous Q6H PRN    acetaminophen (TYLENOL) suppository 650 mg  650 mg Rectal Q4H PRN    acetaminophen (TYLENOL) tablet 650 mg  650 mg Oral Q4H PRN therapeutic multivitamin (THERAGRAN) tablet 1 Tablet  1 Tablet Oral DAILY    albuterol-ipratropium (DUO-NEB) 2.5 MG-0.5 MG/3 ML  3 mL Nebulization Q4H PRN    aluminum-magnesium hydroxide (MAALOX) oral suspension 15 mL  15 mL Oral QID PRN    senna-docusate (PERICOLACE) 8.6-50 mg per tablet 1 Tablet  1 Tablet Oral BID PRN    polyethylene glycol (MIRALAX) packet 17 g  17 g Oral DAILY PRN    thiamine mononitrate (B-1) tablet 100 mg  100 mg Oral DAILY    enoxaparin (LOVENOX) injection 30 mg  30 mg SubCUTAneous Q12H    hydrALAZINE (APRESOLINE) 20 mg/mL injection 10 mg  10 mg IntraVENous Q6H PRN    metoprolol (LOPRESSOR) injection 2.5 mg  2.5 mg IntraVENous Q4H PRN        Review of Systems:  Review of Systems   Constitutional:  Positive for malaise/fatigue. HENT: Negative. Eyes: Negative. Respiratory: Negative. Cardiovascular: Negative. Gastrointestinal: Negative. Genitourinary: Negative. Musculoskeletal: Negative. Skin: Negative. Neurological:  Negative. Endo/Heme/Allergies: Negative. Psychiatric/Behavioral: Negative. Objective:     Visit Vitals  /82 (BP 1 Location: Left upper arm, BP Patient Position: Sitting)   Pulse (!) 110   Temp 97.2 °F (36.2 °C)   Resp 17   Ht 5' 4\" (1.626 m)   Wt 108.9 kg (240 lb)   LMP 2016   SpO2 96%   BMI 41.20 kg/m²    O2 Flow Rate (L/min): 3 l/min O2 Device: None (Room air)    Temp (24hrs), Av.7 °F (36.5 °C), Min:97.2 °F (36.2 °C), Max:98.1 °F (36.7 °C)      No intake/output data recorded. No intake/output data recorded. PHYSICAL EXAM:  Physical Exam  Vitals reviewed. HENT:      Head: Normocephalic and atraumatic. Right Ear: External ear normal.      Left Ear: External ear normal.      Nose: Nose normal.      Mouth/Throat:      Pharynx: Oropharynx is clear. Eyes:      Conjunctiva/sclera: Conjunctivae normal.   Cardiovascular:      Rate and Rhythm: Normal rate and regular rhythm. Pulses: Normal pulses.    Pulmonary: Effort: Pulmonary effort is normal.      Breath sounds: Normal breath sounds. Abdominal:      General: Bowel sounds are normal.      Comments: Last reported bowel movement on 3/9/23. Musculoskeletal:         General: Normal range of motion. Cervical back: Normal range of motion. Skin:     General: Skin is warm and dry. Capillary Refill: Capillary refill takes 2 to 3 seconds. Neurological:      Mental Status: She is alert and oriented to person, place, and time. Psychiatric:      Comments: Depressed mood   Data Review    Recent Results (from the past 24 hour(s))   CBC WITH AUTOMATED DIFF    Collection Time: 03/10/23  4:18 AM   Result Value Ref Range    WBC 8.4 3.6 - 11.0 K/uL    RBC 4.18 3.80 - 5.20 M/uL    HGB 12.4 11.5 - 16.0 g/dL    HCT 37.9 35.0 - 47.0 %    MCV 90.7 80.0 - 99.0 FL    MCH 29.7 26.0 - 34.0 PG    MCHC 32.7 30.0 - 36.5 g/dL    RDW 13.5 11.5 - 14.5 %    PLATELET 515 031 - 303 K/uL    MPV 9.7 8.9 - 12.9 FL    NRBC 0.0 0  WBC    ABSOLUTE NRBC 0.00 0.00 - 0.01 K/uL    NEUTROPHILS 61 32 - 75 %    LYMPHOCYTES 27 12 - 49 %    MONOCYTES 9 5 - 13 %    EOSINOPHILS 1 0 - 7 %    BASOPHILS 1 0 - 1 %    IMMATURE GRANULOCYTES 1 (H) 0.0 - 0.5 %    ABS. NEUTROPHILS 5.2 1.8 - 8.0 K/UL    ABS. LYMPHOCYTES 2.3 0.8 - 3.5 K/UL    ABS. MONOCYTES 0.8 0.0 - 1.0 K/UL    ABS. EOSINOPHILS 0.1 0.0 - 0.4 K/UL    ABS. BASOPHILS 0.1 0.0 - 0.1 K/UL    ABS. IMM. GRANS. 0.1 (H) 0.00 - 0.04 K/UL    DF AUTOMATED     METABOLIC PANEL, BASIC    Collection Time: 03/10/23  4:18 AM   Result Value Ref Range    Sodium 135 (L) 136 - 145 mmol/L    Potassium 4.1 3.5 - 5.1 mmol/L    Chloride 105 97 - 108 mmol/L    CO2 25 21 - 32 mmol/L    Anion gap 5 5 - 15 mmol/L    Glucose 286 (H) 65 - 100 mg/dL    BUN 15 6 - 20 MG/DL    Creatinine 0.77 0.55 - 1.02 MG/DL    BUN/Creatinine ratio 19 12 - 20      eGFR >60 >60 ml/min/1.73m2    Calcium 8.8 8.5 - 10.1 MG/DL       XR CHEST PORT   Final Result   1.  Interval development of pulmonary vascular congestion and mild pulmonary   edema. CTA CHEST W OR W WO CONT   Final Result   No acute process. XR CHEST PORT   Final Result   No acute process. Active Problems:    Alcohol withdrawal ([address] (2/25/2023)      Opioid withdrawal ([facility] [address] (2/25/2023)      Tachycardia (2/25/2023)        Assessment/Plan:   Suicidal Ideations/Depression        Tachycardia- waxes and weans          Respiratory acidosis secondary to Ativan use - resolved  - no current plan or SI/HI expressed this am; expresses depression.  - etiology of tachycardia related to ETOH and Opioid Withdrawal  - CIWA, discontinued, out of range of withdrawal for ETOH  - continue prn atarax for anxiety. - multivitamin supplementation  -   Ammonia level mildly elevated at 38, E66 folic acid are within normal limits  -echocardiogram on 2/28/23 shows an Ef of 55%-60% with normal wall motion. No significant valvular disease or pericardial effusion.  - continue propranolol and effexor  - psych recommended inpatient admission once medically stable, continue with one-to-one sitter  - patient had a sudden onset of respiratory failure acute respiratory acidosis in the setting of Ativan use for CIWA protocol, patient was evaluated by intensivist.  Patient did not require ICU level of care  -Ativan currently on hold, had received flumazenil to reverse Ativan effect  3/6 Patient was offered to go home with medications and outpatient follow up. Pt reported \"I don't want to go home and do something stupid\", repeat psych consult placed, will continue to wait for rajendra bed for psych due to scooter and crutches. - patient expresses no SI/HI on today. - continue hydroxyzine and gabapentin at current doses per psych recommendations. - seizure and fall precautions  - suicide precautions  - 1:1 sitter  - psych to follow daily     2.  Mild leukocytosis- resolved    - negative influenza and covid    - chest xray shows no acute process    - CTA shows bibasilar atelectasis    - afebrile    - urine drug screen negative for barbiturates and opiates, negative for methadone    - urinalysis 2/25/23 positive for bacteria, negative for nitrites and leukocytes, patient asymptomatic.    - will monitor lab work         3. Acute respiratory failure- resolved    - CTA shows no pulmonary embolism. Bibasilar atelectasis noted. - chest xray shows no acute process    - keep 02 sats greater than 92%    - turn, cough and reposition    - incentive spirometry every 2 hours while awake    - prn nebulizer treatments     4. Hepatic steatosis- POA    - etiology related to ETOH use    - seen on CTA of chest     - avoid hepatoxic medications     - will monitor lab work     5. Fracture of Left Ankle- POA    - etiology related to prior fall    - well-approximated surgical scar noted to ankle. - elevate left lower extremity while at rest    - fall precautions    - Working on getting geriatric bed for scooter     6. Tobacco abuse  - Nicotine patch     7. Allergic rhinitis  - Zyrtec        Discharge disposition: Patient is medically stable for discharge. Awaiting inpatient psychiatric-geriatric bed. CM following for discharge planning.      Code status: Full  Prophylaxis: Lovenox     Plan of care discussed with: patient, nursing, CM        _______________________________________________________________    Doyle Upton NP

## 2023-03-11 PROCEDURE — 74011250636 HC RX REV CODE- 250/636: Performed by: STUDENT IN AN ORGANIZED HEALTH CARE EDUCATION/TRAINING PROGRAM

## 2023-03-11 PROCEDURE — 74011250637 HC RX REV CODE- 250/637: Performed by: STUDENT IN AN ORGANIZED HEALTH CARE EDUCATION/TRAINING PROGRAM

## 2023-03-11 PROCEDURE — 65270000015 HC RM PRIVATE ONCOLOGY

## 2023-03-11 PROCEDURE — 74011250637 HC RX REV CODE- 250/637: Performed by: NURSE PRACTITIONER

## 2023-03-11 PROCEDURE — 74011250637 HC RX REV CODE- 250/637: Performed by: PHYSICIAN ASSISTANT

## 2023-03-11 PROCEDURE — 74011250637 HC RX REV CODE- 250/637: Performed by: INTERNAL MEDICINE

## 2023-03-11 RX ADMIN — BUTALBITAL, ACETAMINOPHEN, AND CAFFEINE 1 TABLET: 50; 325; 40 TABLET ORAL at 21:17

## 2023-03-11 RX ADMIN — PROPRANOLOL HYDROCHLORIDE 40 MG: 10 TABLET ORAL at 21:16

## 2023-03-11 RX ADMIN — BUTALBITAL, ACETAMINOPHEN, AND CAFFEINE 1 TABLET: 50; 325; 40 TABLET ORAL at 17:57

## 2023-03-11 RX ADMIN — HYDROXYZINE HYDROCHLORIDE 50 MG: 25 TABLET ORAL at 19:58

## 2023-03-11 RX ADMIN — VENLAFAXINE HYDROCHLORIDE 225 MG: 150 CAPSULE, EXTENDED RELEASE ORAL at 08:48

## 2023-03-11 RX ADMIN — GABAPENTIN 600 MG: 300 CAPSULE ORAL at 21:17

## 2023-03-11 RX ADMIN — GABAPENTIN 600 MG: 300 CAPSULE ORAL at 17:57

## 2023-03-11 RX ADMIN — BUTALBITAL, ACETAMINOPHEN, AND CAFFEINE 1 TABLET: 50; 325; 40 TABLET ORAL at 12:52

## 2023-03-11 RX ADMIN — BUTALBITAL, ACETAMINOPHEN, AND CAFFEINE 1 TABLET: 50; 325; 40 TABLET ORAL at 08:48

## 2023-03-11 RX ADMIN — CETIRIZINE HYDROCHLORIDE 10 MG: 10 TABLET, FILM COATED ORAL at 08:49

## 2023-03-11 RX ADMIN — ENOXAPARIN SODIUM 30 MG: 100 INJECTION SUBCUTANEOUS at 10:54

## 2023-03-11 RX ADMIN — PROPRANOLOL HYDROCHLORIDE 40 MG: 10 TABLET ORAL at 17:58

## 2023-03-11 RX ADMIN — MIRTAZAPINE 7.5 MG: 15 TABLET, FILM COATED ORAL at 21:17

## 2023-03-11 RX ADMIN — BUTALBITAL, ACETAMINOPHEN, AND CAFFEINE 1 TABLET: 50; 325; 40 TABLET ORAL at 04:07

## 2023-03-11 RX ADMIN — THIAMINE HCL TAB 100 MG 100 MG: 100 TAB at 08:49

## 2023-03-11 RX ADMIN — FLUTICASONE PROPIONATE 2 SPRAY: 50 SPRAY, METERED NASAL at 10:54

## 2023-03-11 RX ADMIN — THERA TABS 1 TABLET: TAB at 08:49

## 2023-03-11 RX ADMIN — ENOXAPARIN SODIUM 30 MG: 100 INJECTION SUBCUTANEOUS at 21:17

## 2023-03-11 RX ADMIN — GABAPENTIN 600 MG: 300 CAPSULE ORAL at 08:49

## 2023-03-11 RX ADMIN — ACETAMINOPHEN 325MG 650 MG: 325 TABLET ORAL at 06:24

## 2023-03-11 NOTE — PROGRESS NOTES
End of Shift Note    Bedside shift change report given to Dutch Rodrigues RN (oncoming nurse) by Korina Oswald, Student RN (offgoing nurse). Report included the following information SBAR, Kardex, and MAR    Shift worked:  [id number]     Shift summary and any significant changes:    Patient A/Ox4, x1 assist to bathroom due to LLE, continent. Patient has 1x1 sitter at bedside due to suicide precautions. Patient has no PIV access - MD aware. All scheduled medications given in addition to the following PRN medications: Atarax and Fioricet x2  and Tylenol x1 (for headache). Concerns for physician to address: N/A     Zone phone for oncoming shift:  N/A     Activity:  Activity Level: Up with Assistance  Number times ambulated in hallways past shift: 0  Number of times OOB to chair past shift: 2    Cardiac:   Cardiac Monitoring: No      Cardiac Rhythm: Sinus Rhythm    Access:  Current line(s): no access     Genitourinary:   Urinary status: voiding    Respiratory:   O2 Device: None (Room air)  Chronic home O2 use?: NO  Incentive spirometer at bedside: NO       GI:  Last Bowel Movement Date: 03/05/23  Current diet:  ADULT DIET Regular; Safety Tray; Safety Tray (Disposables)  Passing flatus: YES  Tolerating current diet: YES       Pain Management:   Patient states pain is manageable on current regimen: YES    Skin:  Elijah Score: 22  Interventions: increase time out of bed    Patient Safety:  Fall Score:  Total Score: 4  Interventions: bed/chair alarm, assistive device (walker, cane, etc), and sitter at bedside   High Fall Risk: Yes    Length of Stay:  Expected LOS: - - -  Actual LOS: [address]

## 2023-03-11 NOTE — CONSULTS
Chief Complaint:\"They are just waiting for a bed for me\"    Length of Stay: 14 Days    Interval History:Patient report sthat she still feels like her medications are not working. When asked why she felt this way, she reports having a lack of guido and sleeping for a large portion of the day and night. In addition, she requests to be prescribed suboxone due to an \"opiate addiction\" from her ankle injury. She states that she broke her ankle late last year and had surgery in December. She reports using prescribed opiates from Dec-  and she feels that she is now addicted-although she denies any use since January. Its a bit difficult to follow her as there are inconsistencies and she is not very forthcoming with information. Substance hx: denies any history other than opiate pills prescribed to her    Social hx: lives in [facility]/ grandparents who are supportive. 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 Psychiatric hx: previous inpt stays last yr in march, may and July, she denies hx of self harm and denies a hx of suicide attempts    Past Medical History:  Past Medical History:   Diagnosis Date    ADD (attention deficit disorder) 19-19yo    Treated with Adderall since dx. 2013 dosing 20mg AM and 10mg PM.  Trial/change to Vyvanse Nov-Dec 2015--not as effective. Gynecologic disorder     History of miscarriages. History of Mirena IUD placed on 4/17/15    HX OTHER MEDICAL      -BUFA baby    HX OTHER MEDICAL     HPV positive    Idiopathic vulvodynia 2014    L1 vertebral fracture (HCC) 2017    [facility] imaging/admissoin: Mild acute two column compression fracture of L1 without evidence of retropulsion into the spinal canal.  Managed non-operatively.     Migraines 2013    Neurological disorder     Pelvic pain in female 9/15/2014    2015 gyn laparoscopy/hysteroscopy with endometriosis worse right.    Psychiatric disorder     depression    Secondary dysmenorrhea 8/12/2014    With menorraghia    Seizures ([address] 2008    never on meds--had appr 4-5 in a short amt of time and none since           Labs:  Lab Results   Component Value Date/Time    WBC 8.4 03/10/2023 04:18 AM    Hemoglobin (POC) 14.3 04/04/2016 07:26 AM    HGB 12.4 03/10/2023 04:18 AM    Hematocrit (POC) 42 04/04/2016 07:26 AM    HCT 37.9 03/10/2023 04:18 AM    PLATELET 280 83/71/7907 04:18 AM    MCV 90.7 03/10/2023 04:18 AM    Hgb, External 11.6 05/03/2012 12:00 AM    Hct, External 33.4 05/03/2012 12:00 AM    Platelet cnt., External 332K 05/03/2012 12:00 AM      Lab Results   Component Value Date/Time    Sodium 135 (L) 03/10/2023 04:18 AM    Potassium 4.1 03/10/2023 04:18 AM    Chloride 105 03/10/2023 04:18 AM    CO2 25 03/10/2023 04:18 AM    Anion gap 5 03/10/2023 04:18 AM    Glucose 286 (H) 03/10/2023 04:18 AM    BUN 15 03/10/2023 04:18 AM    Creatinine 0.77 03/10/2023 04:18 AM    BUN/Creatinine ratio 19 03/10/2023 04:18 AM    GFR est AA >60 07/21/2022 10:38 AM    GFR est non-AA >60 07/21/2022 10:38 AM    Calcium 8.8 03/10/2023 04:18 AM    Bilirubin, total 0.6 09/08/2022 12:00 AM    Alk.  phosphatase 88 09/08/2022 12:00 AM    Protein, total 6.6 09/08/2022 12:00 AM    Albumin 3.2 (L) 03/05/2023 03:18 AM    Globulin 3.1 07/21/2022 10:38 AM    A-G Ratio 1.1 07/21/2022 10:38 AM    ALT (SGPT) 27 09/08/2022 12:00 AM      Vitals:    03/10/23 0819 03/10/23 1803 03/10/23 1909 03/11/23 0756   BP: 132/82 (!) 141/82 116/68 111/66   Pulse: (!) 110 (!) 113 98 81   Resp: 17  20 16   Temp: 97.2 °F (36.2 °C)  98.4 °F (36.9 °C) 98.4 °F (36.9 °C)   SpO2: 96%  97% 95%   Weight:       Height:       LMP: 03/14/2016         Current Facility-Administered Medications   Medication Dose Route Frequency Provider Last Rate Last Admin    fluticasone propionate (FLONASE) 50 mcg/actuation nasal spray 2 Spray  2 Spray Both Nostrils DAILY Lilliana Barboza NP   2 Spray at 03/11/23 1054    butalbital-acetaminophen-caffeine (FIORICET, ESGIC) -40 mg per tablet 1 Tablet  1 Tablet Oral Q4H PRN Eligah Brace, NP   1 Tablet at 03/11/23 1252    hydrOXYzine HCL (ATARAX) tablet 50 mg  50 mg Oral Q6H PRN Eligah Brace, NP   50 mg at 03/10/23 2158    gabapentin (NEURONTIN) capsule 600 mg  600 mg Oral TID Eligah Brace, NP   600 mg at 03/11/23 0849    cetirizine (ZYRTEC) tablet 10 mg  10 mg Oral DAILY Eliot Littlejohn MD   10 mg at 03/11/23 0849    mirtazapine (REMERON) tablet 7.5 mg  7.5 mg Oral QHS Eliot Littlejohn MD   7.5 mg at 03/10/23 2158    benzocaine-menthoL (CHLORASEPTIC MAX) lozenge 1 Lozenge  1 Lozenge Oral Q2H PRN Kwaku Amaro PA-C   1 Lozenge at 03/02/23 0210    nicotine (NICODERM CQ) 14 mg/24 hr patch 1 Patch  1 Patch TransDERmal DAILY Kwaku Amaro PA-C   1 Patch at 03/11/23 0849    venlafaxine-SR (EFFEXOR-XR) capsule 225 mg  225 mg Oral DAILY WITH Macrina Valente MD   225 mg at 03/11/23 0848    propranoloL (INDERAL) tablet 40 mg  40 mg Oral TID Eligah Brace, NP   40 mg at 03/10/23 2158    ondansetron (ZOFRAN ODT) tablet 4 mg  4 mg Oral Q8H PRN Eligah Brace, NP        ondansetron [facility]) injection 4 mg  4 mg IntraVENous Q6H PRN Eligah Brace, NP   4 mg at 02/28/23 1938    acetaminophen (TYLENOL) suppository 650 mg  650 mg Rectal Q4H PRN Eligah Brace, NP        acetaminophen (TYLENOL) tablet 650 mg  650 mg Oral Q4H PRN Eligah Brace, NP   650 mg at 03/11/23 0341    therapeutic multivitamin [facility]) tablet 1 Tablet  1 Tablet Oral DAILY Eligah Brace, NP   1 Tablet at 03/11/23 0849    albuterol-ipratropium (DUO-NEB) 2.5 MG-0.5 MG/3 ML  3 mL Nebulization Q4H PRN Endy Beck NP   3 mL at 02/27/23 0413    aluminum-magnesium hydroxide (MAALOX) oral suspension 15 mL  15 mL Oral QID PRN Endy Beck NP        senna-docusate (PERICOLACE) 8.6-50 mg per tablet 1 Tablet  1 Tablet Oral BID PRN Endy Beck NP polyethylene glycol (MIRALAX) packet 17 g  17 g Oral DAILY PRN Simon Oden NP        thiamine mononitrate (B-1) tablet 100 mg  100 mg Oral DAILY Prema Zunigaun, DO   100 mg at 03/11/23 0849    enoxaparin (LOVENOX) injection 30 mg  30 mg SubCUTAneous Q12H Prema Bensonun, DO   30 mg at 03/11/23 1054    hydrALAZINE (APRESOLINE) 20 mg/mL injection 10 mg  10 mg IntraVENous Q6H PRN Simon Oden NP        metoprolol (LOPRESSOR) injection 2.5 mg  2.5 mg IntraVENous Q4H PRN Simon Oden NP             Mental Status Exam:  Eye contact: intact  Grooming: fair  Psychomotor activity: appropriate  Speech is spontaneous  Mood is \"the same \"  Affect:appropriate  Perception:denies AVH  Suicidal ideation:   Sleep:hypersomnia  Appetite:good  Attention/Concentration:good  Cognition is grossly intact         Physical Exam:  Body habitus: Body mass index is 41.2 kg/m². 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 However, she is vague about this and UDS in February was also negative. In any case naltrexone will assist with cravings. Continue other treatment    Impression:  Avoid use of any controlled substances. It will be difficult to place this patient in an acute setting due to her medical limitations and criteria.  She seems to have an unknown secondary gain with an admission. I advise to do the recommendations above and get patients grandparents involved. This patient may be more appropriate for intensive outpatient. However, she seems to be quite comfortable in the hospital currently and is adamant that \"I cannot go home\".

## 2023-03-11 NOTE — PROGRESS NOTES
Hospitalist Progress Note    Subjective:   Daily Progress Note: 3/11/2023 3:16 PM    Hospital Course:Mr. Antonella Justin is a 38 y.o. female who presented to the emergency room with suicidal ideations. Reported to emergency room physician that she was having suicidal thoughts and called the psychiatric physician to inform them that her medications were not working and was instructed to report to the emergency room. Patient observed resting on stretcher with eyes closed. Arouses when name is called. Expresses no SI or HI at present and no plan. Denies chest pain, shortness of breath, palpitations, dizziness or distress. Patient is diaphoretic otherwise no distress noted. Recently lost pet and started drinking vodka. Also states that she recently fractured her left ankle and became addicted to her pain medications. Subjective: Patient was sleeping, from my arrival, complained that she has had. No acute distress noted.     Current Facility-Administered Medications   Medication Dose Route Frequency    fluticasone propionate (FLONASE) 50 mcg/actuation nasal spray 2 Spray  2 Spray Both Nostrils DAILY    butalbital-acetaminophen-caffeine (FIORICET, ESGIC) -40 mg per tablet 1 Tablet  1 Tablet Oral Q4H PRN    hydrOXYzine HCL (ATARAX) tablet 50 mg  50 mg Oral Q6H PRN    gabapentin (NEURONTIN) capsule 600 mg  600 mg Oral TID    cetirizine (ZYRTEC) tablet 10 mg  10 mg Oral DAILY    mirtazapine (REMERON) tablet 7.5 mg  7.5 mg Oral QHS    benzocaine-menthoL (CHLORASEPTIC MAX) lozenge 1 Lozenge  1 Lozenge Oral Q2H PRN    nicotine (NICODERM CQ) 14 mg/24 hr patch 1 Patch  1 Patch TransDERmal DAILY    venlafaxine-SR (EFFEXOR-XR) capsule 225 mg  225 mg Oral DAILY WITH BREAKFAST    propranoloL (INDERAL) tablet 40 mg  40 mg Oral TID    ondansetron (ZOFRAN ODT) tablet 4 mg  4 mg Oral Q8H PRN    ondansetron (ZOFRAN) injection 4 mg  4 mg IntraVENous Q6H PRN    acetaminophen (TYLENOL) suppository 650 mg  650 mg Rectal Q4H PRN acetaminophen (TYLENOL) tablet 650 mg  650 mg Oral Q4H PRN    therapeutic multivitamin (THERAGRAN) tablet 1 Tablet  1 Tablet Oral DAILY    albuterol-ipratropium (DUO-NEB) 2.5 MG-0.5 MG/3 ML  3 mL Nebulization Q4H PRN    aluminum-magnesium hydroxide (MAALOX) oral suspension 15 mL  15 mL Oral QID PRN    senna-docusate (PERICOLACE) 8.6-50 mg per tablet 1 Tablet  1 Tablet Oral BID PRN    polyethylene glycol (MIRALAX) packet 17 g  17 g Oral DAILY PRN    thiamine mononitrate (B-1) tablet 100 mg  100 mg Oral DAILY    enoxaparin (LOVENOX) injection 30 mg  30 mg SubCUTAneous Q12H    hydrALAZINE (APRESOLINE) 20 mg/mL injection 10 mg  10 mg IntraVENous Q6H PRN    metoprolol (LOPRESSOR) injection 2.5 mg  2.5 mg IntraVENous Q4H PRN        Review of Systems:  Review of Systems   Constitutional:  Positive for malaise/fatigue. HENT: Negative. Eyes: Negative. Respiratory: Negative. Cardiovascular: Negative. Gastrointestinal: Negative. Genitourinary: Negative. Musculoskeletal: Negative. Skin: Negative. Neurological:  Negative. Endo/Heme/Allergies: Negative. Psychiatric/Behavioral: Negative. Objective:     Visit Vitals  /66   Pulse 81   Temp 98.4 °F (36.9 °C)   Resp 16   Ht 5' 4\" (1.626 m)   Wt 108.9 kg (240 lb)   LMP 2016   SpO2 95%   BMI 41.20 kg/m²    O2 Flow Rate (L/min): 3 l/min O2 Device: None (Room air)    Temp (24hrs), Av.4 °F (36.9 °C), Min:98.4 °F (36.9 °C), Max:98.4 °F (36.9 °C)       07 -  1900  In: 220 [P.O.:220]  Out: -   No intake/output data recorded. PHYSICAL EXAM:  Physical Exam  Vitals reviewed. HENT:      Head: Normocephalic and atraumatic. Right Ear: External ear normal.      Left Ear: External ear normal.      Nose: Nose normal.      Mouth/Throat:      Pharynx: Oropharynx is clear. Eyes:      Conjunctiva/sclera: Conjunctivae normal.   Cardiovascular:      Rate and Rhythm: Normal rate and regular rhythm. Pulses: Normal pulses. Pulmonary:      Effort: Pulmonary effort is normal.      Breath sounds: Normal breath sounds. Abdominal:      General: Bowel sounds are normal.      Comments: Last reported bowel movement on 3/9/23. Musculoskeletal:         General: Normal range of motion. Cervical back: Normal range of motion. Skin:     General: Skin is warm and dry. Capillary Refill: Capillary refill takes 2 to 3 seconds. Neurological:      Mental Status: She is alert and oriented to person, place, and time. Psychiatric:      Comments: Depressed mood   Data Review    No results found for this or any previous visit (from the past 24 hour(s)). XR CHEST PORT   Final Result   1. Interval development of pulmonary vascular congestion and mild pulmonary   edema. CTA CHEST W OR W WO CONT   Final Result   No acute process. XR CHEST PORT   Final Result   No acute process. Active Problems:    Alcohol withdrawal ([address] (2/25/2023)      Opioid withdrawal ([address] (2/25/2023)      Tachycardia (2/25/2023)      Assessment/Plan:   Suicidal Ideations/Depression        Tachycardia- waxes and weans          Respiratory acidosis secondary to Ativan use - resolved  - no current plan or SI/HI expressed this am; expresses depression.  - etiology of tachycardia related to ETOH and Opioid Withdrawal  - CIWA, discontinued, out of range of withdrawal for ETOH  - continue prn atarax for anxiety. - multivitamin supplementation  -   Ammonia level mildly elevated at 38, C18 folic acid are within normal limits  -echocardiogram on 2/28/23 shows an Ef of 55%-60% with normal wall motion.  No significant valvular disease or pericardial effusion.  - continue propranolol and effexor  - psych recommended inpatient admission once medically stable, continue with one-to-one sitter  - patient had a sudden onset of respiratory failure acute respiratory acidosis in the setting of Ativan use for CIWA protocol, patient was evaluated by intensivist.  Patient did not require ICU level of care  -Ativan currently on hold, had received flumazenil to reverse Ativan effect  3/6 Patient was offered to go home with medications and outpatient follow up. Pt reported \"I don't want to go home and do something stupid\", repeat psych consult placed, will continue to wait for rajendra bed for psych due to scooter and crutches. - patient expresses no SI/HI on today. - continue hydroxyzine and gabapentin at current doses per psych recommendations. - seizure and fall precautions  - suicide precautions  - 1:1 sitter at the bedside  [place] is following         2. Mild leukocytosis- resolved    - negative influenza and covid    - chest xray shows no acute process    - CTA shows bibasilar atelectasis    - afebrile    - urine drug screen negative for barbiturates and opiates, negative for methadone    - urinalysis 2/25/23 positive for bacteria, negative for nitrites and leukocytes, patient asymptomatic.    - will monitor lab work         3. Acute respiratory failure- resolved    - CTA shows no pulmonary embolism. Bibasilar atelectasis noted. - chest xray shows no acute process    - keep 02 sats greater than 92%    - turn, cough and reposition    - incentive spirometry every 2 hours while awake    - prn nebulizer treatments     4. Hepatic steatosis- POA    - etiology related to ETOH use    - seen on CTA of chest     - avoid hepatoxic medications     - will monitor lab work     5. Fracture of Left Ankle- POA    - etiology related to prior fall    - well-approximated surgical scar noted to ankle. - elevate left lower extremity while at rest    - fall precautions    - Working on getting geriatric bed for scooter     6. Tobacco abuse  - Nicotine patch     7. Allergic rhinitis  - Zyrtec        Discharge disposition: Patient is medically stable for discharge. Awaiting inpatient psychiatric-geriatric bed. CM following for discharge planning.      Code status: Full  Prophylaxis: Lovenox     Plan of care discussed with: patient, nursing, CM        _______________________________________________________________    Carmelita Jimenez MD

## 2023-03-12 LAB
ANION GAP SERPL CALC-SCNC: 8 MMOL/L (ref 5–15)
BASOPHILS # BLD: 0.1 K/UL (ref 0–0.1)
BASOPHILS NFR BLD: 1 % (ref 0–1)
BUN SERPL-MCNC: 17 MG/DL (ref 6–20)
BUN/CREAT SERPL: 20 (ref 12–20)
CALCIUM SERPL-MCNC: 9 MG/DL (ref 8.5–10.1)
CHLORIDE SERPL-SCNC: 108 MMOL/L (ref 97–108)
CO2 SERPL-SCNC: 19 MMOL/L (ref 21–32)
CREAT SERPL-MCNC: 0.87 MG/DL (ref 0.55–1.02)
DIFFERENTIAL METHOD BLD: ABNORMAL
EOSINOPHIL # BLD: 0.1 K/UL (ref 0–0.4)
EOSINOPHIL NFR BLD: 1 % (ref 0–7)
ERYTHROCYTE [DISTWIDTH] IN BLOOD BY AUTOMATED COUNT: 13.5 % (ref 11.5–14.5)
GLUCOSE SERPL-MCNC: 252 MG/DL (ref 65–100)
HCT VFR BLD AUTO: 37.2 % (ref 35–47)
HGB BLD-MCNC: 12.5 G/DL (ref 11.5–16)
IMM GRANULOCYTES # BLD AUTO: 0.1 K/UL (ref 0–0.04)
IMM GRANULOCYTES NFR BLD AUTO: 1 % (ref 0–0.5)
LYMPHOCYTES # BLD: 2.2 K/UL (ref 0.8–3.5)
LYMPHOCYTES NFR BLD: 28 % (ref 12–49)
MCH RBC QN AUTO: 29.8 PG (ref 26–34)
MCHC RBC AUTO-ENTMCNC: 33.6 G/DL (ref 30–36.5)
MCV RBC AUTO: 88.6 FL (ref 80–99)
MONOCYTES # BLD: 0.7 K/UL (ref 0–1)
MONOCYTES NFR BLD: 8 % (ref 5–13)
NEUTS SEG # BLD: 4.9 K/UL (ref 1.8–8)
NEUTS SEG NFR BLD: 61 % (ref 32–75)
NRBC # BLD: 0 K/UL (ref 0–0.01)
NRBC BLD-RTO: 0 PER 100 WBC
PLATELET # BLD AUTO: 447 K/UL (ref 150–400)
PMV BLD AUTO: 9.7 FL (ref 8.9–12.9)
POTASSIUM SERPL-SCNC: 4.1 MMOL/L (ref 3.5–5.1)
RBC # BLD AUTO: 4.2 M/UL (ref 3.8–5.2)
SODIUM SERPL-SCNC: 135 MMOL/L (ref 136–145)
WBC # BLD AUTO: 8 K/UL (ref 3.6–11)

## 2023-03-12 PROCEDURE — 80048 BASIC METABOLIC PNL TOTAL CA: CPT

## 2023-03-12 PROCEDURE — 74011250637 HC RX REV CODE- 250/637: Performed by: INTERNAL MEDICINE

## 2023-03-12 PROCEDURE — 74011250637 HC RX REV CODE- 250/637: Performed by: PHYSICIAN ASSISTANT

## 2023-03-12 PROCEDURE — 74011250637 HC RX REV CODE- 250/637: Performed by: STUDENT IN AN ORGANIZED HEALTH CARE EDUCATION/TRAINING PROGRAM

## 2023-03-12 PROCEDURE — 74011250636 HC RX REV CODE- 250/636: Performed by: STUDENT IN AN ORGANIZED HEALTH CARE EDUCATION/TRAINING PROGRAM

## 2023-03-12 PROCEDURE — 74011250637 HC RX REV CODE- 250/637: Performed by: NURSE PRACTITIONER

## 2023-03-12 PROCEDURE — 65270000015 HC RM PRIVATE ONCOLOGY

## 2023-03-12 PROCEDURE — 85025 COMPLETE CBC W/AUTO DIFF WBC: CPT

## 2023-03-12 PROCEDURE — 36415 COLL VENOUS BLD VENIPUNCTURE: CPT

## 2023-03-12 RX ADMIN — GABAPENTIN 600 MG: 300 CAPSULE ORAL at 09:00

## 2023-03-12 RX ADMIN — CETIRIZINE HYDROCHLORIDE 10 MG: 10 TABLET, FILM COATED ORAL at 09:00

## 2023-03-12 RX ADMIN — PROPRANOLOL HYDROCHLORIDE 40 MG: 10 TABLET ORAL at 17:47

## 2023-03-12 RX ADMIN — MIRTAZAPINE 7.5 MG: 15 TABLET, FILM COATED ORAL at 21:59

## 2023-03-12 RX ADMIN — THIAMINE HCL TAB 100 MG 100 MG: 100 TAB at 09:00

## 2023-03-12 RX ADMIN — BUTALBITAL, ACETAMINOPHEN, AND CAFFEINE 1 TABLET: 50; 325; 40 TABLET ORAL at 17:46

## 2023-03-12 RX ADMIN — PROPRANOLOL HYDROCHLORIDE 40 MG: 10 TABLET ORAL at 21:58

## 2023-03-12 RX ADMIN — GABAPENTIN 400 MG: 300 CAPSULE ORAL at 17:46

## 2023-03-12 RX ADMIN — BUTALBITAL, ACETAMINOPHEN, AND CAFFEINE 1 TABLET: 50; 325; 40 TABLET ORAL at 13:18

## 2023-03-12 RX ADMIN — GABAPENTIN 400 MG: 300 CAPSULE ORAL at 21:57

## 2023-03-12 RX ADMIN — VENLAFAXINE HYDROCHLORIDE 225 MG: 150 CAPSULE, EXTENDED RELEASE ORAL at 08:00

## 2023-03-12 RX ADMIN — ENOXAPARIN SODIUM 30 MG: 100 INJECTION SUBCUTANEOUS at 21:57

## 2023-03-12 RX ADMIN — BUTALBITAL, ACETAMINOPHEN, AND CAFFEINE 1 TABLET: 50; 325; 40 TABLET ORAL at 06:39

## 2023-03-12 RX ADMIN — FLUTICASONE PROPIONATE 2 SPRAY: 50 SPRAY, METERED NASAL at 09:00

## 2023-03-12 RX ADMIN — THERA TABS 1 TABLET: TAB at 09:00

## 2023-03-12 NOTE — PROGRESS NOTES
End of Shift Note    Bedside shift change report given to Daiana Galvez RN (oncoming nurse) by Jacob Mckee RN (offgoing nurse). Report included the following information SBAR, Kardex, and MAR    Shift worked: [id number]     Shift summary and any significant changes:    Patient A/Ox4, continent, up with assist and use of rollator. Patient is on seizure and suicide precautions and has a sitter in the room. Patient has no IV access - MD aware. Patient is tearful at the start of shift expressing desires to , \"be more active\" and wanting to \"get out of room\". We explained to the patient that she is able to get up and walk the hallways with the sitter at any time. Patient received all scheduled medications in addition to the following PRN medications: Atarax and Fioricet x2. Morning labs not obtained after two attempts to straight stick. Concerns for physician to address: Patient stated at change of shift that, \"she wants to go inpatient psych so that the doctor can be more involved\"   Zone phone for oncoming shift:  N/A       Activity:  Activity Level: Up with Assistance  Number times ambulated in hallways past shift: 0  Number of times OOB to chair past shift: 1    Cardiac:   Cardiac Monitoring: No      Cardiac Rhythm: Sinus Rhythm    Access:  Current line(s): no access     Genitourinary:   Urinary status: voiding    Respiratory:   O2 Device: None (Room air)  Chronic home O2 use?: NO  Incentive spirometer at bedside: YES       GI:  Last Bowel Movement Date: 03/05/23  Current diet:  ADULT DIET Regular; Safety Tray; Safety Tray (Disposables)  Passing flatus: YES  Tolerating current diet: YES       Pain Management:   Patient states pain is manageable on current regimen: YES    Skin:  Elijah Score: 20  Interventions: turn team, speciality bed, and increase time out of bed    Patient Safety:  Fall Score:  Total Score: 4  Interventions: assistive device (walker, cane, etc), gripper socks, and sitter at bedside   High Fall Risk: Yes    Length of Stay:  Expected LOS: - - -  Actual LOS: [address]

## 2023-03-12 NOTE — PROGRESS NOTES
Hospitalist Progress Note    Subjective:   Daily Progress Note: 3/12/2023 1:19 PM    Hospital Course: Mr. Rian Villeda is a 38 y.o. female who presented to the emergency room with suicidal ideations. Reported to emergency room physician that she was having suicidal thoughts and called the psychiatric physician to inform them that her medications were not working and was instructed to report to the emergency room. Patient observed resting on stretcher with eyes closed. Arouses when name is called. Expresses no SI or HI at present and no plan. Denies chest pain, shortness of breath, palpitations, dizziness or distress. Patient is diaphoretic otherwise no distress noted. Recently lost pet and started drinking vodka. Also states that she recently fractured her left ankle and became addicted to her pain medications. Subjective: Patient observed sitting up in bed with eyes opened. Complains of sinus pressure and headache. Denies other complaints. States that depression is worsening. Denies suicidal or homicidal ideations.     Current Facility-Administered Medications   Medication Dose Route Frequency    fluticasone propionate (FLONASE) 50 mcg/actuation nasal spray 2 Spray  2 Spray Both Nostrils DAILY    butalbital-acetaminophen-caffeine (FIORICET, ESGIC) -40 mg per tablet 1 Tablet  1 Tablet Oral Q4H PRN    hydrOXYzine HCL (ATARAX) tablet 50 mg  50 mg Oral Q6H PRN    gabapentin (NEURONTIN) capsule 600 mg  600 mg Oral TID    cetirizine (ZYRTEC) tablet 10 mg  10 mg Oral DAILY    mirtazapine (REMERON) tablet 7.5 mg  7.5 mg Oral QHS    benzocaine-menthoL (CHLORASEPTIC MAX) lozenge 1 Lozenge  1 Lozenge Oral Q2H PRN    nicotine (NICODERM CQ) 14 mg/24 hr patch 1 Patch  1 Patch TransDERmal DAILY    venlafaxine-SR (EFFEXOR-XR) capsule 225 mg  225 mg Oral DAILY WITH BREAKFAST    propranoloL (INDERAL) tablet 40 mg  40 mg Oral TID    ondansetron (ZOFRAN ODT) tablet 4 mg  4 mg Oral Q8H PRN    ondansetron (ZOFRAN) injection 4 mg  4 mg IntraVENous Q6H PRN    acetaminophen (TYLENOL) suppository 650 mg  650 mg Rectal Q4H PRN    acetaminophen (TYLENOL) tablet 650 mg  650 mg Oral Q4H PRN    therapeutic multivitamin (THERAGRAN) tablet 1 Tablet  1 Tablet Oral DAILY    albuterol-ipratropium (DUO-NEB) 2.5 MG-0.5 MG/3 ML  3 mL Nebulization Q4H PRN    aluminum-magnesium hydroxide (MAALOX) oral suspension 15 mL  15 mL Oral QID PRN    senna-docusate (PERICOLACE) 8.6-50 mg per tablet 1 Tablet  1 Tablet Oral BID PRN    polyethylene glycol (MIRALAX) packet 17 g  17 g Oral DAILY PRN    thiamine mononitrate (B-1) tablet 100 mg  100 mg Oral DAILY    enoxaparin (LOVENOX) injection 30 mg  30 mg SubCUTAneous Q12H    hydrALAZINE (APRESOLINE) 20 mg/mL injection 10 mg  10 mg IntraVENous Q6H PRN    metoprolol (LOPRESSOR) injection 2.5 mg  2.5 mg IntraVENous Q4H PRN        Review of Systems:    Review of Systems   Constitutional: Negative. HENT:  Positive for congestion and sinus pain. Headache   Eyes: Negative. Respiratory: Negative. Cardiovascular: Negative. Gastrointestinal: Negative. Genitourinary: Negative. Musculoskeletal: Negative. Skin: Negative. Neurological: Negative. Endo/Heme/Allergies: Negative. Psychiatric/Behavioral:  Positive for depression. Objective:     Visit Vitals  /79 (BP 1 Location: Right upper arm, BP Patient Position: Sitting)   Pulse 95   Temp 98.8 °F (37.1 °C)   Resp 18   Ht 5' 4\" (1.626 m)   Wt 108.9 kg (240 lb)   LMP 2016   SpO2 95%   BMI 41.20 kg/m²    O2 Flow Rate (L/min): 3 l/min O2 Device: None (Room air)    Temp (24hrs), Av.4 °F (36.9 °C), Min:97.9 °F (36.6 °C), Max:98.8 °F (37.1 °C)      No intake/output data recorded. 03/10 1901 -  0700  In: 340 [P.O.:340]  Out: -     PHYSICAL EXAM:    Physical Exam  Vitals reviewed. Constitutional:       Appearance: Normal appearance. HENT:      Head: Normocephalic and atraumatic.       Right Ear: External ear normal.      Left Ear: External ear normal.      Nose: Nose normal.      Mouth/Throat:      Pharynx: Oropharynx is clear. Eyes:      Conjunctiva/sclera: Conjunctivae normal.   Cardiovascular:      Rate and Rhythm: Normal rate and regular rhythm. Pulses: Normal pulses. Heart sounds: Normal heart sounds. Pulmonary:      Effort: Pulmonary effort is normal.      Breath sounds: Normal breath sounds. Abdominal:      General: Bowel sounds are normal.      Comments: Last reported bowel movement 3/11/23. Musculoskeletal:         General: Normal range of motion. Cervical back: Normal range of motion. Skin:     General: Skin is warm and dry. Capillary Refill: Capillary refill takes 2 to 3 seconds. Neurological:      Mental Status: She is alert and oriented to person, place, and time. Psychiatric:      Comments: Flat affect. Data Review    No results found for this or any previous visit (from the past 24 hour(s)). XR CHEST PORT   Final Result   1. Interval development of pulmonary vascular congestion and mild pulmonary   edema. CTA CHEST W OR W WO CONT   Final Result   No acute process. XR CHEST PORT   Final Result   No acute process. Active Problems:    Alcohol withdrawal ([address] (2/25/2023)      Opioid withdrawal ([address] (2/25/2023)      Tachycardia (2/25/2023)        Assessment/Plan:   Suicidal Ideations/Depression        Tachycardia- waxes and weans          Respiratory acidosis secondary to Ativan use - resolved  - no current plan or SI/HI expressed this am; expresses depression.  - etiology of tachycardia related to ETOH and Opioid Withdrawal  - CIWA, discontinued, out of range of withdrawal for ETOH  - continue prn atarax for anxiety. - multivitamin supplementation  -   Ammonia level mildly elevated at 38, I05 folic acid are within normal limits  -echocardiogram on 2/28/23 shows an Ef of 55%-60% with normal wall motion.  No significant valvular disease or pericardial effusion.  - continue propranolol and effexor  - psych recommended inpatient admission once medically stable, continue with one-to-one sitter  - patient had a sudden onset of respiratory failure acute respiratory acidosis in the setting of Ativan use for CIWA protocol, patient was evaluated by intensivist.  Patient did not require ICU level of care  -Ativan currently on hold, had received flumazenil to reverse Ativan effect  3/6 Patient was offered to go home with medications and outpatient follow up. Pt reported \"I don't want to go home and do something stupid\", repeat psych consult placed, will continue to wait for [facility] bed for psych due to scooter and crutches. - patient expresses no SI/HI on today. - will reduce gabapentin to 400mg tid  - consider adding naltrexone 50mg with lunch daily to assist with cravings per psych recommendations. - continue hydroxyzine and gabapentin at current doses per psych recommendations. - seizure and fall precautions  - suicide precautions  - 1:1 sitter  - psych to follow daily     2. Mild leukocytosis- resolved    - negative influenza and covid    - chest xray shows no acute process    - CTA shows bibasilar atelectasis    - afebrile    - urine drug screen negative for barbiturates and opiates, negative for methadone    - urinalysis 2/25/23 positive for bacteria, negative for nitrites and leukocytes, patient asymptomatic.    - will monitor lab work         3. Acute respiratory failure- resolved    - CTA shows no pulmonary embolism. Bibasilar atelectasis noted. - chest xray shows no acute process    - keep 02 sats greater than 92%    - turn, cough and reposition    - incentive spirometry every 2 hours while awake    - prn nebulizer treatments     4. Hepatic steatosis- POA    - etiology related to ETOH use    - seen on CTA of chest     - avoid hepatoxic medications     - will monitor lab work     5.  Fracture of Left Ankle- POA    - etiology related to prior fall    - well-approximated surgical scar noted to ankle. - elevate left lower extremity while at rest    - fall precautions    - Working on getting geriatric bed for scooter     6. Tobacco abuse  - Nicotine patch     7. Allergic rhinitis  - Zyrtec   - continue flonase    ** patient to be mobile during the day instead of lying in bed**        Discharge disposition: Patient is medically stable for discharge. Awaiting inpatient psychiatric-geriatric bed. CM following for discharge planning.      Code status: Full  Prophylaxis: Lovenox     Plan of care discussed with: patient, nursing, CM        _______________________________________________________________    Deyanira Chris NP

## 2023-03-13 ENCOUNTER — HOSPITAL ENCOUNTER (INPATIENT)
Age: 43
LOS: 2 days | Discharge: HOME | DRG: 751 | End: 2023-03-15
Attending: PSYCHIATRY & NEUROLOGY | Admitting: PSYCHIATRY & NEUROLOGY
Payer: MEDICAID

## 2023-03-13 VITALS
DIASTOLIC BLOOD PRESSURE: 64 MMHG | WEIGHT: 240 LBS | RESPIRATION RATE: 16 BRPM | OXYGEN SATURATION: 96 % | HEIGHT: 64 IN | TEMPERATURE: 97.9 F | HEART RATE: 93 BPM | SYSTOLIC BLOOD PRESSURE: 100 MMHG | BODY MASS INDEX: 40.97 KG/M2

## 2023-03-13 DIAGNOSIS — F10.20 ALCOHOL USE DISORDER, SEVERE, DEPENDENCE (HCC): ICD-10-CM

## 2023-03-13 DIAGNOSIS — F43.21 ADJUSTMENT DISORDER WITH DEPRESSED MOOD: ICD-10-CM

## 2023-03-13 DIAGNOSIS — F33.2 MAJOR DEPRESSIVE DISORDER, RECURRENT SEVERE WITHOUT PSYCHOTIC FEATURES (HCC): Primary | ICD-10-CM

## 2023-03-13 LAB
GLUCOSE BLD STRIP.AUTO-MCNC: 133 MG/DL (ref 65–117)
GLUCOSE BLD STRIP.AUTO-MCNC: 186 MG/DL (ref 65–117)
SERVICE CMNT-IMP: ABNORMAL
SERVICE CMNT-IMP: ABNORMAL

## 2023-03-13 PROCEDURE — 74011250636 HC RX REV CODE- 250/636: Performed by: STUDENT IN AN ORGANIZED HEALTH CARE EDUCATION/TRAINING PROGRAM

## 2023-03-13 PROCEDURE — 74011250637 HC RX REV CODE- 250/637: Performed by: NURSE PRACTITIONER

## 2023-03-13 PROCEDURE — 65220000003 HC RM SEMIPRIVATE PSYCH

## 2023-03-13 PROCEDURE — 74011250637 HC RX REV CODE- 250/637

## 2023-03-13 PROCEDURE — 82962 GLUCOSE BLOOD TEST: CPT

## 2023-03-13 PROCEDURE — 74011250637 HC RX REV CODE- 250/637: Performed by: STUDENT IN AN ORGANIZED HEALTH CARE EDUCATION/TRAINING PROGRAM

## 2023-03-13 PROCEDURE — 74011250637 HC RX REV CODE- 250/637: Performed by: PHYSICIAN ASSISTANT

## 2023-03-13 PROCEDURE — 97116 GAIT TRAINING THERAPY: CPT

## 2023-03-13 PROCEDURE — 74011250637 HC RX REV CODE- 250/637: Performed by: INTERNAL MEDICINE

## 2023-03-13 RX ORDER — VENLAFAXINE HYDROCHLORIDE 75 MG/1
225 CAPSULE, EXTENDED RELEASE ORAL
Qty: 90 CAPSULE | Refills: 0 | Status: SHIPPED | OUTPATIENT
Start: 2023-03-14 | End: 2023-04-13

## 2023-03-13 RX ORDER — ASPIRIN 325 MG/1
100 TABLET, FILM COATED ORAL DAILY
Qty: 30 TABLET | Refills: 0 | Status: SHIPPED | OUTPATIENT
Start: 2023-03-13 | End: 2023-03-15

## 2023-03-13 RX ORDER — IBUPROFEN 200 MG
1 TABLET ORAL DAILY
Qty: 30 PATCH | Refills: 0 | Status: SHIPPED | OUTPATIENT
Start: 2023-03-14 | End: 2023-04-13

## 2023-03-13 RX ORDER — BENZTROPINE MESYLATE 1 MG/1
1 TABLET ORAL
Status: DISCONTINUED | OUTPATIENT
Start: 2023-03-13 | End: 2023-03-15 | Stop reason: HOSPADM

## 2023-03-13 RX ORDER — HYDROXYZINE 50 MG/1
50 TABLET, FILM COATED ORAL
Qty: 40 TABLET | Refills: 0 | Status: SHIPPED | OUTPATIENT
Start: 2023-03-13 | End: 2023-03-23

## 2023-03-13 RX ORDER — ACETAMINOPHEN 325 MG/1
650 TABLET ORAL
Qty: 180 TABLET | Refills: 0 | Status: SHIPPED | OUTPATIENT
Start: 2023-03-13 | End: 2023-04-12

## 2023-03-13 RX ORDER — ACETAMINOPHEN 325 MG/1
650 TABLET ORAL
Status: DISCONTINUED | OUTPATIENT
Start: 2023-03-13 | End: 2023-03-15 | Stop reason: HOSPADM

## 2023-03-13 RX ORDER — HYDROXYZINE 25 MG/1
50 TABLET, FILM COATED ORAL
Status: DISCONTINUED | OUTPATIENT
Start: 2023-03-13 | End: 2023-03-15 | Stop reason: HOSPADM

## 2023-03-13 RX ORDER — INSULIN LISPRO 100 [IU]/ML
INJECTION, SOLUTION INTRAVENOUS; SUBCUTANEOUS
Status: DISCONTINUED | OUTPATIENT
Start: 2023-03-13 | End: 2023-03-13 | Stop reason: HOSPADM

## 2023-03-13 RX ORDER — MIRTAZAPINE 15 MG/1
7.5 TABLET, FILM COATED ORAL
Status: DISCONTINUED | OUTPATIENT
Start: 2023-03-13 | End: 2023-03-14

## 2023-03-13 RX ORDER — NALTREXONE HYDROCHLORIDE 50 MG/1
50 TABLET, FILM COATED ORAL DAILY
Qty: 30 TABLET | Refills: 0 | Status: ON HOLD | OUTPATIENT
Start: 2023-03-14 | End: 2023-03-15 | Stop reason: SDUPTHER

## 2023-03-13 RX ORDER — OLANZAPINE 5 MG/1
5 TABLET ORAL
Status: DISCONTINUED | OUTPATIENT
Start: 2023-03-13 | End: 2023-03-15 | Stop reason: HOSPADM

## 2023-03-13 RX ORDER — NALTREXONE HYDROCHLORIDE 50 MG/1
50 TABLET, FILM COATED ORAL DAILY
Status: DISCONTINUED | OUTPATIENT
Start: 2023-03-14 | End: 2023-03-13 | Stop reason: HOSPADM

## 2023-03-13 RX ORDER — HALOPERIDOL 5 MG/ML
5 INJECTION INTRAMUSCULAR
Status: DISCONTINUED | OUTPATIENT
Start: 2023-03-13 | End: 2023-03-15 | Stop reason: HOSPADM

## 2023-03-13 RX ORDER — INSULIN LISPRO 100 [IU]/ML
INJECTION, SOLUTION INTRAVENOUS; SUBCUTANEOUS
Qty: 1 EACH | Refills: 0 | Status: ON HOLD | OUTPATIENT
Start: 2023-03-13 | End: 2023-03-14

## 2023-03-13 RX ORDER — DIPHENHYDRAMINE HYDROCHLORIDE 50 MG/ML
50 INJECTION, SOLUTION INTRAMUSCULAR; INTRAVENOUS
Status: DISCONTINUED | OUTPATIENT
Start: 2023-03-13 | End: 2023-03-15 | Stop reason: HOSPADM

## 2023-03-13 RX ORDER — TRAZODONE HYDROCHLORIDE 50 MG/1
50 TABLET ORAL
Status: DISCONTINUED | OUTPATIENT
Start: 2023-03-13 | End: 2023-03-15 | Stop reason: HOSPADM

## 2023-03-13 RX ORDER — ADHESIVE BANDAGE
30 BANDAGE TOPICAL DAILY PRN
Status: DISCONTINUED | OUTPATIENT
Start: 2023-03-13 | End: 2023-03-15 | Stop reason: HOSPADM

## 2023-03-13 RX ORDER — BUTALBITAL, ACETAMINOPHEN AND CAFFEINE 50; 325; 40 MG/1; MG/1; MG/1
1 TABLET ORAL
Qty: 180 TABLET | Refills: 0 | Status: SHIPPED | OUTPATIENT
Start: 2023-03-13 | End: 2023-04-12

## 2023-03-13 RX ORDER — IBUPROFEN 200 MG
4 TABLET ORAL AS NEEDED
Status: DISCONTINUED | OUTPATIENT
Start: 2023-03-13 | End: 2023-03-13 | Stop reason: HOSPADM

## 2023-03-13 RX ORDER — MIRTAZAPINE 7.5 MG/1
7.5 TABLET, FILM COATED ORAL
Qty: 30 TABLET | Refills: 0 | Status: ON HOLD | OUTPATIENT
Start: 2023-03-13 | End: 2023-03-15 | Stop reason: SDUPTHER

## 2023-03-13 RX ADMIN — THERA TABS 1 TABLET: TAB at 09:39

## 2023-03-13 RX ADMIN — BUTALBITAL, ACETAMINOPHEN, AND CAFFEINE 1 TABLET: 50; 325; 40 TABLET ORAL at 14:59

## 2023-03-13 RX ADMIN — GABAPENTIN 400 MG: 100 CAPSULE ORAL at 21:23

## 2023-03-13 RX ADMIN — GABAPENTIN 400 MG: 300 CAPSULE ORAL at 17:01

## 2023-03-13 RX ADMIN — MIRTAZAPINE 7.5 MG: 15 TABLET, FILM COATED ORAL at 21:23

## 2023-03-13 RX ADMIN — BUTALBITAL, ACETAMINOPHEN, AND CAFFEINE 1 TABLET: 50; 325; 40 TABLET ORAL at 04:20

## 2023-03-13 RX ADMIN — FLUTICASONE PROPIONATE 2 SPRAY: 50 SPRAY, METERED NASAL at 09:40

## 2023-03-13 RX ADMIN — PROPRANOLOL HYDROCHLORIDE 40 MG: 10 TABLET ORAL at 09:39

## 2023-03-13 RX ADMIN — ENOXAPARIN SODIUM 30 MG: 100 INJECTION SUBCUTANEOUS at 09:39

## 2023-03-13 RX ADMIN — THIAMINE HCL TAB 100 MG 100 MG: 100 TAB at 09:39

## 2023-03-13 RX ADMIN — GABAPENTIN 400 MG: 300 CAPSULE ORAL at 09:39

## 2023-03-13 RX ADMIN — VENLAFAXINE HYDROCHLORIDE 225 MG: 150 CAPSULE, EXTENDED RELEASE ORAL at 09:39

## 2023-03-13 RX ADMIN — CETIRIZINE HYDROCHLORIDE 10 MG: 10 TABLET, FILM COATED ORAL at 09:39

## 2023-03-13 RX ADMIN — HYDROXYZINE HYDROCHLORIDE 50 MG: 25 TABLET, FILM COATED ORAL at 21:24

## 2023-03-13 NOTE — DISCHARGE SUMMARY
Hospitalist Discharge Summary     Patient ID:    Tammy Whitman  [id number]  26 y.o.  1980    Admit date: 2/24/2023    Discharge date : 3/13/2023        Final Diagnoses: Active Problems:    Alcohol withdrawal ([address] (2/25/2023)      Opioid withdrawal [address] (2/25/2023)      Tachycardia (2/25/2023)      Reason for Hospitalization:  Alcohol withdraw, suicide ideation, tachycardia    Hospital Course:   Pt was admitted for suicide ideation, history of alcohol abuse, presented with tachycardia which resolved after IV fluid resuscitation. She was seen by psychiatry and medications were recommended to treat her substance abuse and mood disorder. She was recommended to transfer to [facility] unit at [facility] [place] 2[place] for substance abuse and suicide ideation. Pt will continue with current medications prescribed and follow up PCP and Physical therapy after discharge from psychiatric unit. Pt is stable for discharge today. She is in agreement with plan. Discharge Medications:   Current Discharge Medication List        START taking these medications    Details   butalbital-acetaminophen-caffeine (FIORICET, ESGIC) -40 mg per tablet Take 1 Tablet by mouth every four (4) hours as needed for Headache or Migraine for up to 30 days. Indications: a migraine headache  Qty: 180 Tablet, Refills: 0  Start date: 3/13/2023, End date: 4/12/2023      acetaminophen (TYLENOL) 325 mg tablet Take 2 Tablets by mouth every four (4) hours as needed for Fever for up to 30 days. Qty: 180 Tablet, Refills: 0  Start date: 3/13/2023, End date: 4/12/2023      mirtazapine (REMERON) 7.5 mg tablet Take 1 Tablet by mouth nightly for 30 days. Qty: 30 Tablet, Refills: 0  Start date: 3/13/2023, End date: 4/12/2023      naltrexone (DEPADE) 50 mg tablet Take 1 Tablet by mouth daily for 30 days.  Indications: alcoholism  Qty: 30 Tablet, Refills: 0  Start date: 3/14/2023, End date: 4/13/2023      thiamine mononitrate (B-1) 100 mg tablet Take 1 Tablet by mouth daily for 30 days. Indications: deficiency in thiamine or vitamin B1  Qty: 30 Tablet, Refills: 0  Start date: 3/13/2023, End date: 4/12/2023      nicotine (NICODERM CQ) 14 mg/24 hr patch 1 Patch by TransDERmal route daily for 30 days. Qty: 30 Patch, Refills: 0  Start date: 3/14/2023, End date: 4/13/2023      insulin lispro (HUMALOG) 100 unit/mL injection INITIATE CORRECTIVE INSULIN PROTOCOL (ZAHRAA):  RX ZAHRAA Insulin Resistant (Obese, Steroids use)    AC (before meals), Q6H, and Q4H CORRECTIONAL SCALE only For Blood Sugar (mg/dl) of :             140-199=3 units            200-249=4 units  250-299=7 units  300-349=10 units  350 or greater = Call MD  Give in addition to basal medications. Do Not Hold for NPO    BEDTIME CORRECTIONAL sliding scale when scheduled:  200-249=2 units  250-299=4 units   300-349=5 units  350 or greater = Call MD  Give in addition to basal medications. Do Not Hold for NPO  Fast Acting - Administer Immediately - or within 15 minutes of start of meal, if mealtime coverage. Indications: type 2 diabetes mellitus  Qty: 1 Each, Refills: 0  Start date: 3/13/2023           CONTINUE these medications which have CHANGED    Details   hydrOXYzine HCL (ATARAX) 50 mg tablet Take 1 Tablet by mouth every six (6) hours as needed (anxiety or sleep) for up to 10 days. Qty: 40 Tablet, Refills: 0  Start date: 3/13/2023, End date: 3/23/2023      venlafaxine-SR [facility] 75 mg capsule Take 3 Capsules by mouth daily (with breakfast) for 30 days. Qty: 90 Capsule, Refills: 0  Start date: 3/14/2023, End date: 4/13/2023           CONTINUE these medications which have NOT CHANGED    Details   propranoloL (INDERAL) 40 mg tablet Take 1 Tablet by mouth three (3) times daily.   Qty: 15 Tablet, Refills: 0           STOP taking these medications       Latuda 20 mg tab tablet Comments:   Reason for Stopping:         ibuprofen (MOTRIN) 800 mg tablet Comments:   Reason for Stopping:         cephALEXin (KEFLEX) 500 mg capsule Comments:   Reason for Stopping: Follow up Care:    1. Karissa Kahn MD in 1 weeks. Follow-up Information       Follow up With Specialties Details Why Contact Info    Karissa Kahn MD Infectious Disease Physician Follow up in 1 month(s)  Caitlin Suggs  [phone]      Lia Yee, PT, DPT Orthopedic Physical Therapist, Physical Therapy Follow up in 4 week(s)  [address]                * Follow-up Care/Patient Instructions: Activity: Activity as tolerated  Diet: Regular Diet  Wound Care: None needed      Condition at Discharge:  Stable  __________________________________________________________________    Disposition  Psychiatric Hospital  ____________________________________________________________________    Code Status:  Full Code  ___________________________________________________________________    Discharge Exam:  Patient seen and examined by me on discharge day. Physical Exam  Constitutional:       General: She is not in acute distress. Appearance: She is obese. Cardiovascular:      Rate and Rhythm: Normal rate and regular rhythm. Pulses: Normal pulses. Heart sounds: Normal heart sounds. Pulmonary:      Effort: Pulmonary effort is normal.      Breath sounds: Normal breath sounds. Abdominal:      General: Bowel sounds are normal.      Palpations: Abdomen is soft. Musculoskeletal:         General: Normal range of motion. Skin:     General: Skin is warm and dry. Neurological:      Mental Status: She is oriented to person, place, and time.    Psychiatric:         Mood and Affect: Mood normal.         Behavior: Behavior normal.        CONSULTATIONS: Psychiatry    Significant Diagnostic Studies:   Recent Results (from the past 24 hour(s))   GLUCOSE, POC    Collection Time: 03/13/23 11:49 AM   Result Value Ref Range Glucose (POC) 133 (H) 65 - 117 mg/dL    Performed by Nelly Bentley, POC    Collection Time: 03/13/23  5:10 PM   Result Value Ref Range    Glucose (POC) 186 (H) 65 - 117 mg/dL    Performed by LAMONT Healy      XR CHEST PORT   Final Result   1. Interval development of pulmonary vascular congestion and mild pulmonary   edema. CTA CHEST W OR W WO CONT   Final Result   No acute process. XR CHEST PORT   Final Result   No acute process. Discharge: time spent 35 minutes in discharge  Education and counseling.      Signed:  Vishal Parks NP  3/13/2023  5:26 PM

## 2023-03-13 NOTE — BSMART NOTE
BSMART Liaison Team Note     LOS:  16 days     Patient goal(s) for today: Take medications as prescribed. Communicate needs to staff an appropriate manner. Utilize and review effective coping skills. BSMART Liaison team focus/goals: Assess needs. Provide support and education. Coordinate care. Progress note: This writer met with patient face to face. Pt was alert and oriented x4. Presented with euthymic mood. Pt reports she is not doing well. She stated, \"it is getting worse, I need help. \" Pt reports that her medications are changing but is unaware of these changes and this can be frustrating. She is on Effexor and believes it is not working and is unaware why the psychiatric provider does not want to add an anti-depressant. Pt denied SI but states \"I am starting to feel that way. \" Pt reports passive SI and stated \"I am tired of pretending I want to live, I do not. \" Pt wants to work on her ability to control her depression and be in control of how she feels. Patient is also applying for disability at this time. [place] [employer] Denies [employer]. Discussed with patient potential plan to discharge. Pt does not feel safe discharging at this time, despite discussion around safety plan and partial hospitalization program. Pt reports completing the PHP at [facility] back in July 2022. She states it was helpful but she had received inpatient behavioral health services prior and was more stabilized. Pt continues to request inpatient behavioral health admission and is not open to safety planning or outpatient resources at this time. Pt states she is independent in her ADLs and is able to put weight on her feet. Pt can use a wheelchair but is able to get in and out of it by herself. Spoke with RN/Nava who reports pt is medically cleared and waiting for behavioral health unit. Spoke with Monica Tomas from bed board. Updated her on patient's current ability to ambulate with limited assistance/wheelchair.      IP consult to psychiatry placed. Pt does not feel safe discharging home with safety plan at this time. Barriers to Discharge: [place] placement. Guns in the home: no      Outpatient providers: RAI Merida @ [facility] Jeff Cano and Lindsey Salazar at [facility] info/prescription coverage:  [facility] MEDICAID/[place] [employer] [place]US     Diagnosis: Major Depressive Disorder, recurrent, severe without psychotic features; Alcohol Use Disorder       Plan:  please defer to psychiatric provider note for disposition and recommendation . Follow up Psych Consult placed? yes.    Psychiatrist updated? no       Participating treatment team members: Vin Carter, MSW, Supervisee in Social Work; [place]

## 2023-03-13 NOTE — PROGRESS NOTES
Problem: Suicide  Goal: *STG: Remains safe in hospital  Outcome: Progressing Towards Goal  Goal: *STG: Seeks staff when feelings of self harm or harm towards others arise  Outcome: Progressing Towards Goal  Goal: *STG: Attends activities and groups  Outcome: Progressing Towards Goal  Goal: *STG:  Verbalizes alternative ways of dealing with maladaptive feelings/behaviors  Outcome: Progressing Towards Goal  Goal: *STG/LTG: Complies with medication therapy  Outcome: Progressing Towards Goal  Goal: *STG/LTG: No longer expresses self destructive or suicidal thoughts  Outcome: Progressing Towards Goal  Goal: *LTG:  Identifies available community resources  Outcome: Progressing Towards Goal  Goal: *LTG:  Develops proactive suicide prevention plan  Outcome: Progressing Towards Goal

## 2023-03-13 NOTE — PROGRESS NOTES
CM received call from bed placement at [facility] unit. CM informed that pt has been accepted to transition to psych placement at [facility] [place]. CM informed that hospital is prepared to accept pt on today. CM informed clinical team of the following, and physician will d/c.     Accepting Physician: Dr. TIFFANY HANKINS [facility] to Home to transport: 6P  Call report: [phone]      LESA completed the need of the pt at this time    MARCELO Donnelly, [address] [place] [address]

## 2023-03-13 NOTE — PROGRESS NOTES
Problem: Pressure Injury - Risk of  Goal: *Prevention of pressure injury  Description: Document Elijah Scale and appropriate interventions in the flowsheet. Outcome: Resolved/Met  Note: Pressure Injury Interventions:  Sensory Interventions: Assess changes in LOC, Float heels, Keep linens dry and wrinkle-free, Minimize linen layers, Pressure redistribution bed/mattress (bed type)    Moisture Interventions: Absorbent underpads, Apply protective barrier, creams and emollients, Minimize layers    Activity Interventions: Increase time out of bed, Pressure redistribution bed/mattress(bed type)    Mobility Interventions: HOB 30 degrees or less, Float heels, Pressure redistribution bed/mattress (bed type)    Nutrition Interventions: Document food/fluid/supplement intake, Offer support with meals,snacks and hydration    Friction and Shear Interventions: Apply protective barrier, creams and emollients, Feet elevated on foot rest, HOB 30 degrees or less, Minimize layers                Problem: Patient Education: Go to Patient Education Activity  Goal: Patient/Family Education  Outcome: Resolved/Met     Problem: Falls - Risk of  Goal: *Absence of Falls  Description: Document Rema Fall Risk and appropriate interventions in the flowsheet.   Outcome: Resolved/Met  Note: Fall Risk Interventions:  Mobility Interventions: Assess mobility with egress test, Bed/chair exit alarm, Utilize walker, cane, or other assistive device, Utilize gait belt for transfers/ambulation    Mentation Interventions: Adequate sleep, hydration, pain control, Bed/chair exit alarm, Update white board, Increase mobility, Family/sitter at bedside    Medication Interventions: Bed/chair exit alarm, Utilize gait belt for transfers/ambulation    Elimination Interventions: Bed/chair exit alarm, Call light in reach, Stay With Me (per policy)              Problem: Patient Education: Go to Patient Education Activity  Goal: Patient/Family Education  Outcome: Resolved/Met     Problem: Alcohol Withdrawal  Goal: *STG: Participates in treatment plan  Outcome: Resolved/Met  Goal: *STG: Remains safe in hospital  Outcome: Resolved/Met  Goal: *STG: Seeks staff when symptoms of withdrawal increase  Outcome: Resolved/Met  Goal: *STG: Complies with medication therapy  Outcome: Resolved/Met  Goal: *STG: Attends activities and groups  Outcome: Resolved/Met  Goal: *STG: Will identify negative impact of chemical dependency including the use of tobacco, alcohol, and other substances  Outcome: Resolved/Met  Goal: *STG: Verbalizes abstinence as an achievable goal  Outcome: Resolved/Met  Goal: *STG: Agrees to participate in outpatient after care program to support ongoing mental health  Outcome: Resolved/Met  Goal: *STG: Able to indentify relapse triggers including interpersonal/social and familial factors  Outcome: Resolved/Met  Goal: *STG: Identify lifestyle changes to support long term sobriety such as vocation, employment, education, and legal issues  Outcome: Resolved/Met  Goal: *STG: Maintains appropriate nutrition and hydration  Outcome: Resolved/Met  Goal: *STG: Vital signs within defined limits  Outcome: Resolved/Met  Goal: *STG/LTG: Relapse prevention plan in place to include housing/aftercare, leisure activities, and spirituality  Outcome: Resolved/Met  Goal: Interventions  Outcome: Resolved/Met     Problem: Patient Education: Go to Patient Education Activity  Goal: Patient/Family Education  Outcome: Resolved/Met     Problem: Risk for Opioid Over-Sedation  Goal: Recognition of Risk Factors for Over-sedation  Outcome: Resolved/Met  Goal: Prevention of Over-sedation  Outcome: Resolved/Met  Goal: Prevention of Respiratory Depression  Outcome: Resolved/Met     Problem: Patient Education: Go to Patient Education Activity  Goal: Patient/Family Education  Outcome: Resolved/Met     Problem: Suicide  Goal: *STG: Remains safe in hospital  Outcome: Resolved/Met  Goal: *STG: Seeks staff when feelings of self harm or harm towards others arise  Outcome: Resolved/Met  Goal: *STG: Attends activities and groups  Outcome: Resolved/Met  Goal: *STG:  Verbalizes alternative ways of dealing with maladaptive feelings/behaviors  Outcome: Resolved/Met  Goal: *STG/LTG: Complies with medication therapy  Outcome: Resolved/Met  Goal: *STG/LTG: No longer expresses self destructive or suicidal thoughts  Outcome: Resolved/Met  Goal: *LTG:  Identifies available community resources  Outcome: Resolved/Met  Goal: *LTG:  Develops proactive suicide prevention plan  Outcome: Resolved/Met  Goal: Interventions  Outcome: Resolved/Met     Problem: Patient Education: Go to Patient Education Activity  Goal: Patient/Family Education  Outcome: Resolved/Met     Problem: Patient Education: Go to Patient Education Activity  Goal: Patient/Family Education  Outcome: Resolved/Met

## 2023-03-13 NOTE — CONSULTS
Chief Complaint:\"They are just waiting for a bed for me\"     Length of Stay: 17 Days     Interval History:  3/13/23- Pt states that she is not doing well. She states that her depression is still severe. She endorses passive SI, but denies plans/means/intent currently. She states that she does not feel safe if she were to discharge home. Discussed possible IOP, but she states that she does now know what she will do if she were to return home at this point. She denies HI/AVH currently. She states that she is sleeping a little better with the remeron, but it is still interrupted sleep. She endorses ongoing cravings for pain medications. Discussed adding naltrexone to help with cravings. Pt is in agreement with this. She states that she does not need to wear to boot on her ankle and is able to take baby steps. She endorses needing a wheel chair or other assistive device to go long distances. She states that she is able to complete her ADL's on her own.     3/11/23-Patient report sthat she still feels like her medications are not working. When asked why she felt this way, she reports having a lack of guido and sleeping for a large portion of the day and night. In addition, she requests to be prescribed suboxone due to an \"opiate addiction\" from her ankle injury. She states that she broke her ankle late last year and had surgery in December. She reports using prescribed opiates from Dec- Jan and she feels that she is now addicted-although she denies any use since January. Its a bit difficult to follow her as there are inconsistencies and she is not very forthcoming with information. Substance hx: denies any history other than opiate pills prescribed to her     Social hx: lives in [facility]/ grandparents who are supportive. 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       Psychiatric hx: previous inpt stays last yr in march, may and July, she denies hx of self harm and denies a hx of suicide attempts     Past Medical History:       Past Medical History:   Diagnosis Date    ADD (attention deficit disorder) [age]yo     Treated with Adderall since dx. 2013 dosing 20mg AM and 10mg PM.  Trial/change to Vyvanse Nov-Dec 2015--not as effective. Gynecologic disorder       History of miscarriages. History of Mirena IUD placed on 4/17/15    HX OTHER MEDICAL        -BUFA baby    HX OTHER MEDICAL       HPV positive    Idiopathic vulvodynia 2014    L1 vertebral fracture (HCC) 2017     [facility] imaging/admissoin: Mild acute two column compression fracture of L1 without evidence of retropulsion into the spinal canal.  Managed non-operatively. Migraines 2013    Neurological disorder      Pelvic pain in female 9/15/2014     2015 gyn laparoscopy/hysteroscopy with endometriosis worse right.     Psychiatric disorder       depression    Secondary dysmenorrhea 2014     With menorraghia    Seizures ([facility] [address] 2008     never on meds--had appr 4-5 in a short amt of time and none since             Labs:        Lab Results   Component Value Date/Time     WBC 8.4 03/10/2023 04:18 AM     Hemoglobin (POC) 14.3 2016 07:26 AM     HGB 12.4 03/10/2023 04:18 AM     Hematocrit (POC) 42 2016 07:26 AM     HCT 37.9 03/10/2023 04:18 AM     PLATELET 796  04:18 AM     MCV 90.7 03/10/2023 04:18 AM     Hgb, External 11.6 2012 12:00 AM     Hct, External 33.4 2012 12:00 AM     Platelet cnt., External 332K 2012 12:00 AM            Lab Results   Component Value Date/Time     Sodium 135 (L) 03/10/2023 04:18 AM     Potassium 4.1 03/10/2023 04:18 AM     Chloride 105 03/10/2023 04:18 AM     CO2 25 03/10/2023 04:18 AM     Anion gap 5 03/10/2023 04:18 AM     Glucose 286 (H) 03/10/2023 04:18 AM     BUN 15 03/10/2023 04:18 AM     Creatinine 0.77 03/10/2023 04:18 AM     BUN/Creatinine ratio 19 03/10/2023 04:18 AM     GFR est AA >60 07/21/2022 10:38 AM     GFR est non-AA >60 07/21/2022 10:38 AM     Calcium 8.8 03/10/2023 04:18 AM     Bilirubin, total 0.6 09/08/2022 12:00 AM     Alk.  phosphatase 88 09/08/2022 12:00 AM     Protein, total 6.6 09/08/2022 12:00 AM     Albumin 3.2 (L) 03/05/2023 03:18 AM     Globulin 3.1 07/21/2022 10:38 AM     A-G Ratio 1.1 07/21/2022 10:38 AM     ALT (SGPT) 27 09/08/2022 12:00 AM             Vitals:     03/10/23 0819 03/10/23 1803 03/10/23 1909 03/11/23 0756   BP: 132/82 (!) 141/82 116/68 111/66   Pulse: (!) 110 (!) 113 98 81   Resp: 17   20 16   Temp: 97.2 °F (36.2 °C)   98.4 °F (36.9 °C) 98.4 °F (36.9 °C)   SpO2: 96%   97% 95%   Weight:           Height:           LMP: 03/14/2016                    Current Facility-Administered Medications   Medication Dose Route Frequency Provider Last Rate Last Admin    fluticasone propionate (FLONASE) 50 mcg/actuation nasal spray 2 Spray  2 Spray Both Nostrils DAILY Laurence Bradley NP   2 Spray at 03/11/23 1054    butalbital-acetaminophen-caffeine (FIORICET, ESGIC) -40 mg per tablet 1 Tablet  1 Tablet Oral Q4H PRN Laurence Bradley NP   1 Tablet at 03/11/23 1252    hydrOXYzine HCL (ATARAX) tablet 50 mg  50 mg Oral Q6H PRN Laurence Bradley NP   50 mg at 03/10/23 2158    gabapentin (NEURONTIN) capsule 600 mg  600 mg Oral TID Laurence Bradley NP   600 mg at 03/11/23 0849    cetirizine (ZYRTEC) tablet 10 mg  10 mg Oral DAILY Tacos Anaya MD   10 mg at 03/11/23 0849    mirtazapine (REMERON) tablet 7.5 mg  7.5 mg Oral QHS Tacos Anaya MD   7.5 mg at 03/10/23 2158    benzocaine-menthoL (CHLORASEPTIC MAX) lozenge 1 Lozenge  1 Lozenge Oral Q2H PRN Deven Boo PA-C   1 Lozenge at 03/02/23 0210    nicotine (NICODERM CQ) 14 mg/24 hr patch 1 Patch  1 Patch TransDERmal DAILY Deven Boo PA-C   1 Patch at 03/11/23 0849    venlafaxine-SR (EFFEXOR-XR) capsule 225 mg  225 mg Oral DAILY WITH BREAKFAST Rosenda Cruz MD   225 mg at 03/11/23 0933 propranoloL (INDERAL) tablet 40 mg  40 mg Oral TID Orien Lm, NP   40 mg at 03/10/23 2158    ondansetron (ZOFRAN ODT) tablet 4 mg  4 mg Oral Q8H PRN Lorena Amato, NP        ondansetron [facility]) injection 4 mg  4 mg IntraVENous Q6H PRN Orien Lm, NP   4 mg at 02/28/23 1938    acetaminophen (TYLENOL) suppository 650 mg  650 mg Rectal Q4H PRN Lorena Lm, NP        acetaminophen (TYLENOL) tablet 650 mg  650 mg Oral Q4H PRN Oriaga Lm, NP   650 mg at 03/11/23 6816    therapeutic multivitamin [facility]) tablet 1 Tablet  1 Tablet Oral DAILY Oriaga Amato, NP   1 Tablet at 03/11/23 0849    albuterol-ipratropium (DUO-NEB) 2.5 MG-0.5 MG/3 ML  3 mL Nebulization Q4H PRN Lorena Amato, NP   3 mL at 02/27/23 0413    aluminum-magnesium hydroxide (MAALOX) oral suspension 15 mL  15 mL Oral QID PRN Lorena Amato, NP        senna-docusate (PERICOLACE) 8.6-50 mg per tablet 1 Tablet  1 Tablet Oral BID PRN Lorena Amato, NP        polyethylene glycol (MIRALAX) packet 17 g  17 g Oral DAILY PRN Lorena Amato, NP        thiamine mononitrate (B-1) tablet 100 mg  100 mg Oral DAILY Nelta Tano, Devon, DO   100 mg at 03/11/23 0849    enoxaparin (LOVENOX) injection 30 mg  30 mg SubCUTAneous Q12H Marcelino, Devon, DO   30 mg at 03/11/23 1054    hydrALAZINE (APRESOLINE) 20 mg/mL injection 10 mg  10 mg IntraVENous Q6H PRN Lorena Amato, NP        metoprolol (LOPRESSOR) injection 2.5 mg  2.5 mg IntraVENous Q4H PRN Oriaga Lm, NP                Mental Status Exam:  Eye contact: intact  Grooming: fair  Psychomotor activity: appropriate  Speech is spontaneous  Mood is \"the same \"  Affect:appropriate  Perception:denies AVH  Suicidal ideation:   Sleep:hypersomnia  Appetite:good  Attention/Concentration:good  Cognition is grossly intact           Physical Exam:  Body habitus: Body mass index is 41.2 kg/m².   Musculoskeletal system: normal gait  Tremor - neg  Cog wheeling - neg        Assessment and Plan:  Nikia Lemus meets criteria for a diagnosis of MDD recurrent severe without psychosis, r/o personality d/o unspec        Recommendations:  Add naltrexone 50mg daily  PT consult with exercise plan, it is imperative that patient is not lying in bed throughout the day and/or just sitting in chair, patient may need encouragement from 1:1 to get up and be active for 15 minutes every two hours (for example)  Continue other treatment  Admit to inpatient psychiatric unit. Impression:  Avoid use of any controlled substances. Pt meets criteria for inpatient psychiatric admission.

## 2023-03-13 NOTE — PROGRESS NOTES
Problem: Mobility Impaired (Adult and Pediatric)  Goal: *Acute Goals and Plan of Care (Insert Text)  Description: FUNCTIONAL STATUS PRIOR TO ADMISSION: Patient was independent and active without use of DME until she sustained a GLF on 12/24/22 with resulting L ankle fracture and s/p ORIF. She had ortho follow-up appointment on 2/15 which states she will remain NWB on LLE for 4 additional weeks. HOME SUPPORT PRIOR TO ADMISSION: The patient lived with her parents or grandparents but did not require assist. Both houses are 1-story, 4 or 7 steps with 1 or 2 rails to enter from outside. [facility]  Physical Therapy Goals  Initiated 3/2/2023; reviewed/ continued 3/13/2023 (pending anticipated clearance for WB L LE)  1.  Patient will transfer from bed to chair and chair to bed with modified independence using the least restrictive device within 7 day(s). 2.  Patient will perform sit to stand with modified independence within 7 day(s). 3.  Patient will ambulate with modified independence for 150 feet with the least restrictive device within 7 day(s). 4.  Patient will ascend/descend 7 stairs with 1 handrail(s) with minimal assistance/contact guard assist within 7 day(s). Outcome: Progressing Towards Goal   PHYSICAL THERAPY TREATMENT: WEEKLY REASSESSMENT  Patient: Leonard Higginbotham (49 y.o. female)  Date: 3/13/2023  Primary Diagnosis: Alcohol withdrawal ([facility] [address] [F10.939]  Opioid withdrawal ([facility] [address] [F11.93]  Tachycardia [R00.0]       Precautions:   Fall, NWB L LE      ASSESSMENT  Patient continues with skilled PT services and is progressing towards goals. Pt received sitting up in bed. Reports up in room with single crutch, touch down WB L LE, no longer wearing boot L LE. Reviewed most recent WBing parameters per ortho visit 2/15//2023 which notes NWB L LE with boot until follow up 3/15/2023. Pt receptive to education. Demo supervision with use of knee scooter for mobility on unit.  Spoke with RN and NP regarding most recent documented WBing parameters; requested ortho follow up to assist with progression toward WB L LE as appropriate. Will con't to follow for mobility progression as appropriate once updated info available from ortho. Current Level of Function Impacting Discharge (mobility/balance): supervision with use of knee scooter    Other factors to consider for discharge: remains with sitter         PLAN :  Goals have been updated based on progression since last assessment. Patient continues to benefit from skilled intervention to address the above impairments. Recommendations and Planned Interventions: bed mobility training, transfer training, gait training, therapeutic exercises, and therapeutic activities      Frequency/Duration: Patient will be followed by physical therapy:  2 times a week to address goals. Recommendation for discharge: (in order for the patient to meet his/her long term goals)  To be determined: pending IP psych admission per most recent POC    This discharge recommendation:  Has not yet been discussed the attending provider and/or case management    IF patient discharges home will need the following DME: to be determined (TBD) pending updated WBing status         SUBJECTIVE:   Patient stated I'm not putting much weight through it, but I can just use the wheelchair if I need to.    OBJECTIVE DATA SUMMARY:   HISTORY:    Past Medical History:   Diagnosis Date    ADD (attention deficit disorder) [age]yo    Treated with Adderall since dx. 2013 dosing 20mg AM and 10mg PM.  Trial/change to Vyvanse Nov-Dec 2015--not as effective. Gynecologic disorder     History of miscarriages.   History of Mirena IUD placed on 4/17/15    HX OTHER MEDICAL      -BUFA baby    HX OTHER MEDICAL     HPV positive    Idiopathic vulvodynia 2014    L1 vertebral fracture ([facility] [address] 2017    [facility] imaging/admissoin: Mild acute two column compression fracture of L1 without evidence of retropulsion into the spinal canal.  Managed non-operatively. Migraines 6/12/2013    Neurological disorder     Pelvic pain in female 9/15/2014    April 2015 gyn laparoscopy/hysteroscopy with endometriosis worse right. Psychiatric disorder     depression    Secondary dysmenorrhea 8/12/2014    With menorraghia    Seizures ([facility] [address] 2008    never on meds--had appr 4-5 in a short amt of time and none since     Past Surgical History:   Procedure Laterality Date    ENDOMETRIAL CRYOABLATION      HX GYN  4/17/15    laparoscopy and hysteroscopy and IUD placement    HX HYSTERECTOMY  04/04/2016    Complete Hysterectomy       Personal factors and/or comorbidities impacting plan of care: pending IP psych admit    Home Situation  Home Environment: Private residence  # Steps to Enter: 7  Rails to Enter: Yes  Hand Rails : Right  One/Two Story Residence: One story  Living Alone: No  Support Systems: Parent(s), Other Family Member(s)  Patient Expects to be Discharged to[de-identified] Psychiatric Unit  Current DME Used/Available at Home: Crutches    EXAMINATION/PRESENTATION/DECISION MAKING:   Critical Behavior:  Neurologic State: Alert  Orientation Level: Oriented X4  Cognition: Follows commands     Hearing: Auditory  Auditory Impairment: None  Skin:  well healing incisions L ankle  Edema: present L ankle, mild/ moderate     Functional Mobility:  Bed Mobility:     Supine to Sit: Independent  Sit to Supine: Independent     Transfers:  Sit to Stand: Supervision  Stand to Sit: Supervision                       Balance:   Sitting: Intact  Standing: Intact; With support  Ambulation/Gait Training:              Gait Description [facility]):  (supervision with use of knee scooter on unit)        Left Side Weight Bearing: Non-weight bearing (L LE, per ortho note 2/21/2023)               Pain Rating:  Pt reports minimal soreness L ankle    Activity Tolerance:   Good    After treatment patient left in no apparent distress:   Call bell within reach, Caregiver / family present, Side rails x 3, and sitting up in bed    COMMUNICATION/EDUCATION:   The patients plan of care was discussed with: Registered nurse and NP . Fall prevention education was provided and the patient/caregiver indicated understanding., Patient/family have participated as able in goal setting and plan of care. , and Patient/family agree to work toward stated goals and plan of care.     Thank you for this referral.  Eduin Graff, PT   Time Calculation: 15 mins

## 2023-03-13 NOTE — PROGRESS NOTES
End of Shift Note    Bedside shift change report given to Belkis Jiang RN (oncoming nurse) by Shazia Watts LPN (offgoing nurse). Report included the following information SBAR, Kardex, and MAR    Shift worked:  7p-7:30a     Shift summary and any significant changes:    Pt tolerated care fairly well. All pt meds administered orally per MAR. Pt has sitter by bedside. Pt denied having pain. Pt requested for PRN Fioricet for headache/migraine.      Pt did not have am labs      Concerns for physician to address:       Zone phone for oncoming shift:            Shazia Watts LPN

## 2023-03-13 NOTE — PROGRESS NOTES
I have reviewed discharge instructions with the PATIENT PARENT GUARDIAN: patient. The patient verbalized understanding. Discharge medications reviewed with patient and appropriate educational materials and side effects teaching were provided. Follow-up appointments reviewed. Opportunity for questions and clarification was provided. Venous access removed without difficulty. Patient's belongings gathered and sent with patient. Patient is ready for discharge. Pt discharged/transferred to [facility] via [facility] Home at 6 pm. EMTALA completed.      Stef Salas RN

## 2023-03-14 PROBLEM — F43.21 ADJUSTMENT DISORDER WITH DEPRESSED MOOD: Status: ACTIVE | Noted: 2023-03-14

## 2023-03-14 PROCEDURE — 65220000003 HC RM SEMIPRIVATE PSYCH

## 2023-03-14 PROCEDURE — 97161 PT EVAL LOW COMPLEX 20 MIN: CPT | Performed by: PHYSICAL THERAPIST

## 2023-03-14 PROCEDURE — 99223 1ST HOSP IP/OBS HIGH 75: CPT | Performed by: PSYCHIATRY & NEUROLOGY

## 2023-03-14 PROCEDURE — 74011250637 HC RX REV CODE- 250/637: Performed by: PSYCHIATRY & NEUROLOGY

## 2023-03-14 PROCEDURE — 74011250637 HC RX REV CODE- 250/637

## 2023-03-14 RX ORDER — GABAPENTIN 400 MG/1
400 CAPSULE ORAL 3 TIMES DAILY
Status: ON HOLD | COMMUNITY
End: 2023-03-15 | Stop reason: SDUPTHER

## 2023-03-14 RX ORDER — THERA TABS 400 MCG
1 TAB ORAL DAILY
Status: DISCONTINUED | OUTPATIENT
Start: 2023-03-15 | End: 2023-03-15 | Stop reason: HOSPADM

## 2023-03-14 RX ORDER — IBUPROFEN 200 MG
1 TABLET ORAL DAILY
Status: DISCONTINUED | OUTPATIENT
Start: 2023-03-14 | End: 2023-03-15 | Stop reason: HOSPADM

## 2023-03-14 RX ORDER — ESCITALOPRAM OXALATE 10 MG/1
5 TABLET ORAL DAILY
Status: DISCONTINUED | OUTPATIENT
Start: 2023-03-14 | End: 2023-03-15

## 2023-03-14 RX ORDER — CETIRIZINE HYDROCHLORIDE 10 MG/1
10 TABLET ORAL DAILY
COMMUNITY

## 2023-03-14 RX ORDER — BUTALBITAL, ACETAMINOPHEN AND CAFFEINE 50; 325; 40 MG/1; MG/1; MG/1
1 TABLET ORAL
Status: DISCONTINUED | OUTPATIENT
Start: 2023-03-14 | End: 2023-03-15 | Stop reason: HOSPADM

## 2023-03-14 RX ORDER — LURASIDONE HYDROCHLORIDE 20 MG/1
20 TABLET, FILM COATED ORAL
Status: DISCONTINUED | OUTPATIENT
Start: 2023-03-15 | End: 2023-03-15 | Stop reason: HOSPADM

## 2023-03-14 RX ORDER — CETIRIZINE HYDROCHLORIDE 10 MG/1
10 TABLET ORAL DAILY
Status: DISCONTINUED | OUTPATIENT
Start: 2023-03-15 | End: 2023-03-15 | Stop reason: HOSPADM

## 2023-03-14 RX ORDER — FLUTICASONE PROPIONATE 50 MCG
2 SPRAY, SUSPENSION (ML) NASAL DAILY
COMMUNITY

## 2023-03-14 RX ORDER — FLUTICASONE PROPIONATE 50 MCG
2 SPRAY, SUSPENSION (ML) NASAL DAILY
Status: DISCONTINUED | OUTPATIENT
Start: 2023-03-15 | End: 2023-03-15

## 2023-03-14 RX ORDER — NALTREXONE HYDROCHLORIDE 50 MG/1
50 TABLET, FILM COATED ORAL DAILY
Status: DISCONTINUED | OUTPATIENT
Start: 2023-03-14 | End: 2023-03-15 | Stop reason: HOSPADM

## 2023-03-14 RX ORDER — MULTIVITAMIN
1 TABLET ORAL DAILY
COMMUNITY

## 2023-03-14 RX ORDER — VENLAFAXINE HYDROCHLORIDE 75 MG/1
225 CAPSULE, EXTENDED RELEASE ORAL
Status: DISCONTINUED | OUTPATIENT
Start: 2023-03-14 | End: 2023-03-15 | Stop reason: HOSPADM

## 2023-03-14 RX ADMIN — VENLAFAXINE HYDROCHLORIDE 225 MG: 75 CAPSULE, EXTENDED RELEASE ORAL at 12:56

## 2023-03-14 RX ADMIN — HYDROXYZINE HYDROCHLORIDE 50 MG: 25 TABLET, FILM COATED ORAL at 11:34

## 2023-03-14 RX ADMIN — ESCITALOPRAM OXALATE 5 MG: 10 TABLET ORAL at 12:57

## 2023-03-14 RX ADMIN — TRAZODONE HYDROCHLORIDE 50 MG: 50 TABLET ORAL at 21:06

## 2023-03-14 RX ADMIN — ACETAMINOPHEN 650 MG: 325 TABLET ORAL at 05:24

## 2023-03-14 RX ADMIN — HYDROXYZINE HYDROCHLORIDE 50 MG: 25 TABLET, FILM COATED ORAL at 19:35

## 2023-03-14 RX ADMIN — BUTALBITAL, ACETAMINOPHEN, AND CAFFEINE 1 TABLET: 50; 325; 40 TABLET ORAL at 11:34

## 2023-03-14 RX ADMIN — BUTALBITAL, ACETAMINOPHEN, AND CAFFEINE 1 TABLET: 50; 325; 40 TABLET ORAL at 19:35

## 2023-03-14 RX ADMIN — NALTREXONE HYDROCHLORIDE 50 MG: 50 TABLET, FILM COATED ORAL at 12:57

## 2023-03-14 NOTE — PROGRESS NOTES
[facility] Transfer Pharmacy Medication Reconciliation    Information obtained from: records from [facility] hospitalization, patient interview   RxQuery data available1: Yes    Comments/recommendations:  Medications below reflect medications patient was receiving while hospitalized at [facility] (transferred from [facility] on 3/13/23). Reports mirtazapine, propranolol & gabapentin are new medications started there. The [place] Prescription Monitoring Program () was accessed to determine fill history of any controlled medications. Fills within the past 6 months are listed below:  Dextroamphetamine-amphetamine 15 mg tablets #60 for 30 DS filled 2/6/23, 1/9/23, 12/11/22, 11/14/22, 10/14/22  Oxycodone-acetaminophen 5-325 mg tablets #15 for 2 DS filled 1/30/23, 1/29/23  Oxycodone-acetaminophen 7.5-325 mg tablets #20 for 5 DS filled 1/3/23  Oxycodone 5 mg tablets #30 for 5 DS filled 1/3/23  Oxycodone-acetaminophen 5-325 mg #30 for 5 DS filled 12/29/22  Oxycodone-acetaminophen 5-325 mg #12 for 3 DS filled 12/27/22  Hydrocodone-acetaminophen 5-325 mg #5 for 1 DS filled 12/24/22    *Opioid prescriptions above are from 5 different prescribers* Prior to December 2022, last fill for any opioid was in July 2022. Patient reported to treatment team that she fakes pain symptoms in order to obtain opioid prescriptions. Medication changes (since last review): Added  Cetirizine  Fluticasone propionate nasal spray  Multivitamin  Gabapentin  Removed  Sliding scale insulin   Adjusted  None     1RxQuery pharmacy benefit data reflects medications filled and processed through the patient's insurance, however                this data does NOT capture whether the medication was picked up or is currently being taken by the patient. Patient allergies:    Allergies as of 03/13/2023 - Fully Reviewed 02/27/2023   Allergen Reaction Noted    Aspirin Other (comments) 05/30/2013    Lorazepam Other (comments) 02/25/2023    Penicillins Other (comments) 2013         Prior to Admission Medications   Prescriptions Last Dose Informant Patient Reported? Taking?   acetaminophen (TYLENOL) 325 mg tablet   No No   Sig: Take 2 Tablets by mouth every four (4) hours as needed for Fever for up to 30 days. butalbital-acetaminophen-caffeine (FIORICET, ESGIC) -40 mg per tablet   No No   Sig: Take 1 Tablet by mouth every four (4) hours as needed for Headache or Migraine for up to 30 days. Indications: a migraine headache   cetirizine (ZYRTEC) 10 mg tablet   Yes Yes   Sig: Take 10 mg by mouth daily. fluticasone propionate (Flonase Allergy Relief) 50 mcg/actuation nasal spray   Yes Yes   Si Sprays by Both Nostrils route daily. gabapentin (NEURONTIN) 400 mg capsule   Yes Yes   Sig: Take 400 mg by mouth three (3) times daily. hydrOXYzine HCL (ATARAX) 50 mg tablet   No Yes   Sig: Take 1 Tablet by mouth every six (6) hours as needed (anxiety or sleep) for up to 10 days. mirtazapine (REMERON) 7.5 mg tablet   No No   Sig: Take 1 Tablet by mouth nightly for 30 days. multivitamin (ONE A DAY) tablet   Yes Yes   Sig: Take 1 Tablet by mouth daily. naltrexone (DEPADE) 50 mg tablet   No No   Sig: Take 1 Tablet by mouth daily for 30 days. Indications: alcoholism   nicotine (NICODERM CQ) 14 mg/24 hr patch   No No   Si Patch by TransDERmal route daily for 30 days. propranoloL (INDERAL) 40 mg tablet   No No   Sig: Take 1 Tablet by mouth three (3) times daily. thiamine mononitrate (B-1) 100 mg tablet   No No   Sig: Take 1 Tablet by mouth daily for 30 days. Indications: deficiency in thiamine or vitamin B1   venlafaxine-SR (EFFEXOR-XR) 75 mg capsule   No No   Sig: Take 3 Capsules by mouth daily (with breakfast) for 30 days.       Facility-Administered Medications: None          Thank you,  Dai Rinaldi, PHARMD, BCPS, [facility]  Clinical Pharmacy Specialist, [facility]  Desk: [phone]  Pharmacy: [phone] ([phone])

## 2023-03-14 NOTE — GROUP NOTE
GRISELDA  GROUP DOCUMENTATION INDIVIDUAL                                                                          Group Therapy Note    Date: 3/14/2023    Group Start Time: 2649  Group End Time: 0902  Group Topic: [employer]    [facility] - [address]; [address] [place] [address], [address] [place] [address] Road [address] [place] [address] GROUP    Group Therapy Note    Attendees: 6       Attendance: Did not attend      [employer]

## 2023-03-14 NOTE — PROGRESS NOTES
Problem: Suicide  Goal: *STG/LTG: No longer expresses self destructive or suicidal thoughts  Outcome: Progressing Towards Goal     Problem: Depressed Mood (Adult/Pediatric)  Goal: *STG: Remains safe in hospital  Outcome: Progressing Towards Goal

## 2023-03-14 NOTE — BH NOTES
Patient arrived on the unit at [address] from [facility] unit voluntarily. Admitting diagnosis is Adjustment Mood Disorder with mixed emotions and Major Depressive Disorder. Patient was initially admitted at [facility] on 2/24/2023 with the diagnosis of Alcohol and Opioids withdrawal and Tachycardia. Patient was alert and oriented. She was cooperative with the admission process. Patient denied active S.I and said that she realized she needed help with her depressed mood before it will start making her feel suicidal. She confirmed anxiety, depression and denied A/V/H. Patient relapsed on opiates after her ankle injury. She brought her crutches and boot to the unit due to unit's policy, she has been given a wheel chair. Valuables  and belongings secured. Patient was oriented to the unit. Patient was compliant with her medications. Atarax given for anxiety at bedtime. Patient appeared to be sleeping for about 7.5 hours.

## 2023-03-14 NOTE — PROGRESS NOTES
Behavioral Services  Medicare Certification Upon Admission    I certify that this patient's inpatient psychiatric hospital admission is medically necessary for:    [x] (1) Treatment which could reasonably be expected to improve this patient's condition,       [x] (2) Or for diagnostic study;     AND     [x](2) The inpatient psychiatric services are provided while the individual is under the care of a physician and are included in the individualized plan of care.     Estimated length of stay/service 4-6 days    Plan for post-hospital care home    Electronically signed by Mike Arce MD on 3/14/2023 at 6:05 PM

## 2023-03-14 NOTE — H&P
INITIAL PSYCHIATRIC EVALUATION            IDENTIFICATION:    Patient Name  Rian Villeda   Date of Birth 1980   [facility] [id number]   Medical Record Number  [id number]      Age  38 y.o. PCP Asia Doe MD   Admit date:  3/13/2023    Room Number  322/02  @ [facility], [place]   Date of Service  3/14/2023            HISTORY         REASON FOR HOSPITALIZATION:  CC: \"depressed mood. Pt admitted under a voluntary basis for suicidal ideations proving to be an imminent danger to self and an inability to care for self. HISTORY OF PRESENT ILLNESS:    The patient, Rian Villeda, is a 38 y.o. WHITE/NON- female with a past psychiatric history significant for MDD and EtOH use disorder, mild, who presents at this time with complaints of (and/or evidence of) the following emotional symptoms: depression and suicidal thoughts/threats. Additional symptomatology include escalation of EtOH use. The above symptoms have been present for 2+ weeks. These symptoms are of moderate to high severity. These symptoms are constant in nature. The patient's condition has been precipitated by psychosocial stressors. Patient's condition made worse by alcohol use. UDS: negative; BAL= 127 at admission. The patient was transferred from [facility] due to worsening depressive symptoms in the setting of lingering pain symptoms from an ankle fracture. The patient is a fair historian. The patient corroborates the above narrative. The patient contracts for safety on the unit and gives consent for the team to contact collateral. The patient is amenable to initiating treatment while on the unit. She reports difficulty at home including poor sleep and poor self care. ALLERGIES:   Allergies   Allergen Reactions    Aspirin Other (comments)     Hot sweats and passed out; tolerated ibuprofen    Lorazepam Other (comments)    Penicillins Other (comments)     Childhood. Does not remember reaction.  Is not aware if she has ever taken cephalosporins in the past.    Jan 2019: Pt notes no problems with cephalosporins. MEDICATIONS PRIOR TO ADMISSION:   Medications Prior to Admission   Medication Sig    multivitamin (ONE A DAY) tablet Take 1 Tablet by mouth daily. gabapentin (NEURONTIN) 400 mg capsule Take 400 mg by mouth three (3) times daily. cetirizine (ZYRTEC) 10 mg tablet Take 10 mg by mouth daily. fluticasone propionate (Flonase Allergy Relief) 50 mcg/actuation nasal spray 2 Sprays by Both Nostrils route daily. butalbital-acetaminophen-caffeine (FIORICET, ESGIC) -40 mg per tablet Take 1 Tablet by mouth every four (4) hours as needed for Headache or Migraine for up to 30 days. Indications: a migraine headache    hydrOXYzine HCL (ATARAX) 50 mg tablet Take 1 Tablet by mouth every six (6) hours as needed (anxiety or sleep) for up to 10 days. venlafaxine-SR (EFFEXOR-XR) 75 mg capsule Take 3 Capsules by mouth daily (with breakfast) for 30 days. acetaminophen (TYLENOL) 325 mg tablet Take 2 Tablets by mouth every four (4) hours as needed for Fever for up to 30 days. mirtazapine (REMERON) 7.5 mg tablet Take 1 Tablet by mouth nightly for 30 days. naltrexone (DEPADE) 50 mg tablet Take 1 Tablet by mouth daily for 30 days. Indications: alcoholism    thiamine mononitrate (B-1) 100 mg tablet Take 1 Tablet by mouth daily for 30 days. Indications: deficiency in thiamine or vitamin B1    nicotine (NICODERM CQ) 14 mg/24 hr patch 1 Patch by TransDERmal route daily for 30 days. insulin lispro (HUMALOG) 100 unit/mL injection INITIATE CORRECTIVE INSULIN PROTOCOL (ZAHRAA):  RX ZAHRAA Insulin Resistant (Obese, Steroids use)    AC (before meals), Q6H, and Q4H CORRECTIONAL SCALE only For Blood Sugar (mg/dl) of :             140-199=3 units            200-249=4 units  250-299=7 units  300-349=10 units  350 or greater = Call MD  Give in addition to basal medications.   Do Not Hold for NPO    BEDTIME CORRECTIONAL sliding scale when scheduled:  200-249=2 units  250-299=4 units   300-349=5 units  350 or greater = Call MD  Give in addition to basal medications. Do Not Hold for NPO  Fast Acting - Administer Immediately - or within 15 minutes of start of meal, if mealtime coverage. Indications: type 2 diabetes mellitus    propranoloL (INDERAL) 40 mg tablet Take 1 Tablet by mouth three (3) times daily. PAST MEDICAL HISTORY:   Past Medical History:   Diagnosis Date    ADD (attention deficit disorder) [age]yo    Treated with Adderall since dx. 2013 dosing 20mg AM and 10mg PM.  Trial/change to Vyvanse Nov-Dec 2015--not as effective. Gynecologic disorder     History of miscarriages. History of Mirena IUD placed on 4/17/15    HX OTHER MEDICAL      -BUFA baby    HX OTHER MEDICAL     HPV positive    Idiopathic vulvodynia 2014    L1 vertebral fracture (HCC) 2017    [facility] imaging/admissoin: Mild acute two column compression fracture of L1 without evidence of retropulsion into the spinal canal.  Managed non-operatively. Migraines 2013    Neurological disorder     Pelvic pain in female 9/15/2014    2015 gyn laparoscopy/hysteroscopy with endometriosis worse right. Psychiatric disorder     depression    Secondary dysmenorrhea 2014    With menorraghia    Seizures ([address]     never on meds--had appr 4-5 in a short amt of time and none since     Past Surgical History:   Procedure Laterality Date    ENDOMETRIAL CRYOABLATION      HX GYN  4/17/15    laparoscopy and hysteroscopy and IUD placement    HX HYSTERECTOMY  2016    Complete Hysterectomy      SOCIAL HISTORY:  lives in [facility]/ grandparents who are supportive. 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 Patient has a high school education.      FAMILY HISTORY: History reviewed, pertinent family history as below:   Family History   Problem Relation Age of Onset    Cancer Mother     Diabetes Mother     Alcohol abuse Father         and drug    Psychiatric Disorder Father     Cancer Father     Diabetes Maternal Grandmother     Hypertension Maternal Grandmother     Thyroid Disease Maternal Grandmother     Diabetes Maternal Grandfather     Hypertension Maternal Grandfather     Cancer Maternal Grandfather     Heart Disease Maternal Grandfather     Cancer Paternal Grandmother     Diabetes Paternal Grandmother        REVIEW OF SYSTEMS:   Pertinent items are noted in the History of Present Illness. All other Systems reviewed and are considered negative. MENTAL STATUS EXAM & VITALS     MENTAL STATUS EXAM (MSE):    MSE FINDINGS ARE WITHIN NORMAL LIMITS (WNL) UNLESS OTHERWISE STATED BELOW. ( ALL OF THE BELOW CATEGORIES OF THE MSE HAVE BEEN REVIEWED (reviewed 3/14/2023) AND UPDATED AS DEEMED APPROPRIATE )  General Presentation age appropriate and overweight, cooperative   Orientation not oriented to situation   Vital Signs  See below (reviewed 3/14/2023); Vital Signs (BP, Pulse, & Temp) are within normal limits if not listed below.    Gait and Station Stable/steady, no ataxia   Musculoskeletal System No extrapyramidal symptoms (EPS); no abnormal muscular movements or Tardive Dyskinesia (TD); muscle strength and tone are within normal limits   Language No aphasia or dysarthria   Speech:  normal volume and non-pressured   Thought Processes logical; normal rate of thoughts; fair abstract reasoning/computation   Thought Associations goal directed   Thought Content preoccupations   Suicidal Ideations none   Homicidal Ideations contracts for safety   Mood:  depressed and anhedonia   Affect:  euthymic   Memory recent  intact   Memory remote:  intact   Concentration/Attention:  intact   Fund of Knowledge average   Insight:  fair   Reliability fair   Judgment:  limited          VITALS:     Patient Vitals for the past 24 hrs:   Temp Pulse Resp BP SpO2   03/14/23 0846 98 °F (36.7 °C) (!) 102 16 139/73 97 %   03/13/23 1930 97.9 °F (36.6 °C) (!) 105 14 106/79 --     Wt Readings from Last 3 Encounters:   02/28/23 108.9 kg (240 lb)   02/22/23 108.9 kg (240 lb)   02/15/23 121.1 kg (267 lb)     Temp Readings from Last 3 Encounters:   03/14/23 98 °F (36.7 °C)   03/13/23 97.9 °F (36.6 °C)   02/22/23 97.6 °F (36.4 °C)     BP Readings from Last 3 Encounters:   03/14/23 139/73   03/13/23 100/64   02/22/23 (!) 109/53     Pulse Readings from Last 3 Encounters:   03/14/23 (!) 102   03/13/23 93   02/22/23 (!) 139            DATA     LABORATORY DATA:  Labs Reviewed - No data to display  No results displayed because visit has over 200 results. RADIOLOGY REPORTS:  Results from Hospital Encounter encounter on 02/24/23    XR CHEST PORT    Narrative  EXAM:  XR CHEST PORT    INDICATION: Shortness of breath    COMPARISON: 2/25/2023    TECHNIQUE: Upright portable chest AP view at 1128    FINDINGS: The cardiac silhouette is within normal limits. The pulmonary  vasculature is within normal limits. There is interval development of pulmonary vascular congestion and mild  pulmonary edema. No pneumonia. Impression  1. Interval development of pulmonary vascular congestion and mild pulmonary  edema. XR ANKLE LT MIN 3 V    Result Date: 2/15/2023  Left ankle AP, lateral and oblique nonweightbearing x-rays show overall good alignment status post procedure, postsurgical changes noted, implants intact, fractures appear to be well aligned but no visible calluses are seen. No loosening of implants. Slight decreased bone density. XR ANKLE LT MIN 3 V    Result Date: 1/20/2023  Left ankle AP, lateral and oblique x-rays show satisfactory alignment status post surgical procedure with retained implants at the medial malleolus and lateral malleolus, all the screws and plates are intact. There is a normal ankle mortise with normal joint space. Fractures healing not complete.   Postsurgical changes noted.    CTA CHEST W OR W WO CONT    Result Date: 2/25/2023  INDICATION:  sob, hypoxia - r/o PE EXAM:  CTA Chest with contrast for Pulmonary Embolus COMPARISON:  None TECHNIQUE:  Following the uneventful intravenous administration of IV contrast thin helical axial images were obtained through the chest. 3D image postprocessing was performed. CT dose reduction was achieved through use of a standardized protocol tailored for this examination and automatic exposure control for dose modulation. FINDINGS: THYROID: Unremarkable. MEDIASTINUM/JEREMY: No mass or lymphadenopathy. HEART/PERICARDIUM: Unremarkable. Coronary artery calcification:  1 absent. AORTA:  No aneurysm. PULMONARY ARTERIES:No pulmonary embolism. LUNGS/PLEURA: No pulmonary edema, pleural effusion or focal airspace disease. Bibasilar atelectasis. INCIDENTALLY IMAGED UPPER ABDOMEN: Hepatic steatosis. BONES: No destructive bone lesion. ADDITIONAL COMMENTS:  N/A     No acute process. CT ABD PELV WO CONT    Result Date: 12/24/2022  CLINICAL HISTORY: fall in bathtub, lower back pain L4-L5 h/o previous compression fx INDICATION: fall in bathtub, lower back pain L4-L5 h/o previous compression fx COMPARISON: 7/21/2022 CONTRAST:  None. TECHNIQUE: Thin axial images were obtained through the abdomen and pelvis. Coronal and sagittal reformats were generated. Oral contrast was not administered. CT dose reduction was achieved through use of a standardized protocol tailored for this examination and automatic exposure control for dose modulation. The absence of intravenous contrast material reduces the sensitivity for evaluation of visceral organs and vasculature including presence of small mass lesions, hemodynamically significant stenoses, dissections, mucosal abnormalities etc. FINDINGS: LOWER THORAX: No significant abnormality in the incidentally imaged lower chest. LIVER/GALLBLADDER: Hepatic steatosis . Gallbladder is within normal limits. CBD is not dilated. SPLEEN/PANCREAS:  within normal limits. ADRENALS/KIDNEYS: Unremarkable. No calculus or hydronephrosis. STOMACH: Unremarkable. SMALL BOWEL/COLON: No dilatation or wall thickening. Colonic diverticulosis. APPENDIX: Unremarkable. PERITONEUM: No ascites or pneumoperitoneum. RETROPERITONEUM: No lymphadenopathy or aortic aneurysm. REPRODUCTIVE ORGANS: URINARY BLADDER: No mass or calculus. BONES: Chronic Schmorl's node along the superior endplate of L1 and W99. ADDITIONAL COMMENTS: N/A     No acute intraperitoneal process is identified. There is no evidence of acute fracture or dislocation. Incidental and/or nonemergent findings are as described in detail above. XR CHEST PORT    Result Date: 2/27/2023  EXAM:  XR CHEST PORT INDICATION: Shortness of breath COMPARISON: 2/25/2023 TECHNIQUE: Upright portable chest AP view at 1128 FINDINGS: The cardiac silhouette is within normal limits. The pulmonary vasculature is within normal limits. There is interval development of pulmonary vascular congestion and mild pulmonary edema. No pneumonia. 1. Interval development of pulmonary vascular congestion and mild pulmonary edema. XR CHEST PORT    Result Date: 2/25/2023  INDICATION: hypoxia EXAM:  AP CHEST RADIOGRAPH COMPARISON: June 8, 2022 FINDINGS: AP portable view of the chest demonstrates a normal cardiomediastinal silhouette. There is no edema, effusion, consolidation, or pneumothorax. The osseous structures are unremarkable. No acute process. ECHO ADULT COMPLETE    Result Date: 2/28/2023    Left Ventricle: Normal left ventricular systolic function with a visually estimated EF of 55 - 60%. Left ventricle size is normal. Normal wall motion. Mitral Valve: Mild regurgitation. Technical qualifiers: Echo study was technically difficult due to patient's body habitus.               MEDICATIONS       ALL MEDICATIONS  Current Facility-Administered Medications   Medication Dose Route Frequency    nicotine (NICODERM CQ) 14 mg/24 hr patch 1 Patch  1 Patch TransDERmal DAILY    butalbital-acetaminophen-caffeine (FIORICET, ESGIC) -40 mg per tablet 1 Tablet  1 Tablet Oral Q6H PRN    OLANZapine (ZyPREXA) tablet 5 mg  5 mg Oral Q6H PRN    haloperidol lactate (HALDOL) injection 5 mg  5 mg IntraMUSCular Q6H PRN    benztropine (COGENTIN) tablet 1 mg  1 mg Oral BID PRN    diphenhydrAMINE (BENADRYL) injection 50 mg  50 mg IntraMUSCular BID PRN    hydrOXYzine HCL (ATARAX) tablet 50 mg  50 mg Oral TID PRN    traZODone (DESYREL) tablet 50 mg  50 mg Oral QHS PRN    acetaminophen (TYLENOL) tablet 650 mg  650 mg Oral Q4H PRN    magnesium hydroxide (MILK OF MAGNESIA) 400 mg/5 mL oral suspension 30 mL  30 mL Oral DAILY PRN    mirtazapine (REMERON) tablet 7.5 mg  7.5 mg Oral QHS      SCHEDULED MEDICATIONS  Current Facility-Administered Medications   Medication Dose Route Frequency    nicotine (NICODERM CQ) 14 mg/24 hr patch 1 Patch  1 Patch TransDERmal DAILY    mirtazapine (REMERON) tablet 7.5 mg  7.5 mg Oral QHS                ASSESSMENT & PLAN        The patient, Antony Vale, is a 38 y.o.  female who presents at this time for treatment of the following diagnoses:  Patient Active Hospital Problem List:       Adjustment disorder with depressed mood (3/14/2023)           Assessment: the patient presents with depressed mood and ongoing symptoms with her medication regimen despite compliance. She has been seeing a psychiatrist for months and is working with an outpatient provider on her ongoing depressive episode.         Plan:   - RESTART Effexor 225 mg QDAY for MDD  - RESTART Latuda 20 mg ACLUNCH for MDD adjunct  - DISCONTINUE Remeron due to polypharmacy  - START Lexapro 5 mg QDAY for MDD adjunct  - RESTART Naltrexone 50 mg QDAY for EtOH use disorder  - RESTART Fioricet 1 tablet Q6H PRN migraine  - f/u A1C, TSH, Lipid, hepatic panel and GGT  - IGM therapy as tolerated  - Expand database / obtain collateral  - Dispo planning (home when stable) A coordinated, multidisplinary treatment team (includes the nurse, unit pharmacist,  and writer) round was conducted for this initial evaluation with the patient present. The following regarding medications was addressed during rounds with patient: the risks and benefits of the proposed medication. The patient was given the opportunity to ask questions. Informed consent given to the use of the above medications. I will continue to adjust psychiatric and non-psychiatric medications (see above \"medication\" section and orders section for details) as deemed appropriate & based upon diagnoses and response to treatment. I have reviewed admission (and previous/old) labs and medical tests in the EHR and or transferring hospital documents. I will continue to order blood tests/labs and diagnostic tests as deemed appropriate and review results as they become available (see orders for details). I have reviewed old psychiatric and medical records available in the EHR. I Will order additional psychiatric records from other institutions to further elucidate the nature of patient's psychopathology and review once available. I will gather additional collateral information from friends, family and o/p treatment team to further elucidate the nature of patient's psychopathology and baselline level of psychiatric functioning. I certify that this patient's inpatient psychiatric hospital services are required for treatment that could reasonably be expected to improve the patient's condition, or for diagnostic study, and that the patient continues to need, on a daily basis, active treatment furnished directly by or requiring the supervision of inpatient psychiatric facility personnel. In addition, the hospital records show that services furnished were intensive treatment services, admission or related services, or equivalent services.       ESTIMATED LENGTH OF STAY:  5-7 days       STRENGTHS:  Exercising self-direction/Resourceful, Access to housing/residential stability, and Interpersonal/supportive relationships (family, friends, peers)                                        SIGNED:    Uzair Coronado MD  3/14/2023

## 2023-03-14 NOTE — PROGRESS NOTES
PHYSICAL THERAPY EVALUATION/DISCHARGE  Patient: Gabbie Randle (81 y.o. female)  Date: 3/14/2023  Primary Diagnosis: Adjustment disorder with depressed mood [F43.21]       Precautions: LLE NWB, Fall          ASSESSMENT  Based on the objective data described below, the patient presents with decreased mobility independence secondary to NWB status of LLE to allow for fracture healing. Patient is able to use wheelchair and rolling walker safely while maintaining NWB status, however patient states that the wheelchair is uncomfortable and that she does not like using walker for mobility. Patient is scheduled for follow up with orthopedic MD tomorrow 3/15/23. Weight bearing status would likely be updated at orthopedic follow up. PT recommends consulting orthopedic physician's office to see if radiographs can be performed at [facility] and weight bearing status change can be assessed remotely if appropriate. Other factors to consider for discharge: Fx healing     Further skilled acute physical therapy is not indicated at this time. PLAN :  Recommendation for discharge: (in order for the patient to meet his/her long term goals)  To be determined: based on weight bearing status and mobility independence     This discharge recommendation:  Has been made in collaboration with the attending provider and/or case management    IF patient discharges home will need the following DME: to be determined (TBD)       SUBJECTIVE:   Patient stated The wheelchair is just too uncomfortable. The knee scooter worked well, I get too tired using a walker.     OBJECTIVE DATA SUMMARY:   HISTORY:    Past Medical History:   Diagnosis Date    ADD (attention deficit disorder) [age]yo    Treated with Adderall since . 2013 dosing 20mg AM and 10mg PM.  Trial/change to Vyvanse Nov-Dec 2015--not as effective. Gynecologic disorder     History of miscarriages.   History of Mirena IUD placed on 4/17/15    HX OTHER MEDICAL      -BUFA baby HX OTHER MEDICAL     HPV positive    Idiopathic vulvodynia 8/12/2014    L1 vertebral fracture (HCC) 03/16/2017    [facility] imaging/admissoin: Mild acute two column compression fracture of L1 without evidence of retropulsion into the spinal canal.  Managed non-operatively. Migraines 6/12/2013    Neurological disorder     Pelvic pain in female 9/15/2014    April 2015 gyn laparoscopy/hysteroscopy with endometriosis worse right. Psychiatric disorder     depression    Secondary dysmenorrhea 8/12/2014    With menorraghia    Seizures ([facility] [address] 2008    never on meds--had appr 4-5 in a short amt of time and none since     Past Surgical History:   Procedure Laterality Date    ENDOMETRIAL CRYOABLATION      HX GYN  4/17/15    laparoscopy and hysteroscopy and IUD placement    HX HYSTERECTOMY  04/04/2016    Complete Hysterectomy       Prior level of function: independent  Personal factors and/or comorbidities impacting plan of care:     Home Situation  Home Environment: Private residence  One/Two Story Residence: One story  Living Alone: No  Support Systems: Child(nellie), Other Family Member(s)  Patient Expects to be Discharged to[de-identified] Home  Current DME Used/Available at Home: Crutches, Wheelchair    EXAMINATION/PRESENTATION/DECISION MAKING:   Critical Behavior:  Neurologic State: Alert  Orientation Level: Oriented X4  Cognition: Follows commands    Range Of Motion:   Within Functional limits   Strength: Within Functional limits     Functional Mobility:  Bed Mobility:  Rolling: Independent  Supine to Sit: Independent  Sit to Supine: Independent  Transfers:  Sit to Stand: Independent  Stand to Sit: Independent  Balance:   Sitting: Intact  Standing: Intact; With support  Ambulation/Gait Training:  Distance (ft): 20 Feet (ft)  Assistive Device: Walker, rolling  Ambulation - Level of Assistance: Supervision  Left Side Weight Bearing: Non-weight bearing    Physical Therapy Evaluation Charge Determination   History Examination Presentation Decision-Making   LOW Complexity : Zero comorbidities / personal factors that will impact the outcome / POC LOW Complexity : 1-2 Standardized tests and measures addressing body structure, function, activity limitation and / or participation in recreation  LOW Complexity : Stable, uncomplicated  Other outcome measures    LOW       Based on the above components, the patient evaluation is determined to be of the following complexity level: LOW     Pain Rating:  No pain reported,  L ankle stiffness noted    Activity Tolerance:   Good      After treatment patient left in no apparent distress:   Sitting at edge of bed    COMMUNICATION/EDUCATION:   The patients plan of care was discussed with: Physical therapist and Registered nurse. Fall prevention education was provided and the patient/caregiver indicated understanding., Patient/family have participated as able in goal setting and plan of care. , and Patient/family agree to work toward stated goals and plan of care.     Thank you for this referral.  Franky Pringle, PT   Time Calculation: 10 mins

## 2023-03-14 NOTE — BH NOTES
PSYCHOSOCIAL ASSESSMENT  :Patient identifying info:   Shiva Tee is a 38 y.o., female admitted 3/13/2023  7:02 PM     Presenting problem and precipitating factors: Patient is a 38year old female with a hx of depression and polysubstance abuse who voluntarily admitted herself due to increased depression and passive SI. Mental status assessment: alert and oriented x 4    Strengths/Recreation/Coping Skills: family, stable housing    Collateral information: Grandparents and Mom    Current psychiatric /substance abuse providers and contact info:  [facility]    Previous psychiatric/substance abuse providers and response to treatment: previous psych admissions    Family history of mental illness or substance abuse: yes    Substance abuse history:    Social History     Tobacco Use    Smoking status: Former     Packs/day: 1.00     Years: 18.00     Pack years: 18.00     Types: Cigarettes    Smokeless tobacco: Current    Tobacco comments:     Vapes   Substance Use Topics    Alcohol use: Yes     Comment: 5 beers yesterday 7/20/22       History of biomedical complications associated with substance abuse: none    Patient's current acceptance of treatment or motivation for change: fair    Family constellation: Patient is single, never , with one child    Is significant other involved? no    Describe support system: grandparents, mom, and outpatient providers    Describe living arrangements and home environment: lives with grandparents    GUARDIAN/POA: NO    Guardian Name:     Guardian Contact:     Health issues:   Hospital Problems  Date Reviewed: 11/18/2022            Codes Class Noted POA    Adjustment disorder with depressed mood ICD-10-CM: F43.21  ICD-9-CM: 309.0  3/14/2023 Unknown           Trauma history: yes    Legal issues: none    History of  service: none    Financial status: family assistance     Presybeterian/cultural factors: none    Education/work history: unemployed     Have you been licensed as a health care professional (current or ): n/a    Describe coping skills:limited and ineffective     Chantale Barger  3/14/2023

## 2023-03-14 NOTE — PROGRESS NOTES
Problem: Suicide  Goal: *STG: Remains safe in hospital  Outcome: Progressing Towards Goal  Goal: *STG: Seeks staff when feelings of self harm or harm towards others arise  Outcome: Progressing Towards Goal  Goal: *STG:  Verbalizes alternative ways of dealing with maladaptive feelings/behaviors  Outcome: Progressing Towards Goal   Patient is endorsing anxiety and depression, but denies SI/HI/AVH. She is restricted and isolating to her room, coming out in her wheelchair when meals arrive. She is medication compliant and A/Ox4.

## 2023-03-14 NOTE — GROUP NOTE
GRISEL[facility]  GROUP DOCUMENTATION INDIVIDUAL                                                                          Group Therapy Note    Date: 3/14/2023    Group Start Time: 1500  Group End Time: 1600  Group Topic: Recreational/Music Therapy    [address] [facility] 3 ACUTE BEHAV [facility]    [place], [address] [facility] GROUP DOCUMENTATION GROUP    Group Therapy Note    Attendees: 11       Attendance: Attended    Patient's Goal:  To concentrate on selected task    Interventions/techniques: Supported-crafts,games,music    Follows Directions:  Followed directions    Interactions: Interacted appropriately    Mental Status: Calm-pleasant    Behavior/appearance: Attentive and Cooperative    Goals Achieved: Able to engage in interactions and Able to listen to others-good concentration during game      [place] Pool

## 2023-03-14 NOTE — GROUP NOTE
[facility] GROUP DOCUMENTATION INDIVIDUAL                                                                          Group Therapy Note    Date: 3/14/2023    Group Start Time: 0900  Group End Time: 1000  Group Topic: Topic Group    [facility] 3 ACUTE BEHAV [facility]    [address] GROUP    Group Therapy Note    Attendees: 4       Attendance: Did not attend    [facility]

## 2023-03-15 ENCOUNTER — OFFICE VISIT (OUTPATIENT)
Dept: ORTHOPEDIC SURGERY | Age: 43
End: 2023-03-15
Payer: MEDICAID

## 2023-03-15 VITALS
SYSTOLIC BLOOD PRESSURE: 121 MMHG | TEMPERATURE: 97.9 F | OXYGEN SATURATION: 96 % | RESPIRATION RATE: 18 BRPM | HEART RATE: 92 BPM | DIASTOLIC BLOOD PRESSURE: 59 MMHG

## 2023-03-15 VITALS — HEIGHT: 64 IN | WEIGHT: 240 LBS | BODY MASS INDEX: 40.97 KG/M2

## 2023-03-15 DIAGNOSIS — Z09 SURGICAL FOLLOW-UP CARE: Primary | ICD-10-CM

## 2023-03-15 DIAGNOSIS — S82.852D CLOSED DISPLACED TRIMALLEOLAR FRACTURE OF LEFT ANKLE WITH ROUTINE HEALING, SUBSEQUENT ENCOUNTER: ICD-10-CM

## 2023-03-15 PROCEDURE — 74011250637 HC RX REV CODE- 250/637

## 2023-03-15 PROCEDURE — 74011250637 HC RX REV CODE- 250/637: Performed by: PSYCHIATRY & NEUROLOGY

## 2023-03-15 PROCEDURE — 99239 HOSP IP/OBS DSCHRG MGMT >30: CPT | Performed by: PSYCHIATRY & NEUROLOGY

## 2023-03-15 PROCEDURE — 99024 POSTOP FOLLOW-UP VISIT: CPT | Performed by: ORTHOPAEDIC SURGERY

## 2023-03-15 RX ORDER — LURASIDONE HYDROCHLORIDE 20 MG/1
20 TABLET, FILM COATED ORAL
Qty: 30 TABLET | Refills: 1 | Status: SHIPPED | OUTPATIENT
Start: 2023-03-15

## 2023-03-15 RX ORDER — TRAZODONE HYDROCHLORIDE 50 MG/1
50 TABLET ORAL
Qty: 30 TABLET | Refills: 1 | Status: SHIPPED | OUTPATIENT
Start: 2023-03-15

## 2023-03-15 RX ORDER — PROPRANOLOL HYDROCHLORIDE 40 MG/1
40 TABLET ORAL 3 TIMES DAILY
Qty: 90 TABLET | Refills: 1 | Status: SHIPPED | OUTPATIENT
Start: 2023-03-15

## 2023-03-15 RX ORDER — MIRTAZAPINE 15 MG/1
7.5 TABLET, FILM COATED ORAL
Status: DISCONTINUED | OUTPATIENT
Start: 2023-03-15 | End: 2023-03-15 | Stop reason: HOSPADM

## 2023-03-15 RX ORDER — GABAPENTIN 400 MG/1
400 CAPSULE ORAL 3 TIMES DAILY
Qty: 90 CAPSULE | Refills: 1 | Status: SHIPPED | OUTPATIENT
Start: 2023-03-15

## 2023-03-15 RX ORDER — MIRTAZAPINE 7.5 MG/1
7.5 TABLET, FILM COATED ORAL
Qty: 30 TABLET | Refills: 1 | Status: SHIPPED | OUTPATIENT
Start: 2023-03-15 | End: 2023-05-14

## 2023-03-15 RX ORDER — NALTREXONE HYDROCHLORIDE 50 MG/1
50 TABLET, FILM COATED ORAL DAILY
Qty: 30 TABLET | Refills: 1 | Status: SHIPPED | OUTPATIENT
Start: 2023-03-15 | End: 2023-05-14

## 2023-03-15 RX ADMIN — THERA TABS 1 TABLET: TAB at 09:26

## 2023-03-15 RX ADMIN — GABAPENTIN 400 MG: 100 CAPSULE ORAL at 11:58

## 2023-03-15 RX ADMIN — NALTREXONE HYDROCHLORIDE 50 MG: 50 TABLET, FILM COATED ORAL at 09:26

## 2023-03-15 RX ADMIN — ESCITALOPRAM OXALATE 5 MG: 10 TABLET ORAL at 09:26

## 2023-03-15 RX ADMIN — CETIRIZINE HYDROCHLORIDE 10 MG: 10 TABLET, FILM COATED ORAL at 09:26

## 2023-03-15 RX ADMIN — LURASIDONE HYDROCHLORIDE 20 MG: 20 TABLET, FILM COATED ORAL at 11:58

## 2023-03-15 RX ADMIN — VENLAFAXINE HYDROCHLORIDE 225 MG: 75 CAPSULE, EXTENDED RELEASE ORAL at 09:26

## 2023-03-15 RX ADMIN — GABAPENTIN 400 MG: 100 CAPSULE ORAL at 09:26

## 2023-03-15 NOTE — PROGRESS NOTES
Kelly Singer (: 1980) is a 38 y.o. female, patient,here for evaluation of the following   Chief Complaint   Patient presents with    Foot Pain     Left         ASSESSMENT/PLAN:  Below is the assessment and plan developed based on review of pertinent history, physical exam, labs, studies, and medications. 1. Surgical follow-up care  -     XR ANKLE LT MIN 3 V; Future  -     REFERRAL TO PHYSICAL THERAPY  2. Closed displaced trimalleolar fracture of left ankle with routine healing, subsequent encounter  -     XR ANKLE LT MIN 3 V; Future  -     REFERRAL TO PHYSICAL THERAPY      The patient is informed of findings on exam and x-rays reviewed. The x-rays show some healing but not complete. Overall alignment remains good. Again reminded that smoking results in delayed healing and could result in a nonunion. Recommend smoking cessation again. She states she has been smoking more since recent hospitalization for other medical conditions. She has not attended much therapy yet so she will be referred again to a therapist to start range of motion, and gradual weightbearing as tolerated to full weightbearing in a boot. I do not want to get her out of boot yet because she is a smoker of cigarettes, at risk for delayed union and failure of fracture reduction or fixation. Next time she returns we will get new x-rays of the left ankle 3 views weightbearing if able to weight-bear. Return in about 6 weeks (around 2023) for repeat xrays. Allergies   Allergen Reactions    Aspirin Other (comments)     Hot sweats and passed out; tolerated ibuprofen    Lorazepam Other (comments)    Penicillins Other (comments)     Childhood. Does not remember reaction. Is not aware if she has ever taken cephalosporins in the past.    2019: Pt notes no problems with cephalosporins.        Current Outpatient Medications   Medication Sig    lurasidone (LATUDA) 20 mg tab tablet Take 1 Tablet by mouth daily (with lunch). Indications: MDD adjunct    mirtazapine (REMERON) 7.5 mg tablet Take 1 Tablet by mouth nightly for 60 days. Indications: major depressive disorder    naltrexone (DEPADE) 50 mg tablet Take 1 Tablet by mouth daily for 60 days. Indications: alcoholism    traZODone (DESYREL) 50 mg tablet Take 1 Tablet by mouth nightly as needed for Sleep (For insomnia). Indications: insomnia associated with depression    propranoloL (INDERAL) 40 mg tablet Take 1 Tablet by mouth three (3) times daily. Indications: high blood pressure    gabapentin (NEURONTIN) 400 mg capsule Take 1 Capsule by mouth three (3) times daily. Max Daily Amount: 1,200 mg. Indications: neuropathic pain    multivitamin (ONE A DAY) tablet Take 1 Tablet by mouth daily. cetirizine (ZYRTEC) 10 mg tablet Take 10 mg by mouth daily. fluticasone propionate (Flonase Allergy Relief) 50 mcg/actuation nasal spray 2 Sprays by Both Nostrils route daily. butalbital-acetaminophen-caffeine (FIORICET, ESGIC) -40 mg per tablet Take 1 Tablet by mouth every four (4) hours as needed for Headache or Migraine for up to 30 days. Indications: a migraine headache    hydrOXYzine HCL (ATARAX) 50 mg tablet Take 1 Tablet by mouth every six (6) hours as needed (anxiety or sleep) for up to 10 days. venlafaxine-SR (EFFEXOR-XR) 75 mg capsule Take 3 Capsules by mouth daily (with breakfast) for 30 days. acetaminophen (TYLENOL) 325 mg tablet Take 2 Tablets by mouth every four (4) hours as needed for Fever for up to 30 days. nicotine (NICODERM CQ) 14 mg/24 hr patch 1 Patch by TransDERmal route daily for 30 days. No current facility-administered medications for this visit. Past Medical History:   Diagnosis Date    ADD (attention deficit disorder) [age]yo    Treated with Adderall since dx. Nov 2013 dosing 20mg AM and 10mg PM.  Trial/change to Vyvanse Nov-Dec 2015--not as effective. Gynecologic disorder     History of miscarriages.   History of Mirena IUD placed on 4/17/15    HX OTHER MEDICAL      2001-BUFA baby    HX OTHER MEDICAL     HPV positive    Idiopathic vulvodynia 2014    L1 vertebral fracture (HCC) 2017    UVA imaging/admissoin: Mild acute two column compression fracture of L1 without evidence of retropulsion into the spinal canal.  Managed non-operatively. Migraines 2013    Neurological disorder     Pelvic pain in female 9/15/2014    2015 gyn laparoscopy/hysteroscopy with endometriosis worse right.     Psychiatric disorder     depression    Secondary dysmenorrhea 2014    With menorraghia    Seizures ([facility] [address]     never on meds--had appr 4-5 in a short amt of time and none since       Past Surgical History:   Procedure Laterality Date    ENDOMETRIAL CRYOABLATION      HX GYN  4/17/15    laparoscopy and hysteroscopy and IUD placement    HX HYSTERECTOMY  2016    Complete Hysterectomy       Family History   Problem Relation Age of Onset    Cancer Mother     Diabetes Mother     Alcohol abuse Father         and drug    Psychiatric Disorder Father     Cancer Father     Diabetes Maternal Grandmother     Hypertension Maternal Grandmother     Thyroid Disease Maternal Grandmother     Diabetes Maternal Grandfather     Hypertension Maternal Grandfather     Cancer Maternal Grandfather     Heart Disease Maternal Grandfather     Cancer Paternal Grandmother     Diabetes Paternal Grandmother        Social History     Socioeconomic History    Marital status: SINGLE     Spouse name: Not on file    Number of children: Not on file    Years of education: Not on file    Highest education level: Not on file   Occupational History    Not on file   Tobacco Use    Smoking status: Former     Packs/day: 1.00     Years: 18.00     Pack years: 18.00     Types: Cigarettes    Smokeless tobacco: Current    Tobacco comments:     Vapes   Vaping Use    Vaping Use: Never used   Substance and Sexual Activity    Alcohol use: Yes     Comment: 5 beers yesterday 22    Drug use: No    Sexual activity: Not Currently     Partners: Male     Birth control/protection: Surgical   Other Topics Concern     Service Not Asked    Blood Transfusions Not Asked    Caffeine Concern Not Asked    Occupational Exposure Not Asked    Hobby Hazards Not Asked    Sleep Concern Not Asked    Stress Concern Not Asked    Weight Concern Not Asked    Special Diet Not Asked    Back Care Not Asked    Exercise Not Asked    Bike Helmet Not Asked    Seat Belt Not Asked    Self-Exams Not Asked   Social History Narrative    ** Merged History Encounter **          Social Determinants of Health     Financial Resource Strain: Not on file   Food Insecurity: Not on file   Transportation Needs: Not on file   Physical Activity: Not on file   Stress: Not on file   Social Connections: Not on file   Intimate Partner Violence: Not on file   Housing Stability: Not on file           Vitals:  Ht 5' 4\" (1.626 m)   Wt 240 lb (108.9 kg)   LMP 2016   BMI 41.20 kg/m²    Body mass index is 41.2 kg/m². SUBJECTIVE:  Delisa Thacker (: 1980)   Patient returns today, she had missed her previous follow-up appointment, she had surgical procedure on 2023, left ankle trimalleolar fracture open reduction internal fixation. States she did not return because she was hospitalized for other medical conditions and is here today for follow-up. Patient is a smoker of cigarettes, in the past I recommend no smoking to allow this to heal.        OBJECTIVE EXAM:     Visit Vitals  Ht 5' 4\" (1.626 m)   Wt 240 lb (108.9 kg)   LMP 2016   BMI 41.20 kg/m²       Appearance: Alert, well appearing and pleasant patient who is in no distress, oriented to person, place/time, and who follows commands. This patient is accompanied in the       office by her  self and mother. Psychiatric: Affect and mood are appropriate.  No dementia noted on examination  Musculoskeletal:  LOCATION: Minimal tenderness and swellingankle - left      Integumentary: No rashes, skin patches, wounds, or abrasions to the right or left legs       Warm and normal color. No regions of expressible drainage. Gait: Normal      Tenderness: No tenderness        Motor/Strength/Tone Exam: Normal       Sensory Exam:   Intact Normal Sensation to ankle/foot      Stability Testing: No anterolateral or varus instability of the Ankle or Subtalar Joints               No peroneal tendon instability noted      ROM: Normal ROM noted to ankle/foot      Contractures: No Achilles or Gastrocnemius Contractures      Calf tenderness: Absent for calf or gastrocnemius muscle regions       Soft, supple, non tender, non taut lower extremity compartment  Alignment:      NEUTRAL Hindfoot,         none Metatarsus Adductus Metatarsus   Wounds/Abrasions:    None present  Extremities:   No embolic phenomena to the toes          No significant edema to the foot and or toes. Lower extremities are warm and appear well perfused    DVT: No evidence of DVT seen on examination at this time     No calf swelling, no tenderness to calf muscles  Lymphatic:  No Evidence of Lymphedema  Vascular: Medial Border of Tibia Region: Edema is not present         Pulses: Dorsalis Pedis &  Posterior Tibial Pulses : Palpable yes        Varicosities Lower Limbs :  None  noted  Neuro: Negative bilateral Straight leg raise (seated position)    See Musculoskeletal section for pertinent individual extremity examination    No abnormal hand/wrist, foot/ankle, or facial/neck tremors. Lower Extremity/Ankle/Foot:  Mostly nonweightbearing gait, limited weightbearing stance. Left lower extremity/ankle: There is no malalignment or deformity, tenderness to palpation at medial and lateral malleolus, minimal swelling at lateral and medial malleolus area, incision sites well-healed. Calves are soft nontender. Ligaments grossly stable.     Left foot: Nontender, no swelling, ligaments grossly stable. Able to flex and extend toes satisfactory to motion and strength. Neurovascular exam is grossly intact. Imaging:    XR Results (most recent):  Results from Appointment encounter on 03/15/23    XR ANKLE LT MIN 3 V    Narrative  Left ankle nonweightbearing AP, lateral and oblique x-rays show satisfactory alignment on 3 views status post surgical fixation, retained implants are intact. Fractures appear to be healing, does not appear 100% healed however. There is slight bone density decreased. An electronic signature was used to authenticate this note.   -- Perla Moore MD

## 2023-03-15 NOTE — GROUP NOTE
[facility] GROUP DOCUMENTATION INDIVIDUAL                                                                          Group Therapy Note    Date: 3/15/2023    Group Start Time: 0825  Group End Time: 0840  Group Topic: Community Meeting    [address], [address]; [place], [address] [facility] GROUP DOCUMENTATION GROUP    Group Therapy Note    Attendees:8       Attendance: Did not attend      [facility]

## 2023-03-15 NOTE — BH NOTES
Assumed care of the patient. Patient was visible on the milieu and was cooperative with the assessments. She was pleasant during  the interaction. She confirmed anxiety, depression and denied S.I/H.I/A//VH. Patient requested PRN Fioricet for headache, Atarax for anxiety and Trazodone for sleep. Patient requesting to have Remeron at bedtime, per patient Trazodone does not help much. Patient appeared to be sleeping for about 6 hours. Unsuccessful blood work this morning. Will pass this along the oncoming shift nurse. [Hearing Loss] : hearing loss [Negative] : Heme/Lymph [Nose Bleeds] : nose bleeds [de-identified] : minor blood

## 2023-03-15 NOTE — BH NOTES
Discharge:  Pt alert, verbal, and oriented. Discharged to Partial Hospitalization Program (PHP) to continue recommended plan of care. Discharge instructions reviewed with pt. Patient verbalized understanding. Patient's belongings, home medications, and valuables returned. Patient transported to home via mother.

## 2023-03-15 NOTE — PROGRESS NOTES
Problem: Suicide  Goal: *STG: Remains safe in hospital  Outcome: Resolved/Met  Goal: *STG: Seeks staff when feelings of self harm or harm towards others arise  Outcome: Resolved/Met  Goal: *STG: Attends activities and groups  Outcome: Resolved/Met  Goal: *STG:  Verbalizes alternative ways of dealing with maladaptive feelings/behaviors  Outcome: Resolved/Met  Goal: *STG/LTG: Complies with medication therapy  Outcome: Resolved/Met  Goal: *STG/LTG: No longer expresses self destructive or suicidal thoughts  Outcome: Resolved/Met  Goal: *LTG:  Identifies available community resources  Outcome: Resolved/Met  Goal: Interventions  Outcome: Resolved/Met     Problem: Depressed Mood (Adult/Pediatric)  Goal: *STG: Participates in treatment plan  Outcome: Resolved/Met  Goal: *STG: Participates in 1:1 therapy sessions  Outcome: Resolved/Met  Goal: *STG: Verbalizes anger, guilt, and other feelings in a constructive manor  Outcome: Resolved/Met  Goal: *STG: Attends activities and groups  Outcome: Resolved/Met  Goal: *STG: Demonstrates reduction in symptoms and increase in insight into coping skills/future focused  Outcome: Resolved/Met  Goal: *STG: Remains safe in hospital  Outcome: Resolved/Met  Goal: *STG: Complies with medication therapy  3/15/2023 1059 by Neeru Atkins  Outcome: Resolved/Met  3/15/2023 1023 by Neeru Atkins  Outcome: Progressing Towards Goal  Goal: *LTG: Returns to previous level of functioning and participates with after care plan  Outcome: Resolved/Met  Goal: *LTG: Understands illness and can identify signs of relapse  Outcome: Resolved/Met  Goal: Interventions  Outcome: Resolved/Met     Problem: Falls - Risk of  Goal: *Absence of Falls  Description: Document Rema Fall Risk and appropriate interventions in the flowsheet.   3/15/2023 1059 by Neeru Meraz  Outcome: Resolved/Met  Note: Fall Risk Interventions:                             3/15/2023 1023 by Dion Collier  Outcome: Progressing Towards Goal  Note: Fall Risk Interventions:                                Problem: Patient Education: Go to Patient Education Activity  Goal: Patient/Family Education  Outcome: Resolved/Met

## 2023-03-15 NOTE — PROGRESS NOTES
Laboratory monitoring for mood stabilizer and antipsychotics:    Recommended baseline monitoring has not been completed based on this patient's current medication regimen. The following labs have been ordered to complete baseline monitoring: HbA1c      The patient is currently taking the following medication(s):   Current Facility-Administered Medications   Medication Dose Route Frequency    nicotine (NICODERM CQ) 14 mg/24 hr patch 1 Patch  1 Patch TransDERmal DAILY    venlafaxine-SR (EFFEXOR-XR) capsule 225 mg  225 mg Oral DAILY WITH BREAKFAST    naltrexone (DEPADE) tablet 50 mg  50 mg Oral DAILY    escitalopram oxalate (LEXAPRO) tablet 5 mg  5 mg Oral DAILY    cetirizine (ZYRTEC) tablet 10 mg  10 mg Oral DAILY    gabapentin (NEURONTIN) capsule 400 mg  400 mg Oral TID    lurasidone (LATUDA) tablet 20 mg  20 mg Oral DAILY WITH LUNCH    therapeutic multivitamin (THERAGRAN) tablet 1 Tablet  1 Tablet Oral DAILY       Height, Weight, BMI Estimation  Estimated body mass index is 41.2 kg/m² as calculated from the following:    Height as of 3/3/23: 162.6 cm (64\"). Weight as of 2/28/23: 108.9 kg (240 lb). Renal Function, Hepatic Function and Chemistry  CrCl cannot be calculated (Unknown ideal weight.). Lab Results   Component Value Date/Time    Sodium 135 (L) 03/12/2023 03:15 PM    Potassium 4.1 03/12/2023 03:15 PM    Chloride 108 03/12/2023 03:15 PM    CO2 19 (L) 03/12/2023 03:15 PM    Anion gap 8 03/12/2023 03:15 PM    Glucose 252 (H) 03/12/2023 03:15 PM    BUN 17 03/12/2023 03:15 PM    Creatinine 0.87 03/12/2023 03:15 PM    BUN/Creatinine ratio 20 03/12/2023 03:15 PM    GFR est AA >60 07/21/2022 10:38 AM    GFR est non-AA >60 07/21/2022 10:38 AM    Calcium 9.0 03/12/2023 03:15 PM    ALT (SGPT) 27 09/08/2022 12:00 AM    Alk.  phosphatase 88 09/08/2022 12:00 AM    Protein, total 6.6 09/08/2022 12:00 AM    Albumin 3.2 (L) 03/05/2023 03:18 AM    Globulin 3.1 07/21/2022 10:38 AM    A-G Ratio 1.1 07/21/2022 10:38 AM Bilirubin, total 0.6 09/08/2022 12:00 AM       Lab Results   Component Value Date/Time    Glucose 252 (H) 03/12/2023 03:15 PM    Glucose (POC) 186 (H) 03/13/2023 05:10 PM       Lab Results   Component Value Date/Time    Hemoglobin A1c 5.9 (H) 02/03/2022 12:00 AM       Hematology  Lab Results   Component Value Date/Time    WBC 8.0 03/12/2023 03:15 PM    Hemoglobin (POC) 14.3 04/04/2016 07:26 AM    HGB 12.5 03/12/2023 03:15 PM    Hematocrit (POC) 42 04/04/2016 07:26 AM    HCT 37.2 03/12/2023 03:15 PM    PLATELET 245 (H) 15/71/0502 03:15 PM    MCV 88.6 03/12/2023 03:15 PM    Hgb, External 11.6 05/03/2012 12:00 AM    Hct, External 33.4 05/03/2012 12:00 AM    Platelet cnt., External 332K 05/03/2012 12:00 AM       Lipids  Lab Results   Component Value Date/Time    Cholesterol, total 126 04/21/2022 11:13 AM    HDL Cholesterol 58 04/21/2022 11:13 AM    LDL, calculated 31.4 04/21/2022 11:13 AM    Triglyceride 183 (H) 04/21/2022 11:13 AM    CHOL/HDL Ratio 2.2 04/21/2022 11:13 AM       Thyroid Function  Lab Results   Component Value Date/Time    TSH 4.36 (H) 05/28/2022 02:33 AM    T4, Free 1.28 02/03/2022 12:00 AM     Vitals  Visit Vitals  BP (!) 121/59   Pulse 92   Temp 97.9 °F (36.6 °C)   Resp 18   SpO2 96%       Pregnancy Test  Lab Results   Component Value Date/Time    HCG urine, QL NEGATIVE 02/15/2019 04:13 PM    Pregnancy test,urine (POC) Negative 07/19/2022 07:06 PM    HCG, Ql. NEGATIVE 07/17/2010 04:44 PM    HCG urine, Ql. (POC) Negative 10/22/2014 09:27 AM       Jonna Rodriguez, PHARMD, BCPS, BCPP  [phone] (pharmacy)

## 2023-03-15 NOTE — BH NOTES
DISCHARGE SUMMARY    Delisa Bella  : 1980  MRN: [id number]    The patient Buel Flower exhibits the ability to control behavior in a less restrictive environment. Patient's level of functioning is improving. No assaultive/destructive behavior has been observed for the past 24 hours. No suicidal/homicidal threat or behavior has been observed for the past 24 hours. There is no evidence of serious medication side effects. Patient has not been in physical or protective restraints for at least the past 24 hours. If weapons involved, how are they secured? No access    Is patient aware of and in agreement with discharge plan? Yes    Arrangements for medication:  Prescriptions sent to pharmacy    Copy of discharge instructions to provider?:  yes    Arrangements for transportation home:  Mom    Keep all follow up appointments as scheduled, continue to take prescribed medications per physician instructions.   Mental health crisis number:  [phone] or your local mental health crisis line number at [place] [address] Emergency WARM LINE      [phone] ([phone])      M-F: 9am to 9pm      Sat & Sun: 5pm - 9pm  National suicide prevention lines:                             0-745-HFDVMDZ ([phone])       6-134-382-TALK ([phone])    Crisis Text Line:  Text HOME to 289708

## 2023-03-15 NOTE — PROGRESS NOTES
Assumed care of pt after receiving shift report from outgoing nurse. No apparent distress noted. Pt currently denies SI/HI/AVH and pain. Pt endorses depression and anxiety. Mood is depressed with dull affect. Pt ambulates independently with wheelchair. Med and meal compliant. Alert and oriented. Isolative to room. No behavioral issues observed. Q15 minute safety checks continued by staff. Problem: Depressed Mood (Adult/Pediatric)  Goal: *STG: Complies with medication therapy  Outcome: Progressing Towards Goal     Problem: Falls - Risk of  Goal: *Absence of Falls  Description: Document Rema Fall Risk and appropriate interventions in the flowsheet.   Outcome: Progressing Towards Goal  Note: Fall Risk Interventions: Percocets 5/325 daily to twice daily as needed #45 Anurag Bullard release 10/3/2022.  Patient needs follow-up prior to additional refills.

## 2023-03-16 NOTE — DISCHARGE SUMMARY
PSYCHIATRIC DISCHARGE SUMMARY         IDENTIFICATION:    Patient Name  Tom Lofton   Date of Birth 1980   [facility] [id number]   Medical Record Number  [id number]      Age  38 y.o. PCP Santos Rice MD   Admit date:  3/13/2023    Discharge date: 3/15/2023   Room Number  322/02  @ [facility]   Date of Service  3/15/2023            TYPE OF DISCHARGE: REGULAR               CONDITION AT DISCHARGE: improved and fair       PROVISIONAL & DISCHARGE DIAGNOSES:    Problem List  Date Reviewed: 11/18/2022            Codes Class    Adjustment disorder with depressed mood ICD-10-CM: F43.21  ICD-9-CM: 309.0         Alcohol withdrawal ([facility] ICD-10-CM: F10.939  ICD-9-CM: 291.81         Opioid withdrawal ([facility] ICD-10-CM: F11.93  ICD-9-CM: 292.0, 305.50         Tachycardia ICD-10-CM: R00.0  ICD-9-CM: 785.0         Major depressive disorder ICD-10-CM: F32.9  ICD-9-CM: 296.20         Recurrent major depressive disorder ([facility] ICD-10-CM: F33.9  ICD-9-CM: 296.30         Alcohol use disorder, severe, dependence ([facility] ICD-10-CM: F10.20  ICD-9-CM: 303.90         Suicidal ideation ICD-10-CM: R45.851  ICD-9-CM: V62.84         Alcohol abuse ICD-10-CM: F10.10  ICD-9-CM: 305.00         Depression ICD-10-CM: F32. A  ICD-9-CM: 311         Elevated LFTs ICD-10-CM: R79.89  ICD-9-CM: 790.6         * (Principal) Major depressive disorder, recurrent severe without psychotic features ([facility] ICD-10-CM: F33.2  ICD-9-CM: 296.33         Pelvic adhesive disease ICD-10-CM: N73.6  ICD-9-CM: 614.6         Tobacco abuse ICD-10-CM: Z72.0  ICD-9-CM: 305.1         Chronic pelvic pain in female ICD-10-CM: R10.2, G89.29  ICD-9-CM: 625.9, 338.29     Overview Addendum 11/12/2015 10:53 AM by Andrew Khalil     On amitriptaline 50 mg HS and neurontin 900 mg daily  Needs GI consult for IBS             Idiopathic vulvodynia ICD-10-CM: N94.819  ICD-9-CM: 625.79         ADD (attention deficit disorder) ICD-10-CM: F98.8  ICD-9-CM: 314.00 Keratosis pilaris ICD-10-CM: L85.8  ICD-9-CM: 757.39         Migraines ICD-10-CM: L36.705  ICD-9-CM: 346.90            Active Hospital Problems    Alcohol use disorder, severe, dependence ([facility]      *Major depressive disorder, recurrent severe without psychotic features ([facility]        DISCHARGE DIAGNOSIS:   Axis I:  SEE ABOVE  Axis II: SEE ABOVE  Axis III: SEE ABOVE  Axis IV:  lack of structure  Axis V:  20 on admission, 60 on discharge     CC & HISTORY OF PRESENT ILLNESS:  \"Suicidal ideation\"    The patient, Shelbie Leigh, is a 38 y.o. WHITE/NON- female with a past psychiatric history significant for MDD and EtOH use disorder, mild, who presents at this time with complaints of (and/or evidence of) the following emotional symptoms: depression and suicidal thoughts/threats. Additional symptomatology include escalation of EtOH use. The above symptoms have been present for 2+ weeks. These symptoms are of moderate to high severity. These symptoms are constant in nature. The patient's condition has been precipitated by psychosocial stressors. Patient's condition made worse by alcohol use. UDS: negative; BAL= 127 at admission. The patient was transferred from [facility] due to worsening depressive symptoms in the setting of lingering pain symptoms from an ankle fracture. The patient is a fair historian. The patient corroborates the above narrative. The patient contracts for safety on the unit and gives consent for the team to contact collateral. The patient is amenable to initiating treatment while on the unit. She reports difficulty at home including poor sleep and poor self care.        SOCIAL HISTORY:    Social History     Socioeconomic History    Marital status: SINGLE     Spouse name: Not on file    Number of children: Not on file    Years of education: Not on file    Highest education level: Not on file   Occupational History    Not on file   Tobacco Use    Smoking status: Former Packs/day: 1.00     Years: 18.00     Pack years: 18.00     Types: Cigarettes    Smokeless tobacco: Current    Tobacco comments:     Vapes   Vaping Use    Vaping Use: Never used   Substance and Sexual Activity    Alcohol use: Yes     Comment: 5 beers yesterday 7/20/22    Drug use: No    Sexual activity: Not Currently     Partners: Male     Birth control/protection: Surgical   Other Topics Concern     Service Not Asked    Blood Transfusions Not Asked    Caffeine Concern Not Asked    Occupational Exposure Not Asked    Hobby Hazards Not Asked    Sleep Concern Not Asked    Stress Concern Not Asked    Weight Concern Not Asked    Special Diet Not Asked    Back Care Not Asked    Exercise Not Asked    Bike Helmet Not Asked    Seat Belt Not Asked    Self-Exams Not Asked   Social History Narrative    ** Merged History Encounter **          Social Determinants of Health     Financial Resource Strain: Not on file   Food Insecurity: Not on file   Transportation Needs: Not on file   Physical Activity: Not on file   Stress: Not on file   Social Connections: Not on file   Intimate Partner Violence: Not on file   Housing Stability: Not on file      FAMILY HISTORY:   Family History   Problem Relation Age of Onset    Cancer Mother     Diabetes Mother     Alcohol abuse Father         and drug    Psychiatric Disorder Father     Cancer Father     Diabetes Maternal Grandmother     Hypertension Maternal Grandmother     Thyroid Disease Maternal Grandmother     Diabetes Maternal Grandfather     Hypertension Maternal Grandfather     Cancer Maternal Grandfather     Heart Disease Maternal Grandfather     Cancer Paternal Grandmother     Diabetes Paternal Grandmother              HOSPITALIZATION COURSE:    Lionel Corona was admitted to the inpatient psychiatric unit [facility] for acute psychiatric stabilization in regards to symptomatology as described in the HPI above.  The differential diagnosis at time of admission include: MDD vs adjustment disorder. While on the unit Gabbie Randle was involved in individual, group, occupational and milieu therapy. Psychiatric medications were adjusted during this hospitalization including Latuda and Remeron. Gabbie Randle demonstrated a slow, but progressive improvement in overall condition. Much of patient's initial presentation appeared to be related to situational stressors and psychological factors. Please see individual progress notes for more specific details regarding patient's hospitalization course. Patient with request for discharge today. There are no grounds to seek a TDO. At time of discharge, Gabbie Randle is without significant problems of depression, psychosis, or polo. Patient free of suicidal and homicidal ideations (appears to be at very low risk of suicide or homicide) and reports many positive predictive factors in terms of not attempting suicide or homicide. Overall presentation at time of discharge is most consistent with the diagnosis of MDD. Patient has maximized benefit to be derived from acute inpatient psychiatric treatment. All members of the treatment team concur with each other in regards to plans for discharge today. Patient and family are aware and in agreement with discharge and discharge plan. LABS AND IMAGAING:    Labs Reviewed   HEMOGLOBIN A1C WITH EAG   GGT   TSH 3RD GENERATION   LIPID PANEL   HEPATIC FUNCTION PANEL     No results found for: DS35, PHEN, PHENO, PHENT, DILF, DS39, PHENY, PTN, VALF2, VALAC, VALP, VALPR, DS6, CRBAM, CRBAMP, CARB2, XCRBAM  No results displayed because visit has over 200 results. XR ANKLE LT MIN 3 V    Result Date: 3/15/2023  Left ankle nonweightbearing AP, lateral and oblique x-rays show satisfactory alignment on 3 views status post surgical fixation, retained implants are intact. Fractures appear to be healing, does not appear 100% healed however.   There is slight bone density decreased. XR ANKLE LT MIN 3 V    Result Date: 2/15/2023  Left ankle AP, lateral and oblique nonweightbearing x-rays show overall good alignment status post procedure, postsurgical changes noted, implants intact, fractures appear to be well aligned but no visible calluses are seen. No loosening of implants. Slight decreased bone density. CTA CHEST W OR W WO CONT    Result Date: 2/25/2023  INDICATION:  sob, hypoxia - r/o PE EXAM:  CTA Chest with contrast for Pulmonary Embolus COMPARISON:  None TECHNIQUE:  Following the uneventful intravenous administration of IV contrast thin helical axial images were obtained through the chest. 3D image postprocessing was performed. CT dose reduction was achieved through use of a standardized protocol tailored for this examination and automatic exposure control for dose modulation. FINDINGS: THYROID: Unremarkable. MEDIASTINUM/JEREMY: No mass or lymphadenopathy. HEART/PERICARDIUM: Unremarkable. Coronary artery calcification:  1 absent. AORTA:  No aneurysm. PULMONARY ARTERIES:No pulmonary embolism. LUNGS/PLEURA: No pulmonary edema, pleural effusion or focal airspace disease. Bibasilar atelectasis. INCIDENTALLY IMAGED UPPER ABDOMEN: Hepatic steatosis. BONES: No destructive bone lesion. ADDITIONAL COMMENTS:  N/A     No acute process. XR CHEST PORT    Result Date: 2/27/2023  EXAM:  XR CHEST PORT INDICATION: Shortness of breath COMPARISON: 2/25/2023 TECHNIQUE: Upright portable chest AP view at 1128 FINDINGS: The cardiac silhouette is within normal limits. The pulmonary vasculature is within normal limits. There is interval development of pulmonary vascular congestion and mild pulmonary edema. No pneumonia. 1. Interval development of pulmonary vascular congestion and mild pulmonary edema.     XR CHEST PORT    Result Date: 2/25/2023  INDICATION: hypoxia EXAM:  AP CHEST RADIOGRAPH COMPARISON: June 8, 2022 FINDINGS: AP portable view of the chest demonstrates a normal cardiomediastinal silhouette. There is no edema, effusion, consolidation, or pneumothorax. The osseous structures are unremarkable. No acute process. ECHO ADULT COMPLETE    Result Date: 2/28/2023    Left Ventricle: Normal left ventricular systolic function with a visually estimated EF of 55 - 60%. Left ventricle size is normal. Normal wall motion. Mitral Valve: Mild regurgitation. Technical qualifiers: Echo study was technically difficult due to patient's body habitus. DISPOSITION:    Home. Patient to f/u with psychiatric and psychotherapy appointments. FOLLOW-UP CARE:    Activity as tolerated  Regular diet  Wound Care: none needed. Follow-up Information       Follow up With Specialties Details [facility]  Call today Follow up with [facility] today to schedule an intake appointment for [facility] services. [address]    Joann Tovar LCSW  Outpatient Manager    Crisis Resources  Follow up  [address] [facility]:  24/7 hotline, [phone]  Warm Line:  Monday-Friday 9 AM-9 PM, Saturday-Sunday 5 PM- 9 PM  1 [phone]  Crisis Text Line:  24/7 crisis text messaging. Text HOME to [phone]. Substance Abuse [employer] .. [address]  Follow up  [facility]    Same Day Access    [phone]    Trinity Hood MD Infectious Disease Physician   [address] Aldean Buerger [address]  [phone]                     PROGNOSIS:   Fair ---- based on nature of patient's pathology/ies and treatment compliance issues. Prognosis is greatly dependent upon patient's ability to remain sober and to follow up with scheduled appointments as well as to comply with psychiatric medications as prescribed.             DISCHARGE MEDICATIONS:    Informed consent given for the use of following psychotropic medications:  Discharge Medication List as of 3/15/2023 11:44 AM        START taking these medications    Details   traZODone (DESYREL) 50 mg tablet Take 1 Tablet by mouth nightly as needed for Sleep (For insomnia). Indications: insomnia associated with depression, Normal, Disp-30 Tablet, R-1           CONTINUE these medications which have CHANGED    Details   lurasidone (LATUDA) 20 mg tab tablet Take 1 Tablet by mouth daily (with lunch). Indications: MDD adjunct, Normal, Disp-30 Tablet, R-1      mirtazapine (REMERON) 7.5 mg tablet Take 1 Tablet by mouth nightly for 60 days. Indications: major depressive disorder, Normal, Disp-30 Tablet, R-1      naltrexone (DEPADE) 50 mg tablet Take 1 Tablet by mouth daily for 60 days. Indications: alcoholism, Normal, Disp-30 Tablet, R-1      propranoloL (INDERAL) 40 mg tablet Take 1 Tablet by mouth three (3) times daily. Indications: high blood pressure, Normal, Disp-90 Tablet, R-1      gabapentin (NEURONTIN) 400 mg capsule Take 1 Capsule by mouth three (3) times daily. Max Daily Amount: 1,200 mg. Indications: neuropathic pain, Normal, Disp-90 Capsule, R-1           CONTINUE these medications which have NOT CHANGED    Details   multivitamin (ONE A DAY) tablet Take 1 Tablet by mouth daily. , Historical Med      cetirizine (ZYRTEC) 10 mg tablet Take 10 mg by mouth daily. , Historical Med      fluticasone propionate (Flonase Allergy Relief) 50 mcg/actuation nasal spray 2 Sprays by Both Nostrils route daily. , Historical Med      butalbital-acetaminophen-caffeine (FIORICET, ESGIC) -40 mg per tablet Take 1 Tablet by mouth every four (4) hours as needed for Headache or Migraine for up to 30 days. Indications: a migraine headache, Print, Disp-180 Tablet, R-0      hydrOXYzine HCL (ATARAX) 50 mg tablet Take 1 Tablet by mouth every six (6) hours as needed (anxiety or sleep) for up to 10 days. , Print, Disp-40 Tablet, R-0      venlafaxine-SR (EFFEXOR-XR) 75 mg capsule Take 3 Capsules by mouth daily (with breakfast) for 30 days. , Print, Disp-90 Capsule, R-0      acetaminophen (TYLENOL) 325 mg tablet Take 2 Tablets by mouth every four (4) hours as needed for Fever for up to 30 days. , Print, Disp-180 Tablet, R-0      nicotine (NICODERM CQ) 14 mg/24 hr patch 1 Patch by TransDERmal route daily for 30 days. , Print, Disp-30 Patch, R-0           STOP taking these medications       thiamine mononitrate (B-1) 100 mg tablet Comments:   Reason for Stopping:         ibuprofen (MOTRIN) 800 mg tablet Comments:   Reason for Stopping:         cephALEXin (KEFLEX) 500 mg capsule Comments:   Reason for Stopping:                      A coordinated, multidisplinary treatment team round was conducted with Zoie Garcia is done daily here at [facility]. This team consists of the nurse, psychiatric unit pharmacist,  and Chong Lopez. I have spent greater than 35 minutes on discharge work.     Signed:  Ban Heard MD  3/15/2023

## 2023-03-21 ENCOUNTER — OFFICE VISIT (OUTPATIENT)
Dept: ORTHOPEDIC SURGERY | Age: 43
End: 2023-03-21

## 2023-03-21 DIAGNOSIS — R26.2 DIFFICULTY WALKING: ICD-10-CM

## 2023-03-21 DIAGNOSIS — M25.572 ACUTE LEFT ANKLE PAIN: ICD-10-CM

## 2023-03-21 DIAGNOSIS — S82.852D CLOSED DISPLACED TRIMALLEOLAR FRACTURE OF LEFT ANKLE WITH ROUTINE HEALING, SUBSEQUENT ENCOUNTER: Primary | ICD-10-CM

## 2023-03-21 NOTE — PROGRESS NOTES
Patient Name: Nick Hurt  Date:3/21/2023  : 1980  [x]  Patient  Verified  Payor: Kristin Her / Plan: [address] [place] [address] / Product Type: Managed Care Medicaid /    Total Treatment Time (min): 45  1:1 Treatment Time ( [address] only):    Kieko Pedersen MD  1. Closed displaced trimalleolar fracture of left ankle with routine healing, subsequent encounter  2. Acute left ankle pain  3. Difficulty walking      Subjective:    Patient is a 38 y.o. female referred to physical therapy by Dr. Eugenia London with a diagnosis of left trimalleolar fracture suffered 2022 during a trip and fall injury around her home. ORIF performed on . She has been referred to physical therapy in the past during her postoperative recovery but is just now arriving due to some various other medical issues. She reports having 2-6/10 pain. She is still ambulating in a boot and utilizing 1 crutch. Dr. Eugenia London has instructed her to continue with the boot due to her history of smoking and need for proper bone healing. She still complains of swelling and stiffness. She reports having pain with walking longer than 5 minutes. 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 She is not driving. She is attempting to transition from smoking to vaping. She has been seen here in clinic in the past by my colleague Julieta Casper PT for lower back pain. She reports that ambulating with boot and crutch does seem to aggravate some of her back pain. Remainder of past medical history and medication list can otherwise be reviewed per the EHR. Objective:    Gait: Ambulates with boot and single axillary crutch  Strength: Demonstrates light isometric resistance in all 4 directions when assessed in nonweightbearing position although with some initial hesitancy. Formal strength testing in weightbearing not assessed.   When assess in seated position  Left ankle ROM: -5 degrees dorsiflexion to 35 degrees plantarflexion  Right ankle ROM: 10 degrees dorsiflexion to 50 degrees plantarflexion  Incisions appear to be intact and healing well without current signs of infection although there is some generalized scar adherence. Circumferential measurements of the ankle are increased by approximately 2 cm as compared to the contralateral side. FAAM score 51%        Ex: 15 min  Treatment today to include instruction in a home exercise program as well as providing patient with written and visual handouts. Man: 10 min    Mobilization of the entire foot, ankle, and distal lower extremity IN combination with passive mobilization in all directions. NMR:  min    All questions were addressed. Assessment:    Patient presents with increased pain and decreased ROM, strength, and mobility consistent with left trimalleolar fracture with ORIF performed on January 9, 2023. Long Term Goals. 12 weeks  1. Patient will demonstrate the ability to ambulate on level surfaces, uneven surfaces, stairs with a smooth and nonantalgic gait pattern. 2.  Patient will demonstrate improved AROM of the ankle to within 95% or greater as compared to the contralateral side to assist with home/community/recreational ADL activity. 3.  Patient will report pain to be consistently less than or equal to 1/10 with all home/community/recreational ADL activity. Short Term Goals. 2 visits. Patient will demonstrate independence with HEP. Plan:  Plan of care: Physical therapy consisted of frequency of 2/week for the next 12 weeks. Physical therapy will consist of therapeutic exercise, modalities, patient education, neuromuscular reeducation, manual therapy, therapeutic activity, dry needling, and instruction in home exercise program as appropriate. [place]  Ex: 15  Man: 10  NMR:     The referring physician has reviewed and approved this evaluation and plan of care as noted by the electronic signature attached to note.     Constance Rust John DPT, ATC

## 2023-03-24 ENCOUNTER — OFFICE VISIT (OUTPATIENT)
Dept: ORTHOPEDIC SURGERY | Age: 43
End: 2023-03-24

## 2023-03-24 DIAGNOSIS — S82.852D CLOSED DISPLACED TRIMALLEOLAR FRACTURE OF LEFT ANKLE WITH ROUTINE HEALING, SUBSEQUENT ENCOUNTER: Primary | ICD-10-CM

## 2023-03-24 DIAGNOSIS — M25.572 ACUTE LEFT ANKLE PAIN: ICD-10-CM

## 2023-03-24 DIAGNOSIS — R26.2 DIFFICULTY WALKING: ICD-10-CM

## 2023-03-24 NOTE — PROGRESS NOTES
Patient Name: Augustin Moreira  Date:3/24/2023  : 1980  [x]  Patient  Verified  Payor: [employer] MEDICAID / Plan: [address] [place] [address] / Product Type: Managed Care Medicaid /    Total Treatment Time (min): 40 - patient needs to leave. 1:1 Treatment Time ( [address] only):   Referring provider: Corina Martinez MD  1. Closed displaced trimalleolar fracture of left ankle with routine healing, subsequent encounter  2. Acute left ankle pain  3. Difficulty walking    SUBJECTIVE  Patient verbalizes compliance with HEP. Still with generalized complaints of soreness. OBJECTIVE  Modality:   []  E-Stim: type _ x _ min     []att   []unatt   []w/US   []w/ice   []w/heat  []  Ultrasound: []cont   []pulse    _ W/cm2 x _  min   []1MHz   []3MHz  []  Ice pack _  Post       [] Hot pack _  Pre       []  Other:    Man: 15 min    TC/foot/ankle mobilization in combination with PROM in all directions. NMR:  min  Neuromuscular reeducation/proprioceptive training listed in exercise below. Ex: 25 min   Therapeutic exercise/strength/endurance completed here in clinic today per the exercise log. Manual resistance to 4way ankle isometrics performed in long sitting. PT Exercise Log         EXERCISE 3/24/2023   Seated [place] board y   [facility] Y- limited WBing   Tandem stance Y in boot                                                                            Ex: 25 min - patient needs to leave due to time constraints. Man: 15 min  NMR:  min    ASSESSMENT  [x]  See Plan of Care  [x]  Patient will continue to benefit from skilled therapy to address remaining functional deficits:   Appropriate soreness WBing in boot. Improved tolerance to mobilization as compared to initial evaluation. PLAN  Continue with current plan of care and progress as appropriate towards functional goals.   [x]  Upgrade activities as tolerated     [x]  Continue plan of care  []  Discharge due to:_  [] Other:_       Janice Patel JUAN Lopez, PT, DPT  3/24/2023    1:57 PM

## 2023-03-27 ENCOUNTER — OFFICE VISIT (OUTPATIENT)
Dept: ORTHOPEDIC SURGERY | Age: 43
End: 2023-03-27
Payer: MEDICAID

## 2023-03-27 DIAGNOSIS — S82.852D CLOSED DISPLACED TRIMALLEOLAR FRACTURE OF LEFT ANKLE WITH ROUTINE HEALING, SUBSEQUENT ENCOUNTER: Primary | ICD-10-CM

## 2023-03-27 DIAGNOSIS — M25.572 ACUTE LEFT ANKLE PAIN: ICD-10-CM

## 2023-03-27 DIAGNOSIS — R26.2 DIFFICULTY WALKING: ICD-10-CM

## 2023-03-27 PROCEDURE — 97110 THERAPEUTIC EXERCISES: CPT | Performed by: PHYSICAL THERAPIST

## 2023-03-27 PROCEDURE — 97140 MANUAL THERAPY 1/> REGIONS: CPT | Performed by: PHYSICAL THERAPIST

## 2023-03-27 NOTE — PROGRESS NOTES
Patient Name: Irma Rivera  Date:3/27/2023  : 1980  [x]  Patient  Verified  Payor: River Schafer / Plan: [address] [place] [address] / Product Type: Managed Care Medicaid /    Total Treatment Time (min): 45+   1:1 Treatment Time ( [address] only):   Referring provider: Clementina Mccormick MD  1. Closed displaced trimalleolar fracture of left ankle with routine healing, subsequent encounter  2. Acute left ankle pain  3. Difficulty walking    SUBJECTIVE  Patient has weaned herself off of crutch and using boot only. Still having complaints of pain, particular in the medial side. Admits that she is at times hesitant with palpation along the medial malleolus. OBJECTIVE  Modality:   []  E-Stim: type _ x _ min     []att   []unatt   []w/US   []w/ice   []w/heat  []  Ultrasound: []cont   []pulse    _ W/cm2 x _  min   []1MHz   []3MHz  []  Ice pack _  Post       [] Hot pack _  Pre       []  Other:    Man: 15 min    TC/foot/ankle mobilization in combination with PROM in all directions. Myofascial and incisional release techniques along the medial/lateral malleoli. Edema massage techniques to the posterior ankle and Achilles. NMR:  min  Neuromuscular reeducation/proprioceptive training listed in exercise below. Ex: 30 min   Therapeutic exercise/strength/endurance completed here in clinic today per the exercise log. PT Exercise Log         EXERCISE 3/27/2023   Seated [place] board y   [facility] Y- limited WBing   Tandem stance Y in boot   Nu-step 10' in boot   TG minisquat Level 14 - in boot                                                                    Ex: 30 min   Man: 15 min  NMR:  min    ASSESSMENT  [x]  See Plan of Care  [x]  Patient will continue to benefit from skilled therapy to address remaining functional deficits:     No signs of infection although patient does have some continued edema and adherence along the incision lines.   Subjective soreness with advancing exercise here in clinic today. PLAN  Continue with current plan of care and progress as appropriate towards functional goals.   [x]  Upgrade activities as tolerated     [x]  Continue plan of care  []  Discharge due to:_  [] Other:_       Lizandro Whitfield PT, DPT  3/27/2023    1:57 PM

## 2023-03-28 NOTE — PROGRESS NOTES
Rm:      No chief complaint on file. Visit [facility] 03/14/2016       1. Have you been to the ER, urgent care clinic since your last visit? Hospitalized since your last visit? {Yes when where and reason for visit:20441}    2. Have you seen or consulted any other health care providers outside of the [address] [place] [address] since your last visit? Include any pap smears or colon screening.  {Yes when where and reason for visit:20441}          Social Determinants of Health     Tobacco Use: High Risk    Smoking Tobacco Use: Former    Smokeless Tobacco Use: Current    Passive Exposure: Not on file   Alcohol Use: Not on file   Financial Resource Strain: Not on file   Food Insecurity: Not on file   Transportation Needs: Not on file   Physical Activity: Not on file   Stress: Not on file   Social Connections: Not on file   Intimate Partner Violence: Not on file   Depression: Not at risk    PHQ-2 Score: 0   Housing Stability: Not on file

## 2023-03-29 ENCOUNTER — OFFICE VISIT (OUTPATIENT)
Dept: INTERNAL MEDICINE CLINIC | Age: 43
End: 2023-03-29

## 2023-03-29 DIAGNOSIS — Z91.199 NO-SHOW FOR APPOINTMENT: Primary | ICD-10-CM

## 2023-03-29 NOTE — PROGRESS NOTES
Note entered/encounter closed for administrative reasons. Scheduled for hospital discharge follow-up. Hospitalized for depression with [facility] from 3/13 through 3/15. She has been seeing Ortho for left ankle fracture which pre-dated 3/13 hospitalization.     Future Appointments   Date Time Provider Vera Jaeger   3/30/2023  2:20 PM Jaida Marsh PT, DPT [facility] BS AMB   4/28/2023  1:00 PM MD JESSICA MonacoP BS AMB

## 2023-03-30 ENCOUNTER — OFFICE VISIT (OUTPATIENT)
Dept: ORTHOPEDIC SURGERY | Age: 43
End: 2023-03-30
Payer: MEDICAID

## 2023-03-30 DIAGNOSIS — S82.852D CLOSED DISPLACED TRIMALLEOLAR FRACTURE OF LEFT ANKLE WITH ROUTINE HEALING, SUBSEQUENT ENCOUNTER: Primary | ICD-10-CM

## 2023-03-30 DIAGNOSIS — M25.572 ACUTE LEFT ANKLE PAIN: ICD-10-CM

## 2023-03-30 DIAGNOSIS — R26.2 DIFFICULTY WALKING: ICD-10-CM

## 2023-03-30 PROCEDURE — 97140 MANUAL THERAPY 1/> REGIONS: CPT | Performed by: PHYSICAL THERAPIST

## 2023-03-30 PROCEDURE — 97110 THERAPEUTIC EXERCISES: CPT | Performed by: PHYSICAL THERAPIST

## 2023-03-30 NOTE — PROGRESS NOTES
Patient Name: Kenna Guy  Date:3/30/2023  : 1980  [x]  Patient  Verified  Payor: Tran Katyln / Plan: [id number] [place] [address] / Product Type: Managed Care Medicaid /    Total Treatment Time (min): 55+   1:1 Treatment Time ( [address] only):   Referring provider: Pat Piña MD  1. Closed displaced trimalleolar fracture of left ankle with routine healing, subsequent encounter  2. Acute left ankle pain  3. Difficulty walking    SUBJECTIVE  Leg and knee have been \"very sore\" as she has been up and walking more again lately. Sore with stretching in PT. Admits she was on her feet at [facility] too. OBJECTIVE  Modality:   []  E-Stim: type _ x _ min     []att   []unatt   []w/US   []w/ice   []w/heat  []  Ultrasound: []cont   []pulse    _ W/cm2 x _  min   []1MHz   []3MHz  []  Ice pack _  Post       [] Hot pack _  Pre       []  Other:    Man: 25+ min    TC/foot/ankle mobilization in combination with PROM in all directions. Myofascial and incisional release techniques along the medial/lateral malleoli. Edema massage techniques to the posterior ankle and Achilles. NMR:  min  Neuromuscular reeducation/proprioceptive training listed in exercise below. Ex: 30 min   Therapeutic exercise/strength/endurance completed here in clinic today per the exercise log. PT Exercise Log         EXERCISE 3/30/2023   Seated [place] board y   [facility] [employer] Y- limited WBing   Tandem stance Y in boot   Nu-step 10' in boot   TG minisquat Level 14 - in boot                                                                    Ex: 30 min   Man: 25 min  NMR:  min    ASSESSMENT  [x]  See Plan of Care  [x]  Patient will continue to benefit from skilled therapy to address remaining functional deficits:     PROM improves by end of session as compared to arrival. Ambulates at end of session with boot and no crutch as compared to crutch use on arrival. Still with edematous and stiff ankle.      PLAN  Continue with current plan of care and progress as appropriate towards functional goals.   [x]  Upgrade activities as tolerated     [x]  Continue plan of care  []  Discharge due to:_  [] Other:_       Michele Velasquez, PT, DPT  3/30/2023    1:57 PM

## 2023-04-03 ENCOUNTER — APPOINTMENT (OUTPATIENT)
Dept: GENERAL RADIOLOGY | Age: 43
End: 2023-04-03
Attending: STUDENT IN AN ORGANIZED HEALTH CARE EDUCATION/TRAINING PROGRAM
Payer: MEDICAID

## 2023-04-03 ENCOUNTER — APPOINTMENT (OUTPATIENT)
Dept: ULTRASOUND IMAGING | Age: 43
End: 2023-04-03
Attending: STUDENT IN AN ORGANIZED HEALTH CARE EDUCATION/TRAINING PROGRAM
Payer: MEDICAID

## 2023-04-03 ENCOUNTER — HOSPITAL ENCOUNTER (EMERGENCY)
Age: 43
Discharge: HOME | End: 2023-04-03
Attending: STUDENT IN AN ORGANIZED HEALTH CARE EDUCATION/TRAINING PROGRAM
Payer: MEDICAID

## 2023-04-03 DIAGNOSIS — M25.572 ACUTE LEFT ANKLE PAIN: Primary | ICD-10-CM

## 2023-04-03 PROCEDURE — 74011250636 HC RX REV CODE- 250/636: Performed by: STUDENT IN AN ORGANIZED HEALTH CARE EDUCATION/TRAINING PROGRAM

## 2023-04-03 PROCEDURE — 99284 EMERGENCY DEPT VISIT MOD MDM: CPT

## 2023-04-03 PROCEDURE — 96372 THER/PROPH/DIAG INJ SC/IM: CPT

## 2023-04-03 PROCEDURE — 74011250637 HC RX REV CODE- 250/637: Performed by: STUDENT IN AN ORGANIZED HEALTH CARE EDUCATION/TRAINING PROGRAM

## 2023-04-03 PROCEDURE — 73610 X-RAY EXAM OF ANKLE: CPT

## 2023-04-03 PROCEDURE — 93971 EXTREMITY STUDY: CPT

## 2023-04-03 RX ORDER — HYDROMORPHONE HYDROCHLORIDE 1 MG/ML
0.5 INJECTION, SOLUTION INTRAMUSCULAR; INTRAVENOUS; SUBCUTANEOUS ONCE
Status: COMPLETED | OUTPATIENT
Start: 2023-04-03 | End: 2023-04-03

## 2023-04-03 RX ORDER — OXYCODONE AND ACETAMINOPHEN 5; 325 MG/1; MG/1
1 TABLET ORAL ONCE
Status: COMPLETED | OUTPATIENT
Start: 2023-04-03 | End: 2023-04-03

## 2023-04-03 RX ORDER — HYDROMORPHONE HYDROCHLORIDE 1 MG/ML
INJECTION, SOLUTION INTRAMUSCULAR; INTRAVENOUS; SUBCUTANEOUS
Status: DISCONTINUED
Start: 2023-04-03 | End: 2023-04-03 | Stop reason: HOSPADM

## 2023-04-03 RX ORDER — KETOROLAC TROMETHAMINE 30 MG/ML
15 INJECTION, SOLUTION INTRAMUSCULAR; INTRAVENOUS
Status: COMPLETED | OUTPATIENT
Start: 2023-04-03 | End: 2023-04-03

## 2023-04-03 RX ORDER — ONDANSETRON 4 MG/1
TABLET, ORALLY DISINTEGRATING ORAL
Status: DISCONTINUED
Start: 2023-04-03 | End: 2023-04-03 | Stop reason: HOSPADM

## 2023-04-03 RX ORDER — POLYETHYLENE GLYCOL 3350 17 G/17G
17 POWDER, FOR SOLUTION ORAL DAILY
Qty: 116 G | Refills: 0 | Status: SHIPPED | OUTPATIENT
Start: 2023-04-03

## 2023-04-03 RX ORDER — OXYCODONE AND ACETAMINOPHEN 5; 325 MG/1; MG/1
1 TABLET ORAL
Qty: 6 TABLET | Refills: 0 | Status: SHIPPED | OUTPATIENT
Start: 2023-04-03 | End: 2023-04-06

## 2023-04-03 RX ORDER — ONDANSETRON 4 MG/1
4 TABLET, ORALLY DISINTEGRATING ORAL
Status: COMPLETED | OUTPATIENT
Start: 2023-04-03 | End: 2023-04-03

## 2023-04-03 RX ADMIN — HYDROMORPHONE HYDROCHLORIDE 0.5 MG: 1 INJECTION, SOLUTION INTRAMUSCULAR; INTRAVENOUS; SUBCUTANEOUS at 02:18

## 2023-04-03 RX ADMIN — KETOROLAC TROMETHAMINE 15 MG: 30 INJECTION, SOLUTION INTRAMUSCULAR; INTRAVENOUS at 01:03

## 2023-04-03 RX ADMIN — OXYCODONE AND ACETAMINOPHEN 1 TABLET: 5; 325 TABLET ORAL at 01:03

## 2023-04-03 RX ADMIN — ONDANSETRON 4 MG: 4 TABLET, ORALLY DISINTEGRATING ORAL at 02:18

## 2023-04-03 NOTE — ED PROVIDER NOTES
EMERGENCY DEPARTMENT HISTORY AND PHYSICAL EXAM      Date: 4/3/2023  Patient Name: Antony Vale    History of Presenting Illness     Chief Complaint   Patient presents with    Ankle Pain     EMS: home. Injured ankle in December, had surgery for it. Pain woke up from the pain. Patient has been seeing PT         HPI: History From: patient, History limited by: none  Antony Vale, 38 y.o. female presents to the ED with cc of left ankle pain. She fractured her ankle and had surgery on 1/3. She states that over the past week, physical therapy has been doing much more, and she has been having increasing pain in her ankle more in the medial aspect radiating up to the medial knee. She denies any new falls or trauma. Did notice more swelling of the region today especially over the medial ankle. She had stable bruising without any increase of the surgery. She denies any fevers, chest pain or shortness of breath. She has been taking ibuprofen at home. There are no other complaints, changes, or physical findings at this time. PCP: Ruthann Zhou MD    No current facility-administered medications on file prior to encounter. Current Outpatient Medications on File Prior to Encounter   Medication Sig Dispense Refill    lurasidone (LATUDA) 20 mg tab tablet Take 1 Tablet by mouth daily (with lunch). Indications: MDD adjunct 30 Tablet 1    mirtazapine (REMERON) 7.5 mg tablet Take 1 Tablet by mouth nightly for 60 days. Indications: major depressive disorder 30 Tablet 1    naltrexone (DEPADE) 50 mg tablet Take 1 Tablet by mouth daily for 60 days. Indications: alcoholism 30 Tablet 1    traZODone (DESYREL) 50 mg tablet Take 1 Tablet by mouth nightly as needed for Sleep (For insomnia). Indications: insomnia associated with depression 30 Tablet 1    propranoloL (INDERAL) 40 mg tablet Take 1 Tablet by mouth three (3) times daily.  Indications: high blood pressure 90 Tablet 1    gabapentin (NEURONTIN) 400 mg capsule Take 1 Capsule by mouth three (3) times daily. Max Daily Amount: 1,200 mg. Indications: neuropathic pain 90 Capsule 1    multivitamin (ONE A DAY) tablet Take 1 Tablet by mouth daily. cetirizine (ZYRTEC) 10 mg tablet Take 10 mg by mouth daily. fluticasone propionate (Flonase Allergy Relief) 50 mcg/actuation nasal spray 2 Sprays by Both Nostrils route daily. butalbital-acetaminophen-caffeine (FIORICET, ESGIC) -40 mg per tablet Take 1 Tablet by mouth every four (4) hours as needed for Headache or Migraine for up to 30 days. Indications: a migraine headache 180 Tablet 0    venlafaxine-SR (EFFEXOR-XR) 75 mg capsule Take 3 Capsules by mouth daily (with breakfast) for 30 days. 90 Capsule 0    acetaminophen (TYLENOL) 325 mg tablet Take 2 Tablets by mouth every four (4) hours as needed for Fever for up to 30 days. 180 Tablet 0    nicotine (NICODERM CQ) 14 mg/24 hr patch 1 Patch by TransDERmal route daily for 30 days. 30 Patch 0       Past History     Past Medical History:  Past Medical History:   Diagnosis Date    ADD (attention deficit disorder) 19-17yo    Treated with Adderall since dx. 2013 dosing 20mg AM and 10mg PM.  Trial/change to Vyvanse Nov-Dec 2015--not as effective. Gynecologic disorder     History of miscarriages. History of Mirena IUD placed on 4/17/15    HX OTHER MEDICAL      -BUFA baby    HX OTHER MEDICAL     HPV positive    Idiopathic vulvodynia 2014    L1 vertebral fracture (HCC) 2017    [facility] imaging/admissoin: Mild acute two column compression fracture of L1 without evidence of retropulsion into the spinal canal.  Managed non-operatively. Migraines 2013    Neurological disorder     Pelvic pain in female 9/15/2014    2015 gyn laparoscopy/hysteroscopy with endometriosis worse right.     Psychiatric disorder     depression    Secondary dysmenorrhea 2014    With menorraghia    Seizures ([facility] [address]     never on meds--had appr 4-5 in a short amt of time and none since       Past Surgical History:  Past Surgical History:   Procedure Laterality Date    ENDOMETRIAL CRYOABLATION      HX GYN  4/17/15    laparoscopy and hysteroscopy and IUD placement    HX HYSTERECTOMY  04/04/2016    Complete Hysterectomy       Family History:  Family History   Problem Relation Age of Onset    Cancer Mother     Diabetes Mother     Alcohol abuse Father         and drug    Psychiatric Disorder Father     Cancer Father     Diabetes Maternal Grandmother     Hypertension Maternal Grandmother     Thyroid Disease Maternal Grandmother     Diabetes Maternal Grandfather     Hypertension Maternal Grandfather     Cancer Maternal Grandfather     Heart Disease Maternal Grandfather     Cancer Paternal Grandmother     Diabetes Paternal Grandmother        Social History:  Social History     Tobacco Use    Smoking status: Former     Packs/day: 1.00     Years: 18.00     Pack years: 18.00     Types: Cigarettes    Smokeless tobacco: Current    Tobacco comments:     Vapes   Vaping Use    Vaping Use: Never used   Substance Use Topics    Alcohol use: Yes     Comment: 5 beers yesterday 7/20/22    Drug use: No       Allergies: Allergies   Allergen Reactions    Aspirin Other (comments)     Hot sweats and passed out; tolerated ibuprofen    Lorazepam Other (comments)    Penicillins Other (comments)     Childhood. Does not remember reaction. Is not aware if she has ever taken cephalosporins in the past.    Jan 2019: Pt notes no problems with cephalosporins. Physical Exam   Physical Exam  Constitutional:       Appearance: She is not toxic-appearing. Comments: Uncomfortable appearing   HENT:      Head: Normocephalic. Cardiovascular:      Rate and Rhythm: Normal rate and regular rhythm. Pulmonary:      Effort: Pulmonary effort is normal.      Breath sounds: Normal breath sounds. Abdominal:      Palpations: Abdomen is soft. Tenderness: There is no abdominal tenderness. Musculoskeletal:      Comments: Mild to moderate swelling of the left ankle as compared to the right, well-healed surgical scars over medial malleolus and lateral malleolus. Tender to palpation over the medial ankle. Faint ecchymoses over medial ankle. Sensation to light touch intact diffusely. DP pulse palpable, brisk capillary refill in all toes. Full range of motion of the knee. Skin:     General: Skin is warm and dry. Neurological:      General: No focal deficit present. Mental Status: She is alert. Psychiatric:         Mood and Affect: Mood normal.       Diagnostic Study Results     Labs -   No results found for this or any previous visit (from the past 24 hour(s)). Radiologic Studies -   XR ANKLE LT MIN 3 V    (Results Pending)   DUPLEX LOWER EXT VENOUS LEFT    (Results Pending)     CT Results  (Last 48 hours)      None          CXR Results  (Last 48 hours)      None              Medical Decision Making   I am the first provider for this patient. I reviewed the vital signs, available nursing notes, past medical history, past surgical history, family history and social history. Vital Signs-Reviewed the patient's vital signs. Patient Vitals for the past 24 hrs:   Temp Pulse Resp BP SpO2   04/03/23 0038 97.9 °F (36.6 °C) 94 22 135/65 97 %         Provider Notes (Medical Decision Making):   47-year-old female presenting with left ankle pain. Differential includes postsurgical pain, strain or sprain, DVT. All compartments are soft and she is neurovascularly intact. No erythema or warmth, not concerning for infection. ED Course:     Initial assessment performed. The patients presenting problems have been discussed, and they are in agreement with the care plan formulated and outlined with them. I have encouraged them to ask questions as they arise throughout their visit.     Medications   ondansetron (ZOFRAN ODT) tablet 4 mg (has no administration in time range)   HYDROmorphone (DILAUDID) injection 0.5 mg (has no administration in time range)   ketorolac (TORADOL) injection 15 mg (15 mg IntraMUSCular Given 4/3/23 0103)   oxyCODONE-acetaminophen (PERCOCET) 5-325 mg per tablet 1 Tablet (1 Tablet Oral Given 4/3/23 0103)              X-ray of the ankle shows healing fractures, duplex shows no evidence of DVT. Patient is counseled on supportive care and return precautions. Will return to the ED for any worsening pain, weakness or numbness, or any new or worrisome symptoms. Will followup with orthopedic doctor within 2 days. Critical Care Time:         Disposition:  Home  Miguelina Milligan's  results have been reviewed with her. She has been counseled regarding her diagnosis, treatment, and plan. She verbally conveys understanding and agreement of the signs, symptoms, diagnosis, treatment and prognosis and additionally agrees to follow up as discussed. She also agrees with the care-plan and conveys that all of her questions have been answered. I have also provided discharge instructions for her that include: educational information regarding their diagnosis and treatment, and list of reasons why they would want to return to the ED prior to their follow-up appointment, should her condition change. PLAN:  1. Current Discharge Medication List        2.    Follow-up Information    None       Return to ED if worse     Diagnosis     Clinical Impression: Acute left ankle pain

## 2023-04-03 NOTE — ED NOTES
Report given to Mitra Fernandez RN. Nurse was informed of reason for arrival, vitals, labs, medications, orders, procedures, results, anything left pending and current plan of action. Questions were asked and received prior to departure from the patient.

## 2023-04-07 ENCOUNTER — OFFICE VISIT (OUTPATIENT)
Dept: ORTHOPEDIC SURGERY | Age: 43
End: 2023-04-07

## 2023-04-17 ENCOUNTER — OFFICE VISIT (OUTPATIENT)
Dept: INTERNAL MEDICINE CLINIC | Age: 43
End: 2023-04-17
Payer: MEDICAID

## 2023-04-17 VITALS
SYSTOLIC BLOOD PRESSURE: 113 MMHG | HEIGHT: 65 IN | BODY MASS INDEX: 44.65 KG/M2 | WEIGHT: 268 LBS | HEART RATE: 100 BPM | RESPIRATION RATE: 16 BRPM | DIASTOLIC BLOOD PRESSURE: 77 MMHG | OXYGEN SATURATION: 95 % | TEMPERATURE: 97.5 F

## 2023-04-17 DIAGNOSIS — R73.03 PREDIABETES: ICD-10-CM

## 2023-04-17 DIAGNOSIS — H10.13 ALLERGIC CONJUNCTIVITIS OF BOTH EYES: ICD-10-CM

## 2023-04-17 DIAGNOSIS — R93.89 ABNORMAL CHEST X-RAY: Primary | ICD-10-CM

## 2023-04-17 DIAGNOSIS — K76.0 HEPATIC STEATOSIS: ICD-10-CM

## 2023-04-17 PROCEDURE — 99215 OFFICE O/P EST HI 40 MIN: CPT | Performed by: INTERNAL MEDICINE

## 2023-04-17 RX ORDER — INSULIN PUMP SYRINGE, 3 ML
EACH MISCELLANEOUS
Qty: 1 KIT | Refills: 0 | Status: ON HOLD | OUTPATIENT
Start: 2023-04-17 | End: 2023-04-24

## 2023-04-17 RX ORDER — DEXTROAMPHETAMINE SACCHARATE, AMPHETAMINE ASPARTATE, DEXTROAMPHETAMINE SULFATE AND AMPHETAMINE SULFATE 3.75; 3.75; 3.75; 3.75 MG/1; MG/1; MG/1; MG/1
15 TABLET ORAL 2 TIMES DAILY
Status: ON HOLD | COMMUNITY
Start: 2023-04-11 | End: 2023-04-24

## 2023-04-17 RX ORDER — LANCETS
EACH MISCELLANEOUS
Qty: 60 EACH | Refills: 2 | Status: ON HOLD | OUTPATIENT
Start: 2023-04-17 | End: 2023-04-24

## 2023-04-17 RX ORDER — ESCITALOPRAM OXALATE 5 MG/1
5 TABLET ORAL DAILY
COMMUNITY
Start: 2023-04-04 | End: 2023-04-26

## 2023-04-17 RX ORDER — CETIRIZINE HYDROCHLORIDE 10 MG/1
10 TABLET ORAL
Qty: 30 TABLET | Refills: 3 | Status: ON HOLD | OUTPATIENT
Start: 2023-04-17 | End: 2023-04-24

## 2023-04-17 RX ORDER — LURASIDONE HYDROCHLORIDE 60 MG/1
TABLET, FILM COATED ORAL
COMMUNITY
Start: 2023-03-22 | End: 2023-04-26

## 2023-04-17 RX ORDER — AZELASTINE HYDROCHLORIDE 0.5 MG/ML
1 SOLUTION/ DROPS OPHTHALMIC 2 TIMES DAILY
Qty: 6 ML | Refills: 1 | Status: ON HOLD | OUTPATIENT
Start: 2023-04-17 | End: 2023-04-24
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
## 2023-04-17 NOTE — PATIENT INSTRUCTIONS
If x-ray remains abnormal, would see pulmonary to evaluate. Please follow the following instructions to process/authorize your referral, if needed:    Referrals processing  Please verify with your insurance IF you need referral authorization submitted. For insurance plans which require this, please follow the following steps. FAILURE TO DO SO MAY RESULT IN INABILITY TO SEE THE SPECIALIST YOU HAVE BEEN REFERRED TO (once you are scheduled to see them). Call and schedule appointment with specialist  Call our clinic and leave message with provider name, and date of appointment  We will then submit the referral to your insurance. This process takes 2-5 business days. If you have questions about scheduling or authorizing referral, you can review with our referral coordinator. You can review with her today if available/if you have time, or you can call to review once you have made your referral/appointment. If you are not sure if you need referral authorizations, please review with the referral coordinator(s), either prior to or after you have made the appointment, as reviewed.

## 2023-04-17 NOTE — PROGRESS NOTES
RM 17    Chief Complaint   Patient presents with    Hospital Follow Up     Pt states there was fluid around lung and elevated bp       Visit Vitals  /77 (BP 1 Location: Left upper arm, BP Patient Position: Sitting, BP Cuff Size: Adult)   Pulse 100   Temp 97.5 °F (36.4 °C) (Oral)   Resp 16   Ht 5' 5\" (1.651 m)   Wt 268 lb (121.6 kg)   SpO2 95%   BMI 44.60 kg/m²       3 most recent PHQ Screens 9/8/2022   PHQ Not Done -   Little interest or pleasure in doing things Not at all   Feeling down, depressed, irritable, or hopeless Not at all   Total Score PHQ 2 0   Trouble falling or staying asleep, or sleeping too much -   Feeling tired or having little energy -   Poor appetite, weight loss, or overeating -   Feeling bad about yourself - or that you are a failure or have let yourself or your family down -   Trouble concentrating on things such as school, work, reading, or watching TV -   Moving or speaking so slowly that other people could have noticed; or the opposite being so fidgety that others notice -   Thoughts of being better off dead, or hurting yourself in some way -   PHQ 9 Score -       1. \"Have you been to the ER, urgent care clinic since your last visit? Hospitalized since your last visit? \"  Seen in ER 3/13/2023    2. \"Have you seen or consulted any other health care providers outside of the [address] [place] [address] since your last visit? \" No     3. For patients aged 39-70: Has the patient had a colonoscopy / FIT/ Cologuard? Yes - Care Gap present. Most recent result on file      If the patient is female:    4. For patients aged 41-77: Has the patient had a mammogram within the past 2 years? No      5. For patients aged 21-65: Has the patient had a pap smear?  No     Health Maintenance Due   Topic Date Due    COVID-19 Vaccine (1) Never done    Varicella Vaccine (1 of 2 - 2-dose childhood series) Never done    Pneumococcal 0-64 years (1 - PCV) Never done    DTaP/Tdap/Td series (1 - Tdap) Never done Learning Assessment 1/9/2019   PRIMARY LEARNER Patient   HIGHEST LEVEL OF EDUCATION - PRIMARY LEARNER  -   BARRIERS PRIMARY LEARNER NONE   PRIMARY LANGUAGE ENGLISH    NEED -   LEARNER PREFERENCE PRIMARY DEMONSTRATION   ANSWERED BY patient   RELATIONSHIP SELF       AVS  education, follow up, and recommendations provided and addressed with patient.   services used to advise patient No

## 2023-04-18 LAB
ALBUMIN SERPL-MCNC: 3.9 G/DL (ref 3.8–4.8)
ALBUMIN/GLOB SERPL: 1.3 {RATIO} (ref 1.2–2.2)
ALP SERPL-CCNC: 117 IU/L (ref 44–121)
ALT SERPL-CCNC: 40 IU/L (ref 0–32)
AST SERPL-CCNC: 22 IU/L (ref 0–40)
BILIRUB SERPL-MCNC: <0.2 MG/DL (ref 0–1.2)
BUN SERPL-MCNC: 10 MG/DL (ref 6–24)
BUN/CREAT SERPL: 13 (ref 9–23)
CALCIUM SERPL-MCNC: 9.7 MG/DL (ref 8.7–10.2)
CHLORIDE SERPL-SCNC: 105 MMOL/L (ref 96–106)
CO2 SERPL-SCNC: 22 MMOL/L (ref 20–29)
CREAT SERPL-MCNC: 0.77 MG/DL (ref 0.57–1)
EST. AVERAGE GLUCOSE BLD GHB EST-MCNC: 137 MG/DL
GLOBULIN SER CALC-MCNC: 2.9 G/DL (ref 1.5–4.5)
GLUCOSE SERPL-MCNC: 124 MG/DL (ref 70–99)
HBA1C MFR BLD: 6.4 % (ref 4.8–5.6)
POTASSIUM SERPL-SCNC: 4.4 MMOL/L (ref 3.5–5.2)
PROT SERPL-MCNC: 6.8 G/DL (ref 6–8.5)
SODIUM SERPL-SCNC: 144 MMOL/L (ref 134–144)

## 2023-04-23 ENCOUNTER — APPOINTMENT (OUTPATIENT)
Dept: CT IMAGING | Age: 43
DRG: 751 | End: 2023-04-23
Attending: EMERGENCY MEDICINE
Payer: MEDICAID

## 2023-04-23 ENCOUNTER — HOSPITAL ENCOUNTER (INPATIENT)
Age: 43
LOS: 3 days | Discharge: HOME | DRG: 751 | End: 2023-04-26
Attending: EMERGENCY MEDICINE | Admitting: PSYCHIATRY & NEUROLOGY
Payer: MEDICAID

## 2023-04-23 DIAGNOSIS — R45.851 SUICIDAL THOUGHTS: Primary | ICD-10-CM

## 2023-04-23 PROBLEM — F39 UNSPECIFIED MOOD (AFFECTIVE) DISORDER (HCC): Status: ACTIVE | Noted: 2023-04-23

## 2023-04-23 PROBLEM — F10.90 ALCOHOL USE DISORDER: Status: ACTIVE | Noted: 2023-04-23

## 2023-04-23 PROBLEM — F11.90 OPIOID USE DISORDER: Status: ACTIVE | Noted: 2023-04-23

## 2023-04-23 LAB
ALBUMIN SERPL-MCNC: 3.6 G/DL (ref 3.5–5)
ALBUMIN/GLOB SERPL: 0.9 (ref 1.1–2.2)
ALP SERPL-CCNC: 116 U/L (ref 45–117)
ALT SERPL-CCNC: 37 U/L (ref 12–78)
AMPHET UR QL SCN: NEGATIVE
ANION GAP SERPL CALC-SCNC: 8 MMOL/L (ref 5–15)
APAP SERPL-MCNC: <2 UG/ML (ref 10–30)
APPEARANCE UR: ABNORMAL
AST SERPL-CCNC: 24 U/L (ref 15–37)
BACTERIA URNS QL MICRO: NEGATIVE /HPF
BARBITURATES UR QL SCN: NEGATIVE
BASOPHILS # BLD: 0.1 K/UL (ref 0–0.1)
BASOPHILS NFR BLD: 1 % (ref 0–1)
BENZODIAZ UR QL: NEGATIVE
BILIRUB SERPL-MCNC: 0.3 MG/DL (ref 0.2–1)
BILIRUB UR QL: NEGATIVE
BUN SERPL-MCNC: 21 MG/DL (ref 6–20)
BUN/CREAT SERPL: 23 (ref 12–20)
CALCIUM SERPL-MCNC: 8.7 MG/DL (ref 8.5–10.1)
CANNABINOIDS UR QL SCN: NEGATIVE
CHLORIDE SERPL-SCNC: 103 MMOL/L (ref 97–108)
CO2 SERPL-SCNC: 24 MMOL/L (ref 21–32)
COCAINE UR QL SCN: NEGATIVE
COLOR UR: ABNORMAL
COMMENT, HOLDF: NORMAL
CREAT SERPL-MCNC: 0.91 MG/DL (ref 0.55–1.02)
DIFFERENTIAL METHOD BLD: NORMAL
DRUG SCRN COMMENT,DRGCM: NORMAL
EOSINOPHIL # BLD: 0.1 K/UL (ref 0–0.4)
EOSINOPHIL NFR BLD: 1 % (ref 0–7)
EPITH CASTS URNS QL MICRO: ABNORMAL /LPF
ERYTHROCYTE [DISTWIDTH] IN BLOOD BY AUTOMATED COUNT: 12.9 % (ref 11.5–14.5)
ETHANOL SERPL-MCNC: 217 MG/DL
FLUAV RNA SPEC QL NAA+PROBE: NOT DETECTED
FLUBV RNA SPEC QL NAA+PROBE: NOT DETECTED
GLOBULIN SER CALC-MCNC: 4 G/DL (ref 2–4)
GLUCOSE SERPL-MCNC: 151 MG/DL (ref 65–100)
GLUCOSE UR STRIP.AUTO-MCNC: NEGATIVE MG/DL
HCG UR QL: NEGATIVE
HCT VFR BLD AUTO: 42.1 % (ref 35–47)
HGB BLD-MCNC: 13.7 G/DL (ref 11.5–16)
HGB UR QL STRIP: NEGATIVE
HYALINE CASTS URNS QL MICRO: ABNORMAL /LPF (ref 0–5)
IMM GRANULOCYTES # BLD AUTO: 0 K/UL (ref 0–0.04)
IMM GRANULOCYTES NFR BLD AUTO: 0 % (ref 0–0.5)
KETONES UR QL STRIP.AUTO: NEGATIVE MG/DL
LEUKOCYTE ESTERASE UR QL STRIP.AUTO: NEGATIVE
LYMPHOCYTES # BLD: 3 K/UL (ref 0.8–3.5)
LYMPHOCYTES NFR BLD: 37 % (ref 12–49)
MCH RBC QN AUTO: 29.3 PG (ref 26–34)
MCHC RBC AUTO-ENTMCNC: 32.5 G/DL (ref 30–36.5)
MCV RBC AUTO: 90.1 FL (ref 80–99)
METHADONE UR QL: NEGATIVE
MONOCYTES # BLD: 0.5 K/UL (ref 0–1)
MONOCYTES NFR BLD: 6 % (ref 5–13)
NEUTS SEG # BLD: 4.4 K/UL (ref 1.8–8)
NEUTS SEG NFR BLD: 55 % (ref 32–75)
NITRITE UR QL STRIP.AUTO: NEGATIVE
NRBC # BLD: 0 K/UL (ref 0–0.01)
NRBC BLD-RTO: 0 PER 100 WBC
OPIATES UR QL: NEGATIVE
PCP UR QL: NEGATIVE
PH UR STRIP: 5.5 (ref 5–8)
PLATELET # BLD AUTO: 393 K/UL (ref 150–400)
PMV BLD AUTO: 9.1 FL (ref 8.9–12.9)
POTASSIUM SERPL-SCNC: 3.4 MMOL/L (ref 3.5–5.1)
PROT SERPL-MCNC: 7.6 G/DL (ref 6.4–8.2)
PROT UR STRIP-MCNC: NEGATIVE MG/DL
RBC # BLD AUTO: 4.67 M/UL (ref 3.8–5.2)
RBC #/AREA URNS HPF: ABNORMAL /HPF (ref 0–5)
SALICYLATES SERPL-MCNC: <1.7 MG/DL (ref 2.8–20)
SAMPLES BEING HELD,HOLD: NORMAL
SARS-COV-2 RNA RESP QL NAA+PROBE: NOT DETECTED
SODIUM SERPL-SCNC: 135 MMOL/L (ref 136–145)
SP GR UR REFRACTOMETRY: 1.02 (ref 1–1.03)
UR CULT HOLD, URHOLD: NORMAL
UROBILINOGEN UR QL STRIP.AUTO: 0.2 EU/DL (ref 0.2–1)
WBC # BLD AUTO: 8.1 K/UL (ref 3.6–11)
WBC URNS QL MICRO: ABNORMAL /HPF (ref 0–4)

## 2023-04-23 PROCEDURE — 81025 URINE PREGNANCY TEST: CPT

## 2023-04-23 PROCEDURE — 74011250636 HC RX REV CODE- 250/636: Performed by: EMERGENCY MEDICINE

## 2023-04-23 PROCEDURE — 80143 DRUG ASSAY ACETAMINOPHEN: CPT

## 2023-04-23 PROCEDURE — 82077 ASSAY SPEC XCP UR&BREATH IA: CPT

## 2023-04-23 PROCEDURE — 65220000003 HC RM SEMIPRIVATE PSYCH

## 2023-04-23 PROCEDURE — 80307 DRUG TEST PRSMV CHEM ANLYZR: CPT

## 2023-04-23 PROCEDURE — 74011250637 HC RX REV CODE- 250/637: Performed by: PSYCHIATRY & NEUROLOGY

## 2023-04-23 PROCEDURE — 74176 CT ABD & PELVIS W/O CONTRAST: CPT

## 2023-04-23 PROCEDURE — 36415 COLL VENOUS BLD VENIPUNCTURE: CPT

## 2023-04-23 PROCEDURE — 87636 SARSCOV2 & INF A&B AMP PRB: CPT

## 2023-04-23 PROCEDURE — 81001 URINALYSIS AUTO W/SCOPE: CPT

## 2023-04-23 PROCEDURE — 74011250637 HC RX REV CODE- 250/637: Performed by: NURSE PRACTITIONER

## 2023-04-23 PROCEDURE — 99285 EMERGENCY DEPT VISIT HI MDM: CPT

## 2023-04-23 PROCEDURE — 74011250637 HC RX REV CODE- 250/637: Performed by: EMERGENCY MEDICINE

## 2023-04-23 PROCEDURE — 85025 COMPLETE CBC W/AUTO DIFF WBC: CPT

## 2023-04-23 PROCEDURE — 80053 COMPREHEN METABOLIC PANEL: CPT

## 2023-04-23 PROCEDURE — 80179 DRUG ASSAY SALICYLATE: CPT

## 2023-04-23 RX ORDER — CHLORDIAZEPOXIDE HYDROCHLORIDE 25 MG/1
50 CAPSULE, GELATIN COATED ORAL
Status: COMPLETED | OUTPATIENT
Start: 2023-04-23 | End: 2023-04-23

## 2023-04-23 RX ORDER — PHENOBARBITAL 32.4 MG/1
16.2 TABLET ORAL 2 TIMES DAILY
Status: DISCONTINUED | OUTPATIENT
Start: 2023-04-26 | End: 2023-04-26

## 2023-04-23 RX ORDER — ACETAMINOPHEN 325 MG/1
650 TABLET ORAL
Status: DISCONTINUED | OUTPATIENT
Start: 2023-04-23 | End: 2023-04-26 | Stop reason: HOSPADM

## 2023-04-23 RX ORDER — DIPHENHYDRAMINE HYDROCHLORIDE 50 MG/ML
50 INJECTION, SOLUTION INTRAMUSCULAR; INTRAVENOUS
Status: DISCONTINUED | OUTPATIENT
Start: 2023-04-23 | End: 2023-04-26 | Stop reason: HOSPADM

## 2023-04-23 RX ORDER — DM/P-EPHED/ACETAMINOPH/DOXYLAM 30-7.5/3
2 LIQUID (ML) ORAL
Status: DISCONTINUED | OUTPATIENT
Start: 2023-04-23 | End: 2023-04-26 | Stop reason: HOSPADM

## 2023-04-23 RX ORDER — TRAZODONE HYDROCHLORIDE 50 MG/1
50 TABLET ORAL
Status: DISCONTINUED | OUTPATIENT
Start: 2023-04-23 | End: 2023-04-26 | Stop reason: HOSPADM

## 2023-04-23 RX ORDER — FOLIC ACID 1 MG/1
1 TABLET ORAL DAILY
Status: DISCONTINUED | OUTPATIENT
Start: 2023-04-24 | End: 2023-04-26 | Stop reason: HOSPADM

## 2023-04-23 RX ORDER — HALOPERIDOL 5 MG/ML
5 INJECTION INTRAMUSCULAR
Status: COMPLETED | OUTPATIENT
Start: 2023-04-23 | End: 2023-04-23

## 2023-04-23 RX ORDER — LORAZEPAM 2 MG/ML
1 INJECTION INTRAMUSCULAR
Status: DISCONTINUED | OUTPATIENT
Start: 2023-04-23 | End: 2023-04-26 | Stop reason: HOSPADM

## 2023-04-23 RX ORDER — PHENOBARBITAL 32.4 MG/1
32.4 TABLET ORAL
Status: DISCONTINUED | OUTPATIENT
Start: 2023-04-24 | End: 2023-04-26

## 2023-04-23 RX ORDER — PHENOBARBITAL 64.8 MG/1
64.8 TABLET ORAL
Status: ACTIVE | OUTPATIENT
Start: 2023-04-23 | End: 2023-04-24

## 2023-04-23 RX ORDER — PHENOBARBITAL 64.8 MG/1
64.8 TABLET ORAL 4 TIMES DAILY
Status: COMPLETED | OUTPATIENT
Start: 2023-04-23 | End: 2023-04-24

## 2023-04-23 RX ORDER — KETOROLAC TROMETHAMINE 30 MG/ML
30 INJECTION, SOLUTION INTRAMUSCULAR; INTRAVENOUS
Status: COMPLETED | OUTPATIENT
Start: 2023-04-23 | End: 2023-04-23

## 2023-04-23 RX ORDER — THERA TABS 400 MCG
1 TAB ORAL DAILY
Status: DISCONTINUED | OUTPATIENT
Start: 2023-04-24 | End: 2023-04-26 | Stop reason: HOSPADM

## 2023-04-23 RX ORDER — HALOPERIDOL 5 MG/ML
5 INJECTION INTRAMUSCULAR ONCE
Status: COMPLETED | OUTPATIENT
Start: 2023-04-23 | End: 2023-04-23

## 2023-04-23 RX ORDER — BENZTROPINE MESYLATE 1 MG/1
1 TABLET ORAL
Status: DISCONTINUED | OUTPATIENT
Start: 2023-04-23 | End: 2023-04-26 | Stop reason: HOSPADM

## 2023-04-23 RX ORDER — OLANZAPINE 5 MG/1
5 TABLET ORAL
Status: DISCONTINUED | OUTPATIENT
Start: 2023-04-23 | End: 2023-04-26 | Stop reason: HOSPADM

## 2023-04-23 RX ORDER — LANOLIN ALCOHOL/MO/W.PET/CERES
100 CREAM (GRAM) TOPICAL DAILY
Status: DISCONTINUED | OUTPATIENT
Start: 2023-04-24 | End: 2023-04-26 | Stop reason: HOSPADM

## 2023-04-23 RX ORDER — HYDROXYZINE 50 MG/1
50 TABLET, FILM COATED ORAL
Status: DISCONTINUED | OUTPATIENT
Start: 2023-04-23 | End: 2023-04-26 | Stop reason: HOSPADM

## 2023-04-23 RX ORDER — HALOPERIDOL 5 MG/ML
5 INJECTION INTRAMUSCULAR
Status: DISCONTINUED | OUTPATIENT
Start: 2023-04-23 | End: 2023-04-26 | Stop reason: HOSPADM

## 2023-04-23 RX ORDER — ONDANSETRON 4 MG/1
4 TABLET, ORALLY DISINTEGRATING ORAL
Status: COMPLETED | OUTPATIENT
Start: 2023-04-23 | End: 2023-04-23

## 2023-04-23 RX ORDER — ADHESIVE BANDAGE
30 BANDAGE TOPICAL DAILY PRN
Status: DISCONTINUED | OUTPATIENT
Start: 2023-04-23 | End: 2023-04-26 | Stop reason: HOSPADM

## 2023-04-23 RX ORDER — PHENOBARBITAL 32.4 MG/1
16.2 TABLET ORAL
Status: DISCONTINUED | OUTPATIENT
Start: 2023-04-26 | End: 2023-04-26

## 2023-04-23 RX ORDER — PHENOBARBITAL 32.4 MG/1
32.4 TABLET ORAL 4 TIMES DAILY
Status: COMPLETED | OUTPATIENT
Start: 2023-04-24 | End: 2023-04-25

## 2023-04-23 RX ORDER — PHENOBARBITAL 32.4 MG/1
32.4 TABLET ORAL 2 TIMES DAILY
Status: DISCONTINUED | OUTPATIENT
Start: 2023-04-25 | End: 2023-04-26 | Stop reason: HOSPADM

## 2023-04-23 RX ADMIN — HALOPERIDOL LACTATE 5 MG: 5 INJECTION, SOLUTION INTRAMUSCULAR at 08:53

## 2023-04-23 RX ADMIN — NICOTINE POLACRILEX 2 MG: 2 LOZENGE ORAL at 18:50

## 2023-04-23 RX ADMIN — PHENOBARBITAL 64.8 MG: 64.8 TABLET ORAL at 17:43

## 2023-04-23 RX ADMIN — ONDANSETRON 4 MG: 4 TABLET, ORALLY DISINTEGRATING ORAL at 05:13

## 2023-04-23 RX ADMIN — HALOPERIDOL LACTATE 5 MG: 5 INJECTION, SOLUTION INTRAMUSCULAR at 13:19

## 2023-04-23 RX ADMIN — KETOROLAC TROMETHAMINE 30 MG: 30 INJECTION, SOLUTION INTRAMUSCULAR at 10:25

## 2023-04-23 RX ADMIN — PHENOBARBITAL 64.8 MG: 64.8 TABLET ORAL at 21:23

## 2023-04-23 RX ADMIN — TRAZODONE HYDROCHLORIDE 50 MG: 50 TABLET ORAL at 21:23

## 2023-04-23 RX ADMIN — CHLORDIAZEPOXIDE HYDROCHLORIDE 50 MG: 25 CAPSULE ORAL at 08:35

## 2023-04-23 RX ADMIN — ONDANSETRON 4 MG: 4 TABLET, ORALLY DISINTEGRATING ORAL at 07:55

## 2023-04-24 LAB
ATRIAL RATE: 95 BPM
CALCULATED P AXIS, ECG09: 19 DEGREES
CALCULATED R AXIS, ECG10: 17 DEGREES
CALCULATED T AXIS, ECG11: 38 DEGREES
DIAGNOSIS, 93000: NORMAL
P-R INTERVAL, ECG05: 140 MS
Q-T INTERVAL, ECG07: 364 MS
QRS DURATION, ECG06: 98 MS
QTC CALCULATION (BEZET), ECG08: 457 MS
VENTRICULAR RATE, ECG03: 95 BPM

## 2023-04-24 PROCEDURE — 74011250637 HC RX REV CODE- 250/637: Performed by: NURSE PRACTITIONER

## 2023-04-24 PROCEDURE — 74011250637 HC RX REV CODE- 250/637: Performed by: PSYCHIATRY & NEUROLOGY

## 2023-04-24 PROCEDURE — 93005 ELECTROCARDIOGRAM TRACING: CPT

## 2023-04-24 PROCEDURE — 65220000003 HC RM SEMIPRIVATE PSYCH

## 2023-04-24 RX ORDER — MIRTAZAPINE 15 MG/1
7.5 TABLET, FILM COATED ORAL
Status: DISCONTINUED | OUTPATIENT
Start: 2023-04-24 | End: 2023-04-26 | Stop reason: HOSPADM

## 2023-04-24 RX ORDER — ESCITALOPRAM OXALATE 10 MG/1
10 TABLET ORAL DAILY
Status: DISCONTINUED | OUTPATIENT
Start: 2023-04-24 | End: 2023-04-26 | Stop reason: HOSPADM

## 2023-04-24 RX ORDER — GABAPENTIN 400 MG/1
400 CAPSULE ORAL 3 TIMES DAILY
Status: DISCONTINUED | OUTPATIENT
Start: 2023-04-24 | End: 2023-04-26 | Stop reason: HOSPADM

## 2023-04-24 RX ORDER — ONDANSETRON 4 MG/1
4 TABLET, ORALLY DISINTEGRATING ORAL
Status: DISCONTINUED | OUTPATIENT
Start: 2023-04-24 | End: 2023-04-26 | Stop reason: HOSPADM

## 2023-04-24 RX ADMIN — ESCITALOPRAM 10 MG: 10 TABLET, FILM COATED ORAL at 13:50

## 2023-04-24 RX ADMIN — THERA TABS 1 TABLET: TAB at 10:12

## 2023-04-24 RX ADMIN — MIRTAZAPINE 7.5 MG: 15 TABLET, FILM COATED ORAL at 21:18

## 2023-04-24 RX ADMIN — NICOTINE POLACRILEX 2 MG: 2 LOZENGE ORAL at 15:54

## 2023-04-24 RX ADMIN — GABAPENTIN 400 MG: 400 CAPSULE ORAL at 21:18

## 2023-04-24 RX ADMIN — Medication 100 MG: at 10:12

## 2023-04-24 RX ADMIN — GABAPENTIN 400 MG: 400 CAPSULE ORAL at 15:54

## 2023-04-24 RX ADMIN — FOLIC ACID 1 MG: 1 TABLET ORAL at 10:12

## 2023-04-24 RX ADMIN — PHENOBARBITAL 32.4 MG: 32.4 TABLET ORAL at 21:19

## 2023-04-24 RX ADMIN — PHENOBARBITAL 32.4 MG: 32.4 TABLET ORAL at 18:29

## 2023-04-24 RX ADMIN — PHENOBARBITAL 64.8 MG: 64.8 TABLET ORAL at 13:50

## 2023-04-24 RX ADMIN — PHENOBARBITAL 64.8 MG: 64.8 TABLET ORAL at 10:12

## 2023-04-24 NOTE — PROGRESS NOTES
Problem: Alcohol Withdrawal  Goal: *STG: Participates in treatment plan  Outcome: Progressing Towards Goal  Note: feeling depressed and angry with self for recent relapse. Describes chronic depression with no success with past medications. Denies SI, however states she is tired of living. Describes recent opiate prescription for foot pain ran out and that lead to her etoh relapse two days ago. CIWA unremarkable. Zofran prescribed for increase in nausea. Tearful at times when discussing her son and the effects of her relapse on him.  Staff focus is on offerings upport  Goal: *STG: Attends activities and groups  Outcome: Progressing Towards Goal  Goal: *STG: Will identify negative impact of chemical dependency including the use of tobacco, alcohol, and other substances  Outcome: Progressing Towards Goal  Goal: *STG: Agrees to participate in outpatient after care program to support ongoing mental health  Outcome: Progressing Towards Goal  Goal: Interventions  Outcome: Progressing Towards Goal

## 2023-04-24 NOTE — BH NOTES
GROUP THERAPY PROGRESS NOTE    Patient is participating in Safety Planning group. Group time: 15-30 minutes    Personal goal for participation: To complete safety plan     Goal orientation: Personal    Group therapy participation: Active     Therapeutic interventions reviewed and discussed:  Group members were supported in filling out safety plans. Pts are able to develop and document a strategy to remain safe after discharge. SW provided feedback about questions/concerns of pts. Pts returned safety plans when completed. Impression of participation:  SW provided safety plan to pt to be completed independently.     MARCELO Mejia, HP-A

## 2023-04-24 NOTE — PROGRESS NOTES
Problem: Falls - Risk of  Goal: *Absence of Falls  Description: Document Jeffery Lozoya Fall Risk and appropriate interventions in the flowsheet. Outcome: Progressing Towards Goal  Note: Fall Risk Interventions:        Patient received resting quietly in bed. No signs of distress. Even and unlabored breathing. Staff will continue to monitor safety q15 and provide support.

## 2023-04-24 NOTE — BH NOTES
GROUP THERAPY PROGRESS NOTE    Patient did not participate in psychotherapy group.     Bulmaro Reina MSW, QMHP-A

## 2023-04-24 NOTE — PROGRESS NOTES
Problem: Alcohol Withdrawal  Goal: *STG: Participates in treatment plan  Outcome: Progressing Towards Goal  Goal: *STG: Will identify negative impact of chemical dependency including the use of tobacco, alcohol, and other substances  Outcome: Progressing Towards Goal  Goal: *STG: Agrees to participate in outpatient after care program to support ongoing mental health  Outcome: Progressing Towards Goal  Goal: Interventions  Outcome: Progressing Towards Goal     Problem: Falls - Risk of  Goal: *Absence of Falls  Description: Document Orly Cunninghamy Fall Risk and appropriate interventions in the flowsheet. Outcome: Progressing Towards Goal  Note: Fall Risk Interventions:     Pt isolative to self. Denies current SI/HI/AVH. Remains medication and meal compliant. Will continue to monitor q15 minutes for safety.

## 2023-04-24 NOTE — BH NOTES
GROUP THERAPY PROGRESS NOTE    Patient did not participate in Coping Skills group.     Ryder Crockett, MARCELO, QMHP-A

## 2023-04-24 NOTE — BH NOTES
GROUP THERAPY PROGRESS NOTE    Patient is participating in recreational therapy group. Group time: 30 minutes    Personal goal for participation: To engage in [place] trivia game activity. Goal orientation: Personal    Group therapy participation:  Active     Therapeutic interventions reviewed and discussed:  Group members were given the opportunity to engage in the [place] activity. Members were able to exercise socialization and memory skills. Members interacted with peers. Impression of participation: Pt was present and engaged in group activity and discussion. Pt interacted well with SW and peers.

## 2023-04-24 NOTE — BH NOTES
PSYCHOSOCIAL ASSESSMENT  :Patient identifying info:   Manjinder Mabry is a 38 y.o., female admitted 4/23/2023  4:11 AM     Presenting problem and precipitating factors: Pt reported to ED due to ETOH intoxication, increased depressive symptoms and SI. Pt denies having a plan. Pt denies any previous attempts. Pt reports that she is \"tired of living with depression\"    Mental status assessment:     Strengths/Recreation/Coping Skills:    Collateral information:     Current psychiatric /substance abuse providers and contact info: Pt reports her CM is Susanna Garcia. Previous psychiatric/substance abuse providers and response to treatment: Pt reports previous hospitalizations. Family history of mental illness or substance abuse: unknown    Substance abuse history:  ETOH, Opiates  Social History     Tobacco Use    Smoking status: Former     Packs/day: 1.00     Years: 18.00     Pack years: 18.00     Types: Cigarettes    Smokeless tobacco: Current    Tobacco comments:     Vapes   Substance Use Topics    Alcohol use: Yes     Comment: 5 beers yesterday 7/20/22       History of biomedical complications associated with substance abuse: none    Patient's current acceptance of treatment or motivation for change: Voluntary     Family constellation: Single, with children     Is significant other involved?  No     Describe support system: family     Describe living arrangements and home environment: Pt lives with grandmother    GUARDIAN/POA: NO    Guardian Name:     Guardian Contact:     Health issues:   Hospital Problems  Date Reviewed: 3/29/2023            Codes Class Noted POA    Alcohol use disorder ICD-10-CM: F10.90  ICD-9-CM: V49.89  4/23/2023 Unknown        Unspecified [facility]affect[facility]) [facility] ([facility] ICD-10-CM: V17  ICD-9-CM: 296.90  4/23/2023 Unknown        Opioid use disorder ICD-10-CM: F11.90  ICD-9-CM: 305.50  4/23/2023 Unknown           Trauma history: no     Legal issues: no     History of  service: no Financial status: unknown     Restorationism/cultural factors: not reported     Education/work history: Unemployed     Have you been licensed as a health care professional (current or ): no     Describe coping skills: dialloectcaitlin Cates  2023

## 2023-04-24 NOTE — INTERDISCIPLINARY ROUNDS
Behavioral Health Interdisciplinary Rounds     Patient Name: Kevyn Zapata  Age: 38 y.o. Room/Bed:  725/  Primary Diagnosis: <principal problem not specified>   Admission Status: Voluntary     Readmission within 30 days: no  Power of  in place: no  Patient requires a blocked bed: no          Reason for blocked bed:       Flu Vaccine :    Consults:          Labs/Testing due today? Have they refused labs?: yes    Sleep hours: 7+       Participation in Care/Groups:  yes  Medication Compliant?: Yes  PRNS (last 24 hours): Sleep Aid    Restraints (last 24 hours):  no     CIWA (range last 24 hours): CIWA-Ar Total: 0   COWS (range last 24 hours):      ________________________________________________________________  OQ Admission Analysis Survey completed:  OQ Admission Analysis Survey score:  OQ Discharge Analysis Survey completed:  OQ Discharge Analysis Survey score:     _____________________________________________________________________    Alcohol screening (AUDIT) completed -   AUDIT Score: 33     If applicable, date SBIRT discussed in treatment team AND documented:   AUDIT Screen Score: AUDIT Score: 33      Document Brief Intervention (corresponds directly with the 5 A's, Ask, Advise, Assess, Assist, and Arrange): At- Risk Patients (Score 7-15 for women; 8-15 for men)  Discuss concern patient is drinking at unhealthy levels known to increase risk of alcohol-related health problems. Is Patient ready to commit to change?      If No:  Encourage reflection  Discuss short term and long term health risks of consuming alcohol  Barriers to change  Reaffirm willingness to help / Educational materials provided  If Yes:  Set goal  Plan  Educational materials provided    Harmful use or Dependence (Score 16 or greater)  Discuss short term and long term health risks of consuming alcohol  Recommendations  Negotiate drinking goal  Recommend addiction specialist/center  Arrange follow-up appointments. Tobacco - patient is a smoker: Have You Used Tobacco in the Past 30 Days: Yes  Illegal Drugs use: Have You Used Any Illegal Substances Over the Past 12 Months: No    24 hour chart check complete: yes   ____________________________________________________________________________________________________________    Patient goal(s) for today:   Treatment team focus/goals:   Progress note: Patient met with full treatment team for the first time since admission. Patient reports nausea and excused herself several times during meeting to use the restroom. Patient endorsing worsening depression and anxiety, she is open to medication changes and potentially open to [facility] referral to [facility] or jasmina Abbott hx of [facility].     LOS:  1  Expected LOS: 5    Financial concerns/prescription coverage:    Family contact:       Family requesting physician contact today:    Discharge plan:   Access to weapons :         Outpatient provider(s):   Patient's preferred phone number for follow up call :   Patient's preferred e-mail address :    Participating treatment team members: Nikia Lemus, MARCELO Newman, Marah Thrasher RN, Linnea Robertson, NP

## 2023-04-24 NOTE — BH NOTES
PRN Medication Documentation    Specific patient behavior that led to need for PRN medication: pt requested sleep aid  Staff interventions attempted prior to PRN being given: medication education  PRN medication given: Trazodone 50mg PO @ 2123  Patient response/effectiveness of PRN medication: will continue to monitor with q15min rounds and provide support

## 2023-04-24 NOTE — H&P
[facility]  INITIAL PSYCHIATRIC INTERVIEW:    Name: Lionel Corona  MR#: [id number]  ACCOUNT#: [de-identified]  : 1980  ADMIT DATE: 2023    CHIEF COMPLAINT: \"I just have depression and I'm tired of it. \"     HISTORY OF PRESENTING COMPLAINT:  This is a 38 y.o. female who is currently admitted to the psychiatric floor at [facility] on a voluntary basis for suicidal ideation in the context of an etoh relapse. This was only a two day relapse after maintaining sobriety for a few months, other than taking prescribed opioids for her foot. Running out of her pain medication was a trigger for the relapse too. Pt has been suffering from depression for many years, and denies specific triggers that led to this episode of worsening depression other than drinking. Pt was recently discharged from [facility] in 2023, after a two day admission. Pt states her relapse was the trigger for this worsening depressive episode. She states she has not been having suicidal thoughts, but at the same time she states \"I hate living. \" There is no plan/intent to harm self, however, just a passive death wish. Denies HI/plan/intent, denies AVH. PAST PSYCHIATRIC HISTORY: Hx of MDD and alcohol use disorder. Pt has a hx of multiple past psychiatric hospitalizations, most recently 2023 at [facility]. Pt has no hx of suicide attempts. She sees [facility] for outpatient psychiatry. Past medication trials include Prozac and Effexor. Therapist is Olga Remy.     SUBSTANCE ABUSE HISTORY: Hx of alcohol use disorder. Pt reports drinking vodka, about a handle of vodka daily, though this was just for two days. Hx of withdrawal seizure, last being a few months ago. She has done a year long program for alcohol use disorder at [facility]. She has a hx of taking morphine that wasn't prescribed to her, but not for over 2 years.  She vapes tobacco. Denies other substance use. PSYCHOSOCIAL HISTORY: Pt lives with grandparents. She has one child, age 8, who lives with her and her grandparents. She is single. 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 FAMILY HISTORY: Mental illness in father's side - unknown what diagnoses    PAST MEDICAL HISTORY:   Past Medical History:   Diagnosis Date    ADD (attention deficit disorder) 19-17yo    Treated with Adderall since dx. 2013 dosing 20mg AM and 10mg PM.  Trial/change to Vyvanse Nov-Dec 2015--not as effective. Gynecologic disorder     History of miscarriages. History of Mirena IUD placed on 4/17/15    HX OTHER MEDICAL      -BUFA baby    HX OTHER MEDICAL     HPV positive    Idiopathic vulvodynia 2014    L1 vertebral fracture (HCC) 2017    [facility] imaging/admissoin: Mild acute two column compression fracture of L1 without evidence of retropulsion into the spinal canal.  Managed non-operatively. Migraines 2013    Neurological disorder     Pelvic pain in female 9/15/2014    2015 gyn laparoscopy/hysteroscopy with endometriosis worse right.     Psychiatric disorder     depression    Secondary dysmenorrhea 2014    With menorraghia    Seizures ([facility]     never on meds--had appr 4-5 in a short amt of time and none since     Visit Vitals  /67 (BP Patient Position: Sitting)   Pulse 86   Temp 97.8 °F (36.6 °C)   Resp 18   Ht 5' 5\" (1.651 m)   Wt 121.6 kg (268 lb)   SpO2 94%   BMI 44.60 kg/m²       Current Facility-Administered Medications:     OLANZapine (ZyPREXA) tablet 5 mg, 5 mg, Oral, Q6H PRN, [place], Raysa M, NP    haloperidol lactate (HALDOL) injection 5 mg, 5 mg, IntraMUSCular, Q6H PRN, Lyn, Raysa M, NP    benztropine (COGENTIN) tablet 1 mg, 1 mg, Oral, BID PRN, [place], Raysa M, NP    diphenhydrAMINE (BENADRYL) injection 50 mg, 50 mg, IntraMUSCular, BID PRN, Lyn, Raysa M, NP    hydrOXYzine HCL (ATARAX) tablet 50 mg, 50 mg, Oral, TID PRN, Nate Katherine M, NP LORazepam (ATIVAN) injection 1 mg, 1 mg, IntraMUSCular, Q4H PRN, Dibble, Collette Berlin, NP    traZODone (DESYREL) tablet 50 mg, 50 mg, Oral, QHS PRN, Dibble, Collette [place], NP, 50 mg at 04/23/23 2123    acetaminophen (TYLENOL) tablet 650 mg, 650 mg, Oral, Q4H PRN, Raysa Nicholson NP    magnesium hydroxide (MILK OF MAGNESIA) 400 mg/5 mL oral suspension 30 mL, 30 mL, Oral, DAILY PRN, Dibble, Collette Berlin, NP    PHENobarbitaL tablet 64.8 mg, 64.8 mg, Oral, QID, 64.8 mg at 04/23/23 2123 **FOLLOWED BY** PHENobarbitaL tablet 32.4 mg, 32.4 mg, Oral, QID **FOLLOWED BY** [START ON 4/25/2023] PHENobarbitaL tablet 32.4 mg, 32.4 mg, Oral, BID **FOLLOWED BY** [START ON 4/26/2023] PHENobarbitaL tablet 16.2 mg, 16.2 mg, Oral, BID, Raysa Nicholson NP    PHENobarbitaL tablet 64.8 mg, 64.8 mg, Oral, Q6H PRN **FOLLOWED BY** PHENobarbitaL tablet 32.4 mg, 32.4 mg, Oral, Q6H PRN **FOLLOWED BY** [START ON 4/26/2023] PHENobarbitaL tablet 16.2 mg, 16.2 mg, Oral, Q6H PRN, Raysa Nicholson NP    thiamine HCL (B-1) tablet 100 mg, 100 mg, Oral, DAILY, Raysa Nicholson NP    folic acid (FOLVITE) tablet 1 mg, 1 mg, Oral, DAILY, Raysa Nicholson NP    therapeutic multivitamin (THERAGRAN) tablet 1 Tablet, 1 Tablet, Oral, DAILY, Raysa Nicholson NP    nicotine buccal (POLACRILEX) lozenge 2 mg, 2 mg, Oral, Q2H PRN, Harini Rodríguez MD, 2 mg at 04/23/23 1850    ALLERGIES:  Aspirin, Ativan, Penicillin    MENTAL STATUS EXAM:   Shiva Tee is a 38 y.o. WHITE/NON- female who is in fair grooming, dressed in a hospital gown. Eye contact is fair. Pt is calm and is engaged in the evaluation. Speech is spontaneous and normal in rate, volume and prosody. Thought Process is logical, linear, and goal directed.   Mood is reported as \"OK\"  Affect is congruent and euthymic  +Passive death wish  Denies thoughts of homicide, plan, or intent  Thought content is negative for perceptual abnormalities such as paranoia, hallucinations, delusions  Attention/Concentration are intact  Recent/Remote memories are intact per answers to my evaluation questions. Insight is fair. Judgment is fair  Cognition is intact grossly. DIAGNOSTIC IMPRESSION: MDD, recurrent, severe, without psychosis; alcohol use disorder, opioid use disorder. PLAN:   Continue inpatient stay for the safety and optimum care of the patient. Routine labs to be ordered as needed. EKG ordered, last was February 2023, due to QT prolonging effects of Latuda and Lexapro. Psychotropic medications will be ordered and adjusted as needed. Increasing home Lexapro to 10mg for mood; increasing Latuda to 80mg for mood; continuing mirtazapine 7.5mg QHS for sleep/mood. Supportive, milieu and group therapy. Estimated length of stay is 5-7 days, dependent upon pt's participation in treatment, response to pharmacological and non-pharmacological interventions, and follow-up plan including outpatient providers and safe environment for discharge. I certify that this patient's inpatient psychiatric hospital admission is medically necessary for treatment which could reasonably be expected to improve this patient's condition. The inpatient psychiatric services are provided while the individual is under the care of a psychiatric provider and are included in the individualized plan of care.

## 2023-04-25 PROCEDURE — 65220000003 HC RM SEMIPRIVATE PSYCH

## 2023-04-25 PROCEDURE — 74011250637 HC RX REV CODE- 250/637: Performed by: NURSE PRACTITIONER

## 2023-04-25 PROCEDURE — 74011250637 HC RX REV CODE- 250/637: Performed by: PSYCHIATRY & NEUROLOGY

## 2023-04-25 RX ADMIN — FOLIC ACID 1 MG: 1 TABLET ORAL at 08:17

## 2023-04-25 RX ADMIN — Medication 100 MG: at 08:17

## 2023-04-25 RX ADMIN — PHENOBARBITAL 32.4 MG: 32.4 TABLET ORAL at 08:17

## 2023-04-25 RX ADMIN — ESCITALOPRAM 10 MG: 10 TABLET, FILM COATED ORAL at 08:17

## 2023-04-25 RX ADMIN — MIRTAZAPINE 7.5 MG: 15 TABLET, FILM COATED ORAL at 20:26

## 2023-04-25 RX ADMIN — NICOTINE POLACRILEX 2 MG: 2 LOZENGE ORAL at 18:49

## 2023-04-25 RX ADMIN — NICOTINE POLACRILEX 2 MG: 2 LOZENGE ORAL at 21:12

## 2023-04-25 RX ADMIN — GABAPENTIN 400 MG: 400 CAPSULE ORAL at 17:02

## 2023-04-25 RX ADMIN — THERA TABS 1 TABLET: TAB at 08:17

## 2023-04-25 RX ADMIN — GABAPENTIN 400 MG: 400 CAPSULE ORAL at 08:17

## 2023-04-25 RX ADMIN — NICOTINE POLACRILEX 2 MG: 2 LOZENGE ORAL at 11:25

## 2023-04-25 RX ADMIN — GABAPENTIN 400 MG: 400 CAPSULE ORAL at 20:25

## 2023-04-25 RX ADMIN — PHENOBARBITAL 32.4 MG: 32.4 TABLET ORAL at 18:00

## 2023-04-25 RX ADMIN — PHENOBARBITAL 32.4 MG: 32.4 TABLET ORAL at 12:40

## 2023-04-25 NOTE — BH NOTES
PSYCHIATRIC PROGRESS NOTE    Patient: Kalin Watkins MRN: [id number]  SSN: [id number]    YOB: 1980  Age: 38 y.o. Sex: female      Admit Date: 2023    Length of Stay: 2 Days    Chief Complaint: \"I'm feeling much better. \"     Interval History:   23- Tiburcio Issa is feeling much better. She states she is feeling like \"a whole new person. \" She reports strong motivation for recovery, and she states she is thinking about returning to a 30 day restoration program with [facility], which she has attended before. She is optimistic and future oriented. She slept well last night. Mood is good today. She has been engaged on the unit and participating in groups. Pt denies SI/plan/intent, denies HI/plan/intent, denies AVH, no evidence of any psychotic processes observed. No complaints reported with eating or sleeping. Kalin Watkins has been compliant with medications and finds them beneficial. Denies adverse effects. Denies other changes or new concerns or questions at this time. Past Medical History:  Past Medical History:   Diagnosis Date    ADD (attention deficit disorder) 19-19yo    Treated with Adderall since dx. 2013 dosing 20mg AM and 10mg PM.  Trial/change to Vyvanse Nov-Dec 2015--not as effective. Gynecologic disorder     History of miscarriages. History of Mirena IUD placed on 4/17/15    HX OTHER MEDICAL      -BUFA baby    HX OTHER MEDICAL     HPV positive    Idiopathic vulvodynia 2014    L1 vertebral fracture (HCC) 2017    [facility] imaging/admissoin: Mild acute two column compression fracture of L1 without evidence of retropulsion into the spinal canal.  Managed non-operatively. Migraines 2013    Neurological disorder     Pelvic pain in female 9/15/2014    2015 gyn laparoscopy/hysteroscopy with endometriosis worse right.     Psychiatric disorder     depression    Secondary dysmenorrhea 2014    With menorraghia    Seizures ([facility] [address] 2008    never on meds--had appr 4-5 in a short amt of time and none since       Labs:  Lab Results   Component Value Date/Time    WBC 8.1 04/23/2023 04:50 AM    Hemoglobin (POC) 14.3 04/04/2016 07:26 AM    HGB 13.7 04/23/2023 04:50 AM    Hematocrit (POC) 42 04/04/2016 07:26 AM    HCT 42.1 04/23/2023 04:50 AM    PLATELET 680 09/89/2449 04:50 AM    MCV 90.1 04/23/2023 04:50 AM    Hgb, External 11.6 05/03/2012 12:00 AM    Hct, External 33.4 05/03/2012 12:00 AM    Platelet cnt., External 332K 05/03/2012 12:00 AM      Lab Results   Component Value Date/Time    Sodium 135 (L) 04/23/2023 04:50 AM    Potassium 3.4 (L) 04/23/2023 04:50 AM    Chloride 103 04/23/2023 04:50 AM    CO2 24 04/23/2023 04:50 AM    Anion gap 8 04/23/2023 04:50 AM    Glucose 151 (H) 04/23/2023 04:50 AM    BUN 21 (H) 04/23/2023 04:50 AM    Creatinine 0.91 04/23/2023 04:50 AM    BUN/Creatinine ratio 23 (H) 04/23/2023 04:50 AM    GFR est AA >60 07/21/2022 10:38 AM    GFR est non-AA >60 07/21/2022 10:38 AM    Calcium 8.7 04/23/2023 04:50 AM    Bilirubin, total 0.3 04/23/2023 04:50 AM    Alk.  phosphatase 116 04/23/2023 04:50 AM    Protein, total 7.6 04/23/2023 04:50 AM    Albumin 3.6 04/23/2023 04:50 AM    Globulin 4.0 04/23/2023 04:50 AM    A-G Ratio 0.9 (L) 04/23/2023 04:50 AM    ALT (SGPT) 37 04/23/2023 04:50 AM      Vitals:    04/24/23 1421 04/24/23 1540 04/24/23 1933 04/25/23 0808   BP: 113/63 107/65 110/81 115/80   Pulse: 100 (!) 101 90 96   Resp: 18 16 16 16   Temp: 98 °F (36.7 °C) 98 °F (36.7 °C) 97.7 °F (36.5 °C) 98.2 °F (36.8 °C)   SpO2: 95% 94% 94% 94%   Weight:       Height:       LMP: 03/14/2016       Current Facility-Administered Medications   Medication Dose Route Frequency Provider Last Rate Last Admin    ondansetron (ZOFRAN ODT) tablet 4 mg  4 mg Oral Q8H PRN Lazara Medrano NP        gabapentin (NEURONTIN) capsule 400 mg  400 mg Oral TID Lazara Medrano NP   400 mg at 04/25/23 0817    mirtazapine (REMERON) tablet 7.5 mg  7.5 mg Oral QHS Josefina Lau NP   7.5 mg at 04/24/23 2118    escitalopram oxalate (LEXAPRO) tablet 10 mg  10 mg Oral DAILY Josefina Lau NP   10 mg at 04/25/23 0817    OLANZapine (ZyPREXA) tablet 5 mg  5 mg Oral Q6H PRN Lyn, Kendrick Mcdonoughsin, NP        haloperidol lactate (HALDOL) injection 5 mg  5 mg IntraMUSCular Q6H PRN Lyn, Kendrick Mcdonoughsin, NP        benztropine (COGENTIN) tablet 1 mg  1 mg Oral BID PRN Jordan, Kendrick Mcdonoughsin, NP        diphenhydrAMINE (BENADRYL) injection 50 mg  50 mg IntraMUSCular BID PRN Lyn, Kendrick Mcdonoughsin, NP        hydrOXYzine HCL (ATARAX) tablet 50 mg  50 mg Oral TID PRN Lyn, Kendrick Mcdonoughsin, NP        LORazepam (ATIVAN) injection 1 mg  1 mg IntraMUSCular Q4H PRN Lyn, Kendrick Mcdonoughsin, NP        traZODone (DESYREL) tablet 50 mg  50 mg Oral QHS PRN Sandip Allan, NP   50 mg at 04/23/23 2123    acetaminophen (TYLENOL) tablet 650 mg  650 mg Oral Q4H PRN Lyn, Kendrick Mcdonoughsin, NP        magnesium hydroxide (MILK OF MAGNESIA) 400 mg/5 mL oral suspension 30 mL  30 mL Oral DAILY PRN Kendrick Nicholson, NP        PHENobarbitaL tablet 32.4 mg  32.4 mg Oral QID Doe CODY NP   32.4 mg at 04/25/23 0097    Followed by    PHENobarbitaL tablet 32.4 mg  32.4 mg Oral BID Sandip Allan NP        Followed by    Brandon Rachel ON 4/26/2023] PHENobarbitaL tablet 16.2 mg  16.2 mg Oral BID Raysa Nicholson, NP        PHENobarbitaL tablet 32.4 mg  32.4 mg Oral Q6H PRN Sandip Hand, NP        Followed by    Brandonkristopher Rachel ON 4/26/2023] PHENobarbitaL tablet 16.2 mg  16.2 mg Oral Q6H PRN Kendrick Nicholsonsin, NP        thiamine HCL (B-1) tablet 100 mg  100 mg Oral DAILY Raysa Nicholson, NP   100 mg at 41/35/97 1794    folic acid (FOLVITE) tablet 1 mg  1 mg Oral DAILY Raysa Nicholson NP   1 mg at 04/25/23 0817    therapeutic multivitamin [facility]) tablet 1 Tablet  1 Tablet Oral DAILY Sandip Allan NP   1 Tablet at 04/25/23 6573    nicotine buccal (POLACRILEX) lozenge 2 mg  2 mg Oral Q2H PRN Harini Rodríguez MD   2 mg at 04/25/23 [address]     Mental Status Exam:  Kevyn Zapata is a 38 y.o.  female who is in fair grooming, dressed in a hospital gown. Eye contact is fair. Pt is calm and is engaged in the evaluation. Speech is spontaneous and normal in rate, volume and prosody. Thought Process is logical, linear, and goal directed. Mood is reported as \"better\"  Affect is congruent and euthymic  Denies thoughts of suicide, plan, or intent  Denies thoughts of homicide, plan, or intent  Thought content is negative for perceptual abnormalities such as paranoia, hallucinations, delusions  Attention/Concentration are intact  Recent/Remote memories are intact per answers to my evaluation questions. Insight is fair. Judgment is fair  Cognition is intact grossly. Physical Exam:  Body habitus: Body mass index is 44.6 kg/m². Musculoskeletal system: normal gait  Tremor - neg  Cog wheeling - neg    Assessment and Plan:  Kevyn Zapata meets criteria for a diagnosis of:   MDD, recurrent, severe, without psychosis;   alcohol use disorder,   opioid use disorder. 4/25/23- No medication changes - tentative discharge tomorrow. She intends to admit to the [address] [place] [address] [facility] program next week, which she will self-admit to, as she has done this program before and is familiar with it. She has outpatient appointments already made. A coordinated, multidisplinary treatment team round was conducted with the patient, nurses, pharmcist,  and writer present. Discussions held with , and/or with family members; Complete current electronic health record for patient was reviewed in full including consultant notes, ancillary staff notes, nurses and tech notes, labs and vitals.   I certify that this patients inpatient psychiatric hospital services furnished since the previous certification were, and continue to be, required for treatment that could reasonably be expected to improve the patient's condition, or for diagnostic study, and that the patient continues to need, on a daily basis, active treatment furnished directly by or requiring the supervision of inpatient psychiatric facility personnel. In addition, the hospital records show that services furnished were intensive treatment services, admission or related services, or equivalent services.

## 2023-04-25 NOTE — INTERDISCIPLINARY ROUNDS
Behavioral Health Interdisciplinary Rounds     Patient Name: Jay Chamberlain  Age: 38 y.o. Room/Bed:  725/02  Primary Diagnosis: <principal problem not specified>   Admission Status: Voluntary     Readmission within 30 days: no  Power of  in place: no  Patient requires a blocked bed: no          Reason for blocked bed:       Flu Vaccine :    Consults:          Labs/Testing due today? Have they refused labs?: Yes, refusing. Sleep hours: 7 hrs+       Participation in Care/Groups:  no  Medication Compliant?: Yes  PRNS (last 24 hours): None    Restraints (last 24 hours):  no     CIWA (range last 24 hours): CIWA-Ar Total: 0   COWS (range last 24 hours):      ________________________________________________________________  OQ Admission Analysis Survey completed:  OQ Admission Analysis Survey score:  OQ Discharge Analysis Survey completed:  OQ Discharge Analysis Survey score:     _____________________________________________________________________    Alcohol screening (AUDIT) completed -   AUDIT Score: 33     If applicable, date SBIRT discussed in treatment team AND documented:   AUDIT Screen Score: AUDIT Score: 33      Document Brief Intervention (corresponds directly with the 5 A's, Ask, Advise, Assess, Assist, and Arrange): At- Risk Patients (Score 7-15 for women; 8-15 for men)  Discuss concern patient is drinking at unhealthy levels known to increase risk of alcohol-related health problems. Is Patient ready to commit to change?      If No:  Encourage reflection  Discuss short term and long term health risks of consuming alcohol  Barriers to change  Reaffirm willingness to help / Educational materials provided  If Yes:  Set goal  Plan  Educational materials provided    Harmful use or Dependence (Score 16 or greater)  Discuss short term and long term health risks of consuming alcohol  Recommendations  Negotiate drinking goal  Recommend addiction specialist/center  Arrange follow-up appointments. Tobacco - patient is a smoker: Have You Used Tobacco in the Past 30 Days: Yes  Illegal Drugs use: Have You Used Any Illegal Substances Over the Past 12 Months: No    24 hour chart check complete: yes   ____________________________________________________________________________________________________________    Patient goal(s) for today:   Treatment team focus/goals:   Progress note: Patient met with treatment team and appeared with a bright mood and affect. Patient denies SI/HI and WOODS AT [facility], feeling hopeful about future and is interested in doing [facility] program after discharge since recent relapse. Lexapro has been increased, plan to discharge home tomorrow.     LOS:  2  Expected LOS: 3    Financial concerns/prescription coverage:    Family contact:       Family requesting physician contact today:    Discharge plan:   Access to weapons :         Outpatient provider(s):   Patient's preferred phone number for follow up call :   Patient's preferred e-mail address :    Participating treatment team members: Ashley Lux, Mil De León MSW, Yadira Lopez, ADAMS, Claudeen Corin, NP, Luz Elena Eli PharmD

## 2023-04-25 NOTE — PROGRESS NOTES
Spiritual Care Assessment/Progress Note  [facility]      NAME: Estella Canas      MRN: [id number]  AGE: 38 y.o.  SEX: female  Muslim Affiliation: Cal Bond   Language: English     4/25/2023     Total Time (in minutes): 30     Spiritual Assessment begun in [address] through conversation with:         [x]Patient        [] Family    [] Friend(s)        Reason for Consult: Initial/Spiritual assessment, patient floor     Spiritual beliefs: (Please include comment if needed)     [x] Identifies with a melony tradition: Anabaptism        [x] Supported by a melony community:            [] Claims no spiritual orientation:           [] Seeking spiritual identity:                [] Adheres to an individual form of spirituality:           [] Not able to assess:                           Identified resources for coping:      [x] Prayer                               [] Music                  [] Guided Imagery     [x] Family/friends                 [] Pet visits     [x] Devotional reading                         [] Unknown     [] Other:                                               Interventions offered during this visit: (See comments for more details)    Patient Interventions: Coping skills reviewed/reinforced           Plan of Care:     [x] Support spiritual and/or cultural needs    [] Support AMD and/or advance care planning process      [] Support grieving process   [] Coordinate Rites and/or Rituals    [] Coordination with community clergy   [] No spiritual needs identified at this time   [] Detailed Plan of Care below (See Comments)  [] Make referral to Music Therapy  [] Make referral to Pet Therapy     [] Make referral to Addiction services  [] Make referral to [facility]  [] Make referral to Spiritual Care Partner  [] No future visits requested        [] Contact Spiritual Care for further referrals     Referral source:  initiated  Estella Canas at [facility]. I reviewed the medical record prior to this encounter. Spiritual Assessment: Hopeful for recovery; supported by Mu-ism community    We discussed Tesha Brewer current feelings/concerns and spiritual perspectives. The patient reported the the reason for this admission. She has been connected her Mu-ism's recovery program and feels accepted and supported. Outcome: Tesha Brewer clarified beliefs related to illness, healing, and medical care. And expressed appreciation for pastoral support and the opportunity to share their thought and feelings. Plan of Care: Patient is aware of  availability and was encouraged to request support as needed. The  on-call can be reached at [phone]). Rev.  Dilan Gustafson MDiv, MS, [facility]  Staff

## 2023-04-25 NOTE — PROGRESS NOTES
Problem: Alcohol Withdrawal  Goal: *STG: Participates in treatment plan  Outcome: Progressing Towards Goal  Note: Out on unit passively engaged, reports feeling tired due to phenobarb. CIWA unremarkable with pulse elevated to 90. Denies SI, no self harming behaviors. Future focused, goal directed. Reports daily goal is to d/c home by Thursday morning due to prior commitment to a Taoism function and Friday an ortho appointment. Tx team updated.  Staff focus is on offering support  Goal: *STG: Attends activities and groups  Outcome: Progressing Towards Goal  Goal: *STG: Will identify negative impact of chemical dependency including the use of tobacco, alcohol, and other substances  Outcome: Progressing Towards Goal  Goal: *STG: Agrees to participate in outpatient after care program to support ongoing mental health  Outcome: Progressing Towards Goal  Goal: Interventions  Outcome: Progressing Towards Goal

## 2023-04-25 NOTE — BH NOTES
GROUP THERAPY PROGRESS NOTE    Patient is participating in psychotherapy group. Group time: 45 minutes    Personal goal for participation: To gain an understanding of the 12 basic irrational beliefs and identify personal interpretations as they apply. Goal orientation: Personal    Group therapy participation: Active     Therapeutic interventions reviewed and discussed:  Group members were guided through developing an understanding of irrational beliefs and how they affect thought processes and behaviors. Members completed and activity and then engaged in discussion. Handouts provided. Impression of participation:  Pt was present and engaged in group discussion. Pt added insight to topic. Pt interacted with SW and peers. Pt was calm, cooperative.        Collette Session, MARCELO, QMHP-A

## 2023-04-25 NOTE — BH NOTES
GROUP THERAPY PROGRESS NOTE    Patient is participating in Coping Skills group. Group time: 60 minutes    Personal goal for participation: To develop an understanding of Powerlessness and the need for control    Goal orientation: Personal    Group therapy participation: Active     Therapeutic interventions reviewed and discussed:  SW guided members through recognizing powerlessness in their lives. Pts used handouts to help visualize areas of life that are out of their control. SW helped members develop coping strategies to help navigate our lack of control. Handout provided. Impression of participation:  Pt was present and engaged in group discussion. Pt added insight to group topic. Pt interacted with SW and peers. Pt was calm, cooperative.        Helen Llanos, MSW, QMHP-A

## 2023-04-25 NOTE — BH NOTES
GROUP THERAPY PROGRESS NOTE    Patient did not participate in Healthy living and wellness group.     Suri Nichols MSW, [facility]-A

## 2023-04-25 NOTE — DISCHARGE INSTRUCTIONS
DISCHARGE SUMMARY    Merlin Maffucci  : 1980  MRN: [id number]    The patient Ashley Lux exhibits the ability to control behavior in a less restrictive environment. Patient's level of functioning is improving. No assaultive/destructive behavior has been observed for the past 24 hours. No suicidal/homicidal threat or behavior has been observed for the past 24 hours. There is no evidence of serious medication side effects. Patient has not been in physical or protective restraints for at least the past 24 hours. If weapons involved, how are they secured? None    Is patient aware of and in agreement with discharge plan? Yes    Arrangements for medication:  Prescriptions given to patient, given a 30 day supply. Copy of discharge instructions to provider?:  Yes    Arrangements for transportation home: Family    Keep all follow up appointments as scheduled, continue to take prescribed medications per physician instructions. Mental health crisis number:  212 or your local mental health crisis line number at [address] [place] [address] at [place] [address] Emergency WARM LINE      [phone] [phone])      M-F: 9am to 9pm      Sat & Sun: [address] [facility] suicide prevention lines:                             9-671-IOVWEPR ([phone])       5-592-960-TALK ([phone])    Crisis Text Line:  Text HOME to [place] from Nurse    PATIENT INSTRUCTIONS:    What to do at Home:  Recommended activity: Activity as tolerated      *  Please give a list of your current medications to your Primary Care Provider. *  Please update this list whenever your medications are discontinued, doses are      changed, or new medications (including over-the-counter products) are added. *  Please carry medication information at all times in case of emergency situations.     These are general instructions for a healthy lifestyle:    No smoking/ No tobacco products/ Avoid exposure to second hand smoke  Surgeon General's Warning:  Quitting smoking now greatly reduces serious risk to your health. Obesity, smoking, and sedentary lifestyle greatly increases your risk for illness    A healthy diet, regular physical exercise & weight monitoring are important for maintaining a healthy lifestyle    You may be retaining fluid if you have a history of heart failure or if you experience any of the following symptoms:  Weight gain of 3 pounds or more overnight or 5 pounds in a week, increased swelling in our hands or feet or shortness of breath while lying flat in bed. Please call your doctor as soon as you notice any of these symptoms; do not wait until your next office visit. The discharge information has been reviewed with the patient. The patient verbalized understanding. Discharge medications reviewed with the patient and appropriate educational materials and side effects teaching were provided.   ___________________________________________________________________________________________________________________________________

## 2023-04-25 NOTE — BH NOTES
GROUP THERAPY PROGRESS NOTE    Patient is participating in recreational therapy group. Group time: 30 minutes    Personal goal for participation: To develop an understanding and practice of stress relief through art therapy    Goal orientation: Personal    Group therapy participation: Active     Therapeutic interventions reviewed and discussed:  Group members were given the opportunity to engage in conversation about their mental health challenges and strengths while coloring/painting. Group members gained an understanding of how stress relief through artistic practice can be beneficial to their mental health. Impression of participation: Pt and SW engaged in therapeutic conversation while completing craft activity. Pt was calm, cooperative.        MARCELO Link, HP-A

## 2023-04-25 NOTE — PROGRESS NOTES
Problem: Falls - Risk of  Goal: *Absence of Falls  Description: Document Sheraj Pretty Fall Risk and appropriate interventions in the flowsheet. Outcome: Progressing Towards Goal  Note: Fall Risk Interventions:     Pt appears to be resting in no apparent distress noted. No voiced concerns. Standard fall and safety precautions maintained. Q15m rounds for safety continued per provider order.

## 2023-04-26 VITALS
BODY MASS INDEX: 44.65 KG/M2 | SYSTOLIC BLOOD PRESSURE: 103 MMHG | HEIGHT: 65 IN | WEIGHT: 268 LBS | DIASTOLIC BLOOD PRESSURE: 61 MMHG | OXYGEN SATURATION: 95 % | RESPIRATION RATE: 16 BRPM | TEMPERATURE: 97.9 F | HEART RATE: 79 BPM

## 2023-04-26 LAB
ALBUMIN SERPL-MCNC: 3.1 G/DL (ref 3.5–5)
ALBUMIN/GLOB SERPL: 1 (ref 1.1–2.2)
ALP SERPL-CCNC: 102 U/L (ref 45–117)
ALT SERPL-CCNC: 43 U/L (ref 12–78)
ANION GAP SERPL CALC-SCNC: 6 MMOL/L (ref 5–15)
AST SERPL-CCNC: 26 U/L (ref 15–37)
BILIRUB SERPL-MCNC: 0.2 MG/DL (ref 0.2–1)
BUN SERPL-MCNC: 13 MG/DL (ref 6–20)
BUN/CREAT SERPL: 20 (ref 12–20)
CALCIUM SERPL-MCNC: 8.6 MG/DL (ref 8.5–10.1)
CHLORIDE SERPL-SCNC: 108 MMOL/L (ref 97–108)
CHOLEST SERPL-MCNC: 130 MG/DL
CO2 SERPL-SCNC: 25 MMOL/L (ref 21–32)
CREAT SERPL-MCNC: 0.64 MG/DL (ref 0.55–1.02)
GLOBULIN SER CALC-MCNC: 3.2 G/DL (ref 2–4)
GLUCOSE SERPL-MCNC: 107 MG/DL (ref 65–100)
HDLC SERPL-MCNC: 55 MG/DL
HDLC SERPL: 2.4 (ref 0–5)
LDLC SERPL CALC-MCNC: 41.8 MG/DL (ref 0–100)
POTASSIUM SERPL-SCNC: 3.9 MMOL/L (ref 3.5–5.1)
PROT SERPL-MCNC: 6.3 G/DL (ref 6.4–8.2)
SARS-COV-2 RNA RESP QL NAA+PROBE: NOT DETECTED
SODIUM SERPL-SCNC: 139 MMOL/L (ref 136–145)
SOURCE, COVRS: NORMAL
TRIGL SERPL-MCNC: 166 MG/DL (ref ?–150)
TSH SERPL DL<=0.05 MIU/L-ACNC: 4.7 UIU/ML (ref 0.36–3.74)
VLDLC SERPL CALC-MCNC: 33.2 MG/DL

## 2023-04-26 PROCEDURE — 80061 LIPID PANEL: CPT

## 2023-04-26 PROCEDURE — U0005 INFEC AGEN DETEC AMPLI PROBE: HCPCS

## 2023-04-26 PROCEDURE — 36415 COLL VENOUS BLD VENIPUNCTURE: CPT

## 2023-04-26 PROCEDURE — 84443 ASSAY THYROID STIM HORMONE: CPT

## 2023-04-26 PROCEDURE — 74011250637 HC RX REV CODE- 250/637: Performed by: NURSE PRACTITIONER

## 2023-04-26 PROCEDURE — 74011250637 HC RX REV CODE- 250/637: Performed by: PSYCHIATRY & NEUROLOGY

## 2023-04-26 PROCEDURE — 80053 COMPREHEN METABOLIC PANEL: CPT

## 2023-04-26 RX ORDER — POLYETHYLENE GLYCOL 3350 17 G/17G
17 POWDER, FOR SOLUTION ORAL DAILY
COMMUNITY
End: 2023-04-26

## 2023-04-26 RX ORDER — NALTREXONE HYDROCHLORIDE 50 MG/1
50 TABLET, FILM COATED ORAL DAILY
COMMUNITY
End: 2023-04-26

## 2023-04-26 RX ORDER — CETIRIZINE HYDROCHLORIDE 10 MG/1
10 TABLET ORAL DAILY
COMMUNITY
End: 2023-04-26

## 2023-04-26 RX ORDER — HYDROXYZINE 25 MG/1
25 TABLET, FILM COATED ORAL
COMMUNITY

## 2023-04-26 RX ORDER — DEXTROAMPHETAMINE SACCHARATE, AMPHETAMINE ASPARTATE, DEXTROAMPHETAMINE SULFATE AND AMPHETAMINE SULFATE 3.75; 3.75; 3.75; 3.75 MG/1; MG/1; MG/1; MG/1
15 TABLET ORAL 2 TIMES DAILY
COMMUNITY
End: 2023-04-26

## 2023-04-26 RX ORDER — OXYCODONE AND ACETAMINOPHEN 5; 325 MG/1; MG/1
1 TABLET ORAL
COMMUNITY
End: 2023-04-26

## 2023-04-26 RX ORDER — ESCITALOPRAM OXALATE 10 MG/1
10 TABLET ORAL DAILY
Qty: 30 TABLET | Refills: 0 | Status: SHIPPED | OUTPATIENT
Start: 2023-04-27

## 2023-04-26 RX ADMIN — FOLIC ACID 1 MG: 1 TABLET ORAL at 09:55

## 2023-04-26 RX ADMIN — THERA TABS 1 TABLET: TAB at 09:55

## 2023-04-26 RX ADMIN — ESCITALOPRAM 10 MG: 10 TABLET, FILM COATED ORAL at 09:55

## 2023-04-26 RX ADMIN — NICOTINE POLACRILEX 2 MG: 2 LOZENGE ORAL at 09:54

## 2023-04-26 RX ADMIN — GABAPENTIN 400 MG: 400 CAPSULE ORAL at 09:54

## 2023-04-26 RX ADMIN — Medication 100 MG: at 09:55

## 2023-04-26 NOTE — PROGRESS NOTES
Patient says she feels good today. Patient pleasant and cooperative. Participated in treatment team. Medication and meal compliant. Denies SI/HI.  Says her mood is good and she slept well last night    Problem: Alcohol Withdrawal  Goal: *STG: Participates in treatment plan  Outcome: Progressing Towards Goal  Goal: *STG: Will identify negative impact of chemical dependency including the use of tobacco, alcohol, and other substances  Outcome: Progressing Towards Goal

## 2023-04-26 NOTE — PROGRESS NOTES
Follow up visit during rounds. Pt was excited to share about her discharge today. I offered words of encouragement and blessing for continued recovery. No other needs were voiced during this encounter. For additional spiritual care, please contact the  on-call at [phone]). .  Lissa Stoo MDiv, MS, [facility]  Staff

## 2023-04-26 NOTE — DISCHARGE SUMMARY
DISCHARGE SUMMARY    Some parts of the discharge summary are from the initial Psychiatric interview that was done on admission by the admitting psychiatrist.     Date of Admission: 4/23/2023    Date of Discharge: 4/26/2023     TYPE OF DISCHARGE:   REGULAR     INITIAL PSYCHIATRIC EVALUATION:  CHIEF COMPLAINT: \"I just have depression and I'm tired of it. \"      HISTORY OF PRESENTING COMPLAINT:  This is a 38 y.o. female who is currently admitted to the psychiatric floor at [facility] on a voluntary basis for suicidal ideation in the context of an etoh relapse. This was only a two day relapse after maintaining sobriety for a few months, other than taking prescribed opioids for her foot. Running out of her pain medication was a trigger for the relapse too. Pt has been suffering from depression for many years, and denies specific triggers that led to this episode of worsening depression other than drinking. Pt was recently discharged from [facility] in March of 2023, after a two day admission. Pt states her relapse was the trigger for this worsening depressive episode. She states she has not been having suicidal thoughts, but at the same time she states \"I hate living. \" There is no plan/intent to harm self, however, just a passive death wish. Denies HI/plan/intent, denies AVH. PAST PSYCHIATRIC HISTORY: Hx of MDD and alcohol use disorder. Pt has a hx of multiple past psychiatric hospitalizations, most recently March 2023 at [facility]. Pt has no hx of suicide attempts. She sees Vioelt Rodriguez for outpatient psychiatry. Past medication trials include Prozac and Effexor. Therapist is Estefania Leahy.      SUBSTANCE ABUSE HISTORY: Hx of alcohol use disorder. Pt reports drinking vodka, about a handle of vodka daily, though this was just for two days. Hx of withdrawal seizure, last being a few months ago. She has done a year long program for alcohol use disorder at [facility]. She has a hx of taking morphine that wasn't prescribed to her, but not for over 2 years. She vapes tobacco. Denies other substance use. PSYCHOSOCIAL HISTORY: Pt lives with grandparents. She has one child, age 8, who lives with her and her grandparents. She is single. 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 FAMILY HISTORY: Mental illness in father's side - unknown what diagnoses       COURSE IN THE HOSPITAL:  Lj Jernigan was admitted to the inpatient psychiatry unit at [facility] for acute psychiatric stabilization in regards to symptomatology as described in the HPI above and placed on Q15 minute checks and safety precautions. While on the unit, Lj Jernigan was involved in individual, group, occupational and milieu therapy. Pt was started back on psychiatric medications to address symptomatology described above. Pt improved gradually and was able to integrate into the milieu with help from the nursing staff. Lj Jernigan has made progress over the course of hospitalization and is psychiatrically stable for discharge at this time. Pt was appropriate on the unit, interacted and engaged appropriately with peers and staff, and was cooperative with medications and participated in the unit routine. Please see individual progress notes for more specific details regarding patient's hospitalization course. Patient was discharged as per the plan. Pt  had been doing well on the unit as per the report of the nursing staff and my observations. No PRN medication for agitation, seclusion or restraints were required during the last 48 hours of the stay. At this time pt did not offer any complaints. Patient denied any SI or HI. Denied any AH or VH. Pt engaged in safety planning and agrees to enact safety plan should any thoughts of harming self or others arise.  Patient is future oriented, identifies reasons for living and goals for the future, and is not felt to be an immediate risk to self or others. Patient will follow-up with appointments and agrees to be compliant with prescribed medications as ordered and remains motivated to be in treatment. The patient verbalized understanding of the above discharge instructions. Please refer to case management notes for details about discharge disposition and follow-up. DISCHARGE DIAGNOSIS:  MDD, recurrent, severe, without psychosis;   alcohol use disorder,   opioid use disorder. Current Discharge Medication List        CONTINUE these medications which have CHANGED    Details   escitalopram oxalate (LEXAPRO) 10 mg tablet Take 1 Tablet by mouth daily. Indications: anxiousness associated with depression  Qty: 30 Tablet, Refills: 0  Start date: 4/27/2023           CONTINUE these medications which have NOT CHANGED    Details   hydrOXYzine HCL (ATARAX) 25 mg tablet Take 1 Tablet by mouth three (3) times daily as needed for Anxiety. mirtazapine (REMERON) 7.5 mg tablet Take 1 Tablet by mouth nightly for 60 days. Indications: major depressive disorder  Qty: 30 Tablet, Refills: 1      gabapentin (NEURONTIN) 400 mg capsule Take 1 Capsule by mouth three (3) times daily. Max Daily Amount: 1,200 mg.  Indications: neuropathic pain  Qty: 90 Capsule, Refills: 1    Associated Diagnoses: Alcohol use disorder, severe, dependence (HCC)           STOP taking these medications       cetirizine (ZYRTEC) 10 mg tablet Comments:   Reason for Stopping:         dextroamphetamine-amphetamine (AdderalL) 15 mg tablet Comments:   Reason for Stopping:         naltrexone (DEPADE) 50 mg tablet Comments:   Reason for Stopping:         polyethylene glycol (MIRALAX) 17 gram packet Comments:   Reason for Stopping:         oxyCODONE-acetaminophen (Percocet) 5-325 mg per tablet Comments:   Reason for Stopping:         Latuda 60 mg tab tablet Comments:   Reason for Stopping:              No results found for: VALF2, VALAC, VALP, VALPR, DS6, CRBAM, CRBAMP, CARB2, XCRBAM  No results found for: LITH    Follow-up Information       Follow up With Specialties Details Why Contact Info    Andre Rodriguez MD Infectious Disease Physician Follow up  [address]  [phone]      [facility]  Call Contact for substance/alcohol use 30 day recovery program [address], [address]    Email: [email or web address]  Phone:  [phone]    Devan Rosales, [facility] [address] on 4/27/2023 Continue for medication management [address] [facility]  [address]    Phone: [phone]  Text only: [phone]  Fax: [phone]    [facility]  Go to Continue for therapy services [address], [address]    Phone: [phone]          WOUND CARE: none needed. MENTAL STATUS EXAM:  Shiva Tee is a 38 y.o.  female who is in fair grooming, dressed in a hospital gown. Eye contact is fair. Pt is calm and is engaged in the evaluation. Speech is spontaneous and normal in rate, volume and prosody. Thought Process is logical, linear, and goal directed. Mood is reported as \"better\"  Affect is congruent and euthymic  Denies thoughts of suicide, plan, or intent  Denies thoughts of homicide, plan, or intent  Thought content is negative for perceptual abnormalities such as paranoia, hallucinations, delusions  Attention/Concentration are intact  Recent/Remote memories are intact per answers to my evaluation questions. Insight is fair. Judgment is fair  Cognition is intact grossly. PROGNOSIS:   Good / Fair based on nature of patient's pathology/ies and treatment compliance issues. Prognosis is greatly dependent upon patient's ability to  follow up on psychiatric/psychotherapy appointments as well as to comply with psychiatric medications as prescribed.

## 2023-04-26 NOTE — BH NOTES
Behavioral Health Transition Record to Provider    Patient Name: Tesha Brewer  YOB: 1980  Medical Record Number: [id number]  Date of Admission: 4/23/2023  Date of Discharge: 4/26/2023    Attending Provider: Anupam Luevano MD  Discharging Provider: Richard Curtis NP  To contact this individual call [phone] and ask the  to page. If unavailable, ask to be transferred to [facility] Provider on call. [facility] Provider will be available on call 24/7 and during holidays. Primary Care Provider: Vlad Kelley MD    Allergies   Allergen Reactions    Aspirin Other (comments)     Hot sweats and passed out; tolerated ibuprofen    Lorazepam Other (comments)    Penicillins Other (comments)     Childhood. Does not remember reaction. Is not aware if she has ever taken cephalosporins in the past.    Jan 2019: Pt notes no problems with cephalosporins. Reason for Admission: CHIEF COMPLAINT: \"I just have depression and I'm tired of it. \"      HISTORY OF PRESENTING COMPLAINT:  This is a 38 y.o. female who is currently admitted to the psychiatric floor at [facility] on a voluntary basis for suicidal ideation in the context of an etoh relapse. This was only a two day relapse after maintaining sobriety for a few months, other than taking prescribed opioids for her foot. Running out of her pain medication was a trigger for the relapse too. Pt has been suffering from depression for many years, and denies specific triggers that led to this episode of worsening depression other than drinking. Pt was recently discharged from [facility] in March of 2023, after a two day admission. Pt states her relapse was the trigger for this worsening depressive episode. She states she has not been having suicidal thoughts, but at the same time she states \"I hate living. \" There is no plan/intent to harm self, however, just a passive death wish.  Denies HI/plan/intent, denies AV. Admission Diagnosis: Unspecified mood (affective) disorder ([facility]) [F39]  Opioid use disorder [F11.90]  Alcohol use disorder [F10.90]    * No surgery found *    Results for orders placed or performed during the hospital encounter of 04/23/23   COVID-19 WITH INFLUENZA A/B   Result Value Ref Range    SARS-CoV-2 by PCR Not detected NOTD      Influenza A by PCR Not detected NOTD      Influenza B by PCR Not detected NOTD     URINE CULTURE HOLD SAMPLE    Specimen: Urine   Result Value Ref Range    Urine culture hold        Urine on hold in Microbiology dept for 2 days. If unpreserved urine is submitted, it cannot be used for addtional testing after 24 hours, recollection will be required. SAMPLES BEING HELD   Result Value Ref Range    SAMPLES BEING HELD 1BLUE     COMMENT        Add-on orders for these samples will be processed based on acceptable specimen integrity and analyte stability, which may vary by analyte. CBC WITH AUTOMATED DIFF   Result Value Ref Range    WBC 8.1 3.6 - 11.0 K/uL    RBC 4.67 3.80 - 5.20 M/uL    HGB 13.7 11.5 - 16.0 g/dL    HCT 42.1 35.0 - 47.0 %    MCV 90.1 80.0 - 99.0 FL    MCH 29.3 26.0 - 34.0 PG    MCHC 32.5 30.0 - 36.5 g/dL    RDW 12.9 11.5 - 14.5 %    PLATELET 562 643 - 526 K/uL    MPV 9.1 8.9 - 12.9 FL    NRBC 0.0 0  WBC    ABSOLUTE NRBC 0.00 0.00 - 0.01 K/uL    NEUTROPHILS 55 32 - 75 %    LYMPHOCYTES 37 12 - 49 %    MONOCYTES 6 5 - 13 %    EOSINOPHILS 1 0 - 7 %    BASOPHILS 1 0 - 1 %    IMMATURE GRANULOCYTES 0 0.0 - 0.5 %    ABS. NEUTROPHILS 4.4 1.8 - 8.0 K/UL    ABS. LYMPHOCYTES 3.0 0.8 - 3.5 K/UL    ABS. MONOCYTES 0.5 0.0 - 1.0 K/UL    ABS. EOSINOPHILS 0.1 0.0 - 0.4 K/UL    ABS. BASOPHILS 0.1 0.0 - 0.1 K/UL    ABS. IMM.  GRANS. 0.0 0.00 - 0.04 K/UL    DF AUTOMATED     METABOLIC PANEL, COMPREHENSIVE   Result Value Ref Range    Sodium 135 (L) 136 - 145 mmol/L    Potassium 3.4 (L) 3.5 - 5.1 mmol/L    Chloride 103 97 - 108 mmol/L    CO2 24 21 - 32 mmol/L    Anion gap 8 5 - 15 mmol/L    Glucose 151 (H) 65 - 100 mg/dL    BUN 21 (H) 6 - 20 MG/DL    Creatinine 0.91 0.55 - 1.02 MG/DL    BUN/Creatinine ratio 23 (H) 12 - 20      eGFR >60 >60 ml/min/1.73m2    Calcium 8.7 8.5 - 10.1 MG/DL    Bilirubin, total 0.3 0.2 - 1.0 MG/DL    ALT (SGPT) 37 12 - 78 U/L    AST (SGOT) 24 15 - 37 U/L    Alk.  phosphatase 116 45 - 117 U/L    Protein, total 7.6 6.4 - 8.2 g/dL    Albumin 3.6 3.5 - 5.0 g/dL    Globulin 4.0 2.0 - 4.0 g/dL    A-G Ratio 0.9 (L) 1.1 - 2.2     ETHYL ALCOHOL   Result Value Ref Range    ALCOHOL(ETHYL),SERUM 217 (H) <67 MG/DL   SALICYLATE   Result Value Ref Range    Salicylate level <5.1 (L) 2.8 - 20.0 MG/DL   ACETAMINOPHEN   Result Value Ref Range    Acetaminophen level <2 (L) 10 - 30 ug/mL   DRUG SCREEN, URINE   Result Value Ref Range    AMPHETAMINES Negative NEG      BARBITURATES Negative NEG      BENZODIAZEPINES Negative NEG      COCAINE Negative NEG      METHADONE Negative NEG      OPIATES Negative NEG      PCP(PHENCYCLIDINE) Negative NEG      THC (TH-CANNABINOL) Negative NEG      Drug screen comment (NOTE)    HCG URINE, QL   Result Value Ref Range    HCG urine, QL Negative NEG     URINALYSIS W/MICROSCOPIC   Result Value Ref Range    Color YELLOW/STRAW      Appearance TURBID (A) CLEAR      Specific gravity 1.023 1.003 - 1.030      pH (UA) 5.5 5.0 - 8.0      Protein Negative NEG mg/dL    Glucose Negative NEG mg/dL    Ketone Negative NEG mg/dL    Bilirubin Negative NEG      Blood Negative NEG      Urobilinogen 0.2 0.2 - 1.0 EU/dL    Nitrites Negative NEG      Leukocyte Esterase Negative NEG      WBC 0-4 0 - 4 /hpf    RBC 0-5 0 - 5 /hpf    Epithelial cells FEW FEW /lpf    Bacteria Negative NEG /hpf    Hyaline cast 0-2 0 - 5 /lpf   LIPID PANEL   Result Value Ref Range    Cholesterol, total 130 <200 MG/DL    Triglyceride 166 (H) <150 MG/DL    HDL Cholesterol 55 MG/DL    LDL, calculated 41.8 0 - 100 MG/DL    VLDL, calculated 33.2 MG/DL    CHOL/HDL Ratio 2.4 0.0 - 5.0 METABOLIC PANEL, COMPREHENSIVE   Result Value Ref Range    Sodium 139 136 - 145 mmol/L    Potassium 3.9 3.5 - 5.1 mmol/L    Chloride 108 97 - 108 mmol/L    CO2 25 21 - 32 mmol/L    Anion gap 6 5 - 15 mmol/L    Glucose 107 (H) 65 - 100 mg/dL    BUN 13 6 - 20 MG/DL    Creatinine 0.64 0.55 - 1.02 MG/DL    BUN/Creatinine ratio 20 12 - 20      eGFR >60 >60 ml/min/1.73m2    Calcium 8.6 8.5 - 10.1 MG/DL    Bilirubin, total 0.2 0.2 - 1.0 MG/DL    ALT (SGPT) 43 12 - 78 U/L    AST (SGOT) 26 15 - 37 U/L    Alk. phosphatase 102 45 - 117 U/L    Protein, total 6.3 (L) 6.4 - 8.2 g/dL    Albumin 3.1 (L) 3.5 - 5.0 g/dL    Globulin 3.2 2.0 - 4.0 g/dL    A-G Ratio 1.0 (L) 1.1 - 2.2     TSH 3RD GENERATION   Result Value Ref Range    TSH 4.70 (H) 0.36 - 3.74 uIU/mL   EKG, 12 LEAD, INITIAL   Result Value Ref Range    Ventricular Rate 95 BPM    Atrial Rate 95 BPM    P-R Interval 140 ms    QRS Duration 98 ms    Q-T Interval 364 ms    QTC Calculation (Bezet) 457 ms    Calculated P Axis 19 degrees    Calculated R Axis 17 degrees    Calculated T Axis 38 degrees    Diagnosis       Normal sinus rhythm  Low voltage QRS  Borderline ECG  When compared with ECG of 25-FEB-2023 08:09,  No significant change was found  Confirmed by Dionisio Yee MD ([id number]) on 4/24/2023 8:24:00 PM         Immunizations administered during this encounter:   Immunization History   Administered Date(s) Administered    Influenza Vaccine 09/26/2013    Influenza Vaccine Split 09/27/2012    Influenza, FLUARIX, FLULAVAL, Yang Umanzor (age 10 mo+) AND AFLURIA, (age 1 y+), PF, 0.5mL 12/12/2014, 10/13/2015, 10/12/2016       Screening for Metabolic Disorders for Patients on Antipsychotic Medications  (Data obtained from the EMR)    Estimated Body Mass Index  Estimated body mass index is 44.6 kg/m² as calculated from the following:    Height as of this encounter: 5' 5\" (1.651 m). Weight as of this encounter: 121.6 kg (268 lb).      Vital Signs/Blood Pressure  Visit Vitals  /61 (BP 1 Location: Left arm)   Pulse 79   Temp 97.9 °F (36.6 °C)   Resp 16   Ht 5' 5\" (1.651 m)   Wt 121.6 kg (268 lb)   LMP 03/14/2016   SpO2 95%   BMI 44.60 kg/m²       Blood Glucose/Hemoglobin A1c  Lab Results   Component Value Date/Time    Glucose 107 (H) 04/26/2023 06:13 AM    Glucose (POC) 186 (H) 03/13/2023 05:10 PM       Lab Results   Component Value Date/Time    Hemoglobin A1c 6.4 (H) 04/17/2023 12:00 AM        Lipid Panel  Lab Results   Component Value Date/Time    Cholesterol, total 130 04/26/2023 06:13 AM    HDL Cholesterol 55 04/26/2023 06:13 AM    LDL, calculated 41.8 04/26/2023 06:13 AM    Triglyceride 166 (H) 04/26/2023 06:13 AM    CHOL/HDL Ratio 2.4 04/26/2023 06:13 AM        Discharge Diagnosis: MDD, recurrent, severe, without psychosis;   alcohol use disorder,   opioid use disorder. Discharge Plan: The patient Evon Bowden exhibits the ability to control behavior in a less restrictive environment. Patient's level of functioning is improving. No assaultive/destructive behavior has been observed for the past 24 hours. No suicidal/homicidal threat or behavior has been observed for the past 24 hours. There is no evidence of serious medication side effects. Patient has not been in physical or protective restraints for at least the past 24 hours. If weapons involved, how are they secured? None    Is patient aware of and in agreement with discharge plan? Yes    Arrangements for medication:  Prescriptions given to patient, given a 30 day supply. Copy of discharge instructions to provider?:  Yes    Arrangements for transportation home: Family    Keep all follow up appointments as scheduled, continue to take prescribed medications per physician instructions.   Mental health crisis number:  [phone] or your local mental health crisis line number at [address] [place] [address] at [phone]    Discharge Medication List and Instructions:   Current Discharge Medication List        CONTINUE these medications which have CHANGED    Details   escitalopram oxalate (LEXAPRO) 10 mg tablet Take 1 Tablet by mouth daily. Indications: anxiousness associated with depression  Qty: 30 Tablet, Refills: 0  Start date: 4/27/2023           CONTINUE these medications which have NOT CHANGED    Details   hydrOXYzine HCL (ATARAX) 25 mg tablet Take 1 Tablet by mouth three (3) times daily as needed for Anxiety. mirtazapine (REMERON) 7.5 mg tablet Take 1 Tablet by mouth nightly for 60 days. Indications: major depressive disorder  Qty: 30 Tablet, Refills: 1      gabapentin (NEURONTIN) 400 mg capsule Take 1 Capsule by mouth three (3) times daily. Max Daily Amount: 1,200 mg.  Indications: neuropathic pain  Qty: 90 Capsule, Refills: 1    Associated Diagnoses: Alcohol use disorder, severe, dependence ([facility] [address]           STOP taking these medications       cetirizine (ZYRTEC) 10 mg tablet Comments:   Reason for Stopping:         dextroamphetamine-amphetamine (AdderalL) 15 mg tablet Comments:   Reason for Stopping:         naltrexone (DEPADE) 50 mg tablet Comments:   Reason for Stopping:         polyethylene glycol (MIRALAX) 17 gram packet Comments:   Reason for Stopping:         oxyCODONE-acetaminophen (Percocet) 5-325 mg per tablet Comments:   Reason for Stopping:         Latuda 60 mg tab tablet Comments:   Reason for Stopping:               Unresulted Labs (24h ago, onward)       Start     Ordered    04/26/23 0615  SARS-COV-2  ONE TIME,   R        Question:  Specimen source  Answer:  Nasopharyngeal    04/26/23 0615                  To obtain results of studies pending at discharge, please contact [phone]    Follow-up Information       Follow up With Specialties Details Why Contact Info    Marlyn Novak MD Infectious Disease Physician Follow up  [facility]  [phone]      [facility]  Call Contact for substance/alcohol use 30 day recovery program [facility] [address] [facility], [address] [place] [address]    Email: [email or web address]  Phone:  [phone]    Fe Alves, [facility] [address] on 4/27/2023 Continue for medication management [address] [facility]  [address]    Phone: [phone]  Text only: [phone]  Fax: [phone]    [facility]  Go to Continue for therapy services [address], [address]    Phone: [phone]            Advanced Directive:   Does the patient have an appointed surrogate decision maker? No  Does the patient have a Medical Advance Directive? No  Does the patient have a Psychiatric Advance Directive? No  If the patient does not have a surrogate or Medical Advance Directive AND Psychiatric Advance Directive, the patient was offered information on these advance directives Yes    Patient Instructions: Please continue all medications until otherwise directed by physician. Tobacco Cessation Discharge Plan:   Is the patient a smoker and needs referral for smoking cessation? No  Patient referred to the following for smoking cessation with an appointment? Not applicable     Patient was offered medication to assist with smoking cessation at discharge? Not applicable  Was education for smoking cessation added to the discharge instructions? Yes    Alcohol/Substance Abuse Discharge Plan:   Does the patient have a history of substance/alcohol abuse and requires a referral for treatment? Yes  Patient referred to the following for substance/alcohol abuse treatment with an appointment? Refused  Patient was offered medication to assist with alcohol cessation at discharge? Refused  Was education for substance/alcohol abuse added to discharge instructions? Yes    Patient discharged to Home; discussed with patient/caregiver and provided to the patient/caregiver either in hard copy or electronically.

## 2023-04-26 NOTE — PROGRESS NOTES
Pharmacist Discharge Medication Reconciliation    Discharging Provider: Shin Neil NP    Significant PMH:   Past Medical History:   Diagnosis Date    ADD (attention deficit disorder) [age]yo    Treated with Adderall since dx. 2013 dosing 20mg AM and 10mg PM.  Trial/change to Vyvanse Nov-Dec 2015--not as effective. Gynecologic disorder     History of miscarriages. History of Mirena IUD placed on 4/17/15    HX OTHER MEDICAL      -BUFA baby    HX OTHER MEDICAL     HPV positive    Idiopathic vulvodynia 2014    L1 vertebral fracture (HCC) 2017    [facility] imaging/admissoin: Mild acute two column compression fracture of L1 without evidence of retropulsion into the spinal canal.  Managed non-operatively. Migraines 2013    Neurological disorder     Pelvic pain in female 9/15/2014    2015 gyn laparoscopy/hysteroscopy with endometriosis worse right. Psychiatric disorder     depression    Secondary dysmenorrhea 2014    With menorraghia    Seizures ([facility] [address] 2008    never on meds--had appr 4-5 in a short amt of time and none since     Chief Complaint for this Admission:   Chief Complaint   Patient presents with    Suicidal     Allergies: Aspirin, Lorazepam, and Penicillins    Discharge Medications:   Current Discharge Medication List        CONTINUE these medications which have CHANGED    Details   escitalopram oxalate (LEXAPRO) 10 mg tablet Take 1 Tablet by mouth daily. Indications: anxiousness associated with depression  Qty: 30 Tablet, Refills: 0  Start date: 2023           CONTINUE these medications which have NOT CHANGED    Details   hydrOXYzine HCL (ATARAX) 25 mg tablet Take 1 Tablet by mouth three (3) times daily as needed for Anxiety. mirtazapine (REMERON) 7.5 mg tablet Take 1 Tablet by mouth nightly for 60 days. Indications: major depressive disorder  Qty: 30 Tablet, Refills: 1      gabapentin (NEURONTIN) 400 mg capsule Take 1 Capsule by mouth three (3) times daily. Max Daily Amount: 1,200 mg.  Indications: neuropathic pain  Qty: 90 Capsule, Refills: 1    Associated Diagnoses: Alcohol use disorder, severe, dependence (HCC)           STOP taking these medications       cetirizine (ZYRTEC) 10 mg tablet Comments:   Reason for Stopping:         dextroamphetamine-amphetamine (AdderalL) 15 mg tablet Comments:   Reason for Stopping:         naltrexone (DEPADE) 50 mg tablet Comments:   Reason for Stopping:         polyethylene glycol (MIRALAX) 17 gram packet Comments:   Reason for Stopping:         oxyCODONE-acetaminophen (Percocet) 5-325 mg per tablet Comments:   Reason for Stopping:         Latuda 60 mg tab tablet Comments:   Reason for Stopping:             The patient's chart, MAR and AVS were reviewed by CLEMENTE De La TorreD.

## 2023-04-26 NOTE — PROGRESS NOTES
Patient resting in bed with eyes closed and breathing even and unlabored. Bed in low position and space is clutter free. Non-skid socks worn. Lights dim to promote rest. Patient monitored every 15 minutes for safety.

## 2023-04-26 NOTE — BH NOTES
GROUP THERAPY PROGRESS NOTE  No group due to low patient participation.    Sheridan Cancino, MSW, QMHP-A
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
## 2023-04-26 NOTE — BH NOTES
GROUP THERAPY PROGRESS NOTE    Patient did not participate in recreational therapy group.     MARCELO Cohen, HP-A

## 2023-04-26 NOTE — BH NOTES
GROUP THERAPY PROGRESS NOTE    Patient did not participate in [facility] group.     Sheridan Cancino, MSW, QMHP-A

## 2023-05-03 ENCOUNTER — OFFICE VISIT (OUTPATIENT)
Dept: ORTHOPEDIC SURGERY | Age: 43
End: 2023-05-03

## 2023-05-03 VITALS — HEIGHT: 65 IN | BODY MASS INDEX: 44.65 KG/M2 | WEIGHT: 268 LBS

## 2023-05-03 DIAGNOSIS — S82.852D CLOSED DISPLACED TRIMALLEOLAR FRACTURE OF LEFT ANKLE WITH ROUTINE HEALING, SUBSEQUENT ENCOUNTER: ICD-10-CM

## 2023-05-03 DIAGNOSIS — Z09 SURGICAL FOLLOW-UP CARE: Primary | ICD-10-CM

## 2023-05-06 ENCOUNTER — APPOINTMENT (OUTPATIENT)
Facility: HOSPITAL | Age: 43
End: 2023-05-06
Payer: MEDICAID

## 2023-05-06 ENCOUNTER — HOSPITAL ENCOUNTER (EMERGENCY)
Facility: HOSPITAL | Age: 43
Discharge: HOME | End: 2023-05-06
Attending: STUDENT IN AN ORGANIZED HEALTH CARE EDUCATION/TRAINING PROGRAM
Payer: MEDICAID

## 2023-05-06 VITALS
OXYGEN SATURATION: 97 % | HEART RATE: 100 BPM | RESPIRATION RATE: 18 BRPM | TEMPERATURE: 98.7 F | SYSTOLIC BLOOD PRESSURE: 114 MMHG | DIASTOLIC BLOOD PRESSURE: 76 MMHG

## 2023-05-06 DIAGNOSIS — M25.572 ACUTE LEFT ANKLE PAIN: Primary | ICD-10-CM

## 2023-05-06 PROCEDURE — 99283 EMERGENCY DEPT VISIT LOW MDM: CPT

## 2023-05-06 PROCEDURE — 73590 X-RAY EXAM OF LOWER LEG: CPT

## 2023-05-06 PROCEDURE — 73610 X-RAY EXAM OF ANKLE: CPT

## 2023-05-06 PROCEDURE — 6370000000 HC RX 637 (ALT 250 FOR IP): Performed by: STUDENT IN AN ORGANIZED HEALTH CARE EDUCATION/TRAINING PROGRAM

## 2023-05-06 RX ORDER — HYDROCODONE BITARTRATE AND ACETAMINOPHEN 5; 325 MG/1; MG/1
1 TABLET ORAL
Status: COMPLETED | OUTPATIENT
Start: 2023-05-06 | End: 2023-05-06

## 2023-05-06 RX ORDER — HYDROXYZINE HYDROCHLORIDE 25 MG/1
25 TABLET, FILM COATED ORAL 3 TIMES DAILY PRN
COMMUNITY

## 2023-05-06 RX ORDER — ESCITALOPRAM OXALATE 10 MG/1
20 TABLET ORAL DAILY
COMMUNITY
Start: 2023-04-27

## 2023-05-06 RX ORDER — ONDANSETRON 4 MG/1
4 TABLET, ORALLY DISINTEGRATING ORAL
Status: COMPLETED | OUTPATIENT
Start: 2023-05-06 | End: 2023-05-06

## 2023-05-06 RX ADMIN — HYDROCODONE BITARTRATE AND ACETAMINOPHEN 1 TABLET: 5; 325 TABLET ORAL at 17:31

## 2023-05-06 RX ADMIN — ONDANSETRON 4 MG: 4 TABLET, ORALLY DISINTEGRATING ORAL at 17:05

## 2023-05-06 ASSESSMENT — PAIN - FUNCTIONAL ASSESSMENT: PAIN_FUNCTIONAL_ASSESSMENT: 0-10

## 2023-05-06 ASSESSMENT — PAIN SCALES - GENERAL: PAINLEVEL_OUTOF10: 7

## 2023-05-06 NOTE — ED PROVIDER NOTES
[facility] EM[facility]Y [facility]  EMERGENCY DEPARTMENT ENCOUNTER      Pt Name: Tracy Anthony  MRN: [id number]  Allgfngozi 1980  Date of evaluation: 5/6/2023  Provider: Maryana Espinoza DO    CHIEF COMPLAINT     No chief complaint on file. HISTORY OF PRESENT ILLNESS   (Location/Symptom, Timing/Onset, Context/Setting, Quality, Duration, Modifying Factors, Severity)  Note limiting factors. 44-year-old male presents the ED via EMS for evaluation of left ankle pain. Patient was at a football game was going to use the reese Teja and rolled her left ankle. Patient reports pain in the left ankle especially in the medial aspect of it. Patient recently had surgical repair with surgical hardware performed via [facility]ics. Reports having some tingling sensation of the feet. Patient also feels nauseous secondary to the pain        Review of External Medical Records:     Nursing Notes were reviewed. REVIEW OF SYSTEMS    (2-9 systems for level 4, 10 or more for level 5)     Review of Systems    Except as noted above the remainder of the review of systems was reviewed and negative. PAST MEDICAL HISTORY     Past Medical History:   Diagnosis Date    ADD (attention deficit disorder) [age]yo    Treated with Adderall since dx. Nov 2013 dosing 20mg AM and 10mg PM.  Trial/change to Vyvanse Nov-Dec 2015--not as effective. Gynecologic disorder     History of miscarriages. 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  Managed non-operatively. Migraines 6/12/2013    Neurological disorder     Pelvic pain in female 9/15/2014    April 2015 gyn laparoscopy/hysteroscopy with endometriosis worse right.     Psychiatric disorder     depression    Secondary dysmenorrhea 8/12/2014    With menorraghia    Seizures

## 2023-05-06 NOTE — ED NOTES
Pt discharged in stable condition at this time. MD/SAL reviewed discharge instructions, prescriptions, and follow up with patient at bedside. Pt verbalized understanding and denies any needs or questions at this time. Pt assisted via GLENDY [address] per her comfort and request. Pt to be driven home by her grandparents.         Farzaneh Gunter RN  05/06/23 9727

## 2023-05-17 ASSESSMENT — ENCOUNTER SYMPTOMS: NAUSEA: 1

## 2023-05-26 ENCOUNTER — OFFICE VISIT (OUTPATIENT)
Age: 43
End: 2023-05-26
Payer: MEDICAID

## 2023-05-26 VITALS
RESPIRATION RATE: 18 BRPM | OXYGEN SATURATION: 96 % | WEIGHT: 271 LBS | SYSTOLIC BLOOD PRESSURE: 104 MMHG | HEART RATE: 78 BPM | HEIGHT: 65 IN | TEMPERATURE: 97.8 F | DIASTOLIC BLOOD PRESSURE: 75 MMHG | BODY MASS INDEX: 45.15 KG/M2

## 2023-05-26 DIAGNOSIS — F33.1 MAJOR DEPRESSIVE DISORDER, RECURRENT, MODERATE (HCC): ICD-10-CM

## 2023-05-26 DIAGNOSIS — R73.03 PREDIABETES: ICD-10-CM

## 2023-05-26 PROCEDURE — 99214 OFFICE O/P EST MOD 30 MIN: CPT | Performed by: INTERNAL MEDICINE

## 2023-05-26 RX ORDER — ORAL SEMAGLUTIDE 3 MG/1
3 TABLET ORAL DAILY
Qty: 30 TABLET | Refills: 3 | Status: SHIPPED | OUTPATIENT
Start: 2023-05-26

## 2023-05-26 SDOH — ECONOMIC STABILITY: HOUSING INSECURITY
IN THE LAST 12 MONTHS, WAS THERE A TIME WHEN YOU DID NOT HAVE A STEADY PLACE TO SLEEP OR SLEPT IN A SHELTER (INCLUDING NOW)?: NO

## 2023-05-26 SDOH — ECONOMIC STABILITY: FOOD INSECURITY: WITHIN THE PAST 12 MONTHS, THE FOOD YOU BOUGHT JUST DIDN'T LAST AND YOU DIDN'T HAVE MONEY TO GET MORE.: SOMETIMES TRUE

## 2023-05-26 SDOH — ECONOMIC STABILITY: INCOME INSECURITY: HOW HARD IS IT FOR YOU TO PAY FOR THE VERY BASICS LIKE FOOD, HOUSING, MEDICAL CARE, AND HEATING?: VERY HARD

## 2023-05-26 SDOH — ECONOMIC STABILITY: FOOD INSECURITY: WITHIN THE PAST 12 MONTHS, YOU WORRIED THAT YOUR FOOD WOULD RUN OUT BEFORE YOU GOT MONEY TO BUY MORE.: OFTEN TRUE

## 2023-05-26 ASSESSMENT — PATIENT HEALTH QUESTIONNAIRE - PHQ9
1. LITTLE INTEREST OR PLEASURE IN DOING THINGS: 1
SUM OF ALL RESPONSES TO PHQ9 QUESTIONS 1 & 2: 2
2. FEELING DOWN, DEPRESSED OR HOPELESS: 1
SUM OF ALL RESPONSES TO PHQ QUESTIONS 1-9: 2

## 2023-05-31 ENCOUNTER — TELEPHONE (OUTPATIENT)
Age: 43
End: 2023-05-31

## 2023-05-31 NOTE — TELEPHONE ENCOUNTER
PA for  Rybelsus 3MG tablets was sent to Ins [employer] today and then came back saying This request is unable to be processed electronically.

## 2023-08-07 ENCOUNTER — APPOINTMENT (OUTPATIENT)
Facility: HOSPITAL | Age: 43
End: 2023-08-07
Payer: MEDICAID

## 2023-08-07 ENCOUNTER — HOSPITAL ENCOUNTER (EMERGENCY)
Facility: HOSPITAL | Age: 43
Discharge: HOME | End: 2023-08-07
Payer: MEDICAID

## 2023-08-07 VITALS
SYSTOLIC BLOOD PRESSURE: 107 MMHG | DIASTOLIC BLOOD PRESSURE: 69 MMHG | HEIGHT: 65 IN | OXYGEN SATURATION: 94 % | WEIGHT: 260 LBS | TEMPERATURE: 97.7 F | BODY MASS INDEX: 43.32 KG/M2 | HEART RATE: 100 BPM | RESPIRATION RATE: 20 BRPM

## 2023-08-07 DIAGNOSIS — M25.572 CHRONIC PAIN OF LEFT ANKLE: Primary | ICD-10-CM

## 2023-08-07 DIAGNOSIS — G89.29 CHRONIC PAIN OF LEFT ANKLE: Primary | ICD-10-CM

## 2023-08-07 PROCEDURE — 99283 EMERGENCY DEPT VISIT LOW MDM: CPT

## 2023-08-07 PROCEDURE — 6370000000 HC RX 637 (ALT 250 FOR IP)

## 2023-08-07 PROCEDURE — 73610 X-RAY EXAM OF ANKLE: CPT

## 2023-08-07 RX ORDER — ONDANSETRON 4 MG/1
4 TABLET, ORALLY DISINTEGRATING ORAL ONCE
Status: COMPLETED | OUTPATIENT
Start: 2023-08-07 | End: 2023-08-07

## 2023-08-07 RX ORDER — CYCLOBENZAPRINE HCL 10 MG
10 TABLET ORAL 3 TIMES DAILY PRN
Qty: 21 TABLET | Refills: 0 | Status: SHIPPED | OUTPATIENT
Start: 2023-08-07 | End: 2023-08-17

## 2023-08-07 RX ORDER — HYDROCODONE BITARTRATE AND ACETAMINOPHEN 5; 325 MG/1; MG/1
1 TABLET ORAL
Status: COMPLETED | OUTPATIENT
Start: 2023-08-07 | End: 2023-08-07

## 2023-08-07 RX ADMIN — HYDROCODONE BITARTRATE AND ACETAMINOPHEN 1 TABLET: 5; 325 TABLET ORAL at 07:37

## 2023-08-07 RX ADMIN — ONDANSETRON 4 MG: 4 TABLET, ORALLY DISINTEGRATING ORAL at 07:37

## 2023-08-07 ASSESSMENT — ENCOUNTER SYMPTOMS
SHORTNESS OF BREATH: 0
PHOTOPHOBIA: 0
BACK PAIN: 0
VOMITING: 0
RHINORRHEA: 0
NAUSEA: 0
COUGH: 0

## 2023-08-07 ASSESSMENT — PAIN - FUNCTIONAL ASSESSMENT: PAIN_FUNCTIONAL_ASSESSMENT: 0-10

## 2023-08-07 ASSESSMENT — PAIN SCALES - GENERAL: PAINLEVEL_OUTOF10: 8

## 2023-08-07 NOTE — ED PROVIDER NOTES
Gynecologic disorder     History of miscarriages. 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  Managed non-operatively. Migraines 6/12/2013    Neurological disorder     Pelvic pain in female 9/15/2014    April 2015 gyn laparoscopy/hysteroscopy with endometriosis worse right. Psychiatric disorder     depression    Secondary dysmenorrhea 8/12/2014    With menorraghia    Seizures ([address]) 2008    never on meds--had appr 4-5 in a short amt of time and none since       Past Surgical History:  Past Surgical History:   Procedure Laterality Date    ENDOMETRIAL CRYOABLATION      GYN  4/17/15    laparoscopy and hysteroscopy and IUD placement    HYSTERECTOMY (CERVIX STATUS UNKNOWN)  04/04/2016    Complete Hysterectomy       Family History:  Family History   Problem Relation Age of Onset    Diabetes Maternal Grandfather     Psychiatric Disorder Father     Cancer Father     Diabetes Maternal Grandmother     Hypertension Maternal Grandmother     Thyroid Disease Maternal Grandmother     Hypertension Maternal Grandfather     Cancer Maternal Grandfather     Heart Disease Maternal Grandfather     Cancer Paternal Grandmother     Diabetes Paternal Grandmother     Cancer Mother     Diabetes Mother     Alcohol Abuse Father         and drug       Social History:  Social History     Tobacco Use    Smoking status: Former     Packs/day: 1.00     Types: Cigarettes    Smokeless tobacco: Current   Substance Use Topics    Alcohol use: Yes    Drug use: No       Allergies: Allergies   Allergen Reactions    Aspirin Other (See Comments)     Hot sweats and passed out; tolerated ibuprofen    Lorazepam Other (See Comments)     Pt denies allergy    Penicillins Other (See Comments)     Childhood. Does not remember reaction.  Is not aware if she has

## 2023-08-07 NOTE — ED NOTES
I have reviewed the provider's instructions with the patient, answering all questions to her satisfaction. RN placed ace bandage on pt at this time.  Pt wheeled to waiting room at this time     Maryjane Cruz RN  08/07/23 8057

## 2023-10-18 ENCOUNTER — OFFICE VISIT (OUTPATIENT)
Age: 43
End: 2023-10-18
Payer: MEDICAID

## 2023-10-18 VITALS
WEIGHT: 280 LBS | DIASTOLIC BLOOD PRESSURE: 67 MMHG | HEART RATE: 98 BPM | SYSTOLIC BLOOD PRESSURE: 112 MMHG | HEIGHT: 65 IN | BODY MASS INDEX: 46.65 KG/M2 | RESPIRATION RATE: 16 BRPM | OXYGEN SATURATION: 96 % | TEMPERATURE: 97.6 F

## 2023-10-18 DIAGNOSIS — Z28.21 INFLUENZA VACCINATION DECLINED: ICD-10-CM

## 2023-10-18 DIAGNOSIS — Z32.01 POSITIVE PREGNANCY TEST: Primary | ICD-10-CM

## 2023-10-18 LAB
HCG, PREGNANCY, URINE, POC: NEGATIVE
VALID INTERNAL CONTROL, POC: YES

## 2023-10-18 PROCEDURE — PBSHW AMB POC URINE PREGNANCY TEST, VISUAL COLOR COMPARISON: Performed by: FAMILY MEDICINE

## 2023-10-18 PROCEDURE — 99213 OFFICE O/P EST LOW 20 MIN: CPT | Performed by: FAMILY MEDICINE

## 2023-10-18 PROCEDURE — 81025 URINE PREGNANCY TEST: CPT | Performed by: FAMILY MEDICINE

## 2023-10-18 RX ORDER — ESCITALOPRAM OXALATE 20 MG/1
20 TABLET ORAL DAILY
COMMUNITY
Start: 2023-08-22

## 2023-10-18 RX ORDER — MIRTAZAPINE 15 MG/1
15 TABLET, FILM COATED ORAL
COMMUNITY
Start: 2023-10-13

## 2023-10-18 ASSESSMENT — PATIENT HEALTH QUESTIONNAIRE - PHQ9
SUM OF ALL RESPONSES TO PHQ QUESTIONS 1-9: 0
6. FEELING BAD ABOUT YOURSELF - OR THAT YOU ARE A FAILURE OR HAVE LET YOURSELF OR YOUR FAMILY DOWN: 0
1. LITTLE INTEREST OR PLEASURE IN DOING THINGS: 0
5. POOR APPETITE OR OVEREATING: 0
SUM OF ALL RESPONSES TO PHQ QUESTIONS 1-9: 0
4. FEELING TIRED OR HAVING LITTLE ENERGY: 0
2. FEELING DOWN, DEPRESSED OR HOPELESS: 0
9. THOUGHTS THAT YOU WOULD BE BETTER OFF DEAD, OR OF HURTING YOURSELF: 0
8. MOVING OR SPEAKING SO SLOWLY THAT OTHER PEOPLE COULD HAVE NOTICED. OR THE OPPOSITE, BEING SO FIGETY OR RESTLESS THAT YOU HAVE BEEN MOVING AROUND A LOT MORE THAN USUAL: 0
SUM OF ALL RESPONSES TO PHQ QUESTIONS 1-9: 0
7. TROUBLE CONCENTRATING ON THINGS, SUCH AS READING THE NEWSPAPER OR WATCHING TELEVISION: 0
3. TROUBLE FALLING OR STAYING ASLEEP: 0
SUM OF ALL RESPONSES TO PHQ9 QUESTIONS 1 & 2: 0
10. IF YOU CHECKED OFF ANY PROBLEMS, HOW DIFFICULT HAVE THESE PROBLEMS MADE IT FOR YOU TO DO YOUR WORK, TAKE CARE OF THINGS AT HOME, OR GET ALONG WITH OTHER PEOPLE: 0
SUM OF ALL RESPONSES TO PHQ QUESTIONS 1-9: 0

## 2023-10-18 ASSESSMENT — COLUMBIA-SUICIDE SEVERITY RATING SCALE - C-SSRS
3. HAVE YOU BEEN THINKING ABOUT HOW YOU MIGHT KILL YOURSELF?: NO
7. DID THIS OCCUR IN THE LAST THREE MONTHS: NO
5. HAVE YOU STARTED TO WORK OUT OR WORKED OUT THE DETAILS OF HOW TO KILL YOURSELF? DO YOU INTEND TO CARRY OUT THIS PLAN?: NO

## 2023-10-18 NOTE — PROGRESS NOTES
Javier Perez (:  1980) is a 38 y.o. female,Established patient, here for evaluation of the following chief complaint(s):  history of postive pregnacy       Subjective   SUBJECTIVE/OBJECTIVE:  Pt presents for acute visit. Pt states she was ready for ankle revision surgery. Upon preop testing you tested positive for pregnancy. She is s/p hysterectomy with BSO and she has also been sexually abstinent for 3 yrs. Denies any additional complaints. Past Medical History:   Diagnosis Date    ADD (attention deficit disorder) [age]yo    Treated with Adderall since dx. 2013 dosing 20mg AM and 10mg PM.  Trial/change to Vyvanse Nov-Dec 2015--not as effective. Gynecologic disorder     History of miscarriages. History of Mirena IUD placed on 4/17/15    Idiopathic vulvodynia 2014    L1 vertebral fracture ([address]) 2017    [facility] imaging/admissoin: Mild acute two column compression fracture of L1 without evidence of retropulsion into the spinal canal.  Managed non-operatively. Migraines 2013    Neurological disorder     Pelvic pain in female 9/15/2014    2015 gyn laparoscopy/hysteroscopy with endometriosis worse right.     Psychiatric disorder     depression    Secondary dysmenorrhea 2014    With menorraghia    Seizures ([address])     never on meds--had appr 4-5 in a short amt of time and none since     Past Surgical History:   Procedure Laterality Date    ENDOMETRIAL CRYOABLATION      GYN  4/17/15    laparoscopy and hysteroscopy and IUD placement    HYSTERECTOMY (CERVIX STATUS UNKNOWN)  2016    Complete Hysterectomy      Family History   Problem Relation Age of Onset    Diabetes Maternal Grandfather     Psychiatric Disorder Father     Cancer Father     Diabetes Maternal Grandmother     Hypertension Maternal Grandmother     Thyroid Disease Maternal Grandmother     Hypertension Maternal Grandfather     Cancer Maternal Grandfather     Heart Disease Maternal Grandfather

## 2023-10-18 NOTE — PROGRESS NOTES
Rm: 01    Chief Complaint   Patient presents with    history of postive pregnacy         1. Have you been to the ER, urgent care clinic since your last visit? Hospitalized since your last visit?no    2. Have you seen or consulted any other health care providers outside of the [address] [place] [address] since your last visit? Include any pap smears or colon screening. no        Social Determinants of Health     Tobacco Use: High Risk (10/10/2023)    Patient History     Smoking Tobacco Use: Former     Smokeless Tobacco Use: Current     Passive Exposure: Not on file   Alcohol Use: Not on file   Financial Resource Strain: High Risk (5/26/2023)    Overall Financial Resource Strain (CARDIA)     Difficulty of Paying Living Expenses: Very hard   Food Insecurity: Food Insecurity Present (5/26/2023)    Hunger Vital Sign     Worried About Running Out of Food in the Last Year: Often true     Ran Out of Food in the Last Year: Sometimes true   Transportation Needs: Unmet Transportation Needs (5/26/2023)    PRAPARE - Transportation     Lack of Transportation (Medical):  Not on file     Lack of Transportation (Non-Medical): Yes   Physical Activity: Not on file   Stress: Not on file   Social Connections: Not on file   Intimate Partner Violence: Not on file   Depression: Not at risk (10/18/2023)    PHQ-2     PHQ-2 Score: 0   Housing Stability: Unknown (5/26/2023)    Housing Stability Vital Sign     Unable to Pay for Housing in the Last Year: Not on file     Number of Places Lived in the Last Year: Not on file     Unstable Housing in the Last Year: No

## 2023-10-20 LAB — HCG INTACT+B SERPL-ACNC: <1 MIU/ML

## 2023-10-24 ENCOUNTER — HOSPITAL ENCOUNTER (EMERGENCY)
Facility: HOSPITAL | Age: 43
Discharge: HOME | End: 2023-10-24
Payer: MEDICAID

## 2023-10-24 VITALS
RESPIRATION RATE: 18 BRPM | HEIGHT: 65 IN | OXYGEN SATURATION: 97 % | HEART RATE: 106 BPM | SYSTOLIC BLOOD PRESSURE: 110 MMHG | BODY MASS INDEX: 44.98 KG/M2 | WEIGHT: 270 LBS | TEMPERATURE: 98 F | DIASTOLIC BLOOD PRESSURE: 54 MMHG

## 2023-10-24 DIAGNOSIS — G89.29 CHRONIC PAIN OF LEFT ANKLE: Primary | ICD-10-CM

## 2023-10-24 DIAGNOSIS — M25.572 CHRONIC PAIN OF LEFT ANKLE: Primary | ICD-10-CM

## 2023-10-24 PROCEDURE — 99284 EMERGENCY DEPT VISIT MOD MDM: CPT

## 2023-10-24 PROCEDURE — 6370000000 HC RX 637 (ALT 250 FOR IP)

## 2023-10-24 PROCEDURE — 96372 THER/PROPH/DIAG INJ SC/IM: CPT

## 2023-10-24 PROCEDURE — 6360000002 HC RX W HCPCS: Performed by: EMERGENCY MEDICINE

## 2023-10-24 RX ORDER — ONDANSETRON 4 MG/1
4 TABLET, ORALLY DISINTEGRATING ORAL ONCE
Status: COMPLETED | OUTPATIENT
Start: 2023-10-24 | End: 2023-10-24

## 2023-10-24 RX ORDER — FENTANYL CITRATE 50 UG/ML
50 INJECTION, SOLUTION INTRAMUSCULAR; INTRAVENOUS ONCE
Status: COMPLETED | OUTPATIENT
Start: 2023-10-24 | End: 2023-10-24

## 2023-10-24 RX ORDER — OXYCODONE HYDROCHLORIDE AND ACETAMINOPHEN 5; 325 MG/1; MG/1
1 TABLET ORAL
Status: DISCONTINUED | OUTPATIENT
Start: 2023-10-24 | End: 2023-10-24

## 2023-10-24 RX ORDER — FENTANYL CITRATE 50 UG/ML
25 INJECTION, SOLUTION INTRAMUSCULAR; INTRAVENOUS
Status: DISCONTINUED | OUTPATIENT
Start: 2023-10-24 | End: 2023-10-24

## 2023-10-24 RX ORDER — TRAMADOL HYDROCHLORIDE 50 MG/1
50 TABLET ORAL
Status: COMPLETED | OUTPATIENT
Start: 2023-10-24 | End: 2023-10-24

## 2023-10-24 RX ADMIN — TRAMADOL HYDROCHLORIDE 50 MG: 50 TABLET ORAL at 07:53

## 2023-10-24 RX ADMIN — FENTANYL CITRATE 50 MCG: 50 INJECTION, SOLUTION INTRAMUSCULAR; INTRAVENOUS at 09:09

## 2023-10-24 RX ADMIN — ONDANSETRON 4 MG: 4 TABLET, ORALLY DISINTEGRATING ORAL at 07:52

## 2023-10-24 ASSESSMENT — PAIN DESCRIPTION - LOCATION: LOCATION: ANKLE

## 2023-10-24 ASSESSMENT — PAIN SCALES - GENERAL
PAINLEVEL_OUTOF10: 7
PAINLEVEL_OUTOF10: 8

## 2023-10-24 ASSESSMENT — PAIN - FUNCTIONAL ASSESSMENT: PAIN_FUNCTIONAL_ASSESSMENT: 0-10

## 2023-10-24 NOTE — ED NOTES
EMERGENCY DEPARTMENT HISTORY AND PHYSICAL EXAM      Date: 10/24/2023  Patient Name: Chung Schaefer    History of Presenting Illness     Chief Complaint   Patient presents with    Ankle Pain     BIBEMS. Left  ankle pain since last night. Previous left ankle surgery in January. Scheduled for surgery on Monday to have hardware removed. She woke up in the middle of the night with left ankle swelling and uncontrolled pain. She did have a lot of walking activity yesterday. Pain is not relieved with home oxycodone         HPI: History From: patient, History limited by: none  Chung Schaefer, 38 y.o. female presents to the ED with cc of left ankle pain that has been intermittent and ongoing since initial injury in 01/2023. Patient reports that she originally had ankle surgery with multiple plates and screws to her left ankle. Patient has been seen multiple times for acute on chronic left ankle pain this year. Today she reports that she woke from sleep and was having uncontrolled left ankle pain on her home Percocet after frequent ambulation yesterday. Patient was scheduled to have surgery yesterday for hardware removal but was found to have elevated hCG despite having prior hysterectomy. Surgery center declined her surgery and requested that she obtain follow-up with OB/GYN for further evaluation regarding elevated hCG. Pain rated as a 7/10 in severity. There are no other complaints, changes, or physical findings at this time. PCP: Marciano Vick MD    No current facility-administered medications on file prior to encounter.      Current Outpatient Medications on File Prior to Encounter   Medication Sig Dispense Refill    escitalopram (LEXAPRO) 20 MG tablet Take 1 tablet by mouth daily      mirtazapine (REMERON) 15 MG tablet Take 1 tablet by mouth nightly      ibuprofen (ADVIL;MOTRIN) 800 MG tablet Take 1 tablet by mouth 2 times daily as needed for Pain (Patient not taking: Reported on

## 2023-10-28 ENCOUNTER — HOSPITAL ENCOUNTER (EMERGENCY)
Facility: HOSPITAL | Age: 43
Discharge: HOME | End: 2023-10-29
Attending: STUDENT IN AN ORGANIZED HEALTH CARE EDUCATION/TRAINING PROGRAM
Payer: MEDICAID

## 2023-10-28 ENCOUNTER — APPOINTMENT (OUTPATIENT)
Facility: HOSPITAL | Age: 43
End: 2023-10-28
Payer: MEDICAID

## 2023-10-28 VITALS
TEMPERATURE: 98.5 F | DIASTOLIC BLOOD PRESSURE: 80 MMHG | OXYGEN SATURATION: 95 % | RESPIRATION RATE: 18 BRPM | SYSTOLIC BLOOD PRESSURE: 109 MMHG | HEART RATE: 120 BPM | BODY MASS INDEX: 46.1 KG/M2 | HEIGHT: 64 IN | WEIGHT: 270 LBS
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
## 2023-10-28 DIAGNOSIS — S82.852D DISPLACED TRIMALLEOLAR FRACTURE OF LEFT LOWER LEG, SUBSEQUENT ENCOUNTER FOR CLOSED FRACTURE WITH ROUTINE HEALING: ICD-10-CM

## 2023-10-28 PROCEDURE — 70450 CT HEAD/BRAIN W/O DYE: CPT

## 2023-10-28 PROCEDURE — 72125 CT NECK SPINE W/O DYE: CPT

## 2023-10-28 PROCEDURE — 6370000000 HC RX 637 (ALT 250 FOR IP): Performed by: STUDENT IN AN ORGANIZED HEALTH CARE EDUCATION/TRAINING PROGRAM

## 2023-10-28 PROCEDURE — 4500000002 HC ER NO CHARGE

## 2023-10-28 PROCEDURE — 99283 EMERGENCY DEPT VISIT LOW MDM: CPT

## 2023-10-28 PROCEDURE — 73610 X-RAY EXAM OF ANKLE: CPT

## 2023-10-28 RX ORDER — OXYCODONE HYDROCHLORIDE 5 MG/1
5 TABLET ORAL
Status: COMPLETED | OUTPATIENT
Start: 2023-10-28 | End: 2023-10-28

## 2023-10-28 RX ORDER — ACETAMINOPHEN 500 MG
1000 TABLET ORAL
Status: DISCONTINUED | OUTPATIENT
Start: 2023-10-28 | End: 2023-10-29 | Stop reason: HOSPADM

## 2023-10-28 RX ADMIN — OXYCODONE HYDROCHLORIDE 5 MG: 5 TABLET ORAL at 23:45

## 2023-10-28 ASSESSMENT — PAIN - FUNCTIONAL ASSESSMENT: PAIN_FUNCTIONAL_ASSESSMENT: 0-10

## 2023-10-28 ASSESSMENT — PAIN SCALES - GENERAL
PAINLEVEL_OUTOF10: 8
PAINLEVEL_OUTOF10: 8

## 2023-10-29 PROCEDURE — 6370000000 HC RX 637 (ALT 250 FOR IP): Performed by: STUDENT IN AN ORGANIZED HEALTH CARE EDUCATION/TRAINING PROGRAM

## 2023-10-29 PROCEDURE — 6360000002 HC RX W HCPCS: Performed by: STUDENT IN AN ORGANIZED HEALTH CARE EDUCATION/TRAINING PROGRAM

## 2023-10-29 RX ORDER — KETOROLAC TROMETHAMINE 30 MG/ML
30 INJECTION, SOLUTION INTRAMUSCULAR; INTRAVENOUS
Status: COMPLETED | OUTPATIENT
Start: 2023-10-29 | End: 2023-10-29

## 2023-10-29 RX ORDER — LIDOCAINE 4 G/G
1 PATCH TOPICAL
Status: DISCONTINUED | OUTPATIENT
Start: 2023-10-29 | End: 2023-10-29 | Stop reason: HOSPADM

## 2023-10-29 RX ORDER — IBUPROFEN 800 MG/1
800 TABLET ORAL EVERY 6 HOURS PRN
Qty: 56 TABLET | Refills: 0 | Status: SHIPPED | OUTPATIENT
Start: 2023-10-29 | End: 2023-11-12

## 2023-10-29 RX ORDER — ONDANSETRON 4 MG/1
4 TABLET, ORALLY DISINTEGRATING ORAL ONCE
Status: COMPLETED | OUTPATIENT
Start: 2023-10-29 | End: 2023-10-29

## 2023-10-29 RX ORDER — IBUPROFEN 800 MG/1
800 TABLET ORAL 2 TIMES DAILY PRN
Qty: 60 TABLET | Refills: 0 | Status: SHIPPED | OUTPATIENT
Start: 2023-10-29

## 2023-10-29 RX ORDER — OXYCODONE HYDROCHLORIDE 5 MG/1
5 TABLET ORAL
Status: COMPLETED | OUTPATIENT
Start: 2023-10-29 | End: 2023-10-29

## 2023-10-29 RX ADMIN — ONDANSETRON 4 MG: 4 TABLET, ORALLY DISINTEGRATING ORAL at 00:29

## 2023-10-29 RX ADMIN — OXYCODONE HYDROCHLORIDE 5 MG: 5 TABLET ORAL at 01:12

## 2023-10-29 RX ADMIN — KETOROLAC TROMETHAMINE 30 MG: 30 INJECTION, SOLUTION INTRAMUSCULAR; INTRAVENOUS at 00:10

## 2023-10-29 ASSESSMENT — PAIN SCALES - GENERAL
PAINLEVEL_OUTOF10: 9
PAINLEVEL_OUTOF10: 7
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
## 2023-12-13 ENCOUNTER — HOSPITAL ENCOUNTER (EMERGENCY)
Facility: HOSPITAL | Age: 43
Discharge: HOME | End: 2023-12-13
Attending: EMERGENCY MEDICINE
Payer: MEDICAID

## 2023-12-13 VITALS
TEMPERATURE: 97.7 F | DIASTOLIC BLOOD PRESSURE: 46 MMHG | SYSTOLIC BLOOD PRESSURE: 95 MMHG | HEART RATE: 118 BPM | HEIGHT: 64 IN | BODY MASS INDEX: 48.18 KG/M2 | OXYGEN SATURATION: 95 % | WEIGHT: 282.19 LBS | RESPIRATION RATE: 22 BRPM

## 2023-12-13 DIAGNOSIS — F11.229 OPIOID DEPENDENCE WITH INTOXICATION WITH COMPLICATION (HCC): ICD-10-CM

## 2023-12-13 DIAGNOSIS — F33.1 MODERATE EPISODE OF RECURRENT MAJOR DEPRESSIVE DISORDER (HCC): ICD-10-CM

## 2023-12-13 DIAGNOSIS — S93.602A SPRAIN OF LEFT FOOT, INITIAL ENCOUNTER: Primary | ICD-10-CM

## 2023-12-13 DIAGNOSIS — M25.572 CHRONIC PAIN OF LEFT ANKLE: ICD-10-CM

## 2023-12-13 DIAGNOSIS — G89.29 CHRONIC PAIN OF LEFT ANKLE: ICD-10-CM

## 2023-12-13 LAB
ALBUMIN SERPL-MCNC: 3.2 G/DL (ref 3.5–5)
ALBUMIN/GLOB SERPL: 0.8 (ref 1.1–2.2)
ALP SERPL-CCNC: 124 U/L (ref 45–117)
ALT SERPL-CCNC: 62 U/L (ref 12–78)
AMPHET UR QL SCN: NEGATIVE
ANION GAP SERPL CALC-SCNC: 7 MMOL/L (ref 5–15)
APAP SERPL-MCNC: <2 UG/ML (ref 10–30)
APPEARANCE UR: ABNORMAL
AST SERPL-CCNC: 44 U/L (ref 15–37)
BACTERIA URNS QL MICRO: ABNORMAL /HPF
BARBITURATES UR QL SCN: NEGATIVE
BENZODIAZ UR QL: NEGATIVE
BILIRUB SERPL-MCNC: 0.2 MG/DL (ref 0.2–1)
BILIRUB UR QL: NEGATIVE
BUN SERPL-MCNC: 15 MG/DL (ref 6–20)
BUN/CREAT SERPL: 19 (ref 12–20)
CALCIUM SERPL-MCNC: 8.7 MG/DL (ref 8.5–10.1)
CANNABINOIDS UR QL SCN: NEGATIVE
CHLORIDE SERPL-SCNC: 107 MMOL/L (ref 97–108)
CO2 SERPL-SCNC: 28 MMOL/L (ref 21–32)
COCAINE UR QL SCN: NEGATIVE
COLOR UR: ABNORMAL
CREAT SERPL-MCNC: 0.79 MG/DL (ref 0.55–1.02)
EKG ATRIAL RATE: 105 BPM
EKG DIAGNOSIS: NORMAL
EKG P AXIS: 35 DEGREES
EKG P-R INTERVAL: 140 MS
EKG Q-T INTERVAL: 358 MS
EKG QRS DURATION: 102 MS
EKG QTC CALCULATION (BAZETT): 473 MS
EKG R AXIS: 38 DEGREES
EKG T AXIS: 55 DEGREES
EKG VENTRICULAR RATE: 105 BPM
EPITH CASTS URNS QL MICRO: ABNORMAL /LPF
ETHANOL SERPL-MCNC: 216 MG/DL (ref 0–0.08)
FLUAV RNA SPEC QL NAA+PROBE: NOT DETECTED
FLUBV RNA SPEC QL NAA+PROBE: NOT DETECTED
GLOBULIN SER CALC-MCNC: 4.2 G/DL (ref 2–4)
GLUCOSE SERPL-MCNC: 125 MG/DL (ref 65–100)
GLUCOSE UR STRIP.AUTO-MCNC: NEGATIVE MG/DL
HCG UR QL: NEGATIVE
HGB UR QL STRIP: NEGATIVE
HYALINE CASTS URNS QL MICRO: ABNORMAL /LPF (ref 0–2)
KETONES UR QL STRIP.AUTO: NEGATIVE MG/DL
LEUKOCYTE ESTERASE UR QL STRIP.AUTO: NEGATIVE
Lab: NORMAL
METHADONE UR QL: NEGATIVE
NITRITE UR QL STRIP.AUTO: NEGATIVE
OPIATES UR QL: NEGATIVE
PCP UR QL: NEGATIVE
PH UR STRIP: 5.5 (ref 5–8)
POTASSIUM SERPL-SCNC: 4 MMOL/L (ref 3.5–5.1)
PROT SERPL-MCNC: 7.4 G/DL (ref 6.4–8.2)
PROT UR STRIP-MCNC: NEGATIVE MG/DL
RBC #/AREA URNS HPF: ABNORMAL /HPF (ref 0–5)
SALICYLATES SERPL-MCNC: <1.7 MG/DL (ref 2.8–20)
SARS-COV-2 RNA RESP QL NAA+PROBE: NOT DETECTED
SODIUM SERPL-SCNC: 142 MMOL/L (ref 136–145)
SP GR UR REFRACTOMETRY: 1.02
URINE CULTURE IF INDICATED: ABNORMAL
UROBILINOGEN UR QL STRIP.AUTO: 0.2 EU/DL (ref 0.2–1)
WBC URNS QL MICRO: ABNORMAL /HPF (ref 0–4)

## 2023-12-13 PROCEDURE — 80179 DRUG ASSAY SALICYLATE: CPT

## 2023-12-13 PROCEDURE — 6360000002 HC RX W HCPCS: Performed by: EMERGENCY MEDICINE

## 2023-12-13 PROCEDURE — 81025 URINE PREGNANCY TEST: CPT

## 2023-12-13 PROCEDURE — 80307 DRUG TEST PRSMV CHEM ANLYZR: CPT

## 2023-12-13 PROCEDURE — 87636 SARSCOV2 & INF A&B AMP PRB: CPT

## 2023-12-13 PROCEDURE — 82077 ASSAY SPEC XCP UR&BREATH IA: CPT

## 2023-12-13 PROCEDURE — 80053 COMPREHEN METABOLIC PANEL: CPT

## 2023-12-13 PROCEDURE — 96374 THER/PROPH/DIAG INJ IV PUSH: CPT

## 2023-12-13 PROCEDURE — 96375 TX/PRO/DX INJ NEW DRUG ADDON: CPT

## 2023-12-13 PROCEDURE — 81001 URINALYSIS AUTO W/SCOPE: CPT

## 2023-12-13 PROCEDURE — 80143 DRUG ASSAY ACETAMINOPHEN: CPT

## 2023-12-13 PROCEDURE — 99284 EMERGENCY DEPT VISIT MOD MDM: CPT

## 2023-12-13 PROCEDURE — 36415 COLL VENOUS BLD VENIPUNCTURE: CPT

## 2023-12-13 RX ORDER — HALOPERIDOL 5 MG/ML
2 INJECTION INTRAMUSCULAR ONCE
Status: DISCONTINUED | OUTPATIENT
Start: 2023-12-13 | End: 2023-12-13

## 2023-12-13 RX ORDER — KETOROLAC TROMETHAMINE 30 MG/ML
15 INJECTION, SOLUTION INTRAMUSCULAR; INTRAVENOUS
Status: COMPLETED | OUTPATIENT
Start: 2023-12-13 | End: 2023-12-13

## 2023-12-13 RX ORDER — ONDANSETRON 2 MG/ML
4 INJECTION INTRAMUSCULAR; INTRAVENOUS ONCE
Status: COMPLETED | OUTPATIENT
Start: 2023-12-13 | End: 2023-12-13

## 2023-12-13 RX ORDER — ONDANSETRON 4 MG/1
4 TABLET, FILM COATED ORAL EVERY 8 HOURS PRN
Qty: 12 TABLET | Refills: 0 | Status: SHIPPED | OUTPATIENT
Start: 2023-12-13

## 2023-12-13 RX ORDER — HALOPERIDOL 5 MG/ML
2 INJECTION INTRAMUSCULAR ONCE
Status: COMPLETED | OUTPATIENT
Start: 2023-12-13 | End: 2023-12-13

## 2023-12-13 RX ADMIN — KETOROLAC TROMETHAMINE 15 MG: 30 INJECTION, SOLUTION INTRAMUSCULAR; INTRAVENOUS at 02:27

## 2023-12-13 RX ADMIN — ONDANSETRON 4 MG: 2 INJECTION INTRAMUSCULAR; INTRAVENOUS at 02:28

## 2023-12-13 RX ADMIN — HALOPERIDOL LACTATE 2 MG: 5 INJECTION, SOLUTION INTRAMUSCULAR at 05:10

## 2023-12-13 ASSESSMENT — PAIN DESCRIPTION - LOCATION: LOCATION: FOOT

## 2023-12-13 ASSESSMENT — PAIN - FUNCTIONAL ASSESSMENT: PAIN_FUNCTIONAL_ASSESSMENT: NONE - DENIES PAIN

## 2023-12-13 ASSESSMENT — LIFESTYLE VARIABLES
HOW MANY STANDARD DRINKS CONTAINING ALCOHOL DO YOU HAVE ON A TYPICAL DAY: PATIENT DOES NOT DRINK
HOW OFTEN DO YOU HAVE A DRINK CONTAINING ALCOHOL: NEVER

## 2023-12-13 ASSESSMENT — PAIN SCALES - GENERAL: PAINLEVEL_OUTOF10: 4

## 2023-12-13 ASSESSMENT — PAIN DESCRIPTION - ORIENTATION: ORIENTATION: LEFT

## 2023-12-13 NOTE — DISCHARGE INSTRUCTIONS
It was a pleasure taking care of you in our Emergency Department today. We know that when you come to [facility], you are entrusting us with your health, comfort, and safety. Our physicians and nurses honor that trust, and truly appreciate the opportunity to care for you and your loved ones. We also value your feedback. If you receive a survey about your Emergency Department experience today, please fill it out. We care about our patients' feedback, and we listen to what you have to say. Thank you!       Dr. Oleg Regalado MD.

## 2023-12-13 NOTE — [facility] NOTE
Comprehensive Assessment Form Part 1      Section I - Disposition    Primary Diagnosis: Depression                                  Anxiety   Secondary Diagnosis:     The Medical Doctor to Psychiatrist conference was notcompleted. The Medical Doctor is in agreement with Psychiatrist disposition because of (reason) pt meet criteria for admission . The plan is inpatient admission . The on-call Psychiatrist consulted was  .  The admitting Psychiatrist will be  .  The admitting Diagnosis is Depression . The Payor source is Medicaid . This writer reviewed the [facility] in nursing flowsheet and the risk level assigned is low  risk. Based on this assessment, the risk of suicide is low  risk and the plan is inpatient admission . Section II - Integrated Summary  Summary:   Pt came into the hospital due to increased anxiety and depression. She was originally seen for foot pain due to recent surgery. Pt was seen via telehealth, so was laying down in bed. Pt reported that she has had increased depression and anxiety for  the past few days. Pt stated   \" I want to fall asleep and not wake up\". Pt disclosed that after her surgery she was prescribed pain medication and she started to us them . Pt reported she had an issue with opioids 3 years ago and went to rehab at [facility] and managed to stay clean for the past 3 years. Pt reported she has been taking oxycodone and percocet. Pt denies any other substance use. Pt stated she stopped taking them a few weeks ago but she is very disappointed that she relapsed. Pt reported poor sleep and overeating. She has missed work and spent days laying in bed. Pt was tearful throughout the entire assessment stating \" I just need help\". Pt is been treated for depression and anxiety she reports medication compliance. At this time hospitalization is recommended.    The patient has demonstrated mental capacity to provide

## 2023-12-13 NOTE — ED NOTES
[facility] called and advised that they are going to seek admission for this patient. They are requesting we complete a Flu swab, COVID swab, do a ethanol level and pregnancy test, and to try and get her heart rate under 100. They also need a note in the chart regarding the air cast around the patient's left ankle advising whether it is necessary or not. MD and primary RN.        Valerio Hernandez, RN  12/13/23 3701

## 2023-12-13 NOTE — [facility] NOTE
[facility] assessment completed, and suicide risk level noted to be low . Primary Nurse Es Way   and Physician Alex  notified. Concerns not observed.

## 2023-12-13 NOTE — ED NOTES
Patient unhooked herself from all of the monitoring equipment, asked to have her IV removed, and advised that she would like to leave.  Called [facility] and notified primary MD.       Ruddy Benito, RN  12/13/23 2703

## 2023-12-13 NOTE — ED NOTES
Patient discharged from the ED by Dr. Grant Meneses. Diagnosis, medications, precautions and follow-ups were reviewed with the patient/family. Questions were asked and answered prior to departure. Patient departed the ED via ambulation and was accompanied by herself. Patient will be waiting in the waiting room until her Lyft arrives.        Keon Pratt RN  12/13/23 2909

## 2023-12-13 NOTE — [facility] NOTE
Pt requested to leave. She stated she can help more help from her established providers. We safety planned. Pt doesn't have any weapons in the home , and she plans to call her psychiatrist and therapist tomorrow to schedule an earlier appointment. Pt was talking in  calm voice tome anxiety seemed to be decreased.

## 2023-12-13 NOTE — ED NOTES
[facility] advised that they talked to the patient and developed a safety plan for her. They advised that she was good to be discharged. MD notified.        Lea Oliveira RN  12/13/23 [id number]

## 2023-12-13 NOTE — ED NOTES
Patient advised that she is setting up a Lyft to get her home since she has been given Haldol and her alcohol level is above the legal limit.        Darshana Thakkar RN  12/13/23 [id number]

## 2023-12-13 NOTE — ED PROVIDER NOTES
Patient making loud, retching sounds without producing any actual vomitus room. Intermittently able to speak in full, unlabored sentences and then starts crying. PCP: Rebekah Hickey MD    Allergy List:   Allergies   Allergen Reactions    Aspirin Other (See Comments)     Hot sweats and passed out; tolerated ibuprofen    Lorazepam Other (See Comments)     Pt denies allergy    Penicillins Other (See Comments)     Childhood. Does not remember reaction. Is not aware if she has ever taken cephalosporins in the past.    Jan 2019: Pt notes no problems with cephalosporins. Medications:  No current facility-administered medications for this encounter. Current Outpatient Medications   Medication Sig Dispense Refill    ondansetron (ZOFRAN) 4 MG tablet Take 1 tablet by mouth every 8 hours as needed for Nausea or Vomiting 12 tablet 0    mirtazapine (REMERON) 15 MG tablet Take 1 tablet by mouth nightly      gabapentin (NEURONTIN) 400 MG capsule Take 1 capsule by mouth 3 times daily. PAST HISTORY       Past Medical History:  Past Medical History:   Diagnosis Date    ADD (attention deficit disorder) [age]yo    Treated with Adderall since dx. Nov 2013 dosing 20mg AM and 10mg PM.  Trial/change to Vyvanse Nov-Dec 2015--not as effective. Gynecologic disorder     History of miscarriages. 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  Managed non-operatively. Migraines 6/12/2013    Neurological disorder     Pelvic pain in female 9/15/2014    April 2015 gyn laparoscopy/hysteroscopy with endometriosis worse right.     Psychiatric disorder     depression    Secondary dysmenorrhea 8/12/2014    With menorraghia    Seizures ([address]) 2008    never on meds--had appr 4-5 in a short amt of time and none since

## 2024-01-24 NOTE — BH NOTES
GROUP THERAPY PROGRESS NOTE    Shireen Payan is participating in Coping Skills Group. Group time: 1 hour    Personal goal for participation: To practice box breathing exercise, participate in body scan activity, and engage in individual activity. Goal orientation: personal    Group therapy participation: active    Therapeutic interventions reviewed and discussed:     Impression of participation: So Dale participated in group activity and breathing exercise. Disclosed where she felt emotions in her body and  explained the purpose of the exercise. States that she is excited for Sarahi Angelo to be discharged, but will miss her. Congruent

## 2024-04-26 NOTE — PROGRESS NOTES
Spirituality Group      Meeting Topic: Methods of Spiritual Pathways    Meeting Time:  45-60 minutes    Meeting Goal: Patients will discuss various approaches to making spiritual connections. Participants will be invited to explore a new spiritual practice. Group ends with short guided meditation. Today meeting focus:Spiritual connections in [address] [facility] attended and participated in the group appropriately respective of their current diagnosis. For additional spiritual care, please contact the  on-call at ([phone]). Rev.  Greg Cesar MDiv, MS, [facility]  Staff  no